# Patient Record
Sex: FEMALE | Race: WHITE | NOT HISPANIC OR LATINO | Employment: OTHER | URBAN - METROPOLITAN AREA
[De-identification: names, ages, dates, MRNs, and addresses within clinical notes are randomized per-mention and may not be internally consistent; named-entity substitution may affect disease eponyms.]

---

## 2017-01-18 ENCOUNTER — TELEPHONE (OUTPATIENT)
Dept: NEUROLOGY | Facility: CLINIC | Age: 67
End: 2017-01-18

## 2017-01-21 DIAGNOSIS — G25.0 ESSENTIAL TREMOR: Primary | ICD-10-CM

## 2017-01-21 DIAGNOSIS — Z82.0 FAMILY HISTORY OF MOVEMENT DISORDER: ICD-10-CM

## 2017-01-21 PROBLEM — G24.9 DYSTONIC MOVEMENTS: Status: ACTIVE | Noted: 2017-01-21

## 2017-01-21 PROBLEM — G25.2 DYSTONIC TREMOR: Status: ACTIVE | Noted: 2017-01-21

## 2017-01-23 ENCOUNTER — OFFICE VISIT (OUTPATIENT)
Dept: NEUROLOGY | Facility: CLINIC | Age: 67
End: 2017-01-23
Attending: INTERNAL MEDICINE
Payer: MEDICARE

## 2017-01-23 VITALS
BODY MASS INDEX: 24.75 KG/M2 | DIASTOLIC BLOOD PRESSURE: 77 MMHG | HEART RATE: 105 BPM | OXYGEN SATURATION: 98 % | HEIGHT: 63 IN | SYSTOLIC BLOOD PRESSURE: 149 MMHG | WEIGHT: 139.7 LBS

## 2017-01-23 DIAGNOSIS — G25.2 DYSTONIC TREMOR: Primary | ICD-10-CM

## 2017-01-23 DIAGNOSIS — R25.1 TREMOR: ICD-10-CM

## 2017-01-23 DIAGNOSIS — F40.240 CLAUSTROPHOBIA: ICD-10-CM

## 2017-01-23 DIAGNOSIS — G24.9 DYSTONIC MOVEMENTS: ICD-10-CM

## 2017-01-23 LAB
ANION GAP SERPL CALCULATED.3IONS-SCNC: 8 MMOL/L (ref 3–14)
BASOPHILS # BLD AUTO: 0 10E9/L (ref 0–0.2)
BASOPHILS NFR BLD AUTO: 0.3 %
BUN SERPL-MCNC: 16 MG/DL (ref 7–30)
CALCIUM SERPL-MCNC: 9.5 MG/DL (ref 8.5–10.1)
CHLORIDE SERPL-SCNC: 104 MMOL/L (ref 94–109)
CO2 SERPL-SCNC: 29 MMOL/L (ref 20–32)
CREAT SERPL-MCNC: 0.63 MG/DL (ref 0.52–1.04)
DIFFERENTIAL METHOD BLD: ABNORMAL
EOSINOPHIL # BLD AUTO: 0 10E9/L (ref 0–0.7)
EOSINOPHIL NFR BLD AUTO: 0.5 %
ERYTHROCYTE [DISTWIDTH] IN BLOOD BY AUTOMATED COUNT: 12 % (ref 10–15)
GFR SERPL CREATININE-BSD FRML MDRD: NORMAL ML/MIN/1.7M2
GLUCOSE SERPL-MCNC: 97 MG/DL (ref 70–99)
HCT VFR BLD AUTO: 41.6 % (ref 35–47)
HGB BLD-MCNC: 14.4 G/DL (ref 11.7–15.7)
LYMPHOCYTES # BLD AUTO: 2.4 10E9/L (ref 0.8–5.3)
LYMPHOCYTES NFR BLD AUTO: 40 %
MCH RBC QN AUTO: 33.3 PG (ref 26.5–33)
MCHC RBC AUTO-ENTMCNC: 34.6 G/DL (ref 31.5–36.5)
MCV RBC AUTO: 96 FL (ref 78–100)
MONOCYTES # BLD AUTO: 0.5 10E9/L (ref 0–1.3)
MONOCYTES NFR BLD AUTO: 8.2 %
NEUTROPHILS # BLD AUTO: 3.1 10E9/L (ref 1.6–8.3)
NEUTROPHILS NFR BLD AUTO: 51 %
PLATELET # BLD AUTO: 296 10E9/L (ref 150–450)
POTASSIUM SERPL-SCNC: 3.9 MMOL/L (ref 3.4–5.3)
RBC # BLD AUTO: 4.33 10E12/L (ref 3.8–5.2)
SODIUM SERPL-SCNC: 141 MMOL/L (ref 133–144)
WBC # BLD AUTO: 6.1 10E9/L (ref 4–11)

## 2017-01-23 PROCEDURE — 36415 COLL VENOUS BLD VENIPUNCTURE: CPT | Performed by: PSYCHIATRY & NEUROLOGY

## 2017-01-23 PROCEDURE — 85025 COMPLETE CBC W/AUTO DIFF WBC: CPT | Performed by: PSYCHIATRY & NEUROLOGY

## 2017-01-23 PROCEDURE — 80048 BASIC METABOLIC PNL TOTAL CA: CPT | Performed by: PSYCHIATRY & NEUROLOGY

## 2017-01-23 PROCEDURE — 99214 OFFICE O/P EST MOD 30 MIN: CPT | Performed by: PSYCHIATRY & NEUROLOGY

## 2017-01-23 RX ORDER — PRIMIDONE 50 MG/1
TABLET ORAL
Qty: 90 TABLET | Refills: 11 | Status: SHIPPED | OUTPATIENT
Start: 2017-01-23 | End: 2017-05-03

## 2017-01-23 RX ORDER — TRIHEXYPHENIDYL HYDROCHLORIDE 2 MG/1
TABLET ORAL
Qty: 30 TABLET | Refills: 11 | Status: SHIPPED | OUTPATIENT
Start: 2017-01-23 | End: 2017-05-03

## 2017-01-23 RX ORDER — LORAZEPAM 1 MG/1
TABLET ORAL
Qty: 3 TABLET | Refills: 0 | Status: SHIPPED | OUTPATIENT
Start: 2017-01-23 | End: 2017-05-03

## 2017-01-23 ASSESSMENT — PAIN SCALES - GENERAL: PAINLEVEL: NO PAIN (0)

## 2017-01-23 NOTE — NURSING NOTE
"Irish Rosales's goals for this visit include: consult  She requests these members of her care team be copied on today's visit information:     PCP: Braulio Victoria    Referring Provider:  Braulio Victoria MD  [facility]  [address]  [facility] [place] [address]    Chief Complaint   Patient presents with     Consult       Initial /77 mmHg  Pulse 105  Ht 1.6 m (5' 3\")  Wt 63.368 kg (139 lb 11.2 oz)  BMI 24.75 kg/m2  SpO2 98% Estimated body mass index is 24.75 kg/(m^2) as calculated from the following:    Height as of this encounter: 1.6 m (5' 3\").    Weight as of this encounter: 63.368 kg (139 lb 11.2 oz).  BP completed using cuff size: regular    Do you need any medication refills at today's visit? n  "

## 2017-01-23 NOTE — PROGRESS NOTES
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  She has had hand tremor her whole life.  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   She has neck head movements for 2-3 yrs.   She has  Received botox by [facility] - last injection was September 2016.     Family history of tremor in her mother  2 of 3 sisters have abnormal involuntary movements  Jazmyn voltz  [id number] orofacial dyskinesias -   Piedad Belz [id number]  laryngeal dystonia and laryngeal tremor and laryngeal spasm.     Plan  Consultation with Dr. Rod for botox  Consultation with Bryan Vasques for genetics - may have insurance coverage issues.   Consideration for primidone again  She has not been on artane  She has not had recent lft or cbc in her chart.   She is getting xanax from her pcp as needed.   She is interested in a dbs workup.   She may want to come in with Jazmyn and Piedad - both of whom have movement problems to have them videotaped if they agree at the same time.  Dystonia and tremor rating scale.   Return to be determined. May have her see me, Juhi or Dr. Haile.  Will need ativan for mri as has claustrophobia  Cognitive evaluation with Lilo Drew.     Carrillo Esposito MD  _____________________________________________________________________  Cc: 66 year old female   Consult requested by pcp    Outside records reviewed and revealed --      History obtained from patient and family      History of Present Illness  66 yrs old .     She has been seeing dr. Harris for botox of her face and neck. She had an injection in her neck in September that did not seem to work. She has been doing botox for about 4 yrs. She has had benefit in the past.     She has a family history of tremor in her mother  One sister has laryngeal spasm  Another sister has had orofacial dyskinesias     Her tremor improves with alcohol.   She cannot write well till after she has had a glass of wine.   She has been on primidone  in the past - was over 10 yrs ago and no clear side effects  She is taking zoloft for depression and depression  She wears glasses.   Vision is fine  She has no hearing problems  She sleeps okay  She denies snoring  Denies infection  Denies blood problems  Does not have heart problems  Denies lung problems  On cholesterol medication but does not have thyroid or diabetes  Does not have msk problems or bladder or bowel problems  She denies choking  She has a Rx for xanax for tremors and uses it once or twice per month.       14 Review of systems  are negative except for   Patient Active Problem List   Diagnosis     Spasm of vocal cords     Essential tremor     Osteoporosis     Hyperlipidemia LDL goal <160     Generalized anxiety disorder     History of major depression     Carpal tunnel syndrome     Closed nondisp fracture of distal phalanx of lesser toe of right foot     Cervical radiculopathy     Right shoulder pain     Impingement syndrome of right shoulder     Family history of tremor     Family history of movement disorder     Dystonic tremor     Dystonic movements        Allergies   Allergen Reactions     Sulfa Drugs Swelling and Rash     Atorvastatin      Leg cramps     Simvastatin      Leg cramp     Past Surgical History   Procedure Laterality Date     C bso, omentectomy w/demetris  1997     hysterectomy     C hand/finger surgery unlisted  1998     right carpal tunnel surgery     ------------other-------------  2004     vocal cord thyroplasty at [facility]     Release carpal tunnel Left 1/2/2015     Procedure: RELEASE CARPAL TUNNEL;  Surgeon: SHANIA Hernandez MD;  Location: [facility] OR     Release ulnar nerve (elbow) Left 1/2/2015     Procedure: RELEASE ULNAR NERVE (ELBOW);  Surgeon: SHANIA Hernandez MD;  Location: [facility] OR     Past Medical History   Diagnosis Date     Osteoporosis 8/2/2010     Family history of tremor 1/21/2017     Family history of movement disorder 1/21/2017     Dystonic tremor 1/21/2017      Dystonic movements 1/21/2017     Social History     Social History     Marital Status:      Spouse Name: N/A     Number of Children: N/A     Years of Education: N/A     Occupational History     Not on file.     Social History Main Topics     Smoking status: Never Smoker      Smokeless tobacco: Never Used     Alcohol Use: Yes      Comment: occasionally     Drug Use: No     Sexual Activity:     Partners: Male     Other Topics Concern      Service No     Blood Transfusions No     Caffeine Concern No     Occupational Exposure No     Hobby Hazards No     Sleep Concern No     Stress Concern No     Weight Concern No     Special Diet No     Back Care No     Exercise Yes     walk     Bike Helmet Yes     Seat Belt Yes     Self-Exams Yes     Social History Narrative        Lives in [place]    Daughter Karyn Sanchez        benign essential tremor with a strained quality to her voice consistent with mild laryngeal spasm        ALLERGIES:  She is allergic to sulfa drugs, atorvastatin and simvastatin.  The statin drugs caused leg cramps.            FAMILY HISTORY:  As noted above with 1 sister developing a voice tremor and another sister having what is described as a facial tremor.            SOCIAL HISTORY:  She does not smoke.  She does use alcohol on occasion.      Family History   Problem Relation Age of Onset     Hypertension Father      and mother     CEREBROVASCULAR DISEASE Father      Tremor Sister      voice tremor     Tremor Sister      facial tremor      Current Outpatient Prescriptions   Medication Sig Dispense Refill     rosuvastatin (CRESTOR) 5 MG tablet 1 tablet 3 times a week 45 tablet 1     sertraline (ZOLOFT) 100 MG tablet TAKE 1 TABLET (100 MG) BY MOUTH DAILY 90 tablet 0     botulinum toxin type A (BOTOX) 100 UNITS injection Inject 0.5units bilaterally for a total of 1.0 units 1 each      alprazolam (XANAX) 1 MG tablet Take 1 mg by mouth 3 times daily as needed.    "      Examination  B/P: 149/77, T: Data Unavailable, P: 105, R: Data Unavailable 139 lbs 11.2 oz  Blood pressure 149/77, pulse 105, height 1.6 m (5' 3\"), weight 63.368 kg (139 lb 11.2 oz), SpO2 98 %, not currently breastfeeding., Body mass index is 24.75 kg/(m^2).    General examination: well developed, nourished and normal affect  Carotid: No bruits. Chest CTA, Heart regular without gallops or murmurs. Abdomen soft nontender, no masses, bowel sounds intact. Periphery: normal pulses without edema. No skin lesions. MENTAL STATUS:  Alert, oriented x3.  Speech fluent with normal naming, repetition, comprehension.  Good right-left orientation, Can remember 3/3 objects.   5/5 on spelling world backwards. CRANIAL NERVES:  Disks flat. Pupils are equal, round, reactive to light.  Normal vascularity and fields. Extraocular movements full.  Facial sensation and movement normal.  Hearing intact. Palate moves symmetrically.  Tongue midline.  Sternocleidomastoid and trapezius strength intact.  Neck strength was normal.  NEUROLOGIC:  Tone: slight increased tone in legs. Motor in upper and lower extremities. 5/5.  Reflexes 2/4.  Toe signs downgoing.  Good finger-nose-finger, fine finger movement, heel-shin maneuver, sensation to light touch, position sense and vibration and temperature was normal. Gait normal. Romberg and postural stability intact Tremor  - vocal tremor with some dystonic features and has head/neck involvement. She has a postural tremor - more on the left than right. She has an irregular head tremor with left anterior movement and posterior movement along with some slight improvement with a sensory trick. She also involuntary movements of her forehead, and lower face.         Skyler Salas MD at 12/21/2016  3:14 PM       Status: Signed         Expand All Collapse All    HISTORY OF PRESENT ILLNESS:  Irish Rosales is a 66-year-old female referred for evaluation of tremor.        She tells me she has had a " "tremor of her hands \"most of her life.\"  For many years she has also had a voice tremor.  She did undergo vocal cord thyroplasty at the [facility] in the past for this.  I do note she has recently seen Dr. Christiano Mendenhall who felt that she had an essential tremor with mild laryngeal spasm and discussed Botox treatment for this.  Over the past year or so she has developed a tremor of her head and also facial movements.        She has been seen at the [facility] in the past and received Botox injections.  She recalls receiving them in her posterior cervical muscles and also her facial muscles.  Initially there was some improvement but the last round that apparently was just restricted to the neck really did not help.        In the past, she has tried propranolol and primidone, which did not help.  The [facility] notes indicates that Topamax was prescribed, but she cannot recall taking it.  She does take alprazolam as needed on occasion and it helps particularly at night.  She also has found alcohol helpful.        FAMILY HISTORY:  Notable for a sister who has a \"facial tremor\" and another sister who is developing a voice tremor.  There is no family history of limb tremor.        PAST MEDICAL HISTORY:  Otherwise rather unremarkable aside from anxiety and hyperlipidemia.        CURRENT MEDICATIONS:  Crestor, Zoloft and alprazolam.        ALLERGIES:  She is allergic to sulfa drugs, atorvastatin and simvastatin.  The statin drugs caused leg cramps.        FAMILY HISTORY:  As noted above with 1 sister developing a voice tremor and another sister having what is described as a facial tremor.        SOCIAL HISTORY:  She does not smoke.  She does use alcohol on occasion.        PHYSICAL EXAMINATION:    VITAL SIGNS:  The patient's heart rate is 71.  Blood pressure 142/76.    NEUROLOGIC:  She is alert and cooperative.  She has difficult to understand speech.  There is a tremulous quality to her voice but also a halting " quality.  She does have a tremor of the head which varies in its direction and sometimes is more pronounced towards the left and other times more pronounced to the right.  At times it is mainly horizontal and at other times it has a vertical component.  She has intermittent clonic movements of the facial muscles that involve the lower facial muscles more than the upper.  There is no blepharospasm.  She sometimes does raise her eyebrows.  Fundi are unremarkable.  Extraocular motility is full.  Facial strength and sensation are normal. Her palate moves in the midline.  There is no palatal myoclonus appreciated.  There are no abnormal tongue movements appreciated.  Limb strength and sensation are normal.  She has a very mild postural and action tremor of the upper extremities.  There are no dystonic limb movements.    TONE:  Her muscle tone is normal.    GAIT:  Normal.    REFLEXES:  2+.  Plantar responses are flexor.        IMPRESSION:  Complex tremor.        She appears to have a dystonic head tremor.  The facial movements are unusual in my experience.  They do not necessarily appear dystonic to me.  She has a mild postural and action tremor of the upper extremities and a complex voice tremor.      I suspect this is a familial disorder and probably some form of dystonia, but I cannot characterize it any further than that.        PLAN:  I do note she is scheduled to see Dr. Carrillo Esposito in February and I encouraged her to keep that appointment.  He is an expert in abnormal movements.  She understands today that I do not have any specific treatment to offer her.  We briefly discussed trying an anticholinergic agent but given the potential for side effects she declined.            SHWETA DENNEY MD                 D: 2016 13:42   T: 2016 14:19   MT: INGE#[id number]        Name:     HARSHAD PAYNE    MRN:      [id number]        Account:      [id number]    :      1950           Visit Date:   2016         Document: [id number]         cc: Christiano Victoria MD

## 2017-01-23 NOTE — PATIENT INSTRUCTIONS
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  She has had hand tremor her whole life.  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   She has neck head movements for 2-3 yrs.   She has  Received botox by [facility] - last injection was September 2016.     Family history of tremor in her mother  2 of 3 sisters have abnormal involuntary movements  Jazmyn voltz  [id number] orofacial dyskinesias -   Piedad Belz [id number]  laryngeal dystonia and laryngeal tremor and laryngeal spasm.     Plan  Consultation with Dr. Rod for botox  Consultation with Bryan Vasques for genetics - may have insurance coverage issues.   Consideration for primidone again  She has not been on artane  She has not had recent lft or cbc in her chart.   She is getting xanax from her pcp as needed.   She is interested in a dbs workup.   She may want to come in with Jazmyn and Piedad - both of whom have movement problems to have them videotaped if they agree at the same time.  Dystonia and tremor rating scale.   Return to be determined. May have her see me, Juhi or Dr. Haile.  Will need ativan for mri as has claustrophobia  Cognitive evaluation with Lilo Drew.     Carrillo Esposito MD

## 2017-01-23 NOTE — Clinical Note
1/23/2017       RE: Irish Rosales  [address] [facility] [address]     Dear Colleague,    Thank you for referring your patient, Irish Rosales, to the [facility] at [facility]. Please see a copy of my visit note below.      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  She has had hand tremor her whole life.  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   She has neck head movements for 2-3 yrs.   She has  Received botox by [facility] - last injection was September 2016.     Family history of tremor in her mother  2 of 3 sisters have abnormal involuntary movements  Jazmyn voltz  [id number] orofacial dyskinesias -   Piedad Belz [id number]  laryngeal dystonia and laryngeal tremor and laryngeal spasm.     Plan  Consultation with Dr. Rod for botox  Consultation with Bryan Vasques for genetics - may have insurance coverage issues.   Consideration for primidone again  She has not been on artane  She has not had recent lft or cbc in her chart.   She is getting xanax from her pcp as needed.   She is interested in a dbs workup.   She may want to come in with Jazmyn and Piedad - both of whom have movement problems to have them videotaped if they agree at the same time.   Return to be determined. May have her see me, Juhi or Dr. Haile.    Carrillo Esposito MD  _____________________________________________________________________  Cc: 66 year old female   Consult requested by pcp    Outside records reviewed and revealed --      History obtained from patient and family      History of Present Illness  66 yrs old .     She has been seeing dr. Harris for botox of her face and neck. She had an injection in her neck in September that did not seem to work. She has been doing botox for about 4 yrs. She has had benefit in the past.     She has a family history of tremor in her mother  One sister has laryngeal spasm  Another  sister has had orofacial dyskinesias     Her tremor improves with alcohol.   She cannot write well till after she has had a glass of wine.   She has been on primidone in the past - was over 10 yrs ago and no clear side effects  She is taking zoloft for depression and depression  She wears glasses.   Vision is fine  She has no hearing problems  She sleeps okay  She denies snoring  Denies infection  Denies blood problems  Does not have heart problems  Denies lung problems  On cholesterol medication but does not have thyroid or diabetes  Does not have msk problems or bladder or bowel problems  She denies choking  She has a Rx for xanax for tremors and uses it once or twice per month.       14 Review of systems  are negative except for   Patient Active Problem List   Diagnosis     Spasm of vocal cords     Essential tremor     Osteoporosis     Hyperlipidemia LDL goal <160     Generalized anxiety disorder     History of major depression     Carpal tunnel syndrome     Closed nondisp fracture of distal phalanx of lesser toe of right foot     Cervical radiculopathy     Right shoulder pain     Impingement syndrome of right shoulder     Family history of tremor     Family history of movement disorder     Dystonic tremor     Dystonic movements        Allergies   Allergen Reactions     Sulfa Drugs Swelling and Rash     Atorvastatin      Leg cramps     Simvastatin      Leg cramp     Past Surgical History   Procedure Laterality Date     C bso, omentectomy w/demetris  1997     hysterectomy     C hand/finger surgery unlisted  1998     right carpal tunnel surgery     ------------other-------------  2004     vocal cord thyroplasty at [facility]     Release carpal tunnel Left 1/2/2015     Procedure: RELEASE CARPAL TUNNEL;  Surgeon: SHANIA Hernandez MD;  Location: [facility] OR     Release ulnar nerve (elbow) Left 1/2/2015     Procedure: RELEASE ULNAR NERVE (ELBOW);  Surgeon: SHANIA Hernandez MD;  Location: [facility] OR     Past Medical  History   Diagnosis Date     Osteoporosis 8/2/2010     Family history of tremor 1/21/2017     Family history of movement disorder 1/21/2017     Dystonic tremor 1/21/2017     Dystonic movements 1/21/2017     Social History     Social History     Marital Status:      Spouse Name: N/A     Number of Children: N/A     Years of Education: N/A     Occupational History     Not on file.     Social History Main Topics     Smoking status: Never Smoker      Smokeless tobacco: Never Used     Alcohol Use: Yes      Comment: occasionally     Drug Use: No     Sexual Activity:     Partners: Male     Other Topics Concern      Service No     Blood Transfusions No     Caffeine Concern No     Occupational Exposure No     Hobby Hazards No     Sleep Concern No     Stress Concern No     Weight Concern No     Special Diet No     Back Care No     Exercise Yes     walk     Bike Helmet Yes     Seat Belt Yes     Self-Exams Yes     Social History Narrative        Lives in [place]    Daughter Karyn Sanchez        benign essential tremor with a strained quality to her voice consistent with mild laryngeal spasm        ALLERGIES:  She is allergic to sulfa drugs, atorvastatin and simvastatin.  The statin drugs caused leg cramps.            FAMILY HISTORY:  As noted above with 1 sister developing a voice tremor and another sister having what is described as a facial tremor.            SOCIAL HISTORY:  She does not smoke.  She does use alcohol on occasion.      Family History   Problem Relation Age of Onset     Hypertension Father      and mother     CEREBROVASCULAR DISEASE Father      Tremor Sister      voice tremor     Tremor Sister      facial tremor      Current Outpatient Prescriptions   Medication Sig Dispense Refill     rosuvastatin (CRESTOR) 5 MG tablet 1 tablet 3 times a week 45 tablet 1     sertraline (ZOLOFT) 100 MG tablet TAKE 1 TABLET (100 MG) BY MOUTH DAILY 90 tablet 0     botulinum toxin type A (BOTOX) 100  "UNITS injection Inject 0.5units bilaterally for a total of 1.0 units 1 each      alprazolam (XANAX) 1 MG tablet Take 1 mg by mouth 3 times daily as needed.         Examination  B/P: 149/77, T: Data Unavailable, P: 105, R: Data Unavailable 139 lbs 11.2 oz  Blood pressure 149/77, pulse 105, height 1.6 m (5' 3\"), weight 63.368 kg (139 lb 11.2 oz), SpO2 98 %, not currently breastfeeding., Body mass index is 24.75 kg/(m^2).    General examination: well developed, nourished and normal affect  Carotid: No bruits. Chest CTA, Heart regular without gallops or murmurs. Abdomen soft nontender, no masses, bowel sounds intact. Periphery: normal pulses without edema. No skin lesions. MENTAL STATUS:  Alert, oriented x3.  Speech fluent with normal naming, repetition, comprehension.  Good right-left orientation, Can remember 3/3 objects.   5/5 on spelling world backwards. CRANIAL NERVES:  Disks flat. Pupils are equal, round, reactive to light.  Normal vascularity and fields. Extraocular movements full.  Facial sensation and movement normal.  Hearing intact. Palate moves symmetrically.  Tongue midline.  Sternocleidomastoid and trapezius strength intact.  Neck strength was normal.  NEUROLOGIC:  Tone: slight increased tone in legs. Motor in upper and lower extremities. 5/5.  Reflexes 2/4.  Toe signs downgoing.  Good finger-nose-finger, fine finger movement, heel-shin maneuver, sensation to light touch, position sense and vibration and temperature was normal. Gait normal. Romberg and postural stability intact Tremor  - vocal tremor with some dystonic features and has head/neck involvement. She has a postural tremor - more on the left than right. She has an irregular head tremor with left anterior movement and posterior movement along with some slight improvement with a sensory trick. She also involuntary movements of her forehead, and lower face.         Skyler Salas MD at 12/21/2016  3:14 PM       Status: Signed         Expand All " "Collapse All    HISTORY OF PRESENT ILLNESS:  Irish Rosales is a 66-year-old female referred for evaluation of tremor.        She tells me she has had a tremor of her hands \"most of her life.\"  For many years she has also had a voice tremor.  She did undergo vocal cord thyroplasty at the [facility] in the past for this.  I do note she has recently seen Dr. Christiano Mendenhall who felt that she had an essential tremor with mild laryngeal spasm and discussed Botox treatment for this.  Over the past year or so she has developed a tremor of her head and also facial movements.        She has been seen at the [facility] in the past and received Botox injections.  She recalls receiving them in her posterior cervical muscles and also her facial muscles.  Initially there was some improvement but the last round that apparently was just restricted to the neck really did not help.        In the past, she has tried propranolol and primidone, which did not help.  The [facility] notes indicates that Topamax was prescribed, but she cannot recall taking it.  She does take alprazolam as needed on occasion and it helps particularly at night.  She also has found alcohol helpful.        FAMILY HISTORY:  Notable for a sister who has a \"facial tremor\" and another sister who is developing a voice tremor.  There is no family history of limb tremor.        PAST MEDICAL HISTORY:  Otherwise rather unremarkable aside from anxiety and hyperlipidemia.        CURRENT MEDICATIONS:  Crestor, Zoloft and alprazolam.        ALLERGIES:  She is allergic to sulfa drugs, atorvastatin and simvastatin.  The statin drugs caused leg cramps.        FAMILY HISTORY:  As noted above with 1 sister developing a voice tremor and another sister having what is described as a facial tremor.        SOCIAL HISTORY:  She does not smoke.  She does use alcohol on occasion.        PHYSICAL EXAMINATION:    VITAL SIGNS:  The patient's heart rate is 71.  Blood pressure 142/76. "    NEUROLOGIC:  She is alert and cooperative.  She has difficult to understand speech.  There is a tremulous quality to her voice but also a halting quality.  She does have a tremor of the head which varies in its direction and sometimes is more pronounced towards the left and other times more pronounced to the right.  At times it is mainly horizontal and at other times it has a vertical component.  She has intermittent clonic movements of the facial muscles that involve the lower facial muscles more than the upper.  There is no blepharospasm.  She sometimes does raise her eyebrows.  Fundi are unremarkable.  Extraocular motility is full.  Facial strength and sensation are normal. Her palate moves in the midline.  There is no palatal myoclonus appreciated.  There are no abnormal tongue movements appreciated.  Limb strength and sensation are normal.  She has a very mild postural and action tremor of the upper extremities.  There are no dystonic limb movements.    TONE:  Her muscle tone is normal.    GAIT:  Normal.    REFLEXES:  2+.  Plantar responses are flexor.        IMPRESSION:  Complex tremor.        She appears to have a dystonic head tremor.  The facial movements are unusual in my experience.  They do not necessarily appear dystonic to me.  She has a mild postural and action tremor of the upper extremities and a complex voice tremor.      I suspect this is a familial disorder and probably some form of dystonia, but I cannot characterize it any further than that.        PLAN:  I do note she is scheduled to see Dr. Carrillo Esposito in February and I encouraged her to keep that appointment.  He is an expert in abnormal movements.  She understands today that I do not have any specific treatment to offer her.  We briefly discussed trying an anticholinergic agent but given the potential for side effects she declined.            SHWETA DENNEY MD                 D: 12/21/2016 13:42   T: 12/21/2016 14:19   MT: [id number]         Name:     HARSHAD PAYNE    MRN:      [id number]        Account:      [id number]    :      1950           Visit Date:   2016        Document: [id number]         cc: Christiano Victoria MD               Again, thank you for allowing me to participate in the care of your patient.      Sincerely,    Carrillo Esposito MD

## 2017-01-23 NOTE — Clinical Note
Patient:  Irish Rosales  :   1950  MRN:     [id number]        MsJim Rosales  [address] [facility] [address]        2017    Dear ,    We are writing to inform you of your test results.    Resulted Orders   CBC with platelets differential   Result Value Ref Range    WBC 6.1 4.0 - 11.0 10e9/L    RBC Count 4.33 3.8 - 5.2 10e12/L    Hemoglobin 14.4 11.7 - 15.7 g/dL    Hematocrit 41.6 35.0 - 47.0 %    MCV 96 78 - 100 fl    MCH 33.3 (H) 26.5 - 33.0 pg    MCHC 34.6 31.5 - 36.5 g/dL    RDW 12.0 10.0 - 15.0 %    Platelet Count 296 150 - 450 10e9/L    Diff Method Automated Method     % Neutrophils 51.0 %    % Lymphocytes 40.0 %    % Monocytes 8.2 %    % Eosinophils 0.5 %    % Basophils 0.3 %    Absolute Neutrophil 3.1 1.6 - 8.3 10e9/L    Absolute Lymphocytes 2.4 0.8 - 5.3 10e9/L    Absolute Monocytes 0.5 0.0 - 1.3 10e9/L    Absolute Eosinophils 0.0 0.0 - 0.7 10e9/L    Absolute Basophils 0.0 0.0 - 0.2 10e9/L   Basic metabolic panel   Result Value Ref Range    Sodium 141 133 - 144 mmol/L    Potassium 3.9 3.4 - 5.3 mmol/L    Chloride 104 94 - 109 mmol/L    Carbon Dioxide 29 20 - 32 mmol/L    Anion Gap 8 3 - 14 mmol/L    Glucose 97 70 - 99 mg/dL      Comment:      Non Fasting    Urea Nitrogen 16 7 - 30 mg/dL    Creatinine 0.63 0.52 - 1.04 mg/dL    GFR Estimate >90  Non  GFR Calc   >60 mL/min/1.7m2    GFR Estimate If Black >90   GFR Calc   >60 mL/min/1.7m2    Calcium 9.5 8.5 - 10.1 mg/dL       Your test results fall within the expected range(s) or remain unchanged from previous results.  Please continue with current treatment plan.    Carrillo Esposito MD  dsb

## 2017-01-23 NOTE — MR AVS SNAPSHOT
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  She has had hand tremor her whole life.  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   She has neck head movements for 2-3 yrs.   She has  Received botox by [facility] - last injection was September 2016.     Family history of tremor in her mother  2 of 3 sisters have abnormal involuntary movements  Jazmyn voltz  [id number] orofacial dyskinesias -   Piedad Belz [id number]  laryngeal dystonia and laryngeal tremor and laryngeal spasm.     Plan  Consultation with Dr. Rod for botox  Consultation with Erickson Vasques for genetics - may have insurance coverage issues.   Consideration for primidone again  She has not been on artane  She has not had recent lft or cbc in her chart.   She is getting xanax from her pcp as needed.   She is interested in a dbs workup.   She may want to come in with Jazmyn and Piedad - both of whom have movement problems to have them videotaped if they agree at the same time.  Dystonia and tremor rating scale.   Return to be determined. May have her see me, Juhi or Dr. Haile.  Will need ativan for mri as has claustrophobia  Cognitive evaluation with Lilo Drew.     Carrillo Esposito MD        Follow-ups after your visit        Additional Services     GENETICS REFERRAL       Your provider has referred you to: erickson vasques      Please be aware that coverage of these services is subject to the terms and limitations of your health insurance plan.  Call member services at your health plan with any  benefit or coverage questions.      Please bring the following with you to your appointment:    (1) Any X-Rays, CTs or MRIs which have been performed.  Contact the facility where they were done to arrange for  prior to your scheduled appointment.   (2) List of current medications   (3) This referral request   (4) Any documents/labs given to you for this referral            NEUROPSYCHOLOGY REFERRAL       Your provider has referred you to:   Dr. Drew. dbs evaluation    All scheduling is subject to the client's specific insurance plan & benefits, provider/location availability, and provider clinical specialities.  Please arrive 15 minutes early for your first appointment and bring your completed paperwork.    Please be aware that coverage of these services is subject to the terms and limitations of your health insurance plan.  Call member services at your health plan with any benefit or coverage questions.    Please bring the following to your appointment:  >>   Any x-rays, CTs or MRIs which have been performed.  Contact the facility where they were done to arrange for  prior to your scheduled appointment.  Any new CT, MRI or other procedures ordered by your specialist must be performed at a [place] facility or coordinated by your clinic's referral office.    >>   List of current medications   >>   This referral request   >>   Any documents/labs given to you for this referral            PHYSIATRY REFERRAL       Your provider has referred you to:  Dr. Rod for botox.     Please be aware that coverage of these services is subject to the terms and limitations of your health insurance plan.  Call member services at your health plan with any benefit or coverage questions.      Please bring the following to your appointment:  >>   Any x-rays, CTs or MRIs which have been performed.  Contact the facility where they were done to arrange for  prior to your scheduled appointment.    >>   List of current  medications   >>   This referral request   >>   Any documents/labs given to you for this referral                  Your next 10 appointments already scheduled     May 03, 2017 11:20 AM   (Arrive by 11:05 AM)   New Movement Disorder with RACHANA Lock [facility] ([facility])    [address]  [address] [facility] [id number] [phone]              Future tests that were ordered for you today     Open Future Orders        Priority Expected Expires Ordered    MR Brain w/o Contrast Routine  1/23/2019 1/23/2017    NEUROPSYCHOLOGY REFERRAL Routine  1/23/2019 1/23/2017    CBC with platelets differential Routine  1/23/2019 1/23/2017    Basic metabolic panel Routine  1/23/2019 1/23/2017    GENETICS REFERRAL Routine  1/23/2019 1/23/2017    PHYSIATRY REFERRAL Routine  1/23/2019 1/23/2017            Who to contact     If you have questions or need follow up information about today's clinic visit or your schedule please contact [facility] directly at [phone].  Normal or non-critical lab and imaging results will be communicated to you by [employer], letter or phone within 4 business days after the clinic has received the results. If you do not hear from us within 7 days, please contact the clinic through [employer]t or phone. If you have a critical or abnormal lab result, we will notify you by phone as soon as possible.  Submit refill requests through [employer] or call your pharmacy and they will forward the refill request to us. Please allow 3 business days for your refill to be completed.          Additional Information About Your Visit        Lumat[employer]art Information     [employer] gives you secure access to your electronic health record. If you see a primary care provider, you can also send messages to your care team and make appointments. If you have questions, please call your primary care clinic.  If you do not have a primary care provider, please call  "[phone] and they will assist you.      [employer] is an electronic gateway that provides easy, online access to your medical records. With [employer], you can request a clinic appointment, read your test results, renew a prescription or communicate with your care team.     To access your existing account, please contact your [facility] or call [phone] for assistance.        Care EveryWhere ID     This is your Care EveryWhere ID. This could be used by other organizations to access your [place] medical records  [id number]        Your Vitals Were     Pulse Height BMI (Body Mass Index) Pulse Oximetry          105 1.6 m (5' 3\") 24.75 kg/m2 98%         Blood Pressure from Last 3 Encounters:   01/23/17 149/77   12/21/16 142/76   07/11/16 100/72    Weight from Last 3 Encounters:   01/23/17 63.368 kg (139 lb 11.2 oz)   12/21/16 63.549 kg (140 lb 1.6 oz)   04/29/15 58.968 kg (130 lb)                 Today's Medication Changes          These changes are accurate as of: 1/23/17  3:51 PM.  If you have any questions, ask your nurse or doctor.               Start taking these medicines.        Dose/Directions    LORazepam 1 MG tablet   Commonly known as:  ATIVAN   Used for:  Claustrophobia   Started by:  Carrillo Esposito MD        Take 1 mg 30 minutes prior to the mri scan. May repeat x 1   Quantity:  3 tablet   Refills:  0       primidone 50 MG tablet   Commonly known as:  MYSOLINE   Used for:  Tremor   Started by:  Carrillo Esposito MD        1/2 tab at night x 1 wk; then 1 tab at night x 1 wk then 1.5 tabs at night for 1 week, then 2 tabs at night x 1 week; then 2.5 tabs at night x 1 week then 3 tabs at night   Quantity:  90 tablet   Refills:  11       trihexyphenidyl 2 MG tablet   Commonly known as:  ARTANE   Used for:  Tremor   Started by:  Carrillo Esposito MD        1/2 tablet twice daily   Quantity:  30 tablet   Refills:  11            Where to get your medicines      Some of " these will need a paper prescription and others can be bought over the counter.  Ask your nurse if you have questions.     Bring a paper prescription for each of these medications    - LORazepam 1 MG tablet  - primidone 50 MG tablet  - trihexyphenidyl 2 MG tablet             Primary Care Provider Office Phone # Fax #    Brualio Victoria [phone]       [facility] [address]  [place] [address]        Thank you!     Thank you for choosing [facility]  for your care. Our goal is always to provide you with excellent care. Hearing back from our patients is one way we can continue to improve our services. Please take a few minutes to complete the written survey that you may receive in the mail after your visit with us. Thank you!             Your Updated Medication List - Protect others around you: Learn how to safely use, store and throw away your medicines at www.disposemymeds.org.          This list is accurate as of: 1/23/17  3:51 PM.  Always use your most recent med list.                   Brand Name Dispense Instructions for use    BOTOX 100 UNITS injection   Generic drug:  botulinum toxin type A     1 each    Inject 0.5units bilaterally for a total of 1.0 units       LORazepam 1 MG tablet    ATIVAN    3 tablet    Take 1 mg 30 minutes prior to the mri scan. May repeat x 1       primidone 50 MG tablet    MYSOLINE    90 tablet    1/2 tab at night x 1 wk; then 1 tab at night x 1 wk then 1.5 tabs at night for 1 week, then 2 tabs at night x 1 week; then 2.5 tabs at night x 1 week then 3 tabs at night       rosuvastatin 5 MG tablet    CRESTOR    45 tablet    1 tablet 3 times a week       sertraline 100 MG tablet    ZOLOFT    90 tablet    TAKE 1 TABLET (100 MG) BY MOUTH DAILY       trihexyphenidyl 2 MG tablet    ARTANE    30 tablet    1/2 tablet twice daily       XANAX 1 MG tablet   Generic drug:  ALPRAZolam      Take 1 mg by mouth 3 times daily as needed.

## 2017-01-24 ENCOUNTER — CARE COORDINATION (OUTPATIENT)
Dept: NEUROLOGY | Facility: CLINIC | Age: 67
End: 2017-01-24

## 2017-01-24 ENCOUNTER — TELEPHONE (OUTPATIENT)
Dept: NEUROLOGY | Facility: CLINIC | Age: 67
End: 2017-01-24

## 2017-01-24 NOTE — PROGRESS NOTES
botox and genetics consultation  Received: Yesterday       Vaibhav, MD Caprice Mcbride Matthew, JUAN ANTONIO; Storm Rod MD; Giovana Bales, PT; Briana Cuevas, EBENEZER Cc: Piedad Rose, RN                   Please call for consultations       1/24/17:  Message sent to the front staff to help arrange appointments with Dr. Rod, Bryan Vasques, and Lilo Drew per Dr. Esposito.

## 2017-01-24 NOTE — TELEPHONE ENCOUNTER
----- Message from Tacho Guthrie sent at 1/24/2017  8:43 AM CST -----  Message was sent to Giovana Adams to contact patient to schedule. I spoke to Lilo Drew and she'll contact patient to arrange.  ----- Message -----     From: Twila Lynch RN     Sent: 1/24/2017   6:26 AM       To: [id number]    Please help arrange an appointment with Dr. Rod for Botox, a genetics appointment with Bryan Vasques, and a neuropsych appointment with Viki.  Patient is being referred by Dr. Esposito.  Thank you!!

## 2017-01-25 ENCOUNTER — TELEPHONE (OUTPATIENT)
Dept: NEUROSURGERY | Facility: CLINIC | Age: 67
End: 2017-01-25

## 2017-01-25 NOTE — TELEPHONE ENCOUNTER
Spoke with pt about DBS workup appointments. She will be out of town 2/3 - 2/6, but any time after that is fine for scheduling. Will schedule appts and send letter with dates/times/locations/instructions to pt once everything is scheduled. She has my contact information and is welcome to call with any questions.

## 2017-02-07 ENCOUNTER — PRE VISIT (OUTPATIENT)
Dept: NEUROLOGY | Facility: CLINIC | Age: 67
End: 2017-02-07

## 2017-02-07 NOTE — TELEPHONE ENCOUNTER
1.  Date/reason for appt:2/17/17, Dystonia rating scale and tremor testing  2.  Referring provider:GIFTY MARINELLI  3.  Call to patient (Yes / No - short description):No, referred  4.  Previous care at / records requested from:   [facility]- office notes are in epic.

## 2017-02-13 ASSESSMENT — MOVEMENT DISORDERS SOCIETY - UNIFIED PARKINSONS DISEASE RATING SCALE (MDS-UPDRS)
TOTAL_SCORE: 12
HOBBIES_AND_OTHER_ACTIVITIES: SLIGHT: I AM A BIT SLOW BUT DO THESE ACTIVITIES EASILY.
GETTING_OUT_OF_BED_CAR_DEEP_CHAIR: NORMAL: NOT AT ALL (NO PROBLEMS).
HANDWRITING: MILD: SOME WORDS ARE UNCLEAR AND DIFFICULT TO READ.
WALKING_AND_BALANCE: NORMAL: NOT AT ALL (NO PROBLEMS).
EATING_TASKS: SLIGHT: I AM SLOW, BUT I DO NOT NEED ANY HELP HANDLING MY FOOD AND HAVE NOT HAD FOOD SPILLS WHILE EATING.
DRESSING: SLIGHT: I AM SLOW BUT I DO NOT NEED HELP.
SPEECH: MODERATE: MY SPEECH IS UNCLEAR ENOUGH THAT OTHERS ASK ME TO REPEAT MYSELF EVERY DAY EVEN THOUGH MOST OF MY SPEECH IS UNDERSTOOD.
TURNING_IN_BED: NORMAL: NOT AT ALL (NO PROBLEMS).
HYGIENE: SLIGHT:  I AM SLOW BUT I DO NOT NEED ANY HELP.
SALIVA_AND_DROOLING: NORMAL: NOT AT ALL (NO PROBLEMS).
CHEWING_AND_SWALLOWING: NORMAL: NO PROBLEMS.
TREMOR: MODERATE: SHAKING OR TREMOR CAUSES PROBLEMS WITH MANY OF MY DAILY ACTIVITES.
FREEZING: NORMAL: NOT AT ALL (NO PROBLEMS).

## 2017-02-13 ASSESSMENT — ENCOUNTER SYMPTOMS
JOINT SWELLING: 0
BACK PAIN: 0
NUMBNESS: 0
MUSCLE WEAKNESS: 0
NECK PAIN: 0
LOSS OF CONSCIOUSNESS: 0
DIZZINESS: 0
SPEECH CHANGE: 1
HEADACHES: 0
SEIZURES: 0
MUSCLE CRAMPS: 0
PARALYSIS: 0
MEMORY LOSS: 0
STIFFNESS: 0
ARTHRALGIAS: 0
TINGLING: 0
MYALGIAS: 1
WEAKNESS: 0
TREMORS: 1
DISTURBANCES IN COORDINATION: 0

## 2017-02-17 ENCOUNTER — OFFICE VISIT (OUTPATIENT)
Dept: NEUROLOGY | Facility: CLINIC | Age: 67
End: 2017-02-17

## 2017-02-17 VITALS
DIASTOLIC BLOOD PRESSURE: 62 MMHG | BODY MASS INDEX: 24.63 KG/M2 | HEIGHT: 63 IN | HEART RATE: 79 BPM | WEIGHT: 139 LBS | SYSTOLIC BLOOD PRESSURE: 147 MMHG

## 2017-02-17 DIAGNOSIS — G25.2 DYSTONIC TREMOR: ICD-10-CM

## 2017-02-17 DIAGNOSIS — G24.9 DYSTONIC MOVEMENTS: ICD-10-CM

## 2017-02-17 DIAGNOSIS — R25.1 TREMOR: ICD-10-CM

## 2017-02-17 DIAGNOSIS — G24.1 DYSTONIA, TORSION, FRAGMENTS OF: Primary | ICD-10-CM

## 2017-02-17 DIAGNOSIS — G24.3 SPASMODIC TORTICOLLIS: ICD-10-CM

## 2017-02-17 NOTE — LETTER
2017       RE: Irish Rosales  [address] [facility] [address]     Dear Colleague,    Thank you for referring your patient, Irish Rosales, to the [facility] NEUROLOGY at [facility]. Please see a copy of my visit note below.    GENETIC COUNSELING-Neurology  Irish Rosales comes to clinic with her sister due to a history of cervical/laryngeal dystonia. She has been seen by Ale Colvin and Vaibhav and they asked that I meet with her to review family history and genetic testing options.    MEDICAL HISTORY-  Please see Dr. Esposito and Vinicius's notes. Briefly, Irish reports hand tremor with onset at age 8-10.  This did not progress significantly over time.  In her 50's Irish noted a vocal dystonia and in the past 3-4 years has noted symptoms affected her neck and face.      FAMILY HISTORY-see scanned pedigree  1. Irish's sister has laryngeal dystonia with onset in the 50's.  She was present for today's visit and did not seem to display the other craniocervical findings, but she is a younger sister.  2. Irish's other sister (Jazmyn) has laryngeal dystonia with onset in the 50's and a blepharospasm more recently onset.  3. Irish's mother  at 72 with lung cancer- she had a mild hand tremor, but no other findings that would suggest neurologic disease.  4. Ethnic background is English and [place]ese.    GENETIC COUNSELING-  We discussed the genetic and environmental basis of dystonia. I explained that the extremely striking finding of laryngeal/vocal dystonia in 3 siblings is quite striking and strongly suggests a genetic component. I explained that many of the genes that have historically been identified for dystonia are associated generalized dystonia (e.g. DYT1) and we would not strongly suspect these in a family with very striking cervical/facial/laryngeal dystonia.      I explained that several newer genes (ANO3, GNAL, TUBB4A, CIZ) have been  identified that are more strongly associated with cervical dystonia. I explained that some of the newer genes are available for testing through [facility].  Otherwise, we will have a more up to date panel with all of these genes in the next few months.  I explained that if testing is an important factor in the DBS decision making process, we can test for the genes that we have available today. If it is not information that is important for the DBS decision, we can defer testing a few months until we have a more comprehensive group of genes available that focus on cervical dystonias.    We discussed the impact of a potential genetic diagnosis on other family members. I explained that most dystonias are dominant, but often have significantly reduced penetrance.  The family history is striking in that the three clearly affected individuals are all siblings, which would be more in keeping with recessive inheritance. Complicating this assessment is the fact that Irish's mother had a tremor, which may or may not be related to Irish's findings- as the tremor is not present in the other two sisters with dystonia.  Also complicating this is the above stated fact that for most dominant dystonias, penetrance is incomplete, which can lead to a pedigree that suggests recessive inheritance.    Irish indicated that she would like to defer testing a few months. We can check back on testing at a later date. I discussed the case with Dr. Colvin and Ida Juarez (movement disorders fellow)    Bryan Vasques MS, [facility]  Certified Genetic Counselor

## 2017-02-17 NOTE — PATIENT INSTRUCTIONS
We will have you start the Artane as follows:  Please take 1/2 tablet in the evening for one week then afterwards you can take 1/2 tablet twice daily.

## 2017-02-17 NOTE — MR AVS SNAPSHOT
After Visit Summary   2/17/2017    Irish Rosales    MRN: [id number]           Patient Information     Date Of Birth          1950        Visit Information        Provider Department      2/17/2017 11:00 AM Shayne Vasques GC [facility] Neurology        Today's Diagnoses     Dystonic tremor        Dystonic movements        Tremor           Follow-ups after your visit        Your next 10 appointments already scheduled     Feb 22, 2017  8:00 AM CST   (Arrive by 7:45 AM)   Neuropsych Eval with Lilo Drew, PhD [facility] Neuropsychology ([facility])    [address]  3rd Floor  [facility] [address] [phone]            Feb 27, 2017 11:00 AM CST   MR BRAIN W/O CONTRAST with [id number]   [place] [facility] ([facility])    [address] [address]   [phone]           Take your medicines as usual, unless your doctor tells you not to. Bring a list of your current medicines to your exam (including vitamins, minerals and over-the-counter drugs). Also bring the results of similar scans you may have had.  Please remove any body piercings and hair extensions before you arrive.  Follow your doctor s orders. If you do not, we may have to postpone your exam.  You will not have contrast for this exam. You do not need to do anything special to prepare.  The MRI machine uses a strong magnet. Please wear clothes without metal (snaps, zippers). A sweatsuit works well, or we may give you a hospital gown.   **IMPORTANT** THE INSTRUCTIONS BELOW ARE ONLY FOR THOSE PATIENTS WHO HAVE BEEN TOLD THEY WILL RECEIVE SEDATION OR GENERAL ANESTHESIA DURING THEIR MRI PROCEDURE:  IF YOU WILL RECEIVE SEDATION (take medicine to help you relax during your exam):   You must get the medicine from your doctor before you arrive. Bring the medicine to the exam. Do not take it at home.   Arrive one hour early. Bring someone who can take  you home after the test. Your medicine will make you sleepy. After the exam, you may not drive, take a bus or take a taxi by yourself.   No eating 8 hours before your exam. You may have clear liquids up until 4 hours before your exam. (Clear liquids include water, clear tea, black coffee and fruit juice without pulp.)  IF YOU WILL RECEIVE ANESTHESIA (be asleep for your exam):   Arrive 1 1/2 hours early. Bring someone who can take you home after the test. You may not drive, take a bus or take a taxi by yourself.   No eating 8 hours before your exam. You may have clear liquids up until 4 hours before your exam. (Clear liquids include water, clear tea, black coffee and fruit juice without pulp.)   You will spend four to five hours in the recovery room.  Please call the Imaging Department at your exam site with any questions.            Feb 27, 2017  1:30 PM CST   (Arrive by 1:15 PM)   New Patient Visit with Aleksander Morales MD   [facility] Neurosurgery ([facility])    [address] [place] [address] [phone]            May 03, 2017 11:20 AM CDT   (Arrive by 11:05 AM)   New Movement Disorder with RACHANA Lock CNP   [facility] Neurology ([facility])    [address] [place] [id number] [phone]              Who to contact     Please call your clinic at [phone] to:    Ask questions about your health    Make or cancel appointments    Discuss your medicines    Learn about your test results    Speak to your doctor   If you have compliments or concerns about an experience at your clinic, or if you wish to file a complaint, please contact [facility] Patient Relations at [phone] or email us at [email or web address]         Additional Information About Your Visit        MyChart Information     [facility] gives you secure access to your electronic health  record. If you see a primary care provider, you can also send messages to your care team and make appointments. If you have questions, please call your primary care clinic.  If you do not have a primary care provider, please call [phone] and they will assist you.      [facility] is an electronic gateway that provides easy, online access to your medical records. With [employer], you can request a clinic appointment, read your test results, renew a prescription or communicate with your care team.     To access your existing account, please contact your [facility] or call [phone] for assistance.        Care EveryWhere ID     This is your Care EveryWhere ID. This could be used by other organizations to access your [place] medical records  [id number]         Blood Pressure from Last 3 Encounters:   02/17/17 147/62   01/23/17 149/77   12/21/16 142/76    Weight from Last 3 Encounters:   02/17/17 63 kg (139 lb)   01/23/17 63.4 kg (139 lb 11.2 oz)   12/21/16 63.5 kg (140 lb 1.6 oz)              We Performed the Following     GENETICS REFERRAL        Primary Care Provider Office Phone # Fax #    Braulio Victoria [phone]       [facility] [address] [facility] [address]        Thank you!     Thank you for choosing [facility] NEUROLOGY  for your care. Our goal is always to provide you with excellent care. Hearing back from our patients is one way we can continue to improve our services. Please take a few minutes to complete the written survey that you may receive in the mail after your visit with us. Thank you!             Your Updated Medication List - Protect others around you: Learn how to safely use, store and throw away your medicines at www.disposemymeds.org.          This list is accurate as of: 2/17/17  3:53 PM.  Always use your most recent med list.                   Brand Name Dispense Instructions for use    BOTOX 100 UNITS injection   Generic drug:   botulinum toxin type A     1 each    Inject 0.5units bilaterally for a total of 1.0 units       LORazepam 1 MG tablet    ATIVAN    3 tablet    Take 1 mg 30 minutes prior to the mri scan. May repeat x 1       primidone 50 MG tablet    MYSOLINE    90 tablet    1/2 tab at night x 1 wk; then 1 tab at night x 1 wk then 1.5 tabs at night for 1 week, then 2 tabs at night x 1 week; then 2.5 tabs at night x 1 week then 3 tabs at night       rosuvastatin 5 MG tablet    CRESTOR    45 tablet    1 tablet 3 times a week       sertraline 100 MG tablet    ZOLOFT    90 tablet    TAKE 1 TABLET (100 MG) BY MOUTH DAILY       trihexyphenidyl 2 MG tablet    ARTANE    30 tablet    1/2 tablet twice daily       XANAX 1 MG tablet   Generic drug:  ALPRAZolam      Take 1 mg by mouth 3 times daily as needed.

## 2017-02-17 NOTE — PROGRESS NOTES
GENETIC COUNSELING-Neurology  Irish Rosales comes to clinic with her sister due to a history of cervical/laryngeal dystonia. She has been seen by Ale Colvin and Vaibhav and they asked that I meet with her to review family history and genetic testing options.    MEDICAL HISTORY-  Please see Dr. Esposito and Vinicius's notes. Briefly, Irish reports hand tremor with onset at age 8-10.  This did not progress significantly over time.  In her 50's Irish noted a vocal dystonia and in the past 3-4 years has noted symptoms affected her neck and face.      FAMILY HISTORY-see scanned pedigree  1. Irish's sister has laryngeal dystonia with onset in the 50's.  She was present for today's visit and did not seem to display the other craniocervical findings, but she is a younger sister.  2. Irish's other sister (Jazmyn) has laryngeal dystonia with onset in the 50's and a blepharospasm more recently onset.  3. Irish's mother  at 72 with lung cancer- she had a mild hand tremor, but no other findings that would suggest neurologic disease.  4. Ethnic background is English and [place].    GENETIC COUNSELING-  We discussed the genetic and environmental basis of dystonia. I explained that the extremely striking finding of laryngeal/vocal dystonia in 3 siblings is quite striking and strongly suggests a genetic component. I explained that many of the genes that have historically been identified for dystonia are associated generalized dystonia (e.g. DYT1) and we would not strongly suspect these in a family with very striking cervical/facial/laryngeal dystonia.      I explained that several newer genes (ANO3, GNAL, TUBB4A, CIZ) have been identified that are more strongly associated with cervical dystonia. I explained that some of the newer genes are available for testing through [facility].  Otherwise, we will have a more up to date panel with all of these genes in the next few months.  I explained that if testing is an  important factor in the DBS decision making process, we can test for the genes that we have available today. If it is not information that is important for the DBS decision, we can defer testing a few months until we have a more comprehensive group of genes available that focus on cervical dystonias.    We discussed the impact of a potential genetic diagnosis on other family members. I explained that most dystonias are dominant, but often have significantly reduced penetrance.  The family history is striking in that the three clearly affected individuals are all siblings, which would be more in keeping with recessive inheritance. Complicating this assessment is the fact that Irish's mother had a tremor, which may or may not be related to Irish's findings- as the tremor is not present in the other two sisters with dystonia.  Also complicating this is the above stated fact that for most dominant dystonias, penetrance is incomplete, which can lead to a pedigree that suggests recessive inheritance.    Irish indicated that she would like to defer testing a few months. We can check back on testing at a later date. I discussed the case with Dr. Colvin and Ida Juarez (movement disorders fellow)    Bryan Vasques MS, [facility]  Certified Genetic Counselor

## 2017-02-17 NOTE — MR AVS SNAPSHOT
After Visit Summary   2/17/2017    Irish Rosales    MRN: [id number]           Patient Information     Date Of Birth          1950        Visit Information        Provider Department      2/17/2017 10:20 AM Edwar Colvin MD [facility] Neurology        Care Instructions    We will have you start the Artane as follows:  Please take 1/2 tablet in the evening for one week then afterwards you can take 1/2 tablet twice daily.          Follow-ups after your visit        Your next 10 appointments already scheduled     Feb 22, 2017  8:00 AM CST   (Arrive by 7:45 AM)   Neuropsych Eval with Lilo Drew, PhD [facility] Neuropsychology ([facility])    [address]  3rd Floor  [facility] [id number] [phone]            Feb 27, 2017 11:00 AM CST   MR BRAIN W/O CONTRAST with [id number]   [facility])    [address] [facility] [address]   [phone]           Take your medicines as usual, unless your doctor tells you not to. Bring a list of your current medicines to your exam (including vitamins, minerals and over-the-counter drugs). Also bring the results of similar scans you may have had.  Please remove any body piercings and hair extensions before you arrive.  Follow your doctor s orders. If you do not, we may have to postpone your exam.  You will not have contrast for this exam. You do not need to do anything special to prepare.  The MRI machine uses a strong magnet. Please wear clothes without metal (snaps, zippers). A sweatsuit works well, or we may give you a hospital gown.   **IMPORTANT** THE INSTRUCTIONS BELOW ARE ONLY FOR THOSE PATIENTS WHO HAVE BEEN TOLD THEY WILL RECEIVE SEDATION OR GENERAL ANESTHESIA DURING THEIR MRI PROCEDURE:  IF YOU WILL RECEIVE SEDATION (take medicine to help you relax during your exam):   You must get the medicine from your doctor before you arrive. Bring the  medicine to the exam. Do not take it at home.   Arrive one hour early. Bring someone who can take you home after the test. Your medicine will make you sleepy. After the exam, you may not drive, take a bus or take a taxi by yourself.   No eating 8 hours before your exam. You may have clear liquids up until 4 hours before your exam. (Clear liquids include water, clear tea, black coffee and fruit juice without pulp.)  IF YOU WILL RECEIVE ANESTHESIA (be asleep for your exam):   Arrive 1 1/2 hours early. Bring someone who can take you home after the test. You may not drive, take a bus or take a taxi by yourself.   No eating 8 hours before your exam. You may have clear liquids up until 4 hours before your exam. (Clear liquids include water, clear tea, black coffee and fruit juice without pulp.)   You will spend four to five hours in the recovery room.  Please call the Imaging Department at your exam site with any questions.            Feb 27, 2017  1:30 PM CST   (Arrive by 1:15 PM)   New Patient Visit with Aleksander Morales MD   [facility] ([facility])    [address] [place] [id number] [phone]            May 03, 2017 11:20 AM CDT   (Arrive by 11:05 AM)   New Movement Disorder with RACHANA Lock [facility] Neurology ([facility])    [address] [place] [id number] [phone]              Who to contact     Please call your clinic at [phone] to:    Ask questions about your health    Make or cancel appointments    Discuss your medicines    Learn about your test results    Speak to your doctor   If you have compliments or concerns about an experience at your clinic, or if you wish to file a complaint, please contact [facility] Patient Relations at [phone] or email us at [email or web address]         Additional Information About Your  "Visit        MyChart Information     [facility] gives you secure access to your electronic health record. If you see a primary care provider, you can also send messages to your care team and make appointments. If you have questions, please call your primary care clinic.  If you do not have a primary care provider, please call [phone] and they will assist you.      [facility] is an electronic gateway that provides easy, online access to your medical records. With [facility], you can request a clinic appointment, read your test results, renew a prescription or communicate with your care team.     To access your existing account, please contact your [facility] or call [phone] for assistance.        Care EveryWhere ID     This is your Care EveryWhere ID. This could be used by other organizations to access your [place] medical records  [id number]        Your Vitals Were     Pulse Height BMI (Body Mass Index)             79 1.6 m (5' 3\") 24.62 kg/m2          Blood Pressure from Last 3 Encounters:   02/17/17 147/62   01/23/17 149/77   12/21/16 142/76    Weight from Last 3 Encounters:   02/17/17 63 kg (139 lb)   01/23/17 63.4 kg (139 lb 11.2 oz)   12/21/16 63.5 kg (140 lb 1.6 oz)              Today, you had the following     No orders found for display       Primary Care Provider Office Phone # Fax #    Braulio Victoria [phone]       [facility] [address]  [facility] [address]        Thank you!     Thank you for choosing [facility] NEUROLOGY  for your care. Our goal is always to provide you with excellent care. Hearing back from our patients is one way we can continue to improve our services. Please take a few minutes to complete the written survey that you may receive in the mail after your visit with us. Thank you!             Your Updated Medication List - Protect others around you: Learn how to safely use, store and throw away your medicines at " www.disposemymeds.org.          This list is accurate as of: 2/17/17 12:01 PM.  Always use your most recent med list.                   Brand Name Dispense Instructions for use    BOTOX 100 UNITS injection   Generic drug:  botulinum toxin type A     1 each    Inject 0.5units bilaterally for a total of 1.0 units       LORazepam 1 MG tablet    ATIVAN    3 tablet    Take 1 mg 30 minutes prior to the mri scan. May repeat x 1       primidone 50 MG tablet    MYSOLINE    90 tablet    1/2 tab at night x 1 wk; then 1 tab at night x 1 wk then 1.5 tabs at night for 1 week, then 2 tabs at night x 1 week; then 2.5 tabs at night x 1 week then 3 tabs at night       rosuvastatin 5 MG tablet    CRESTOR    45 tablet    1 tablet 3 times a week       sertraline 100 MG tablet    ZOLOFT    90 tablet    TAKE 1 TABLET (100 MG) BY MOUTH DAILY       trihexyphenidyl 2 MG tablet    ARTANE    30 tablet    1/2 tablet twice daily       XANAX 1 MG tablet   Generic drug:  ALPRAZolam      Take 1 mg by mouth 3 times daily as needed.

## 2017-02-17 NOTE — PROGRESS NOTES
"[facility]    HPI:  Ms. Rosales is 66 year old right handed woman with dystonia and tremor presenting for videotaped evaluation. She reports she has had tremor in the right>left hands since childhood. She notices this mainly with activities including writing, feeding herself, putting on makeup. She is unable to put on eye makeup but can brush her teeth and hair without issue. No issues with dressing herself or doing button. She is able to drink with a cup, rarely spills, sometimes will use two hands to stabilize the cup.     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    She has had vocal tremor with spasms for many years but this became more notical about 15 years ago and this has gotten much worse over the last 3-4 years. She had a vocal cord throplasty which helped with some breathy sounds but not the vocal spasms. She has had botox in the vocal cords with variable response.    She has developed facial movements in the last 3-4 years and the last 6 months she also developed head tremor and posterior neck tightness. She does receive botox injections at [facility] which she found helpful for the last 2-3 years but the most recent injections in Sept were less helpful.     She has been physically active and denies problems with balance. 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 She denies any tightness or cramping in her arms or legs.    She is does takes Xanax for both anxiety and can \"calm her movements\", she only takes this about twice a month.     She has been on propranolol previously without improve, she does not recall how high a dose she was on. She has precriptions for primidone and Artane but has not started these medications.     She follows with Dr. Esposito who she has most recently in Jan.     She has notice that with drinking ETOH, she has marked improvement in her tremors, voice, and facial movements.     No " "history of neuroleptic exposure.     In regards to DBS surgery, she would most want her head tremor and voice improved, she can \"live with the hand tremors\".     Past Surgical History   Procedure Laterality Date     C bso, omentectomy w/demetris  1997     hysterectomy     C hand/finger surgery unlisted  1998     right carpal tunnel surgery     ------------other-------------  2004     vocal cord [facility]roplasty at [facility]     Release carpal tunnel Left 1/2/2015     Procedure: RELEASE CARPAL TUNNEL;  Surgeon: SHANIA Hernandez MD;  Location: [facility] OR     Release ulnar nerve (elbow) Left 1/2/2015     Procedure: RELEASE ULNAR NERVE (ELBOW);  Surgeon: SHANIA Hernandez MD;  Location: [facility] OR     Past Medical History   Diagnosis Date     Depression      Dyslipidemia      Dystonic movements 1/21/2017     Dystonic tremor 1/21/2017     Family history of movement disorder 1/21/2017     Family history of tremor 1/21/2017     Osteoporosis 8/2/2010     Current Outpatient Prescriptions   Medication Sig Dispense Refill     LORazepam (ATIVAN) 1 MG tablet Take 1 mg 30 minutes prior to the mri scan. May repeat x 1 3 tablet 0     primidone (MYSOLINE) 50 MG tablet 1/2 tab at night x 1 wk; then 1 tab at night x 1 wk then 1.5 tabs at night for 1 week, then 2 tabs at night x 1 week; then 2.5 tabs at night x 1 week then 3 tabs at night 90 tablet 11     trihexyphenidyl (ARTANE) 2 MG tablet 1/2 tablet twice daily 30 tablet 11     rosuvastatin (CRESTOR) 5 MG tablet 1 tablet 3 times a week 45 tablet 1     sertraline (ZOLOFT) 100 MG tablet TAKE 1 TABLET (100 MG) BY MOUTH DAILY 90 tablet 0     botulinum toxin type A (BOTOX) 100 UNITS injection Inject 0.5units bilaterally for a total of 1.0 units 1 each      alprazolam (XANAX) 1 MG tablet Take 1 mg by mouth 3 times daily as needed.          Allergies   Allergen Reactions     Sulfa Drugs Swelling and Rash     Atorvastatin      Leg cramps     Simvastatin      Leg cramp     Social History     Social " History     Marital status:      Spouse name: N/A     Number of children: N/A     Years of education: N/A     Occupational History     Not on file.     Social History Main Topics     Smoking status: Never Smoker     Smokeless tobacco: Never Used     Alcohol use Yes      Comment: occasionally     Drug use: No     Sexual activity: Yes     Partners: Male     Other Topics Concern      Service No     Blood Transfusions No     Caffeine Concern No     Occupational Exposure No     Hobby Hazards No     Sleep Concern No     Stress Concern No     Weight Concern No     Special Diet No     Back Care No     Exercise Yes     walk     Bike Helmet Yes     Seat Belt Yes     Self-Exams Yes     Social History Narrative        Lives in [place]    Daughter Karyn Sanchez        benign essential tremor with a strained quality to her voice consistent with mild laryngeal spasm        ALLERGIES:  She is allergic to sulfa drugs, atorvastatin and simvastatin.  The statin drugs caused leg cramps.            FAMILY HISTORY:  As noted above with 1 sister developing a voice tremor and another sister having what is described as a facial tremor.  Mother with hand tremors          SOCIAL HISTORY:  She does not smoke.  She does use alcohol on occasion. She previously worked as a RN.         Jazmyn jimenez  [id number] orofacial dyskinesias    Piedad Harvey [phone]  laryngeal dystonia and laryngeal tremor and laryngeal spasm.              Family History   Problem Relation Age of Onset     Hypertension Father      and mother     CEREBROVASCULAR DISEASE Father      Tremor Mother      Lung Cancer Mother      Neurologic Disorder Sister      dystonic voice x 2 botox     Neurologic Disorder Sister      jose m mn. facial movement    One sister with orofacial dyskinesias and another sister with laryngeal dystonia and laryngeal tremor and laryngeal spasm.   Family heritage English and [place].   Mother with hand tremor     ROS:  "  Please see ROS questionnaire below    Patient Active Problem List   Diagnosis     Spasm of vocal cords     Essential tremor     Osteoporosis     Hyperlipidemia LDL goal <160     Generalized anxiety disorder     History of major depression     Carpal tunnel syndrome     Closed nondisp fracture of distal phalanx of lesser toe of right foot     Cervical radiculopathy     Right shoulder pain     Impingement syndrome of right shoulder     Family history of tremor     Family history of movement disorder     Dystonic tremor     Dystonic movements       Examination   139 lbs 0 oz  Blood pressure 147/62, pulse 79, height 1.6 m (5' 3\"), weight 63 kg (139 lb), not currently breastfeeding., Body mass index is 24.62 kg/(m^2).    General examination: pleasant woman, no apparent distress   Limited ROM with left lateral neck turn, neck pulls mildly to the left  No shoulder elevation    Neuro exam:   Mental status:  Alert, answers questions and follows commands appropriately    Cranial nerves:  EOMI, face symmetric with notable facial tremor, head tremor, vocal tremor with laryngeal tremor    Motor: normal tone, symmetric finger and toe tapping bi  Coordination: FTN intact bi without dysmetria   Gait: steady, symmetric arm swing, good turn    Please see rating scales below for more details    Dystonia movement scale:  Region Provoking factor Severity factor  Weight Product   eyes 0 0 0.5 0   Mouth 1 1 0.5 0.5   Speech/swallow 3 3 1.0 9   Neck 2 2 0.5 2   R arm 0 0 1.0 0   L arm 0 0 1.0 0   trunk 0 0 1.0 0   R leg 0 0 1.0 0   L leg 0 0 1.0 0   Total: 11.5    Fahn, Thien Apple tremor rating scale    Facial tremor 2  Tongue tremor protruded 0  Voice act/int: 3  Head- rest 2, post 2  RUE- rest0 , post 0, action 2  LUE- rest 0, post 1, action 0  Trunk -rest 0, post 0   RLE- rest 0, post 0, action 0  LLE- rest 0, post 0, action 0  Handwriting- 3  Drawing A R-2, L-3  Drawing B R-2, L-3  Drawing C R-2, L-2  Pouring- R-3, " L-3  Speaking-4  Feeding-2  Liquids- 1  Hygiene- 3  Dressing- 0  Writing- 2  Working -3    Total-50    TWSTRS Scale  A.   1. 2  2.0  3.a. 0b.0  4.0  5.0  B. 2  C.0  D.0  E.1  F.0  A.0  B.0  C.1  D.1  Total: 7      Assessment/plan:   Ms. Rosales is 66 year old woman with cervical dystonia, bilateral UE tremor and prominent facial and vocal tremor presenting for videotaped evaluation, positive family history strongly suggesting familial component. We did briefly discuss with her that it is difficult to say to what extent, if at all, that the head and vocal tremors will improve with DBS.     -appreciate genetics assistance   -videotaped assessment completed as above  -we will discuss her case at DBS case conference accordingly  -we did instruct her to start the Artane 1/2 of a 2mg tab at bedtime, increase to 1/2 tab BID after one week     The case and recommendations were discussed with Dr. Colvin, who has spoken with and examined the patient and helped to formulate the impression and recommendations.    Ida Juarez MD  Movement disorders fellow     Patient seen and examined with Dr. Juarez and I agree with the assessment and plan as outlined.    Edwar Colvin PhD, MD

## 2017-02-21 ENCOUNTER — CARE COORDINATION (OUTPATIENT)
Dept: PHYSICAL MEDICINE AND REHAB | Facility: CLINIC | Age: 67
End: 2017-02-21

## 2017-02-21 NOTE — PROGRESS NOTES
I called Irish Rosales to coordinate scheduling for a referral to Dr. Storm Rod.  Ms. Rosales was referred to Dr. Rod by Dr. Carrillo Esposito, Movement Disorder Specialist, Neurology, [facility], for evaluation for possible use of the botulinum toxins for management of dystonia and tremor.   I was unable to reach her and left voice-mail on her phone with the reason for my call and my contact information, as well as the contact information for my colleague, Briana Coronado RN.      Dr. Esposito is requesting we delay her appointment with Dr. Rod until she has completed all phases of her DBS work-up.    I requested a call back to coordinate scheduling this appointment and to discuss any concerns or questions she may have regarding this referral.  Currently awaiting call back.

## 2017-02-22 ENCOUNTER — OFFICE VISIT (OUTPATIENT)
Dept: NEUROPSYCHOLOGY | Facility: CLINIC | Age: 67
End: 2017-02-22

## 2017-02-22 DIAGNOSIS — G25.0 ESSENTIAL TREMOR: Primary | ICD-10-CM

## 2017-02-22 DIAGNOSIS — F09 MENTAL DISORDER DUE TO GENERAL MEDICAL CONDITION: ICD-10-CM

## 2017-02-22 NOTE — MR AVS SNAPSHOT
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           Follow-ups after your visit        Your next 10 appointments already scheduled     May 03, 2017 11:20 AM CDT   (Arrive by 11:05 AM)   New Movement Disorder with [facility] ([facility])    [address] [place] [id number] [phone]              Who to contact     Please call your clinic at [phone] to:    Ask questions about your health    Make or cancel appointments    Discuss your medicines    Learn about your test results    Speak to your doctor   If you have compliments or concerns about an experience at your clinic, or if you wish to file a complaint, please contact [facility] Patient Relations at [phone] or email us at [email or web address]         Additional Information About Your Visit        MyChart Information     [employer] gives you secure access to your electronic health record. If you see a primary care provider, you can also send messages to your care team and make appointments. If you have questions, please call your primary care clinic.  If you do not have a primary care provider, please call [phone] and they will assist you.      [employer] is an electronic gateway that provides easy, online access to your medical records. With [employer], you can request a clinic appointment, read your test results, renew a prescription or communicate with your care team.     To access your existing account, please contact your [facility] or call  [phone] for assistance.        Care EveryWhere ID     This is your Care EveryWhere ID. This could be used by other organizations to access your [facility] medical records  [id number]         Blood Pressure from Last 3 Encounters:   02/27/17 141/63   02/17/17 147/62   01/23/17 149/77    Weight from Last 3 Encounters:   02/27/17 64.4 kg (142 lb)   02/17/17 63 kg (139 lb)   01/23/17 63.4 kg (139 lb 11.2 oz)              We Performed the Following     [id number] TESTING, PER HR/PSYCHOLOGIST     NEUROPSYCH TESTING BY [facility]        Primary Care Provider Office Phone # Fax #    Braulio Victoria [phone]       [facility] [address] [facility] [address]        Thank you!     Thank you for choosing [facility] NEUROPSYCHOLOGY  for your care. Our goal is always to provide you with excellent care. Hearing back from our patients is one way we can continue to improve our services. Please take a few minutes to complete the written survey that you may receive in the mail after your visit with us. Thank you!             Your Updated Medication List - Protect others around you: Learn how to safely use, store and throw away your medicines at www.disposemymeds.org.          This list is accurate as of: 2/22/17 11:59 PM.  Always use your most recent med list.                   Brand Name Dispense Instructions for use    BOTOX 100 UNITS injection   Generic drug:  botulinum toxin type A     1 each    Inject 0.5units bilaterally for a total of 1.0 units       LORazepam 1 MG tablet    ATIVAN    3 tablet    Take 1 mg 30 minutes prior to the mri scan. May repeat x 1       primidone 50 MG tablet    MYSOLINE    90 tablet    1/2 tab at night x 1 wk; then 1 tab at night x 1 wk then 1.5 tabs at night for 1 week, then 2 tabs at night x 1 week; then 2.5 tabs at night x 1 week then 3 tabs at night       rosuvastatin 5 MG tablet    CRESTOR    45 tablet    1 tablet 3 times a week       sertraline 100 MG tablet     ZOLOFT    90 tablet    TAKE 1 TABLET (100 MG) BY MOUTH DAILY       trihexyphenidyl 2 MG tablet    ARTANE    30 tablet    1/2 tablet twice daily       XANAX 1 MG tablet   Generic drug:  ALPRAZolam      Take 1 mg by mouth 3 times daily as needed.

## 2017-02-22 NOTE — NURSING NOTE
The patient was seen for neuropsychological evaluation at the request of Dr. Carrillo Esposito for the purpose of diagnostic clarification and treatment planning.  2 hours of face to face testing were provided by this writer.  Please see Dr. Lilo Drew's report for a full interpretation of the findings.

## 2017-02-27 ENCOUNTER — OFFICE VISIT (OUTPATIENT)
Dept: NEUROSURGERY | Facility: CLINIC | Age: 67
End: 2017-02-27

## 2017-02-27 VITALS
TEMPERATURE: 97.9 F | DIASTOLIC BLOOD PRESSURE: 63 MMHG | HEART RATE: 102 BPM | HEIGHT: 63 IN | SYSTOLIC BLOOD PRESSURE: 141 MMHG | WEIGHT: 142 LBS | BODY MASS INDEX: 25.16 KG/M2

## 2017-02-27 DIAGNOSIS — R25.1 TREMOR: ICD-10-CM

## 2017-02-27 DIAGNOSIS — R09.89 BRUIT OF RIGHT CAROTID ARTERY: Primary | ICD-10-CM

## 2017-02-27 PROBLEM — Z92.89 H/O MAGNETIC RESONANCE IMAGING OF BRAIN AND BRAIN STEM: Status: ACTIVE | Noted: 2017-02-27

## 2017-02-27 ASSESSMENT — PAIN SCALES - GENERAL: PAINLEVEL: NO PAIN (0)

## 2017-02-27 NOTE — LETTER
2/27/2017       RE: Irish Rosales  [id number] [facility]     [place] [address]     Dear Colleague,    Thank you for referring your patient, Irish Rosales, to the [facility] NEUROSURGERY at [facility]. Please see a copy of my visit note below.    HISTORY AND PHYSICAL EXAM    Chief Complaint   Patient presents with     Consult     DBS evaluation; Dystonia and tremor       HISTORY OF PRESENT ILLNESS  We saw Ms. Irish Rosales in Neurosurgery Clinic today for DBS evaluation. She is a 66 year old right-handed female with history of Dystonic Tremor. 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 She also has cervical dystonia. Her right side is more affected than her left. She has had botox injections at the [facility] for the last 2-3 years, which have been becoming less affective recently. She also follows with Dr. Esposito. Today, she is doing well and comes to clinic with her friend. She is very nervous about staying in the hospital overnight. She does note that drinking will improve her vocal tremor. Her goals for DBS surgery are to decrease tremor, especially head and voice and improve dystonia. She stated that she can live with her hand tremors but wants other symptoms treated.      Past Medical History   Diagnosis Date     Depression      Dyslipidemia      Dystonic movements 1/21/2017     Dystonic tremor 1/21/2017     Family history of movement disorder 1/21/2017     Family history of tremor 1/21/2017     H/O magnetic resonance imaging of brain and brain stem 2/27/2017     MR BRAIN W/O CONTRAST 2/27/2017 11:46 AM  Provided History: dbs brain surgery for dystonia, Other specified forms of tremor, Dystonia, unspecified, Tremor, unspecified  Comparison:  No similar prior studies   Technique: Volumetric sagittal T1 and T2 weighted, axial  T2-weighted, turboFLAIR and diffusion-weighted with ADC map images of the brain were obtained without intravenous contrast.  Findings:     Osteoporosis 8/2/2010       Past Surgical History   Procedure Laterality Date     C bso, omentectomy w/demetris  1997     hysterectomy     C hand/finger surgery unlisted  1998     right carpal tunnel surgery     ------------other-------------  2004     vocal cord thyroplasty at [facility]     Release carpal tunnel Left 1/2/2015     Procedure: RELEASE CARPAL TUNNEL;  Surgeon: SHANIA Hernandez MD;  Location: [facility] OR     Release ulnar nerve (elbow) Left 1/2/2015     Procedure: RELEASE ULNAR NERVE (ELBOW);  Surgeon: SHANIA Hernandez MD;  Location: [facility] OR       Family History   Problem Relation Age of Onset     Hypertension Father      and mother     CEREBROVASCULAR DISEASE Father      Tremor Mother      Lung Cancer Mother      Neurologic Disorder Sister      dystonic voice x 2 botox     Neurologic Disorder Sister      jose m mn. facial movement        Social History     Social History     Marital status:      Spouse name: N/A     Number of children: N/A     Years of education: N/A     Occupational History     Not on file.     Social History Main Topics     Smoking status: Never Smoker     Smokeless tobacco: Never Used     Alcohol use Yes      Comment: occasionally     Drug use: No     Sexual activity: Yes     Partners: Male     Other Topics Concern      Service No     Blood Transfusions No     Caffeine Concern No     Occupational Exposure No     Hobby Hazards No     Sleep Concern No     Stress Concern No     Weight Concern No     Special Diet No     Back Care No     Exercise Yes     walk     Bike Helmet Yes     Seat Belt Yes     Self-Exams Yes     Social History Narrative        Lives in [place]    Daughter Karyn Sanchez        benign essential tremor with a strained quality to her voice consistent with mild laryngeal spasm        ALLERGIES:  She is  "allergic to sulfa drugs, atorvastatin and simvastatin.  The statin drugs caused leg cramps.            FAMILY HISTORY:  As noted above with 1 sister developing a voice tremor and another sister having what is described as a facial tremor.  Mother with hand tremors          SOCIAL HISTORY:  She does not smoke.  She does use alcohol on occasion. She previously worked as a RN.         Jazmyn barbara  [phone] orofacial dyskinesias    Piedad Harvey [phone]  laryngeal dystonia and laryngeal tremor and laryngeal spasm.                   Allergies   Allergen Reactions     Sulfa Drugs Swelling and Rash     Atorvastatin      Leg cramps     Simvastatin      Leg cramp       Current Outpatient Prescriptions   Medication     LORazepam (ATIVAN) 1 MG tablet     primidone (MYSOLINE) 50 MG tablet     trihexyphenidyl (ARTANE) 2 MG tablet     rosuvastatin (CRESTOR) 5 MG tablet     sertraline (ZOLOFT) 100 MG tablet     botulinum toxin type A (BOTOX) 100 UNITS injection     alprazolam (XANAX) 1 MG tablet     No current facility-administered medications for this visit.      PHYSICAL EXAM  /63 (BP Location: Right arm, Patient Position: Chair, Cuff Size: Adult Regular)  Pulse 102  Temp 97.9  F (36.6  C) (Oral)  Ht 1.6 m (5' 3\")  Wt 64.4 kg (142 lb)  Breastfeeding? No  BMI 25.15 kg/m2    General: Awake, alert, oriented. Well nourished, well developed, she is not in any acute distress.  HEENT: Head normocephalic, atraumatic. Soft carotid bruit on the right side. Neck supple. Good range of motion. No palpable thyroid mass.  Heart: Regular rhythm and rate. No murmurs.  Lungs: Clear to auscultation and percussion bilaterally. No ronchi, rales or wheeze.  Abdomen: Soft, non-tender, non-distended. No hepatosplenomegaly.  Extremity: Warm with no clubbing or cyanosis. No lower extremity edema.    Neurological  Awake, alert and oriented to date, time, place and person. Speech fluent.   Pupils equal, round, reactive to light.  Extraocular " movement intact.  Visual fields are full on confrontation.  Hearing is grossly normal to finger rub.   Facial sensation intact.  Face symmetric.  Tongue midline.  Uvula elevates equally.    Motor: full strength throughout.   Sensation: intact to light touch and pinpoint.  Deep tendon reflexes: 2+ throughout. Negative for clonus. Negative for Gilbert's sign. No dysmetria.      MRI brain 2/27/2017  Ventricles appear to be appropriate in size. No significant sulcal widening noted.    Findings:   Multiple images are degraded by motion artifact.     These images reveal no intracranial mass lesion, mass effect, midline shift or abnormal extraaxial fluid collection. The ventricles and sulci are normal for age. Diffusion-weighted images demonstrate no restricted diffusion.  Normal intravascular flow voids are identified. Several scattered nonspecific T2 hyperintense foci in bilateral cerebral white matter.     The visualized presence sinuses and mastoid air cells are clear. The orbits are unremarkable.         Impression: Several scattered nonspecific T2 hyperintense foci in supratentorial white matter of both cerebral hemispheres. Differential considerations are broad and include early chronic small ischemic disease, sequela of prior inflammation/infection or sequela of chronic migraines.      ASSESSMENT   66 year old female with a history of Dystonia and bilateral tremor, vocal dystonia and tremor, and head tremor.  Right side symptom is worse than left side.    She may ultimately be a bilateral candidate for implantation.  In terms of her target for DBS, I think it should be discussed.    Her MRI was reviewed and there are no obvious anatomical or structural concerns in terms of targeting for DBS.    On physical exam, I did detect a soft carotid bruit on the right side.  We will need to investigate this further.    During today's visit, we discussed both phase I and II of DBS surgery. We discussed that during Phase I, we  would place the DBS electrode on the left side under MAC and microelectrode recording. She would then return one week later for the phase II with placement of the DBS generator/battery over the left chest wall under general anesthesia. If she is a bilateral implantation candidate, in a staged manner, she would then return three weeks later for the phase I and II combined for the placement of the DBS electrode on the right side under MAC and microelectrode recording followed by connection to the previously implanted generator/battery.    Risks, benefits, alternative therapies were discussed with the patient, including but not limited to infection and bleeding. Surgical procedure was discussed in detail. I discussed the possible use of the VINCENT robotic surgical assistant, for the surgery. All questions were answered, and she expressed understanding.     A full history and physical exam was performed during today's visit. Her case will be discussed at the Movement Disorder Consensus Group Meeting to determine whether she is a good candidate for DBS surgery.      PLAN:  1. We will discuss her case at the Movement Disorder Consensus Group Meeting, and we will contact her regarding his DBS candidacy.   2. Order bilateral carotid ultrasound to evaluate for carotid stenosis - carotid bruit on physical exam.    IHannah, am serving as a scribe to document services personally performed by Aleksander Morales MD, PhD, based upon my observations and the provider's statements to me. All documentation has been reviewed and edited by the aforementioned doctor prior to being entered into the official medical record.    I, Aleksander Morales, attest that above named individual is acting in scribe capacity, has observed my performance of the services and has documented them in accordance with my direction. The documentation recorded by the scribe accurately reflects the service I personally performed and the decisions made by me.  The document was also partially recorded by me and the entire document was edited by me as well.       Again, thank you for allowing me to participate in the care of your patient.      Sincerely,    Aleksander Morales MD

## 2017-02-27 NOTE — PROGRESS NOTES
HISTORY AND PHYSICAL EXAM    Chief Complaint   Patient presents with     Consult     DBS evaluation; Dystonia and tremor       HISTORY OF PRESENT ILLNESS  We saw Ms. Irish Rosales in Neurosurgery Clinic today for DBS evaluation. She is a 66 year old right-handed female with history of Dystonic Tremor. 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 She also has cervical dystonia. Her right side is more affected than her left. She has had botox injections at the [facility] for the last 2-3 years, which have been becoming less affective recently. She also follows with Dr. Esposito. Today, she is doing well and comes to clinic with her friend. She is very nervous about staying in the hospital overnight. She does note that drinking will improve her vocal tremor. Her goals for DBS surgery are to decrease tremor, especially head and voice and improve dystonia. She stated that she can live with her hand tremors but wants other symptoms treated.      Past Medical History   Diagnosis Date     Depression      Dyslipidemia      Dystonic movements 1/21/2017     Dystonic tremor 1/21/2017     Family history of movement disorder 1/21/2017     Family history of tremor 1/21/2017     H/O magnetic resonance imaging of brain and brain stem 2/27/2017     MR BRAIN W/O CONTRAST 2/27/2017 11:46 AM  Provided History: dbs brain surgery for dystonia, Other specified forms of tremor, Dystonia, unspecified, Tremor, unspecified  Comparison:  No similar prior studies   Technique: Volumetric sagittal T1 and T2 weighted, axial T2-weighted, turboFLAIR and diffusion-weighted with ADC map images of the brain were obtained without intravenous contrast.  Findings:     Osteoporosis 8/2/2010       Past Surgical History   Procedure Laterality Date     C bso, omentectomy w/demetris  1997     hysterectomy     C hand/finger surgery unlisted  1998      right carpal tunnel surgery     ------------other-------------  2004     vocal cord thyroplasty at [facility]     Release carpal tunnel Left 1/2/2015     Procedure: RELEASE CARPAL TUNNEL;  Surgeon: SHANIA Hernandez MD;  Location: [facility] OR     Release ulnar nerve (elbow) Left 1/2/2015     Procedure: RELEASE ULNAR NERVE (ELBOW);  Surgeon: SHANIA Hernandez MD;  Location: [facility] OR       Family History   Problem Relation Age of Onset     Hypertension Father      and mother     CEREBROVASCULAR DISEASE Father      Tremor Mother      Lung Cancer Mother      Neurologic Disorder Sister      dystonic voice x 2 botox     Neurologic Disorder Sister      jose m mn. facial movement        Social History     Social History     Marital status:      Spouse name: N/A     Number of children: N/A     Years of education: N/A     Occupational History     Not on file.     Social History Main Topics     Smoking status: Never Smoker     Smokeless tobacco: Never Used     Alcohol use Yes      Comment: occasionally     Drug use: No     Sexual activity: Yes     Partners: Male     Other Topics Concern      Service No     Blood Transfusions No     Caffeine Concern No     Occupational Exposure No     Hobby Hazards No     Sleep Concern No     Stress Concern No     Weight Concern No     Special Diet No     Back Care No     Exercise Yes     walk     Bike Helmet Yes     Seat Belt Yes     Self-Exams Yes     Social History Narrative        Lives in [place]    Daughter Karyn Sanchez        benign essential tremor with a strained quality to her voice consistent with mild laryngeal spasm        ALLERGIES:  She is allergic to sulfa drugs, atorvastatin and simvastatin.  The statin drugs caused leg cramps.            FAMILY HISTORY:  As noted above with 1 sister developing a voice tremor and another sister having what is described as a facial tremor.  Mother with hand tremors          SOCIAL HISTORY:  She does not smoke.   "She does use alcohol on occasion. She previously worked as a RN.         Jazmyn barbara  [phone] orofacial dyskinesias    Piedad Harvey [phone]  laryngeal dystonia and laryngeal tremor and laryngeal spasm.                   Allergies   Allergen Reactions     Sulfa Drugs Swelling and Rash     Atorvastatin      Leg cramps     Simvastatin      Leg cramp       Current Outpatient Prescriptions   Medication     LORazepam (ATIVAN) 1 MG tablet     primidone (MYSOLINE) 50 MG tablet     trihexyphenidyl (ARTANE) 2 MG tablet     rosuvastatin (CRESTOR) 5 MG tablet     sertraline (ZOLOFT) 100 MG tablet     botulinum toxin type A (BOTOX) 100 UNITS injection     alprazolam (XANAX) 1 MG tablet     No current facility-administered medications for this visit.        Answers for HPI/ROS submitted by the patient on 2/13/2017   General Symptoms: No  Skin Symptoms: No  HENT Symptoms: No  EYE SYMPTOMS: No  HEART SYMPTOMS: No  LUNG SYMPTOMS: No  INTESTINAL SYMPTOMS: No  URINARY SYMPTOMS: No  GYNECOLOGIC SYMPTOMS: No  BREAST SYMPTOMS: No  SKELETAL SYMPTOMS: Yes  BLOOD SYMPTOMS: No  NERVOUS SYSTEM SYMPTOMS: Yes  MENTAL HEALTH SYMPTOMS: No  Back pain: No  Muscle aches: Yes  Neck pain: No  Swollen joints: No  Joint pain: No  Bone pain: No  Muscle cramps: No  Muscle weakness: No  Joint stiffness: No  Bone fracture: No  Trouble with coordination: No  Dizziness or trouble with balance: No  Fainting or black-out spells: No  Memory loss: No  Headache: No  Seizures: No  Speech problems: Yes  Tingling: No  Tremor: Yes  Weakness: No  Difficulty walking: No  Paralysis: No  Numbness: No      PHYSICAL EXAM  /63 (BP Location: Right arm, Patient Position: Chair, Cuff Size: Adult Regular)  Pulse 102  Temp 97.9  F (36.6  C) (Oral)  Ht 1.6 m (5' 3\")  Wt 64.4 kg (142 lb)  Breastfeeding? No  BMI 25.15 kg/m2    General: Awake, alert, oriented. Well nourished, well developed, she is not in any acute distress.  HEENT: Head normocephalic, atraumatic. Soft " carotid bruit on the right side. Neck supple. Good range of motion. No palpable thyroid mass.  Heart: Regular rhythm and rate. No murmurs.  Lungs: Clear to auscultation and percussion bilaterally. No ronchi, rales or wheeze.  Abdomen: Soft, non-tender, non-distended. No hepatosplenomegaly.  Extremity: Warm with no clubbing or cyanosis. No lower extremity edema.    Neurological  Awake, alert and oriented to date, time, place and person. Speech fluent.   Pupils equal, round, reactive to light.  Extraocular movement intact.  Visual fields are full on confrontation.  Hearing is grossly normal to finger rub.   Facial sensation intact.  Face symmetric.  Tongue midline.  Uvula elevates equally.    Motor: full strength throughout.   Sensation: intact to light touch and pinpoint.  Deep tendon reflexes: 2+ throughout. Negative for clonus. Negative for Gilbert's sign. No dysmetria.      MRI brain 2/27/2017  Ventricles appear to be appropriate in size. No significant sulcal widening noted.    Findings:   Multiple images are degraded by motion artifact.     These images reveal no intracranial mass lesion, mass effect, midline shift or abnormal extraaxial fluid collection. The ventricles and sulci are normal for age. Diffusion-weighted images demonstrate no restricted diffusion.  Normal intravascular flow voids are identified. Several scattered nonspecific T2 hyperintense foci in bilateral cerebral white matter.     The visualized presence sinuses and mastoid air cells are clear. The orbits are unremarkable.         Impression: Several scattered nonspecific T2 hyperintense foci in supratentorial white matter of both cerebral hemispheres. Differential considerations are broad and include early chronic small ischemic disease, sequela of prior inflammation/infection or sequela of chronic migraines.      ASSESSMENT   66 year old female with a history of Dystonia and bilateral tremor, vocal dystonia and tremor, and head tremor.  Right  side symptom is worse than left side.    She may ultimately be a bilateral candidate for implantation.  In terms of her target for DBS, I think it should be discussed.    Her MRI was reviewed and there are no obvious anatomical or structural concerns in terms of targeting for DBS.    On physical exam, I did detect a soft carotid bruit on the right side.  We will need to investigate this further.    During today's visit, we discussed both phase I and II of DBS surgery. We discussed that during Phase I, we would place the DBS electrode on the left side under MAC and microelectrode recording. She would then return one week later for the phase II with placement of the DBS generator/battery over the left chest wall under general anesthesia. If she is a bilateral implantation candidate, in a staged manner, she would then return three weeks later for the phase I and II combined for the placement of the DBS electrode on the right side under MAC and microelectrode recording followed by connection to the previously implanted generator/battery.    Risks, benefits, alternative therapies were discussed with the patient, including but not limited to infection and bleeding. Surgical procedure was discussed in detail. I discussed the possible use of the V[employer]ENT robotic surgical assistant, for the surgery. All questions were answered, and she expressed understanding.     A full history and physical exam was performed during today's visit. Her case will be discussed at the Movement Disorder Consensus Group Meeting to determine whether she is a good candidate for DBS surgery.      PLAN:  1. We will discuss her case at the Movement Disorder Consensus Group Meeting, and we will contact her regarding his DBS candidacy.   2. Order bilateral carotid ultrasound to evaluate for carotid stenosis - carotid bruit on physical exam.    I, Hannah Bateman, am serving as a scribe to document services personally performed by Aleksander Morales MD, PhD,  based upon my observations and the provider's statements to me. All documentation has been reviewed and edited by the aforementioned doctor prior to being entered into the official medical record.    I, Aleksander Morales, attest that above named individual is acting in scribe capacity, has observed my performance of the services and has documented them in accordance with my direction. The documentation recorded by the scribe accurately reflects the service I personally performed and the decisions made by me. The document was also partially recorded by me and the entire document was edited by me as well.

## 2017-02-27 NOTE — MR AVS SNAPSHOT
After Visit Summary   2/27/2017    Irish Rosales    MRN: [id number]           Patient Information     Date Of Birth          1950        Visit Information        Provider Department      2/27/2017 1:30 PM Aleksander Morales MD [facility] Neurosurgery        Today's Diagnoses     Bruit of right carotid artery    -  1    Tremor           Follow-ups after your visit        Your next 10 appointments already scheduled     Jun 12, 2017  9:00 AM CDT   (Arrive by 8:45 AM)   Return Visit with Aleksander Morales MD   [facility] Neurosurgery ([facility])    [address] [place] [address]  3rd [facility] [address]   [phone]            Jun 13, 2017 10:30 AM CDT   (Arrive by 10:15 AM)   PAC EVALUATION with  Pac Miguel 6   [facility] Preoperative Assessment [place] ([facility])    [address] [place] [address]   [phone]            Jun 13, 2017 11:30 AM CDT   (Arrive by 11:15 AM)   PAC RN ASSESSMENT with  Pac Rn   [facility] Preoperative Assessment [place] ([facility])    [address] [place] [address]   [phone]            Jun 13, 2017 12:10 PM CDT   (Arrive by 11:55 AM)   PAC Anesthesia Consult with  Pac Anesthesiologist   [facility] Preoperative Assessment [place] ([facility])    [address] [place] [address]   [phone]            Jun 20, 2017   Procedure with Aleksander Morales MD   [facility], [place], Same Day Surgery (--)    [address] [place] [address]   [phone]            Jun 20, 2017  8:40 AM CDT   CT HEAD W/O CONTRAST with UUCT1   [facility] [place] [place] ([facility])    [address] [facility] [address] [phone]           Please bring any scans or X-rays taken at other hospitals, if similar tests were done. Also bring a list of  your medicines, including vitamins, minerals and over-the-counter drugs. It is safest to leave personal items at home.  Be sure to tell your doctor:   If you have any allergies.   If there s any chance you are pregnant.   If you are breastfeeding.   If you have any special needs.  You do not need to do anything special to prepare.  Please wear loose clothing, such as a sweat suit or jogging clothes. Avoid snaps, zippers and other metal. We may ask you to undress and put on a hospital gown.            Jun 23, 2017   Procedure with Aleksander Morales MD   [facility], [place], Same Day Surgery (--)    [address]  [place] [address] [phone]              Future tests that were ordered for you today     Open Future Orders        Priority Expected Expires Ordered    CBC with platelets Routine  6/7/2018 6/7/2017    Basic metabolic panel Routine  6/7/2018 6/7/2017    UA with Microscopic reflex to Culture Routine  6/7/2018 6/7/2017    INR Routine  6/7/2018 6/7/2017    Partial thromboplastin time Routine  6/7/2018 6/7/2017            Who to contact     Please call your clinic at [phone] to:    Ask questions about your health    Make or cancel appointments    Discuss your medicines    Learn about your test results    Speak to your doctor   If you have compliments or concerns about an experience at your clinic, or if you wish to file a complaint, please contact [facility] Patient Relations at [phone] or email us at [email or web address]         Additional Information About Your Visit        [employer] Information     [employer]Simple gives you secure access to your electronic health record. If you see a primary care provider, you can also send messages to your care team and make appointments. If you have questions, please call your primary care clinic.  If you do not have a primary care provider, please call [phone] and they will assist you.      PaySimple is an electronic gateway  "that provides easy, online access to your medical records. With [employer], you can request a clinic appointment, read your test results, renew a prescription or communicate with your care team.     To access your existing account, please contact your [facility] or call [phone] for assistance.        Care EveryWhere ID     This is your Care EveryWhere ID. This could be used by other organizations to access your [place] medical records  [id number]        Your Vitals Were     Pulse Temperature Height Breastfeeding? BMI (Body Mass Index)       102 97.9  F (36.6  C) (Oral) 1.6 m (5' 3\") No 25.15 kg/m2        Blood Pressure from Last 3 Encounters:   05/03/17 144/64   02/27/17 141/63   02/17/17 147/62    Weight from Last 3 Encounters:   05/03/17 64 kg (141 lb)   02/27/17 64.4 kg (142 lb)   02/17/17 63 kg (139 lb)              We Performed the Following     Gela-Operative Worksheet, Single Procedure     Gela-Operative Worksheet, Single Procedure        Primary Care Provider Office Phone # Fax #    Braulio Victoria [phone]       [facility] [address]  [facility] [address]        Thank you!     Thank you for choosing [facility]  for your care. Our goal is always to provide you with excellent care. Hearing back from our patients is one way we can continue to improve our services. Please take a few minutes to complete the written survey that you may receive in the mail after your visit with us. Thank you!             Your Updated Medication List - Protect others around you: Learn how to safely use, store and throw away your medicines at www.disposemymeds.org.          This list is accurate as of: 2/27/17 11:59 PM.  Always use your most recent med list.                   Brand Name Dispense Instructions for use    BOTOX 100 UNITS injection   Generic drug:  botulinum toxin type A     1 each    Inject 0.5units bilaterally for a total of 1.0 units       " rosuvastatin 5 MG tablet    CRESTOR    45 tablet    1 tablet 3 times a week

## 2017-02-28 ENCOUNTER — TELEPHONE (OUTPATIENT)
Dept: NEUROLOGY | Facility: CLINIC | Age: 67
End: 2017-02-28

## 2017-02-28 NOTE — TELEPHONE ENCOUNTER
"                                                      Deep Brain Stimulation Surgery Surgical Candidacy Form    Referring Provider: Dr. Braulio Victoria (PCP in [place])   Patient Information: Lives in [place], [place]  Name: Irish Rosales  YOB: 1950  Age: 66 year old  Height: 5'3\"  Weight: 142 lbs.  BMI: 25.21  Blood Pressure: 141/63  Diagnosis: Dystonia and Tremor  Age of Onset of Symptoms: since childhood, vocal dystonia and head tremor began in her 40's.   Handedness: Right.  Side of Onset: ?   Disease Features: Dystonia, Tremor and bilateral upper extremity tremor; vocal dystonia and tremor, head tremor   7/25/18 - She is still disabled by R hand tremor as the arm approaches her mouth while eating.   Surgery Goals: Decrease tremor., Improve dystonia. and Other: Patient wants her head tremor and voice improved, she \"can live with her hand tremors.\"   Medications: As of 2/28/17  Current Outpatient Prescriptions   Medication Sig Dispense Refill     LORazepam (ATIVAN) 1 MG tablet Take 1 mg 30 minutes prior to the mri scan. May repeat x 1 3 tablet 0     primidone (MYSOLINE) 50 MG tablet 1/2 tab at night x 1 wk; then 1 tab at night x 1 wk then 1.5 tabs at night for 1 week, then 2 tabs at night x 1 week; then 2.5 tabs at night x 1 week then 3 tabs at night 90 tablet 11     trihexyphenidyl (ARTANE) 2 MG tablet 1/2 tablet twice daily 30 tablet 11     rosuvastatin (CRESTOR) 5 MG tablet 1 tablet 3 times a week 45 tablet 1     sertraline (ZOLOFT) 100 MG tablet TAKE 1 TABLET (100 MG) BY MOUTH DAILY 90 tablet 0     botulinum toxin type A (BOTOX) 100 UNITS injection Inject 0.5units bilaterally for a total of 1.0 units 1 each      alprazolam (XANAX) 1 MG tablet Take 1 mg by mouth 3 times daily as needed.        Tremor Assessment: 2/17/17  52 out of 144  TWSTRS: 7  Dystonia Movement Scale: 20.5    Tremor Assessment: 7/25/18  On Stim = 25  Off Stim = 34   Neuropsychological Evaluation: 2/22/17    Cognitive Issues: " Results indicate performance that falls within normal limits across cognitive domains, generally in the average to above average range. The findings do not reflect dementia at this time.    Psychiatric Issues: She has a history of depression which has been well managed with medications. Objective personality assessment falls within normal limits. She drinks 1-2 glasses of wine most days, which improves the tremor in her voice and hands and allows her to do tasks such as make phone calls and pay bills. She was never in any chemical dependency treatment programs. She denied illicit drug use, tobacco use, gambling, or other compulsive behaviors. She lives by herself, but her sister will stay with her after the surgery, and she has a next door neighbor who is a nurse and is very supportive.    Depression: No depression.(well managed)    Cognitive Function: No signs or symptoms of cognitive dysfunction.    Psychosis: No hallucinations.     MRI Date: 2/27/17    Impression:   Multiple images are degraded by motion artifact.     These images reveal no intracranial mass lesion, mass effect, midline  shift or abnormal extraaxial fluid collection. The ventricles and  sulci are normal for age. Diffusion-weighted images demonstrate no  restricted diffusion. Normal intravascular flow voids are identified.  Several scattered nonspecific T2 hyperintense foci in bilateral  cerebral white matter.     The visualized presence sinuses and mastoid air cells are clear. The  orbits are unremarkable.         Impression: Several scattered nonspecific T2 hyperintense foci in  supratentorial white matter of both cerebral hemispheres. Differential  considerations are broad and include early chronic small ischemic  disease, sequela of prior inflammation/infection or sequela of chronic  migraines.     PMH: -  Past Medical History:   Diagnosis Date     Depression      Dyslipidemia      Dystonic movements 1/21/2017     Dystonic tremor 1/21/2017      Encounter for neuropsychological testing 3/13/2017    2017 evaluation  IMPRESSIONS AND RECOMMENDATIONS   Current results indicate performance that falls within normal limits across cognitive domains, generally in the average to above average range. Objective personality assessment also falls within normal limits.   This pattern of performance does not reflect dementia at this time, nor is it suggestive of focal or lateralized cerebral involvement. Sh     Family history of movement disorder 1/21/2017     Family history of tremor 1/21/2017     H/O magnetic resonance imaging of brain and brain stem 2/27/2017    MR BRAIN W/O CONTRAST 2/27/2017 11:46 AM  Provided History: dbs brain surgery for dystonia, Other specified forms of tremor, Dystonia, unspecified, Tremor, unspecified  Comparison:  No similar prior studies   Technique: Volumetric sagittal T1 and T2 weighted, axial T2-weighted, turboFLAIR and diffusion-weighted with ADC map images of the brain were obtained without intravenous contrast.  Findings:     mild abnormality in carotid study 3/2/2017    BILATERAL DUPLEX CAROTID ULTRASOUND March 1, 2017 1:01 PM    HISTORY: Other specified symptoms and signs involving the circulatory and respiratory systems.   COMPARISON: None.   FINDINGS: There is mild atherosclerotic plaque in the carotid bifurcations. Flow velocities and waveforms show less than 50% diameter stenosis in both the right and left internal carotid arteries as assessed by each ICA PS     Osteoporosis 8/2/2010       Past Surgical History:   Procedure Laterality Date     ------------OTHER-------------  2004    vocal cord thyroplasty at [facility]     C BSO, OMENTECTOMY W/XAVIER  1997    hysterectomy     C HAND/FINGER SURGERY UNLISTED  1998    right carpal tunnel surgery     IMPLANT DEEP BRAIN STIMULATION GENERATOR / BATTERY Left 3/13/2018    Procedure: IMPLANT DEEP BRAIN STIMULATION GENERATOR / BATTERY;  Deep Brain Stimulator Placement, Phase II, Placement Of  Deep Brain Stimulator Generator/Battery Over The Left Chest Wall;  Surgeon: Aleksander Morales MD;  Location: [facility] OR     OPTICAL TRACKING SYSTEM INSERTION DEEP BRAIN STIMULATION Left 3/6/2018    Procedure: OPTICAL TRACKING SYSTEM INSERTION DEEP BRAIN STIMULATION;  Stealth Assisted Left Deep Brain Stimulator Placement, Phase I, Placement Of Left Side Deep Brain Stimulator Electrode, Target Left Ventral Intermediate Nucleus Of The Thalamus With Microelectrode Recording;  Surgeon: Aleksander Morales MD;  Location: [facility] OR     RELEASE CARPAL TUNNEL Left 1/2/2015    Procedure: RELEASE CARPAL TUNNEL;  Surgeon: SHANIA Hernandez MD;  Location: [facility] OR     RELEASE ULNAR NERVE (ELBOW) Left 1/2/2015    Procedure: RELEASE ULNAR NERVE (ELBOW);  Surgeon: SHANIA Hernandez MD;  Location: [facility] OR     Soc Hx:     Social History     Social History Narrative        Lives in [place]    Daughter Karyn Sanchez        benign essential tremor with a strained quality to her voice consistent with mild laryngeal spasm        ALLERGIES:  She is allergic to sulfa drugs, atorvastatin and simvastatin.  The statin drugs caused leg cramps.            FAMILY HISTORY:  As noted above with 1 sister developing a voice tremor and another sister having what is described as a facial tremor.  Mother with hand tremors          SOCIAL HISTORY:  She does not smoke.  She does use alcohol on occasion. She previously worked as a RN.         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            Former nurse    Family Hx of Movement Disorders: family history includes CEREBROVASCULAR DISEASE in her father; Hypertension in her father; Lung Cancer in her mother; Neurologic Disorder in her sister and sister; Tremor in her mother.    Consult Dr. Carrillo Esposito     Patient discussed at conference on: 3/2/17, 8/16/18     DBS Meeting Notes/Further Work-up  Needed: -  From the 3/2/17 Consensus meeting for DBS:    1. LVIM wait and see (possibility of improvement in voice tremor with one lead)   2. Abbott Infinity device -   3. Check on research interest   4. SLP to assess before and after implantation    1. Carrie Fitzgerald RN to contact patient to attend DBS class.  2. Surgery scheduling to contact patient to schedule the DBS surgery.    Per Dr. Edwar Colvin, patient should have post-surgical SLP evaluation 3 months after initial programming, but, if she comes under consideration for second side surgery sooner than that, then schedule the voice assessment as part of her workup for that.    When scheduling the voice assessment, make it clear that we are looking for a measure of the severity of her speech impairment (preferably a quantitative measure).  ---------------------  4/18/18 Francisco Gaytan, SLP    Today's evaluation shows subtle but significant improvement in voicing across perceptual, acoustic, and aerodynamic measures.  Perceptually, using two expert blinded raters the patient's overall severity on CAPE-V was noted to decrease by 8 points, with reduced tremor severity and strain.  Although most of the acoustic measures did not show significant differences pre to post activation of DBS, those which measure perturbation of the signal, Jitter (% frequency perturbation) and Shimmer (% intensity perturbation) did demonstrate a distinction, the latter being most notable with a decrease of .85%.  Aerodynamic measurement provided the most substantive changes with a marked improvement in efficiency of airflow when DBS was activated. Although still elevated compared to gender and age norms, comfortably sustained /a/ vowel was seen to improve in efficiency from over 2 standard deviations above the mean .227 L/s with DBS off to .167 L/s with the DBS on, which is just above the first standard deviation.  Additionally during running speech her total expiratory volume reduced  from 3.61 Liters of air with DBS off to .78  Liters of air with DBS on across a standard passage.  A visualization of this change in airflow, and the erratic nature of this airflow is provided above to underscore this difference.   This efficiency will make a marked difference in the vocal burden over the course of a day of conversation.  The patient herself noted significant improvement in voice quality, though effort ratings were unchanged.    8/16/18:    1. Dr. Hernandez to contact patient  2. 2nd side but neuropsych first  3. Abbott Infinity 5 RVIM/Vop pending neuropsych (unless patient wants bigger battery for longer life)  4. Zonisamide Dr. Hernandez?  5. We do not need to rediscuss if Dr. Drew finds her to be a reasonable candidate.  6. Dr. Hernandez to have clinic coordinators schedule neuropsych with Dr. Drew after she speaks with her.    I spoke with MsGuy Bobby to update her on the DBS consensus decision. We would like to offer her surgery for the second side - specifically two leads in the right thalamus: Vim and Vop/Voa. We hope to improve mixed essential tremor with dystonic features affecting the head and proximal arm.      We discussed that some of her tremor into the limbs may be referred by movement of the head. Bilateral stimulation may improve head tremor and thus limb function if this is the case.      The plan will be for a second surgery, during which two leads will be placed into the right thalamus for the left body: right Vim and right Vop/Voa. Device will be Abbott Infinity. She is ok with the larger Abbott generator.      In the meantime, we will start a low dose of zonisamide 25 mg HS, which may help reduce tremor. She does have a sulfa allergy to antibiotics. She reported a mild rash and mild swelling of her face. She denied respiratory problems. I informed her of the potential sulfa cross reactivity and she was counseled to immediately stop the medication if she were to develop a rash. Other  potential side effects are reduced sweating, hyperthermia (typically in pediatric patients), kidney stones, and reduced appetite. She was instructed to drink plenty of water and watch calorie content.      Sandy Hernandez MD  Movement Disorders Fellow

## 2017-03-01 ENCOUNTER — HOSPITAL ENCOUNTER (OUTPATIENT)
Dept: ULTRASOUND IMAGING | Facility: CLINIC | Age: 67
Discharge: HOME | End: 2017-03-01
Attending: NEUROLOGICAL SURGERY | Admitting: NEUROLOGICAL SURGERY
Payer: MEDICARE

## 2017-03-01 DIAGNOSIS — R09.89 BRUIT OF RIGHT CAROTID ARTERY: ICD-10-CM

## 2017-03-01 PROCEDURE — 93880 EXTRACRANIAL BILAT STUDY: CPT

## 2017-03-01 NOTE — PROGRESS NOTES
DEMENTIA RATING SCALE- 2          TRAIL MAKING TEST               Raw  MAS    Raw  MAS      Seconds  Errors  MAS  %ile       Attention  37  13  Conc  39  12    A  34  0  10  50       Init/Psv    36  10  Memory 24  10    B  52  0  13  84       Construct  6  10  Total  142/144  13                                   STROOP             WAIS-IV                Raw ? Tad ? Total  MAS  %ile           Raw    Age SS      Word  87 ? n/a ? 87  9  37     Similarities    30    13      Color  57 ? n/a ? 57  7  16     Comprehension    26    11      C/W  26 ? n/a ? 26  7  16     Letter Num. Seq    20    10                     Digit Span    27    10      WISCONSIN CARD SORTING TEST       Matrix Reas.    21    14      # Categories  6    >16 %ile                     % persev err.  6  T 62  88%ile       WIDE RANGE ACHIEVEMENT TEST- 4                           Standard   %ile    Grade    FRANKEL VERBAL LEARNIING TEST-Rev         Score    Rank    Equiv    Form  1             Reading    106    66    >12.9          Raw  T                       Trial 1      7         WECHSLER MEMORY SCALE- REVISED       Trial 2      11                 Raw  MAS  %ile    Trial 3      11         Information & Orientation    14        Total 1-3    29  55       Logical Memory Immed.    36  16  98    Learning      4         Logical Memory 30 Min.    32  15  95    Delay      11  58       Visual Reprod Immed.    28  7  16    Percent Retained    100  57       Visual Reprod 30 Min.    21  8  25    True Positives    12         30 Minute Recognition    4        Discrimination Index  12  59                       False Positives    0         BOSTON NAMING TEST                         Score  58  MAS  13  84  %ile      MARINELLI JUDGEMENT of LINE ORIENTATION      [ 58  w/o cues 2 w/phonemic cues]      Form  H                               Raw  26   MAS  13       CONTROLLED ORAL WORD ASSOC TEST                     Score  56  MAS  14  91 %ile                                      CLOCK DRAWING           SEMANTIC FLUENCY              Command 2 \3  Copy  3 \3   Score  57  MAS  13  84 %ile                                     MMPI-2-RF             MAS= [place] Older Adult Normative Study Age Adj. Scaled Scores

## 2017-03-02 PROBLEM — R93.89 ULTRASOUND SCAN ABNORMAL: Status: ACTIVE | Noted: 2017-03-02

## 2017-03-13 PROBLEM — Z01.89 ENCOUNTER FOR NEUROPSYCHOLOGICAL TESTING: Status: ACTIVE | Noted: 2017-03-13

## 2017-03-13 NOTE — PROGRESS NOTES
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 Information was obtained via interview with the patient and her sister, and review of her medical records. Records indicate that Ms. Rosales has a history of dystonia and tremor. She has tremor in her right hand worse than her left hand since childhood, which she notices mainly with activities including writing, feeding herself, and putting on makeup. She has not worked in the last four or five years because of difficulty with tasks like giving injections in her job as a nurse. She has had vocal tremor with spasms for many years which became more notable about 15 years ago and has become much worse over the last three or four years. She has developed facial movements over the last three or four years and in the last six months she also developed had tremor and posterior neck tightness for which she receives Botox injections. She is interested in undergoing deep brain stimulation (DBS) surgery for management of her symptoms. This neuropsychological evaluation was undertaken at the request of Carrillo Esposito M.D., as part of the presurgical protocol, to establish a neurocognitive baseline, and to evaluate mood, in order to assist with determining candidacy.    CLINICAL INTERVIEW FINDINGS    Upon interview, Ms. Rosales stated that she has been diagnosed with dystonia, and has had tremor in her hand since the age of 12. The tremor in her voice has become progressively worse. She was diagnosed with vocal dystonia 15 years ago, and recently started having a neck tremor. The neck and facial tremor are quite bothersome to her. The hand tremor is  making it harder for her to write, put on makeup, and eat. Her right side is worse, and she is right-handed. She stated that Dr. Colvin mentioned to her that her voice tremor was not likely to improve much with surgery. She has a good understanding of the surgical procedure and feels confident about being awake during the surgery. She listed bleeding and stroke, as risks of the surgery, and the possibility that the surgery may not work. She stated that she  100%  wants to proceed with surgery. Her sister stated that she admires her strength and courage, and that she is supportive of her pursuing the surgery. Ms. Rosales lives by herself, but her sister will stay with her after the surgery. Also, her next-door neighbor is a nurse and provides a lot of support to her.    Ms. Rosales denied difficulty with cognition, including memory, word finding, comprehension, attention or concentration, decision-making, or organization. She manages her own finances, medications, driving, and cooking, apparently without difficulty. She handles her personal cares independently.    Ms. Rosales reported a history of depression which has been well managed with medications. She met with a psychologist years ago. She has never been hospitalized for psychiatric treatment. She described her mood currently as good. She does not believe that she is depressed or anxious, and she is no more irritable than normal. She stated that she never cries. She sleeps well, eight hours a night, and she feels rested in the morning. She has not been napping during the day. Her appetite has been normal and her weight has been stable. Her energy level, interest level, and motivation are good. She denied suicidal ideation or any history of attempts to commit suicide. She denied visual or auditory hallucinations.    Most days, Ms. Rosales drinks 1 to 2 glasses of wine. The wine helps to diminish her tremor so she is able to make phone calls and pay her bills. Her  sister agreed that the tremor in her voice and hands improves considerably when she drinks a glass or two of wine. There was never a period of time when she drank much more than that, and she denied any history of chemical dependency treatment. She denied illicit drug use, tobacco use, gambling, or other compulsive behaviors.    Ms. Rosales completed an Associate s degree, and worked as an RN until four years ago. She left her work because of her health and problems with her voice and tremor. She has received disability. She was  to her first  for 12 years, and her second for 23 years before . She has three children and described herself as close with them.    Ms. Rosales denied any history of seizure, stroke, or head injury resulting loss of consciousness. Her balance has been good and she has not been falling. She has had carpal tunnel surgery on her left side and has improvements on that side since then. She denied headaches. She is bothered by tightness in the muscles of her neck.    PAST MEDICAL HISTORY:  Medical records indicate a history of generalized anxiety disorder, dystonic tremor, impingement syndrome of the right shoulder, cervical radiculopathy, carpal tunnel syndrome, depression, essential tremor, hyperlipidemia, and osteoporosis.    CURRENT MEDICATIONS: Include lorazepam, primidone, trihexyphenidyl, rosuvastatin, sertraline, botulinum toxin type A, and alprazolam.    FAMILY MEDICAL HISTORY:  Significant for a sister with laryngeal dystonia with onset in her 50s, another sister with laryngeal dystonia with onset in the 50s and more recent blepharospasm, a mother who  at 72 with lung cancer who had mild hand tremor, and a father with cerebrovascular disease.    BEHAVIORAL OBSERVATIONS    During the evaluation, Ms. Rosales was pleasant, cooperative, and seemed to understand the instructions. She was alert and oriented to person, place, and time. Tremors were observed clinically  in her hands, and gait was slow. Mood was euthymic. Speech was fluent with normal volume, but somewhat difficult to understand at times due to her vocal tremor. Spontaneous conversation was present and entirely appropriate. Ms. Rosales appeared to put forth consistent effort, and the results are believed to accurately reflect her current level of functioning.    MEASURES ADMINISTERED    The following measures were administered by a trained psychometrist, under the direct supervision of a licensed psychologist.    Subtests of the Wechsler Adult Intelligence Scale-4; Reading subtest of the Wide Range Achievement Test-4; subtests of the Wechsler Memory Scale-Revised; Woodard Verbal Learning Test-Revised; [place] Naming Test; Controlled Oral Word Association Test; Semantic Fluency; Trail Making Test; Stroop; [place] Card Sorting Test; Posey Judgement of Line Orientation; Clock Drawing; Dementia Rating Scale - 2 (DRS-2); [place] Multiphasic Personality Inventory -2-Restructured Form (MMPI-2-RF).     RESULTS AND INTERPRETATION    Overall intellectual functioning was estimated fall in the high average range, somewhat above premorbid estimates of average based on single word reading abilities. Performance on a screening measure of dementia was high average (DRS-2 Total Score = 142/144).    Confrontation naming was high average for her age. Verbal abstract reasoning was high average. Ability to comprehend and articulate responses to complex social situations was average. Letter fluency and generative naming to category were above average.    Attention span was average for her age. Divided attention was high average. Performance on a measure of distractibility was low average. Psychomotor processing speed was average.    Basic visual perception, including matching lines and angles, was average for her age. Construction of a clock to command was notable for difficulty with conceptualization. When asked to set the hands to  11:10, she instead set them to 2:00. Copy of a clock fell within normal limits. Nonverbal deductive reasoning was above average.    Novel problem-solving, including the ability to generate strategies and solutions, fell within normal limits for her age and level of education.    Immediate and 30 minute delayed recall of verbal narrative material were above average. On a multiple trial list learning task, immediate recall was high average, with high average retention (100%) following a 25 minute delay. Recognition memory on this task was above average. Immediate and 30 minute delayed recall of visual material was low average.    On the MMPI-2-RF, a self-report measure of mood and personality, Ms. Rosales responded to the items in a consistent and valid manner. Her level of emotional distress was low. She denied significant psychopathology, including depressed mood or ideation, anxiety, or psychosis.    IMPRESSIONS AND RECOMMENDATIONS    Current results indicate performance that falls within normal limits across cognitive domains, generally in the average to above average range. Objective personality assessment also falls within normal limits.    This pattern of performance does not reflect dementia at this time, nor is it suggestive of focal or lateralized cerebral involvement. She has a good understanding of the surgical procedure and the risks involved. She appears to be capable of comprehending medical information and making well reasoned decisions for herself. Ms. Rosales has a history of depression which has been well managed with medications. She does not currently appear to be experiencing emotional problems that might interfere with her judgment or ability to follow through with treatment recommendations. She drinks 1 to 2 glasses of wine most days, which improves the tremor in her voice and hands and allows her to do tasks such as make phone calls and pay bills. She was never in any chemical dependency  treatment programs. She denied illicit drug use, tobacco use, gambling, or other compulsive behaviors. She lives by herself, but her sister will stay with her after the surgery, and she has a next-door neighbor who is a nurse and who is very supportive. She appears to be a good candidate for surgery from a neurocognitive perspective.    Current results may serve as a baseline of her neurocognitive functioning. Repeated neuropsychological evaluation in one year may help to determine whether any cognitive difficulties develop. These results have not been discussed with Ms. Rosales or her family, although she was encouraged to contact my office to schedule an appointment for feedback should she so desire.    Lilo Drew, Ph.D., ABPP  Licensed Psychologist, LP [id number]  Board Certified in Clinical Neuropsychology    Time spent:  Four hours professional time, including interview, record review, data integration, and report writing (CPT 89658); an additional two hours, including testing administered by a psychometrist and interpreted by a neuropsychologist (CPT 92864). ICD-10 diagnosis: G25; F06.8.

## 2017-03-29 ENCOUNTER — TELEPHONE (OUTPATIENT)
Dept: NEUROLOGY | Facility: CLINIC | Age: 67
End: 2017-03-29

## 2017-03-29 DIAGNOSIS — R49.8 VOCAL TREMOR: ICD-10-CM

## 2017-03-29 DIAGNOSIS — R47.89 OTHER SPEECH DISTURBANCES: Primary | ICD-10-CM

## 2017-03-29 NOTE — TELEPHONE ENCOUNTER
From the 3/2/17 Consensus meeting for DBS:    1. LVIM wait and see (possibility of improvement in voice tremor with one lead)   2. [employer] Infinity device -   3. Check on research interest   4. SLP to assess before and after implantation  --------------------  Called  central scheduling to see how to order the before and after evaluation for SLP. Patient lives in [place] so [facility] would be closest. Once I know the surgery date I will call [facility] rehab at [phone] and let them know so they can call the patient to schedule the evaluation before and after the surgery. Will find out from providers, how long before the surgery and after the surgery the evaluations should take place. Order entered.  --------------------  Called patient to discuss. She is very excited and wants to move forward with the Infinity device even though it is not yet approved for MRI.  She would like to know more about research opportunities especially the 7T study. She has not been through the DBS class and would like to attend one of the sessions.     1. Carrie Fitzgerald RN to contact patient to attend DBS class.  2. Surgery scheduling to contact patient to schedule the DBS surgery.  --------------------------------  Per Dr. Edwar Colvin, patient should have post surgical SLP evaluation 3 months after initial programming, but, if she comes under consideration for second side surgery sooner than that, then schedule the voice assessment as part of her workup for that.    When scheduling the voice assessment, make it clear that we are looking for a measure of the severity of her speech impairment (preferably a quantitative measure).

## 2017-04-05 ENCOUNTER — TELEPHONE (OUTPATIENT)
Dept: NEUROSURGERY | Facility: CLINIC | Age: 67
End: 2017-04-05

## 2017-04-05 NOTE — TELEPHONE ENCOUNTER
Pt would like to attend our May DBS class. Will send a letter with all the relevant information. No further questions at this time.

## 2017-04-06 ENCOUNTER — HOSPITAL ENCOUNTER (OUTPATIENT)
Dept: SPEECH THERAPY | Facility: CLINIC | Age: 67
Setting detail: THERAPIES SERIES
End: 2017-04-06
Attending: PSYCHIATRY & NEUROLOGY
Payer: MEDICARE

## 2017-04-06 PROCEDURE — 40000251 ZZH STATISTIC VOICE CENTER VISIT: Performed by: STUDENT IN AN ORGANIZED HEALTH CARE EDUCATION/TRAINING PROGRAM

## 2017-04-06 PROCEDURE — G9171 VOICE CURRENT STATUS: HCPCS | Mod: GN,CL | Performed by: STUDENT IN AN ORGANIZED HEALTH CARE EDUCATION/TRAINING PROGRAM

## 2017-04-06 PROCEDURE — 92524 BEHAVRAL QUALIT ANALYS VOICE: CPT | Mod: GN | Performed by: STUDENT IN AN ORGANIZED HEALTH CARE EDUCATION/TRAINING PROGRAM

## 2017-04-06 PROCEDURE — G9173 VOICE D/C STATUS: HCPCS | Mod: GN,CL | Performed by: STUDENT IN AN ORGANIZED HEALTH CARE EDUCATION/TRAINING PROGRAM

## 2017-04-06 PROCEDURE — G9172 VOICE GOAL STATUS: HCPCS | Mod: GN,CL | Performed by: STUDENT IN AN ORGANIZED HEALTH CARE EDUCATION/TRAINING PROGRAM

## 2017-04-10 ENCOUNTER — CARE COORDINATION (OUTPATIENT)
Dept: PHYSICAL MEDICINE AND REHAB | Facility: CLINIC | Age: 67
End: 2017-04-10

## 2017-04-10 NOTE — PROGRESS NOTES
I spoke with Ms. Rosales today regarding the referral from Dr. Carrillo Esposito, Neurology, that is in place for her to see Dr. Storm Rod for consideration of Botox.  We discussed the referral and since she has decided to proceed with DBS placement we will hold off on Botox for now.  I explained that Botox would be an option in the future if she has continuing abnormal involuntary movements after DBS placement.  Ms. Rosales acknowledged understanding and is in agreement with this plan.    Dr. Esposito was notified of this call.

## 2017-04-13 NOTE — PROGRESS NOTES
"[facility] OP Voice Evaluation       04/06/17 1045   General Information   Type Of Visit Initial   Start Of Care Date 04/06/17   Referring Physician Carrillo Esposito MD  (neurologist)   Orders Evaluate And Treat   Orders Comment Per order: Evaluate pre and post DBS surgery for changes in vocal tremor   Medical Diagnosis Other speech disturbance, vocal tremor   Onset Of Illness/injury Or Date Of Surgery 03/29/17  (order date)   Precautions/Limitations no known precautions/limitations   Hearing WFL for evaluation   Avocational voice uses Pt's sister describes her as a socialable person   Surgical/Medical history reviewed Yes   Pertinent History Of Current Problem 67yo female with 15-20 year Hx of laryngeal dystonia and essential tremor.  She is hoping to have DBS surgery for treatment of this problem, and presents today for pre-op voice evaluation.  Pt reports worsening voice symptoms over the past few years.  Her voice will get \"stuck\" on sounds which greatly impacts her intelligibility, particularly on the phone and when talking to novel listeners.  Pt reports her voice is better with alcohol and with familiar listeners.  She feels that she reduces her voice use as a result of this problem.  Pt denies difficulty with swallowing, breathing, and/or cough.  Surgery is not yet scheduled, but she is hoping for this to be completed in the next few months.   Prior Level of Functioning (Previous medical/surgical treatment for this problem.)   Prior Level Of Function Comment Pt reports she has had tremors since she was a child that have progressively worsened over time.  Voice became involved 15-20 years ago and has worsened particularly in the past few years.  Pt had bilateral thyroplasty around 2005 at the [facility].  She has also had repeated Botox injections with Dr. Mendenhall at the [facility] initially in 2010 with the most recent injection in May 2015.  These were minimally effective.   General " "Observations Pt was accompanied to today's evaluation by her sister, Tea.  Pt was pleasant and cooperative.   Patient/family Goals To do what she can to prepare for the DBS surgery.   General Information Comments Pt reports today is a typical voice day.   Personal Rating / Voice Use Rating   Comments Voice Handicap Index-10: 38/40.   Evaluation Results   Voice Observations VISIBLE TENSION: neck, with visible head tremor.   Voice Profile during conversation, 1 min monologue and paragraph reading   Voice Quality Scratchy   Voice quality comments SPEECH: Severe tremor with consistent moderate strain and intermittent mild roughness.  Tremor is consistent with a horizontal and vertical component, intermittently characterized by regular phonation breaks and regular pitch variation.  Mixed adductor/abductor spasms are present in tasks differentiating between voiced and voiceless phonemes, and these will occasionally result in stuttering-like errors (e.g. \"s- s- s- sunlight\").  SINGING: Consistent regular tremor with intermittent aphonic breaks.  VOWEL PROLONGATION: comfortable pitch /a/: B3, initial regular voice breaks followed by regular pitch variation; high pitch /a/: C4, regular horizontal/vertical tremor; low pitch /a/: F3, regular horizontal tremor.   Voice quality severity rating continuum (1=Severe, 7=WNL) 3  (CAPE-V Overall Severity: 73/100)   Breath control Irregular   Breath Control comments Excessive thoracic muscle use pattern with clavicular elevation on inspiration.  Phonation is not coordinated with respiration.   Breath control severity rating continuum (1=Severe, 7=WNL) 4   Voice Use / Effort Contraction of neck muscles   Voice Use / Effort comments Pt reports her phonatory effort for speech as 7 out of 10 (10 is maximum effort).   Voice use / Effort severity rating continuum (1=Severe, 7=WNL) 4   Fundamental frequency (Hz) (Centered around A3)   Pitch / Frequency comments Tremor is sometimes " characterized by regular variations in pitch   Pitch / Frequency severity rating continuum (1=Severe, 7=WNL) 5   Volume comments Volume for conversational speech is WFL and appropriate for the setting.  A whisper has a regular waver from the head tremor.  Soft phonation is characterized by regular tremor.  Loud phonation is strained with regular tremor but fewer aphonic spasms.     Volume severity rating continuum (1=Severe, 7=WNL) 5   Neuromuscular Control Regular waver (tremor  4 -7 Hz);Voice arrests   Neuromuscular Control severity rating continuum (1=Severe, 7=WNL) 3   Resonance WNL   Resonance severity rating continuum (1=Severe, 7=WNL) 7   Comments Severe dysphonia characterized by horizontal/vertical tremor, intermittent mixed abductor/adductor spasms, strain, roughness, poor respiratory/phonatory coordination, increased phonatory effort, and reduced pitch range.   Adduction /Abduction Function   Laryngeal diadokinetic speed (Slow)   Laryngeal diadokinetic strength (Effortful)   Laryngeal diadokinetic consistency Regular   Function of Lengtheners / Shorteners (CT and TA Muscles)   Pitch glides Limited range  (Lowest pitch: F3; Highest pitch: Bb4)   General Therapy Interventions   Planned Therapy Interventions Voice   Intervention Comments Pt will return for post-operative voice evaluation, at which point appropriateness for therapy will be determined.   Impressions and Recommendations   Communication Diagnosis Dysphonia, Vocal tremor, Laryngeal dystonia   Summary Ms. Rosales presents with severe dysphonia characterized by horizontal/vertical tremor, intermittent mixed abductor/adductor spasms, strain, roughness, poor respiratory/phonatory coordination, increased phonatory effort, and reduced pitch range.  Dysphonia significantly impacts pt's intelligibility, which makes it difficult for her to communicate with others, particularly with novel communication partners.  Pt will be undergoing DBS surgery in the  next few months, and will return for post-operative voice evaluation at that time.   Recommendations Recommend that patient return for post-operative voice evaluation after recovery from DBS surgery to assess potential changes in voice quality/tremor.  Appropriateness for additional SLP intervention will be determined at that time.   Frequency and Duration One additional session of evaluation following recovery from DBS surgery.   Prognosis  Fair due to pending medical interventions   Risks and Benefits of Treatment have been explained. Yes   Patient & /or Caregiver  in agreement with plan of care Yes   Patient Education SLP provided education regarding results of today's evaluation to pt and sister.   Educational Assessment   Barriers to Learning No barriers   Preferred Learning Style Listening;Reading;Demonstration;Pictures / Video   Voice Goals   Voice Goals 1   Voice Goal 1   Goal Identifier Evaluation   Goal Description Patient will participate in post-operative voice evaluation to determine changes in vocal tremor per MD order.   Target Date 10/06/17   Total Session Time   Total Session Time 45   Total Evaluation Time 45   SLP Medicare Only G-code   G-code Voice   Voice   Voice: Current Status , Goal , Discharge -Ngeo Only-Modifier the same for all G-codes CL: 60-79% impairment   Voice Comments Based on evaluation     Thank you for the referral of this patient.    Allison Alpers, B.A. ([employer]), M.A., CCC-SLP  Speech-Language Pathologist  Certificate of Vocology  [facility]  [phone]

## 2017-04-19 ASSESSMENT — MOVEMENT DISORDERS SOCIETY - UNIFIED PARKINSONS DISEASE RATING SCALE (MDS-UPDRS)
DRESSING: SLIGHT: I AM SLOW BUT I DO NOT NEED HELP.
TOTAL_SCORE: 15
SALIVA_AND_DROOLING: NORMAL: NOT AT ALL (NO PROBLEMS).
TURNING_IN_BED: NORMAL: NOT AT ALL (NO PROBLEMS).
CHEWING_AND_SWALLOWING: NORMAL: NO PROBLEMS.
EATING_TASKS: SLIGHT: I AM SLOW, BUT I DO NOT NEED ANY HELP HANDLING MY FOOD AND HAVE NOT HAD FOOD SPILLS WHILE EATING.
HYGIENE: SLIGHT:  I AM SLOW BUT I DO NOT NEED ANY HELP.
TREMOR: MODERATE: SHAKING OR TREMOR CAUSES PROBLEMS WITH MANY OF MY DAILY ACTIVITES.
GETTING_OUT_OF_BED_CAR_DEEP_CHAIR: NORMAL: NOT AT ALL (NO PROBLEMS).
HOBBIES_AND_OTHER_ACTIVITIES: MILD: I HAVE SOME DIFFICULTY DOING THESE ACTIVITIES.
SPEECH: SEVERE: MOST OF ALL OF MY SPEECH CANNOT BE UNDERSTOOD.
HANDWRITING: MODERATE: MANY WORDS ARE UNCLEAR AND DIFFICULT TO READ.
FREEZING: NORMAL: NOT AT ALL (NO PROBLEMS).
WALKING_AND_BALANCE: NORMAL: NOT AT ALL (NO PROBLEMS).

## 2017-04-27 ENCOUNTER — TELEPHONE (OUTPATIENT)
Dept: NEUROSURGERY | Facility: CLINIC | Age: 67
End: 2017-04-27

## 2017-04-27 NOTE — TELEPHONE ENCOUNTER
Left pt a VM with DBS surgery dates and times and let her know I will be sending a packet in the mail with this information, arrival times, as well as information on preparing for surgery. I also let her know that we will schedule a PAC appt for her and the appt date/time will also be included in the packet. Left my direct contact information.

## 2017-04-28 DIAGNOSIS — R25.1 TREMOR: Primary | ICD-10-CM

## 2017-05-03 ENCOUNTER — OFFICE VISIT (OUTPATIENT)
Dept: NEUROLOGY | Facility: CLINIC | Age: 67
End: 2017-05-03

## 2017-05-03 VITALS
BODY MASS INDEX: 24.98 KG/M2 | SYSTOLIC BLOOD PRESSURE: 144 MMHG | HEART RATE: 67 BPM | HEIGHT: 63 IN | DIASTOLIC BLOOD PRESSURE: 64 MMHG | OXYGEN SATURATION: 98 % | WEIGHT: 141 LBS | RESPIRATION RATE: 20 BRPM

## 2017-05-03 DIAGNOSIS — G25.2 DYSTONIC TREMOR: Primary | ICD-10-CM

## 2017-05-03 RX ORDER — CALCIUM CARBONATE 500(1250)
500 TABLET ORAL 2 TIMES DAILY
COMMUNITY
End: 2018-03-09

## 2017-05-03 RX ORDER — ALPRAZOLAM 0.5 MG
1 TABLET ORAL DAILY PRN
COMMUNITY
Start: 2017-03-25 | End: 2017-10-27

## 2017-05-03 ASSESSMENT — PAIN SCALES - GENERAL: PAINLEVEL: NO PAIN (0)

## 2017-05-03 NOTE — Clinical Note
"5/3/2017       RE: Irish Rosales  [address] [facility] [id number]     Dear Colleague,    Thank you for referring your patient, Irish Rosales, to the [facility] NEUROLOGY at [facility]. Please see a copy of my visit note below.    PATIENT: Irish Rosales  : 1950    GIANNA: May 3, 2017    REASON FOR VISIT: Follow up for ***    HPI: Ms. Irish Rosales is a 66 year old *** handed *** female who came to the [facility] neurology clinic accompanied by her *** for a follow up visit.  She was last seen in the clinic on *** by *** for ***. Since then, ***. Overall, *** how do you think you are doing since last visit? States the main bothersome symptoms are ***.     Started LONG TIME A GO WITH HAND, THEN NOw involves voice, head.    Head   Voice face  Hard to make phone calls,  Hand to put make up & staff,    Can talk to family members, but it's difficult  Ask paying bill or ask questions      Has done botox -- voice  Face & necek, quit wrking  She had years ago thyroplasty to help voice but didn't 15 years ago, spasms       She has gone   A good candidiate  Surgery ,    Wanted to know if she needs to stay in the hospital [facility].       MEDICATIONS:   Outpatient Prescriptions Marked as Taking for the 5/3/17 encounter (Office Visit) with Xiomara Madsen APRN CNP   Medication Sig     ALPRAZolam (XANAX) 0.5 MG tablet Take 1 tablet by mouth daily as needed     calcium carbonate (OS- MG [place]. CA) 500 MG tablet Take 500 mg by mouth 2 times daily     sertraline (ZOLOFT) 100 MG tablet TAKE 1 TABLET BY MOUTH EVERY DAY     rosuvastatin (CRESTOR) 5 MG tablet 1 tablet 3 times a week     botulinum toxin type A (BOTOX) 100 UNITS injection Inject 0.5units bilaterally for a total of 1.0 units       ALLERGIES: Sulfa drugs; Atorvastatin; and Simvastatin    Vital Signs:  Blood pressure 144/64, pulse 67, resp. rate 20, height 1.6 m (5' 3\"), weight 64 kg (141 lb), " SpO2 98 %, not currently breastfeeding., Body mass index is 24.98 kg/(m^2).    ASSESSMENT/PLAN:    You may return to our clinic as needed.    The total time spent with the patient was 20 minutes, and greater than 50% of this time was spent in counseling and coordination of care.    RACHANA Roy, CNP  [facility]    Again, thank you for allowing me to participate in the care of your patient.      Sincerely,    RACHANA Booker CNP

## 2017-05-03 NOTE — MR AVS SNAPSHOT
After Visit Summary   5/3/2017    Irish Rosales    MRN: [id number]           Patient Information     Date Of Birth          1950        Visit Information        Provider Department      5/3/2017 11:20 AM Xiomara Madsen APRN CNP [facility] Neurology        Today's Diagnoses     Dystonic tremor    -  1       Follow-ups after your visit        Your next 10 appointments already scheduled     Jun 13, 2017 10:30 AM CDT   (Arrive by 10:15 AM)   PAC EVALUATION with  Pac Miguel 6   [facility] Preoperative [facility] [place] ([facility])    [address] [place] [id number]   [phone]            Jun 13, 2017 11:30 AM CDT   (Arrive by 11:15 AM)   PAC RN ASSESSMENT with Kylie Pac Rn   [facility] Assessment [place] ([facility])    [address] [place] [id number]   [phone]            Jun 13, 2017 12:10 PM CDT   (Arrive by 11:55 AM)   PAC Anesthesia Consult with  Pac Anesthesiologist   [facility] Assessment [place] ([facility])    [address] [place] [id number]   [phone]            Jun 20, 2017   Procedure with Aleksander Morales MD   [facility], [place], Same Day Surgery (--)    [address] [place] [id number]   [phone]            Jun 20, 2017  8:40 AM CDT   CT HEAD W/O CONTRAST with UUCT1   [facility], [place], [place] ([facility])    [address] [place] [id number] [phone]           Please bring any scans or X-rays taken at other hospitals, if similar tests were done. Also bring a list of your medicines, including vitamins, minerals and over-the-counter drugs. It is safest to leave personal items at home.  Be sure to tell your doctor:   If you have any allergies.   If there s any chance you are pregnant.   If you are breastfeeding.   If you have any  "special needs.  You do not need to do anything special to prepare.  Please wear loose clothing, such as a sweat suit or jogging clothes. Avoid snaps, zippers and other metal. We may ask you to undress and put on a hospital gown.            Jun 23, 2017   Procedure with Aleksander Morales MD   [facility], [place], Same Day Surgery (--)    [address]  [place] [id number] [phone]              Who to contact     Please call your clinic at [phone] to:    Ask questions about your health    Make or cancel appointments    Discuss your medicines    Learn about your test results    Speak to your doctor   If you have compliments or concerns about an experience at your clinic, or if you wish to file a complaint, please contact [facility] Patient Relations at [phone] or email us at [email or web address]         Additional Information About Your Visit        [employer] Information     [employer] gives you secure access to your electronic health record. If you see a primary care provider, you can also send messages to your care team and make appointments. If you have questions, please call your primary care clinic.  If you do not have a primary care provider, please call [phone] and they will assist you.      [employer] is an electronic gateway that provides easy, online access to your medical records. With [employer], you can request a clinic appointment, read your test results, renew a prescription or communicate with your care team.     To access your existing account, please contact your [facility] or call [phone] for assistance.        Care EveryWhere ID     This is your Care EveryWhere ID. This could be used by other organizations to access your [place] medical records  [id number]        Your Vitals Were     Pulse Respirations Height Pulse Oximetry Breastfeeding? BMI (Body Mass Index)    67 20 1.6 m (5' 3\") 98% No 24.98 kg/m2       Blood " Pressure from Last 3 Encounters:   05/03/17 144/64   02/27/17 141/63   02/17/17 147/62    Weight from Last 3 Encounters:   05/03/17 64 kg (141 lb)   02/27/17 64.4 kg (142 lb)   02/17/17 63 kg (139 lb)              Today, you had the following     No orders found for display       Primary Care Provider Office Phone # Fax #    Braulio Victoria [phone]       [facility] [address] [facility] [address]        Thank you!     Thank you for choosing [facility] NEUROLOGY  for your care. Our goal is always to provide you with excellent care. Hearing back from our patients is one way we can continue to improve our services. Please take a few minutes to complete the written survey that you may receive in the mail after your visit with us. Thank you!             Your Updated Medication List - Protect others around you: Learn how to safely use, store and throw away your medicines at www.disposemymeds.org.          This list is accurate as of: 5/3/17 11:59 PM.  Always use your most recent med list.                   Brand Name Dispense Instructions for use    ALPRAZolam 0.5 MG tablet    XANAX     Take 1 tablet by mouth daily as needed       BOTOX 100 UNITS injection   Generic drug:  botulinum toxin type A     1 each    Inject 0.5units bilaterally for a total of 1.0 units       calcium carbonate 500 MG tablet    OS- mg [place]. Ca     Take 500 mg by mouth 2 times daily       rosuvastatin 5 MG tablet    CRESTOR    45 tablet    1 tablet 3 times a week       sertraline 100 MG tablet    ZOLOFT    90 tablet    TAKE 1 TABLET BY MOUTH EVERY DAY

## 2017-05-03 NOTE — PROGRESS NOTES
"PATIENT: Irish Rosales    : 1950    GIANNA: May 3, 2017    REASON FOR VISIT: Follow up for dystonic tremor.    HPI: Ms. Irish Rosales is a 66 year old  female who came to the [facility] neurology clinic accompanied by her sister for a follow up visit.  She was initially seen in [facility] Disorder Neurology clinic on 2017 by Dr. Esposito for evaluation of dystonic tremor & consider deep brain stimulation.    She has a long standing Hx of tremor involving hands, voice, face, & head.  She has had Botox injection with minimal benefit.  \"It quit working.\"  About 15 years ago, she underwent bilateral thyroplasty to help voice tremor and spasms; but, it didn't help her.    Currently, she has a hard time making herself understood when she's on the phone. She is unable to pay bills or ask questions on the phone due to dysphonia, dysarthria, & voice tremor.    Since her last visit, she has completed DBS workup & has been informed to go forward with surgery.  He surgery is schedule on  & battery placement on .    Today's appointment was made when she was initially seen by Dr. Esposito to insure that her DBS workup is completed.  She is asking if she needs to stay in the hospital overnight.     She has reasonable expectation on DBS outcome.  She admits that no one has promised her positive outcome.  She has been told that the benefit that she might get is unclear.  She is willing to take the risk to have the surgery regardless of the outcome.       MEDICATIONS:   Medication Sig     ALPRAZolam (XANAX) 0.5 MG Take 1 tablet by mouth daily as needed     calcium carbonate 500 MG Take 500 mg by mouth 2 times daily     sertraline (ZOLOFT) 100 MG TAKE 1 TABLET BY MOUTH EVERY DAY     rosuvastatin (CRESTOR) 5 MG 1 tablet 3 times a week     botulinum toxin type A (BOTOX) 100 UNITS injection Inject 0.5units bilaterally for a total of 1.0 units       ALLERGIES: Sulfa drugs; Atorvastatin; and " "Simvastatin    Vital Signs:  Blood pressure 144/64, pulse 67, resp. rate 20, height 1.6 m (5' 3\"), weight 64 kg (141 lb), SpO2 98 %.  Body mass index is 24.98 kg/(m^2).    ASSESSMENT/PLAN:    Dystonic tremor:  Ms. Irish Rosales is a 66 year old  female who came to the [facility] neurology clinic accompanied by her sister for a follow up visit.  She has completed DBS workup & is scheduled for surgery.  As mentioned above, she has reasonable expectation of DBS outcome.  She was informed that she'll need to stay in the hospital after her initial surgery & discussed the reason, which she agreed.     The total time spent with the patient was 20 minutes, and greater than 50% of this time was spent in counseling and coordination of care.    RACHANA Roy, CNP  [facility] Neurology Clinic  "

## 2017-05-03 NOTE — NURSING NOTE
Chief Complaint   Patient presents with     Consult     [facility]- MEDS AND DBS DISCUSSION PER TUITE

## 2017-06-02 ENCOUNTER — CARE COORDINATION (OUTPATIENT)
Dept: GERIATRIC MEDICINE | Facility: CLINIC | Age: 67
End: 2017-06-02

## 2017-06-02 NOTE — PROGRESS NOTES
Client is new enrollee to [facility] effective 5/1/17 with [employer] health plan. Client transferred from [facility] system.  Writer was notified by [employer] on 6/1/17 of client's enrollment to [facility] that occurred on 5/1/17.    Writer placed call to client, introduced self as client s new CM. Confirmed with client that the welcome letter with writer's name and contact information has been received.  Initial home visit scheduled for Tuesday, June 13th at 1:00pm.  Confirmed w/ client that her apartment number is [id number] not [address].  Updated apartment number on CPS and in demographics in Epic.  Updated CMS of information.    INNA Heck  [employer]  [phone]  Fax: [phone]

## 2017-06-07 DIAGNOSIS — Z01.818 PREOPERATIVE EVALUATION TO RULE OUT SURGICAL CONTRAINDICATION: Primary | ICD-10-CM

## 2017-06-13 ENCOUNTER — ANESTHESIA EVENT (OUTPATIENT)
Dept: SURGERY | Facility: CLINIC | Age: 67
End: 2017-06-13

## 2017-06-14 ENCOUNTER — TELEPHONE (OUTPATIENT)
Dept: NEUROSURGERY | Facility: CLINIC | Age: 67
End: 2017-06-14

## 2017-06-14 ENCOUNTER — CARE COORDINATION (OUTPATIENT)
Dept: GERIATRIC MEDICINE | Facility: CLINIC | Age: 67
End: 2017-06-14

## 2017-06-14 NOTE — TELEPHONE ENCOUNTER
Called patient to check in as she No showed her appointment with Dr. Morales on Monday and her PA appointment on Tuesday. Patient states that she no longer wants to proceed with surgery as she was under the impression that the DBS would not fore sure help her with her voice tremors and only her hand tremors. Discussed with Dr. Morales and patient's surgery will be canceled.     Jenifer Townsend RN

## 2017-06-14 NOTE — PROGRESS NOTES
Home visit/[place] Risk Assessment/EW screening completed on: 6/13/17  Member resides: Apartment handicap accessible.  Resides at [facility] an independent senior living apartment building.  Member currently receiving the following services: None.  Independent in all ADL's and IADL's.  No services or DME order needed at this time.  Client is aware to call writer if she is in need of services or has a significant health status change.  See EMR for a list of client's diagnoses and medications. Reviewed [facility] Calls Service.  Medication management: Medications reviewed: Yes. Medication management: Independent-does not set up. Medication understanding: Patient has understanding of regimen and is adherent Yes and Do you have questions about your medications? No. MTM offered.  Falls: None.  ADL's/IADL's:  Independent with: Ambulation-no assistive device, Bathing, Dressing, Grooming, Eating, Positioning, Transfers, Toileting, Cleaning, Cooking, Laundry, Shopping, Meal prep, Medication Management, Money Management and Transportation (has a vehicle that she drives).                                  Dependent with: None  Member Mood/behavior-PHQ9 score:  1/27 indicating no major depression.  Reported feeling down d/t recent news she received at a doctors appointment that she would not be able to have surgery for her voice tremors.  [facility] sponsored benefits: Shared information re: Silver Sneakers/gym memberships (client is already utilizing the gym membership perk), ASA, Calcium +D.  Plan of Care Is: Contiue to remain independent in her own apartment.  Caregiver support: Daughter Halley lives in the area and helps support client.  Reviewed Community wide emergency planning: Client is able to call 911, exit the apartment building, take cover, and follow emergency response instructions as needed.  Release of Information: Okay to share scheduling, medical, and billing information w/ her  daughter Halley Sanchez.  Okay to leave scheduling, medical, and billing information on client's voicemail.  Follow-Up Plan: Member informed of future contact, plan to f/u with member with a 6 month telephone assessment.  Contact information shared with member and family, encouraged member to call with any questions or concerns prior to this.  See [facility] for further detailed information    INNA Heck  [employer]  [phone]  Fax: [phone]

## 2017-06-15 ASSESSMENT — PATIENT HEALTH QUESTIONNAIRE - PHQ9: SUM OF ALL RESPONSES TO PHQ QUESTIONS 1-9: 0

## 2017-06-16 ENCOUNTER — CARE COORDINATION (OUTPATIENT)
Dept: GERIATRIC MEDICINE | Facility: CLINIC | Age: 67
End: 2017-06-16

## 2017-06-16 PROBLEM — Z71.89 ADVANCE CARE PLANNING: Status: ACTIVE | Noted: 2017-06-16

## 2017-06-16 NOTE — PROGRESS NOTES
Forgot to ask client if she had a HCD during the home visit on 6/13/17.  Left message for client inquiring if she had a HCD.  If not, offered to send client information on advanced careplanning.    Michelle Sutton, [facility]  [phone]  Fax: [phone]

## 2017-06-20 ENCOUNTER — CARE COORDINATION (OUTPATIENT)
Dept: GERIATRIC MEDICINE | Facility: CLINIC | Age: 67
End: 2017-06-20

## 2017-06-20 NOTE — PROGRESS NOTES
Received message from client returning writers phone call re: advanced careplanning.  Client does have a HCD.  Writer will send client a pre-addressed and stamp envelope to client when her plan of care is mailed out to her for client to send back a copy of her HCD.  Otherwise if HCD is not received by next home visit, writer will obtain a copy at that time.    Michelle Sutton AMANDA  [facility]  [phone]  Fax: [phone]

## 2017-06-30 ENCOUNTER — CARE COORDINATION (OUTPATIENT)
Dept: GERIATRIC MEDICINE | Facility: CLINIC | Age: 67
End: 2017-06-30

## 2017-06-30 NOTE — PROGRESS NOTES
Received after visit chart from care coordinator.  Completed following tasks: Mailed copy of care plan to client  Entered MMIS  Mailed self addressed stamped envelope to return copy of HCD  Included dental provider list.  Chart was returned to MARCIN.   Chayo Hickman  Case Management Specialist  [facility]  [phone]

## 2017-08-04 DIAGNOSIS — E78.5 HYPERLIPIDEMIA LDL GOAL <160: ICD-10-CM

## 2017-08-04 RX ORDER — ROSUVASTATIN CALCIUM 5 MG/1
TABLET, COATED ORAL
Qty: 12 TABLET | Refills: 0 | Status: SHIPPED | OUTPATIENT
Start: 2017-08-04 | End: 2017-09-08

## 2017-08-04 NOTE — TELEPHONE ENCOUNTER
rosuvastatin (CRESTOR) 5 MG tablet  Last Written Prescription Date: 12/2/2016  Last Fill Quantity: 45, # refills: 1  Last Office Visit with G, P or [facility] prescribing provider: 7/11/2016       Lab Results   Component Value Date    CHOL 364 04/18/2016     Lab Results   Component Value Date     04/18/2016     Lab Results   Component Value Date     04/18/2016     Lab Results   Component Value Date    TRIG 140 04/18/2016     Lab Results   Component Value Date    CHOLHDLRATIO 3.0 09/17/2013     30 day aniceto refill and MyChart letter sent to patient indicating this as well as the need to schedule annual physical and labs. Rebecca Jhaveri RN

## 2017-09-08 DIAGNOSIS — E78.5 HYPERLIPIDEMIA LDL GOAL <160: ICD-10-CM

## 2017-09-11 NOTE — TELEPHONE ENCOUNTER
Patient was given a aniceto refill last month, she had an appointment set up with Dr. Victoria for 9/7/17. This appointment was cancelled and r/s for 10/16/17 at a different location ([place]) for a physical / establish care. Forwarding to provider for review and to advise on second aniceto refill.    Crestor 5 mg tablet     Last Written Prescription Date: 8/4/17  Last Fill Quantity: 12, # refills: 0    Last Office Visit with [facility] or [facility] prescribing provider:  Last visit with Dr. Victoria was on 4/18/16. Last office visit with our clinic was on 7/11/16, seen by Dr. Starkey.   Future Office Visit:    Next 5 appointments (look out 90 days)     Oct 16, 2017  9:20 AM CDT   PHYSICAL with Edita Knapp MD   [facility] ([facility]    [address] [place] [id number] [phone]                  Cholesterol   Date Value Ref Range Status   04/18/2016 364 (H) <200 mg/dL Final     Comment:     Desirable:       <200 mg/dl     HDL Cholesterol   Date Value Ref Range Status   04/18/2016 101 >49 mg/dL Final     LDL Cholesterol Calculated   Date Value Ref Range Status   04/18/2016 235 (H) <100 mg/dL Final     Comment:     Above desirable:  100-129 mg/dl   Borderline High:  130-159 mg/dL   High:             160-189 mg/dL   Very high:       >189 mg/dl       Triglycerides   Date Value Ref Range Status   04/18/2016 140 <150 mg/dL Final     Comment:     Fasting specimen     Cholesterol/HDL Ratio   Date Value Ref Range Status   09/17/2013 3.0 0.0 - 5.0 Final     ALT   Date Value Ref Range Status   09/17/2013 26 0 - 50 U/L Final      Janine Juarez RN   .[facility], Primary Care

## 2017-09-13 RX ORDER — ROSUVASTATIN CALCIUM 5 MG/1
TABLET, COATED ORAL
Qty: 12 TABLET | Refills: 0 | Status: SHIPPED | OUTPATIENT
Start: 2017-09-13 | End: 2017-10-08

## 2017-09-14 NOTE — TELEPHONE ENCOUNTER
Patient advised of information below per Dr. Victoria.  Patient stated understanding.    Maranda Yoon CMA

## 2017-10-08 DIAGNOSIS — E78.5 HYPERLIPIDEMIA LDL GOAL <160: ICD-10-CM

## 2017-10-10 NOTE — TELEPHONE ENCOUNTER
Patient has received 2 aniceto refills. Attempted to call and speak with patient. Phone connection was in and out, could hear every other word the patient was saying. Writer asked patient if it was okay to call tomorrow. Patient said okay that sounds good. Rebecca Jhvaeri RN        rosuvastatin (CRESTOR) 5 MG tablet  Last Written Prescription Date: 9/13/2017  Last Fill Quantity: 12, # refills: 0  [facility] Office Visit with [facility] or [facility] prescribing provider: 7/11/2016       Lab Results   Component Value Date    CHOL 364 04/18/2016     Lab Results   Component Value Date     04/18/2016     Lab Results   Component Value Date     04/18/2016     Lab Results   Component Value Date    TRIG 140 04/18/2016     Lab Results   Component Value Date    CHOLHDLRATIO 3.0 09/17/2013

## 2017-10-12 RX ORDER — ROSUVASTATIN CALCIUM 5 MG/1
TABLET, COATED ORAL
Qty: 12 TABLET | Refills: 0 | Status: SHIPPED | OUTPATIENT
Start: 2017-10-12 | End: 2017-10-23

## 2017-10-12 NOTE — TELEPHONE ENCOUNTER
Spoke with patient. She scheduled an appointment with Dr. Victoria on 10/23 also helped patient schedule fasting labs for the same day. Labs ordered. Patient aware that we will not be able to refill further if appointment is canceled. Patient verbalized understanding. Rebecca Jhaveri RN

## 2017-10-23 ENCOUNTER — OFFICE VISIT (OUTPATIENT)
Dept: PEDIATRICS | Facility: CLINIC | Age: 67
End: 2017-10-23
Payer: COMMERCIAL

## 2017-10-23 VITALS
WEIGHT: 138 LBS | TEMPERATURE: 96.7 F | HEART RATE: 76 BPM | SYSTOLIC BLOOD PRESSURE: 148 MMHG | OXYGEN SATURATION: 99 % | BODY MASS INDEX: 24.45 KG/M2 | DIASTOLIC BLOOD PRESSURE: 86 MMHG

## 2017-10-23 DIAGNOSIS — E78.5 HYPERLIPIDEMIA LDL GOAL <160: ICD-10-CM

## 2017-10-23 DIAGNOSIS — Z12.31 ENCOUNTER FOR SCREENING MAMMOGRAM FOR BREAST CANCER: ICD-10-CM

## 2017-10-23 DIAGNOSIS — G25.2 DYSTONIC TREMOR: Primary | ICD-10-CM

## 2017-10-23 DIAGNOSIS — Z11.59 ENCOUNTER FOR HEPATITIS C SCREENING TEST FOR LOW RISK PATIENT: ICD-10-CM

## 2017-10-23 DIAGNOSIS — Z23 NEED FOR PNEUMOCOCCAL VACCINATION: ICD-10-CM

## 2017-10-23 LAB
CHOLEST SERPL-MCNC: 289 MG/DL
HDLC SERPL-MCNC: 100 MG/DL
LDLC SERPL CALC-MCNC: 173 MG/DL
NONHDLC SERPL-MCNC: 189 MG/DL
TRIGL SERPL-MCNC: 82 MG/DL

## 2017-10-23 PROCEDURE — 99213 OFFICE O/P EST LOW 20 MIN: CPT | Mod: 25 | Performed by: INTERNAL MEDICINE

## 2017-10-23 PROCEDURE — 80061 LIPID PANEL: CPT | Performed by: INTERNAL MEDICINE

## 2017-10-23 PROCEDURE — 90670 PCV13 VACCINE IM: CPT | Performed by: INTERNAL MEDICINE

## 2017-10-23 PROCEDURE — 90471 IMMUNIZATION ADMIN: CPT | Performed by: INTERNAL MEDICINE

## 2017-10-23 PROCEDURE — 36415 COLL VENOUS BLD VENIPUNCTURE: CPT | Performed by: INTERNAL MEDICINE

## 2017-10-23 RX ORDER — ALPRAZOLAM 0.5 MG
0.5 TABLET ORAL DAILY PRN
Qty: 45 TABLET | Status: CANCELLED | OUTPATIENT
Start: 2017-10-23

## 2017-10-23 RX ORDER — GABAPENTIN 300 MG/1
CAPSULE ORAL
Qty: 90 CAPSULE | Refills: 0 | Status: SHIPPED | OUTPATIENT
Start: 2017-10-23 | End: 2018-01-11

## 2017-10-23 RX ORDER — ROSUVASTATIN CALCIUM 5 MG/1
5 TABLET, COATED ORAL DAILY
Qty: 30 TABLET | Refills: 11 | Status: SHIPPED | OUTPATIENT
Start: 2017-10-23 | End: 2018-08-30

## 2017-10-23 NOTE — MR AVS SNAPSHOT
After Visit Summary   10/23/2017    Irish Rosales    MRN: [id number]           Patient Information     Date Of Birth          1950        Visit Information        Provider Department      10/23/2017 8:30 AM Braulio [place] MD PhD [facility]        Today's Diagnoses     Dystonic tremor    -  1    Hyperlipidemia LDL goal <160        Encounter for hepatitis C screening test for low risk patient        Encounter for screening mammogram for breast cancer        Need for pneumococcal vaccination          Care Instructions    Make appointment(s) for:   -- wellness visit in 3 months with fasting labs.   -- mammogram.         Additional instruction:  1. Contact  or [facility] neurologist if gabapentin does not help.  2. Increase Crestor to 5 mg daily, call if you have side effect wit this.     Medication(s) prescribed today:    Orders Placed This Encounter   Medications     gabapentin (NEURONTIN) 300 MG capsule     Sig: Take 1 tablet (300 mg) every night for 1-3 days, then 1 tablet twice daily for 1-3 days, then 1 tablet three times daily     Dispense:  90 capsule     Refill:  0     rosuvastatin (CRESTOR) 5 MG tablet     Sig: Take 1 tablet (5 mg) by mouth daily     Dispense:  30 tablet     Refill:  11     Dose increased.                   Follow-ups after your visit        Future tests that were ordered for you today     Open Future Orders        Priority Expected Expires Ordered    MA Screening Digital Bilateral Routine  10/23/2018 10/23/2017    Hepatitis C antibody Routine  2/23/2018 10/23/2017    Lipid panel reflex to direct LDL Routine  2/23/2018 10/23/2017    ALT Routine  2/23/2018 10/23/2017            Who to contact     If you have questions or need follow up information about today's clinic visit or your schedule please contact [facility] directly at [phone].  Normal or non-critical lab and imaging results will be communicated to you by MyChart, letter or  phone within 4 business days after the clinic has received the results. If you do not hear from us within 7 days, please contact the clinic through [email or web address] or phone. If you have a critical or abnormal lab result, we will notify you by phone as soon as possible.  Submit refill requests through [email or web address] or call your pharmacy and they will forward the refill request to us. Please allow 3 business days for your refill to be completed.          Additional Information About Your Visit        [employer] Information     [email or web address] gives you secure access to your electronic health record. If you see a primary care provider, you can also send messages to your care team and make appointments. If you have questions, please call your primary care clinic.  If you do not have a primary care provider, please call [phone] and they will assist you.      [email or web address] is an electronic gateway that provides easy, online access to your medical records. With [email or web address], you can request a clinic appointment, read your test results, renew a prescription or communicate with your care team.     To access your existing account, please contact your [facility] or call [phone] for assistance.        Care EveryWhere ID     This is your Care EveryWhere ID. This could be used by other organizations to access your [facility] medical records  [id number]        Your Vitals Were     Pulse Temperature Pulse Oximetry BMI (Body Mass Index)          76 96.7  F (35.9  C) (Temporal) 99% 24.45 kg/m2         Blood Pressure from Last 3 Encounters:   10/23/17 148/86   05/03/17 144/64   02/27/17 141/63    Weight from Last 3 Encounters:   10/23/17 138 lb (62.6 kg)   05/03/17 141 lb (64 kg)   02/27/17 142 lb (64.4 kg)              We Performed the Following     PNEUMOCOCCAL CONJ VACCINE 13 VALENT IM          Today's Medication Changes          These changes are accurate as of: 10/23/17  8:58 AM.  If you have any questions, ask your nurse or  doctor.               Start taking these medicines.        Dose/Directions    gabapentin 300 MG capsule   Commonly known as:  NEURONTIN   Used for:  Dystonic tremor   Started by:  Braulio [place] MD PhD        Take 1 tablet (300 mg) every night for 1-3 days, then 1 tablet twice daily for 1-3 days, then 1 tablet three times daily   Quantity:  90 capsule   Refills:  0         These medicines have changed or have updated prescriptions.        Dose/Directions    rosuvastatin 5 MG tablet   Commonly known as:  CRESTOR   This may have changed:  See the new instructions.   Used for:  Hyperlipidemia LDL goal <160   Changed by:  Braulio [place] MD PhD        Dose:  5 mg   Take 1 tablet (5 mg) by mouth daily   Quantity:  30 tablet   Refills:  11            Where to get your medicines      These medications were sent to [id number] [place] [facility] [facility] [address] [facility]  [address] [facility] [place] [address]    Hours:  24-hours Phone:  [phone]     gabapentin 300 MG capsule    rosuvastatin 5 MG tablet                Primary Care Provider Office Phone # Fax #    Braulio [place] MD PhD [phone]       [address] [facility] [address]        Equal Access to Services     [facility]: Hadii tammy ku hadasho Soomaali, waaxda luqadaha, qaybta kaalmada adeegyada, waxay pk haysunn gerald melgoza . So [facility] [phone].    ATENCIÓN: Si habla español, tiene a flower disposición servicios gratuitos de asistencia lingüística. Llame al [phone].    We comply with applicable federal civil rights laws and [place] laws. We do not discriminate on the basis of race, color, national origin, age, disability, sex, sexual orientation, or gender identity.            Thank you!     Thank you for choosing [facility]  for your care. Our goal is always to provide you with excellent care. Hearing back from our patients is one way we can continue to improve our  services. Please take a few minutes to complete the written survey that you may receive in the mail after your visit with us. Thank you!             Your Updated Medication List - Protect others around you: Learn how to safely use, store and throw away your medicines at www.disposemymeds.org.          This list is accurate as of: 10/23/17  8:58 AM.  Always use your most recent med list.                   Brand Name Dispense Instructions for use Diagnosis    ALPRAZolam 0.5 MG tablet    XANAX     Take 1 tablet by mouth daily as needed        BOTOX 100 UNITS injection   Generic drug:  botulinum toxin type A     1 each    Inject 0.5units bilaterally for a total of 1.0 units    Other diseases of larynx       calcium carbonate 1250 MG tablet    OS- mg [place]. [place]     Take 500 mg by mouth 2 times daily        gabapentin 300 MG capsule    NEURONTIN    90 capsule    Take 1 tablet (300 mg) every night for 1-3 days, then 1 tablet twice daily for 1-3 days, then 1 tablet three times daily    Dystonic tremor       rosuvastatin 5 MG tablet    CRESTOR    30 tablet    Take 1 tablet (5 mg) by mouth daily    Hyperlipidemia LDL goal <160       sertraline 100 MG tablet    ZOLOFT    90 tablet    TAKE 1 TABLET BY MOUTH EVERY DAY    History of major depression

## 2017-10-23 NOTE — PROGRESS NOTES
SUBJECTIVE:   [place] Rosales is a 67 year old female who presents to clinic today for the following health issues:      Medication Followup of all meds, would like a new med for tremors.     Taking Medication as prescribed: yes    Side Effects:  None    Medication Helping Symptoms:  yes       Patient has a history of dystonic tremor involving the head in her voice.  She has been evaluated by neurology and ENT quite extensively.  She tried primidone without benefit, had Botox injection without benefit.  She was recently evaluated at [facility].  3 months ago, was given propranolol.  It helped her tremor some, but she was throwing up every day.  She was on it for about 2 months, and quit in the last month.  The throwing up stopped.  She has not followed up with [facility] neurologist.  She came here to ask me for a new medication for her tremor.    Reviewed prior evaluation at our care system.  Deep brain stimulation was suggested, patient went to neurosurgery evaluation for this.  According to the patient, [facility] doctors did not recommend DBS, and patient did not pursue this.  We do not have records from [facility].    History of hyperlipidemia, statin intolerance, able to tolerate Crestor 5 mg 3 times a week.  She had lipids done this morning.      Current Outpatient Prescriptions on File Prior to Visit:  sertraline (ZOLOFT) 100 MG tablet TAKE 1 TABLET BY MOUTH EVERY DAY   calcium carbonate (OS- MG [place]. [place]) 500 MG tablet Take 500 mg by mouth 2 times daily    botulinum toxin type A (BOTOX) 100 UNITS injection Inject 0.5units bilaterally for a total of 1.0 units   [DISCONTINUED] rosuvastatin (CRESTOR) 5 MG tablet TAKE 1 TABLET BY MOUTH THREE TIMES WEEKLY AS DIRECTED.   ALPRAZolam (XANAX) 0.5 MG tablet Take 1 tablet by mouth daily as needed     No current facility-administered medications on file prior to visit.       Problem list, Medication list, Allergies, and Medical/Social/Surgical histories  reviewed in [facility] and updated as appropriate.    OBJECTIVE:                                                    /86  Pulse 76  Temp 96.7  F (35.9  C) (Temporal)  Wt 138 lb (62.6 kg)  SpO2 99%  BMI 24.45 kg/m2    GEN: 67-year-old female, alert and constant head tremor and voice tremor, a little difficult to understand         Diagnostic test results:  Results for orders placed or performed in visit on 10/23/17 (from the past 24 hour(s))   **Lipid panel reflex to direct LDL FUTURE 1yr   Result Value Ref Range    Cholesterol 289 (H) <200 mg/dL    Triglycerides 82 <150 mg/dL    HDL Cholesterol 100 >49 mg/dL    LDL Cholesterol Calculated 173 (H) <100 mg/dL    Non HDL Cholesterol 189 (H) <130 mg/dL          ASSESSMENT/PLAN:                                                      67 year old female with the following diagnoses and treatment plan:      ICD-10-CM    1. Dystonic tremor G25.2 gabapentin (NEURONTIN) 300 MG capsule   2. Hyperlipidemia LDL goal <160 E78.5 rosuvastatin (CRESTOR) 5 MG tablet     Lipid panel reflex to direct LDL     ALT   3. Encounter for hepatitis C screening test for low risk patient Z11.59 Hepatitis C antibody   4. Encounter for screening mammogram for breast cancer Z12.31 MA Screening Digital Bilateral   5. Need for pneumococcal vaccination Z23 PNEUMOCOCCAL CONJ VACCINE 13 VALENT IM       -- The management of the dystonic tremor is beyond the scope of my expertise.  Patient is clearly miserable from this long-standing problem.  I do not see any harm for her to try gabapentin, which has benefit in essential tremor. Advised her to contact me or [facility] neurologist if this does not work or she has side effects.   -- lipid panel improved greatly with crestor 3 times a week but still high, increase dose to daily. She will let me know if she has side effects  -- updated on mammogram and Prevnar 13.  -- return in 3 months for wellness and repeat fasting lipids.     Will call or return to  clinic if worsening or symptoms not improving as discussed.  See Patient Instructions.        Braulio [place] MD-PhD  [facility]    (Note: Chart documentation was done in part with Dragon Voice Recognition software. Although reviewed after completion, some word and grammatical errors may remain.)

## 2017-10-23 NOTE — PROGRESS NOTES
Dear Irish,   Here are your recent results.   --    Please call or Mychart to our office if you have further questions.     Braulio [place] MD-PhD

## 2017-10-23 NOTE — PATIENT INSTRUCTIONS
Make appointment(s) for:   -- wellness visit in 3 months with fasting labs.   -- mammogram.         Additional instruction:  1. Contact us or [facility] neurologist if gabapentin does not help.  2. Increase Crestor to 5 mg daily, call if you have side effect wit this.     Medication(s) prescribed today:    Orders Placed This Encounter   Medications     gabapentin (NEURONTIN) 300 MG capsule     Sig: Take 1 tablet (300 mg) every night for 1-3 days, then 1 tablet twice daily for 1-3 days, then 1 tablet three times daily     Dispense:  90 capsule     Refill:  0     rosuvastatin (CRESTOR) 5 MG tablet     Sig: Take 1 tablet (5 mg) by mouth daily     Dispense:  30 tablet     Refill:  11     Dose increased.

## 2017-10-23 NOTE — NURSING NOTE
Screening Questionnaire for Adult Immunization    Are you sick today?   No   Do you have allergies to medications, food, a vaccine component or latex?   No   Have you ever had a serious reaction after receiving a vaccination?   No   Do you have a long-term health problem with heart disease, lung disease, asthma, kidney disease, metabolic disease (e.g. diabetes), anemia, or other blood disorder?   No   Do you have cancer, leukemia, HIV/AIDS, or any other immune system problem?   No   In the past 3 months, have you taken medications that affect  your immune system, such as prednisone, other steroids, or anticancer drugs; drugs for the treatment of rheumatoid arthritis, Crohn s disease, or psoriasis; or have you had radiation treatments?   No   Have you had a seizure, or a brain or other nervous system problem?   No   During the past year, have you received a transfusion of blood or blood     products, or been given immune (gamma) globulin or antiviral drug?   No   For women: Are you pregnant or is there a chance you could become        pregnant during the next month?   No   Have you received any vaccinations in the past 4 weeks?   No     Immunization questionnaire answers were all negative.      MNVFC doesn't apply on this patient           Screening performed by Margret Cabello

## 2017-10-23 NOTE — NURSING NOTE
"Chief Complaint   Patient presents with     Recheck Medication       Initial /86  Pulse 76  Temp 96.7  F (35.9  C) (Temporal)  Wt 138 lb (62.6 kg)  SpO2 99%  BMI 24.45 kg/m2 Estimated body mass index is 24.45 kg/(m^2) as calculated from the following:    Height as of 5/3/17: 5' 3\" (1.6 m).    Weight as of this encounter: 138 lb (62.6 kg).  Medication Reconciliation: complete    "

## 2017-10-25 ENCOUNTER — RADIANT APPOINTMENT (OUTPATIENT)
Dept: MAMMOGRAPHY | Facility: CLINIC | Age: 67
End: 2017-10-25
Attending: INTERNAL MEDICINE
Payer: COMMERCIAL

## 2017-10-25 DIAGNOSIS — Z12.31 ENCOUNTER FOR SCREENING MAMMOGRAM FOR BREAST CANCER: ICD-10-CM

## 2017-10-25 PROCEDURE — G0202 SCR MAMMO BI INCL CAD: HCPCS | Performed by: STUDENT IN AN ORGANIZED HEALTH CARE EDUCATION/TRAINING PROGRAM

## 2017-10-27 ENCOUNTER — MYC MEDICAL ADVICE (OUTPATIENT)
Dept: PEDIATRICS | Facility: CLINIC | Age: 67
End: 2017-10-27

## 2017-10-27 DIAGNOSIS — G25.2 DYSTONIC TREMOR: Primary | ICD-10-CM

## 2017-10-27 RX ORDER — ALPRAZOLAM 0.5 MG
0.5 TABLET ORAL DAILY PRN
Qty: 30 TABLET | Refills: 1 | Status: SHIPPED | OUTPATIENT
Start: 2017-10-27 | End: 2018-02-15

## 2017-10-27 NOTE — TELEPHONE ENCOUNTER
I have not prescribed this before. Check with patient who prescribed this for her and how often she is taking this. This is a controlled medication. So regular use/prescription through our clinic will need to have controlled medication agreement visit.

## 2017-10-27 NOTE — TELEPHONE ENCOUNTER
Routed Se[employer] message to PCP    I gave this info to Dr. [place]'s nurse during my visit. The Dr is Quinton from [facility], who I no longer see. The Xanax0.5 mg was PRN for severe tremor. I usually took 1-2 pills per wk.    Sol Rios RN,   [facility]

## 2017-10-27 NOTE — TELEPHONE ENCOUNTER
Routing refill request to provider for review/approval because:  Drug not on the G refill protocol.    Medication is listed as historical.    ALPRAZolam (XANAX) 0.5 MG tablet   3/25/2017  --      Sig: Take 1 tablet by mouth daily as needed     Class: Historical         Last office visit: 10/23/17    Sol Rios RN,   [facility], [facility]

## 2017-10-27 NOTE — TELEPHONE ENCOUNTER
Called patient, left message with phone number to call back.   Left message that a message was sent over [facility].        Ysabel Elizondo RN, [facility]

## 2017-11-06 DIAGNOSIS — Z86.59 HISTORY OF MAJOR DEPRESSION: ICD-10-CM

## 2017-11-06 RX ORDER — SERTRALINE HYDROCHLORIDE 100 MG/1
TABLET, FILM COATED ORAL
Qty: 90 TABLET | Refills: 0 | Status: SHIPPED | OUTPATIENT
Start: 2017-11-06 | End: 2018-02-02

## 2017-11-07 NOTE — TELEPHONE ENCOUNTER
sertraline (ZOLOFT) 100 MG tablet       Last Written Prescription Date: 8/10/17  Last Fill Quantity: 90; # refills: 0  Last Office Visit with [facility], [facility] or [facility] prescribing provider:  10/23/17   Next 5 appointments (look out 90 days)     Jan 22, 2018  8:10 AM CST   [facility]YS[facility] Braulio [place] [facility]   [facility]    [address] [place] [id number] [phone]                   Last PHQ-9 score on record=   PHQ-9 SCORE 6/14/2017   Total Score -   Total Score 0       Lab Results   Component Value Date    AST 27 08/19/2010     Lab Results   Component Value Date    ALT 26 09/17/2013     Prescription approved per [facility] Refill Protocol.  Janine Juarez RN   [facility], Primary Care

## 2017-11-08 ENCOUNTER — CARE COORDINATION (OUTPATIENT)
Dept: GERIATRIC MEDICINE | Facility: CLINIC | Age: 67
End: 2017-11-08

## 2017-11-08 NOTE — PROGRESS NOTES
First attempt to contact client for a six month telephone assessment.  Left message requesting a call back.    INNA Heck  [facility]  [phone]  Fax: [phone]

## 2017-11-16 NOTE — PROGRESS NOTES
Second attempt to contact client for a six month telephone assessment.  Left message requesting a call back.    INNA Heck  [facility]  [phone]  Fax: [phone]

## 2017-11-27 NOTE — PROGRESS NOTES
[facility] Six[facility]    6 month telephone assessment completed on 11-.    ER visits: No  Hospitalizations: No  TCU stays: No  Significant health status changes: Reported no significant health status changes.  Client reported that she has been feeling and doing well.  Falls/Injuries: No  ADL/IADL changes: No  Changes in services: No    Caregiver Assessment follow up:  N/A.    Goals: See POC in chart for goal progress documentation.      Will see client in 6 months for an annual health risk assessment.   Encouraged client to call CM with any questions or concerns in the meantime.     INNA Heck  [facility]  [phone]  Fax: [phone]

## 2017-12-26 DIAGNOSIS — Z86.59 HISTORY OF MAJOR DEPRESSION: ICD-10-CM

## 2017-12-27 RX ORDER — SERTRALINE HYDROCHLORIDE 100 MG/1
TABLET, FILM COATED ORAL
Qty: 90 TABLET | Refills: 0 | OUTPATIENT
Start: 2017-12-27

## 2017-12-27 NOTE — TELEPHONE ENCOUNTER
sertraline (ZOLOFT) 100 MG tablet  Last Written Prescription Date: 11/6/2017  Last Fill Quantity: 90, # refills: 0  Last Office Visit with [facility] primary care provider:  10/23/2017   Next 5 appointments (look out 90 days)     Jan 22, 2018  8:10 AM CST   PHYSICAL with Braulio [place] MD PhD   [facility]    [address] [place] [id number] [phone]                   Last PHQ-9 score on record=   PHQ-9 SCORE 6/14/2017   Total Score -   Total Score 0       Refill request too soon. Rebecca Jhaveri RN

## 2018-01-09 ENCOUNTER — TELEPHONE (OUTPATIENT)
Dept: NEUROLOGY | Facility: CLINIC | Age: 68
End: 2018-01-09

## 2018-01-09 NOTE — TELEPHONE ENCOUNTER
"Received [employer] message from patient stating she would like to move forward with DEEP BRAIN STIMULATION. Called her back and coordinated an appointment for her this Thursday 1/11 at 10:30 am, to see Dr. Morales to discuss the particular plan of her case.    Juhi pointed out:    Based on the [employer] discussion on 5/18/2017 pt was \"confused\" about what she could get out of DBS.  There were unanswered questions if DBS would improve voice tremor/dysarthria or not & how much benefit she might get.  These expectations should be addressed before pt goes forward with surgery.       From the 3/2/17 Consensus meeting for DBS:    1. LVIM wait and see (possibility of improvement in voice tremor with one lead)   2. [employer] Infinity device -   3. Check on research interest   4. SLP to assess before and after implantation  --------------------  Called  central scheduling to see how to order the before and after evaluation for SLP. [place] in [place]. Once I know the surgery date I will call [facility] rehab at [phone] and let them know so they can call the patient to schedule the evaluation before and after the surgery. Will find out from providers, how long before the surgery and after the surgery the evaluations should take place. Order entered.  --------------------  Called patient to discuss. She is very excited and wants to move forward with the Infinity device even though it is not yet approved for MRI.  She would like to know more about research opportunities especially the 7T study. She has not been through the DBS class and would like to attend one of the sessions.     1. Carrie Fitzgerald, RN to contact patient to attend DBS class. She did attend on 5/17/17.  2. Surgery scheduling to contact patient to schedule the DBS surgery.    Per Dr. Edwar Colvin, patient should have post-surgical SLP evaluation 3 months after initial programming, but, if she comes under consideration for " second side surgery sooner than that, then schedule the voice assessment as part of her workup for that.    When scheduling the voice assessment, make it clear that we are looking for a measure of the severity of her speech impairment (preferably a quantitative measure).

## 2018-01-11 ENCOUNTER — OFFICE VISIT (OUTPATIENT)
Dept: NEUROSURGERY | Facility: CLINIC | Age: 68
End: 2018-01-11
Payer: COMMERCIAL

## 2018-01-11 VITALS
HEART RATE: 75 BPM | DIASTOLIC BLOOD PRESSURE: 79 MMHG | HEIGHT: 63 IN | BODY MASS INDEX: 24.86 KG/M2 | SYSTOLIC BLOOD PRESSURE: 156 MMHG | WEIGHT: 140.3 LBS

## 2018-01-11 DIAGNOSIS — G25.2 DYSTONIC TREMOR: ICD-10-CM

## 2018-01-11 DIAGNOSIS — G24.9 DYSTONIC MOVEMENTS: ICD-10-CM

## 2018-01-11 DIAGNOSIS — R25.1 TREMOR: Primary | ICD-10-CM

## 2018-01-11 ASSESSMENT — PAIN SCALES - GENERAL: PAINLEVEL: SEVERE PAIN (7)

## 2018-01-11 NOTE — LETTER
1/11/2018       RE: Irish Rosales  [address]     [place] [address]     Dear Colleague,    Thank you for referring your patient, Irish Rosales, to the [facility] NEUROSURGERY at [facility]. Please see a copy of my visit note below.    HISTORY AND PHYSICAL EXAM    Chief Complaint   Patient presents with     RECHECK     [facility] RETURN - F/U DBS - patient would like further discussion     HISTORY OF PRESENT ILLNESS  Ms. Irish Rosales returns to the Neurosurgery Clinic to discuss her DBS surgery plans further.  Patient was seen by me back in February, 27, 2017, as part of her DBS workup.  Her case was discussed at the Movement Disorder Consensus Group Meeting and she was considered to be a good DBS candidate.  The following was recommended.    Left Vim DBS implantation with wait-and-see strategy.  Abbott Infinity device.  SLP evaluation to assess her speech before and after implantation.    Patient subsequently had SLP evaluation.  She also underwent botox injection at [place].  She states that her botox did not work and she also stated that it made her neck weak.  She continues to wear a soft collar for her weak neck.    She sent a [employer] message stating that she would like to move forward with DBS and the appointment has been made.    Briefly, patient is a 67 year old right-handed female with history of dystonic tremor.  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  She also has cervical dystonia.  Her right side is more affected than her left side.  Her goals for DBS surgery are to decrease tremor, especially head and voice and improve dystonia.  She stated that she can live with her hand tremors but wants other symptoms treated.    Per notes from RACHANA Rivera, patient apparently had questions about how DBS will improve her  voice tremors and dysarthria.  Her expectations have not been fully addressed.      Past Medical History:   Diagnosis Date     Depression      Dyslipidemia      Dystonic movements 1/21/2017     Dystonic tremor 1/21/2017     Encounter for neuropsychological testing 3/13/2017    2017 evaluation  IMPRESSIONS AND RECOMMENDATIONS   Current results indicate performance that falls within normal limits across cognitive domains, generally in the average to above average range. Objective personality assessment also falls within normal limits.   This pattern of performance does not reflect dementia at this time, nor is it suggestive of focal or lateralized cerebral involvement. Sh     Family history of movement disorder 1/21/2017     Family history of tremor 1/21/2017     H/O magnetic resonance imaging of brain and brain stem 2/27/2017    MR BRAIN W/O CONTRAST 2/27/2017 11:46 AM  Provided History: dbs brain surgery for dystonia, Other specified forms of tremor, Dystonia, unspecified, Tremor, unspecified  Comparison:  No similar prior studies   Technique: Volumetric sagittal T1 and T2 weighted, axial T2-weighted, turboFLAIR and diffusion-weighted with ADC map images of the brain were obtained without intravenous contrast.  Findings:     mild abnormality in carotid study 3/2/2017    BILATERAL DUPLEX CAROTID ULTRASOUND March 1, 2017 1:01 PM    HISTORY: Other specified symptoms and signs involving the circulatory and respiratory systems.   COMPARISON: None.   FINDINGS: There is mild atherosclerotic plaque in the carotid bifurcations. Flow velocities and waveforms show less than 50% diameter stenosis in both the right and left internal carotid arteries as assessed by each ICA PS     Osteoporosis 8/2/2010     Past Surgical History:   Procedure Laterality Date     ------------OTHER-------------  2004    vocal cord [facility]roplasty at [facility]     C BSO, OMENTECTOMY W/XAVIER  1997    hysterectomy     C HAND/FINGER SURGERY UNLISTED  1998     right carpal tunnel surgery     RELEASE CARPAL TUNNEL Left 1/2/2015    Procedure: RELEASE CARPAL TUNNEL;  Surgeon: SHANIA Hernandez MD;  Location: [facility] OR     RELEASE ULNAR NERVE (ELBOW) Left 1/2/2015    Procedure: RELEASE ULNAR NERVE (ELBOW);  Surgeon: SHANIA Hernandez MD;  Location: [facility] OR     Social History     Social History     Marital status:      Spouse name: N/A     Number of children: 3     Years of education: 14     Occupational History     RN Retired     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     Social History Main Topics     Smoking status: Never Smoker     Smokeless tobacco: Never Used     Alcohol use Yes      Comment: occasionally     Drug use: No     Sexual activity: Yes     Partners: Male     Other Topics Concern      Service No     Blood Transfusions No     Caffeine Concern No     Occupational Exposure No     Hobby Hazards No     Sleep Concern No     Stress Concern No     Weight Concern No     Special Diet No     Back Care No     Exercise Yes     walk     Bike Helmet Yes     Seat Belt Yes     Self-Exams Yes     Social History Narrative        Lives in [place]    Daughter Karyn Sanchez        benign essential tremor with a strained quality to her voice consistent with mild laryngeal spasm        ALLERGIES:  She is allergic to sulfa drugs, atorvastatin and simvastatin.  The statin drugs caused leg cramps.            FAMILY HISTORY:  As noted above with 1 sister developing a voice tremor and another sister having what is described as a facial tremor.  Mother with hand tremors          SOCIAL HISTORY:  She does not smoke.  She does use alcohol on occasion. She previously worked as a RN.         Jazmyn jimenez  [phone] orofacial dyskinesias    Piedad Harvey [phone]  laryngeal dystonia and laryngeal tremor and laryngeal spasm.             Updated 6/13/17: Client was born in [place], [place] to parents Jerome and Berenice.  Client grew up w/  "three sisters Tea, Carrie, and Jazmyn who are currently still living.  Client graduated from high school, attended college for two years, and graduated w/ a RN Degree.  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  She was  to her ex-spouse for 25 years and had three children; one son Bryan and two daughters Halley/Ysabel.  One daughter Halley Sanchez resides nearby and her other daughter Ysabel and her son Bryan reside in [place], [place].  Bryan has two daughters ages five and eight years old.  Client enjoys flying for free to [place] to visit her two granddaughters. Michelle Sutton, [facility] ([phone], Fax: [phone]).             Family History   Problem Relation Age of Onset     Hypertension Father      and mother     CEREBROVASCULAR DISEASE Father      Tremor Mother      Lung Cancer Mother      Neurologic Disorder Sister      dystonic voice x 2 botox     Neurologic Disorder Sister      jose m mn. facial movement      Current Outpatient Prescriptions   Medication     sertraline (ZOLOFT) 100 MG tablet     ALPRAZolam (XANAX) 0.5 MG tablet     rosuvastatin (CRESTOR) 5 MG tablet     calcium carbonate (OS- [facility] [place]. [place]) 500 MG tablet     botulinum toxin type A (BOTOX) 100 UNITS injection     No current facility-administered medications for this visit.         Allergies   Allergen Reactions     Sulfa Drugs Swelling and Rash     Atorvastatin      Leg cramps     Simvastatin      Leg cramp       PHYSICAL EXAM  /79 (BP Location: Right arm, Patient Position: Sitting, Cuff Size: Adult Regular)  Pulse 75  Ht 1.6 m (5' 3\")  Wt 63.6 kg (140 lb 4.8 oz)  BMI 24.85 kg/m2    General: Awake, alert, oriented. Well nourished, well developed, she is not in any acute distress.  HEENT: Head normocephalic, atraumatic. Neck supple. Good " range of motion. No palpable thyroid mass. Soft carotid bruit on the right side.  Wearing a soft cervical collar.  Heart: Regular rhythm and rate. No murmurs.  Lungs: Clear to auscultation and percussion bilaterally. No ronchi, rales or wheeze.  Abdomen: Soft, non-tender, non-distended. No hepatosplenomegaly.  Extremity: Warm with no clubbing, cyanosis or edema. No lower extremity edema.    Neurological  Awake, alert and oriented to date, time, place and person. Speech fluent but tremulous.   Pupils equal, round, reactive to light.  Extraocular movement intact.  Hearing is grossly normal to finger rub.   Facial sensation intact.  Face symmetric.  Tongue midline.  Uvula elevates equally.    Motor: full strength throughout.  Sensation: intact to light touch and pinpoint.  Deep tendon reflexes: 2+ throughout. Negative for clonus. Negative for Gilbert's sign. No dysmetria.      ASSESSMENT  66 year old female with a history of dystonia/dystonic tremors and bilateral tremors, vocal dystonia and tremor, head tremor.  Right side is worse than left side.    Her symptoms are debilitating and she is unable to perform some of the basic daily activities of living.  She did recently undergo botox injection and it was not helpful in terms of her neck dystonia.    She expressed interest in proceeding with the DBS surgery.  She did not ask any questions about what the DBS will address and I did discuss that DBS at [facility] and the [facility] would address her tremors.    Again, during today, we did discuss both phase I and II of DBS surgery.  During phase I, we would place the DBS electrode on the left side under MAC and microelectrode recording.  She would then return one week later for the phase II with placement of the DBS generator/battery over the left chest wall.  She is a wait-and-see candidate so her second side will be reconsidered at the later time.    Risks, benefits and alternative therapies were discussed with the patient, including but not  limited to infection and bleeding.  Surgical procedure was discussed in detail.  All questions were answered and she expressed understanding.    We also briefly discussed the device options, although Abbott Infinity system was recommended.    Following topic was discussed.    Medtronic  MRI compatible.  Non-rechargeable and rechargeable generator/battery available.  4 contact electrode.  Communication method: have the  or patient controller on the skin directly over the generator/battery.    Abbott  Not yet MRI compatible.  Will become MRI compatible with retro-approval when FDA approves the device for MRI use.  Non-rechargeable generator/battery.  9 contact segmented/directional electrode.  Communication method: Wireless/bluetooth.    Librelato Implementos RodoviÃ¡rios  Not MRI compatible.  Working on generator/battery that will be MRI compatible.  If patient gets an implant and needs an MRI in the future, when the device becomes available, patient can have the upgrade.  Rechargeable generator/battery.  8 contact electrode.  Independent current control at each contacts.  Communication method: Wireless.    I did discuss that the implant technique for these devices are relatively the same which was discussed again.  They all have similar risks associated with the surgery.      PLAN  1.  DBS surgery, Left Vim implantation, wait-and-see strategy.  Abbott Infinity system.      65 minutes were spent face to face with the patient of which more than 50% of the time was spent counseling and discussing the above issues regarding diagnosis and treatment/surgical options.    Again, thank you for allowing me to participate in the care of your patient.      Sincerely,    Aleksander Morales MD

## 2018-01-11 NOTE — MR AVS SNAPSHOT
After Visit Summary   1/11/2018    Irish Rosales    MRN: [id number]           Patient Information     Date Of Birth          1950        Visit Information        Provider Department      1/11/2018 10:30 AM Aleksander Morales MD [facility] Neurosurgery        Today's Diagnoses     Tremor    -  1    Dystonic tremor        Dystonic movements           Follow-ups after your visit        Your next 10 appointments already scheduled     Jan 22, 2018  7:40 AM CST   LAB with LAB FIRST FLOOR [facility] ([facility])    [address] [place] [address] [id number] [phone]           Please do not eat 10-12 hours before your appointment if you are coming in fasting for labs on lipids, cholesterol, or glucose (sugar). This does not apply to pregnant women. Water, hot tea and black coffee (with nothing added) are okay. Do not drink other fluids, diet soda or chew gum.            Jan 22, 2018  8:10 AM CST   PHYSICAL with Braulio Victoria MD PhD   [facility] ([facility]    [address] [place] [address] [id number] [phone]              Who to contact     Please call your clinic at [phone] to:    Ask questions about your health    Make or cancel appointments    Discuss your medicines    Learn about your test results    Speak to your doctor   If you have compliments or concerns about an experience at your clinic, or if you wish to file a complaint, please contact [facility] Patient Relations at [phone] or email us at [email or web address]         Additional Information About Your Visit        MyChart Information     [employer] gives you secure access to your electronic health record. If you see a primary care provider, you can also send messages to your care team and make appointments. If you have questions, please call your primary care clinic.  If you do not have a primary  "care provider, please call [phone] and they will assist you.      [employer] is an electronic gateway that provides easy, online access to your medical records. With [employer], you can request a clinic appointment, read your test results, renew a prescription or communicate with your care team.     To access your existing account, please contact your [facility] or call [phone] for assistance.        Care EveryWhere ID     This is your Care EveryWhere ID. This could be used by other organizations to access your [facility] medical records  [id number]        Your Vitals Were     Pulse Height BMI (Body Mass Index)             75 1.6 m (5' 3\") 24.85 kg/m2          Blood Pressure from Last 3 Encounters:   01/11/18 156/79   10/23/17 148/86   05/03/17 144/64    Weight from Last 3 Encounters:   01/11/18 63.6 kg (140 lb 4.8 oz)   10/23/17 62.6 kg (138 lb)   05/03/17 64 kg (141 lb)              We Performed the Following     Gela-Operative Worksheet, Single Procedure     Gela-Operative Worksheet, Single Procedure        Primary Care Provider Office Phone # Fax #    Braulio Victoria MD PhD [phone]       [address]  [place] [address]        Equal Access to Services     SAMINA PORTER : Hadii aad ku hadasho Soomaali, waaxda luqadaha, qaybta kaalmada adeegyada, waxay idiin haysunn gerald khnavid melgoza . So [facility] [phone].    ATENCIÓN: Si habla español, tiene a flower disposición servicios gratuitos de asistencia lingüística. Llame al [phone].    We comply with applicable federal civil rights laws and [place] laws. We do not discriminate on the basis of race, color, national origin, age, disability, sex, sexual orientation, or gender identity.            Thank you!     Thank you for choosing [facility]  for your care. Our goal is always to provide you with excellent care. Hearing back from our patients is one way we can continue to improve our services. Please " take a few minutes to complete the written survey that you may receive in the mail after your visit with us. Thank you!             Your Updated Medication List - Protect others around you: Learn how to safely use, store and throw away your medicines at www.disposemymeds.org.          This list is accurate as of: 1/11/18 11:59 PM.  Always use your most recent med list.                   Brand Name Dispense Instructions for use Diagnosis    ALPRAZolam 0.5 MG tablet    XANAX    30 tablet    Take 1 tablet (0.5 mg) by mouth daily as needed    Dystonic tremor       BOTOX 100 UNITS injection   Generic drug:  botulinum toxin type A     1 each    Inject 0.5units bilaterally for a total of 1.0 units    Other diseases of larynx       calcium carbonate 1250 MG tablet    OS- mg [place]. [place]     Take 500 mg by mouth 2 times daily        rosuvastatin 5 MG tablet    CRESTOR    30 tablet    Take 1 tablet (5 mg) by mouth daily    Hyperlipidemia LDL goal <160       sertraline 100 MG tablet    ZOLOFT    90 tablet    TAKE 1 TABLET BY MOUTH EVERY DAY    History of major depression

## 2018-01-11 NOTE — PROGRESS NOTES
HISTORY AND PHYSICAL EXAM    Chief Complaint   Patient presents with     RECHECK     [facility] RETURN - F/U DBS - patient would like further discussion     HISTORY OF PRESENT ILLNESS  Ms. Irish Rosales returns to the Neurosurgery Clinic to discuss her DBS surgery plans further.  Patient was seen by me back in February, 27, 2017, as part of her DBS workup.  Her case was discussed at the Movement Disorder Consensus Group Meeting and she was considered to be a good DBS candidate.  The following was recommended.    Left Vim DBS implantation with wait-and-see strategy.  Abbott Infinity device.  SLP evaluation to assess her speech before and after implantation.    Patient subsequently had SLP evaluation.  She also underwent botox injection at [facility].  She states that her botox did not work and she also stated that it made her neck weak.  She continues to wear a soft collar for her weak neck.    She sent a [employer] message stating that she would like to move forward with DBS and the appointment has been made.    Briefly, patient is a 67 year old right-handed female with history of dystonic tremor.  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  She also has cervical dystonia.  Her right side is more affected than her left side.  Her goals for DBS surgery are to decrease tremor, especially head and voice and improve dystonia.  She stated that she can live with her hand tremors but wants other symptoms treated.    Per notes from RACHANA Rivera, patient apparently had questions about how DBS will improve her voice tremors and dysarthria.  Her expectations have not been fully addressed.      Past Medical History:   Diagnosis Date     Depression      Dyslipidemia      Dystonic movements 1/21/2017     Dystonic tremor 1/21/2017     Encounter for neuropsychological testing 3/13/2017    2017 evaluation  IMPRESSIONS  AND RECOMMENDATIONS   Current results indicate performance that falls within normal limits across cognitive domains, generally in the average to above average range. Objective personality assessment also falls within normal limits.   This pattern of performance does not reflect dementia at this time, nor is it suggestive of focal or lateralized cerebral involvement. Sh     Family history of movement disorder 1/21/2017     Family history of tremor 1/21/2017     H/O magnetic resonance imaging of brain and brain stem 2/27/2017    MR BRAIN W/O CONTRAST 2/27/2017 11:46 AM  Provided History: dbs brain surgery for dystonia, Other specified forms of tremor, Dystonia, unspecified, Tremor, unspecified  Comparison:  No similar prior studies   Technique: Volumetric sagittal T1 and T2 weighted, axial T2-weighted, turboFLAIR and diffusion-weighted with ADC map images of the brain were obtained without intravenous contrast.  Findings:     mild abnormality in carotid study 3/2/2017    BILATERAL DUPLEX CAROTID ULTRASOUND March 1, 2017 1:01 PM    HISTORY: Other specified symptoms and signs involving the circulatory and respiratory systems.   COMPARISON: None.   FINDINGS: There is mild atherosclerotic plaque in the carotid bifurcations. Flow velocities and waveforms show less than 50% diameter stenosis in both the right and left internal carotid arteries as assessed by each ICA PS     Osteoporosis 8/2/2010     Past Surgical History:   Procedure Laterality Date     ------------OTHER-------------  2004    vocal cord [facility]roplasty at [facility]     C BSO, OMENTECTOMY W/XAVIER  1997    hysterectomy     C HAND/FINGER SURGERY UNLISTED  1998    right carpal tunnel surgery     RELEASE CARPAL TUNNEL Left 1/2/2015    Procedure: RELEASE CARPAL TUNNEL;  Surgeon: SHANIA Hernandez MD;  Location: [facility] OR     RELEASE ULNAR NERVE (ELBOW) Left 1/2/2015    Procedure: RELEASE ULNAR NERVE (ELBOW);  Surgeon: SHANIA Hernandez MD;  Location:  OR      Social History     Social History     Marital status:      Spouse name: N/A     Number of children: 3     Years of education: 14     Occupational History     RN Retired     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     Social History Main Topics     Smoking status: Never Smoker     Smokeless tobacco: Never Used     Alcohol use Yes      Comment: occasionally     Drug use: No     Sexual activity: Yes     Partners: Male     Other Topics Concern      Service No     Blood Transfusions No     Caffeine Concern No     Occupational Exposure No     Hobby Hazards No     Sleep Concern No     Stress Concern No     Weight Concern No     Special Diet No     Back Care No     Exercise Yes     walk     Bike Helmet Yes     Seat Belt Yes     Self-Exams Yes     Social History Narrative        Lives in [place]    Daughter Karyn Sanchez        benign essential tremor with a strained quality to her voice consistent with mild laryngeal spasm        ALLERGIES:  She is allergic to sulfa drugs, atorvastatin and simvastatin.  The statin drugs caused leg cramps.            FAMILY HISTORY:  As noted above with 1 sister developing a voice tremor and another sister having what is described as a facial tremor.  Mother with hand tremors          SOCIAL HISTORY:  She does not smoke.  She does use alcohol on occasion. She previously worked as a RN.         Jazmyn barbara  [phone] orofacial dyskinesias    Piedad Candice [phone]  laryngeal dystonia and laryngeal tremor and laryngeal spasm.             Updated 6/13/17: Client was born in [place], [place] to parents Jerome and Berenice.  Client grew up w/ three sisters Tea, Carrie, and Jazmyn who are currently still living.  Client graduated from high school, attended college for two years, and graduated w/ a RN Degree.  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  She was  to her ex-spouse for 25 years and had three children; one son Bryan and two daughters Halley/Ysabel.  One daughter Halley Sanchez resides nearby and her other daughter Ysabel and her son Bryan reside in [place], [place].  Bryan has two daughters ages five and eight years old.  Client enjoys flying for free to [place] to visit her two granddaughters. Michelle Sutton, [facility] ([phone], Fax: [phone]).             Family History   Problem Relation Age of Onset     Hypertension Father      and mother     CEREBROVASCULAR DISEASE Father      Tremor Mother      Lung Cancer Mother      Neurologic Disorder Sister      dystonic voice x 2 botox     Neurologic Disorder Sister      jose m mn. facial movement      Current Outpatient Prescriptions   Medication     sertraline (ZOLOFT) 100 MG tablet     ALPRAZolam (XANAX) 0.5 MG tablet     rosuvastatin (CRESTOR) 5 MG tablet     calcium carbonate (OS- [facility] [place]) 500 MG tablet     botulinum toxin type A (BOTOX) 100 UNITS injection     No current facility-administered medications for this visit.         Allergies   Allergen Reactions     Sulfa Drugs Swelling and Rash     Atorvastatin      Leg cramps     Simvastatin      Leg cramp       Answers for HPI/ROS submitted by the patient on 2/13/2017 - updated 1/11/2018.  Per HPI.  No new symptoms.  General Symptoms: No  Skin Symptoms: No  HENT Symptoms: No  EYE SYMPTOMS: No  HEART SYMPTOMS: No  LUNG SYMPTOMS: No  INTESTINAL SYMPTOMS: No  URINARY SYMPTOMS: No  GYNECOLOGIC SYMPTOMS: No  BREAST SYMPTOMS: No  SKELETAL SYMPTOMS: Yes  BLOOD SYMPTOMS: No  NERVOUS SYSTEM SYMPTOMS: Yes  MENTAL HEALTH SYMPTOMS: No  Back pain: No  Muscle aches: Yes  Neck pain: No  Swollen joints: No  Joint pain: No  Bone pain: No  Muscle cramps: No  Muscle weakness: No  Joint stiffness: No  Bone fracture: No  Trouble with coordination: No  Dizziness or  "trouble with balance: No  Fainting or black-out spells: No  Memory loss: No  Headache: No  Seizures: No  Speech problems: Yes  Tingling: No  Tremor: Yes  Weakness: No  Difficulty walking: No  Paralysis: No  Numbness: No      PHYSICAL EXAM  /79 (BP Location: Right arm, Patient Position: Sitting, Cuff Size: Adult Regular)  Pulse 75  Ht 1.6 m (5' 3\")  Wt 63.6 kg (140 lb 4.8 oz)  BMI 24.85 kg/m2    General: Awake, alert, oriented. Well nourished, well developed, she is not in any acute distress.  HEENT: Head normocephalic, atraumatic. Neck supple. Good range of motion. No palpable thyroid mass. Soft carotid bruit on the right side.  Wearing a soft cervical collar.  Heart: Regular rhythm and rate. No murmurs.  Lungs: Clear to auscultation and percussion bilaterally. No ronchi, rales or wheeze.  Abdomen: Soft, non-tender, non-distended. No hepatosplenomegaly.  Extremity: Warm with no clubbing, cyanosis or edema. No lower extremity edema.    Neurological  Awake, alert and oriented to date, time, place and person. Speech fluent but tremulous.   Pupils equal, round, reactive to light.  Extraocular movement intact.  Hearing is grossly normal to finger rub.   Facial sensation intact.  Face symmetric.  Tongue midline.  Uvula elevates equally.    Motor: full strength throughout.  Sensation: intact to light touch and pinpoint.  Deep tendon reflexes: 2+ throughout. Negative for clonus. Negative for Gilbert's sign. No dysmetria.      ASSESSMENT  66 year old female with a history of dystonia/dystonic tremors and bilateral tremors, vocal dystonia and tremor, head tremor.  Right side is worse than left side.    Her symptoms are debilitating and she is unable to perform some of the basic daily activities of living.  She did recently undergo botox injection and it was not helpful in terms of her neck dystonia.    She expressed interest in proceeding with the DBS surgery.  She did not ask any questions about what the DBS will " address and I did discuss that DBS at Vim would address her tremors.    Again, during today, we did discuss both phase I and II of DBS surgery.  During phase I, we would place the DBS electrode on the left side under MAC and microelectrode recording.  She would then return one week later for the phase II with placement of the DBS generator/battery over the left chest wall.  She is a wait-and-see candidate so her second side will be reconsidered at the later time.    Risks, benefits and alternative therapies were discussed with the patient, including but not limited to infection and bleeding.  Surgical procedure was discussed in detail.  All questions were answered and she expressed understanding.    We also briefly discussed the device options, although [employer] system was recommended.    Following topic was discussed.    [employer]  MRI compatible.  Non-rechargeable and rechargeable generator/battery available.  4 contact electrode.  Communication method: have the  or patient controller on the skin directly over the generator/battery.    [employer]ott  Not yet MRI compatible.  Will become MRI compatible with retro-approval when FDA approves the device for MRI use.  Non-rechargeable generator/battery.  9 contact segmented/directional electrode.  Communication method: Wireless/bluetooth.    [employer]  Not MRI compatible.  Working on generator/battery that will be MRI compatible.  If patient gets an implant and needs an MRI in the future, when the device becomes available, patient can have the upgrade.  Rechargeable generator/battery.  8 contact electrode.  Independent current control at each contacts.  Communication method: Wireless.    I did discuss that the implant technique for these devices are relatively the same which was discussed again.  They all have similar risks associated with the surgery.      PLAN  1.  DBS surgery, Left Vim implantation, wait-and-see strategy.  Abbott Infinity  system.      65 minutes were spent face to face with the patient of which more than 50% of the time was spent counseling and discussing the above issues regarding diagnosis and treatment/surgical options.

## 2018-02-02 ENCOUNTER — MYC MEDICAL ADVICE (OUTPATIENT)
Dept: PHARMACY | Facility: CLINIC | Age: 68
End: 2018-02-02

## 2018-02-02 DIAGNOSIS — Z86.59 HISTORY OF MAJOR DEPRESSION: ICD-10-CM

## 2018-02-02 DIAGNOSIS — F32.5 DEPRESSION, MAJOR, IN REMISSION (H): ICD-10-CM

## 2018-02-02 RX ORDER — SERTRALINE HYDROCHLORIDE 100 MG/1
TABLET, FILM COATED ORAL
Qty: 90 TABLET | Refills: 0 | Status: SHIPPED | OUTPATIENT
Start: 2018-02-02 | End: 2018-05-04

## 2018-02-02 ASSESSMENT — PATIENT HEALTH QUESTIONNAIRE - PHQ9
SUM OF ALL RESPONSES TO PHQ QUESTIONS 1-9: 0
SUM OF ALL RESPONSES TO PHQ QUESTIONS 1-9: 0

## 2018-02-02 NOTE — TELEPHONE ENCOUNTER
PHQ9 score updated via [employer].     Routing to provider as FYI.     PHQ-9 score:    PHQ-9 SCORE 2/2/2018   Total Score -   Total Score MyChart 0   Total Score 0       Rebecca Jhaveri RN

## 2018-02-02 NOTE — TELEPHONE ENCOUNTER
sertraline (ZOLOFT) 100 MG tablet  Last Written Prescription Date:  11/6/2017  Last Fill Quantity: 90,  # refills: 0   Last Office Visit with [facility] or [facility] primary care provider:  10/23/2017   Future Office Visit:       PHQ-9 score:    PHQ-9 SCORE 6/14/2017   Total Score -   Total Score 0       SSRIs Protocol Failed2/2 4:06 AM   PHQ-9 score less than 5 in past 6 months    Recent or future visit with authorizing provider    Patient is age 18 or older    No active pregnancy on record    No positive pregnancy test in last 12 months    Recent (6 mo) or future visit with authorizing provider's specialty     PHQ9 questionnaire sent via [employer]. Refilled per [facility] protocol.    Rebecca Jhaveri RN

## 2018-02-03 ASSESSMENT — PATIENT HEALTH QUESTIONNAIRE - PHQ9: SUM OF ALL RESPONSES TO PHQ QUESTIONS 1-9: 0

## 2018-02-05 ENCOUNTER — CARE COORDINATION (OUTPATIENT)
Dept: GERIATRIC MEDICINE | Facility: CLINIC | Age: 68
End: 2018-02-05

## 2018-02-05 NOTE — PROGRESS NOTES
Received message from Meseret an employee at client's apartment building.  Meseret was calling with client to find out who her CM was.  Due to client having difficulty speaking, client verbally okay in message that Meseret be called back with this writer's contact information at [phone].  Spoke w/ Meseret this morning and gave this writer's name and phone number to her.  Client thought she needed to notify this writer that she was having surgery in March.  Meseret was informed to tell client if she was in need of services after the surgery writer could assess the need to go back out to the home for a significant change assessment.    Michelle Sutton, [facility]  [phone]  Fax: [phone]

## 2018-02-15 DIAGNOSIS — G25.2 DYSTONIC TREMOR: ICD-10-CM

## 2018-02-16 RX ORDER — ALPRAZOLAM 0.5 MG
0.5 TABLET ORAL DAILY PRN
Qty: 30 TABLET | Refills: 1 | Status: SHIPPED | OUTPATIENT
Start: 2018-02-16 | End: 2018-07-31

## 2018-02-16 NOTE — TELEPHONE ENCOUNTER
Script for Xanax faxed to [facility] pharmacy.  Patient advised.    Maranda Yoon CMA

## 2018-02-20 ENCOUNTER — DOCUMENTATION ONLY (OUTPATIENT)
Dept: NEUROSURGERY | Facility: CLINIC | Age: 68
End: 2018-02-20

## 2018-02-28 ENCOUNTER — ALLIED HEALTH/NURSE VISIT (OUTPATIENT)
Dept: SURGERY | Facility: CLINIC | Age: 68
End: 2018-02-28
Payer: COMMERCIAL

## 2018-02-28 ENCOUNTER — APPOINTMENT (OUTPATIENT)
Dept: SURGERY | Facility: CLINIC | Age: 68
End: 2018-02-28
Payer: COMMERCIAL

## 2018-02-28 ENCOUNTER — ANESTHESIA EVENT (OUTPATIENT)
Dept: SURGERY | Facility: CLINIC | Age: 68
DRG: 027 | End: 2018-02-28
Payer: COMMERCIAL

## 2018-02-28 ENCOUNTER — OFFICE VISIT (OUTPATIENT)
Dept: SURGERY | Facility: CLINIC | Age: 68
End: 2018-02-28
Payer: COMMERCIAL

## 2018-02-28 VITALS
HEART RATE: 68 BPM | HEIGHT: 63 IN | DIASTOLIC BLOOD PRESSURE: 79 MMHG | OXYGEN SATURATION: 97 % | WEIGHT: 138.3 LBS | TEMPERATURE: 98.8 F | RESPIRATION RATE: 16 BRPM | SYSTOLIC BLOOD PRESSURE: 145 MMHG | BODY MASS INDEX: 24.5 KG/M2

## 2018-02-28 DIAGNOSIS — G24.9 DYSTONIC MOVEMENTS: ICD-10-CM

## 2018-02-28 DIAGNOSIS — Z01.818 PREOPERATIVE EVALUATION TO RULE OUT SURGICAL CONTRAINDICATION: ICD-10-CM

## 2018-02-28 DIAGNOSIS — Z01.818 PREOPERATIVE GENERAL PHYSICAL EXAMINATION: ICD-10-CM

## 2018-02-28 DIAGNOSIS — Z11.59 ENCOUNTER FOR HEPATITIS C SCREENING TEST FOR LOW RISK PATIENT: ICD-10-CM

## 2018-02-28 DIAGNOSIS — E78.5 HYPERLIPIDEMIA LDL GOAL <160: ICD-10-CM

## 2018-02-28 DIAGNOSIS — Z01.818 PREOPERATIVE GENERAL PHYSICAL EXAMINATION: Primary | ICD-10-CM

## 2018-02-28 DIAGNOSIS — G25.0 ESSENTIAL TREMOR: ICD-10-CM

## 2018-02-28 DIAGNOSIS — J38.5 SPASM OF VOCAL CORDS: ICD-10-CM

## 2018-02-28 LAB
ALBUMIN UR-MCNC: NEGATIVE MG/DL
ALT SERPL W P-5'-P-CCNC: 13 U/L (ref 0–50)
ANION GAP SERPL CALCULATED.3IONS-SCNC: 6 MMOL/L (ref 3–14)
APPEARANCE UR: ABNORMAL
APTT PPP: 26 SEC (ref 22–37)
BILIRUB UR QL STRIP: NEGATIVE
BUN SERPL-MCNC: 14 MG/DL (ref 7–30)
CALCIUM SERPL-MCNC: 9.2 MG/DL (ref 8.5–10.1)
CHLORIDE SERPL-SCNC: 104 MMOL/L (ref 94–109)
CHOLEST SERPL-MCNC: 257 MG/DL
CO2 SERPL-SCNC: 27 MMOL/L (ref 20–32)
COLOR UR AUTO: YELLOW
CREAT SERPL-MCNC: 0.62 MG/DL (ref 0.52–1.04)
ERYTHROCYTE [DISTWIDTH] IN BLOOD BY AUTOMATED COUNT: 11.3 % (ref 10–15)
GFR SERPL CREATININE-BSD FRML MDRD: >90 ML/MIN/1.7M2
GLUCOSE SERPL-MCNC: 93 MG/DL (ref 70–99)
GLUCOSE UR STRIP-MCNC: NEGATIVE MG/DL
HCT VFR BLD AUTO: 39.6 % (ref 35–47)
HDLC SERPL-MCNC: 97 MG/DL
HGB BLD-MCNC: 13.6 G/DL (ref 11.7–15.7)
HGB UR QL STRIP: ABNORMAL
INR PPP: 0.98 (ref 0.86–1.14)
KETONES UR STRIP-MCNC: 5 MG/DL
LDLC SERPL CALC-MCNC: 145 MG/DL
LEUKOCYTE ESTERASE UR QL STRIP: ABNORMAL
MCH RBC QN AUTO: 33.7 PG (ref 26.5–33)
MCHC RBC AUTO-ENTMCNC: 34.3 G/DL (ref 31.5–36.5)
MCV RBC AUTO: 98 FL (ref 78–100)
MUCOUS THREADS #/AREA URNS LPF: PRESENT /LPF
NITRATE UR QL: NEGATIVE
NONHDLC SERPL-MCNC: 160 MG/DL
PH UR STRIP: 5 PH (ref 5–7)
PLATELET # BLD AUTO: 240 10E9/L (ref 150–450)
POTASSIUM SERPL-SCNC: 3.6 MMOL/L (ref 3.4–5.3)
RBC # BLD AUTO: 4.03 10E12/L (ref 3.8–5.2)
RBC #/AREA URNS AUTO: 2 /HPF (ref 0–2)
SODIUM SERPL-SCNC: 138 MMOL/L (ref 133–144)
SOURCE: ABNORMAL
SP GR UR STRIP: 1.02 (ref 1–1.03)
SQUAMOUS #/AREA URNS AUTO: 1 /HPF (ref 0–1)
TRIGL SERPL-MCNC: 78 MG/DL
UROBILINOGEN UR STRIP-MCNC: 0 MG/DL (ref 0–2)
WBC # BLD AUTO: 4.4 10E9/L (ref 4–11)
WBC #/AREA URNS AUTO: 2 /HPF (ref 0–2)

## 2018-02-28 NOTE — PATIENT INSTRUCTIONS
Preparing for Your Surgery      Name:  Irish Rosales   MRN:  [id number]   :  1950   Today's Date:  2018     Arriving for surgery:  Surgery date:  3/6/18  Arrival time:  6:30 am  Please come to:       [facility] [address]  [address] [place]  [address]    -   parking is available in front of the hospital from 5:15 am to 8:00 pm    -  Stop at the Information Desk in the lobby    -   Inform the information person that you are here for surgery. An escort to 3c will be provided. If you would not like an escort, please proceed to 3C on the 3rd floor. [phone]     What can I eat or drink?  -  You may have solid food or milk products until 8 hours prior to your surgery.  Stop midnight 3/6/18  -  You may have water, black coffee, apple juice or 7up/Sprite until 2 hours prior to your surgery.  Stop 6:30 am  3/6/18    Which medicines can I take?         No aspirin before surgery, stop advil/ibuprofen 1 - 2 days before surgery.       Stop vitamins/supplements today.    -  Please take these medications the day of surgery: 3/6/18         zoloft       crestor       Xanax if needed    How do I prepare myself?  -  Take two showers: one the night before surgery; and one the morning of surgery.         Use Scrubcare or Hibiclens to wash from neck down.  You may use your own shampoo and conditioner. No other hair products.   -  Do NOT use lotion, powder, deodorant, or antiperspirant the day of your surgery.  -  Do NOT wear any makeup, fingernail polish or jewelry.  -Do not bring your own medications to the hospital, except for inhalers and eye drops.  -  Bring your ID and insurance card.    Questions or Concerns:  If you have questions or concerns, please call the  Preoperative Assessment Center, Monday-Friday 7AM-7PM:  [phone]  AFTER YOUR SURGERY  Breathing exercises   Breathing exercises help you recover faster. Take deep breaths and let the air out slowly.  This will:     Help you wake up after surgery.    Help prevent complications like pneumonia.  Preventing complications will help you go home sooner.   We may give you a breathing device (incentive spirometer) to encourage you to breathe deeply.   Nausea and vomiting   You may feel sick to your stomach after surgery; if so, let your nurse know.    Pain control:  After surgery, you may have pain. Our goal is to help you manage your pain. Pain medicine will help you feel comfortable enough to do activities that will help you heal.  These activities may include breathing exercises, walking and physical therapy.   To help your health care team treat your pain we will ask: 1) If you have pain  2) where it is located 3) describe your pain in your words  Methods of pain control include medications given by mouth, vein or by nerve block for some surgeries.  We may give you a pain control pump that will:  1) Deliver the medicine through a tube placed in your vein  2) Control the amount of medicine you receive  3) Allow you to push a button to deliver a dose of pain medicine  Sequential Compression Device (SCD) or Pneumo Boots:  You may need to wear SCD S on your legs or feet. These are wraps connected to a machine that pumps in air and releases it. The repeated pumping helps prevent blood clots from forming.

## 2018-02-28 NOTE — ANESTHESIA PREPROCEDURE EVALUATION
Anesthesia Evaluation     . Pt has had prior anesthetic. Type: General           ROS/MED HX    ENT/Pulmonary:     (+)other ENT- vocal cord spasms, , . .    Neurologic:     (+)other neuro familial tremor    Cardiovascular:     (+) Dyslipidemia, ----. : . . . :. . Previous cardiac testing date:results:date: results:ECG reviewed date: results: date: results:          METS/Exercise Tolerance:  >4 METS   Hematologic:  - neg hematologic  ROS       Musculoskeletal:  - neg musculoskeletal ROS       GI/Hepatic:  - neg GI/hepatic ROS       Renal/Genitourinary:  - ROS Renal section negative       Endo:  - neg endo ROS       Psychiatric:  - neg psychiatric ROS       Infectious Disease:  - neg infectious disease ROS       Malignancy:      - no malignancy   Other:    (+) No chance of pregnancy no H/O Chronic Pain,no other significant disability                    Physical Exam  Normal systems: dental    Airway   Mallampati: I  TM distance: >3 FB  Neck ROM: full    Dental     Cardiovascular   Rhythm and rate: regular and normal      Pulmonary    breath sounds clear to auscultation               PAC Discussion and Assessment    ASA Classification: 3  Case is suitable for:   Anesthetic techniques and relevant risks discussed: GA  Invasive monitoring and risk discussed:   Types:   Possibility and Risk of blood transfusion discussed:   NPO instructions given:   Additional anesthetic preparation and risks discussed:   Needs early admission to pre-op area:   Other:     PAC Resident/NP Anesthesia Assessment:  67 year old female for phase 1 left deep brain stimulator (DBS) placement on 3/6/18, with Dr. Morales and phase 2 DBS generator battery left chest, with Dr. Morales, on 3/13/18[facility] in treatment of systemic tremor, BUE, vocal dystonia and cervical dystonia.  PAC referral for risk assessment and optimization for anesthesia.  Pre-operative considerations include:  1.) cardiac: Functional status independent, Exercise tolerance is  > 4 METS. HLD. No known CAD. EKG 2014 NSR  RCRI = 0.4% risk of major adverse cardiac events  No further cardiac evaluation indicated  2.) Pulmonary/ENT: Vocal cord spasms and dystonia. No pulmonary dx, sx or meds.  SHERLY risks: age = 1/8 = low risk  3.) Heme: No bleeds or clots  4.) GI: PONV score = 2 (2 or > anti-emetioc prophylaxis recommended)  Anesthesia: Last GA 2015, LMA, no problems documented. Pt also seen by Dr. Bustamante. See his recommendations below.      Reviewed and Signed by PAC Mid-Level Provider/Resident  Mid-Level Provider/Resident: Lacey Arciniega PA-C  Date: 2/28/18  Time: 10:35 AM    Attending Anesthesiologist Anesthesia Assessment:  67 year old for DBS for management of dystonia. Chart reviewed, patient seen and evaluated; agree with above assessment. Patient has no significant cardiac or pulmonary disease, is active.     Patient is appropriate for the planned procedure without further workup or medical management change. The final anesthesia plan will be determined by the physician anesthesiologist caring for the patient on the day of surgery.      Reviewed and Signed by PAC Anesthesiologist  Anesthesiologist: mele  Date: 2/28/2018  Time:   Pass/Fail: Pass  Disposition:     PAC Pharmacist Assessment:        Pharmacist:   Date:   Time:      Anesthesia Plan      History & Physical Review  History and physical reviewed and following examination; no interval change.    ASA Status:  3 .    NPO Status:  > 8 hours    Plan for MAC with Intravenous and Propofol induction. Maintenance will be Balanced.  Reason for MAC:  Deep or markedly invasive procedure (G8)  PONV prophylaxis:  Ondansetron (or other 5HT-3) and Dexamethasone or Solumedrol  Additional equipment: Videolaryngoscope      Postoperative Care  Postoperative pain management:  Oral pain medications.      Consents  Anesthetic plan, risks, benefits and alternatives discussed with:  Patient..                          .

## 2018-02-28 NOTE — MR AVS SNAPSHOT
After Visit Summary   2018    Irish Rosales    MRN: [id number]           Patient Information     Date Of Birth          1950        Visit Information        Provider Department      2018 11:30 AM Rn, [facility]        Care Instructions    Preparing for Your Surgery      Name:  Irish Rosales   MRN:  [id number]   :  1950   Today's Date:  2018     Arriving for surgery:  Surgery date:  3/6/18  Arrival time:  6:30 am  Please come to:       [facility] [address]  [address] [place]  [address]    -   parking is available in front of the hospital from 5:15 am to 8:00 pm    -  Stop at the Information Desk in the lobby    -   Inform the information person that you are here for surgery. An escort to 3c will be provided. If you would not like an escort, please proceed to 3C on the 3rd floor. [phone]     What can I eat or drink?  -  You may have solid food or milk products until 8 hours prior to your surgery.  Stop midnight 3/6/18  -  You may have water, black coffee, apple juice or 7up/Sprite until 2 hours prior to your surgery.  Stop 6:30 am  3/6/18    Which medicines can I take?         No aspirin before surgery, stop advil/ibuprofen 1 - 2 days before surgery.       Stop vitamins/supplements today.    -  Please take these medications the day of surgery: 3/6/18         zoloft       crestor       Xanax if needed    How do I prepare myself?  -  Take two showers: one the night before surgery; and one the morning of surgery.         Use Scrubcare or Hibiclens to wash from neck down.  You may use your own shampoo and conditioner. No other hair products.   -  Do NOT use lotion, powder, deodorant, or antiperspirant the day of your surgery.  -  Do NOT wear any makeup, fingernail polish or jewelry.  -Do not bring your own medications to the hospital, except for inhalers and eye drops.  -  Bring  your ID and insurance card.    Questions or Concerns:  If you have questions or concerns, please call the  Preoperative Assessment Center, Monday-Friday 7AM-7PM:  [phone]  AFTER YOUR SURGERY  Breathing exercises   Breathing exercises help you recover faster. Take deep breaths and let the air out slowly. This will:     Help you wake up after surgery.    Help prevent complications like pneumonia.  Preventing complications will help you go home sooner.   We may give you a breathing device (incentive spirometer) to encourage you to breathe deeply.   Nausea and vomiting   You may feel sick to your stomach after surgery; if so, let your nurse know.    Pain control:  After surgery, you may have pain. Our goal is to help you manage your pain. Pain medicine will help you feel comfortable enough to do activities that will help you heal.  These activities may include breathing exercises, walking and physical therapy.   To help your health care team treat your pain we will ask: 1) If you have pain  2) where it is located 3) describe your pain in your words  Methods of pain control include medications given by mouth, vein or by nerve block for some surgeries.  We may give you a pain control pump that will:  1) Deliver the medicine through a tube placed in your vein  2) Control the amount of medicine you receive  3) Allow you to push a button to deliver a dose of pain medicine  Sequential Compression Device (SCD) or Pneumo Boots:  You may need to wear SCD S on your legs or feet. These are wraps connected to a machine that pumps in air and releases it. The repeated pumping helps prevent blood clots from forming.                     Follow-ups after your visit        Your next 10 appointments already scheduled     Feb 28, 2018 12:00 PM CST   LAB with [facility] ([facility])    [address] [place] [id number] [phone]           Please do not eat 10-12 hours  before your appointment if you are coming in fasting for labs on lipids, cholesterol, or glucose (sugar). This does not apply to pregnant women. Water, hot tea and black coffee (with nothing added) are okay. Do not drink other fluids, diet soda or chew gum.            Mar 06, 2018   Procedure with Aleksander Morales MD   [facility], Same Day Surgery (--)    [address] [facility] [address]   [phone]            Mar 06, 2018  9:00 AM CST   CT HEAD W/O CONTRAST with UUCT4   [place], [place] ([facility], [facility])    [address] [facility] [address] [phone]           Please bring any scans or X-rays taken at other hospitals, if similar tests were done. Also bring a list of your medicines, including vitamins, minerals and over-the-counter drugs. It is safest to leave personal items at home.  Be sure to tell your doctor:   If you have any allergies.   If there s any chance you are pregnant.   If you are breastfeeding.  You do not need to do anything special to prepare for this exam.  Please wear loose clothing, such as a sweat suit or jogging clothes. Avoid snaps, zippers and other metal. We may ask you to undress and put on a hospital gown.            Mar 13, 2018   Procedure with Aleksander Morales MD   [facility], Same Day Surgery (--)    [address] [facility] [address]   [phone]              Future tests that were ordered for you today     Open Future Orders        Priority Expected Expires Ordered    ABO/Rh type and screen Routine 2/28/2018 3/30/2018 2/28/2018            Who to contact     Please call your clinic at [phone] to:    Ask questions about your health    Make or cancel appointments    Discuss your medicines    Learn about your test results    Speak to your doctor            Additional Information About Your Visit        MyChart Information     [employer] gives you secure access to your electronic health record. If  you see a primary care provider, you can also send messages to your care team and make appointments. If you have questions, please call your primary care clinic.  If you do not have a primary care provider, please call [phone] and they will assist you.      [employer] is an electronic gateway that provides easy, online access to your medical records. With [employer], you can request a clinic appointment, read your test results, renew a prescription or communicate with your care team.     To access your existing account, please contact your [facility] or call [phone] for assistance.        Care EveryWhere ID     This is your Care EveryWhere ID. This could be used by other organizations to access your [facility] medical records  [id number]         Blood Pressure from Last 3 Encounters:   02/28/18 145/79   01/11/18 156/79   10/23/17 148/86    Weight from Last 3 Encounters:   02/28/18 62.7 kg (138 lb 4.8 oz)   01/11/18 63.6 kg (140 lb 4.8 oz)   10/23/17 62.6 kg (138 lb)              Today, you had the following     No orders found for display         Today's Medication Changes          These changes are accurate as of 2/28/18 11:50 AM.  If you have any questions, ask your nurse or doctor.               These medicines have changed or have updated prescriptions.        Dose/Directions    rosuvastatin 5 MG tablet   Commonly known as:  CRESTOR   This may have changed:  when to take this   Used for:  Hyperlipidemia LDL goal <160        Dose:  5 mg   Take 1 tablet (5 mg) by mouth daily   Quantity:  30 tablet   Refills:  11       sertraline 100 MG tablet   Commonly known as:  ZOLOFT   This may have changed:  See the new instructions.   Used for:  History of major depression        TAKE 1 TABLET BY MOUTH EVERY DAY   Quantity:  90 tablet   Refills:  0                Primary Care Provider Office Phone # Fax #    Braulio Victoria MD PhD [phone] [address] [facility]  [address]        Equal Access to Services     [facility]: Hadii tammy tristan sandra Almazan, watravisda luqkera, qaybta reganbrendaferny duncan. So [facility] [phone].    ATENCIÓN: Si habla español, tiene a flower disposición servicios gratuitos de asistencia lingüística. Jocelyname al [phone].    We comply with applicable federal civil rights laws and [place] laws. We do not discriminate on the basis of race, color, national origin, age, disability, sex, sexual orientation, or gender identity.            Thank you!     Thank you for choosing [facility]OPER[facility]  for your care. Our goal is always to provide you with excellent care. Hearing back from our patients is one way we can continue to improve our services. Please take a few minutes to complete the written survey that you may receive in the mail after your visit with us. Thank you!             Your Updated Medication List - Protect others around you: Learn how to safely use, store and throw away your medicines at www.disposemymeds.org.          This list is accurate as of 2/28/18 11:50 AM.  Always use your most recent med list.                   Brand Name Dispense Instructions for use Diagnosis    ALPRAZolam 0.5 MG tablet    XANAX    30 tablet    Take 1 tablet (0.5 mg) by mouth daily as needed    Dystonic tremor       BOTOX 100 UNITS injection   Generic drug:  botulinum toxin type A     1 each    Inject 0.5units bilaterally for a total of 1.0 units    Other diseases of larynx       calcium carbonate 1250 MG tablet    OS- mg Telida. Ca     Take 500 mg by mouth 2 times daily        rosuvastatin 5 MG tablet    CRESTOR    30 tablet    Take 1 tablet (5 mg) by mouth daily    Hyperlipidemia LDL goal <160       sertraline 100 MG tablet    ZOLOFT    90 tablet    TAKE 1 TABLET BY MOUTH EVERY DAY    History of major depression

## 2018-02-28 NOTE — H&P
Pre-Operative H & P     CC:  Preoperative exam to assess for increased cardiopulmonary risk while undergoing surgery and anesthesia.    Date of Encounter: 2/28/2018  Primary Care Physician:  Braulio Victoria  Reason for visit:     Preoperative general physical examination  Essential tremor  Spasm of vocal cords  Dystonic movements    HPI  Irish Rosales is a 67 year old female who presents for pre-operative H & P in preparation for phase 1 left deep brain stimulator (DBS) placement on 3/6/18, with Dr. Morales and phase 2 DBS generator battery left chest, with Dr. Morales, on 3/13/18, both at [facility], in treatment of systemic tremor, BUE, vocal dystonia and cervical dystonia.  Familial tremor, also present in mother and 2 sisters.  See neuro notes for full HPI  No recent fever, chills or night sweats. In her usual state of health. No prior surgeries for these tremors.       History is obtained from the patient.     Past Medical History  Past Medical History:   Diagnosis Date     Depression      Dyslipidemia      Dystonic movements 1/21/2017     Dystonic tremor 1/21/2017     Encounter for neuropsychological testing 3/13/2017     Family history of movement disorder 1/21/2017     mild abnormality in carotid study 3/2/2017    BILATERAL DUPLEX CAROTID ULTRASOUND March 1, 2017 1:01 PM    HISTORY: Other specified symptoms and signs involving the circulatory and respiratory systems.   COMPARISON: None.   FINDINGS: There is mild atherosclerotic plaque in the carotid bifurcations. Flow velocities and waveforms show less than 50% diameter stenosis in both the right and left internal carotid arteries as assessed by each ICA PS     Osteoporosis 8/2/2010       Past Surgical History  Past Surgical History:   Procedure Laterality Date     ------------OTHER-------------  2004    vocal cord thyroplasty at [facility]     C BSO, OMENTECTOMY W/XAVIER  1997    hysterectomy     C HAND/FINGER SURGERY UNLISTED  1998    right carpal tunnel surgery      RELEASE CARPAL TUNNEL Left 1/2/2015    Procedure: RELEASE CARPAL TUNNEL;  Surgeon: SHANIA Hernandez MD;  Location: [facility] OR     RELEASE ULNAR NERVE (ELBOW) Left 1/2/2015    Procedure: RELEASE ULNAR NERVE (ELBOW);  Surgeon: SHANIA Hernandez MD;  Location: [facility] OR       Hx of Blood transfusions/reactions: no     Hx of abnormal bleeding or anti-platelet use: no    Menstrual history: No LMP recorded. Patient has had a hysterectomy.:    Steroid use in the last year: no    Personal or FH with difficulty with Anesthesia:  no    Prior to Admission Medications  Current Outpatient Prescriptions   Medication Sig Dispense Refill     ALPRAZolam (XANAX) 0.5 MG tablet Take 1 tablet (0.5 mg) by mouth daily as needed 30 tablet 1     sertraline (ZOLOFT) 100 MG tablet TAKE 1 TABLET BY MOUTH EVERY DAY (Patient taking differently: TAKE 1 TABLET BY MOUTH EVERY MORNING) 90 tablet 0     rosuvastatin (CRESTOR) 5 MG tablet Take 1 tablet (5 mg) by mouth daily (Patient taking differently: Take 5 mg by mouth every morning ) 30 tablet 11     calcium carbonate (OS- [facility] [place]. 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     Social History Main Topics     Smoking status: Never Smoker     Smokeless tobacco: Never Used     Alcohol use Yes      Comment: occasionally     Drug use: No     Sexual activity: Yes     Partners: Male       Social History Narrative        Lives in [place]     Daughter Karyn Sanchez        benign essential tremor with a strained quality to her voice consistent with mild laryngeal spasm        ALLERGIES:  She is allergic to sulfa drugs, atorvastatin and simvastatin.  The statin drugs caused leg cramps.            FAMILY HISTORY:  As noted above with 1 sister developing a voice tremor and another sister having what is described as a facial tremor.  Mother with hand tremors          SOCIAL HISTORY:  She does not smoke.  She does use alcohol on occasion. She previously worked as a RN.         Jazmyn [facility]  [id number] orofacial dyskinesias    Piedad [facility] [phone]  laryngeal dystonia and laryngeal tremor and laryngeal spasm.             Updated 6/13/17: Client was born in [place], [place] to parents Jerome and Berenice.  Client grew up w/ three sisters Tea, Carrie, and Jazmyn who are currently still living.  Client graduated from high school, attended college for two years, and graduated w/ a RN Degree.  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  She was  to her ex-spouse for 25 years and had three children; one son Bryan and two daughters Halley/Ysabel.  One daughter Halley Sanchez resides nearby and her other daughter Ysabel and her son Bryan reside in [place], [place].  Bryan has two daughters ages five and eight years old.  Client enjoys flying for free to [place] to visit her two granddaughters. Michelle Sutton, [facility] ([phone], Fax: [phone]).               Family History  Family History   Problem Relation Age of Onset     Hypertension Father      and mother     CEREBROVASCULAR DISEASE Father      Tremor Mother      Lung Cancer Mother      Neurologic Disorder Sister      dystonic voice x 2 botox     Neurologic Disorder Sister      jose m olea. facial movement          ROS/MED  "HX    ENT/Pulmonary:     (+)other ENT- vocal cord spasms, , . .    Neurologic:     (+)other neuro familial tremor    Cardiovascular:     (+) Dyslipidemia  Previous cardiac testing :ECG reviewed date: 2014 NSR   METS/Exercise Tolerance:  >4 METS   Hematologic:  - neg hematologic  ROS       Musculoskeletal:  - neg musculoskeletal ROS       GI/Hepatic:  - neg GI/hepatic ROS       Renal/Genitourinary:  - ROS Renal section negative       Endo:  - neg endo ROS       Psychiatric:  - neg psychiatric ROS       Infectious Disease:  - neg infectious disease ROS       Malignancy:      - no malignancy   Other:    (+) No chance of pregnancy no H/O Chronic Pain,no other significant disability              The complete review of systems is negative other than noted in the HPI or here.   Temp: 98.8  F (37.1  C) Temp src: Oral BP: 145/79 Pulse: 68   Resp: 16 SpO2: 97 %         138 lbs 4.8 oz  5' 3\"   Body mass index is 24.5 kg/(m^2).       Physical Exam  Constitutional: Awake, alert, cooperative, no apparent distress, and appears stated age. Rotary head movement throughout exam.  Eyes: Pupils equal, round and reactive to light, extra ocular muscles not tested, sclera clear, conjunctiva normal.  HENT: Normocephalic, oral pharynx with moist mucus membranes, good dentition. No goiter appreciated.   Respiratory: Clear to auscultation bilaterally, no crackles or wheezing.  Cardiovascular: Regular rate and rhythm, normal S1 and S2, and no murmur noted.  Carotids +2, no bruits. No LE edema. Palpable pulses to radial  DP and PT arteries.   GI: Normal bowel sounds, soft, non-distended, non-tender, no masses palpated, no hepatosplenomegaly.  Lymph/Hematologic: No cervical lymphadenopathy and no supraclavicular lymphadenopathy.  Genitourinary:  deferred  Skin: Warm and dry.     Musculoskeletal: Full ROM of neck. There is no redness, warmth, or swelling of the joints. Gross motor strength is not tested   Neurologic: Awake, alert, oriented to " name, place and time. Cranial nerves II-XII are grossly intact. Gait is normal.   Neuropsychiatric: Calm, cooperative. Normal affect.     Labs: (personally reviewed)  CBC RESULTS:   Recent Labs   Lab Test  18   1229   WBC  4.4   RBC  4.03   HGB  13.6   HCT  39.6   MCV  98   MCH  33.7*   MCHC  34.3   RDW  11.3   PLT  240     Last Basic Metabolic Panel:  Lab Results   Component Value Date     2017      Lab Results   Component Value Date    POTASSIUM 3.9 2017     Lab Results   Component Value Date    CHLORIDE 104 2017     Lab Results   Component Value Date    TJ 9.5 2017     Lab Results   Component Value Date    CO2 29 2017     Lab Results   Component Value Date    BUN 16 2017     Lab Results   Component Value Date    CR 0.63 2017     Lab Results   Component Value Date    GLC 97 2017       EKG: Personally reviewed but formal cardiology read pendin NSR    ASSESSMENT and PLAN  Irish Rosales is a 67 year old female scheduled to undergo phase 1 left deep brain stimulator (DBS) placement on 3/6/18, with Dr. Morales and phase 2 DBS generator battery left chest, with Dr. Morales, on 3/13/18, both at [facility], in treatment of systemic tremor, BUE, vocal dystonia and cervical dystonia.    Pre-operative considerations include:  1.) Cardiac: Functional status independent, Exercise tolerance is > 4 METS. HLD. No known CAD. EKG  NSR  RCRI = 0.4% risk of major adverse cardiac events  No further cardiac evaluation indicated    2.) Pulmonary/ENT: Vocal cord spasms and dystonia. No pulmonary dx, sx or meds.    3.) Heme: No bleeds or clots  CBC, BMP, INR, PTT, ua and type and screenordered by Dr. Morales and can be done today.    She has the following specific operative considerations:   - RCRI : No serious cardiac risks.  0.4% risk of major adverse cardiac event.   - Anesthesia considerations:  Refer to PAC assessment in anesthesia records  - SHERLY # of risks  = low  risk  - Risk of PONV score = 2.  If > 2, anti-emetic intervention recommended.    Patient was discussed with Dr Bustamante. Patient is optimized and is acceptable candidate for the proposed procedure.  No further diagnostic evaluation is needed.       JELLY Singletary  Preoperative Assessment Center  [facility]  Clinic and Surgery Center  Phone: [phone]  Fax: [phone]

## 2018-03-01 LAB — HCV AB SERPL QL IA: NONREACTIVE

## 2018-03-02 NOTE — PROGRESS NOTES
Dear Irish,   Here are your recent results which are within the expected range.  Please continue with your current plan of care.     Please call or Mychart to our office if you have further questions.     Braulio Victoria MD-PhD

## 2018-03-06 ENCOUNTER — CARE COORDINATION (OUTPATIENT)
Dept: GERIATRIC MEDICINE | Facility: CLINIC | Age: 68
End: 2018-03-06

## 2018-03-06 ENCOUNTER — HOSPITAL ENCOUNTER (OUTPATIENT)
Dept: CT IMAGING | Facility: CLINIC | Age: 68
DRG: 027 | End: 2018-03-06
Attending: NEUROLOGICAL SURGERY | Admitting: NEUROLOGICAL SURGERY
Payer: COMMERCIAL

## 2018-03-06 ENCOUNTER — HOSPITAL ENCOUNTER (INPATIENT)
Facility: CLINIC | Age: 68
LOS: 1 days | Discharge: HOME | DRG: 027 | End: 2018-03-07
Attending: NEUROLOGICAL SURGERY | Admitting: NEUROLOGICAL SURGERY
Payer: COMMERCIAL

## 2018-03-06 ENCOUNTER — APPOINTMENT (OUTPATIENT)
Dept: GENERAL RADIOLOGY | Facility: CLINIC | Age: 68
DRG: 027 | End: 2018-03-06
Attending: NEUROLOGICAL SURGERY
Payer: COMMERCIAL

## 2018-03-06 ENCOUNTER — APPOINTMENT (OUTPATIENT)
Dept: GENERAL RADIOLOGY | Facility: CLINIC | Age: 68
DRG: 027 | End: 2018-03-06
Attending: STUDENT IN AN ORGANIZED HEALTH CARE EDUCATION/TRAINING PROGRAM
Payer: COMMERCIAL

## 2018-03-06 ENCOUNTER — ANESTHESIA (OUTPATIENT)
Dept: SURGERY | Facility: CLINIC | Age: 68
DRG: 027 | End: 2018-03-06
Payer: COMMERCIAL

## 2018-03-06 DIAGNOSIS — R25.1 TREMOR: ICD-10-CM

## 2018-03-06 DIAGNOSIS — G25.0 ESSENTIAL TREMOR: Primary | ICD-10-CM

## 2018-03-06 LAB
ABO + RH BLD: NORMAL
ABO + RH BLD: NORMAL
BLD GP AB SCN SERPL QL: NORMAL
BLOOD BANK CMNT PATIENT-IMP: NORMAL
BLOOD BANK CMNT PATIENT-IMP: NORMAL
GLUCOSE BLDC GLUCOMTR-MCNC: 107 MG/DL (ref 70–99)
INTERPRETATION ECG - MUSE: NORMAL
MRSA DNA SPEC QL NAA+PROBE: NEGATIVE
SPECIMEN EXP DATE BLD: NORMAL
SPECIMEN SOURCE: NORMAL

## 2018-03-06 PROCEDURE — 00000146 ZZHCL STATISTIC GLUCOSE BY METER IP

## 2018-03-06 PROCEDURE — 87641 MR-STAPH DNA AMP PROBE: CPT | Performed by: NEUROLOGICAL SURGERY

## 2018-03-06 PROCEDURE — 25000128 H RX IP 250 OP 636: Performed by: NEUROLOGICAL SURGERY

## 2018-03-06 PROCEDURE — 27810169 ZZH OR IMPLANT GENERAL: Performed by: NEUROLOGICAL SURGERY

## 2018-03-06 PROCEDURE — 25000128 H RX IP 250 OP 636: Performed by: NURSE ANESTHETIST, CERTIFIED REGISTERED

## 2018-03-06 PROCEDURE — 25000125 ZZHC RX 250: Performed by: NEUROLOGICAL SURGERY

## 2018-03-06 PROCEDURE — 27210794 ZZH OR GENERAL SUPPLY STERILE: Performed by: NEUROLOGICAL SURGERY

## 2018-03-06 PROCEDURE — 36000076 ZZH SURGERY LEVEL 6 EA 15 ADDTL MIN - UMMC: Performed by: NEUROLOGICAL SURGERY

## 2018-03-06 PROCEDURE — 70470 CT HEAD/BRAIN W/O & W/DYE: CPT

## 2018-03-06 PROCEDURE — 40000277 XR SURGERY CARM FLUORO LESS THAN 5 MIN W STILLS

## 2018-03-06 PROCEDURE — 87640 STAPH A DNA AMP PROBE: CPT | Performed by: NEUROLOGICAL SURGERY

## 2018-03-06 PROCEDURE — 27210995 ZZH RX 272: Performed by: STUDENT IN AN ORGANIZED HEALTH CARE EDUCATION/TRAINING PROGRAM

## 2018-03-06 PROCEDURE — 25000128 H RX IP 250 OP 636: Performed by: STUDENT IN AN ORGANIZED HEALTH CARE EDUCATION/TRAINING PROGRAM

## 2018-03-06 PROCEDURE — 37000009 ZZH ANESTHESIA TECHNICAL FEE, EACH ADDTL 15 MIN: Performed by: NEUROLOGICAL SURGERY

## 2018-03-06 PROCEDURE — 40000065 ZZH STATISTIC EKG NON-CHARGEABLE

## 2018-03-06 PROCEDURE — 71000014 ZZH RECOVERY PHASE 1 LEVEL 2 FIRST HR: Performed by: NEUROLOGICAL SURGERY

## 2018-03-06 PROCEDURE — 40000170 ZZH STATISTIC PRE-PROCEDURE ASSESSMENT II: Performed by: NEUROLOGICAL SURGERY

## 2018-03-06 PROCEDURE — 70250 X-RAY EXAM OF SKULL: CPT

## 2018-03-06 PROCEDURE — C1778 LEAD, NEUROSTIMULATOR: HCPCS | Performed by: NEUROLOGICAL SURGERY

## 2018-03-06 PROCEDURE — 36000074 ZZH SURGERY LEVEL 6 1ST 30 MIN - UMMC: Performed by: NEUROLOGICAL SURGERY

## 2018-03-06 PROCEDURE — 00H03MZ INSERTION OF NEUROSTIMULATOR LEAD INTO BRAIN, PERCUTANEOUS APPROACH: ICD-10-PCS | Performed by: NEUROLOGICAL SURGERY

## 2018-03-06 PROCEDURE — 25000125 ZZHC RX 250: Performed by: NURSE ANESTHETIST, CERTIFIED REGISTERED

## 2018-03-06 PROCEDURE — 93010 ELECTROCARDIOGRAM REPORT: CPT | Performed by: INTERNAL MEDICINE

## 2018-03-06 PROCEDURE — 20000004 ZZH R&B ICU UMMC

## 2018-03-06 PROCEDURE — 37000008 ZZH ANESTHESIA TECHNICAL FEE, 1ST 30 MIN: Performed by: NEUROLOGICAL SURGERY

## 2018-03-06 PROCEDURE — 8E09XBZ COMPUTER ASSISTED PROCEDURE OF HEAD AND NECK REGION: ICD-10-PCS | Performed by: NEUROLOGICAL SURGERY

## 2018-03-06 PROCEDURE — 27210995 ZZH RX 272: Performed by: NEUROLOGICAL SURGERY

## 2018-03-06 PROCEDURE — 25000132 ZZH RX MED GY IP 250 OP 250 PS 637: Performed by: STUDENT IN AN ORGANIZED HEALTH CARE EDUCATION/TRAINING PROGRAM

## 2018-03-06 DEVICE — IMP BUR HOLE COVER GUARDIAN 6010ANS: Type: IMPLANTABLE DEVICE | Site: CRANIAL | Status: FUNCTIONAL

## 2018-03-06 DEVICE — KIT DBS LEAD DBS 8CH 40CM 1-3,3-1 SPACE 0.5 BLACK 6172ANS: Type: IMPLANTABLE DEVICE | Site: BRAIN | Status: FUNCTIONAL

## 2018-03-06 RX ORDER — LABETALOL HYDROCHLORIDE 5 MG/ML
10-40 INJECTION, SOLUTION INTRAVENOUS EVERY 10 MIN PRN
Status: DISCONTINUED | OUTPATIENT
Start: 2018-03-06 | End: 2018-03-07 | Stop reason: HOSPADM

## 2018-03-06 RX ORDER — SODIUM CHLORIDE, SODIUM LACTATE, POTASSIUM CHLORIDE, CALCIUM CHLORIDE 600; 310; 30; 20 MG/100ML; MG/100ML; MG/100ML; MG/100ML
INJECTION, SOLUTION INTRAVENOUS CONTINUOUS
Status: DISCONTINUED | OUTPATIENT
Start: 2018-03-06 | End: 2018-03-06 | Stop reason: HOSPADM

## 2018-03-06 RX ORDER — OXYCODONE HYDROCHLORIDE 5 MG/1
5 TABLET ORAL EVERY 4 HOURS PRN
Status: DISCONTINUED | OUTPATIENT
Start: 2018-03-06 | End: 2018-03-06

## 2018-03-06 RX ORDER — HYDRALAZINE HYDROCHLORIDE 20 MG/ML
10-20 INJECTION INTRAMUSCULAR; INTRAVENOUS EVERY 30 MIN PRN
Status: DISCONTINUED | OUTPATIENT
Start: 2018-03-06 | End: 2018-03-07 | Stop reason: HOSPADM

## 2018-03-06 RX ORDER — CEFAZOLIN SODIUM 2 G/100ML
2 INJECTION, SOLUTION INTRAVENOUS
Status: COMPLETED | OUTPATIENT
Start: 2018-03-06 | End: 2018-03-06

## 2018-03-06 RX ORDER — LIDOCAINE HYDROCHLORIDE AND EPINEPHRINE 10; 10 MG/ML; UG/ML
INJECTION, SOLUTION INFILTRATION; PERINEURAL PRN
Status: DISCONTINUED | OUTPATIENT
Start: 2018-03-06 | End: 2018-03-06 | Stop reason: HOSPADM

## 2018-03-06 RX ORDER — LABETALOL HYDROCHLORIDE 5 MG/ML
INJECTION, SOLUTION INTRAVENOUS PRN
Status: DISCONTINUED | OUTPATIENT
Start: 2018-03-06 | End: 2018-03-06

## 2018-03-06 RX ORDER — HYDROMORPHONE HYDROCHLORIDE 1 MG/ML
0.2 INJECTION, SOLUTION INTRAMUSCULAR; INTRAVENOUS; SUBCUTANEOUS
Status: ACTIVE | OUTPATIENT
Start: 2018-03-06 | End: 2018-03-07

## 2018-03-06 RX ORDER — ONDANSETRON 2 MG/ML
4-8 INJECTION INTRAMUSCULAR; INTRAVENOUS EVERY 6 HOURS PRN
Status: DISCONTINUED | OUTPATIENT
Start: 2018-03-06 | End: 2018-03-07 | Stop reason: HOSPADM

## 2018-03-06 RX ORDER — FENTANYL CITRATE 50 UG/ML
25-50 INJECTION, SOLUTION INTRAMUSCULAR; INTRAVENOUS
Status: DISCONTINUED | OUTPATIENT
Start: 2018-03-06 | End: 2018-03-06 | Stop reason: HOSPADM

## 2018-03-06 RX ORDER — NALOXONE HYDROCHLORIDE 0.4 MG/ML
.1-.4 INJECTION, SOLUTION INTRAMUSCULAR; INTRAVENOUS; SUBCUTANEOUS
Status: DISCONTINUED | OUTPATIENT
Start: 2018-03-06 | End: 2018-03-07 | Stop reason: HOSPADM

## 2018-03-06 RX ORDER — PROCHLORPERAZINE MALEATE 5 MG
5 TABLET ORAL EVERY 6 HOURS PRN
Status: DISCONTINUED | OUTPATIENT
Start: 2018-03-06 | End: 2018-03-07 | Stop reason: HOSPADM

## 2018-03-06 RX ORDER — CEFAZOLIN SODIUM 1 G
1 VIAL (EA) INJECTION EVERY 8 HOURS
Status: DISCONTINUED | OUTPATIENT
Start: 2018-03-06 | End: 2018-03-07 | Stop reason: HOSPADM

## 2018-03-06 RX ORDER — PROPOFOL 10 MG/ML
INJECTION, EMULSION INTRAVENOUS PRN
Status: DISCONTINUED | OUTPATIENT
Start: 2018-03-06 | End: 2018-03-06

## 2018-03-06 RX ORDER — BACITRACIN 500 [USP'U]/G
OINTMENT OPHTHALMIC PRN
Status: DISCONTINUED | OUTPATIENT
Start: 2018-03-06 | End: 2018-03-06 | Stop reason: HOSPADM

## 2018-03-06 RX ORDER — SODIUM CHLORIDE, SODIUM LACTATE, POTASSIUM CHLORIDE, CALCIUM CHLORIDE 600; 310; 30; 20 MG/100ML; MG/100ML; MG/100ML; MG/100ML
INJECTION, SOLUTION INTRAVENOUS CONTINUOUS PRN
Status: DISCONTINUED | OUTPATIENT
Start: 2018-03-06 | End: 2018-03-06

## 2018-03-06 RX ORDER — METOPROLOL TARTRATE 1 MG/ML
INJECTION, SOLUTION INTRAVENOUS PRN
Status: DISCONTINUED | OUTPATIENT
Start: 2018-03-06 | End: 2018-03-06

## 2018-03-06 RX ORDER — ACETAMINOPHEN 325 MG/1
975 TABLET ORAL EVERY 8 HOURS
Status: DISCONTINUED | OUTPATIENT
Start: 2018-03-06 | End: 2018-03-07 | Stop reason: HOSPADM

## 2018-03-06 RX ORDER — ALPRAZOLAM 0.25 MG
0.5 TABLET ORAL DAILY PRN
Status: DISCONTINUED | OUTPATIENT
Start: 2018-03-06 | End: 2018-03-07 | Stop reason: HOSPADM

## 2018-03-06 RX ORDER — IOPAMIDOL 755 MG/ML
75 INJECTION, SOLUTION INTRAVASCULAR ONCE
Status: COMPLETED | OUTPATIENT
Start: 2018-03-06 | End: 2018-03-06

## 2018-03-06 RX ORDER — HYDRALAZINE HYDROCHLORIDE 20 MG/ML
INJECTION INTRAMUSCULAR; INTRAVENOUS PRN
Status: DISCONTINUED | OUTPATIENT
Start: 2018-03-06 | End: 2018-03-06

## 2018-03-06 RX ORDER — LIDOCAINE 40 MG/G
CREAM TOPICAL
Status: DISCONTINUED | OUTPATIENT
Start: 2018-03-06 | End: 2018-03-06 | Stop reason: HOSPADM

## 2018-03-06 RX ORDER — ONDANSETRON 4 MG/1
4-8 TABLET, ORALLY DISINTEGRATING ORAL EVERY 6 HOURS PRN
Status: DISCONTINUED | OUTPATIENT
Start: 2018-03-06 | End: 2018-03-07 | Stop reason: HOSPADM

## 2018-03-06 RX ORDER — SODIUM CHLORIDE 9 MG/ML
INJECTION, SOLUTION INTRAVENOUS CONTINUOUS
Status: DISPENSED | OUTPATIENT
Start: 2018-03-06 | End: 2018-03-07

## 2018-03-06 RX ORDER — ACETAMINOPHEN 325 MG/1
325 TABLET ORAL EVERY 4 HOURS PRN
Status: DISCONTINUED | OUTPATIENT
Start: 2018-03-06 | End: 2018-03-07 | Stop reason: HOSPADM

## 2018-03-06 RX ORDER — AMOXICILLIN 250 MG
2 CAPSULE ORAL 2 TIMES DAILY
Status: DISCONTINUED | OUTPATIENT
Start: 2018-03-06 | End: 2018-03-07 | Stop reason: HOSPADM

## 2018-03-06 RX ORDER — POLYETHYLENE GLYCOL 3350 17 G/17G
17 POWDER, FOR SOLUTION ORAL 3 TIMES DAILY
Status: DISCONTINUED | OUTPATIENT
Start: 2018-03-06 | End: 2018-03-07 | Stop reason: HOSPADM

## 2018-03-06 RX ORDER — CALCIUM CARBONATE 500(1250)
1250 TABLET ORAL 2 TIMES DAILY
Status: DISCONTINUED | OUTPATIENT
Start: 2018-03-06 | End: 2018-03-07 | Stop reason: HOSPADM

## 2018-03-06 RX ORDER — ROSUVASTATIN CALCIUM 5 MG/1
5 TABLET, COATED ORAL EVERY MORNING
Status: DISCONTINUED | OUTPATIENT
Start: 2018-03-07 | End: 2018-03-07 | Stop reason: HOSPADM

## 2018-03-06 RX ORDER — ONDANSETRON 4 MG/1
4 TABLET, ORALLY DISINTEGRATING ORAL EVERY 30 MIN PRN
Status: DISCONTINUED | OUTPATIENT
Start: 2018-03-06 | End: 2018-03-06 | Stop reason: HOSPADM

## 2018-03-06 RX ORDER — ONDANSETRON 2 MG/ML
4 INJECTION INTRAMUSCULAR; INTRAVENOUS EVERY 30 MIN PRN
Status: DISCONTINUED | OUTPATIENT
Start: 2018-03-06 | End: 2018-03-06 | Stop reason: HOSPADM

## 2018-03-06 RX ORDER — SERTRALINE HYDROCHLORIDE 100 MG/1
100 TABLET, FILM COATED ORAL EVERY MORNING
Status: DISCONTINUED | OUTPATIENT
Start: 2018-03-07 | End: 2018-03-07 | Stop reason: HOSPADM

## 2018-03-06 RX ORDER — LIDOCAINE 40 MG/G
CREAM TOPICAL
Status: DISCONTINUED | OUTPATIENT
Start: 2018-03-06 | End: 2018-03-07 | Stop reason: HOSPADM

## 2018-03-06 RX ORDER — OXYCODONE HYDROCHLORIDE 5 MG/1
5-10 TABLET ORAL EVERY 4 HOURS PRN
Status: DISCONTINUED | OUTPATIENT
Start: 2018-03-06 | End: 2018-03-07 | Stop reason: HOSPADM

## 2018-03-06 RX ORDER — CEFAZOLIN SODIUM 1 G/3ML
1 INJECTION, POWDER, FOR SOLUTION INTRAMUSCULAR; INTRAVENOUS SEE ADMIN INSTRUCTIONS
Status: DISCONTINUED | OUTPATIENT
Start: 2018-03-06 | End: 2018-03-06 | Stop reason: HOSPADM

## 2018-03-06 RX ORDER — FENTANYL CITRATE 50 UG/ML
INJECTION, SOLUTION INTRAMUSCULAR; INTRAVENOUS PRN
Status: DISCONTINUED | OUTPATIENT
Start: 2018-03-06 | End: 2018-03-06

## 2018-03-06 RX ADMIN — METOPROLOL TARTRATE 2 MG: 5 INJECTION INTRAVENOUS at 11:43

## 2018-03-06 RX ADMIN — SODIUM CHLORIDE, POTASSIUM CHLORIDE, SODIUM LACTATE AND CALCIUM CHLORIDE: 600; 310; 30; 20 INJECTION, SOLUTION INTRAVENOUS at 12:53

## 2018-03-06 RX ADMIN — PROPOFOL 40 MG: 10 INJECTION, EMULSION INTRAVENOUS at 09:09

## 2018-03-06 RX ADMIN — PROPOFOL 50 MG: 10 INJECTION, EMULSION INTRAVENOUS at 08:22

## 2018-03-06 RX ADMIN — PROPOFOL 40 MG: 10 INJECTION, EMULSION INTRAVENOUS at 09:13

## 2018-03-06 RX ADMIN — LABETALOL HYDROCHLORIDE 10 MG: 5 INJECTION INTRAVENOUS at 11:54

## 2018-03-06 RX ADMIN — SODIUM CHLORIDE, PRESERVATIVE FREE 90 ML: 5 INJECTION INTRAVENOUS at 08:44

## 2018-03-06 RX ADMIN — LABETALOL HYDROCHLORIDE 5 MG: 5 INJECTION INTRAVENOUS at 10:48

## 2018-03-06 RX ADMIN — PROPOFOL 20 MG: 10 INJECTION, EMULSION INTRAVENOUS at 09:15

## 2018-03-06 RX ADMIN — PROPOFOL 20 MG: 10 INJECTION, EMULSION INTRAVENOUS at 08:29

## 2018-03-06 RX ADMIN — DEXMEDETOMIDINE HYDROCHLORIDE 0.5 MCG/KG/HR: 100 INJECTION, SOLUTION INTRAVENOUS at 09:05

## 2018-03-06 RX ADMIN — HYDRALAZINE HYDROCHLORIDE 5 MG: 20 INJECTION INTRAMUSCULAR; INTRAVENOUS at 11:18

## 2018-03-06 RX ADMIN — IOPAMIDOL 75 ML: 755 INJECTION, SOLUTION INTRAVENOUS at 08:44

## 2018-03-06 RX ADMIN — METOPROLOL TARTRATE 2 MG: 5 INJECTION INTRAVENOUS at 11:50

## 2018-03-06 RX ADMIN — LABETALOL HYDROCHLORIDE 15 MG: 5 INJECTION INTRAVENOUS at 12:07

## 2018-03-06 RX ADMIN — PROPOFOL 30 MG: 10 INJECTION, EMULSION INTRAVENOUS at 09:19

## 2018-03-06 RX ADMIN — HYDRALAZINE HYDROCHLORIDE 5 MG: 20 INJECTION INTRAMUSCULAR; INTRAVENOUS at 11:04

## 2018-03-06 RX ADMIN — METOPROLOL TARTRATE 1 MG: 5 INJECTION INTRAVENOUS at 11:26

## 2018-03-06 RX ADMIN — PROPOFOL 40 MG: 10 INJECTION, EMULSION INTRAVENOUS at 09:05

## 2018-03-06 RX ADMIN — PROPOFOL 20 MG: 10 INJECTION, EMULSION INTRAVENOUS at 08:26

## 2018-03-06 RX ADMIN — CEFAZOLIN SODIUM 2 G: 2 INJECTION, SOLUTION INTRAVENOUS at 09:48

## 2018-03-06 RX ADMIN — CEFAZOLIN 1 G: 10 INJECTION, POWDER, FOR SOLUTION INTRAVENOUS at 22:28

## 2018-03-06 RX ADMIN — PROPOFOL 30 MG: 10 INJECTION, EMULSION INTRAVENOUS at 09:18

## 2018-03-06 RX ADMIN — ACETAMINOPHEN 975 MG: 325 TABLET, FILM COATED ORAL at 16:42

## 2018-03-06 RX ADMIN — ACETAMINOPHEN 975 MG: 325 TABLET, FILM COATED ORAL at 23:33

## 2018-03-06 RX ADMIN — FENTANYL CITRATE 50 MCG: 50 INJECTION, SOLUTION INTRAMUSCULAR; INTRAVENOUS at 12:21

## 2018-03-06 RX ADMIN — ALPRAZOLAM 0.5 MG: 0.25 TABLET ORAL at 21:41

## 2018-03-06 RX ADMIN — CEFAZOLIN SODIUM 1 G: 2 INJECTION, SOLUTION INTRAVENOUS at 11:41

## 2018-03-06 RX ADMIN — LABETALOL HYDROCHLORIDE 10 MG: 5 INJECTION INTRAVENOUS at 10:54

## 2018-03-06 RX ADMIN — SODIUM CHLORIDE, POTASSIUM CHLORIDE, SODIUM LACTATE AND CALCIUM CHLORIDE: 600; 310; 30; 20 INJECTION, SOLUTION INTRAVENOUS at 08:13

## 2018-03-06 RX ADMIN — PROPOFOL 30 MG: 10 INJECTION, EMULSION INTRAVENOUS at 09:10

## 2018-03-06 RX ADMIN — PROPOFOL 20 MG: 10 INJECTION, EMULSION INTRAVENOUS at 09:24

## 2018-03-06 RX ADMIN — SODIUM CHLORIDE: 9 INJECTION, SOLUTION INTRAVENOUS at 14:14

## 2018-03-06 RX ADMIN — CALCIUM 1250 MG: 500 TABLET ORAL at 20:41

## 2018-03-06 ASSESSMENT — VISUAL ACUITY
OU: GLASSES;BASELINE

## 2018-03-06 NOTE — OP NOTE
PATIENT NAME: HARSHAD ROSALES  YOB: 1950  MRN:   [id number]  ACCOUNT:  [id number]      DATE OF PROCEDURE:  03/06/2018    PREOPERATIVE DIAGNOSIS:    1.  Dystonic tremor.   2.  Essential tremor.    POSTOPERATIVE DIAGNOSIS:    1.  Dystonic tremor.   2.  Essential tremor.    PROCEDURES PERFORMED:   1.  Placement of CRW stereotactic headframe.   2.  Stereotactic neuronavigation planning and CT of head.   3.  Stereotactic neuronavigation using the Targe[employer] system for surgical planning, targeting and approach. The target is left ventral intermediate nucleus of the thalamus (Vim).   4.  Left side deep brain stimulator placement, phase I, placement of left side deep brain stimulator electrode, target is left ventral intermediate nucleus of the thalamus (Vim).   5.  Use of intraoperative microelectrode recording.   6.  Use of intraoperative fluoroscopy.    ATTENDING SURGEON:  Aleksander Morales MD, PhD     RESIDENT SURGEON:  Jennifer Colbert MD, MS     ANESTHESIA:  Monitored anesthesia care and local anesthetic.     ESTIMATED BLOOD LOSS:  5 mL.     COMPLICATIONS:  None.     FINDINGS:  Final electrode location, 0.75 mm X-Y stage shift medially, then center Navarro Gun position, 2.1 mm below target.    IMPLANTS:  Abbott DBS electrode, Infinity 0.5, model 6172ANS, serial number [id number].      INDICATIONS FOR PROCEDURE:  Ms. Harshad Rosales is a 67 year old right-handed female with history of dystonic tremor and essential tremor.  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  She also has cervical dystonia.  Her right side is more affected than her left.  She has had botox injections at the [facility] for the last 2-3 years, which have been becoming less affective recently.  She also follows with Dr. Esposito.  She does note that drinking will improve her vocal tremor.  Her goals for DBS surgery are to  decrease tremor, especially head and voice, and improve dystonia.  She stated that she can live with her hand tremors but wants other symptoms treated.  With multiple visits with various providers, her expectations from DBS has been addressed.  Her case was discussed at the Movement Disorder Consensus Group Meeting and she was considered to be a good DBS candidate.  The following was recommended: left Vim DBS implantation with wait-and-see strategy, Abbott Infinity device, SLP evaluation to assess her speech before and after implantation.  Patient subsequently had SLP evaluation.  She also underwent botox injection at [facility].  She states that her botox did not work and she also stated that it made her neck weak.  She continues to wear a soft collar for her weak neck.  Her MRI was reviewed and there are no obvious anatomical or structural concerns in terms of targeting for DBS.  We discussed both phase I and II of DBS surgery.  We discussed that during phase I, we would place the DBS electrode on the left side under MAC and microelectrode recording.  She would then return one week later for the phase II with placement of the DBS generator/battery over the left chest wall under general anesthesia.  Risks, benefits, alternative therapies were discussed with the patient, including but not limited to infection and bleeding. Surgical procedure was discussed in detail.  We also discussed at length the device options and reasons for recommending Abbott device.  All questions were answered, and she expressed understanding.    DESCRIPTION OF PROCEDURE:      CRW head frame placement and stereotactic neuronavigation.      Informed consent was obtained.  The patient was brought to the operating room and laid supine and kept on the gurney.  She was identified and a brief timeout was performed for the placement of the CRW head frame.  She was given sedation to make her comfortable.  The intended pin sites, two in the front and two  in the back of the head, were cleaned and were injected with local anesthetic, 1% lidocaine with epinephrine.  A total of 24 mL were used during this portion of the surgical case.  The CRW stereotactic head frame was placed onto her head with 4 pins for fixation.  Care was taken so that the fiducial box wound fit over the head and the frame.  Once the head frame was on, the fiducial box was easily placed without interference from her forehead. The patient tolerated the procedure well and her sedation was lightened and she was awakened.      After the CRW stereotactic head frame was placed, she was taken directly to the CT scanner, at which time a CT scan of the head stereotactic protocol was obtained.  Subsequently, the patient was taken back up to the operating room where she was placed supine on the operative bed with the CRW head frame affixed to the bed as well.  Patient was in a slight sitting up position with the neck in a neutral position and she was made comfortable.  The bed was positioned so that it would be a beach chair reclining position (head up, legs down, trendelenburg).  All pressure points were well padded.  Care was taken to make sure that the neck was in neutral position and that the Eads head pastor device had room for manipulation in case more flexion or extension is needed throughout the case.     At this time, attention was turned to the neuro navigation imaging that was obtained.  The Stealth neuronavigation device was loaded with the CT head that had just been obtained.  The device also had an MRI of the head, stereotactic protocol, that was obtained prior to surgery.  CT of the head was merged with the previously obtained MRI of the brain.  The merge demonstrated good coherence/registration.  Then, using this merged image, neuronavigation was used to stereotactically target the left ventral intermediate nucleus of the thalamus (Vim).  The technique used was the AC-PC line localization  technique to target the Vim using stereotactic coordinates and the X, Y and Z as well as the ring and arc angles for the CRW head frame were obtained for the left side.  The distance from the wall of the 3rd ventricle was approximately 10.8 mm.  The entry into the skull, as well as the trajectory of the electrode were checked with a probe's eye view to avoid any sulci, ventricle or vascular structures.     The stereotactic coordinates for the left side was then transferred to the [facility] stereotactic targeting apparatus as well as the phantom base.  The coordinates were confirmed and double checked for accuracy.  Accuracy was also confirmed using phantom base.  The coordinates were X = -13.1, Y = -6.0, Z =+0.3, ring angle = 70.0 degrees anterior, and arc angle = 15.0 degrees to the left.     At this time, attention was turned back to the patient.  Using a hair clipper, her hair over the left frontal area was clipped.  A semicircular C-shaped incision was planned on the left side and this was marked.  Then, the surgical area was prepped and draped in routine surgical fashion.  We also prepped the area posterior to this as well as area towards the left parietal area.  The patient was also made comfortable.     Timeout was then performed confirming the patient's identity, procedure to be performed, side and site of surgery identified, imaging corresponding with the patient and administration of preoperative prophylactic antibiotic.    Left-sided electrode placement with microelectrode recording: Target left Vim.     The CRW targeting apparatus was attached to the head frame on top of the sterile drapes.  The entry point was marked on the scalp on the left side.  A C-shaped incision was made with a skin knife, after the area was injected with local anesthetic, 1% Lidocaine with epinephrine and 1/4% Marcaine plain, 50:50 mix.  The area posterior to the incision was also injected as a pocket would be created.  Area posterior  lateral, towards the left parietal area was also injected as the electrode connector will be tunneled here later.  Total of 10 mL of the above mentioned local anesthetic was used.  The incision was then further carried down to the pericranium.  Hemostasis was obtained using monopolar and bipolar cautery.  Thin layer of pericranial tissue was left behind on the scalp and the skin flap was reflected posteriorly.  Then, using a blunt dissection technique, a pocket was created posteriorly as well as a track that was made towards the left parietal area for later use.    At this time, the targeting apparatus again positioned and the entry point to the skull was marked.  Area of the intended bur hole was cleaned and pericranial tissue removed.  Then using an acorn drill, bur hole was created over this entry point to expose the dura.  The area was vigorously irrigated and bone wax used to control the bone bleeding.  Dura was coagulated with bipolar cautery for hemostasis, and again no active bleeding was noted.     At this time, the electrode fixation cover was fixed to the skull using two screws.  Care was taken to overlap the pericranium over the edge of the cover to provide a smooth tissue transition.  Then, the electrode drive was attached to the targeting apparatus.  The entry into the dura was again checked.  It appeared that all positions of the Navarro Gun electrode pastor were accessible without any interference from the bone edge.  Then using a Bovie cautery and a #4 Penfield instrument, opening was made into the dura, as well as a small corticectomy.  No active bleeding was noted.    Dr. Edwar Colvin and Dr. Harpreet Middleton of the Neurology Department participated in the recording and neurological testing.  During the microelectrode recording and testing, Dr. Colvin and Dr. Middleton took detailed notes of the electrophysiologic data and neurologic exam as well as any stimulation effects.    At this time, the electrode guides  were inserted in the center and posterior Navarro Gun positions and they were advanced slowly until they were fully inserted at which point the tips would be well above the target.  No abnormal resistance was noted.  Duraseal was used to seal the entry site to provide a seal and to minimize cerebrospinal fluid leak and air entry.  Then, recording microelectrodes were brought in and placed within the guide tubes and they were connected for intraoperative recording.  The tips of the electrode were now 15 mm above target.  The patient was awakened.  Then, using a micro drive, the electrodes were slowly advanced towards the target, collecting microelectrode recording data for mapping the track.  The center track yielded dense thalamic activity with somatosensory response in wrist, elbow, shoulder, 1 unit biceps spindle, 1 unit tongue protrusion.  No tactile or deep pressure sensor response was noted.  The posterior track yielded dense thalamic activity with somatosensory response in finger, wrist, elbow, shoulder, 1 unit in knee.  Deep pressure in finger pads and tactile response in tongue were also noted.  Microstimulation at both tracks yielded finger tingling sensation with no internal capsular effects.    Based on the electrophysiology data collected, it was thought that the current tracks were too lateral to the desirable target area.  Therefore, a X-Y stage shift was made 0.75 mm medially which would make the new anterior Navarro Gun position the desired electrode location.  The electrodes were pulled out of the guide tubes and the guide tubes were removed.  The frame shift was made on the X-Y stage of the microdrive.  The electrode drive was then positioned to the desired depth position with the micro drive, 2.1 mm below the target depth.  Then, a guide tube was placed in the anterior Navarro Gun position for the final electrode insertion.  The permanent DBS electrode was brought into the field and placed in the anterior  guide tube with the tip being placed at 2.1 mm below the target depth.  The electrode demonstrated good impedance values.  The electrode was tested.  With 1- and 3+ configuration, 130 Hz and 60 microsecond pulse width, finger to nose tremor decreased at 0.5 microamps and mouth tremor decreased at 0.75 microamps.  Transient paresthesia was noted but no capsular effect was noted.  Based on the results, it was thought that the current location may be the best location of the permanent electrode.    With the satisfactory testing of the electrode position and the stimulation parameters, the electrode was secured at this location.  The patient was again made comfortable.  The guide tube was removed.  Duraseal was again used to seal any opening.  The area was generously irrigated.  Hemostasis was also obtained.  Fluoroscopy was brought into the field to test that the electrode did not move with each manipulation.  The electrode tip was seen to be below the target, as expected.  The electrode clamp was applied to hold the electrode.  Then, the electrode stylet was removed. The electrode was then removed from the electrode pasotr.  The cap cover was finally placed and secured in place.  Fluoroscopy confirmed that the tip of the electrode did not change in position with each manipulation.  The directional marker, on fluoroscopy, also demonstrated that the contact 2A is facing anteriorly.    The connecting portion of the electrode was covered with a protective covering/dead-end connector, and this apparatus was inserted into the subgaleal space/pocket towards the left parietal area that was created at the beginning of the case.  The excess wire was also buried posteriorly in the previously created pocket.  Fluoroscopy confirmed that the tip did not move.  The wound was then generously irrigated.    The galeal layer was reapproximated using 3-0 Vicryl sutures and the skin was reapproximated using 4-0 Monocryl suture in a  running fashion.  The wound was cleaned and dried and prepped with ChoraPrep.  Then, Dermabond was applied.    Removal of the head frame and end of procedure    First, the stereotactic targeting apparatus was removed.  Then, the sterile drapes were removed.  Subsequently, the patient was taken out of the CRW head frame.  The four pin sites were clean and dry and no active bleeding was noted.  Antibiotic ointment was applied to the pin sites.    Patient was further awakened and taken to the recovery room in a stable condition.  At the end of the case, all counts including needle, sponge and instrument counts were correct x 2.  There were no complications.    Dr. Morales was present and performed all the portions of the procedure.      ALEKSANDER MORALES MD    As prepared by BYRON LAUGHLIN MD, MS  Neurosurgery PGY-1      NEUROSURGERY ATTENDING ATTESTATION: Aleksander SHORT M.D., Ph.D., Neurosurgery Attending, was present and scrubbed for the entire case and performed the key and critical portions of the case.

## 2018-03-06 NOTE — IP AVS SNAPSHOT
MRN:[id number]                      After Visit Summary   3/6/2018    Irish Rosales    MRN: [id number]           Thank you!     Thank you for choosing [facility] for your care. Our goal is always to provide you with excellent care. Hearing back from our patients is one way we can continue to improve our services. Please take a few minutes to complete the written survey that you may receive in the mail after you visit with us. Thank you!        Patient Information     Date Of Birth          1950        Designated Caregiver       Most Recent Value    Caregiver    Will someone help with your care after discharge? yes    Name of designated caregiver rufina Grady [phone]    Phone number of caregiver daughter Ysabel [phone]    Caregiver address per face sheet      About your hospital stay     You were admitted on:  March 6, 2018 You last received care in the:  [address] [facility]    You were discharged on:  March 7, 2018        Reason for your hospital stay       You underwent surgery place a deep brain stimulator by Dr. Kline.    - Your sutures are absorbable.     - You will have follow up scheduled with the physician assistants and/or nurse practitioners in our clinic 2 weeks after your surgery.  If you live far away, you may see your primary care doctor for a wound check at 2 weeks.     - If you have not heard from our clinic about your follow up visit by 3-4 days following your discharge, please call our clinic at [phone] to schedule an appointment with the Neurosurgery teams.     After discharge, your activity restrictions are:   -We encourage short frequent walks, increasing as tolerated.  - No driving until you are seen in clinic and cleared by your neurosurgeon.  If you have had a seizure, you may not drive for at least 3 months according to [place] law.    - No strenuous activity.  - No lifting more than 10 pounds until you are seen in clinic (a gallon of milk  weighs approximately 8 pounds)    Wound care  - You are ok to shower, but do not soak your incisions. Pat them dry if they get damp.   - Avoid coloring your hair or permanent styling until cleared by your surgeon  - No baths, hot tubs or pools for 4-6 weeks after surgery.       Call if you have any of the followin. Temperature greater than 101.5 F.   2. Any redness, swelling or discharge from the wound.   3. Any new weakness, numbness or altered mental status.  4. Worsening pain that is not improving with the pain medications you were prescribed.     Call [phone] or after 5:00 pm or on weekends call [phone] and ask for the neurosurgery resident on call. Thank You.                  Who to Call     For medical emergencies, please call [phone].  For non-urgent questions about your medical care, please call your primary care provider or clinic, [phone]  For questions related to your surgery, please call your surgery clinic        Attending Provider     Provider Aleksander Granda MD Neurosurgery       Primary Care Provider Office Phone # Fax #    Braulio Victoria MD PhD [phone]      After Care Instructions     Activity       Do not do any bending, twisting, strenuous exercise, or heavy lifting (greater than 10 pounds) for 2 weeks. Be careful and ask for assistance when walking or going up and down stairs. Avoid any activities that could result in trauma to the surgical wound.            Diet       Follow this diet upon discharge: Orders Placed This Encounter      Advance Diet as Tolerated: Regular Diet Adult; Regular Diet Adult            Wound care and dressings       You should remove your dressings and bandages on post-operative day #2. You should then keep the wound undressed and open to air. You are allowed to take showers and get the wound wet starting on post-operative day #3 but you may not scrub or soak the wound or keep it submerged under water. If you do happen to  "get the wound wet, be sure to pat dry it rather than scrubbing it with a towel.                  Follow-up Appointments     Adult [facility] Follow-up and recommended labs and tests       You should follow up on 3/13/18 for phase II surgery. You should call [phone] or [phone] if you have not heard from the hospital within 7 days of discharge regarding your follow-up appointment.          Appointments on [place] and/or [facility] (with [facility] or [facility] provider or service). Call [phone] if you haven't heard regarding these appointments within 7 days of discharge.                  Your next 10 appointments already scheduled     Mar 13, 2018   Procedure with Aleksander Morales MD   [facility], [place], Same Day Surgery (--)    [address]  [place] [address] [phone]              Pending Results     No orders found for last 3 day(s).            Statement of Approval     Ordered          03/07/18 0843  I have reviewed and agree with all the recommendations and orders detailed in this document.  EFFECTIVE NOW     Approved and electronically signed by:  Debbie Vazquez APRN CNP             Admission Information     Date & Time Provider Department Dept. Phone    3/6/2018 Aleksander Morales MD [address] [facility] [place] [phone]      Your Vitals Were     Blood Pressure Temperature Respirations Height Weight Pulse Oximetry    86/75 98.7  F (37.1  C) (Oral) 19 1.6 m (5' 2.99\") 61.9 kg (136 lb 7.4 oz) 95%    BMI (Body Mass Index)                   24.18 kg/m2           MyChart Information     [employer] gives you secure access to your electronic health record. If you see a primary care provider, you can also send messages to your care team and make appointments. If you have questions, please call your primary care clinic.  If you do not have a primary care provider, please call [phone] and they will assist you.        Care EveryWhere ID     This is your Care EveryWhere ID. This " could be used by other organizations to access your [facility] medical records  [id number]        Equal Access to Services     SAMINA PORTER : Hadii aad ku hadmaganjosselin Almazan, watravisda peacekatheha, qaybta kaselene geraldcatnirmal, waxay idiin haysunmichael interianosachikaleb ballard. So [facility] [phone].    ATENCIÓN: Si habla español, tiene a flower disposición servicios gratuitos de asistencia lingüística. Llame al [phone].    We comply with applicable federal civil rights laws and [place] laws. We do not discriminate on the basis of race, color, national origin, age, disability, sex, sexual orientation, or gender identity.               Review of your medicines      START taking        Dose / Directions    oxyCODONE IR 5 MG tablet   Commonly known as:  ROXICODONE   Used for:  Essential tremor        Dose:  5-10 mg   Take 1-2 tablets (5-10 mg) by mouth every 4 hours as needed for other (pain control or improvement in physical function. Hold dose for analgesic side effects.)   Quantity:  20 tablet   Refills:  0         CONTINUE these medicines which may have CHANGED, or have new prescriptions. If we are uncertain of the size of tablets/capsules you have at home, strength may be listed as something that might have changed.        Dose / Directions    rosuvastatin 5 MG tablet   Commonly known as:  CRESTOR   This may have changed:  when to take this   Used for:  Hyperlipidemia LDL goal <160        Dose:  5 mg   Take 1 tablet (5 mg) by mouth daily   Quantity:  30 tablet   Refills:  11       sertraline 100 MG tablet   Commonly known as:  ZOLOFT   This may have changed:  See the new instructions.   Used for:  History of major depression        TAKE 1 TABLET BY MOUTH EVERY DAY   Quantity:  90 tablet   Refills:  0         CONTINUE these medicines which have NOT CHANGED        Dose / Directions    ALPRAZolam 0.5 MG tablet   Commonly known as:  XANAX   Used for:  Dystonic tremor        Dose:  0.5 mg   Take 1 tablet (0.5 mg) by mouth daily as  needed   Quantity:  30 tablet   Refills:  1       BOTOX 100 UNITS injection   Indication:  Client indeicated she is not taking this medication 6/13/17   Used for:  Other diseases of larynx   Generic drug:  botulinum toxin type A        Inject 0.5units bilaterally for a total of 1.0 units   Quantity:  1 each   Refills:  0       calcium carbonate 1250 MG tablet   Commonly known as:  OS- mg [place]. [place]   Indication:  Client reported she is not taking this medicatoin 6/13/17        Dose:  500 mg   Take 500 mg by mouth 2 times daily   Refills:  0            Where to get your medicines      Some of these will need a paper prescription and others can be bought over the counter. Ask your nurse if you have questions.     Bring a paper prescription for each of these medications     oxyCODONE IR 5 MG tablet                Protect others around you: Learn how to safely use, store and throw away your medicines at www.disposemymeds.org.        Information about OPIOIDS     PRESCRIPTION OPIOIDS: WHAT YOU NEED TO KNOW    Prescription opioids can be used to help relieve moderate to severe pain and are often prescribed following a surgery or injury, or for certain health conditions. These medications can be an important part of treatment but also come with serious risks. It is important to work with your health care provider to make sure you are getting the safest, most effective care.    WHAT ARE THE RISKS AND SIDE EFFECTS OF OPIOID USE?  Prescription opioids carry serious risks of addiction and overdose, especially with prolonged use. An opioid overdose, often marked by slowed breathing can cause sudden death. The use of prescription opioids can have a number of side effects as well, even when taken as directed:      Tolerance - meaning you might need to take more of a medication for the same pain relief    Physical dependence - meaning you have symptoms of withdrawal when a medication is stopped    Increased sensitivity to  pain    Constipation    Nausea, vomiting, and dry mouth    Sleepiness and dizziness    Confusion    Depression    Low levels of testosterone that can result in lower sex drive, energy, and strength    Itching and sweating    RISKS ARE GREATER WITH:    History of drug misuse, substance use disorder, or overdose    Mental health conditions (such as depression or anxiety)    Sleep apnea    Older age (65 years or older)    Pregnancy    Avoid alcohol while taking prescription opioids.   Also, unless specifically advised by your health care provider, medications to avoid include:    Benzodiazepines (such as Xanax or Valium)    Muscle relaxants (such as Soma or Flexeril)    Hypnotics (such as Ambien or Lunesta)    Other prescription opioids    KNOW YOUR OPTIONS:  Talk to your health care provider about ways to manage your pain that do not involve prescription opioids. Some of these options may actually work better and have fewer risks and side effects:    Pain relievers such as acetaminophen, ibuprofen, and naproxen    Some medications that are also used for depression or seizures    Physical therapy and exercise    Cognitive behavioral therapy, a psychological, goal-directed approach, in which patients learn how to modify physical, behavioral, and emotional triggers of pain and stress    IF YOU ARE PRESCRIBED OPIOIDS FOR PAIN:    Never take opioids in greater amounts or more often than prescribed    Follow up with your primary health care provider and work together to create a plan on how to manage your pain.    Talk about ways to help manage your pain that do not involve prescription opioids    Talk about all concerns and side effects    Help prevent misuse and abuse    Never sell or share prescription opioids    Never use another person's prescription opioids    Store prescription opioids in a secure place and out of reach of others (this may include visitors, children, friends, and family)    Visit  www.cdc.gov/drugoverdose to learn about risks of opioid abuse and overdose    If you believe you may be struggling with addiction, tell your health care provider and ask for guidance or call [facility]'s National Helpline at 9-493-514-HELP    LEARN MORE / www.cdc.gov/drugoverdose/prescribing/guideline.html    Safely dispose of unused prescription opioids: Find your local drug take-back programs and more information about the importance of safe disposal at www.doseofreality.[place].gov             Medication List: This is a list of all your medications and when to take them. Check marks below indicate your daily home schedule. Keep this list as a reference.      Medications           Morning Afternoon Evening Bedtime As Needed    ALPRAZolam 0.5 MG tablet   Commonly known as:  XANAX   Take 1 tablet (0.5 mg) by mouth daily as needed   Last time this was given:  0.5 mg on 3/7/2018  4:04 AM                                BOTOX 100 UNITS injection   Inject 0.5units bilaterally for a total of 1.0 units   Generic drug:  botulinum toxin type A                                calcium carbonate 1250 MG tablet   Commonly known as:  OS- mg [place]. Ca   Take 500 mg by mouth 2 times daily   Last time this was given:  1,250 mg on 3/7/2018  8:10 AM                                oxyCODONE IR 5 MG tablet   Commonly known as:  ROXICODONE   Take 1-2 tablets (5-10 mg) by mouth every 4 hours as needed for other (pain control or improvement in physical function. Hold dose for analgesic side effects.)                                rosuvastatin 5 MG tablet   Commonly known as:  CRESTOR   Take 1 tablet (5 mg) by mouth daily   Last time this was given:  5 mg on 3/7/2018  8:11 AM                                sertraline 100 MG tablet   Commonly known as:  ZOLOFT   TAKE 1 TABLET BY MOUTH EVERY DAY   Last time this was given:  100 mg on 3/7/2018  8:10 AM                                          More Information      Managing Tremors  Make the Most  of Your Hand Function in Daily Tasks  Feel more stable    Sit close to your work, with your arms close to your body. Use the table for support.    Use both hands when you pour drinks, lift things and so on.    Use your other hand to help steady the hand that shakes.    Wear a wrist splint.    Use non-slip fabric and rubber bands:    Under your cutting board or plate when cutting food.    Around objects for a better . For example: laundry and other soap bottles, shampoo, shaving cream, hairspray and other spray cans.  Add weight    Wear a light weight on your wrist or forearm to keep it steady.    Use weighted objects: silverware, covered cups, pens and so on.  Bigger is easier  It takes less effort and is easier to hold:    Built-up handled silverware and kitchen tools. Example: Good  brand    Fatter pens. Example: Dr.  by     Built-up foam on tool handles. For example: your toothbrush.  Manage Your Energy    Do hard tasks when your energy is at its best.    Take your medicines as prescribed.    Plan plenty of time for tasks, so you don't feel rushed. Feeling nervous can make tremors worse.    Get good sleep at night, and rest before you feel tired.    Use methods to relax, such as mindful breathing or guided imagery.  Change and adapt  Try these tips and replacements.  Eating:  Use drinking cups and mugs that have larger bases. Keep your elbows on the table when eating.  Dressing:     Stretchy shoelaces    Button hook    Elastic thread for shirt cuff buttons    Velcro fasteners    Larger buttonholes and buttons (  inch across)    Magnetic jewelry  Staying clean:   Use an electric toothbrush, soap-on-rope, dental flossers.  In the Kitchen:     Rocker knife    Pizza cutter    Cart to move heavy things instead of carrying them    Long oven mitts or Ov Gloves with grippers to protect your hands and arms when cooking  Exchanging things and information:     Digital recorder    Book pastor    Signature  stamp    Fingertip moistener    Rubber fingertip    Money clip  Medicines:   Keep pills on a tray with a lip to keep them from falling and rolling off the table.  Playing games:   Use a pastor for playing cards.  Home access:     Install lever door handles.    Use remote control appliances.  Using the computer:    Slow down mouse motion and keystroke rate.    Use chair armrests to support your elbows when typing.  For informational purposes only. Not to replace the advice of your health care provider. Copyright   2016 [facility]. All rights reserved. [employer] 422333 - 08/16.

## 2018-03-06 NOTE — ANESTHESIA POSTPROCEDURE EVALUATION
Patient: Irish Rosales    Procedure(s):  Stealth Assisted Left Deep Brain Stimulator Placement, Phase I, Placement Of Left Side Deep Brain Stimulator Electrode, Target Left Ventral Intermediate Nucleus Of The Thalamus With Microelectrode Recording - Wound Class: I-Clean    Diagnosis:Tremor   Diagnosis Additional Information: No value filed.    Anesthesia Type:  MAC    Note:  Anesthesia Post Evaluation    Patient location during evaluation: PACU  Patient participation: Able to fully participate in evaluation  Level of consciousness: awake and alert  Pain management: adequate  Airway patency: patent  Cardiovascular status: hemodynamically stable  Respiratory status: acceptable  Hydration status: stable  PONV: none     Anesthetic complications: None          Last vitals:  Vitals:    03/06/18 1323 03/06/18 1330 03/06/18 1345   BP: 107/58 (!) 80/43 102/57   Resp: 18 16    Temp: 36.9  C (98.4  F)  36.7  C (98.1  F)   SpO2: 95% 94% 95%         Electronically Signed By: Meng Chavis MD  March 6, 2018  2:00 PM

## 2018-03-06 NOTE — PROGRESS NOTES
Client admitted to [facility] for planned OPTICAL TRACKING SYSTEM INSERTION DEEP BRAIN STIMULATION surgery.  Left message for Sasha KLEIN that will following client while at the hospital.  Updated LATOYA with client's services, writer's contact information, and covering CM starting tomorrow through 23/19/18 information.  Requested for Covering CM to be kept updated on d/c plans if before 3/19/18 and writer after 3/19/18.    INNA Heck  [employer]  [phone]  Fax: [phone]

## 2018-03-06 NOTE — BRIEF OP NOTE
[facility], [place]    Brief Operative Note    Pre-operative diagnosis: Tremor   Post-operative diagnosis * No post-op diagnosis entered *  Procedure: Procedure(s):  Stealth Assisted Left Deep Brain Stimulator Placement, Phase I, Placement Of Left Side Deep Brain Stimulator Electrode, Target Left Ventral Intermediate Nucleus Of The Thalamus With Microelectrode Recording - Wound Class: I-Clean  Surgeon: Surgeon(s) and Role:     * Aleksander Morales MD - Primary  Anesthesia: Combined MAC with Local   Estimated blood loss: 5cc  Drains: None  Specimens: * No specimens in log *  Findings:   None.  Complications: None.  Implant: left VIM DBS      NEUROSURGERY ATTENDING ATTESTATION: Aleksander SHORT M.D., Ph.D., Neurosurgery Attending, was present and scrubbed for the entire case and performed the key and critical portions of the case.

## 2018-03-06 NOTE — ANESTHESIA CARE TRANSFER NOTE
Patient: Irish Rosales    Procedure(s):  Stealth Assisted Left Deep Brain Stimulator Placement, Phase I, Placement Of Left Side Deep Brain Stimulator Electrode, Target Left Ventral Intermediate Nucleus Of The Thalamus With Microelectrode Recording - Wound Class: I-Clean    Diagnosis: Tremor   Diagnosis Additional Information: No value filed.    Anesthesia Type:   MAC     Note:  Airway :Room Air  Patient transferred to:PACU  Comments: Anesthesia Care Transfer Note    Patient: Irish Rosales    Transferred to: PACU    Patient vital signs: stable    Airway: none    Monitors on, VSS, pt. Stable, Report given to PACU EBENEZER.     Davie Soto CRNA  3/6/2018 1:26 PM      Handoff Report: Identifed the Patient, Identified the Reponsible Provider, Reviewed the pertinent medical history, Discussed the surgical course, Reviewed Intra-OP anesthesia mangement and issues during anesthesia, Set expectations for post-procedure period and Allowed opportunity for questions and acknowledgement of understanding      Vitals: (Last set prior to Anesthesia Care Transfer)    CRNA VITALS  3/6/2018 1251 - 3/6/2018 1326      3/6/2018             Ht Rate: 73                Electronically Signed By: RACHANA Damian CRNA  March 6, 2018  1:26 PM

## 2018-03-06 NOTE — IP AVS SNAPSHOT
[address] [address]    Phone:  [phone]                                       After Visit Summary   3/6/2018    Irish Rosales    MRN: [id number]           After Visit Summary Signature Page     I have received my discharge instructions, and my questions have been answered. I have discussed any challenges I see with this plan with the nurse or doctor.    ..........................................................................................................................................  Patient/Patient Representative Signature      ..........................................................................................................................................  Patient Representative Print Name and Relationship to Patient    ..................................................               ................................................  Date                                            Time    ..........................................................................................................................................  Reviewed by Signature/Title    ...................................................              ..............................................  Date                                                            Time

## 2018-03-06 NOTE — PROGRESS NOTES
SPIRITUAL HEALTH SERVICES  [facility] ([place])  3C  PRE-SURGERY VISIT    Had pre-surgery visit with Julia and her 2 daughters.  Provided spiritual support, prayer.     Jeannie Watt MDiv    Pager [phone]

## 2018-03-07 ENCOUNTER — APPOINTMENT (OUTPATIENT)
Dept: CT IMAGING | Facility: CLINIC | Age: 68
DRG: 027 | End: 2018-03-07
Attending: STUDENT IN AN ORGANIZED HEALTH CARE EDUCATION/TRAINING PROGRAM
Payer: COMMERCIAL

## 2018-03-07 ENCOUNTER — CARE COORDINATION (OUTPATIENT)
Dept: GERIATRIC MEDICINE | Facility: CLINIC | Age: 68
End: 2018-03-07

## 2018-03-07 VITALS
TEMPERATURE: 98.7 F | SYSTOLIC BLOOD PRESSURE: 86 MMHG | RESPIRATION RATE: 19 BRPM | BODY MASS INDEX: 24.18 KG/M2 | HEIGHT: 63 IN | WEIGHT: 136.47 LBS | DIASTOLIC BLOOD PRESSURE: 75 MMHG | OXYGEN SATURATION: 95 %

## 2018-03-07 DIAGNOSIS — Z71.89 ADVANCE CARE PLANNING: ICD-10-CM

## 2018-03-07 LAB
ANION GAP SERPL CALCULATED.3IONS-SCNC: 10 MMOL/L (ref 3–14)
BUN SERPL-MCNC: 6 MG/DL (ref 7–30)
CALCIUM SERPL-MCNC: 8.5 MG/DL (ref 8.5–10.1)
CHLORIDE SERPL-SCNC: 106 MMOL/L (ref 94–109)
CO2 SERPL-SCNC: 23 MMOL/L (ref 20–32)
CREAT SERPL-MCNC: 0.54 MG/DL (ref 0.52–1.04)
ERYTHROCYTE [DISTWIDTH] IN BLOOD BY AUTOMATED COUNT: 12 % (ref 10–15)
GFR SERPL CREATININE-BSD FRML MDRD: >90 ML/MIN/1.7M2
GLUCOSE SERPL-MCNC: 108 MG/DL (ref 70–99)
HCT VFR BLD AUTO: 38.5 % (ref 35–47)
HGB BLD-MCNC: 12.7 G/DL (ref 11.7–15.7)
MCH RBC QN AUTO: 32.9 PG (ref 26.5–33)
MCHC RBC AUTO-ENTMCNC: 33 G/DL (ref 31.5–36.5)
MCV RBC AUTO: 100 FL (ref 78–100)
PLATELET # BLD AUTO: 222 10E9/L (ref 150–450)
POTASSIUM SERPL-SCNC: 3.2 MMOL/L (ref 3.4–5.3)
RBC # BLD AUTO: 3.86 10E12/L (ref 3.8–5.2)
SODIUM SERPL-SCNC: 138 MMOL/L (ref 133–144)
WBC # BLD AUTO: 6.4 10E9/L (ref 4–11)

## 2018-03-07 PROCEDURE — 25000128 H RX IP 250 OP 636: Performed by: STUDENT IN AN ORGANIZED HEALTH CARE EDUCATION/TRAINING PROGRAM

## 2018-03-07 PROCEDURE — 70450 CT HEAD/BRAIN W/O DYE: CPT

## 2018-03-07 PROCEDURE — 25000132 ZZH RX MED GY IP 250 OP 250 PS 637: Performed by: STUDENT IN AN ORGANIZED HEALTH CARE EDUCATION/TRAINING PROGRAM

## 2018-03-07 PROCEDURE — 85027 COMPLETE CBC AUTOMATED: CPT | Performed by: STUDENT IN AN ORGANIZED HEALTH CARE EDUCATION/TRAINING PROGRAM

## 2018-03-07 PROCEDURE — 27210995 ZZH RX 272: Performed by: STUDENT IN AN ORGANIZED HEALTH CARE EDUCATION/TRAINING PROGRAM

## 2018-03-07 PROCEDURE — 80048 BASIC METABOLIC PNL TOTAL CA: CPT | Performed by: STUDENT IN AN ORGANIZED HEALTH CARE EDUCATION/TRAINING PROGRAM

## 2018-03-07 PROCEDURE — 84132 ASSAY OF SERUM POTASSIUM: CPT | Performed by: NEUROLOGICAL SURGERY

## 2018-03-07 PROCEDURE — 36415 COLL VENOUS BLD VENIPUNCTURE: CPT | Performed by: STUDENT IN AN ORGANIZED HEALTH CARE EDUCATION/TRAINING PROGRAM

## 2018-03-07 RX ORDER — POTASSIUM CHLORIDE 1.5 G/1.58G
20-40 POWDER, FOR SOLUTION ORAL
Status: DISCONTINUED | OUTPATIENT
Start: 2018-03-07 | End: 2018-03-07 | Stop reason: HOSPADM

## 2018-03-07 RX ORDER — POTASSIUM CHLORIDE 750 MG/1
20-40 TABLET, EXTENDED RELEASE ORAL
Status: DISCONTINUED | OUTPATIENT
Start: 2018-03-07 | End: 2018-03-07 | Stop reason: HOSPADM

## 2018-03-07 RX ORDER — ALPRAZOLAM 0.25 MG
0.5 TABLET ORAL ONCE
Status: COMPLETED | OUTPATIENT
Start: 2018-03-07 | End: 2018-03-07

## 2018-03-07 RX ORDER — POTASSIUM CHLORIDE 7.45 MG/ML
10 INJECTION INTRAVENOUS
Status: DISCONTINUED | OUTPATIENT
Start: 2018-03-07 | End: 2018-03-07 | Stop reason: RX

## 2018-03-07 RX ORDER — POTASSIUM CHLORIDE 29.8 MG/ML
20 INJECTION INTRAVENOUS
Status: DISCONTINUED | OUTPATIENT
Start: 2018-03-07 | End: 2018-03-07 | Stop reason: HOSPADM

## 2018-03-07 RX ORDER — OXYCODONE HYDROCHLORIDE 5 MG/1
5-10 TABLET ORAL EVERY 4 HOURS PRN
Qty: 20 TABLET | Refills: 0 | Status: ON HOLD | OUTPATIENT
Start: 2018-03-07 | End: 2018-03-13

## 2018-03-07 RX ORDER — MAGNESIUM SULFATE HEPTAHYDRATE 40 MG/ML
4 INJECTION, SOLUTION INTRAVENOUS EVERY 4 HOURS PRN
Status: DISCONTINUED | OUTPATIENT
Start: 2018-03-07 | End: 2018-03-07 | Stop reason: HOSPADM

## 2018-03-07 RX ORDER — POTASSIUM CL/LIDO/0.9 % NACL 10MEQ/0.1L
10 INTRAVENOUS SOLUTION, PIGGYBACK (ML) INTRAVENOUS
Status: DISCONTINUED | OUTPATIENT
Start: 2018-03-07 | End: 2018-03-07 | Stop reason: HOSPADM

## 2018-03-07 RX ADMIN — ALPRAZOLAM 0.5 MG: 0.25 TABLET ORAL at 04:04

## 2018-03-07 RX ADMIN — CEFAZOLIN 1 G: 10 INJECTION, POWDER, FOR SOLUTION INTRAVENOUS at 04:05

## 2018-03-07 RX ADMIN — HYDRALAZINE HYDROCHLORIDE 10 MG: 20 INJECTION INTRAMUSCULAR; INTRAVENOUS at 00:10

## 2018-03-07 RX ADMIN — CALCIUM 1250 MG: 500 TABLET ORAL at 08:10

## 2018-03-07 RX ADMIN — SERTRALINE HYDROCHLORIDE 100 MG: 100 TABLET ORAL at 08:10

## 2018-03-07 RX ADMIN — ROSUVASTATIN CALCIUM 5 MG: 5 TABLET, FILM COATED ORAL at 08:11

## 2018-03-07 RX ADMIN — ACETAMINOPHEN 975 MG: 325 TABLET, FILM COATED ORAL at 08:10

## 2018-03-07 RX ADMIN — POTASSIUM CHLORIDE 40 MEQ: 750 TABLET, EXTENDED RELEASE ORAL at 06:32

## 2018-03-07 ASSESSMENT — VISUAL ACUITY
OU: GLASSES;BASELINE

## 2018-03-07 NOTE — DISCHARGE SUMMARY
[facility] Discharge Summary and Instructions    Irish Rosales MRN# [id number]   Age: 67 year old YOB: 1950     Date of Admission:  3/6/2018  Date of Discharge::  3/7/2018  9:56 AM  Admitting Physician:  Aleksander Morales MD  Discharge Physician:  Aleksander Morales MD          Admission Diagnoses:   Tremor   Essential tremor          Discharge Diagnosis:   Tremor   Essential tremor          Procedures:   3/6/18: left VIM DBS placement           Brief History of Illness:   Patient is a 67 year old woman with dystonic tremor affecting right > left side. She was evaluated by Dr. Morales and was offered DBS as above. Risks, benefits, alternative therapies were discussed with the patient including but not limited to infection, bleeding.  The surgical procedure was discussed in detail, all questions were answered. She expressed understanding.             Hospital Course:   Patient underwent above-mentioned procedure on 3/6/18. The operation was uncomplicated and he was admitted to the surgical ICU for routine post operative cares. On post operative day 1, she was doing well, ambulating, voiding without a rodríguez, eating a regular diet, and pain was well controlled. Post-op head CT and x-rays show no acute intracranial findings and good positioning of the DBS lead, respectively. She was deemed ready for discharge home.          Discharge Medications:     Current Discharge Medication List      START taking these medications    Details   oxyCODONE IR (ROXICODONE) 5 MG tablet Take 1-2 tablets (5-10 mg) by mouth every 4 hours as needed for other (pain control or improvement in physical function. Hold dose for analgesic side effects.)  Qty: 20 tablet, Refills: 0    Associated Diagnoses: Essential tremor         CONTINUE these medications which have NOT CHANGED    Details   ALPRAZolam (XANAX) 0.5 MG tablet Take 1 tablet (0.5 mg) by mouth daily as needed  Qty: 30 tablet, Refills: 1    Associated  Diagnoses: Dystonic tremor      sertraline (ZOLOFT) 100 MG tablet TAKE 1 TABLET BY MOUTH EVERY DAY  Qty: 90 tablet, Refills: 0    Associated Diagnoses: History of major depression      rosuvastatin (CRESTOR) 5 MG tablet Take 1 tablet (5 mg) by mouth daily  Qty: 30 tablet, Refills: 11    Comments: Dose increased.  Associated Diagnoses: Hyperlipidemia LDL goal <160      calcium carbonate (OS- MG [place]. CA) 500 MG tablet Take 500 mg by mouth 2 times daily       botulinum toxin type A (BOTOX) 100 UNITS injection Inject 0.5units bilaterally for a total of 1.0 units  Qty: 1 each    Associated Diagnoses: Other diseases of larynx                     Discharge Instructions and Follow-Up:   Discharge diet: Regular   Discharge activity: You may advance activity as tolerated. No strenuous exercise or heay lifting greater than 10 lbs for 4 weeks or until seen and cleared in clinic.   Discharge follow-up: Follow-up on 3/13/18 for stage II surgery   Wound care: Ok to shower,however no scrubbing of the wound and no soaking of the wound, meaning no bathtubs or swimming pools. Pat dry only. Leave wound open to air.  Sutures are not absorbable and need to be removed in 2 weeks. If patient still at rehab by this time, the sutures may be removed by the rehab physician if he or she considers that the wound has healed completely.     Please call if you have:  1. increased pain, redness, drainage, swelling at your incision  2. fevers > 101.5 F degrees  3. with any questions or concerns.  You may reach the Neurosurgery clinic at [phone] during regular work hours. ER at [phone].    and ask for the Neurosurgery Resident on call at [phone], during off hours or weekends.         Discharge Disposition:   Discharged to home          -----------------------------------  Jennifer Colbert MD, MS  Neurosurgery PGY-1  Pager #[phone]

## 2018-03-07 NOTE — PROGRESS NOTES
Social Work Services Progress Note    Hospital Day: 2  Collaborated with:  Radha from [employer]    Data:  Writer received a message from Michelle Wylie ([phone]), Pt's [facility] , requesting an update regarding Pt's discharge plan. Per Michelle, Pt had been independent in the community prior to hospitalization. Michelle stated she will be out of the office, but left contact info for the covering :  Radha Carrillo, [phone].    Intervention:  Writer contacted Radha and informed her that Pt was already discharged to home this morning with no SW needs. Radha stated she will document the discharge.      Renetta Muhammad, [facility]  ICU   Pager: [phone]

## 2018-03-07 NOTE — PLAN OF CARE
Problem: Patient Care Overview  Goal: Plan of Care/Patient Progress Review  Outcome: Improving  Patient post-Op day 1 for left deep brain stimulation.   Neuro: Alert and oriented x 4, PERRL equal/reactive. Has denied pain, had shceduled tylenol on board. Scheduled xanax given and an additional x1 dose given as patient was feeling anxious.  Resp: Patient remained on room air maintaining sats 94-96%.   CV: Afebrile tmax 99.4 oral. Hydralazine x1 given for sBP>140 provider also made aware. SR no ectopy.  GI/: no BM overnight but passing gas. Patient refused her scheduled stool bowel meds. Adequate UOP.   Skin: Head incision remains clean and sutures approximated. Denies having HA, no swelling and denies tenderness at site.  Labs: Potassium 3.2 - 40mEq given. Additional 20mEq @ 0830 and then recheck in 4hrs.    P: Continue hourly neuro checks. Monitor for changes and intervene as needed.

## 2018-03-07 NOTE — PROGRESS NOTES
Patient neurologically baseline from admission, no new deficits noted. Patient is A&Ox4, able to make needs known. CRANDALL +5 strength. PERRLA. Symmetrical. No drainage noted from nose or ears. Denies any pain. Afebrile.  HR 70-90s Sinus. BP stable 120-130s/60-70s(70-90s). Oxygenating 94-98% on Room Air. LS clear diminished. Using IS appropriately without help.  Tolerating a Regular diet. + Flatus. Voids spontaneously without difficulty. Good UOP. Serum Potassium level low this AM, replaced per protocol, Talked to Dr. Seymour and he is ok with discharging patient without need for recheck level at this time.Bilateral incisions C/D/I, no drainage, no swelling, no tenderness. Ambulating in hallway with standby. L hand swelling from infiltrated IV is improving, no numbness or tingling in hand, patient denies pain in hand, cap refill <3 seconds, fingers warm to touch, no discoloration noted.  Explained discharge teaching and education to patient, patient and family did not have any questions at this time. Will discharge to home via family transport.

## 2018-03-07 NOTE — PROGRESS NOTES
3/7/18-  Received call from Shell Jackson at [facility] that Irish was discharged this morning back home before she could even see her!     Called Irish to see how she's doing. She said she is doing okay and didn't need anything. I gave her my number in case something changes. She had no questions about her discharge instructions.     Radha KEITH, [facility] Care Coordinator   Telephone: [phone]  Fax: [phone]

## 2018-03-08 ENCOUNTER — CARE COORDINATION (OUTPATIENT)
Dept: NEUROSURGERY | Facility: CLINIC | Age: 68
End: 2018-03-08

## 2018-03-08 NOTE — PROGRESS NOTES
Neurosurgery Discharge Coordination Note     Attending physician: Dr. Morales  Surgery performed: DBS lead placement  Date of Discharge: 3/7/2018  Discharge to: Home     Current status: Patient states she  feels very good. She was very tired yesterday, but is catching up on her rest. Denies headache or incisional pain.   Denies redness, swelling, increased tenderness, drainage, incision opening or elevated temp. Reports Incision CDI without signs of infection.  Denies current bowel or bladder issues.    Discharge instructions and medications reviewed with patient.  Follow up appointments/imaging/tests needed: Battery placement on 3/13/2018 at 2:40 p.m.     RN triage/on call number given: [phone]/ [phone]

## 2018-03-13 ENCOUNTER — HOSPITAL ENCOUNTER (OUTPATIENT)
Facility: CLINIC | Age: 68
Discharge: HOME | End: 2018-03-13
Attending: NEUROLOGICAL SURGERY | Admitting: NEUROLOGICAL SURGERY
Payer: COMMERCIAL

## 2018-03-13 ENCOUNTER — ANESTHESIA EVENT (OUTPATIENT)
Dept: SURGERY | Facility: CLINIC | Age: 68
End: 2018-03-13
Payer: COMMERCIAL

## 2018-03-13 ENCOUNTER — ANESTHESIA (OUTPATIENT)
Dept: SURGERY | Facility: CLINIC | Age: 68
End: 2018-03-13
Payer: COMMERCIAL

## 2018-03-13 VITALS
RESPIRATION RATE: 15 BRPM | DIASTOLIC BLOOD PRESSURE: 83 MMHG | WEIGHT: 135.36 LBS | HEART RATE: 81 BPM | SYSTOLIC BLOOD PRESSURE: 119 MMHG | OXYGEN SATURATION: 100 % | HEIGHT: 63 IN | TEMPERATURE: 98.4 F | BODY MASS INDEX: 23.98 KG/M2

## 2018-03-13 DIAGNOSIS — G25.0 ESSENTIAL TREMOR: ICD-10-CM

## 2018-03-13 LAB — GLUCOSE BLDC GLUCOMTR-MCNC: 101 MG/DL (ref 70–99)

## 2018-03-13 PROCEDURE — 82962 GLUCOSE BLOOD TEST: CPT

## 2018-03-13 PROCEDURE — 71000014 ZZH RECOVERY PHASE 1 LEVEL 2 FIRST HR: Performed by: NEUROLOGICAL SURGERY

## 2018-03-13 PROCEDURE — 37000009 ZZH ANESTHESIA TECHNICAL FEE, EACH ADDTL 15 MIN: Performed by: NEUROLOGICAL SURGERY

## 2018-03-13 PROCEDURE — 25000125 ZZHC RX 250: Performed by: NURSE ANESTHETIST, CERTIFIED REGISTERED

## 2018-03-13 PROCEDURE — 36000057 ZZH SURGERY LEVEL 3 1ST 30 MIN - UMMC: Performed by: NEUROLOGICAL SURGERY

## 2018-03-13 PROCEDURE — 27211024 ZZHC OR SUPPLY OTHER OPNP: Performed by: NEUROLOGICAL SURGERY

## 2018-03-13 PROCEDURE — 40000170 ZZH STATISTIC PRE-PROCEDURE ASSESSMENT II: Performed by: NEUROLOGICAL SURGERY

## 2018-03-13 PROCEDURE — 37000008 ZZH ANESTHESIA TECHNICAL FEE, 1ST 30 MIN: Performed by: NEUROLOGICAL SURGERY

## 2018-03-13 PROCEDURE — 25000128 H RX IP 250 OP 636: Performed by: NEUROLOGICAL SURGERY

## 2018-03-13 PROCEDURE — 25000131 ZZH RX MED GY IP 250 OP 636 PS 637: Performed by: ANESTHESIOLOGY

## 2018-03-13 PROCEDURE — 25000125 ZZHC RX 250: Performed by: NEUROLOGICAL SURGERY

## 2018-03-13 PROCEDURE — 27210794 ZZH OR GENERAL SUPPLY STERILE: Performed by: NEUROLOGICAL SURGERY

## 2018-03-13 PROCEDURE — 25000128 H RX IP 250 OP 636: Performed by: NURSE ANESTHETIST, CERTIFIED REGISTERED

## 2018-03-13 PROCEDURE — 71000027 ZZH RECOVERY PHASE 2 EACH 15 MINS: Performed by: NEUROLOGICAL SURGERY

## 2018-03-13 PROCEDURE — C1767 GENERATOR, NEURO NON-RECHARG: HCPCS | Performed by: NEUROLOGICAL SURGERY

## 2018-03-13 PROCEDURE — 25000565 ZZH ISOFLURANE, EA 15 MIN: Performed by: NEUROLOGICAL SURGERY

## 2018-03-13 PROCEDURE — 25000128 H RX IP 250 OP 636: Performed by: ANESTHESIOLOGY

## 2018-03-13 PROCEDURE — C1883 ADAPT/EXT, PACING/NEURO LEAD: HCPCS | Performed by: NEUROLOGICAL SURGERY

## 2018-03-13 PROCEDURE — 36000059 ZZH SURGERY LEVEL 3 EA 15 ADDTL MIN UMMC: Performed by: NEUROLOGICAL SURGERY

## 2018-03-13 PROCEDURE — C9399 UNCLASSIFIED DRUGS OR BIOLOG: HCPCS | Performed by: NURSE ANESTHETIST, CERTIFIED REGISTERED

## 2018-03-13 DEVICE — LEAD EXTENSION W/EXTEND TECHNOLOGY 50CM 6371ANS: Type: IMPLANTABLE DEVICE | Site: CHEST  WALL | Status: FUNCTIONAL

## 2018-03-13 DEVICE — GENERATOR DBS SYSTEM INFINITY 5 IPG 6660ANS
Type: IMPLANTABLE DEVICE | Site: CHEST  WALL | Status: NON-FUNCTIONAL
Removed: 2022-02-18

## 2018-03-13 RX ORDER — FENTANYL CITRATE 50 UG/ML
INJECTION, SOLUTION INTRAMUSCULAR; INTRAVENOUS PRN
Status: DISCONTINUED | OUTPATIENT
Start: 2018-03-13 | End: 2018-03-13

## 2018-03-13 RX ORDER — NALOXONE HYDROCHLORIDE 0.4 MG/ML
.1-.4 INJECTION, SOLUTION INTRAMUSCULAR; INTRAVENOUS; SUBCUTANEOUS
Status: DISCONTINUED | OUTPATIENT
Start: 2018-03-13 | End: 2018-03-13 | Stop reason: HOSPADM

## 2018-03-13 RX ORDER — MEPERIDINE HYDROCHLORIDE 50 MG/ML
12.5 INJECTION INTRAMUSCULAR; INTRAVENOUS; SUBCUTANEOUS
Status: DISCONTINUED | OUTPATIENT
Start: 2018-03-13 | End: 2018-03-13 | Stop reason: HOSPADM

## 2018-03-13 RX ORDER — ONDANSETRON 2 MG/ML
INJECTION INTRAMUSCULAR; INTRAVENOUS PRN
Status: DISCONTINUED | OUTPATIENT
Start: 2018-03-13 | End: 2018-03-13

## 2018-03-13 RX ORDER — LIDOCAINE 40 MG/G
CREAM TOPICAL
Status: DISCONTINUED | OUTPATIENT
Start: 2018-03-13 | End: 2018-03-13 | Stop reason: HOSPADM

## 2018-03-13 RX ORDER — PROPOFOL 10 MG/ML
INJECTION, EMULSION INTRAVENOUS PRN
Status: DISCONTINUED | OUTPATIENT
Start: 2018-03-13 | End: 2018-03-13

## 2018-03-13 RX ORDER — CEFAZOLIN SODIUM 1 G/3ML
1 INJECTION, POWDER, FOR SOLUTION INTRAMUSCULAR; INTRAVENOUS SEE ADMIN INSTRUCTIONS
Status: DISCONTINUED | OUTPATIENT
Start: 2018-03-13 | End: 2018-03-13 | Stop reason: HOSPADM

## 2018-03-13 RX ORDER — HYDRALAZINE HYDROCHLORIDE 20 MG/ML
2.5-5 INJECTION INTRAMUSCULAR; INTRAVENOUS EVERY 10 MIN PRN
Status: DISCONTINUED | OUTPATIENT
Start: 2018-03-13 | End: 2018-03-13 | Stop reason: HOSPADM

## 2018-03-13 RX ORDER — LABETALOL HYDROCHLORIDE 5 MG/ML
10 INJECTION, SOLUTION INTRAVENOUS
Status: COMPLETED | OUTPATIENT
Start: 2018-03-13 | End: 2018-03-13

## 2018-03-13 RX ORDER — ONDANSETRON 2 MG/ML
4 INJECTION INTRAMUSCULAR; INTRAVENOUS EVERY 30 MIN PRN
Status: DISCONTINUED | OUTPATIENT
Start: 2018-03-13 | End: 2018-03-13 | Stop reason: HOSPADM

## 2018-03-13 RX ORDER — FENTANYL CITRATE 50 UG/ML
25-50 INJECTION, SOLUTION INTRAMUSCULAR; INTRAVENOUS
Status: DISCONTINUED | OUTPATIENT
Start: 2018-03-13 | End: 2018-03-13 | Stop reason: HOSPADM

## 2018-03-13 RX ORDER — SODIUM CHLORIDE, SODIUM LACTATE, POTASSIUM CHLORIDE, CALCIUM CHLORIDE 600; 310; 30; 20 MG/100ML; MG/100ML; MG/100ML; MG/100ML
INJECTION, SOLUTION INTRAVENOUS CONTINUOUS PRN
Status: DISCONTINUED | OUTPATIENT
Start: 2018-03-13 | End: 2018-03-13

## 2018-03-13 RX ORDER — SODIUM CHLORIDE, SODIUM LACTATE, POTASSIUM CHLORIDE, CALCIUM CHLORIDE 600; 310; 30; 20 MG/100ML; MG/100ML; MG/100ML; MG/100ML
INJECTION, SOLUTION INTRAVENOUS CONTINUOUS
Status: DISCONTINUED | OUTPATIENT
Start: 2018-03-13 | End: 2018-03-13 | Stop reason: HOSPADM

## 2018-03-13 RX ORDER — OXYCODONE HYDROCHLORIDE 5 MG/1
5-10 TABLET ORAL EVERY 4 HOURS PRN
Qty: 30 TABLET | Refills: 0 | Status: SHIPPED | OUTPATIENT
Start: 2018-03-13 | End: 2018-05-25

## 2018-03-13 RX ORDER — CEPHALEXIN 500 MG/1
500 CAPSULE ORAL 4 TIMES DAILY
Qty: 28 CAPSULE | Refills: 0 | Status: SHIPPED | OUTPATIENT
Start: 2018-03-13 | End: 2018-03-20

## 2018-03-13 RX ORDER — CEFAZOLIN SODIUM 2 G/100ML
2 INJECTION, SOLUTION INTRAVENOUS
Status: COMPLETED | OUTPATIENT
Start: 2018-03-13 | End: 2018-03-13

## 2018-03-13 RX ORDER — LIDOCAINE HYDROCHLORIDE 20 MG/ML
INJECTION, SOLUTION INFILTRATION; PERINEURAL PRN
Status: DISCONTINUED | OUTPATIENT
Start: 2018-03-13 | End: 2018-03-13

## 2018-03-13 RX ORDER — ONDANSETRON 4 MG/1
4 TABLET, ORALLY DISINTEGRATING ORAL EVERY 30 MIN PRN
Status: DISCONTINUED | OUTPATIENT
Start: 2018-03-13 | End: 2018-03-13 | Stop reason: HOSPADM

## 2018-03-13 RX ADMIN — Medication 10 MG: at 16:04

## 2018-03-13 RX ADMIN — SODIUM CHLORIDE, POTASSIUM CHLORIDE, SODIUM LACTATE AND CALCIUM CHLORIDE: 600; 310; 30; 20 INJECTION, SOLUTION INTRAVENOUS at 13:56

## 2018-03-13 RX ADMIN — PROPOFOL 30 MG: 10 INJECTION, EMULSION INTRAVENOUS at 15:03

## 2018-03-13 RX ADMIN — Medication 100 MG: at 14:18

## 2018-03-13 RX ADMIN — FENTANYL CITRATE 50 MCG: 50 INJECTION, SOLUTION INTRAMUSCULAR; INTRAVENOUS at 15:03

## 2018-03-13 RX ADMIN — FENTANYL CITRATE 50 MCG: 50 INJECTION, SOLUTION INTRAMUSCULAR; INTRAVENOUS at 14:18

## 2018-03-13 RX ADMIN — MIDAZOLAM 2 MG: 1 INJECTION INTRAMUSCULAR; INTRAVENOUS at 14:12

## 2018-03-13 RX ADMIN — HYDRALAZINE HYDROCHLORIDE 2.5 MG: 20 INJECTION INTRAMUSCULAR; INTRAVENOUS at 16:20

## 2018-03-13 RX ADMIN — ONDANSETRON 4 MG: 2 INJECTION INTRAMUSCULAR; INTRAVENOUS at 15:22

## 2018-03-13 RX ADMIN — FENTANYL CITRATE 50 MCG: 50 INJECTION, SOLUTION INTRAMUSCULAR; INTRAVENOUS at 14:49

## 2018-03-13 RX ADMIN — PROPOFOL 100 MG: 10 INJECTION, EMULSION INTRAVENOUS at 14:18

## 2018-03-13 RX ADMIN — ROCURONIUM BROMIDE 30 MG: 10 INJECTION INTRAVENOUS at 14:27

## 2018-03-13 RX ADMIN — LIDOCAINE HYDROCHLORIDE 60 MG: 20 INJECTION, SOLUTION INFILTRATION; PERINEURAL at 14:18

## 2018-03-13 RX ADMIN — SUGAMMADEX 120 MG: 100 INJECTION, SOLUTION INTRAVENOUS at 15:34

## 2018-03-13 RX ADMIN — PROPOFOL 30 MG: 10 INJECTION, EMULSION INTRAVENOUS at 15:32

## 2018-03-13 RX ADMIN — CEFAZOLIN SODIUM 2 G: 2 INJECTION, SOLUTION INTRAVENOUS at 14:30

## 2018-03-13 RX ADMIN — ROCURONIUM BROMIDE 10 MG: 10 INJECTION INTRAVENOUS at 15:04

## 2018-03-13 ASSESSMENT — VISUAL ACUITY
OU: NORMAL ACUITY
OU: GLASSES
OU: NORMAL ACUITY
OU: NORMAL ACUITY

## 2018-03-13 NOTE — ANESTHESIA PREPROCEDURE EVALUATION
Anesthesia Plan      History & Physical Review  History and physical reviewed and following examination; no interval change.    ASA Status:  3 .    NPO Status:  > 8 hours    Plan for General and ETT with Intravenous induction. Maintenance will be Other.    PONV prophylaxis:  Ondansetron (or other 5HT-3)  See full preop evaluation associated with last op 3/6/2018.  No interval changes.      Postoperative Care  Postoperative pain management:  IV analgesics.      Consents  Anesthetic plan, risks, benefits and alternatives discussed with:  Patient..                          .

## 2018-03-13 NOTE — ANESTHESIA CARE TRANSFER NOTE
Patient: Irish Rosales    Procedure(s):  Deep Brain Stimulator Placement, Phase II, Placement Of Deep Brain Stimulator Generator/Battery Over The Left Chest Wall - Wound Class: I-Clean    Diagnosis: Tremor  Diagnosis Additional Information: No value filed.    Anesthesia Type:   No value filed.     Note:  Airway :Face Mask  Patient transferred to:PACU  Comments: Pt alert, no c/o pain, breathing spontaneously on 6L O2 via FM. VSS. Report shared with RN.Handoff Report: Identifed the Patient, Identified the Reponsible Provider, Reviewed the pertinent medical history, Discussed the surgical course, Reviewed Intra-OP anesthesia mangement and issues during anesthesia, Set expectations for post-procedure period and Allowed opportunity for questions and acknowledgement of understanding      Vitals: (Last set prior to Anesthesia Care Transfer)    CRNA VITALS  3/13/2018 1518 - 3/13/2018 1551      3/13/2018             Pulse: 93    SpO2: 99 %                Electronically Signed By: RACHANA Aguilera CRNA  March 13, 2018  3:51 PM

## 2018-03-13 NOTE — IP AVS SNAPSHOT
Post Anesthesia Care Unit [address] [address]    Phone:  [phone]                                       After Visit Summary   3/13/2018    Irish Rosales    MRN: [id number]           After Visit Summary Signature Page     I have received my discharge instructions, and my questions have been answered. I have discussed any challenges I see with this plan with the nurse or doctor.    ..........................................................................................................................................  Patient/Patient Representative Signature      ..........................................................................................................................................  Patient Representative Print Name and Relationship to Patient    ..................................................               ................................................  Date                                            Time    ..........................................................................................................................................  Reviewed by Signature/Title    ...................................................              ..............................................  Date                                                            Time

## 2018-03-13 NOTE — IP AVS SNAPSHOT
MRN:[id number]                      After Visit Summary   3/13/2018    Irish Rosales    MRN: [id number]           Thank you!     Thank you for choosing [facility] for your care. Our goal is always to provide you with excellent care. Hearing back from our patients is one way we can continue to improve our services. Please take a few minutes to complete the written survey that you may receive in the mail after you visit with us. Thank you!        Patient Information     Date Of Birth          1950        About your hospital stay     You were admitted on:  March 13, 2018 You last received care in the:  Post Anesthesia Care Unit [facility]    You were discharged on:  March 13, 2018        Reason for your hospital stay       Left Phase II DBS generator placement                  Who to Call     For medical emergencies, please call 911.  For non-urgent questions about your medical care, please call your primary care provider or clinic, [phone]  For questions related to your surgery, please call your surgery clinic        Attending Provider     Provider Aleksander Granda MD Neurosurgery       Primary Care Provider Office Phone # Fax #    Braulio Victoria MD PhD [phone]       When to contact your care team       Please call or go to the closest Emergency Room if you have increased pain, redness, drainage, or swelling at your incision, temperature > 101.5 degrees Farenheit, changes in neurologic status (such as headache, lightheadedness, dizziness, confusion, or numbness, tingling, or weakness in your face, arms, or legs) or if you have any other questions or concerns.    You may reach the Neurosurgery clinic at [phone] during regular business hours. You can call the hospital  at [phone] and ask for the on-call Neurosurgery Resident at all other times.                  After Care Instructions     Activity       Do not do any bending,  twisting, strenuous exercise, or heavy lifting (greater than 10 pounds) for 3-4 weeks. Avoid any activities that could result in trauma to the surgical wound. Do not drive while using narcotics or other sedating medications, such as sleep aids, muscle relaxants, etc.            Diet       Follow this diet upon discharge: Advance to a regular diet as tolerated            Discharge Instructions       Continue to take the antibiotics as prescribed until it is finished.            Wound care and dressings       You should then keep the wounds undressed and open to air. You are allowed to take showers and get the wound wet starting on post-operative day #3 (3/16/18) but you may not scrub or soak the wound or keep it submerged under water. If you do happen to get the wound wet, be sure to pat dry it rather than scrubbing it with a towel.                  Follow-up Appointments     Adult [facility] Follow-up and recommended labs and tests       You should follow up with Dr. Morales in Neurosurgery clinic in 2 weeks for wound check and general post-operative care. You should call [phone] or [phone] if you have not heard from the hospital within 2 days of discharge regarding your follow-up appointment.                  Further instructions from your care team       [facility], [place]  Same-Day Surgery   Adult Discharge Orders & Instructions     For 24 hours after surgery    1. Get plenty of rest.  A responsible adult must stay with you for at least 24 hours after you leave the hospital.   2. Do not drive or use heavy equipment.  If you have weakness or tingling, don't drive or use heavy equipment until this feeling goes away.  3. Do not drink alcohol.  4. Avoid strenuous or risky activities.  Ask for help when climbing stairs.   5. You may feel lightheaded.  IF so, sit for a few minutes before standing.  Have someone help you get up.   6. If you have nausea (feel sick to your stomach):  Drink only clear liquids such as apple juice, ginger ale, broth or 7-Up.  Rest may also help.  Be sure to drink enough fluids.  Move to a regular diet as you feel able.  7. You may have a slight fever. Call the doctor if your fever is over 100 F (37.7 C) (taken under the tongue) or lasts longer than 24 hours.  8. You may have a dry mouth, a sore throat, muscle aches or trouble sleeping.  These should go away after 24 hours.  9. Do not make important or legal decisions.   Call your doctor for any of the followin.  Signs of infection (fever, growing tenderness at the surgery site, a large amount of drainage or bleeding, severe pain, foul-smelling drainage, redness, swelling).    2. It has been over 8 to 10 hours since surgery and you are still not able to urinate (pass water).    3.  Headache for over 24 hours.    4.  Numbness, tingling or weakness the day after surgery (if you had spinal anesthesia).  To contact a doctor, call Dr Morales's office at [phone]-- Neurosurgery or:        [phone] and ask for the resident on call for Neurosurgery (answered 24 hours a day)      Emergency Department:    [facility]: [phone]       (TTY for hearing impaired: [phone])           Tips for taking pain medications  To get the best pain relief possible , remember these points:      Take pain medications as directed, before pain becomes severe      Pain medication can upset your stomach: taking it with food may help      Constipation is a common side effect of pain medication. Drink plenty of  Fluids      Eat foods high in fiber. Take a stool softener  if recommended by your doctor or  Pharmacist.      Do not drink alcohol, drive or operate machinery while taking pain medications.      Ask about other ways to control pain, such as with heat, ice or relaxation.          Additional Information     If you use hormonal birth control (such as the pill, patch, ring or implants): You'll need a second form of  "birth control for 7 days (condoms, a diaphragm or contraceptive foam). While in the hospital, you received a medicine called Bridion. Your normal birth control will not work as well for a week after taking this medicine.          Pending Results     No orders found from 3/11/2018 to 3/14/2018.            Admission Information     Date & Time Provider Department Dept. Phone    3/13/2018 Aleksander Morales MD Post Anesthesia Care Unit [facility] [phone]      Your Vitals Were     Blood Pressure Pulse Temperature Respirations Height Weight    156/83 81 98.1  F (36.7  C) (Oral) 16 1.6 m (5' 3\") 61.4 kg (135 lb 5.8 oz)    Pulse Oximetry BMI (Body Mass Index)                99% 23.98 kg/m2          [facility] Information     [employer] gives you secure access to your electronic health record. If you see a primary care provider, you can also send messages to your care team and make appointments. If you have questions, please call your primary care clinic.  If you do not have a primary care provider, please call [phone] and they will assist you.        Care EveryWhere ID     This is your Care EveryWhere ID. This could be used by other organizations to access your [facility] medical records  [id number]        Equal Access to Services     SAMINA PORTER AH: Vincent Almazan, watravisda stevenson, qaybta kaalmada adelorin, ferny ballard. So [facility] [phone].    ATENCIÓN: Si habla español, tiene a flower disposición servicios gratuitos de asistencia lingüística. Llame al [phone].    We comply with applicable federal civil rights laws and [place] laws. We do not discriminate on the basis of race, color, national origin, age, disability, sex, sexual orientation, or gender identity.               Review of your medicines      START taking        Dose / Directions    cephALEXin 500 MG capsule   Commonly known as:  KEFLEX   Used for:  Essential tremor        Dose:  500 mg   Take 1 " capsule (500 mg) by mouth 4 times daily for 7 days   Quantity:  28 capsule   Refills:  0         CONTINUE these medicines which may have CHANGED, or have new prescriptions. If we are uncertain of the size of tablets/capsules you have at home, strength may be listed as something that might have changed.        Dose / Directions    rosuvastatin 5 MG tablet   Commonly known as:  CRESTOR   This may have changed:  when to take this   Used for:  Hyperlipidemia LDL goal <160        Dose:  5 mg   Take 1 tablet (5 mg) by mouth daily   Quantity:  30 tablet   Refills:  11       sertraline 100 MG tablet   Commonly known as:  ZOLOFT   This may have changed:  See the new instructions.   Used for:  History of major depression        TAKE 1 TABLET BY MOUTH EVERY DAY   Quantity:  90 tablet   Refills:  0         CONTINUE these medicines which have NOT CHANGED        Dose / Directions    ALPRAZolam 0.5 MG tablet   Commonly known as:  XANAX   Used for:  Dystonic tremor        Dose:  0.5 mg   Take 1 tablet (0.5 mg) by mouth daily as needed   Quantity:  30 tablet   Refills:  1       oxyCODONE IR 5 MG tablet   Commonly known as:  ROXICODONE   Used for:  Essential tremor        Dose:  5-10 mg   Take 1-2 tablets (5-10 mg) by mouth every 4 hours as needed for other (pain control or improvement in physical function. Hold dose for analgesic side effects.)   Quantity:  30 tablet   Refills:  0            Where to get your medicines      Some of these will need a paper prescription and others can be bought over the counter. Ask your nurse if you have questions.     Bring a paper prescription for each of these medications     cephALEXin 500 MG capsule    oxyCODONE IR 5 MG tablet                Protect others around you: Learn how to safely use, store and throw away your medicines at www.disposemymeds.org.        ANTIBIOTIC INSTRUCTION     You've Been Prescribed an Antibiotic - Now What?  Your healthcare team thinks that you or your loved one might  have an infection. Some infections can be treated with antibiotics, which are powerful, life-saving drugs. Like all medications, antibiotics have side effects and should only be used when necessary. There are some important things you should know about your antibiotic treatment.      Your healthcare team may run tests before you start taking an antibiotic.    Your team may take samples (e.g., from your blood, urine or other areas) to run tests to look for bacteria. These test can be important to determine if you need an antibiotic at all and, if you do, which antibiotic will work best.      Within a few days, your healthcare team might change or even stop your antibiotic.    Your team may start you on an antibiotic while they are working to find out what is making you sick.    Your team might change your antibiotic because test results show that a different antibiotic would be better to treat your infection.    In some cases, once your team has more information, they learn that you do not need an antibiotic at all. They may find out that you don't have an infection, or that the antibiotic you're taking won't work against your infection. For example, an infection caused by a virus can't be treated with antibiotics. Staying on an antibiotic when you don't need it is more likely to be harmful than helpful.      You may experience side effects from your antibiotic.    Like all medications, antibiotics have side effects. Some of these can be serious.    Let you healthcare team know if you have any known allergies when you are admitted to the hospital.    One significant side effect of nearly all antibiotics is the risk of severe and sometimes deadly diarrhea caused by Clostridium difficile (C. Difficile). This occurs when a person takes antibiotics because some good germs are destroyed. Antibiotic use allows C. diificile to take over, putting patients at high risk for this serious infection.    As a patient or caregiver,  it is important to understand your or your loved one's antibiotic treatment. It is especially important for caregivers to speak up when patients can't speak for themselves. Here are some important questions to ask your healthcare team.    What infection is this antibiotic treating and how do you know I have that infection?    What side effects might occur from this antibiotic?    How long will I need to take this antibiotic?    Is it safe to take this antibiotic with other medications or supplements (e.g., vitamins) that I am taking?     Are there any special directions I need to know about taking this antibiotic? For example, should I take it with food?    How will I be monitored to know whether my infection is responding to the antibiotic?    What tests may help to make sure the right antibiotic is prescribed for me?      Information provided by:  www.cdc.gov/getsmart  U.S. Department of Health and Human Services  Centers for disease Control and Prevention  National Center for Emerging and Zoonotic Infectious Diseases  Division of Healthcare Quality Promotion        Information about OPIOIDS     PRESCRIPTION OPIOIDS: WHAT YOU NEED TO KNOW    Prescription opioids can be used to help relieve moderate to severe pain and are often prescribed following a surgery or injury, or for certain health conditions. These medications can be an important part of treatment but also come with serious risks. It is important to work with your health care provider to make sure you are getting the safest, most effective care.    WHAT ARE THE RISKS AND SIDE EFFECTS OF OPIOID USE?  Prescription opioids carry serious risks of addiction and overdose, especially with prolonged use. An opioid overdose, often marked by slowed breathing can cause sudden death. The use of prescription opioids can have a number of side effects as well, even when taken as directed:      Tolerance - meaning you might need to take more of a medication for the same  pain relief    Physical dependence - meaning you have symptoms of withdrawal when a medication is stopped    Increased sensitivity to pain    Constipation    Nausea, vomiting, and dry mouth    Sleepiness and dizziness    Confusion    Depression    Low levels of testosterone that can result in lower sex drive, energy, and strength    Itching and sweating    RISKS ARE GREATER WITH:    History of drug misuse, substance use disorder, or overdose    Mental health conditions (such as depression or anxiety)    Sleep apnea    Older age (65 years or older)    Pregnancy    Avoid alcohol while taking prescription opioids.   Also, unless specifically advised by your health care provider, medications to avoid include:    Benzodiazepines (such as Xanax or Valium)    Muscle relaxants (such as Soma or Flexeril)    Hypnotics (such as Ambien or Lunesta)    Other prescription opioids    KNOW YOUR OPTIONS:  Talk to your health care provider about ways to manage your pain that do not involve prescription opioids. Some of these options may actually work better and have fewer risks and side effects:    Pain relievers such as acetaminophen, ibuprofen, and naproxen    Some medications that are also used for depression or seizures    Physical therapy and exercise    Cognitive behavioral therapy, a psychological, goal-directed approach, in which patients learn how to modify physical, behavioral, and emotional triggers of pain and stress    IF YOU ARE PRESCRIBED OPIOIDS FOR PAIN:    Never take opioids in greater amounts or more often than prescribed    Follow up with your primary health care provider and work together to create a plan on how to manage your pain.    Talk about ways to help manage your pain that do not involve prescription opioids    Talk about all concerns and side effects    Help prevent misuse and abuse    Never sell or share prescription opioids    Never use another person's prescription opioids    Store prescription opioids  in a secure place and out of reach of others (this may include visitors, children, friends, and family)    Visit www.cdc.gov/drugoverdose to learn about risks of opioid abuse and overdose    If you believe you may be struggling with addiction, tell your health care provider and ask for guidance or call [facility]'s National Helpline at 2-587-332-HELP    LEARN MORE / www.cdc.gov/drugoverdose/prescribing/guideline.html    Safely dispose of unused prescription opioids: Find your local drug take-back programs and more information about the importance of safe disposal at www.doseofreality.mn.gov             Medication List: This is a list of all your medications and when to take them. Check marks below indicate your daily home schedule. Keep this list as a reference.      Medications           Morning Afternoon Evening Bedtime As Needed    ALPRAZolam 0.5 MG tablet   Commonly known as:  XANAX   Take 1 tablet (0.5 mg) by mouth daily as needed                                cephALEXin 500 MG capsule   Commonly known as:  KEFLEX   Take 1 capsule (500 mg) by mouth 4 times daily for 7 days                                oxyCODONE IR 5 MG tablet   Commonly known as:  ROXICODONE   Take 1-2 tablets (5-10 mg) by mouth every 4 hours as needed for other (pain control or improvement in physical function. Hold dose for analgesic side effects.)                                rosuvastatin 5 MG tablet   Commonly known as:  CRESTOR   Take 1 tablet (5 mg) by mouth daily                                sertraline 100 MG tablet   Commonly known as:  ZOLOFT   TAKE 1 TABLET BY MOUTH EVERY DAY

## 2018-03-13 NOTE — OP NOTE
PATIENT NAME: HARSHAD PAYNE  YOB: 1950  MRN:   [id number]  ACCOUNT:  [id number]      DATE OF PROCEDURE:  03/13/2018     PREOPERATIVE DIAGNOSIS:   1.  Dystonic tremor.  2.  Essential tremor.  3.  S/p left side deep brain stimulator placement, phase I, placement of left side deep brain stimulator electrode, target left ventral intermediate nucleus of the thalamus, microelectrode recording on 3/6/2018.    POSTOPERATIVE DIAGNOSIS:   1.  Dystonic tremor  2.  Essential tremor.  3.  S/p left side deep brain stimulator placement, phase I, placement of left side deep brain stimulator electrode, target left ventral intermediate nucleus of the thalamus, microelectrode recording on 3/6/2018.    PROCEDURES PERFORMED:  1.  Deep brain stimulator placement, phase II, placement of new deep brain stimulator generator/battery, model Infinity 5, over left chest wall.    2.  Placement of one extension wire and connection of the left side deep brain stimulator electrode, one electrode array, to the new generator/battery.  3.  Electrical interrogation and analysis of the deep brain stimulator system.    ATTENDING SURGEON:  Aleksander Morales MD, PhD     RESIDENT SURGEON:  Chet Seymour MD    ANESTHESIA:  General Anesthesia.     ESTIMATED BLOOD LOSS:  2 mL     COMPLICATIONS:  None.    FINDINGS:  All impedance values were within normal.  No problems found.    IMPLANTS:  Abbott Infinity 5 DBS generator/battery model # 6660ANS, S/N [id number]  Abbott extension wire, model # 6371ANS, S/N S/N [id number]    INDICATIONS FOR PROCEDURE:  Ms. Harshad Payne returns for her ongoing DBS surgery.  She is s/p left side deep brain stimulator placement, phase I, placement of left side deep brain stimulator electrode, target left ventral intermediate nucleus of the thalamus, with microelectrode recording on 3/6/2018.  The surgery was without any complications and patient tolerated it well.  Her postoperative CT head demonstrated expected  findings with no hemorrhage.  She was discharged on POD#1.  She states that she has been doing well.  She denies any fevers, chills, nausea, vomiting, dizziness, weakness, numbness or seizure like activity.  She also denies any significant headaches.  She also denies any problems or issues with the wound.  Briefly, patient is a 67 year old right-handed female with history of dystonic tremor.  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  She also has cervical dystonia.  Her right side is more affected than her left side.  Her goals for DBS surgery are to decrease tremor, especially head and voice and improve dystonia.  She stated that she can live with her hand tremors but wants other symptoms treated.  Her case was discussed at the Movement Disorder Consensus Group meeting and the recommendation was left side Vim DBS with Abbott device and wait and see strategy.  We discussed the details of the surgery.  Under general anesthesia, we will place the DBS generator/battery over the left chest wall.  Extension wire will be placed and will be connected to the previously implanted electrode.  Risks, benefits and alternative therapies were discussed with the patient, including but not limited to infection and bleeding.  Surgical procedure was discussed in detail.  All questions were answered and she expressed understanding.    DESCRIPTION OF PROCEDURE:  The patient was taken to the operating theater and positioned supine on the operating room table.  All pressure points were appropriately padded.  With placement of the endotracheal tube, general endotracheal anesthesia was induced.  Her head was turned to the right side, thus exposing the left parietal area of the head.  The previously implanted connector portion of the stimulating electrode from the left side could be palpated on the left side of the  head.  A small area of hair was removed over this palpable connector using a surgical hair clipper.  Then the left side of the head, neck and chest area was prepped and draped in normal sterile fashion.  Local anesthetic was injected in the areas of intended incision at the left scalp and left chest wall as well as along the path of the intended tunneling.  A total of 18 mL of 1% lidocaine with epinephrine mixed with 0.25% Marcaine plain in a 50:50 combination was used.  A timeout was performed confirming the patient's identity, procedure to be performed, site and side of surgery and administration of preoperative prophylactic antibiotics.      Attention was turned to the head.  A #10 blade scalpel was used to make a 2 cm skin incision at the distal end of the connector that was palpated in her scalp.  Hemostasis was achieved with bipolar cautery.  The incision was carried down to the pericranium and galea opened.  Previously buried protected connector was visualized.  The mosquito was used to hold the protective cover, and we gently pulled out the connector.  This was found to be fully intact.  At that point, a pocket was made using a Cecilia clamp down toward behind the ear into the nuchal line.  This was in preparation for tunneling of the extension wire.    At that point, attention was turned to the left chest wall area.  A #10 blade scalpel was used to make a 6-7 cm incision on the left chest wall.  A #10 blade scalpel was used to finish dissection down to the proper plane less than 1 cm below the skin.  We found a nice dissecting plane superficial to the pectoralis muscle.  A combination of blunt dissection as well as the monopolar cautery with a mosquito was used to establish a subcutaneous pocket about 3 fingerbreadths wide and deep enough to encompass the full length of the fingers.  Hemostasis was achieved with monopolar cautery.  The pocket was copiously irrigated with antibiotic-impregnated saline and  sponge was placed in the pocket.      At this point, a tunneler was brought into the field.  A blunt tip was attached and a subcutaneous passer was tunneled from the head incision to the chest wall incision, careful not to be too superficial to the skin, but was in the correct plane and taking a course over the clavicle.  The tunneler was then removed, thus leaving behind a plastic sheath.  One extension wire was passed from the head incision to the chest incision.  Subsequently, the plastic sheath was pulled out from the chest incision, leaving behind the tunneled extension wire.    We then proceeded to make the connection between the left intracranial lead and the extension wire.  The protective covering was removed from the connector portion of the stimulating electrode.  The contacts were inspected to be clean and without damage.  Then, the stimulating electrode was inserted into the extension wire connection.  The connection was secured with screwdriver.  Then, the extension wire was gently pulled from the chest incision and the excess wire length towards the head was also buried superiorly until the connector was within the incision.  Then, the connector was buried further superiorly in the incision until it was no longer directly under the incision.     At this time, a new Infinity 5 generator/battery was brought onto the field.  The extension wire was then cleaned at its contacts and inserted into the generator/battery portal.  The extension wire for the left sided implant was placed into the front connector portal.  A plug was placed in the back connector portal.  These were secured with a screwdriver.  The previously placed sponge was taken out of the pocket.  Then, the new generator/battery with the excess wires being placed posterior to the generator/battery was placed within the left chest wall pocket that was created previously.  The entire apparatus fit into the pocket comfortably.  The  generator/battery was anchored to the pocket using two 2-0 Ethibond sutures.    The system was tested electrically.  All the impedance values of the left stimulating electrode was within normal.  No problems were found with the system.     With the satisfactory placement of the new system, we began closing the wound.  The left chest incision was closed in the following manner.  The deep pocket was reapproximated using 3-0 Vicryl sutures in an inverted interrupted fashion.  The subcutaneous fat layer was reapproximated using 3-0 Vicryl sutures in an inverted interrupted fashion.  The dermal layer was reapproximated using 3-0 Vicryl sutures in an inverted interrupted fashion.  The skin was reapproximated using 4-0 Monocryl suture in a subcuticular fashion.  The left parietal head incision was irrigated with antibiotic solution and dried.  Meticulous hemostasis was obtained.  It was then closed in the following manner.  The galea was reapproximated over the implanted hardware using 3-0 Vicryl sutures in an inverted interrupted fashion.  This provided a protective layer.  The skin was then reapproximated using 4-0 Monocryl suture in a running fashion.  Both wounds were cleaned and dried.  ChoraPrep was used to clean the incisions again.  Then, Dermabond was applied.    At the end of the case, all counts including needle, sponge and instrument counts were correct x 2.  There were no complications.  Patient was extubated and taken to the recovery room in a stable condition.    Dr. Morales was present and scrubbed throughout the entirety of the case.      SOLANGE MORALES MD      As prepared by ENRIQUE RIGGINS MD      NEUROSURGERY ATTENDING ATTESTATION: Solange SHORT M.D., Ph.D., Neurosurgery Attending, was present and scrubbed for the entire case and performed the key and critical portions of the case.

## 2018-03-13 NOTE — H&P
NEUROSURGERY    HISTORY AND PHYSICAL EXAM UPDATE          Chief Complaint   Patient presents with     RECHECK       Dystonic tremor.  Ongoing DBS surgery.  S/p left side deep brain stimulator placement, phase I, placement of left side deep brain stimulator electrode, target left ventral intermediate nucleus of the thalamus, with microelectrode recording on 3/6/2018.      HISTORY OF PRESENT ILLNESS  Ms. Irish Rosales returns today for her ongoing DBS surgery.  She is s/p left side deep brain stimulator placement, phase I, placement of left side deep brain stimulator electrode, target left ventral intermediate nucleus of the thalamus, with microelectrode recording on 3/6/2018.  The surgery was without any complications and patient tolerated it well.  Her postoperative CT head demonstrated expected findings with no hemorrhage.  She was discharged on POD#1.  She states that she has been doing well.  She denies any fevers, chills, nausea, vomiting, dizziness, weakness, numbness or seizure like activity.  She also denies any significant headaches.  She also denies any problems or issues with the wound.  She is now here for her phase II of the surgery.      Briefly, patient is a 67 year old right-handed female with history of dystonic tremor.  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  She also has cervical dystonia.  Her right side is more affected than her left side.  Her goals for DBS surgery are to decrease tremor, especially head and voice and improve dystonia.  She stated that she can live with her hand tremors but wants other symptoms treated.  Her case was discussed at the Movement Disorder Consensus Group meeting and the recommendation was left side Vim DBS with Abbott device and wait and see strategy.    Past Medical History:   Diagnosis Date     Depression      Dyslipidemia      Dystonic  movements 1/21/2017     Dystonic tremor 1/21/2017     Encounter for neuropsychological testing 3/13/2017    2017 evaluation  IMPRESSIONS AND RECOMMENDATIONS   Current results indicate performance that falls within normal limits across cognitive domains, generally in the average to above average range. Objective personality assessment also falls within normal limits.   This pattern of performance does not reflect dementia at this time, nor is it suggestive of focal or lateralized cerebral involvement. Sh     Family history of movement disorder 1/21/2017     Family history of tremor 1/21/2017     H/O magnetic resonance imaging of brain and brain stem 2/27/2017    MR BRAIN W/O CONTRAST 2/27/2017 11:46 AM  Provided History: dbs brain surgery for dystonia, Other specified forms of tremor, Dystonia, unspecified, Tremor, unspecified  Comparison:  No similar prior studies   Technique: Volumetric sagittal T1 and T2 weighted, axial T2-weighted, turboFLAIR and diffusion-weighted with ADC map images of the brain were obtained without intravenous contrast.  Findings:     mild abnormality in carotid study 3/2/2017    BILATERAL DUPLEX CAROTID ULTRASOUND March 1, 2017 1:01 PM    HISTORY: Other specified symptoms and signs involving the circulatory and respiratory systems.   COMPARISON: None.   FINDINGS: There is mild atherosclerotic plaque in the carotid bifurcations. Flow velocities and waveforms show less than 50% diameter stenosis in both the right and left internal carotid arteries as assessed by each ICA PS     Osteoporosis 8/2/2010     Past Surgical History:   Procedure Laterality Date     ------------OTHER-------------  2004    [facility]ocal [facility] at [facility]     C BSO, OMENTECTOMY W/XAVIER  1997    hysterectomy     C HAND/FINGER SURGERY UNLISTED  1998    right carpal tunnel surgery     OPTICAL TRACKING SYSTEM INSERTION DEEP BRAIN STIMULATION Left 3/6/2018    Procedure: OPTICAL TRACKING SYSTEM INSERTION DEEP BRAIN  STIMULATION;  Stealth Assisted Left Deep Brain Stimulator Placement, Phase I, Placement Of Left Side Deep Brain Stimulator Electrode, Target Left Ventral Intermediate Nucleus Of The Thalamus With Microelectrode Recording;  Surgeon: Aleksander Morales MD;  Location: [facility] OR     RELEASE CARPAL TUNNEL Left 1/2/2015    Procedure: RELEASE CARPAL TUNNEL;  Surgeon: SHANIA Hernandez MD;  Location: [facility] OR     RELEASE ULNAR NERVE (ELBOW) Left 1/2/2015    Procedure: RELEASE ULNAR NERVE (ELBOW);  Surgeon: SHANIA Hernandez MD;  Location: [facility] OR     Social History     Social History     Marital status:      Spouse name: N/A     Number of children: 3     Years of education: 14     Occupational History     RN Retired     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     Social History Main Topics     Smoking status: Never Smoker     Smokeless tobacco: Never Used     Alcohol use Yes      Comment: occasionally     Drug use: No     Sexual activity: Yes     Partners: Male     Other Topics Concern      Service No     Blood Transfusions No     Caffeine Concern No     Occupational Exposure No     Hobby Hazards No     Sleep Concern No     Stress Concern No     Weight Concern No     Special Diet No     Back Care No     Exercise Yes     walk     Bike Helmet Yes     Seat Belt Yes     Self-Exams Yes     Social History Narrative        Lives in [place]    Daughter Karyn Sanchez        benign essential tremor with a strained quality to her voice consistent with mild laryngeal spasm        ALLERGIES:  She is allergic to sulfa drugs, atorvastatin and simvastatin.  The statin drugs caused leg cramps.            FAMILY HISTORY:  As noted above with 1 sister developing a voice tremor and another sister having what is described as a facial tremor.  Mother with hand tremors          SOCIAL HISTORY:  She does not smoke.  She does use alcohol on occasion. She previously worked as a  RN.         Jazmyn jimenez  [phone] orofacial dyskinesias    Piedad Candice [phone]  laryngeal dystonia and laryngeal tremor and laryngeal spasm.             Updated 6/13/17: Client was born in [place], [place] to parents Jerome and Berenice.  Client grew up w/ three sisters Tea, Carrie, and Jazmyn who are currently still living.  Client graduated from high school, attended college for two years, and graduated w/ a RN Degree.  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  She was  to her ex-spouse for 25 years and had three children; one son Bryan and two daughters Halley/Ysabel.  One daughter Halley Sanchez resides nearby and her other daughter Ysbael and her son Bryan reside in [place], [place].  Bryan has two daughters ages five and eight years old.  Client enjoys flying for free to [place] to visit her two granddaughters. Michelle Sutton, [facility] ([phone], Fax: [phone]).             Family History   Problem Relation Age of Onset     Hypertension Father      and mother     CEREBROVASCULAR DISEASE Father      Tremor Mother      Lung Cancer Mother      Neurologic Disorder Sister      dystonic voice x 2 botox     Neurologic Disorder Sister      jose m mn. facial movement         Allergies   Allergen Reactions     Sulfa Drugs Swelling and Rash     Atorvastatin      Leg cramps     Simvastatin      Leg cramp     Current Outpatient Prescriptions   Medication     sertraline (ZOLOFT) 100 MG tablet     ALPRAZolam (XANAX) 0.5 MG tablet     rosuvastatin (CRESTOR) 5 MG tablet     calcium carbonate (OS- [facility] [place]. [place]) 500 MG tablet     botulinum toxin type A (BOTOX) 100 UNITS injection       Answers for HPI/ROS submitted by the patient on 2/13/2017 - updated 3/13/2018.  Per HPI.  No new symptoms.  General Symptoms: No  Skin Symptoms: No  HENT  Symptoms: No  EYE SYMPTOMS: No  HEART SYMPTOMS: No  LUNG SYMPTOMS: No  INTESTINAL SYMPTOMS: No  URINARY SYMPTOMS: No  GYNECOLOGIC SYMPTOMS: No  BREAST SYMPTOMS: No  SKELETAL SYMPTOMS: Yes  BLOOD SYMPTOMS: No  NERVOUS SYSTEM SYMPTOMS: Yes  MENTAL HEALTH SYMPTOMS: No  Back pain: No  Muscle aches: Yes  Neck pain: No  Swollen joints: No  Joint pain: No  Bone pain: No  Muscle cramps: No  Muscle weakness: No  Joint stiffness: No  Bone fracture: No  Trouble with coordination: No  Dizziness or trouble with balance: No  Fainting or black-out spells: No  Memory loss: No  Headache: No  Seizures: No  Speech problems: Yes  Tingling: No  Tremor: Yes  Weakness: No  Difficulty walking: No  Paralysis: No  Numbness: No      PHYSICAL EXAM  Temp: 98.5  F (36.9  C)   BP: 146/81 Pulse: 81   Resp: 12 SpO2: 97 % O2 Device: None (Room air)      General: Awake, alert, oriented. Well nourished, well developed, she is not in any acute distress.  HEENT: Head normocephalic, atraumatic. Neck supple. Good range of motion. No palpable thyroid mass. Soft carotid bruit on the right side.  Wearing a soft cervical collar.  Heart: Regular rhythm and rate. No murmurs.  Lungs: Clear to auscultation and percussion bilaterally. No ronchi, rales or wheeze.  Abdomen: Soft, non-tender, non-distended. No hepatosplenomegaly.  Extremity: Warm with no clubbing, cyanosis or edema. No lower extremity edema.  Left frontal incision clean/dry and intact.  No redness.  No drainage.  No fluid collection.    Neurological  Awake, alert and oriented to date, time, place and person. Speech fluent but tremulous.   Pupils equal, round, reactive to light.  Extraocular movement intact.  Hearing is grossly normal to finger rub.   Facial sensation intact.  Face symmetric.  Tongue midline.  Uvula elevates equally.    Motor: full strength throughout.  Sensation: intact to light touch and pinpoint.  Deep tendon reflexes: 2+ throughout. Negative for clonus. Negative for Gilbert's  sign. No dysmetria.      ASSESSMENT  66 year old female with a history of dystonia/dystonic tremors and bilateral tremors, vocal dystonia and tremor, head tremor.  Right side is worse than left side.  S/p left side deep brain stimulator placement, phase I, placement of left side deep brain stimulator electrode, target left ventral intermediate nucleus of the thalamus, with microelectrode recording on 3/6/2018.    She did well with the surgery and she has no complaints at this time.  She is ready for her phase II surgery.  We discussed the details of the surgery.  Under general anesthesia, we will place the DBS generator/battery over the left chest wall.  Extension wire will be placed and will be connected to the previously implanted electrode.  Risks, benefits and alternative therapies were discussed with the patient, including but not limited to infection and bleeding.  Surgical procedure was discussed in detail.  All questions were answered and she expressed understanding.      PLAN  1.  Deep brain stimulator placement, phase II, placement of deep brain stimulator generator/battery over the left chest wall.  Abbott Infinity system.

## 2018-03-13 NOTE — ANESTHESIA POSTPROCEDURE EVALUATION
Patient: Irish Rosales    Procedure(s):  Deep Brain Stimulator Placement, Phase II, Placement Of Deep Brain Stimulator Generator/Battery Over The Left Chest Wall - Wound Class: I-Clean    Diagnosis:Tremor  Diagnosis Additional Information: No value filed.    Anesthesia Type:  General, ETT    Note:  Anesthesia Post Evaluation    Patient location during evaluation: PACU  Patient participation: Able to fully participate in evaluation  Level of consciousness: awake and alert  Pain management: adequate  Airway patency: patent  Cardiovascular status: acceptable  Respiratory status: acceptable  Hydration status: acceptable  PONV: none     Anesthetic complications: None          Last vitals:  Vitals:    03/13/18 1600 03/13/18 1615 03/13/18 1630   BP: (!) 172/93 (!) 166/92 156/83   Pulse:      Resp: 17 15 16   Temp:  36.7  C (98  F) 36.7  C (98.1  F)   SpO2: 100% 98% 98%         Electronically Signed By: Aleksander Garibay MD  March 13, 2018  4:27 PM

## 2018-03-13 NOTE — OR NURSING
Dr. Garibay to PACU bedside. Aware of SBP greater than 160 but states he is ok with pt going home with BP that high. Informed MDA that labetalol already administered. Ok per MDA but no further interventions needed at this time. Will inform Phase 2 nurse and continue to monitor.

## 2018-03-13 NOTE — PROGRESS NOTES
SPIRITUAL HEALTH SERVICES  SPIRITUAL ASSESSMENT Progress Note  [facility] ([place] Pre-Op   ON-CALL VISIT    REFERRAL SOURCE: [facility]      Attempted to visit pt prior to her surgery, she had just been brought down to the OR.    PLAN: Refer to unit  or tomorrow's on-call  for follow-up.    Virginia Amaro  Chaplain Resident  Pager [phone]

## 2018-03-13 NOTE — DISCHARGE INSTRUCTIONS
[facility]  Same-Day Surgery   Adult Discharge Orders & Instructions     For 24 hours after surgery    1. Get plenty of rest.  A responsible adult must stay with you for at least 24 hours after you leave the hospital.   2. Do not drive or use heavy equipment.  If you have weakness or tingling, don't drive or use heavy equipment until this feeling goes away.  3. Do not drink alcohol.  4. Avoid strenuous or risky activities.  Ask for help when climbing stairs.   5. You may feel lightheaded.  IF so, sit for a few minutes before standing.  Have someone help you get up.   6. If you have nausea (feel sick to your stomach): Drink only clear liquids such as apple juice, ginger ale, broth or 7-Up.  Rest may also help.  Be sure to drink enough fluids.  Move to a regular diet as you feel able.  7. You may have a slight fever. Call the doctor if your fever is over 100 F (37.7 C) (taken under the tongue) or lasts longer than 24 hours.  8. You may have a dry mouth, a sore throat, muscle aches or trouble sleeping.  These should go away after 24 hours.  9. Do not make important or legal decisions.   Call your doctor for any of the followin.  Signs of infection (fever, growing tenderness at the surgery site, a large amount of drainage or bleeding, severe pain, foul-smelling drainage, redness, swelling).    2. It has been over 8 to 10 hours since surgery and you are still not able to urinate (pass water).    3.  Headache for over 24 hours.    4.  Numbness, tingling or weakness the day after surgery (if you had spinal anesthesia).  To contact a doctor, call Dr Morales's office at [phone]-- Neurosurgery or:        [phone] and ask for the resident on call for Neurosurgery (answered 24 hours a day)      Emergency Department:    [facility]: [phone]       (TTY for hearing impaired: [phone])           Tips for taking pain medications  To get the best pain relief possible ,  remember these points:      Take pain medications as directed, before pain becomes severe      Pain medication can upset your stomach: taking it with food may help      Constipation is a common side effect of pain medication. Drink plenty of  Fluids      Eat foods high in fiber. Take a stool softener  if recommended by your doctor or  Pharmacist.      Do not drink alcohol, drive or operate machinery while taking pain medications.      Ask about other ways to control pain, such as with heat, ice or relaxation.

## 2018-03-13 NOTE — BRIEF OP NOTE
[facility], [place]    Brief Operative Note    Pre-operative diagnosis: Tremor  Post-operative diagnosis Tremor  Procedure: Procedure(s):  Deep Brain Stimulator Placement, Phase II, Placement Of Deep Brain Stimulator Generator/Battery Over The Left Chest Wall - Wound Class: I-Clean  Surgeon: Surgeon(s) and Role:     * Aleksander Morales MD - Primary     * Chet Seymour MD - Resident - Assisting  Anesthesia: General   Estimated blood loss: 2 cc  Drains: None  Specimens: None  Findings:  All impedances normal  Complications: None.  Implants:  Abbott Infinity 5 DBS generator/battery model # 6660ANS, S/N [id number]  Abbott extension wire, model # 6371ANS, S/N S/N [id number]

## 2018-03-14 ENCOUNTER — CARE COORDINATION (OUTPATIENT)
Dept: GERIATRIC MEDICINE | Facility: CLINIC | Age: 68
End: 2018-03-14

## 2018-03-14 NOTE — PROGRESS NOTES
3/14/18-  Called Irish to see how she's doing after this procedure yesterday:  IMPLANT DEEP BRAIN STIMULATION GENERATOR / BATTERY (Left Chest)    She said she is doing pretty good except she's not able to do much for 3-4 weeks. She can't bend, twist, lift much or drive so will be at home for awhile. She needs to f/u with surgeon in 2 weeks. No other concerns or needs at this time. She thanked me for calling.    Radha KEITH, [facility] Care Coordinator   Telephone: [phone]  Fax: [phone]

## 2018-03-15 ENCOUNTER — CARE COORDINATION (OUTPATIENT)
Dept: NEUROSURGERY | Facility: CLINIC | Age: 68
End: 2018-03-15

## 2018-03-15 NOTE — PROGRESS NOTES
Neurosurgery Discharge Coordination Note     Attending physician: Dr. Morales  Surgery performed: DBS battery placement and connection to lead  Date of Discharge: 3/13/2018  Discharge to: Home     Current status: Patient states she is doing well, though her chest incision  feels sore. She understands that this is normal following surgery, but if soreness starts to get worse over time instead of better, she should call.   Denies redness, swelling, increased tenderness, drainage, incision opening or elevated temp. Reports Incision CDI without signs of infection.  Denies current bowel or bladder issues.    Discharge instructions and medications reviewed with patient.  Follow up appointments/imaging/tests needed: 2 week post op with PA-C or NP. Messaged  to f/u with pt.     RN triage/on call number given: [phone]/ [phone]

## 2018-03-20 DIAGNOSIS — R25.1 TREMOR: Primary | ICD-10-CM

## 2018-03-21 DIAGNOSIS — R47.1 DYSARTHRIA: ICD-10-CM

## 2018-03-21 DIAGNOSIS — G25.0 ESSENTIAL TREMOR: Primary | ICD-10-CM

## 2018-03-23 ENCOUNTER — TELEPHONE (OUTPATIENT)
Dept: NEUROLOGY | Facility: CLINIC | Age: 68
End: 2018-03-23

## 2018-03-23 NOTE — TELEPHONE ENCOUNTER
"Mira ([phone]) from  [facility] called and said they received orders for patient that state she is \"coming down to 3rd floor @ 3pm on 4/11\" and it is unclear what is needed, she has talked with her staff and they aren't sure what they are supposed to do or how to treat, is it supposed to be with a speech therapist? She also needs to know parameters and measureables for therapy? She would like a call back to clarify  "

## 2018-03-23 NOTE — TELEPHONE ENCOUNTER
"Called [facility] back and spoke to \"Catherine\". Clarified our needs to evaluate the patient with a speech therapist after 4/11/18 to see if the Deep Brain Stimulation system has improved the patient's dysarthria. We need speech therapist to evaluate patient's speech when she comes in with the stimulation on, I would then turn the stimulation off. They would wait 15 minutes or so and reevaluate to see if there is a decline in her speech. I would then turn it back on before the patient leaves.    Catherine verbalized understanding of this information and asked that I include \"dysarthria\" on the order.  "

## 2018-03-28 ENCOUNTER — OFFICE VISIT (OUTPATIENT)
Dept: NEUROSURGERY | Facility: CLINIC | Age: 68
End: 2018-03-28
Payer: COMMERCIAL

## 2018-03-28 VITALS
SYSTOLIC BLOOD PRESSURE: 145 MMHG | BODY MASS INDEX: 23.92 KG/M2 | HEART RATE: 71 BPM | DIASTOLIC BLOOD PRESSURE: 90 MMHG | WEIGHT: 135 LBS | HEIGHT: 63 IN

## 2018-03-28 DIAGNOSIS — Z96.89 STATUS POST DEEP BRAIN STIMULATOR PLACEMENT: ICD-10-CM

## 2018-03-28 DIAGNOSIS — G25.0 ESSENTIAL TREMOR: Primary | ICD-10-CM

## 2018-03-28 ASSESSMENT — PAIN SCALES - GENERAL: PAINLEVEL: NO PAIN (0)

## 2018-03-28 NOTE — LETTER
3/28/2018       RE: Irish Rosales  [address]   [place] [place] [address]     Dear Colleague,    Thank you for referring your patient, Irish Rosales, to the [facility] NEUROSURGERY at [facility]. Please see a copy of my visit note below.    NEUROSURGERY PROGRESS NOTE    REASON FOR VISIT: Postop wound check.       DATE OF PROCEDURE:  03/13/2018   PREOPERATIVE DIAGNOSIS:   1.  Dystonic tremor.  2.  Essential tremor.  3.  S/p left side deep brain stimulator placement, placement of left side deep brain stimulator electrode, target left ventral intermediate nucleus of the thalamus, microelectrode recording on 3/6/2018.  PROCEDURES PERFORMED:  1.  Deep brain stimulator placement, phase II, placement of new deep brain stimulator generator/battery, model Infinity 5, over left chest wall.    2.  Placement of one extension wire and connection of the left side deep brain stimulator electrode, one electrode array, to the new generator/battery.  3.  Electrical interrogation and analysis of the deep brain stimulator system.  ATTENDING SURGEON:  Aleksander Morales MD, PhD     HPI:   This is a 67 year old woman with progressive dystonic tremor affecting right > left side. She was evaluated by Dr. Morales and was offered DBS as above in 2 stages. Risks, benefits, alternative therapies were discussed with the patient including but not limited to infection, bleeding.  The surgical procedure was discussed in detail, all questions were answered and she consented to undergo the procedures. She presents to our office for routine wound check today.    The patient denies pain, fever, chills, headaches, nuchal rigidity, redness, swelling, or drainage from incisions. She already has appointments scheduled with Neurology for initial DBS programming on 4/11/2018. She has no concerns or questions today.       CURRENT MEDICATIONS:  Current Outpatient Prescriptions:      oxycodone IR (ROXICODONE)  "5 MG tablet, Take 1-2 tablets (5-10 mg) by mouth every 4 hours as needed for other (pain control or improvement in physical function. Hold dose for analgesic side effects.), Disp: 30 tablet, Rfl: 0     Alprazolam (XANAX) 0.5 MG tablet, Take 1 tablet (0.5 mg) by mouth daily as needed, Disp: 30 tablet, Rfl: 1     sertraline (ZOLOFT) 100 MG tablet, TAKE 1 TABLET BY MOUTH EVERY DAY (Patient taking differently: TAKE 1 TABLET BY MOUTH EVERY MORNING), Disp: 90 tablet, Rfl: 0     rosuvastatin (CRESTOR) 5 MG tablet, Take 1 tablet (5 mg) by mouth daily (Patient taking differently: Take 5 mg by mouth every morning ), Disp: 30 tablet, Rfl: 11      ALLERGIES:  Allergies   Allergen Reactions     Sulfa Drugs Swelling and Rash     Atorvastatin      Leg cramps     Simvastatin      Leg cramp       PMH, SOC HIST, FAM HIST, PROBLEM LIST:  All reviewed in EPIC.      FOCUS EXAMINATION:  /90  Pulse 71  Ht 1.6 m (5' 3\")  Wt 61.2 kg (135 lb)  BMI 23.91 kg/m2  Well developed well nourished female found seated comfortably in exam chair.  No apparent distress. She is accompanied by her sister. She is A&O X3.  Mood and affect WNL. Language and fund of knowledge intact.  She has prominent head, neck and upper body tremors. She is able to sit and rise independently.   She has a nicely healed incision.  The incisions in her scalp and upper chest are well healed.  There is no localized tenderness, swelling, redness, and/or drainage..      ASSESSMENT:  1. Essential tremor    2. Status post deep brain stimulator placement    Irish Rosales's wound is healthy without evidence of infection.       PLAN:  We discussed wound care.  *  Patient was informed that it may take up to 90 days for the sutures to completely dissolved.  *  Keep it open to air.  *  May shower and shampoo with mild soap/shampoo including the incision. No hair conditioners, hair treatments or skin cream for two more weeks.  *  May swim or bathe when all scabbing is gone " from the incision.  *  Call our services if you develop fever, chills, redness, swelling, and/or drainage from incision.  We discussed activities.  *  We recommend she return to normal activities as able.  *  She can return to driving after she sees movement disorder neurologist for DBS programming and recommendations.     We discussed follow up  *  All the patient's questions have been answered and they demonstrate good understanding of the above.    *  We are comfortable releasing her to PRN follow up.  We have not made a specific return appointment but will be happy to see * again should the need arise.  *  The patient has our contact information and is aware that she should call if she has questions comments or concerns.     We appreciate the opportunity to be of service in the care of this pleasant patient.  Please do call if there is anything more we can do    Renuka Mack DNP, RACHANA, FNP-BC  [facility]   Department of Neurosurgery  Phone: [phone]  Fax: [phone]

## 2018-03-28 NOTE — MR AVS SNAPSHOT
After Visit Summary   3/28/2018    Irish Rosales    MRN: [id number]           Patient Information     Date Of Birth          1950        Visit Information        Provider Department      3/28/2018 3:30 PM Renuka Mack APRN [facility] Neurosurgery        Today's Diagnoses     Essential tremor    -  1    Status post deep brain stimulator placement          Care Instructions    1. Followup with Neurology as scheduled.    2. Call [phone] if you have questions regarding postoperative care or develop signs and symptoms of infection.          Follow-ups after your visit        Your next 10 appointments already scheduled     Apr 11, 2018  2:00 PM CDT   CT HEAD W/O CONTRAST with CT2   [facility] [place] [facility] ([facility])    [address]  [address] [place] [address] [phone]           Please bring any scans or X-rays taken at other hospitals, if similar tests were done. Also bring a list of your medicines, including vitamins, minerals and over-the-counter drugs. It is safest to leave personal items at home.  Be sure to tell your doctor:   If you have any allergies.   If there s any chance you are pregnant.   If you are breastfeeding.  You do not need to do anything special to prepare for this exam.  Please wear loose clothing, such as a sweat suit or jogging clothes. Avoid snaps, zippers and other metal. We may ask you to undress and put on a hospital gown.            Apr 11, 2018  3:00 PM CDT   (Arrive by 2:45 PM)   Return Movement Disorder with  MOVEMENT DISORDER FELLOW   [facility] ([facility])    [address]  [address] [place] [address] [phone]              Who to contact     Please call your clinic at [phone] to:    Ask questions about your health    Make or cancel appointments    Discuss your medicines    Learn about your test results    Speak to your doctor       "      Additional Information About Your Visit        [employer] Information     [employer] gives you secure access to your electronic health record. If you see a primary care provider, you can also send messages to your care team and make appointments. If you have questions, please call your primary care clinic.  If you do not have a primary care provider, please call [phone] and they will assist you.      [employer] is an electronic gateway that provides easy, online access to your medical records. With [employer], you can request a clinic appointment, read your test results, renew a prescription or communicate with your care team.     To access your existing account, please contact your [facility] or call [phone] for assistance.        Care EveryWhere ID     This is your Care EveryWhere ID. This could be used by other organizations to access your [place] medical records  [id number]        Your Vitals Were     Pulse Height BMI (Body Mass Index)             71 1.6 m (5' 3\") 23.91 kg/m2          Blood Pressure from Last 3 Encounters:   03/28/18 145/90   03/13/18 119/83   03/07/18 (!) 86/75    Weight from Last 3 Encounters:   03/28/18 61.2 kg (135 lb)   03/13/18 61.4 kg (135 lb 5.8 oz)   03/06/18 61.9 kg (136 lb 7.4 oz)              Today, you had the following     No orders found for display         Today's Medication Changes          These changes are accurate as of 3/28/18 11:59 PM.  If you have any questions, ask your nurse or doctor.               These medicines have changed or have updated prescriptions.        Dose/Directions    rosuvastatin 5 MG tablet   Commonly known as:  CRESTOR   This may have changed:  when to take this   Used for:  Hyperlipidemia LDL goal <160        Dose:  5 mg   Take 1 tablet (5 mg) by mouth daily   Quantity:  30 tablet   Refills:  11       sertraline 100 MG tablet   Commonly known as:  ZOLOFT   This may have changed:  See the new instructions.   Used " for:  History of major depression        TAKE 1 TABLET BY MOUTH EVERY DAY   Quantity:  90 tablet   Refills:  0                Primary Care Provider Office Phone # Fax #    Braulio Victoria MD PhD [phone]       [address]  [place] [address]        Equal Access to Services     [facility]: Hadii aad ku hadasho Soomaali, waaxda luqadaha, qaybta kaalmada adeegyada, waxay pk henriquen gerald jimkaleb melgoza . So [facility] [phone].    ATENCIÓN: Si habla español, tiene a flower disposición servicios gratuitos de asistencia lingüística. Marixa al [phone].    We comply with applicable federal civil rights laws and [place] laws. We do not discriminate on the basis of race, color, national origin, age, disability, sex, sexual orientation, or gender identity.            Thank you!     Thank you for choosing [facility]  for your care. Our goal is always to provide you with excellent care. Hearing back from our patients is one way we can continue to improve our services. Please take a few minutes to complete the written survey that you may receive in the mail after your visit with us. Thank you!             Your Updated Medication List - Protect others around you: Learn how to safely use, store and throw away your medicines at www.disposemymeds.org.          This list is accurate as of 3/28/18 11:59 PM.  Always use your most recent med list.                   Brand Name Dispense Instructions for use Diagnosis    ALPRAZolam 0.5 MG tablet    XANAX    30 tablet    Take 1 tablet (0.5 mg) by mouth daily as needed    Dystonic tremor       oxyCODONE IR 5 MG tablet    ROXICODONE    30 tablet    Take 1-2 tablets (5-10 mg) by mouth every 4 hours as needed for other (pain control or improvement in physical function. Hold dose for analgesic side effects.)    Essential tremor       rosuvastatin 5 MG tablet    CRESTOR    30 tablet    Take 1 tablet (5 mg) by mouth daily    Hyperlipidemia LDL goal <160        sertraline 100 MG tablet    ZOLOFT    90 tablet    TAKE 1 TABLET BY MOUTH EVERY DAY    History of major depression

## 2018-03-29 NOTE — PROGRESS NOTES
NEUROSURGERY PROGRESS NOTE    REASON FOR VISIT: Postop wound check.       DATE OF PROCEDURE:  03/13/2018   PREOPERATIVE DIAGNOSIS:   1.  Dystonic tremor.  2.  Essential tremor.  3.  S/p left side deep brain stimulator placement, placement of left side deep brain stimulator electrode, target left ventral intermediate nucleus of the thalamus, microelectrode recording on 3/6/2018.  PROCEDURES PERFORMED:  1.  Deep brain stimulator placement, phase II, placement of new deep brain stimulator generator/battery, model Infinity 5, over left chest wall.    2.  Placement of one extension wire and connection of the left side deep brain stimulator electrode, one electrode array, to the new generator/battery.  3.  Electrical interrogation and analysis of the deep brain stimulator system.  ATTENDING SURGEON:  Aleksander Morales MD, PhD     HPI:   This is a 67 year old woman with progressive dystonic tremor affecting right > left side. She was evaluated by Dr. Morales and was offered DBS as above in 2 stages. Risks, benefits, alternative therapies were discussed with the patient including but not limited to infection, bleeding.  The surgical procedure was discussed in detail, all questions were answered and she consented to undergo the procedures. She presents to our office for routine wound check today.    The patient denies pain, fever, chills, headaches, nuchal rigidity, redness, swelling, or drainage from incisions. She already has appointments scheduled with Neurology for initial DBS programming on 4/11/2018. She has no concerns or questions today.       CURRENT MEDICATIONS:  Current Outpatient Prescriptions:      oxycodone IR (ROXICODONE) 5 MG tablet, Take 1-2 tablets (5-10 mg) by mouth every 4 hours as needed for other (pain control or improvement in physical function. Hold dose for analgesic side effects.), Disp: 30 tablet, Rfl: 0     Alprazolam (XANAX) 0.5 MG tablet, Take 1 tablet (0.5 mg) by mouth daily as needed, Disp: 30  "tablet, Rfl: 1     sertraline (ZOLOFT) 100 MG tablet, TAKE 1 TABLET BY MOUTH EVERY DAY (Patient taking differently: TAKE 1 TABLET BY MOUTH EVERY MORNING), Disp: 90 tablet, Rfl: 0     rosuvastatin (CRESTOR) 5 MG tablet, Take 1 tablet (5 mg) by mouth daily (Patient taking differently: Take 5 mg by mouth every morning ), Disp: 30 tablet, Rfl: 11      ALLERGIES:  Allergies   Allergen Reactions     Sulfa Drugs Swelling and Rash     Atorvastatin      Leg cramps     Simvastatin      Leg cramp       PMH, SOC HIST, FAM HIST, PROBLEM LIST:  All reviewed in EPIC.      FOCUS EXAMINATION:  /90  Pulse 71  Ht 1.6 m (5' 3\")  Wt 61.2 kg (135 lb)  BMI 23.91 kg/m2  Well developed well nourished female found seated comfortably in exam chair.  No apparent distress. She is accompanied by her sister. She is A&O X3.  Mood and affect WNL. Language and fund of knowledge intact.  She has prominent head, neck and upper body tremors. She is able to sit and rise independently.   She has a nicely healed incision.  The incisions in her scalp and upper chest are well healed.  There is no localized tenderness, swelling, redness, and/or drainage..      ASSESSMENT:  1. Essential tremor    2. Status post deep brain stimulator placement    Irish Rosales's wound is healthy without evidence of infection.       PLAN:  We discussed wound care.  *  Patient was informed that it may take up to 90 days for the sutures to completely dissolved.  *  Keep it open to air.  *  May shower and shampoo with mild soap/shampoo including the incision. No hair conditioners, hair treatments or skin cream for two more weeks.  *  May swim or bathe when all scabbing is gone from the incision.  *  Call our services if you develop fever, chills, redness, swelling, and/or drainage from incision.  We discussed activities.  *  We recommend she return to normal activities as able.  *  She can return to driving after she sees movement disorder neurologist for DBS " programming and recommendations.     We discussed follow up  *  All the patient's questions have been answered and they demonstrate good understanding of the above.    *  We are comfortable releasing her to PRN follow up.  We have not made a specific return appointment but will be happy to see * again should the need arise.  *  The patient has our contact information and is aware that she should call if she has questions comments or concerns.     We appreciate the opportunity to be of service in the care of this pleasant patient.  Please do call if there is anything more we can do    Renuka Mack, SABRINA, APRN, FNP-[facility]   Department of Neurosurgery  Phone: [phone]  Fax: [phone]

## 2018-03-29 NOTE — PATIENT INSTRUCTIONS
1. Followup with Neurology as scheduled.    2. Call [phone] if you have questions regarding postoperative care or develop signs and symptoms of infection.

## 2018-04-09 ENCOUNTER — TELEPHONE (OUTPATIENT)
Dept: NEUROLOGY | Facility: CLINIC | Age: 68
End: 2018-04-09

## 2018-04-09 NOTE — TELEPHONE ENCOUNTER
Called Bessy in [facility] speech center to coordinate a quantitative evaluation of the effect that Deep Brain Stimulation has on her voice tremor. Left voice mail with my number to call.

## 2018-04-11 ENCOUNTER — TELEPHONE (OUTPATIENT)
Dept: NEUROLOGY | Facility: CLINIC | Age: 68
End: 2018-04-11

## 2018-04-11 ENCOUNTER — OFFICE VISIT (OUTPATIENT)
Dept: NEUROLOGY | Facility: CLINIC | Age: 68
End: 2018-04-11
Payer: COMMERCIAL

## 2018-04-11 ENCOUNTER — RADIANT APPOINTMENT (OUTPATIENT)
Dept: CT IMAGING | Facility: CLINIC | Age: 68
End: 2018-04-11
Attending: PSYCHIATRY & NEUROLOGY
Payer: COMMERCIAL

## 2018-04-11 VITALS
HEIGHT: 63 IN | WEIGHT: 135 LBS | SYSTOLIC BLOOD PRESSURE: 140 MMHG | DIASTOLIC BLOOD PRESSURE: 53 MMHG | BODY MASS INDEX: 23.92 KG/M2 | HEART RATE: 79 BPM

## 2018-04-11 DIAGNOSIS — R25.1 TREMOR: ICD-10-CM

## 2018-04-11 DIAGNOSIS — R25.1 TREMOR: Primary | ICD-10-CM

## 2018-04-11 ASSESSMENT — PAIN SCALES - GENERAL: PAINLEVEL: NO PAIN (0)

## 2018-04-11 NOTE — NURSING NOTE
Chief Complaint   Patient presents with     RECHECK     Movement Disorder- Initial DBS Programming      Sheron Dyer

## 2018-04-11 NOTE — TELEPHONE ENCOUNTER
Left voice mail for Bessy Herrera, the  in the [facility] voice center asking if this writer could reserve the appointment on 4/17 at 8:00 am for the patient. The purpose is to try to evaluate quantitatively, any improvement of her voice tremor with her stimulation on.    Patient states this day works for her. I will call the patient to confirm.

## 2018-04-11 NOTE — PROGRESS NOTES
[facility] Neurology  Movement Disorders Clinic    Irish Rosales  YOB: 1950  MRN: [id number]    Reason for visit: Initial DBS programming  DBS implantation 3/6/2018  IPG 3/13/2018    HISTORY OF PRESENT ILLNESS:    Irish Rosales is a 67 year old right handed woman with dystonic tremor, vocal dystonia & cervical dystonia s/p left Vim DBS who presents for initial DBS programming. The plan is a wait and see approach. The right Vim DBS surgery may be considered, depending on response to left Vim stimulation.     For about two weeks post op her symptoms were better. Tremor improved on both sides. Her voice tremor improved as well. At this time she is back her baseline.     She denied any side effects - headache, tingling, no changes in walking, changes in her voice. She has noticed some pain in her neck. It feels tight because of the tremor.      MEDICATIONS:    Reviewed and updated in epic    No tremor medications    Allergies: Sulfa drugs, atorvastatin, simvastatin    PAST MEDICAL HISTORY:  Reviewed and updated in epic    Review of Systems:  12 point review of systems completed. Pertinent positives and negatives above and in HPI    Physical Exam:    Vitals: There were no vitals taken for this visit.  General: Cooperative, no acute distress  HEENT: Normocephalic and nontraumatic, sclera white, moist mucous membranes  Cardiac: Regular rate and rhythm   Respiratory: Nonlabored breathing  Extremities: Distal pulses intact. No UE or LE edema       NEUROLOGIC:  FahnThien Marin Tremor Rating Scale:  TREMOR:  0: None-- 1: Slight -- 2: Moderate -- 3: Marked -- 4: Severe Amplitude   1. FACE: 2    2. TONGUE: 1 at rest, 0 with posture   3. VOICE: 3 (action/intention)   4. HEAD: 2 at rest, 2 posture   5. RUE: 1 at rest, 1 posture, 1 with action   6. LUE: 1 at rest, 1 posture, 1 with action   7.TRUNK: 0   8. RLE: 0   9. LLE: 0  10. HANDWRITIN  11. LARGE SPIRALS:                     RUE: 1    LUE: 1   12.  SMALL SPIRALS:   RUE: 2   LUE: 2    13: STRAIGHT LINES:   RUE: 1   LUE: 1  14. WATER POUR TASK:    RUE: 1    LUE: 1     Performance scale:                28   QoL Scale:                             13   Total:          41           DEEP BRAIN STIMULATION PROGRAMMING    Here for initial programming (monopolar review).     Battery status: > 3.00 v  ON/OFF: OFF  Implanted generator: [employer] Infinity model 6172ANS, serial number [id number]. Lead type 0.5  Lead site(s): Left Vim  Impedance Check: All electrodes checked and were ok     Contacts Amplitude Pulse width Freq Ramp? Effect Side effect    C+1-  0.5  60  130  yes        C+1-  1.0  60  130  yes        C+1-  1.5  60   130  yes   RUE tingling    C+1-  2.0  60  130  yes    R hand tingling, transient    C+1-  2.5  60  130  yes    no tingling    C+1-  3.0  60  130  yes  slight reduction head and RUE tremor   none    C+1-  3.5  60  130  yes    none    C+1-  4.0  60  130  yes    R hand tingling, transient but lasting 5s    C+1-  4.5  60  130  yes   R hand tingling, transient    C+1-  5.0  60  130  yes   Slight ataxia FNF, transient tingling              C+1-  5.0  30 130  no   R hand tingling               C+1-  5.0  30  140  yes   none    C+1-  5.0  30  150  yes   none    C+1-  5.0  30  160  yes   none    C+1-  5.0  30  170  yes    R hand tingling 5s, transient    C+1-  5.0  30  180  yes  Head tremor improved. Improved head range of motion.       C+1-  5.0  30  200  yes  Head tremor improved. Head range of motion improved.   Gait ataxia    C+1-  5.0  30  160  yes    Gait ataxia remained    C+1-  4.0  30  160  yes   Gait ataxia better - but patient felt that she was dragging her R leg.              C+2-abc  0.5   60  130  yes       C+2-abc  1.0         C+2-abc  1.5  60  130  yes       C+2-abc  2.0  60  130  yes     transient R hand tingling    C+2-abc  2.5  60  130  yes  Slightly improved intention tremor  none    C+2-abc  3.0  60  130  yes   transient R hand tingling      C+2-abc  3.5  60  130  yes  Slightly improved action tremor Slight ataxia FNF - cerebellothalamic fibers    C+2-abc  4.5  60  130  yes    R hand tingling, transient    C+2-abc  5.0  60  130  yes   R hand tingling    C+2-abc  5.0  30  130  no   Head tremor improve, action tremor improved. Not better than contact 1.   Slight RUE ataxia.               C+2-abc  5.0  30  140  yes   R hand tingling, transient    C+2-abc  5.0  30  150  yes    R hand tingling, transient    C+2-abc  5.0  30  160  yes   none    C+2-abc  5.0  30  170  yes  Head tremor improved. RUE tremor improved.   R hand tingling, 30sec    C+2-abc  5.0  30  180  yes        C+2-abc  5.0  30  190  no    R hand tingling, transient    C+2-abc  5.0  30  200  yes  Head tremor improved. No more improvement than contact 1. RUE tremor improved.   R hand tingling, transient. Slight RUE ataxia.              C+3-abc  0.5  60  130  yes        1.0  60   130  yes       1.5  60  130  yes       2.0  60  130  yes  Improved intention tremor on FNF      2.5  60  130  yes       3.0  60    yes  Improved intention tremor on FNF  none     3.5  60  130  yes       4.0  60  130  yes   R hand tingling, transient      4.5  60  130  yes    R hand tingling, transient     5.0  60  130  yes  Intention tremor improved but RUE ataxia present - cerebellothalamic fibers. Head tremor improved slightly.   R hand tingling, transient. Slight RUE ataxia.              C+3-abc  5.0  30  130  No   none             C+3-abc  5.0  30  140  yes   none    C+3-abc  5.0  30  150  yes   R hand tingling, transient    C+3-abc  5.0  30  160  yes   none      5.0  30  170  yes   R hand tingling, transient     5.0  30  180  yes       5.0  30   190  yes      C+3-abc  5.0  30  200  yes  Spiral improved from baseline, but no improvement over contact 1. Head tremor improved from baseline.   Slight RUE ataxia. Slight gait ataxia.             C+1-  5.0  30  200  yes  Improved head tremor, improved head range of motion,  "improved spiral Mild gait ataxia, R arm dystonic posturing    C+1-  5.0  30  160  yes    Slight gait ataxia. Patient reported walking felt \"weird.\" R arm dystonic posturing.     C+1-  4.0  30  160  yes     Gait ataxia resolved. R arm dystonic posturing resolved.              C+1-2b-  4.0  30  160 yes  Improved head tremor. Spiral improved over contact 1 alone. Consistent spiral results.   Slight RUE ataxia. No gait ataxia.              C+1-  4.0  30  160  yes    R leg dragging.         Program 1 (Active) Right Brain       Initial Final   Amplitude (mA)  OFF  4.0   Pulse width (usec)    30   Freq (Hz)    160   Contacts: C    +   Contacts: 4       Contacts: 3       Contacts: 2    b-   Contacts: 1    -     Therapy impedances:  Program 1: 800 W       IMPRESSION:  1. Essential tremor  2. Vocal dystonia and tremor    Irish Rosales is a 67 year old woman with dystonic tremor and vocal dystonia/tremor who presented for initial programming after left Vim DBS implantation 3/6/2018. Her final configuration is C+1-2b- at 4.0 mA, 30 ms, and 160 Hz. We intended for the majority of the stimulation to reach the cerebellothalomocortical fibers through the most distal contact. However, there was some benefit also demonstrated on our monopolar survey at contact 2abc. By taking of advantage of the segmented contacts (with higher impedance) and single current system - we felt we would be able to spread some current to the posteromedial Vim by adding 2b but still apply the majority of the current to contact 1. On review of serial spirals and exam - this appeared to be the most effective configuration.     PLAN:  - Ms. Rosales was discharged on configuration C+1-2b- at 4.0 mA, 30 ms, and 160 Hz. Patient  range set from 0 to 5.0 mA in the event she experiences RUE or gait ataxia at home. She was instructed on its use. She was also instructed to carry it with her, especially on long trips away from home.   - There is no reason " she should not fly on an airplane - but TSA should be aware of her DBS and she should be wanded instead of going through xray or other imaging which may inadvertently interfere with her DBS.   - Today, improved head tremor and RUE tremor. Will see how her voice tremor responds. This may shelbi well for the second side.       Sandy Hernandez MD  Patient seen and discussed with Dr. Colvin    Patient seen and examined with Dr. Hernandez and I agree with the assessment and plan as outlined.  Edwar Colvin PhD, MD

## 2018-04-11 NOTE — MR AVS SNAPSHOT
"              After Visit Summary   4/11/2018    Irish Rosales    MRN: [id number]           Patient Information     Date Of Birth          1950        Visit Information        Provider Department      4/11/2018 3:00 PM  MOVEMENT DISORDER FELLOW [facility] Neurology        Today's Diagnoses     Tremor    -  1       Follow-ups after your visit        Who to contact     Please call your clinic at [phone] to:    Ask questions about your health    Make or cancel appointments    Discuss your medicines    Learn about your test results    Speak to your doctor            Additional Information About Your Visit        [email or web address] Information     [employer] gives you secure access to your electronic health record. If you see a primary care provider, you can also send messages to your care team and make appointments. If you have questions, please call your primary care clinic.  If you do not have a primary care provider, please call [phone] and they will assist you.      [employer] is an electronic gateway that provides easy, online access to your medical records. With [employer], you can request a clinic appointment, read your test results, renew a prescription or communicate with your care team.     To access your existing account, please contact your [facility] or call [phone] for assistance.        Care EveryWhere ID     This is your Care EveryWhere ID. This could be used by other organizations to access your [facility] medical records  [id number]        Your Vitals Were     Pulse Height BMI (Body Mass Index)             79 1.6 m (5' 3\") 23.91 kg/m2          Blood Pressure from Last 3 Encounters:   06/29/18 115/74   05/25/18 138/82   05/23/18 138/82    Weight from Last 3 Encounters:   07/25/18 58.5 kg (129 lb)   06/29/18 59.1 kg (130 lb 3.2 oz)   05/25/18 60.2 kg (132 lb 11.2 oz)              Today, you had the following     No orders found for display         Today's Medication " Changes          These changes are accurate as of 4/11/18 11:59 PM.  If you have any questions, ask your nurse or doctor.               These medicines have changed or have updated prescriptions.        Dose/Directions    rosuvastatin 5 MG tablet   Commonly known as:  CRESTOR   This may have changed:  when to take this   Used for:  Hyperlipidemia LDL goal <160        Dose:  5 mg   Take 1 tablet (5 mg) by mouth daily   Quantity:  30 tablet   Refills:  11                Primary Care Provider Office Phone # Fax #    Braulio Victoria MD PhD [phone] [address]  [facility] [place] [address]        Equal Access to Services     [facility]: Hadii tammy tristan hadasho Soomaali, waaxda luqadaha, qaybta kaalmada delisa, ferny melgoza . So [facility] [phone].    ATENCIÓN: Si habla español, tiene a flower disposición servicios gratuitos de asistencia lingüística. [facility] [phone].    We comply with applicable federal civil rights laws and [place] laws. We do not discriminate on the basis of race, color, national origin, age, disability, sex, sexual orientation, or gender identity.            Thank you!     Thank you for choosing [facility] NEUROLOGY  for your care. Our goal is always to provide you with excellent care. Hearing back from our patients is one way we can continue to improve our services. Please take a few minutes to complete the written survey that you may receive in the mail after your visit with us. Thank you!             Your Updated Medication List - Protect others around you: Learn how to safely use, store and throw away your medicines at www.disposemymeds.org.          This list is accurate as of 4/11/18 11:59 PM.  Always use your most recent med list.                   Brand Name Dispense Instructions for use Diagnosis    oxyCODONE IR 5 MG tablet    ROXICODONE    30 tablet    Take 1-2 tablets (5-10 mg) by mouth every 4 hours as needed for other (pain control or  improvement in physical function. Hold dose for analgesic side effects.)    Essential tremor       rosuvastatin 5 MG tablet    CRESTOR    30 tablet    Take 1 tablet (5 mg) by mouth daily    Hyperlipidemia LDL goal <160

## 2018-04-12 NOTE — TELEPHONE ENCOUNTER
Left voice mail on Debbie's phone [phone] asking her to call back to confirm that the patient has this appointment. Patient wanted 4/17, not 4/18.

## 2018-04-13 ENCOUNTER — TELEPHONE (OUTPATIENT)
Dept: NEUROLOGY | Facility: CLINIC | Age: 68
End: 2018-04-13

## 2018-04-13 DIAGNOSIS — G25.2 DYSTONIC TREMOR: Primary | ICD-10-CM

## 2018-04-13 NOTE — TELEPHONE ENCOUNTER
Irish Rosales is a 67 year old woman with dystonic tremor s/p left Vim DBS (implanted 3/6/2018 with [employer]) and recently programmed two days ago 4/11/2018 at configuration C+1-2b-, 4.0 mA, 30 ms, 160 Hz. She initially reported good benefit for right arm tremor control and improved head tremor. However, in the last day she has noted reduction in benefit, approximately 25%. She tried turning the DBS system on and off - with no change in effect.     It is not clear why she has lost benefit. We could be exacerbating her tremor by over-stimulating the cerebellothalamic fiber tract. Microlesion effect could be ongoing. Another possibility is that she had a partial placebo effect to programming.     I instructed her to turn the DBS off through Saturday to see if the DBS to establish a clear baseline and allow a washout of current. On Sunday she will turn the system back on, but at a lower amplitude 3.5 mA. I explained that this lower amplitude may actually be more effective because we could be causing side effects of incoordination if we are overstimulating - similar to what we saw in clinic (see monopolar survey - RUE and gait ataxia at 5.0 mA and at higher pulse widths & frequencies).     She will try the lower current over Sunday and contact the clinic on Monday. Final configuration then should be C+1-2b-, 3.5 mA, 30 ms, 160 Hz.     Sandy Hernandez MD  Movement Disorders Fellow

## 2018-04-16 ENCOUNTER — TELEPHONE (OUTPATIENT)
Dept: NEUROLOGY | Facility: CLINIC | Age: 68
End: 2018-04-16

## 2018-04-16 NOTE — TELEPHONE ENCOUNTER
[facility] Call Center    Phone Message    May a detailed message be left on voicemail: yes    Reason for Call: Other: Pt was supposed to call Piedad with an update. Turning her DBS off and then back on did not help. Please give her a call back to discuss.      Action Taken: Message routed to:  [facility] ([facility] Neurology

## 2018-04-16 NOTE — TELEPHONE ENCOUNTER
Covering for Dr Hernandez:    Spoke to patient. DBS is definitely helping, not causing side effects. Was much worse when she was OFF over weekend. But still not having as much of a benefit as when she was first programmed. Told her she can go back up to 4.0mA. She says she doesn't have range to go up any further after that. Not quite sure if Dr. Hernandez wanted to have a higher range, but told her I'd prefer to wait until she got back next week before making changes to her program and increasing the current further.    Tushar Deluca MD  Movement Disorders Fellow

## 2018-04-17 ENCOUNTER — TELEPHONE (OUTPATIENT)
Dept: NEUROLOGY | Facility: CLINIC | Age: 68
End: 2018-04-17

## 2018-04-17 ENCOUNTER — PRE VISIT (OUTPATIENT)
Dept: OTOLARYNGOLOGY | Facility: CLINIC | Age: 68
End: 2018-04-17

## 2018-04-17 NOTE — TELEPHONE ENCOUNTER
Called patient to see if she is able to come to her appointment tomorrow with Speech Therapy. Left voice mail to call and confirm.    Called patient's daughter Ysabel to confirm if patient is coming to the appointment tomorrow. Left voice mail asking someone to call and confirm.

## 2018-04-17 NOTE — TELEPHONE ENCOUNTER
FUTURE VISIT INFORMATION      FUTURE VISIT INFORMATION:    Date: 4/18/18    Time: 8:00am     Location: [facility]  REFERRAL INFORMATION:    Referring provider:  Ne[facility]    Referring providers clinic:  Neurology [facility]    Reason for visit/diagnosis  brain stimulator    RECORDS REQUESTED FROM:       Clinic name Comments Records Status Imaging Status   [facility] Neuology Records are internal internal N/A                                   RECORDS STATUS

## 2018-04-18 ENCOUNTER — OFFICE VISIT (OUTPATIENT)
Dept: OTOLARYNGOLOGY | Facility: CLINIC | Age: 68
End: 2018-04-18
Payer: COMMERCIAL

## 2018-04-18 DIAGNOSIS — R49.0 DYSPHONIA: ICD-10-CM

## 2018-04-18 DIAGNOSIS — G25.0 ESSENTIAL TREMOR: ICD-10-CM

## 2018-04-18 DIAGNOSIS — G25.2 DYSTONIC TREMOR: ICD-10-CM

## 2018-04-18 DIAGNOSIS — R49.8 VOCAL TREMOR: Primary | ICD-10-CM

## 2018-04-18 NOTE — PROGRESS NOTES
[facility]  Evaluation report    Clinician: Cristobal Gaytan M.M., M.A., CCC/SLP  Referring physician:  Dr. Colvin  Patient: Irish Rosales  Date of Visit: 4/18/2018    HISTORY  Chief complaint: Irish Rosales is a 67 year old woman presenting today for evaluation of voice.    Salient history: She has a history significant for dystonic tremor.  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 She underwent left DBS implantation on 3/13/18 with Dr. Morales, and programming of the DBS was performed on 4/11/18.  She presents today at the request of her care team in neurology for objective evaluation of her improvement with and without activation of DBS.     CURRENT SYMPTOMS PER PATIENT REPORT INCLUDE  VOICE    Poor voice quality    Voice Breaks    Shaky / unsteady voice    Worsens with stress    Improving over time    Past Medical History:   Diagnosis Date     Depression      Dyslipidemia      Dystonic movements 1/21/2017     Dystonic tremor 1/21/2017     Encounter for neuropsychological testing 3/13/2017    2017 evaluation  IMPRESSIONS AND RECOMMENDATIONS   Current results indicate performance that falls within normal limits across cognitive domains, generally in the average to above average range. Objective personality assessment also falls within normal limits.   This pattern of performance does not reflect dementia at this time, nor is it suggestive of focal or lateralized cerebral involvement. Sh     Family history of movement disorder 1/21/2017     Family history of tremor 1/21/2017     H/O magnetic resonance imaging of brain and brain stem 2/27/2017    MR BRAIN W/O CONTRAST 2/27/2017 11:46 AM  Provided History: dbs brain surgery for dystonia, Other specified forms of tremor, Dystonia, unspecified, Tremor, unspecified  Comparison:  No similar prior studies   Technique: Volumetric sagittal T1 and  T2 weighted, axial T2-weighted, turboFLAIR and diffusion-weighted with ADC map images of the brain were obtained without intravenous contrast.  Findings:     mild abnormality in carotid study 3/2/2017    BILATERAL DUPLEX CAROTID ULTRASOUND March 1, 2017 1:01 PM    HISTORY: Other specified symptoms and signs involving the circulatory and respiratory systems.   COMPARISON: None.   FINDINGS: There is mild atherosclerotic plaque in the carotid bifurcations. Flow velocities and waveforms show less than 50% diameter stenosis in both the right and left internal carotid arteries as assessed by each ICA PS     Osteoporosis 8/2/2010     Past Surgical History:   Procedure Laterality Date     ------------OTHER-------------  2004    vocal cord thyroplasty at [facility]     C BSO, OMENTECTOMY W/XAVIER  1997    hysterectomy     C HAND/FINGER SURGERY UNLISTED  1998    right carpal tunnel surgery     IMPLANT DEEP BRAIN STIMULATION GENERATOR / BATTERY Left 3/13/2018    Procedure: IMPLANT DEEP BRAIN STIMULATION GENERATOR / BATTERY;  Deep Brain Stimulator Placement, Phase II, Placement Of Deep Brain Stimulator Generator/Battery Over The Left Chest Wall;  Surgeon: Aleksander Morales MD;  Location: [facility] OR     OPTICAL TRACKING SYSTEM INSERTION DEEP BRAIN STIMULATION Left 3/6/2018    Procedure: OPTICAL TRACKING SYSTEM INSERTION DEEP BRAIN STIMULATION;  Stealth Assisted Left Deep Brain Stimulator Placement, Phase I, Placement Of Left Side Deep Brain Stimulator Electrode, Target Left Ventral Intermediate Nucleus Of The Thalamus With Microelectrode Recording;  Surgeon: Aleksander Morales MD;  Location: [facility] OR     RELEASE CARPAL TUNNEL Left 1/2/2015    Procedure: RELEASE CARPAL TUNNEL;  Surgeon: SHANIA Hernandez MD;  Location: [facility] OR     RELEASE ULNAR NERVE (ELBOW) Left 1/2/2015    Procedure: RELEASE ULNAR NERVE (ELBOW);  Surgeon: SHANIA Hernandez MD;  Location: [facility] OR     OBJECTIVE  Patient Supplied Answers To I  "Questionnaire  Voice Handicap Index (VHI-10) 2018   My voice makes it difficult for people to hear me 3   People have difficulty understanding me in a noisy room 3   My voice difficulties restrict my personal and social life.  3   I feel left out of conversations because of my voice 3   My voice problem causes me to lose income 3   I feel as though I have to strain to produce voice 2   The clarity of my voice is unpredictable 3   My voice problem upsets me 3   My voice makes me feel handicapped 3   People ask, \"What's wrong with your voice?\" 2   VHI-10 28     ** ALL PERCEPTUAL MEASURES WERE RECORDED BOTH WITH AND WITHOUT DBS. 15 MINUTES WAS ALLOWED TO ELAPSE BETWEEN TURNING OFF THE DBS AND RECORDING THE SECOND SET OF MEASURES.**    PATIENT REPORTED MEASURES  WITH DBS:    Effort to talk: 8 / 10 (0-10 in which 10 represents maximal effort)    Effort to sin / 10 (0-10 in which 10 represents maximal effort)    Voice quality: 5 / 10 (0-10 in which 10 represents best possible voice)    WITHOUT DBS:    Effort to talk: 8 / 10 (0-10 in which 10 represents maximal effort)    Effort to sin / 10 (0-10 in which 10 represents maximal effort)    Voice quality: 3 / 10 (0-10 in which 10 represents best possible voice)    PERCEPTUAL EVALUATION (CPT 35414)  *ratings of perceptual quality were averaged from two clinicians reviewing blinded samples with >20 years of combined experience rating voices *  WITH DBS:    Roughness: Minimal    Breathiness: Mild    Strain: WNL    Vocal Tremor: Moderate to severe    Loudness    Conversational speech:  WNL    Pitch:    Conversational speech:  Mildly lowered    Pitch glide: mildly limited but within normal limits. No notable breaks    Resonance:    Conversational speech: narrow, with mild hyponasality    Singing vs. Speech:  Consistent across contexts    CAPE-V Overall Severity:  57/100    WITHOUT DBS:    Roughness: WNL    Breathiness: Mild    Strain: Mild    Vocal Tremor: " "Severe    Loudness    Conversational speech:  WNL    Pitch:    Conversational speech:  WNL    Pitch glide: significantly disrupted by tremor, reduced range    Resonance:    Conversational speech:  Narrowed, affected by tongue base tremor    Singing vs. Speech:  Consistent across contexts    CAPE-V Overall Severity:  65/100    ______________________________________________________________________________  Laryngeal Function Studies (CPT 85035)  Acoustic measures WITH DBS:  Fundamental frequency Metrics     /a/ mean F0 = 209 Hz (SD = 12.82 Hz)     /i/ mean F0 = 214 Hz (SD = 9.24 Hz)     [place] Mean f0 = 190 Hz (SD 25.31 Hz)     [place]:         Min F0 = 150 Hz        Max F0 = 407 Hz        Range = 257 Hz    Cepstral Measures     CPPS /a/ = 26.00 dB     CPPS /i/ = 24.11 dB     CPPS \"all voiced\" = 20.71 dB     AVQI (v.3.01) = 1.92    Additional Measures     Harmonic to Noise Ratio /a/ = 19.81 dB     Harmonic to Noise Ratio: [place] = 16.17 dB     Jitter (local) /a/ = 0.482 %     Shimmer (local) /a/ = 2.966 %        Acoustic measurures WITHOUT DBS:  Fundamental frequency Metrics     /a/ mean F0 = 209 Hz (SD = 17.28 Hz)     /i/ mean F0 = 223 Hz (SD = 12.64 Hz)     [place] Mean f0 = 196 Hz (SD 27.62 Hz)     [place]:         Min F0 = 136 Hz        Max F0 = 384 Hz        Range = 248 Hz    Cepstral Measures     CPPS /a/ = 25.58 dB     CPPS /i/ = 23.92 dB     CPPS \"all voiced\" = 19.91 dB     AVQI (v.3.01) = 1.78    Additional Measures     Harmonic to Noise Ratio /a/ = 18.35 dB     Harmonic to Noise Ratio: [place] = 16.52 dB     Jitter (local) /a/ = 0.642 %     Shimmer (local) /a/ = 3.817 %        AERODYNAMIC MEASURES:   DBS ON     DBS OFF      PROTOCOL Result Units Norm. Mean Std Dev  Result Units Norm. Mean Std Dev   Vital Capacity            Expiratory Volume 1.84 Liters 2.24 0.6  1.98 Liters 2.24 0.6   Comfortable Sustained Phonation            Mean SPL 86.91 dB 79.89 5.47  77.85 dB 79.89 5.47 "   Mean Pitch 220.34 Hz 185.27 25.93  203.39 Hz 185.27 25.93   Peak Expiratory Airflow 0.428 Lit/Sec 0.17 0.09  0.583 Lit/Sec 0.17 0.09   Mean Expiratory Airflow 0.168 Lit/Sec 0.1 0.06  0.227 Lit/Sec 0.1 0.06   Expiratory Volume 1.27 Liters 0.6 0.34  1.38 Liters 0.6 0.34   Voicing Efficiency            Mean SPL 86.43 dB 79.65 6.25  89.94 dB 79.65 6.25   Mean Pitch 216.52 Hz 179.42 18.78  244.29 Hz 179.42 18.78   Peak Air Pressure 13.69 cm H2O 9.7 5.12  13.17 cm H2O 9.7 5.12   Mean Peak Air Pressure (inferred sub-glottal pressure) 12.5 cm H2O 7.95 4.28  11.33 cm H2O 7.95 4.28   Mean Expiratory Airflow 0.38 Lit/Sec    0.467 Lit/Sec     Mean Airflow During Voicing 0.381 Lit/Sec 0.13 0.07  0.465 Lit/Sec 0.13 0.07   Aerodynamic Power 0.466 duckworth 0.12 0.14  0.519 duckworth 0.12 0.14   Aerodynamic Resistance 32.21 cm H2O/(l/s) 80 51.98  23.76 cm H2O/(l/s) 80 51.98   Acoustic Ohms 32.85 ds/cm5 81.58 53.01  24.23 ds/cm5 81.58 53.01   Aerodynamic Efficiency 133.24 ppm 97.94 55.25  268.84 ppm 97.94 55.25   Running Speech (all voiced stimuli)            Mean SPL 71.04 dB    67.27 dB     SPL Range 48.46 dB    52.33 dB     Mean Pitch 180.27 Hz    195.64 Hz     Pitch Range 235.55 Hz    236.15 Hz     Peak Expiratory Airflow 0.555 Lit/Sec    0.796 Lit/Sec     Mean Expiratory Airflow 0.128 Lit/Sec    0.262 Lit/Sec     Expiratory Volume 0.48 Liters    1.25 Liters     Mean Airflow During Voicing 0.095 Lit/Sec    0.237 Lit/Sec     Peak Inspiratory Airflow -0.535 Lit/Sec    -0.946 Lit/Sec     Inspiratory Volume -0.1 Liters    -0.23 Liters     Running Speech ([place])            Mean SPL 68.08 dB    69.22 dB     SPL Range 55.92 dB    56.92 dB     Mean Pitch 190.37 Hz    192.57 Hz     Pitch Range 262.06 Hz    261.94 Hz     Peak Expiratory Airflow 0.29 Lit/Sec    1.152 Lit/Sec     Mean Expiratory Airflow 0.052 Lit/Sec    0.183 Lit/Sec     Expiratory Volume 0.78 Liters    3.61 Liters     Mean Airflow During Voicing 0.049 Lit/Sec     0.193 Lit/Sec     Peak Inspiratory Airflow -1.125 Lit/Sec    -2.16 Lit/Sec     Inspiratory Volume -1.22 Liters    -2.91 Liters       A visualization of airflow during comfortable sustained phonation is below as there is a striking contrast between trials with and without DBS    With DBS      Without DBS    ____________________________________________________________________    ASSESSMENT / PLAN  IMPRESSIONS: Irish Rosales is presenting today with a Dysphonia (R49.0) and vocal tremor (R49.8) in the context of Essential Tremor (G25.0) and Dystonic Tremor (G25.2) status post implantation of a Deep Brain Stimulator in March of this year. Today's evaluation shows subtle but significant improvement in voicing across perceptual, acoustic, and aerodynamic measures.  Perceptually, using two expert blinded raters the patient's overall severity on CAPE-V was noted to decrease by 8 points, with reduced tremor severity and strain.  Although most of the acoustic measures did not show significant differences pre to post activation of DBS, those which measure perturbation of the signal, Jitter (% frequency perturbation) and Shimmer (% intensity perturbation) did demonstrate a distinction, the latter being most notable with a decrease of .85%.  Aerodynamic measurement provided the most substantive changes with a marked improvement in efficiency of airflow when DBS was activated. Although still elevated compared to gender and age norms, comfortably sustained /a/ vowel was seen to improve in efficiency from over 2 standard deviations above the mean .227 L/s with DBS off to .167 L/s with the DBS on, which is just above the first standard deviation.  Additionally during running speech her total expiratory volume reduced from 3.61 Liters of air with DBS off to .78  Liters of air with DBS on across a standard passage.  A visualization of this change in airflow, and the erratic nature of this airflow is provided above to underscore this  difference.   This efficiency will make a marked difference in the vocal burden over the course of a day of conversation.  The patient herself noted significant improvement in voice quality, though effort ratings were unchanged.    Given her continued improvement with her voice post implantation no further speech intervention is needed at this point; however, should the patient feel that her progress plateaus, a brief course of speech therapy (2-3 one hour sessions) would be warranted to aid in optimization of voice.  She was encouraged to maintain contact with the clinic and re-engage if her needs or status change in the future.    G-Codes:   - Functional limitation, current status, at evalution CK - At least 40 percent but less than 60 percent impaired, limited, or restricted   - Functional limitation, projected goal status, at discharge from therapy CK - At least 40 percent but less than 60 percent impaired, limited, or restricted   - Functional limitation, discharge status, at discharge from therapy CK - At least 40 percent but less than 60 percent impaired, limited, or restricted  Certification period: EVALUATION ONLY    This treatment plan was developed with the patient who agreed with the recommendations.    TOTAL SERVICE TIME: 120 minutes  EVALUATION OF VOICE AND RESONANCE: (54368): 60 minutes    LARYNGEAL FUNCTION STUDIES (95189): 60 minutes  NO CHARGE FACILITY FEE (53636)    Cristobal Gaytan M.M., M.A., CCC-SLP  Speech-Language Pathologist  Certificate of Vocology  [phone]

## 2018-04-18 NOTE — LETTER
4/18/2018      RE: Irish Rosales  [address] [address] [facility] [facility]   [facility] [address]       [facility] VOICE CLINIC  Evaluation report    Clinician: Cristobal Gaytan M.M., M.A., CCC/SLP  Referring physician:  Dr. Colvin  Patient: Irish Rosales  Date of Visit: 4/18/2018    HISTORY  Chief complaint: Irish Rosales is a 67 year old woman presenting today for evaluation of voice.    Salient history: She has a history significant for dystonic tremor.  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 She underwent left DBS implantation on 3/13/18 with Dr. Morales, and programming of the DBS was performed on 4/11/18.  She presents today at the request of her care team in neurology for objective evaluation of her improvement with and without activation of DBS.     CURRENT SYMPTOMS PER PATIENT REPORT INCLUDE  VOICE    Poor voice quality    Voice Breaks    Shaky / unsteady voice    Worsens with stress    Improving over time    Past Medical History:   Diagnosis Date     Depression      Dyslipidemia      Dystonic movements 1/21/2017     Dystonic tremor 1/21/2017     Encounter for neuropsychological testing 3/13/2017    2017 evaluation  IMPRESSIONS AND RECOMMENDATIONS   Current results indicate performance that falls within normal limits across cognitive domains, generally in the average to above average range. Objective personality assessment also falls within normal limits.   This pattern of performance does not reflect dementia at this time, nor is it suggestive of focal or lateralized cerebral involvement. Sh     Family history of movement disorder 1/21/2017     Family history of tremor 1/21/2017     H/O magnetic resonance imaging of brain and brain stem 2/27/2017    MR BRAIN W/O CONTRAST 2/27/2017 11:46 AM  Provided History: dbs brain surgery for dystonia, Other specified forms of tremor, Dystonia, unspecified,  Tremor, unspecified  Comparison:  No similar prior studies   Technique: Volumetric sagittal T1 and T2 weighted, axial T2-weighted, turboFLAIR and diffusion-weighted with ADC map images of the brain were obtained without intravenous contrast.  Findings:     mild abnormality in carotid study 3/2/2017    BILATERAL DUPLEX CAROTID ULTRASOUND March 1, 2017 1:01 PM    HISTORY: Other specified symptoms and signs involving the circulatory and respiratory systems.   COMPARISON: None.   FINDINGS: There is mild atherosclerotic plaque in the carotid bifurcations. Flow velocities and waveforms show less than 50% diameter stenosis in both the right and left internal carotid arteries as assessed by each ICA PS     Osteoporosis 8/2/2010     Past Surgical History:   Procedure Laterality Date     ------------OTHER-------------  2004    vocal cord thyroplasty at [facility]     C BSO, OMENTECTOMY W/XAVIER  1997    hysterectomy     C HAND/FINGER SURGERY UNLISTED  1998    right carpal tunnel surgery     IMPLANT DEEP BRAIN STIMULATION GENERATOR / BATTERY Left 3/13/2018    Procedure: IMPLANT DEEP BRAIN STIMULATION GENERATOR / BATTERY;  Deep Brain Stimulator Placement, Phase II, Placement Of Deep Brain Stimulator Generator/Battery Over The Left Chest Wall;  Surgeon: Aleksander Morales MD;  Location: [facility] OR     OPTICAL TRACKING SYSTEM INSERTION DEEP BRAIN STIMULATION Left 3/6/2018    Procedure: OPTICAL TRACKING SYSTEM INSERTION DEEP BRAIN STIMULATION;  Stealth Assisted Left Deep Brain Stimulator Placement, Phase I, Placement Of Left Side Deep Brain Stimulator Electrode, Target Left Ventral Intermediate Nucleus Of The Thalamus With Microelectrode Recording;  Surgeon: Aleksander Morales MD;  Location: [facility] OR     RELEASE CARPAL TUNNEL Left 1/2/2015    Procedure: RELEASE CARPAL TUNNEL;  Surgeon: SHANIA Hernandez MD;  Location: [facility] OR     RELEASE ULNAR NERVE (ELBOW) Left 1/2/2015    Procedure: RELEASE ULNAR NERVE (ELBOW);  Surgeon:  "SHANIA Hernandez MD;  Location: [facility] OR     OBJECTIVE  Patient Supplied Answers To VHI Questionnaire  Voice Handicap Index (VHI-10) 2018   My voice makes it difficult for people to hear me 3   People have difficulty understanding me in a noisy room 3   My voice difficulties restrict my personal and social life.  3   I feel left out of conversations because of my voice 3   My voice problem causes me to lose income 3   I feel as though I have to strain to produce voice 2   The clarity of my voice is unpredictable 3   My voice problem upsets me 3   My voice makes me feel handicapped 3   People ask, \"What's wrong with your voice?\" 2   VHI-10 28     ** ALL PERCEPTUAL MEASURES WERE RECORDED BOTH WITH AND WITHOUT DBS. 15 MINUTES WAS ALLOWED TO ELAPSE BETWEEN TURNING OFF THE DBS AND RECORDING THE SECOND SET OF MEASURES.**    PATIENT REPORTED MEASURES  WITH DBS:    Effort to talk: 8 / 10 (0-10 in which 10 represents maximal effort)    Effort to sin / 10 (0-10 in which 10 represents maximal effort)    Voice quality: 5 / 10 (0-10 in which 10 represents best possible voice)    WITHOUT DBS:    Effort to talk: 8 / 10 (0-10 in which 10 represents maximal effort)    Effort to sin / 10 (0-10 in which 10 represents maximal effort)    Voice quality: 3 / 10 (0-10 in which 10 represents best possible voice)    PERCEPTUAL EVALUATION (CPT 04169)  *ratings of perceptual quality were averaged from two clinicians reviewing blinded samples with >20 years of combined experience rating voices *  WITH DBS:    Roughness: Minimal    Breathiness: Mild    Strain: WNL    Vocal Tremor: Moderate to severe    Loudness    Conversational speech:  WNL    Pitch:    Conversational speech:  Mildly lowered    Pitch glide: mildly limited but within normal limits. No notable breaks    Resonance:    Conversational speech: narrow, with mild hyponasality    Singing vs. Speech:  Consistent across contexts    CAPE-V Overall Severity:  " "57/100    WITHOUT DBS:    Roughness: WNL    Breathiness: Mild    Strain: Mild    Vocal Tremor: Severe    Loudness    Conversational speech:  WNL    Pitch:    Conversational speech:  WNL    Pitch glide: significantly disrupted by tremor, reduced range    Resonance:    Conversational speech:  Narrowed, affected by tongue base tremor    Singing vs. Speech:  Consistent across contexts    CAPE- Overall Severity:  65/100    ______________________________________________________________________________  Laryngeal Function Studies (CPT 56776)  Acoustic measures WITH DBS:  Fundamental frequency Metrics     /a/ mean F0 = 209 Hz (SD = 12.82 Hz)     /i/ mean F0 = 214 Hz (SD = 9.24 Hz)     [place] Mean f0 = 190 Hz (SD 25.31 Hz)     [place]:         Min F0 = 150 Hz        Max F0 = 407 Hz        Range = 257 Hz    Cepstral Measures     CPPS /a/ = 26.00 dB     CPPS /i/ = 24.11 dB     CPPS \"all voiced\" = 20.71 dB     AVQI (v.3.01) = 1.92    Additional Measures     Harmonic to Noise Ratio /a/ = 19.81 dB     Harmonic to Noise Ratio: [place] = 16.17 dB     Jitter (local) /a/ = 0.482 %     Shimmer (local) /a/ = 2.966 %        Acoustic measurures WITHOUT DBS:  Fundamental frequency Metrics     /a/ mean F0 = 209 Hz (SD = 17.28 Hz)     /i/ mean F0 = 223 Hz (SD = 12.64 Hz)     [place] Mean f0 = 196 Hz (SD 27.62 Hz)     [place]:         Min F0 = 136 Hz        Max F0 = 384 Hz        Range = 248 Hz    Cepstral Measures     CPPS /a/ = 25.58 dB     CPPS /i/ = 23.92 dB     CPPS \"all voiced\" = 19.91 dB     AVQI (v.3.01) = 1.78    Additional Measures     Harmonic to Noise Ratio /a/ = 18.35 dB     Harmonic to Noise Ratio: [place] = 16.52 dB     Jitter (local) /a/ = 0.642 %     Shimmer (local) /a/ = 3.817 %        AERODYNAMIC MEASURES:   DBS ON     DBS OFF      PROTOCOL Result Units Norm. Mean Std Dev  Result Units Norm. Mean Std Dev   Vital Capacity            Expiratory Volume 1.84 Liters 2.24 0.6  1.98 Liters 2.24 0.6 "   Comfortable Sustained Phonation            Mean SPL 86.91 dB 79.89 5.47  77.85 dB 79.89 5.47   Mean Pitch 220.34 Hz 185.27 25.93  203.39 Hz 185.27 25.93   Peak Expiratory Airflow 0.428 Lit/Sec 0.17 0.09  0.583 Lit/Sec 0.17 0.09   Mean Expiratory Airflow 0.168 Lit/Sec 0.1 0.06  0.227 Lit/Sec 0.1 0.06   Expiratory Volume 1.27 Liters 0.6 0.34  1.38 Liters 0.6 0.34   Voicing Efficiency            Mean SPL 86.43 dB 79.65 6.25  89.94 dB 79.65 6.25   Mean Pitch 216.52 Hz 179.42 18.78  244.29 Hz 179.42 18.78   Peak Air Pressure 13.69 cm H2O 9.7 5.12  13.17 cm H2O 9.7 5.12   Mean Peak Air Pressure (inferred sub-glottal pressure) 12.5 cm H2O 7.95 4.28  11.33 cm H2O 7.95 4.28   Mean Expiratory Airflow 0.38 Lit/Sec    0.467 Lit/Sec     Mean Airflow During Voicing 0.381 Lit/Sec 0.13 0.07  0.465 Lit/Sec 0.13 0.07   Aerodynamic Power 0.466 duckworth 0.12 0.14  0.519 [place] 0.12 0.14   Aerodynamic Resistance 32.21 cm H2O/(l/s) 80 51.98  23.76 cm H2O/(l/s) 80 51.98   Acoustic Ohms 32.85 ds/cm5 81.58 53.01  24.23 ds/cm5 81.58 53.01   Aerodynamic Efficiency 133.24 ppm 97.94 55.25  268.84 ppm 97.94 55.25   Running Speech (all voiced stimuli)            Mean SPL 71.04 dB    67.27 dB     SPL Range 48.46 dB    52.33 dB     Mean Pitch 180.27 Hz    195.64 Hz     Pitch Range 235.55 Hz    236.15 Hz     Peak Expiratory Airflow 0.555 Lit/Sec    0.796 Lit/Sec     Mean Expiratory Airflow 0.128 Lit/Sec    0.262 Lit/Sec     Expiratory Volume 0.48 Liters    1.25 Liters     Mean Airflow During Voicing 0.095 Lit/Sec    0.237 Lit/Sec     Peak Inspiratory Airflow -0.535 Lit/Sec    -0.946 Lit/Sec     Inspiratory Volume -0.1 Liters    -0.23 Liters     Running Speech ([place])            Mean SPL 68.08 dB    69.22 dB     SPL Range 55.92 dB    56.92 dB     Mean Pitch 190.37 Hz    192.57 Hz     Pitch Range 262.06 Hz    261.94 Hz     Peak Expiratory Airflow 0.29 Lit/Sec    1.152 Lit/Sec     Mean Expiratory Airflow 0.052 Lit/Sec    0.183 Lit/Sec      Expiratory Volume 0.78 Liters    3.61 Liters     Mean Airflow During Voicing 0.049 Lit/Sec    0.193 Lit/Sec     Peak Inspiratory Airflow -1.125 Lit/Sec    -2.16 Lit/Sec     Inspiratory Volume -1.22 Liters    -2.91 Liters       A visualization of airflow during comfortable sustained phonation is below as there is a striking contrast between trials with and without DBS    With DBS      Without DBS    ____________________________________________________________________    ASSESSMENT / PLAN  IMPRESSIONS: Irish Rosales is presenting today with a Dysphonia (R49.0) and vocal tremor (R49.8) in the context of Essential Tremor (G25.0) and Dystonic Tremor (G25.2) status post implantation of a Deep Brain Stimulator in March of this year. Today's evaluation shows subtle but significant improvement in voicing across perceptual, acoustic, and aerodynamic measures.  Perceptually, using two expert blinded raters the patient's overall severity on CAPE-V was noted to decrease by 8 points, with reduced tremor severity and strain.  Although most of the acoustic measures did not show significant differences pre to post activation of DBS, those which measure perturbation of the signal, Jitter (% frequency perturbation) and Shimmer (% intensity perturbation) did demonstrate a distinction, the latter being most notable with a decrease of .85%.  Aerodynamic measurement provided the most substantive changes with a marked improvement in efficiency of airflow when DBS was activated. Although still elevated compared to gender and age norms, comfortably sustained /a/ vowel was seen to improve in efficiency from over 2 standard deviations above the mean .227 L/s with DBS off to .167 L/s with the DBS on, which is just above the first standard deviation.  Additionally during running speech her total expiratory volume reduced from 3.61 Liters of air with DBS off to .78  Liters of air with DBS on across a standard passage.  A visualization of  this change in airflow, and the erratic nature of this airflow is provided above to underscore this difference.   This efficiency will make a marked difference in the vocal burden over the course of a day of conversation.  The patient herself noted significant improvement in voice quality, though effort ratings were unchanged.    Given her continued improvement with her voice post implantation no further speech intervention is needed at this point; however, should the patient feel that her progress plateaus, a brief course of speech therapy (2-3 one hour sessions) would be warranted to aid in optimization of voice.  She was encouraged to maintain contact with the clinic and re-engage if her needs or status change in the future.    G-Codes:   - Functional limitation, current status, at evalution CK - At least 40 percent but less than 60 percent impaired, limited, or restricted   - Functional limitation, projected goal status, at discharge from therapy CK - At least 40 percent but less than 60 percent impaired, limited, or restricted   - Functional limitation, discharge status, at discharge from therapy CK - At least 40 percent but less than 60 percent impaired, limited, or restricted  Certification period: EVALUATION ONLY    This treatment plan was developed with the patient who agreed with the recommendations.    TOTAL SERVICE TIME: 120 minutes  EVALUATION OF VOICE AND RESONANCE: (45867): 60 minutes    LARYNGEAL FUNCTION STUDIES (54634): 60 minutes  NO CHARGE FACILITY FEE (00096)    Cristobal Gaytan M.M., M.A., CCC-SLP  Speech-Language Pathologist  Certificate of Vocology  [phone]

## 2018-04-18 NOTE — LETTER
4/18/2018       RE: Irish Rosales  [address] [address]   [place] [address]     Dear Colleague,    Thank you for referring your patient, Irish Rosalse, to the [facility] VOICE at [facility]. Please see a copy of my visit note below.    [facility] VOICE CLINIC  Evaluation report    Clinician: Cristobal Gaytan M.M., M.A., CCC/SLP  Referring physician:  Dr. Colvin  Patient: Irish Rosales  Date of Visit: 4/18/2018    HISTORY  Chief complaint: Irish Rosales is a 67 year old woman presenting today for evaluation of voice.    Salient history: She has a history significant for dystonic tremor.  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 She underwent left DBS implantation on 3/13/18 with Dr. Morales, and programming of the DBS was performed on 4/11/18.  She presents today at the request of her care team in neurology for objective evaluation of her improvement with and without activation of DBS.     CURRENT SYMPTOMS PER PATIENT REPORT INCLUDE  VOICE    Poor voice quality    Voice Breaks    Shaky / unsteady voice    Worsens with stress    Improving over time    Past Medical History:   Diagnosis Date     Depression      Dyslipidemia      Dystonic movements 1/21/2017     Dystonic tremor 1/21/2017     Encounter for neuropsychological testing 3/13/2017    2017 evaluation  IMPRESSIONS AND RECOMMENDATIONS   Current results indicate performance that falls within normal limits across cognitive domains, generally in the average to above average range. Objective personality assessment also falls within normal limits.   This pattern of performance does not reflect dementia at this time, nor is it suggestive of focal or lateralized cerebral involvement. Sh     Family history of movement disorder 1/21/2017     Family history of tremor 1/21/2017     H/O magnetic resonance  imaging of brain and brain stem 2/27/2017    MR BRAIN W/O CONTRAST 2/27/2017 11:46 AM  Provided History: dbs brain surgery for dystonia, Other specified forms of tremor, Dystonia, unspecified, Tremor, unspecified  Comparison:  No similar prior studies   Technique: Volumetric sagittal T1 and T2 weighted, axial T2-weighted, turboFLAIR and diffusion-weighted with ADC map images of the brain were obtained without intravenous contrast.  Findings:     mild abnormality in carotid study 3/2/2017    BILATERAL DUPLEX CAROTID ULTRASOUND March 1, 2017 1:01 PM    HISTORY: Other specified symptoms and signs involving the circulatory and respiratory systems.   COMPARISON: None.   FINDINGS: There is mild atherosclerotic plaque in the carotid bifurcations. Flow velocities and waveforms show less than 50% diameter stenosis in both the right and left internal carotid arteries as assessed by each ICA PS     Osteoporosis 8/2/2010     Past Surgical History:   Procedure Laterality Date     ------------OTHER-------------  2004    vocal cord thyroplasty at [facility]     C BSO, OMENTECTOMY W/XAVIER  1997    hysterectomy     C HAND/FINGER SURGERY UNLISTED  1998    right carpal tunnel surgery     IMPLANT DEEP BRAIN STIMULATION GENERATOR / BATTERY Left 3/13/2018    Procedure: IMPLANT DEEP BRAIN STIMULATION GENERATOR / BATTERY;  Deep Brain Stimulator Placement, Phase II, Placement Of Deep Brain Stimulator Generator/Battery Over The Left Chest Wall;  Surgeon: Aleksander Morales MD;  Location: [facility] OR     OPTICAL TRACKING SYSTEM INSERTION DEEP BRAIN STIMULATION Left 3/6/2018    Procedure: OPTICAL TRACKING SYSTEM INSERTION DEEP BRAIN STIMULATION;  Stealth Assisted Left Deep Brain Stimulator Placement, Phase I, Placement Of Left Side Deep Brain Stimulator Electrode, Target Left Ventral Intermediate Nucleus Of The Thalamus With Microelectrode Recording;  Surgeon: Aleksander Morales MD;  Location: [facility] OR     RELEASE CARPAL TUNNEL Left 1/2/2015  "   Procedure: RELEASE CARPAL TUNNEL;  Surgeon: SHANIA Hernandez MD;  Location: [facility] OR     RELEASE ULNAR NERVE (ELBOW) Left 2015    Procedure: RELEASE ULNAR NERVE (ELBOW);  Surgeon: SHANIA Hernandez MD;  Location: [facility] OR     OBJECTIVE  Patient Supplied Answers To VHI Questionnaire  Voice Handicap Index (VHI-10) 2018   My voice makes it difficult for people to hear me 3   People have difficulty understanding me in a noisy room 3   My voice difficulties restrict my personal and social life.  3   I feel left out of conversations because of my voice 3   My voice problem causes me to lose income 3   I feel as though I have to strain to produce voice 2   The clarity of my voice is unpredictable 3   My voice problem upsets me 3   My voice makes me feel handicapped 3   People ask, \"What's wrong with your voice?\" 2   VHI-10 28     ** ALL PERCEPTUAL MEASURES WERE RECORDED BOTH WITH AND WITHOUT DBS. 15 MINUTES WAS ALLOWED TO ELAPSE BETWEEN TURNING OFF THE DBS AND RECORDING THE SECOND SET OF MEASURES.**    PATIENT REPORTED MEASURES  WITH DBS:    Effort to talk: 8 / 10 (0-10 in which 10 represents maximal effort)    Effort to sin / 10 (0-10 in which 10 represents maximal effort)    Voice quality: 5 / 10 (0-10 in which 10 represents best possible voice)    WITHOUT DBS:    Effort to talk: 8 / 10 (0-10 in which 10 represents maximal effort)    Effort to sin / 10 (0-10 in which 10 represents maximal effort)    Voice quality: 3 / 10 (0-10 in which 10 represents best possible voice)    PERCEPTUAL EVALUATION (CPT 59689)  *ratings of perceptual quality were averaged from two clinicians reviewing blinded samples with >20 years of combined experience rating voices *  WITH DBS:    Roughness: Minimal    Breathiness: Mild    Strain: WNL    Vocal Tremor: Moderate to severe    Loudness    Conversational speech:  WNL    Pitch:    Conversational speech:  Mildly lowered    Pitch glide: mildly limited but within normal " "limits. No notable breaks    Resonance:    Conversational speech: narrow, with mild hyponasality    Singing vs. Speech:  Consistent across contexts    [facility]- Overall Severity:  57/100    WITHOUT DBS:    Roughness: WNL    Breathiness: Mild    Strain: Mild    Vocal Tremor: Severe    Loudness    Conversational speech:  WNL    Pitch:    Conversational speech:  WNL    Pitch glide: significantly disrupted by tremor, reduced range    Resonance:    Conversational speech:  Narrowed, affected by tongue base tremor    Singing vs. Speech:  Consistent across contexts    [facility] Overall Severity:  65/100    ______________________________________________________________________________  Laryngeal Function Studies (CPT 90378)  Acoustic measures WITH DBS:  Fundamental frequency Metrics     /a/ mean F0 = 209 Hz (SD = 12.82 Hz)     /i/ mean F0 = 214 Hz (SD = 9.24 Hz)     [place] Mean f0 = 190 Hz (SD 25.31 Hz)     [place]:         Min F0 = 150 Hz        Max F0 = 407 Hz        Range = 257 Hz    Cepstral Measures     CPPS /a/ = 26.00 dB     CPPS /i/ = 24.11 dB     CPPS \"all voiced\" = 20.71 dB     AVQI (v.3.01) = 1.92    Additional Measures     Harmonic to Noise Ratio /a/ = 19.81 dB     Harmonic to Noise Ratio: [place] = 16.17 dB     Jitter (local) /a/ = 0.482 %     Shimmer (local) /a/ = 2.966 %        Acoustic measurures WITHOUT DBS:  Fundamental frequency Metrics     /a/ mean F0 = 209 Hz (SD = 17.28 Hz)     /i/ mean F0 = 223 Hz (SD = 12.64 Hz)     [place] Mean f0 = 196 Hz (SD 27.62 Hz)     [place]:         Min F0 = 136 Hz        Max F0 = 384 Hz        Range = 248 Hz    Cepstral Measures     CPPS /a/ = 25.58 dB     CPPS /i/ = 23.92 dB     CPPS \"all voiced\" = 19.91 dB     AVQI (v.3.01) = 1.78    Additional Measures     Harmonic to Noise Ratio /a/ = 18.35 dB     Harmonic to Noise Ratio: [place] = 16.52 dB     Jitter (local) /a/ = 0.642 %     Shimmer (local) /a/ = 3.817 %        AERODYNAMIC MEASURES:   DBS ON     " DBS OFF      PROTOCOL Result Units Norm. Mean Std Dev  Result Units Norm. Mean Std Dev   Vital Capacity            Expiratory Volume 1.84 Liters 2.24 0.6  1.98 Liters 2.24 0.6   Comfortable Sustained Phonation            Mean SPL 86.91 dB 79.89 5.47  77.85 dB 79.89 5.47   Mean Pitch 220.34 Hz 185.27 25.93  203.39 Hz 185.27 25.93   Peak Expiratory Airflow 0.428 Lit/Sec 0.17 0.09  0.583 Lit/Sec 0.17 0.09   Mean Expiratory Airflow 0.168 Lit/Sec 0.1 0.06  0.227 Lit/Sec 0.1 0.06   Expiratory Volume 1.27 Liters 0.6 0.34  1.38 Liters 0.6 0.34   Voicing Efficiency            Mean SPL 86.43 dB 79.65 6.25  89.94 dB 79.65 6.25   Mean Pitch 216.52 Hz 179.42 18.78  244.29 Hz 179.42 18.78   Peak Air Pressure 13.69 cm H2O 9.7 5.12  13.17 cm H2O 9.7 5.12   Mean Peak Air Pressure (inferred sub-glottal pressure) 12.5 cm H2O 7.95 4.28  11.33 cm H2O 7.95 4.28   Mean Expiratory Airflow 0.38 Lit/Sec    0.467 Lit/Sec     Mean Airflow During Voicing 0.381 Lit/Sec 0.13 0.07  0.465 Lit/Sec 0.13 0.07   Aerodynamic Power 0.466 duckworth 0.12 0.14  0.519 duckworth 0.12 0.14   Aerodynamic Resistance 32.21 cm H2O/(l/s) 80 51.98  23.76 cm H2O/(l/s) 80 51.98   Acoustic Ohms 32.85 ds/cm5 81.58 53.01  24.23 ds/cm5 81.58 53.01   Aerodynamic Efficiency 133.24 ppm 97.94 55.25  268.84 ppm 97.94 55.25   Running Speech (all voiced stimuli)            Mean SPL 71.04 dB    67.27 dB     SPL Range 48.46 dB    52.33 dB     Mean Pitch 180.27 Hz    195.64 Hz     Pitch Range 235.55 Hz    236.15 Hz     Peak Expiratory Airflow 0.555 Lit/Sec    0.796 Lit/Sec     Mean Expiratory Airflow 0.128 Lit/Sec    0.262 Lit/Sec     Expiratory Volume 0.48 Liters    1.25 Liters     Mean Airflow During Voicing 0.095 Lit/Sec    0.237 Lit/Sec     Peak Inspiratory Airflow -0.535 Lit/Sec    -0.946 Lit/Sec     Inspiratory Volume -0.1 Liters    -0.23 Liters     Running Speech ([place])            Mean SPL 68.08 dB    69.22 dB     SPL Range 55.92 dB    56.92 dB     Mean Pitch 190.37  Hz    192.57 Hz     Pitch Range 262.06 Hz    261.94 Hz     Peak Expiratory Airflow 0.29 Lit/Sec    1.152 Lit/Sec     Mean Expiratory Airflow 0.052 Lit/Sec    0.183 Lit/Sec     Expiratory Volume 0.78 Liters    3.61 Liters     Mean Airflow During Voicing 0.049 Lit/Sec    0.193 Lit/Sec     Peak Inspiratory Airflow -1.125 Lit/Sec    -2.16 Lit/Sec     Inspiratory Volume -1.22 Liters    -2.91 Liters       A visualization of airflow during comfortable sustained phonation is below as there is a striking contrast between trials with and without DBS    With DBS      Without DBS    ____________________________________________________________________    ASSESSMENT / PLAN  IMPRESSIONS: Irish Rosales is presenting today with a Dysphonia (R49.0) and vocal tremor (R49.8) in the context of Essential Tremor (G25.0) and Dystonic Tremor (G25.2) status post implantation of a Deep Brain Stimulator in March of this year. Today's evaluation shows subtle but significant improvement in voicing across perceptual, acoustic, and aerodynamic measures.  Perceptually, using two expert blinded raters the patient's overall severity on CAPE-V was noted to decrease by 8 points, with reduced tremor severity and strain.  Although most of the acoustic measures did not show significant differences pre to post activation of DBS, those which measure perturbation of the signal, Jitter (% frequency perturbation) and Shimmer (% intensity perturbation) did demonstrate a distinction, the latter being most notable with a decrease of .85%.  Aerodynamic measurement provided the most substantive changes with a marked improvement in efficiency of airflow when DBS was activated. Although still elevated compared to gender and age norms, comfortably sustained /a/ vowel was seen to improve in efficiency from over 2 standard deviations above the mean .227 L/s with DBS off to .167 L/s with the DBS on, which is just above the first standard deviation.  Additionally  during running speech her total expiratory volume reduced from 3.61 Liters of air with DBS off to .78  Liters of air with DBS on across a standard passage.  A visualization of this change in airflow, and the erratic nature of this airflow is provided above to underscore this difference.   This efficiency will make a marked difference in the vocal burden over the course of a day of conversation.  The patient herself noted significant improvement in voice quality, though effort ratings were unchanged.    Given her continued improvement with her voice post implantation no further speech intervention is needed at this point; however, should the patient feel that her progress plateaus, a brief course of speech therapy (2-3 one hour sessions) would be warranted to aid in optimization of voice.  She was encouraged to maintain contact with the clinic and re-engage if her needs or status change in the future.    G-Codes:   - Functional limitation, current status, at evalution CK - At least 40 percent but less than 60 percent impaired, limited, or restricted   - Functional limitation, projected goal status, at discharge from therapy CK - At least 40 percent but less than 60 percent impaired, limited, or restricted   - Functional limitation, discharge status, at discharge from therapy CK - At least 40 percent but less than 60 percent impaired, limited, or restricted  Certification period: EVALUATION ONLY    This treatment plan was developed with the patient who agreed with the recommendations.    TOTAL SERVICE TIME: 120 minutes  EVALUATION OF VOICE AND RESONANCE: (96241): 60 minutes    LARYNGEAL FUNCTION STUDIES (25924): 60 minutes  NO CHARGE FACILITY FEE (42654)    Cristobal Gaytan M.M., M.A., CCC-SLP  Speech-Language Pathologist  Certificate of Vocology  [phone]

## 2018-04-18 NOTE — MR AVS SNAPSHOT
After Visit Summary   4/18/2018    Irish Rosales    MRN: [id number]           Patient Information     Date Of Birth          1950        Visit Information        Provider Department      4/18/2018 8:00 AM Francisco Gaytan SLP  [facility]        Today's Diagnoses     Vocal tremor    -  1    Dystonic tremor        Essential tremor        Dysphonia           Follow-ups after your visit        Who to contact     Please call your clinic at [phone] to:    Ask questions about your health    Make or cancel appointments    Discuss your medicines    Learn about your test results    Speak to your doctor            Additional Information About Your Visit        MyChart Information     [email or web address] gives you secure access to your electronic health record. If you see a primary care provider, you can also send messages to your care team and make appointments. If you have questions, please call your primary care clinic.  If you do not have a primary care provider, please call [phone] and they will assist you.      [email or web address] is an electronic gateway that provides easy, online access to your medical records. With [email or web address], you can request a clinic appointment, read your test results, renew a prescription or communicate with your care team.     To access your existing account, please contact your [facility] or call [phone] for assistance.        Care EveryWhere ID     This is your Care EveryWhere ID. This could be used by other organizations to access your [place] medical records  [id number]         Blood Pressure from Last 3 Encounters:   04/11/18 140/53   03/28/18 145/90   03/13/18 119/83    Weight from Last 3 Encounters:   04/11/18 61.2 kg (135 lb)   03/28/18 61.2 kg (135 lb)   03/13/18 61.4 kg (135 lb 5.8 oz)              We Performed the Following     C BEHAVIORAL & QUALITATIVE ANALYSIS VOICE AND RESONANCE       SLP VOICE CURRENT STATUS      SLP VOICE D/C  STATUS     [facility] GOAL STATUS     LARYNGEAL FUNCTION STUDIES          Today's Medication Changes          These changes are accurate as of 4/18/18 11:59 PM.  If you have any questions, ask your nurse or doctor.               These medicines have changed or have updated prescriptions.        Dose/Directions    rosuvastatin 5 MG tablet   Commonly known as:  CRESTOR   This may have changed:  when to take this   Used for:  Hyperlipidemia LDL goal <160        Dose:  5 mg   Take 1 tablet (5 mg) by mouth daily   Quantity:  30 tablet   Refills:  11       sertraline 100 MG tablet   Commonly known as:  ZOLOFT   This may have changed:  See the new instructions.   Used for:  History of major depression        TAKE 1 TABLET BY MOUTH EVERY DAY   Quantity:  90 tablet   Refills:  0                Primary Care Provider Office Phone # Fax #    Braulio Victoria MD PhD [phone] [address]  [facility] [address]        Equal Access to Services     SALINAS [facility] : Hadii tammy tristan hadasho Soomaali, waaxda luqadaha, qaybta kaalmada adeegyada, ferny melgoza . So [facility] [phone].    ATENCIÓN: Si habla español, tiene a flower disposición servicios gratuitos de asistencia lingüística. Marixa al [phone].    We comply with applicable federal civil rights laws and [place] laws. We do not discriminate on the basis of race, color, national origin, age, disability, sex, sexual orientation, or gender identity.            Thank you!     Thank you for choosing [facility]  for your care. Our goal is always to provide you with excellent care. Hearing back from our patients is one way we can continue to improve our services. Please take a few minutes to complete the written survey that you may receive in the mail after your visit with us. Thank you!             Your Updated Medication List - Protect others around you: Learn how to safely use, store and throw away your medicines at  www.disposemymeds.org.          This list is accurate as of 4/18/18 11:59 PM.  Always use your most recent med list.                   Brand Name Dispense Instructions for use Diagnosis    ALPRAZolam 0.5 MG tablet    XANAX    30 tablet    Take 1 tablet (0.5 mg) by mouth daily as needed    Dystonic tremor       oxyCODONE IR 5 MG tablet    ROXICODONE    30 tablet    Take 1-2 tablets (5-10 mg) by mouth every 4 hours as needed for other (pain control or improvement in physical function. Hold dose for analgesic side effects.)    Essential tremor       rosuvastatin 5 MG tablet    CRESTOR    30 tablet    Take 1 tablet (5 mg) by mouth daily    Hyperlipidemia LDL goal <160       sertraline 100 MG tablet    ZOLOFT    90 tablet    TAKE 1 TABLET BY MOUTH EVERY DAY    History of major depression

## 2018-04-23 NOTE — PROGRESS NOTES
Left Vim    Pass 1 X:-13.16 (10.8 from 3rd vent) Y:-5.99 Z:-0.01 AP:64.0deg MSP:16.5deg    Pen 1 center  Unremarkable dorsal thalamus.  Ventral tier from, possibly, +5.6.  Proprioceptive fields from 5.6 through +1.203, then one mor at -0.06, all of them upper extremity, except the most dorsal, which was tongue.  OUt of thalamus by -1.983, without ever a tactile field.    Microstimulation gave paresthesias, always upper extremity, at 100 uA at -1.983mm, 30 uA at +0.041, at 60 uA at +0.978, at 60 uA at +2.012, an no effect up to 90 uA at  +3.023, 4.009, and +4.972    Pen2 posterior  Unremarkable dorsal thalamsu, ventral tier from, pssibly, 6.0.  Upper extermity proprioceptive fiedls from +6.0 through +2.115, a spindle unit (also upper extremity) at +1.575, deep tactile upper extermity +1.203 through 1.01, tactile finger at +0.578 and tactile bckground tongue at -1.28 and -1.37.  Bottom at, probably, -1.983  Microstimulation gave paresthesis, always upper extremity, at 20 uA at -1.983, at 30 uA at +0.041, at 20 uA at +0.978, at 30 uA at +2.012, at 70 yA at +3.023, at 70 uA at +4.008 and at 90 uA at +4.972    For DBS lead placement, we made a 0.75 mm medial move in the X-Y stage frame of reference, and used the anterior hole,  depth -0.06.  [employer]ott Infinity 0.5  Stimulation [id number] 130 Hz, 60 usec transient paresthesias up to 3.0 mA, with tremor reduction on Finger-chin-finger from 0.5 mA, and reduction of mouth tremor from possibly 0.75, clearer by 1.5. We then advanced the electrode by one contact spacing (to -2.1 mm) final position.    1h mapping  0.5h programming

## 2018-04-27 ENCOUNTER — OFFICE VISIT (OUTPATIENT)
Dept: NEUROLOGY | Facility: CLINIC | Age: 68
End: 2018-04-27
Payer: COMMERCIAL

## 2018-04-27 VITALS
DIASTOLIC BLOOD PRESSURE: 79 MMHG | SYSTOLIC BLOOD PRESSURE: 148 MMHG | WEIGHT: 135 LBS | HEART RATE: 72 BPM | BODY MASS INDEX: 23.92 KG/M2 | HEIGHT: 63 IN

## 2018-04-27 DIAGNOSIS — R25.1 TREMOR: Primary | ICD-10-CM

## 2018-04-27 ASSESSMENT — PAIN SCALES - GENERAL: PAINLEVEL: NO PAIN (0)

## 2018-04-27 NOTE — MR AVS SNAPSHOT
After Visit Summary   4/27/2018    Irish Rosales    MRN: [id number]           Patient Information     Date Of Birth          1950        Visit Information        Provider Department      4/27/2018 8:20 AM Edwar Colvin MD [facility]        Care Instructions    Thank for coming to the [facility] for your DBS programming appointment.     Today we adjusted your settings to improve your tremor. Our primary target for programming is your tremor in the right arm. We were able to improve your ability to hold and drink from a cup. Your head tremor was slightly improved as well. We find that mid-line symptoms such as voice tremor and head tremor respond best to bilateral (both sides) stimulation. We are optimistic by your response so far.     We increased your current to 5.0 mA and the frequency to 180 Hz.     You can decrease the current if you have side effects. The bottom range is 0. The side effects we watch out for are tingling, problems with coordination, and problems with balance. Please let us know if you have side effects.     We would like to follow up with you in 3 months to check your device and see how you are doing.            Follow-ups after your visit        Your next 10 appointments already scheduled     Jul 25, 2018  4:00 PM CDT   (Arrive by 3:45 PM)   Return Movement Disorder with  MOVEMENT DISORDER FELLOW   [facility] ([facility])    [address] [place] [id number] [phone]              Who to contact     Please call your clinic at [phone] to:    Ask questions about your health    Make or cancel appointments    Discuss your medicines    Learn about your test results    Speak to your doctor            Additional Information About Your Visit        MyChart Information     [employer] gives you secure access to your electronic health record. If you see a primary care provider, you can also  "send messages to your care team and make appointments. If you have questions, please call your primary care clinic.  If you do not have a primary care provider, please call [phone] and they will assist you.      [employer] is an electronic gateway that provides easy, online access to your medical records. With [employer], you can request a clinic appointment, read your test results, renew a prescription or communicate with your care team.     To access your existing account, please contact your [facility] or call [phone] for assistance.        Care EveryWhere ID     This is your Care EveryWhere ID. This could be used by other organizations to access your [facility] medical records  [id number]        Your Vitals Were     Pulse Height BMI (Body Mass Index)             72 1.6 m (5' 3\") 23.91 kg/m2          Blood Pressure from Last 3 Encounters:   04/27/18 148/79   04/11/18 140/53   03/28/18 145/90    Weight from Last 3 Encounters:   04/27/18 61.2 kg (135 lb)   04/11/18 61.2 kg (135 lb)   03/28/18 61.2 kg (135 lb)              Today, you had the following     No orders found for display         Today's Medication Changes          These changes are accurate as of 4/27/18 10:14 AM.  If you have any questions, ask your nurse or doctor.               These medicines have changed or have updated prescriptions.        Dose/Directions    rosuvastatin 5 MG tablet   Commonly known as:  CRESTOR   This may have changed:  when to take this   Used for:  Hyperlipidemia LDL goal <160        Dose:  5 mg   Take 1 tablet (5 mg) by mouth daily   Quantity:  30 tablet   Refills:  11       sertraline 100 MG tablet   Commonly known as:  ZOLOFT   This may have changed:  See the new instructions.   Used for:  History of major depression        TAKE 1 TABLET BY MOUTH EVERY DAY   Quantity:  90 tablet   Refills:  0                Primary Care Provider Office Phone # Fax #    Braulio Victoria MD PhD [phone] " [phone]       [address]  [facility] [address]        Equal Access to Services     SAMINA PORTER : Hadii aad ku hadmagano Solevi, watravisda luqadaha, qaybta kaalmada delisa, ferny stephens laFlowernikki ballard. So [facility] [phone].    ATENCIÓN: Si habla español, tiene a flower disposición servicios gratuitos de asistencia lingüística. Llame al [phone].    We comply with applicable federal civil rights laws and [place] laws. We do not discriminate on the basis of race, color, national origin, age, disability, sex, sexual orientation, or gender identity.            Thank you!     Thank you for choosing [facility] NEUROLOGY  for your care. Our goal is always to provide you with excellent care. Hearing back from our patients is one way we can continue to improve our services. Please take a few minutes to complete the written survey that you may receive in the mail after your visit with us. Thank you!             Your Updated Medication List - Protect others around you: Learn how to safely use, store and throw away your medicines at www.disposemymeds.org.          This list is accurate as of 4/27/18 10:14 AM.  Always use your most recent med list.                   Brand Name Dispense Instructions for use Diagnosis    ALPRAZolam 0.5 MG tablet    XANAX    30 tablet    Take 1 tablet (0.5 mg) by mouth daily as needed    Dystonic tremor       oxyCODONE IR 5 MG tablet    ROXICODONE    30 tablet    Take 1-2 tablets (5-10 mg) by mouth every 4 hours as needed for other (pain control or improvement in physical function. Hold dose for analgesic side effects.)    Essential tremor       rosuvastatin 5 MG tablet    CRESTOR    30 tablet    Take 1 tablet (5 mg) by mouth daily    Hyperlipidemia LDL goal <160       sertraline 100 MG tablet    ZOLOFT    90 tablet    TAKE 1 TABLET BY MOUTH EVERY DAY    History of major depression

## 2018-04-27 NOTE — PATIENT INSTRUCTIONS
Thank for coming to the [facility] for your DBS programming appointment.     Today we adjusted your settings to improve your tremor. Our primary target for programming is your tremor in the right arm. We were able to improve your ability to hold and drink from a cup. Your head tremor was slightly improved as well. We find that mid-line symptoms such as voice tremor and head tremor respond best to bilateral (both sides) stimulation. We are optimistic by your response so far.     We increased your current to 5.0 mA and the frequency to 180 Hz.     You can decrease the current if you have side effects. The bottom range is 0. The side effects we watch out for are tingling, problems with coordination, and problems with balance. Please let us know if you have side effects.     We would like to follow up with you in 3 months to check your device and see how you are doing.

## 2018-04-27 NOTE — PROGRESS NOTES
"[facility]  Movement Disorders Clinic    Irish Rosales  YOB: 1950  MRN: [id number]    Reason for visit: DBS programming  DBS implantation 3/6/2018  IPG implantation left chest 3/13/2018 [employer]    History of Present Illness:    rIish Rosales is a 67 year old right handed woman with a history of dystonic tremor, vocal dystonia, and cervical dystonia s/p left Vim DBS who presented for DBS programming. She was last seen 4/11/2018 for initial programming at which time she reported improvement of her RUE tremor and head tremor. However, when she woke up the next morning, she felt that she had lost a large majority of her benefit. Her benefit decreased over a few days 25-50%. At the end of the last session she felt that her head tremor was greatly improved after initial programming but now it is nearly back to baseline. While her RUE tremor is still better than baseline - she notes intermittent problems with spilling when holding a coffee cup. Her goal would be improved drinking from a cup with her right hand. She feels the benefit for her voice tremor has remained the same.     She has made no changes to her settings with the patient . She denied injuries or falls. She is not experiencing any side effects except for occasional \"fogginess\" with her walking. However - at this time she feels that her walking is fine and does not have the \"fogginess.\" She recently travelled to [place] to visit her children. The trip was uneventful with regards to Airport security and DBS interference.       MEDICATIONS:  Alprazolam 0.5 mg prn  Oxycodone prn  Rosuvastatin 5 mg daily  Sertraline 100 mg daily    No tremor medications    Review of Systems:  12 point review of systems completed. Pertinent positives and negatives above and in HPI    Physical Exam:    Vitals: /79 (BP Location: Right arm, Patient Position: Chair, Cuff Size: Adult Regular)  Pulse 72  Ht 1.6 m (5' 3\")  Wt 61.2 " "kg (135 lb)  BMI 23.91 kg/m2  General: Cooperative, no acute distress  HEENT: Normocephalic and nontraumatic, sclera white, moist mucous membranes  Cardiac: Regular rate and rhythm  Respiratory: Nonlabored breathing  Extremities: Distal pulses intact. No UE or LE edema    NEUROLOGIC:  MENTAL STATUS: Fully alert, attentive and oriented.   CRANIAL NERVES: EOM full. No nystagmus. Face is symmetric.   SPEECH: Marked voice tremor with both \"eee\" and \"ahhh.\" Speech is nearly 100% intelligible.     MOTOR: See tremor rating scale for scores below. Intermittent slight face tremors, improved from previous exam. No tongue tremor today. Head tremor seen both at rest and with posture. RUE tremor is better compared to the left side. The RUE tremor is proximal in nature and is slight. At times - it seems higher amplitude but there is a component of spread from the head tremor. Action tremor is slight. No tremor at rest today in either limb. Lower limbs are not affected. Action induced posturing of the left hand, especially with walking. Neck dystonia noted, with left turn and right tilt, slight retrocollis. On top of head tremor, there are phasic head movements.     COORDINATION: Slight RUE ataxia and rebound at end of programming, not observed before changing stimulation parameters. See notes. No LUE ataxia.   GAIT: Rises easily from chair. Posture is upright. Normal base. Arm swing slightly diminished on the left with intermittent dystonic posturing of the left hand and prominent left hand tremor while ambulating. She easily turns in a single step. Able to tandem 4-5 steps before stepping to the side.     Thien Vega Marin Tremor Rating Scale:  TREMOR:  0: None-- 1: Slight -- 2: Moderate -- 3: Marked -- 4: Severe Amplitude   1. FACE: 1   2. TONGUE: 0   3. VOICE: 3   4. HEAD: 1 at rest, 2 posture   5. RUE: 0 at rest, 1 posture, 1 with action   6. LUE: 0 at rest, 1 posture, 1 with action   7.TRUNK: 0   8. RLE: 0   9. LLE: " 0  10. HANDWRITIN  11. LARGE SPIRALS:                     RUE: 1    LUE: 1   12. SMALL SPIRALS:   RUE: 1   LUE: 1    13: STRAIGHT LINES:   RUE: 1   LUE: 1  14. WATER POUR TASK:    RUE: 2    LUE: 1     Performance scale:  21   QoL Scale:   11   Total:    32         PROCEDURE:  DBS PROGRAMMING     Battery status: 2.97v  ON/OFF: On, 100%  Implanted generator: [employer] Infinity   Lead type: Infinity 1-3-3-1 (1.27 x 0.5 mm)  Lead site(s): Left Vim    Impedance Check: No problems found  Therapy impedance: ok, 887 W     Program 1  Active Left Brain             Initial Final   Amplitude (mA)  4.0  5.0   Pulse width (usec)  30  30   Freq (Hz)  160  180   Contacts: C  +  +   Contacts: 4       Contacts: 3       Contacts: 2  b-  b-   Contacts: 1  -  -          Contacts Amplitude PW Freq Ramp? Effect Side effect    C+1-2b-  4.0 30 160  no  RUE posture 1, action 1   Lg spiral 1, sm spiral 1   Head tremor 2  No gait ataxia currently    C+1-2b-  4.25  30  160  yes  RUE posture 1, action 1   Lg spiral 1, sm spiral 1   Head tremor 2      C+1-2b-  4.5  30  160  yes  RUE posture trace, action 1 but better  Right hand transient tingling.     C+1-2b-  4.75  30  160  yes    Right hand transient tingling    C+1-2b-  5.0  30  160  yes  RUE posture trace, action 1 but better.    Head tremor 2 but better   Voice 3  Right hand transient tingling.   No gait ataxia    C+1-2b-  5.25  30  160  yes  RUE posture trace, action 1 (same), Head tremor 2  No tingling this time    C+1-2b-  5.5  30  160  yes  RUE posture trace, action trace. Head tremor 1. Water pour 0. Able to drink from cup, single hand without spilling.  Right hand transient tingling, slight RUE ataxia. Able to tandem 4 steps. Ataxia on spirals.     C+1-2b-  6.0  30 160  yes  RUE posture trace, action trace, spirals show ataxia,    Head tremor 1  Right hand transient tingling. RUE ataxia worse. No gait ataxia. Able to tandem 4-5 steps.     C+1-2b-  5.0  30  170  yes  RUE posture trace  trace, action trace, head tremor 1-2 (worse than previous setting), voice 3  No tingling    C+1-2b-  5.0  30  176  yes    No tingling    C+1-2b-  5.0  30  180  yes  RUE posture trace, trace action tremor, head tremor 1. Lg spiral 1, sm spiral 2 (but from ataxia). Water pour 1. Able to drink from cup single hand without spilling. Voice 3.   No tingling. Slight RUE ataxia. Slight RUE rebound. No gait ataxia. Able to tandem 5 steps.       Final therapy impedances:  Program 1: 887 W    Patient : 0 to 5.0 mA     IMPRESSION:  Irish Rosales is a 67 year old right handed woman with dystonic tremor, vocal dystonia & tremor, and cervical dystonia s/p left Vim DBS who presented for reprogramming. We were able to adjust her stimulation parameters to improve tremor in the RUE - especially for the targeted symptom of holding a cup of water and drinking from a cup. We established a high threshold on monopolar review - up to 6.0 mA - at which point RUE ataxia outweighed benefit. Gait ataxia was not observed today.     We discussed that improvement of her head tremor is a promising sign and may motivate us to pursue second side DBS surgery (right Vim) to improve head tremor further. However - at this time head tremor is not our primary programming target. Midline symptoms such as head tremor, cervical dystonia, and vocal dystonia often require bilateral DBS to optimize symptom control. Today we focused on improving the right arm tremor - which was demonstrably better with the water pour task. Her spirals were slightly worse than baseline due to RUE ataxia - however the benefit in her function outweighs this slight side effect.     Overall, she felt her function was much better on the final settings: C+1-2b-, 5/0 mA, 30 ms, 180 Hz. She will observe for continued benefit at home. She was counseled on potential side effects of RUE paresthesias, RUE ataxia, and gait ataxia. Her patient  range is set from 0 to 5 mA  and she was instructed to call if she needs to decrease her current due to side effects.     PLAN:  - DBS stimulation parameters adjusted as above  - Follow up in 3 months    Patient seen and discussed with Dr. Colvin    Patient seen and examined with Dr. Hernandez and I agree with the assessment and plan as outlined.    Edwar Colvin PhD, MD

## 2018-04-27 NOTE — PROGRESS NOTES
Movement Disorders Clinic    HPI:  Ms. Rosales is a 67-year-old right-handed woman with essential tremor/dystonic tremor status post left VIM DBS in March 2018.  She underwent monopolar review on April 11 with improvement both in right upper extremity and vocal tremor.  However she only had significant improvement for 2 days, and then began to notice worsening of her tremors.  Given concern that some of her worsening may be stimulation related, she was recommended to turn her DBS off for a couple days and then turn it back on at a lower voltage.  She was much worse with her DBS off.  She presents today for adjustment of her DBS settings.    Current Outpatient Prescriptions   Medication Sig Dispense Refill     ALPRAZolam (XANAX) 0.5 MG tablet Take 1 tablet (0.5 mg) by mouth daily as needed 30 tablet 1     oxyCODONE IR (ROXICODONE) 5 MG tablet Take 1-2 tablets (5-10 mg) by mouth every 4 hours as needed for other (pain control or improvement in physical function. Hold dose for analgesic side effects.) 30 tablet 0     rosuvastatin (CRESTOR) 5 MG tablet Take 1 tablet (5 mg) by mouth daily (Patient taking differently: Take 5 mg by mouth every morning ) 30 tablet 11     sertraline (ZOLOFT) 100 MG tablet TAKE 1 TABLET BY MOUTH EVERY DAY (Patient taking differently: TAKE 1 TABLET BY MOUTH EVERY MORNING) 90 tablet 0        Allergies   Allergen Reactions     Sulfa Drugs Swelling and Rash     Atorvastatin      Leg cramps     Simvastatin      Leg cramp     Examination  B/P: Data Unavailable, T: Data Unavailable, P: Data Unavailable, R: Data Unavailable 0 lbs 0 oz  not currently breastfeeding., There is no height or weight on file to calculate BMI.    General examination: no apparent distress   Carotid: No bruits.   CV: Heart regular rate and rhythm  Pulm: clear to ascultation bilaterally     Neuro exam:   Mental status:  Alert, oriented x3.  Speech fluent with normal naming, repetition, comprehension.  Good right-left  orientation, Can remember ***/3 objects, good fund of knowledge    Cranial nerves:  Pupils are equal, round, reactive to light. Extraocular movements intact. Visual fields intact,  Facial sensation intact, facial strength intact.  Hearing intact to finger rub bi. Palate moves symmetrically.  Tongue midline.  Sternocleidomastoid and trapezius strength intact.    Motor: Tone: ***. Motor in upper and lower extremities. 5/5.    Sensation: intact to light touch and vibration in all extremities   Coordination: Good finger-nose-finger, fine finger movement, heel-shin maneuver,  Gait: normal except for ***. Romberg and postural stability *** Tremor present/absent  Reflexes: normoreflexic and symmetric throughout.    Plantars: downgoing bi     Battery status  ON/OFF:  Implanted generator:  Lead site(s):  Impedance Check:      Group (A,B,C,D) Left Brain         Right Brain       Initial Final Initial Final   Amplitude (V/mA)           Pulse width (usec)           Freq (Hz)           Contacts: C           Contacts: 3           Contacts: 2           Contacts: 1           Contacts: 0                 Group (A,B,C,D) Left Brain         Right Brain       Initial Final Initial Final   Amplitude (V/mA)  4.0    N/A     Pulse width (usec)  60         Freq (Hz)  130         Contacts: C  +         Contacts: 3           Contacts: 2  b-         Contacts: 1  -         Contacts: 0              Contacts Amplitude Pulse width Freq Ramp? Effect Side effect                                                                                                                                                      Therapy impedances:  Group A  Group B  Group C  Group D     Assessment/plan:

## 2018-05-03 ENCOUNTER — TELEPHONE (OUTPATIENT)
Dept: NEUROLOGY | Facility: CLINIC | Age: 68
End: 2018-05-03

## 2018-05-03 DIAGNOSIS — R25.1 TREMOR: Primary | ICD-10-CM

## 2018-05-03 NOTE — TELEPHONE ENCOUNTER
Returned Ms Rosales's call regarding her tremor. Over the last two days, the tremor in her right hand and arm has worsened - about to where it was before DBS stimulation adjustment last week. She confirmed with her patient  that she is at 5 mA. She has not made any changes herself. Denied injuries etc.     My recommendation to her was:  - Reduce current to 4.0 mA over the weekend  - On Monday - increase to 4.25 mA   - Stay at 4.25 mA for three days  - Then continue to increase stimulation by 0.25 mA every three days until she gets back up to 5.0 mA    She was able to repeat the instructions back to me.     She will call to let us know how she is doing when she gets back to 5.0 mA again.    Sandy Hernandez MD  Movement Disorders Fellow

## 2018-05-04 DIAGNOSIS — Z86.59 HISTORY OF MAJOR DEPRESSION: ICD-10-CM

## 2018-05-04 NOTE — LETTER
May 7, 2018      Irish Rosales  [address] [address]  [place] [facility] [address]              Dear Irish,      We recently received a call from your pharmacy requesting a refill of your medication. We have provided a 30 day refill of your medication, sertraline (ZOLOFT) 100 MG tablet, as requested.      A review of your chart indicates that your last office visit was on 10/23/2017.  At this appointment Dr. Victoria advised for a return visit in 3 months time for: wellness exam, repeat fasting labs.    We have authorized a one time refill of your medication to allow time for you to schedule.     If you have a history of diabetes or high cholesterol, please come in fasting for the appointment. Fasting entails nothing to eat or drink 8 hours prior to your appointment; with the exception on water. You may take your medication the day of the appointment.    Please call the clinic to schedule your appointment.    Thank you for taking an active role in your healthcare.      Sincerely,    [facility]

## 2018-05-07 ENCOUNTER — PATIENT OUTREACH (OUTPATIENT)
Dept: GERIATRIC MEDICINE | Facility: CLINIC | Age: 68
End: 2018-05-07

## 2018-05-07 RX ORDER — SERTRALINE HYDROCHLORIDE 100 MG/1
TABLET, FILM COATED ORAL
Qty: 90 TABLET | Refills: 0 | OUTPATIENT
Start: 2018-05-07

## 2018-05-07 RX ORDER — SERTRALINE HYDROCHLORIDE 100 MG/1
TABLET, FILM COATED ORAL
Qty: 30 TABLET | Refills: 0 | Status: SHIPPED | OUTPATIENT
Start: 2018-05-07 | End: 2018-06-19

## 2018-05-07 NOTE — TELEPHONE ENCOUNTER
sertraline (ZOLOFT) 100 MG tablet 90 tablet 0 2/2/2018  No   Sig: TAKE 1 TABLET BY MOUTH EVERY DAY       Last OV with Dr. Victoria: 10/23/2017 return in 3 months for wellness and repeat fasting lipids.     No future apts.     PHQ-9 SCORE 6/15/2016 6/14/2017 2/2/2018   Total Score - - -   Total Score MyChart - - 0   Total Score 2 0 0     SSRIs Protocol Passed5/4 6:47 AM   Recent (12 mo) or future (30 days) visit within the authorizing provider's specialty    Patient is age 18 or older    No active pregnancy on record    No positive pregnancy test in last 12 months     Patient due for 6 month depression follow up, wellness exam, and repeat fasting labs. Radha refill. Patient notified by letter.

## 2018-05-07 NOTE — PROGRESS NOTES
[facility] Care Coordination Contact  Communication History     User: Michelle Sutton LSW Date/time: 5/7/2018 10:48 AM    Comment: Scheduled Annual Home Visit for  Tuesday, May 8th at 2:30pm.    Context:  Outcome:     Phone number: [phone] Phone Type: Home Phone    Comm. type: Telephone Call type: Outgoing    Contact: Irish Rosales Relation to patient: Self        INNA Heck  [facility]  [phone]  Fax: [phone]

## 2018-05-08 ENCOUNTER — PATIENT OUTREACH (OUTPATIENT)
Dept: GERIATRIC MEDICINE | Facility: CLINIC | Age: 68
End: 2018-05-08

## 2018-05-08 ASSESSMENT — ACTIVITIES OF DAILY LIVING (ADL): DEPENDENT_IADLS:: MEAL PREPARATION;SHOPPING;LAUNDRY;COOKING;CLEANING

## 2018-05-09 ASSESSMENT — PATIENT HEALTH QUESTIONNAIRE - PHQ9: SUM OF ALL RESPONSES TO PHQ QUESTIONS 1-9: 3

## 2018-05-09 NOTE — PROGRESS NOTES
[facility] Care Coordination Contact    [facility] Home Visit Assessment     Home visit for Health Risk Assessment with Irish Roasles completed on May 8, 2018    Type of residence:: Apartment - handicap accessible  Current living arrangement:: I live alone     Assessment completed with:: Patient    Current Care Plan  Member currently receiving the following home care services: No  Member currently receiving the following community resources: No    Medication Review  Medication reconciliation completed in Epic: Yes  Medication set-up & administration: Independent-does not set up.  Self-administers medications.  Medication understanding concerns (by member, family or CC): No  Medication adherence concerns (by member, family or CC): No    Mental/Behavioral Health   Depression Screening: See PHQ assessment flowsheet.   Mental health DX:: Yes (Depression, major, in remission (H))   Mental health DX how managed:: Medication  No current MH services-will place referral for Medication    Falls Assessment:   Fallen 2 or more times in the past year?: No   Any fall with injury in the past year?: No    ADL/IADL Dependencies:   Dependent ADLs:: Eating, Grooming  Dependent IADLs:: Meal Preparation, Shopping, Laundry, Cooking, Cleaning    [facility] sponsored benefits: Shared information re: Silver Sneakers/gym memberships, ASA, Calcium +D.    PCA Assessment completed at visit: Not applicable     Elderly Waiver Eligibility: Yes-will open to     Care Plan & Recommendations: Irish is having more difficulty with cooking, cleaning, meal prep, and shopping d/t worsening tremors. Irish is requesting homemaking services for assist w/ these needs. Irish is going to ask her neighbors to see if one of their homemakers would be willing to be her homemaker. In the meantime, this CM will look for a homemaking company that can provide five hours a week of homemaking. Irish is also having difficulty  holding a cup to drink and using eating utensils while having soup. Irish is agreeable to being evaluated by OT for adaptive eating utensils and other DME recommendations that could be beneficial to her due to the tremors.    See [facility] for detailed assessment information.    Follow-Up Plan: Member informed of future contact, plan to f/u with member with a 6 month telephone assessment.  Contact information shared with member and family, encouraged member to call with any questions or concerns at any time.    [place] care continuum providers: Please refer to Health Care Home on the Epic Problem List to view this patient's [facility] Care Plan Summary.    INNA Heck  [facility]  [phone]  Fax: [phone]

## 2018-05-10 NOTE — PROGRESS NOTES
I have not seen the patient since October 2017 and have evaluate or discuss her need for OT.   Please schedule an appointment for clinic for Face to Face encounter.

## 2018-05-11 NOTE — PROGRESS NOTES
Called patient to discuss provider request for Face to Face encounter. Patient is agreeable to provider plan and scheduled office visit with PCP on 05/23/18.  Jennifer Herrera CMA

## 2018-05-17 ENCOUNTER — TELEPHONE (OUTPATIENT)
Dept: NEUROLOGY | Facility: CLINIC | Age: 68
End: 2018-05-17

## 2018-05-17 NOTE — TELEPHONE ENCOUNTER
"Called patient back to discuss her concerns and symptoms. She has fallen several times. She did get up to 5 mA but went back down to 4.5 because she was \"shaking uncontrollably\" her whole body. She states, \"the last couple of months have been tiresome.\"    Patient is very unsteady and concerned.     [facility]: Nurse (RN) Note  SITUATION/BACKGROUND                                                      Irish Rosales is a 67 year old female, who complains of increased tremor and gait instability. She has fallen several times in the past week, is very unsteady and concerned. When she gets up to walk, she feels off balance and stumbles.  A couple times she fell but did not hit her head. She is s/p Deep Brain Stimulation surgery LVIM using [employer] on 3/6/18.     Current Status:  Onset: Since programming her DEEP BRAIN STIMULATION   Progression of Symptoms:  worsening  Accompanying Signs & Symptoms: Her voice is very choppy over the phone and somewhat difficult to understand. Her voice tremor was improved initially after Deep Brain Stimulation surgery.    Was instructed by Dr. Hernandez on 5/3:     - Reduce current to 4.0 mA over the weekend  - On Monday - increase to 4.25 mA   - Stay at 4.25 mA for three days  - Then continue to increase stimulation by 0.25 mA every three days until she gets back up to 5.0 mA    RECOMMENDATION/PLAN                                                      Medical Plan:  FUTURE APPOINTMENTS:       - Follow up visit on 5/25 with Dr. Colvin (and Dr. Juarez - 4th Friday of the month)  PATIENT INSTRUCTIONS: This writer instructed patient to turn off dbs to see if her stability improves. Advised that she will have more tremor but we want her to be safe, this will also determine if the stimulation is causing any of this instability.            "

## 2018-05-17 NOTE — TELEPHONE ENCOUNTER
[facility] Call Center    Phone Message    May a detailed message be left on voicemail: yes    Reason for Call: Other: PT would like Piedad to give her a call.     Action Taken: Message routed to:  [facility] Neurology

## 2018-05-17 NOTE — TELEPHONE ENCOUNTER
Spoke to Dr. Juarez. She suggests patient keep her Deep Brain Stimulation system turned off until next Friday 5/26 when she can be seen for an in depth appointment to look into the problem.     Called patient back asking her to send a Probity message on Monday, letting me know if the feeling of unsteadiness has improved. She will bring her patient  next Friday and will keep her stimulation off until then or instructed otherwise. Patient did not hit her head when she fell.

## 2018-05-23 ENCOUNTER — OFFICE VISIT (OUTPATIENT)
Dept: PEDIATRICS | Facility: CLINIC | Age: 68
End: 2018-05-23
Payer: COMMERCIAL

## 2018-05-23 VITALS
OXYGEN SATURATION: 98 % | DIASTOLIC BLOOD PRESSURE: 82 MMHG | BODY MASS INDEX: 23.21 KG/M2 | WEIGHT: 131 LBS | SYSTOLIC BLOOD PRESSURE: 138 MMHG | HEART RATE: 83 BPM | TEMPERATURE: 96.8 F

## 2018-05-23 DIAGNOSIS — G24.3 CERVICAL DYSTONIA: Primary | ICD-10-CM

## 2018-05-23 DIAGNOSIS — Z12.11 SPECIAL SCREENING FOR MALIGNANT NEOPLASMS, COLON: ICD-10-CM

## 2018-05-23 DIAGNOSIS — M85.89 OSTEOPENIA OF MULTIPLE SITES: ICD-10-CM

## 2018-05-23 PROCEDURE — 99213 OFFICE O/P EST LOW 20 MIN: CPT | Performed by: INTERNAL MEDICINE

## 2018-05-23 NOTE — MR AVS SNAPSHOT
After Visit Summary   5/23/2018    Irish Rosales    MRN: [id number]           Patient Information     Date Of Birth          1950        Visit Information        Provider Department      5/23/2018 2:50 PM Braulio Victoria MD PhD [facility] [place]        Today's Diagnoses     Tremor    -  1    Special screening for malignant neoplasms, colon        Osteopenia of multiple sites          Care Instructions    Make appointment(s) for:   -- wellness visit toward the end of October (Oct 28 or after)   -- bone density, colonoscopy, mammogram (Oct 25 or after)      Check with your insurance regarding coverage for Shingrix - the new shingles vaccine.      See referral for physical therapy.             Follow-ups after your visit        Additional Services     GASTROENTEROLOGY ADULT REF PROCEDURE ONLY       Last Lab Result: Creatinine (mg/dL)       Date                     Value                 03/07/2018               0.54             ----------  Body mass index is 23.21 kg/(m^2).     Needed:  No  Language:  English    Patient will be contacted to schedule procedure.     Please be aware that coverage of these services is subject to the terms and limitations of your health insurance plan.  Call member services at your health plan with any benefit or coverage questions.  Any procedures must be performed at a [facility] facility OR coordinated by your clinic's referral office.    Please bring the following with you to your appointment:    (1) Any X-Rays, CTs or MRIs which have been performed.  Contact the facility where they were done to arrange for  prior to your scheduled appointment.    (2) List of current medications   (3) This referral request   (4) Any documents/labs given to you for this referral            OCCUPATIONAL THERAPY REFERRAL       *This therapy referral will be filtered to a centralized scheduling office at [facility] and the patient will  receive a call to schedule an appointment at a [facility] location most convenient for them. *     [facility] provides Occupational Therapy evaluation and treatment and many specialty services across the [facility] system.  If requesting a specialty program, please choose from the list below.    If you have not heard from the scheduling office within 2 business days, please call [phone] for all locations, with the exception of [place], please call [phone] and [place], please call [phone]    Treatment: Evaluation & Treatment for tremor                  Your next 10 appointments already scheduled     May 25, 2018 10:20 AM CDT   (Arrive by 10:05 AM)   Return Movement Disorder with Edwar Colvin MD   [facility] Neurology ([facility] [place])    [address] [address] [phone]            Jul 25, 2018  4:00 PM CDT   (Arrive by 3:45 PM)   Return Movement Disorder with  MOVEMENT DISORDER FELLOW   [facility] Neurology ([facility] [place])    [address] [address] [phone]              Future tests that were ordered for you today     Open Future Orders        Priority Expected Expires Ordered    DX Hip/Pelvis/Spine Routine  5/23/2019 5/23/2018            Who to contact     If you have questions or need follow up information about today's clinic visit or your schedule please contact [facility] directly at [phone].  Normal or non-critical lab and imaging results will be communicated to you by MyChart, letter or phone within 4 business days after the clinic has received the results. If you do not hear from us within 7 days, please contact the clinic through MyChart or phone. If you have a critical or abnormal lab result, we will notify you by phone as soon as possible.  Submit refill requests through [employer] or call your pharmacy and they will forward  the refill request to us. Please allow 3 business days for your refill to be completed.          Additional Information About Your Visit        [facility] Information     [employer] gives you secure access to your electronic health record. If you see a primary care provider, you can also send messages to your care team and make appointments. If you have questions, please call your primary care clinic.  If you do not have a primary care provider, please call [phone] and they will assist you.      [employer] is an electronic gateway that provides easy, online access to your medical records. With [employer], you can request a clinic appointment, read your test results, renew a prescription or communicate with your care team.     To access your existing account, please contact your [facility] or call [phone] for assistance.        Care EveryWhere ID     This is your Care EveryWhere ID. This could be used by other organizations to access your [place] medical records  [id number]        Your Vitals Were     Pulse Temperature Pulse Oximetry BMI (Body Mass Index)          83 96.8  F (36  C) (Temporal) 98% 23.21 kg/m2         Blood Pressure from Last 3 Encounters:   05/23/18 138/82   04/27/18 148/79   04/11/18 140/53    Weight from Last 3 Encounters:   05/23/18 131 lb (59.4 kg)   04/27/18 135 lb (61.2 kg)   04/11/18 135 lb (61.2 kg)              We Performed the Following     GASTROENTEROLOGY ADULT REF PROCEDURE ONLY     OCCUPATIONAL THERAPY REFERRAL          Today's Medication Changes          These changes are accurate as of 5/23/18  3:33 PM.  If you have any questions, ask your nurse or doctor.               These medicines have changed or have updated prescriptions.        Dose/Directions    rosuvastatin 5 MG tablet   Commonly known as:  CRESTOR   This may have changed:  when to take this   Used for:  Hyperlipidemia LDL goal <160        Dose:  5 mg   Take 1 tablet (5 mg) by mouth daily    Quantity:  30 tablet   Refills:  11                Primary Care Provider Office Phone # Fax #    Braulio Victoria MD PhD [phone] [address]  [facility] [address]        Equal Access to Services     SAMINA PORTER : Hadii tammy ku ildao Sotoniali, waaxda luqadaha, qaybta kaalmada adebrittnida, ferny stephens laFlowernikki ballard. So [facility] [place] [phone].    ATENCIÓN: Si habla español, tiene a flower disposición servicios gratuitos de asistencia lingüística. Llame al [phone].    We comply with applicable federal civil rights laws and [place] laws. We do not discriminate on the basis of race, color, national origin, age, disability, sex, sexual orientation, or gender identity.            Thank you!     Thank you for choosing [facility]  for your care. Our goal is always to provide you with excellent care. Hearing back from our patients is one way we can continue to improve our services. Please take a few minutes to complete the written survey that you may receive in the mail after your visit with us. Thank you!             Your Updated Medication List - Protect others around you: Learn how to safely use, store and throw away your medicines at www.disposemymeds.org.          This list is accurate as of 5/23/18  3:33 PM.  Always use your most recent med list.                   Brand Name Dispense Instructions for use Diagnosis    ALPRAZolam 0.5 MG tablet    XANAX    30 tablet    Take 1 tablet (0.5 mg) by mouth daily as needed    Dystonic tremor       oxyCODONE IR 5 MG tablet    ROXICODONE    30 tablet    Take 1-2 tablets (5-10 mg) by mouth every 4 hours as needed for other (pain control or improvement in physical function. Hold dose for analgesic side effects.)    Essential tremor       rosuvastatin 5 MG tablet    CRESTOR    30 tablet    Take 1 tablet (5 mg) by mouth daily    Hyperlipidemia LDL goal <160       sertraline 100 MG tablet    ZOLOFT    30 tablet    TAKE 1 TABLET BY  MOUTH EVERY DAY    History of major depression

## 2018-05-23 NOTE — PATIENT INSTRUCTIONS
Make appointment(s) for:   -- wellness visit toward the end of October (Oct 28 or after)   -- bone density, colonoscopy, mammogram (Oct 25 or after)      Check with your insurance regarding coverage for Shingrix - the new shingles vaccine.      See referral for physical therapy.

## 2018-05-23 NOTE — PROGRESS NOTES
SUBJECTIVE:   Irish Rosales is a 67 year old female who presents to clinic today for the following health issues:      Having stimulater adjusted again this Friday, would like referral to OT if procedure is not successful.     Has cervical dystonia.  Had deep brain stimulator implanted in March.  She has had 2 adjustments.  She is going to have a third adjustment on Friday.  If this is not successful, patient will like to get occupational therapy.      She is also here to update on preventive issues.  She is due for bone density and colon cancer screening.  Mammogram will be due later this year after October 25.    She has no other concerns at this time.          Current Outpatient Prescriptions on File Prior to Visit:  rosuvastatin (CRESTOR) 5 MG tablet Take 1 tablet (5 mg) by mouth daily (Patient taking differently: Take 5 mg by mouth every morning )   sertraline (ZOLOFT) 100 MG tablet TAKE 1 TABLET BY MOUTH EVERY DAY   ALPRAZolam (XANAX) 0.5 MG tablet Take 1 tablet (0.5 mg) by mouth daily as needed     No current facility-administered medications on file prior to visit.        Patient Active Problem List   Diagnosis     Spasm of vocal cords     Essential tremor     Osteoporosis     Hyperlipidemia LDL goal <160     Generalized anxiety disorder     History of major depression     Carpal tunnel syndrome     Closed nondisp fracture of distal phalanx of lesser toe of right foot     Cervical radiculopathy     Right shoulder pain     Impingement syndrome of right shoulder     Family history of tremor     Family history of movement disorder     Dystonic tremor     Dystonic movements     H/O magnetic resonance imaging of brain and brain stem     mild abnormality in carotid study     Encounter for neuropsychological testing     Health Care Home     Advance care planning     Depression, major, in remission (H)     Past Surgical History:   Procedure Laterality Date     ------------OTHER-------------  2004    vocal  cord thyroplasty at [facility]     C BSO, OMENTECTOMY W/XAVIER  1997    hysterectomy     C HAND/FINGER SURGERY UNLISTED  1998    right carpal tunnel surgery     IMPLANT DEEP BRAIN STIMULATION GENERATOR / BATTERY Left 3/13/2018    Procedure: IMPLANT DEEP BRAIN STIMULATION GENERATOR / BATTERY;  Deep Brain Stimulator Placement, Phase II, Placement Of Deep Brain Stimulator Generator/Battery Over The Left Chest Wall;  Surgeon: Aleksander Morales MD;  Location: [facility] OR     OPTICAL TRACKING SYSTEM INSERTION DEEP BRAIN STIMULATION Left 3/6/2018    Procedure: OPTICAL TRACKING SYSTEM INSERTION DEEP BRAIN STIMULATION;  Stealth Assisted Left Deep Brain Stimulator Placement, Phase I, Placement Of Left Side Deep Brain Stimulator Electrode, Target Left Ventral Intermediate Nucleus Of The Thalamus With Microelectrode Recording;  Surgeon: Aleksander Morales MD;  Location: [facility] OR     RELEASE CARPAL TUNNEL Left 1/2/2015    Procedure: RELEASE CARPAL TUNNEL;  Surgeon: SHANIA Hernandez MD;  Location: [facility] OR     RELEASE ULNAR NERVE (ELBOW) Left 1/2/2015    Procedure: RELEASE ULNAR NERVE (ELBOW);  Surgeon: SHANIA Hernandez MD;  Location: [facility] OR       Social History   Substance Use Topics     Smoking status: Never Smoker     Smokeless tobacco: Never Used     Alcohol use Yes      Comment: occasionally     Family History   Problem Relation Age of Onset     Hypertension Father      and mother     CEREBROVASCULAR DISEASE Father      Tremor Mother      Lung Cancer Mother      Neurologic Disorder Sister      dystonic voice x 2 botox     Neurologic Disorder Sister      jose m mn. facial movement              Problem list, Medication list, Allergies, and Medical/Social/Surgical histories reviewed in [facility] and updated as appropriate.    OBJECTIVE:                                                    /82  Pulse 83  Temp 96.8  F (36  C) (Temporal)  Wt 131 lb (59.4 kg)  SpO2 98%  BMI 23.21 kg/m2    GENERAL: 68-year-old female  with constant head tremor and voice tremor, alert and no distress        Diagnostic test results:  Results for orders placed or performed in visit on 04/11/18   CT Head w/o Contrast    Narrative    CT HEAD W/O CONTRAST 4/11/2018 1:37 PM    Provided History: Tremor  ICD-10: Tremor    Comparison: 3/7/2018.    Technique: Using multidetector thin collimation helical acquisition  technique, axial, coronal and sagittal CT images from the skull base  to the vertex were obtained without intravenous contrast.     Findings:    Several images are mildly degraded by artifact related to patient  motion. Left frontal parietal dorota hole for a left frontal approach  DBS lead with tip in the inferior left thalamic region, unchanged.  Streak artifact associated with the lead. No intracranial hemorrhage,  mass effect, or midline shift. The ventricles are proportionate to the  cerebral sulci. The gray to white matter differentiation of the  cerebral hemispheres is preserved. The basal cisterns are patent.     The visualized paranasal sinuses are clear. The mastoid air cells are  clear.       Impression    Impression: Unchanged left frontal DBS lead with tip in the left  subthalamic region. No acute intracranial pathology.    MATILDE PICKENS MD         ASSESSMENT/PLAN:                                                      68 year old female with the following diagnoses and treatment plan:      ICD-10-CM    1. Tremor R25.1 OCCUPATIONAL THERAPY REFERRAL   2. Special screening for malignant neoplasms, colon Z12.11 GASTROENTEROLOGY ADULT REF PROCEDURE ONLY   3. Osteopenia of multiple sites M85.89 DX Hip/Pelvis/Spine       --Occupational therapy referral placed per patient request.  She is due for bone density and colon cancer screening.  These were ordered.  --Return around late October for annual wellness visit.    Will call or return to clinic if worsening or symptoms not improving as discussed.  See Patient Instructions.      Braulio Victoria,  MD-PhD  [facility]    (Note: Chart documentation was done in part with Dragon Voice Recognition software. Although reviewed after completion, some word and grammatical errors may remain.)

## 2018-05-25 ENCOUNTER — PATIENT OUTREACH (OUTPATIENT)
Dept: GERIATRIC MEDICINE | Facility: CLINIC | Age: 68
End: 2018-05-25

## 2018-05-25 ENCOUNTER — OFFICE VISIT (OUTPATIENT)
Dept: NEUROLOGY | Facility: CLINIC | Age: 68
End: 2018-05-25
Payer: COMMERCIAL

## 2018-05-25 VITALS
HEIGHT: 61 IN | BODY MASS INDEX: 25.05 KG/M2 | HEART RATE: 47 BPM | RESPIRATION RATE: 24 BRPM | SYSTOLIC BLOOD PRESSURE: 138 MMHG | WEIGHT: 132.7 LBS | DIASTOLIC BLOOD PRESSURE: 82 MMHG | OXYGEN SATURATION: 97 % | TEMPERATURE: 98.1 F

## 2018-05-25 DIAGNOSIS — G24.3 CERVICAL DYSTONIA: Primary | ICD-10-CM

## 2018-05-25 RX ORDER — BACLOFEN 10 MG/1
TABLET ORAL
Qty: 60 TABLET | Refills: 3 | Status: SHIPPED | OUTPATIENT
Start: 2018-05-25 | End: 2018-05-29

## 2018-05-25 ASSESSMENT — PAIN SCALES - GENERAL: PAINLEVEL: NO PAIN (0)

## 2018-05-25 NOTE — NURSING NOTE
Chief Complaint   Patient presents with     RECHECK     [facility]- MOVEMENT DISORDER F/U     Juvenal Cabello, NING

## 2018-05-25 NOTE — PROGRESS NOTES
[facility]    HPI:  Ms. Rosales is a 67-year-old woman with history of essential tremor status post left VIM DBS on 03/06/18 presenting for follow-up. She underwent monopolar survey on 04/11/18 with transient improvement in both head and right upper extremity tremor.  However this improvement was very short-lived, and she was seen in the clinic again 2 weeks later at which time her voltage was increased as well as the frequency, again with tremor improvement although with some slight ataxia.  Unfortunately, shortly thereafter she kind of this letter know her gait had significantly worsened and she was having falls.  Given concern about stimulation induced ataxia leading to falls, we had recommended she turn off her DBS until she can be reevaluated in clinic today.    She reports she is only able to tolerate being off her DBS for about 1 day prior to the right extremity tremor became very bothersome for her.  She subsequently turned back on her DBS has been on the lower current of 4.5 mA.  At this setting, she has not noticed any significant changes in her balance and feels her balance is back to baseline.  Right upper extremity tremor remains very bothersome for her and she needs to use 2 hands to hold a cup, spills frequently, and has difficulty doing personal hygiene.  She also has prominent head tremor that is very bothersome for her and is leading to significant neck pain.  Her sister reports that was severe enough several days ago where she had to hold the back of her head entire time during [place].    Current Outpatient Prescriptions   Medication Sig Dispense Refill     ALPRAZolam (XANAX) 0.5 MG tablet Take 1 tablet (0.5 mg) by mouth daily as needed 30 tablet 1     oxyCODONE IR (ROXICODONE) 5 MG tablet Take 1-2 tablets (5-10 mg) by mouth every 4 hours as needed for other (pain control or improvement in physical function. Hold dose for analgesic side effects.) (Patient not taking: Reported on  5/8/2018) 30 tablet 0     rosuvastatin (CRESTOR) 5 MG tablet Take 1 tablet (5 mg) by mouth daily (Patient taking differently: Take 5 mg by mouth every morning ) 30 tablet 11     sertraline (ZOLOFT) 100 MG tablet TAKE 1 TABLET BY MOUTH EVERY DAY 30 tablet 0        Allergies   Allergen Reactions     Sulfa Drugs Swelling and Rash     Atorvastatin      Leg cramps     Simvastatin      Leg cramp     Patient Active Problem List   Diagnosis     Spasm of vocal cords     Essential tremor     Osteoporosis     Hyperlipidemia LDL goal <160     Generalized anxiety disorder     History of major depression     Carpal tunnel syndrome     Closed nondisp fracture of distal phalanx of lesser toe of right foot     Cervical radiculopathy     Right shoulder pain     Impingement syndrome of right shoulder     Family history of tremor     Family history of movement disorder     Dystonic tremor     Dystonic movements     H/O magnetic resonance imaging of brain and brain stem     mild abnormality in carotid study     Encounter for neuropsychological testing     Health Care Home     Advance care planning     Depression, major, in remission (H)       Examination  B/P: Data Unavailable, T: Data Unavailable, P: Data Unavailable, R: Data Unavailable 0 lbs 0 oz  not currently breastfeeding., There is no height or weight on file to calculate BMI.    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    Motor: Normal tone in all extremities  Coordination: Finger to nose with mild postural tremors/mild ataxia bilaterally  Intermittent questionable rest tremor that is likely moreso  "reverberation from her head tremor  Very mild postural tremor on the right with batwing position  Gait: Normal stance, she had mild difficulty with tandem gait otherwise appears steady  Handwriting is legible and improved from prior pre-DBS assessments  With spirals, there is somewhat jerky quality with question of ataxia versus reverberation from her head tremor    Tremor rating scale to be scanned into epic:  Exam: 37  Disability: 14    Battery status  ON/OFF:  Implanted generator: Infinity   Lead site(s): left VIM  Impedance Check:   Impedence 900 ohms   Group (A,B,C,D) Left Brain         Right Brain       Initial Final Initial Final   Amplitude (V/mA)  4.5  see table below       Pulse width (usec)  30         Freq (Hz)  180         Contacts: C  +         Contacts: 4           Contacts: 3           Contacts: 2B  -         Contacts: 1  -            Contacts Amplitude Pulse width Freq Ramp? Effect Side effect    DBS off    30  180  yes  FTN- 3      C+, 2B-  0.5  30  180    speech very difficult to understand        1.0  30  180    FTN- 3        1.5  30  180      no SE      2.0  30  180    FTN- 2.5        2.5  30  180     \"felt little jolts\"       3.0  30  180    FTN-2.0      3.5 30 180   FTN-2.0 Trace UE ataxia, as tremor subsides, no clear threshold    4.0 30 180  FTN-2.0  \"felt little jolts\"     4.5 30 180  FTN-2.0 \"felt little jolts\" (transient)    5.0 30 180  FTN-2.0     5.5 30 180  FTN-2.0     6.0 30 180  FTN-2.5    C+, 2A- 0.5 30 180  Speak worse again  (it had improved)     1.0 30 180  FTN-3      1.5 30 180  FTN-3      2.0 30 180  FTN-3       2.5  30  180    FTN-2.5       3.0 30 180   FTN-2.5      3.5 30 180  FTN- 2.0     4.0 30 180  FTN- 1.5      4.5 30   FTN- 1.5 (less ataxia than on 2B     5.0 30   FTN-1.5  Felt \"a little off\" with gait    6.0 30   FTN-2.0    C+, 2C- 0.5 30   FTN-3.0     1.0 30   FTN-3.0     1.5 30        2.0 30   FTN-2.5     2.5 30   FTN-2.5     3.0 30   FTN-2.5     3.5 30   FTN-2.5     " 4.0 30   FTN-1.5     4.5 30   FTN-1.5     5.0    FTN-1.0, gait steady     6.0    FTN-2.0,  More UE ataxia                                   30                Final DBS settings:  Program Contacts Current PW Freq   2A (active) C+,2A- 4.0 mA (range 0-4.0) 30 msec 180 Hz   2B C+,2B- 4.0 mA (range 0-4.0) 30 msec 180 Hz   2C C+,2C- 4.0 mA (range 0-4.0) 30 msec 180 Hz   Old settings  C+, 1-, 2B- 4.5 mA (range 0-4.5) 30 msec 180 Hz            Assessment/plan:   Ms. Rosales is a 67-year-old woman with essential tremor and cervical dystonia/dystonic tremor presenting for follow-up.  She has had issues with stimulation induced ataxia affecting her gait previously with programming.  In reviewing previous surgeries spirals and handwriting, there has been improvement in her tremor of the right upper extremity.  As a result, however there is now more of a component of ataxia.  With adjustments of her DBS today, we were able to strike a better balance between treating tremor and not causing too much stimulation induced ataxia.  At this time her gait does not appear ataxic.  Unfortunately, we have not noticed much improvement in her head tremor which may be a mixed tremor with components of essential tremor and dystonic tremor.    -DBS adjustments with plan to do a trial at each of the subunits on contact 2 for 2 weeks and then to let us know which one is best  -she also has the backup of the her old settings as needed  -If this is not sufficient, we might add a segment from contact 3 in the future   -in the future, we may consider second side surgery to see if this improves her midline tremors/head tremor  -given her neck pain, I also prescribed PRN baclofen 5-10mg BID PRN for symptomatic relief     .The case and recommendations were discussed with Dr. Colvin, who has spoken with and examined the patient and helped to formulate the impression and recommendations.    Ida Juarez MD  Movement disorders fellow       Patient seen and  examined with Dr. Juarez and I agree with the assessment and plan as outlined.  Briefly:  Stimulation is certainly suppressing RUE tremor -- both her history and exam show that, pretty clearly -- but not sufficiently.  Probably, with any settings we are compromising between tremor suppression and ataxia, but with the contacts we were using, an amplitude in excess of 4.5mA is clearly past the optimum, so we can't get more tremor suppression out of the currrent contacts by increasing amplitude.   Today, we systematically investigated 2a, 2b, and 2c.  All three suppressed RUE tremor (and improve vocal tremor too, convincingly) generally with appearance of mild UE ataxia, though not gait ataxia, at the upper end of the therapeutic amplitude range.  2c seemed slightly better, but, rather than rely solely on observations in the office, we'll investigate these more chronically, at home, before proceeding (options include adding the corresponding segment from contact 3, combining two segments from contact 2, or bipolar stimulation with one or more segments of 2 negative, and 1 positive).  Also, for pain associated with the head movements, interpreting those as partly dystonic, we'll try baclofen.  In addition to RUE tremor per se, and to RUE ataxia at higher stimulation intensity, some of the arm shaking may be a secondary consequence of head shaking, which is not responding to unilateral stimulation (as expected, and unlike vocal tremor, which, unexpectedly, is, a little). This raises the question of whether second-side surgery might help the RUE tremor by improving the axial tremor.      Complex programming, two hours.    Edwar Colvin PhD, MD

## 2018-05-25 NOTE — PATIENT INSTRUCTIONS
We will prescribe a medication called baclofen 10mg tablets of which you can take 1/2-1 tab up to twice a day as needed for neck pain.     We will have you do group 2A for two weeks then go to group 2B for two weeks. Then, chose the better of 2A and 2B and after you have been on this program for at least one week, please go to group 2C.     Please let us know which group ends up being best.     You will also have the option to go back to your old settings.

## 2018-05-25 NOTE — MR AVS SNAPSHOT
After Visit Summary   5/25/2018    Irish Rosales    MRN: [id number]           Patient Information     Date Of Birth          1950        Visit Information        Provider Department      5/25/2018 10:20 AM Edwar Colvin MD [facility]        Today's Diagnoses     Cervical dystonia    -  1      Care Instructions    We will prescribe a medication called baclofen 10mg tablets of which you can take 1/2-1 tab up to twice a day as needed for neck pain.     We will have you do group 2A for two weeks then go to group 2B for two weeks. Then, chose the better of 2A and 2B and after you have been on this program for at least one week, please go to group 2C.     Please let us know which group ends up being best.     You will also have the option to go back to your old settings.          Follow-ups after your visit        Your next 10 appointments already scheduled     Jun 04, 2018  8:30 AM CDT   Neuro Eval with Trudy Walters, OT   [facility] ([facility])    [address] [facility] [id number] [phone]            Jul 25, 2018  4:00 PM CDT   (Arrive by 3:45 PM)   Return Movement Disorder with  MOVEMENT DISORDER FELLOW   [facility] ([facility])    [address] [address] [phone]              Who to contact     Please call your clinic at [phone] to:    Ask questions about your health    Make or cancel appointments    Discuss your medicines    Learn about your test results    Speak to your doctor            Additional Information About Your Visit        MyChart Information     [employer] gives you secure access to your electronic health record. If you see a primary care provider, you can also send messages to your care team and make appointments. If you have questions, please call your primary care clinic.  If you do not have a primary care provider, please  "call [phone] and they will assist you.      [employer] is an electronic gateway that provides easy, online access to your medical records. With [employer], you can request a clinic appointment, read your test results, renew a prescription or communicate with your care team.     To access your existing account, please contact your [facility] or call [phone] for assistance.        Care EveryWhere ID     This is your Care EveryWhere ID. This could be used by other organizations to access your [place] medical records  [id number]        Your Vitals Were     Pulse Temperature Respirations Height Pulse Oximetry Breastfeeding?    47 98.1  F (36.7  C) (Oral) 24 1.537 m (5' 0.5\") 97% No    BMI (Body Mass Index)                   25.49 kg/m2            Blood Pressure from Last 3 Encounters:   05/25/18 138/82   05/23/18 138/82   04/27/18 148/79    Weight from Last 3 Encounters:   05/25/18 60.2 kg (132 lb 11.2 oz)   05/23/18 59.4 kg (131 lb)   04/27/18 61.2 kg (135 lb)              Today, you had the following     No orders found for display         Today's Medication Changes          These changes are accurate as of 5/25/18 12:27 PM.  If you have any questions, ask your nurse or doctor.               Start taking these medicines.        Dose/Directions    baclofen 10 MG tablet   Commonly known as:  LIORESAL   Used for:  Cervical dystonia   Started by:  Edwar Colvin MD        Please take 1/2-1 tab up to twice a day as needed for neck pain.   Quantity:  60 tablet   Refills:  3         These medicines have changed or have updated prescriptions.        Dose/Directions    rosuvastatin 5 MG tablet   Commonly known as:  CRESTOR   This may have changed:  when to take this   Used for:  Hyperlipidemia LDL goal <160        Dose:  5 mg   Take 1 tablet (5 mg) by mouth daily   Quantity:  30 tablet   Refills:  11         Stop taking these medicines if you haven't already. Please contact your care " team if you have questions.     oxyCODONE IR 5 MG tablet   Commonly known as:  ROXICODONE   Stopped by:  Edwar Colvin MD                Where to get your medicines      These medications were sent to [facility] - [place], [place] - [address] AT [facility] [address] [place] [address]  [address], [place] [address]     Phone:  [phone]     baclofen 10 MG tablet                Primary Care Provider Office Phone # Fax #    Braulio Victoria MD PhD [phone]       [address]  [facility] [address]        Equal Access to Services     [facility]: Hadii aad ku hadasho Soomaali, waaxda luqadaha, qaybta kaalmada adeegyada, waxay idiin haysunn gerald melgoza . So [facility] [phone].    ATENCIÓN: Si habla español, tiene a flower disposición servicios gratuitos de asistencia lingüística. [facility] [phone].    We comply with applicable federal civil rights laws and [place] laws. We do not discriminate on the basis of race, color, national origin, age, disability, sex, sexual orientation, or gender identity.            Thank you!     Thank you for choosing [facility]UROLOG[facility]  for your care. Our goal is always to provide you with excellent care. Hearing back from our patients is one way we can continue to improve our services. Please take a few minutes to complete the written survey that you may receive in the mail after your visit with us. Thank you!             Your Updated Medication List - Protect others around you: Learn how to safely use, store and throw away your medicines at www.disposemymeds.org.          This list is accurate as of 5/25/18 12:27 PM.  Always use your most recent med list.                   Brand Name Dispense Instructions for use Diagnosis    ALPRAZolam 0.5 MG tablet    XANAX    30 tablet    Take 1 tablet (0.5 mg) by mouth daily as needed    Dystonic tremor       baclofen 10 MG tablet    LIORESAL    60 tablet    Please take 1/2-1 tab up to twice  a day as needed for neck pain.    Cervical dystonia       rosuvastatin 5 MG tablet    CRESTOR    30 tablet    Take 1 tablet (5 mg) by mouth daily    Hyperlipidemia LDL goal <160       sertraline 100 MG tablet    ZOLOFT    30 tablet    TAKE 1 TABLET BY MOUTH EVERY DAY    History of major depression

## 2018-05-25 NOTE — PROGRESS NOTES
[facility] Care Coordination Contact  Received after visit chart from care coordinator.  Completed following tasks: Mailed copy of care plan to client and Entered MMIS  Mailed a list of [facility] agencies.  Provider Signature - No POC Shared:  Member indicates that they do not want their POC shared with any [facility] providers.  Chart was returned to CR Hickman  Case Management Specialist  [facility]  [phone]

## 2018-05-28 ENCOUNTER — PATIENT OUTREACH (OUTPATIENT)
Dept: GERIATRIC MEDICINE | Facility: CLINIC | Age: 68
End: 2018-05-28

## 2018-05-28 NOTE — PROGRESS NOTES
Communication History     User: Michelle Sutton LSW Date/time: 5/25/2018  5:37 PM    Context:  Outcome:     Phone number:  Phone Type:     Comm. type: Telephone Call type: Outgoing    Contact: Irish Rosales Relation to patient: Self    User: Michelle Sutton LS Date/time: 5/25/2018  5:34 PM    Context:  Outcome:     Phone number:  Phone Type:     Comm. type: Telephone Call type: Outgoing    Contact: [facility] Relation to patient:     User: Michelle Sutton LSW Date/time: 5/25/2018  5:32 PM    Context:  Outcome:     Phone number:  Phone Type:     Comm. type: Telephone Call type: Outgoing    Contact: [employer] Relation to patient:           [employer] is looking for a homemaking for 5 hours per week.    [facility] doesn't have any staff available to provide homemaking services, but can place Irish on a waiting list. Requested that her demographics be faxed to them.  Will fax demographics to [facility].    [facility] stated that it's difficulty to find staff to provide services in [facility].    LM for Irish informing her that a homemaking company hasn't been found yet after she left writer a message asking if one had been found.     Speaking w/ another company on 5/29/18 about potential staff.    INNA Heck  [facility]  [phone]  Fax: [phone]

## 2018-05-29 ENCOUNTER — PATIENT OUTREACH (OUTPATIENT)
Dept: GERIATRIC MEDICINE | Facility: CLINIC | Age: 68
End: 2018-05-29

## 2018-05-29 ENCOUNTER — TELEPHONE (OUTPATIENT)
Dept: NEUROLOGY | Facility: CLINIC | Age: 68
End: 2018-05-29

## 2018-05-29 DIAGNOSIS — G24.3 CERVICAL DYSTONIA: ICD-10-CM

## 2018-05-29 RX ORDER — BACLOFEN 10 MG/1
TABLET ORAL
Qty: 180 TABLET | Refills: 1 | Status: SHIPPED | OUTPATIENT
Start: 2018-05-29 | End: 2018-10-25

## 2018-05-29 NOTE — PROGRESS NOTES
[facility] Care Coordination Contact  Communication History     User: Michelle Sutton, INNA Date/time: 5/29/2018 10:16 AM    Context:  Outcome:     Phone number: [phone] Phone Type: Home Phone    Comm. type: Telephone Call type: Outgoing    Contact: Irish Rosales Relation to patient: Self    User: Michelle Sutton, LSAMANDA Date/time: 5/29/2018 10:16 AM    Context:  Outcome:     Phone number: [phone] Phone Type:     Comm. type: Telephone Call type: Outgoing    Contact: [employer]: Emerald Relation to patient:     User: Michelle Sutton, LSAMANDA Date/time: 5/29/2018 10:16 AM    Context:  Outcome:     Phone number: [phone] Phone Type:     Comm. type: Telephone Call type: Incoming    Contact: [employer]: Emerald Relation to patient:         Received message from Emerald at [employer] that she was able to find a homemaking for Irish. Faxed a copy of Irish's demographics to [employer] per Emerald's request. LM for Emerald informing her that writer would write and submit a homemaking auth to [facility].    Spoke w/ Irish and updated her that this CM found a homemaking company. Requested for Irish to call this CM if she doesn't hear from the [employer] with in a week or two.    Requested for CMS to write a homemaking auth.    INNA Heck  [facility]  [phone]  Fax: [phone]

## 2018-05-30 NOTE — PROGRESS NOTES
[facility] Care Coordination Contact  Communication History     User: Michelle Sutton LSW Date/time: 5/29/2018 10:16 AM    Context:  Outcome:     Phone number: [phone] Phone Type: Home Phone    Comm. type: Telephone Call type: Outgoing    Contact: Irish Rosales Relation to patient: Self    User: Michelle Sutton LSW Date/time: 5/29/2018 10:16 AM    Context:  Outcome:     Phone number: [phone] Phone Type:     Comm. type: Telephone Call type: Outgoing    Contact: [employer]: Emerald Relation to patient:      Received message for Irish requesting a call back. Spoke w/ Irish who stated that she received a call from [employer] and they would be out to visit on 5/30/18 to complete paperwork.    INNA Heck  [facility]  [phone]  Fax: [phone]

## 2018-06-04 ENCOUNTER — HOSPITAL ENCOUNTER (OUTPATIENT)
Dept: OCCUPATIONAL THERAPY | Facility: CLINIC | Age: 68
Setting detail: THERAPIES SERIES
End: 2018-06-04
Attending: INTERNAL MEDICINE
Payer: COMMERCIAL

## 2018-06-04 PROCEDURE — 40000125 ZZHC STATISTIC OT OUTPT VISIT: Performed by: OCCUPATIONAL THERAPIST

## 2018-06-04 PROCEDURE — 97535 SELF CARE MNGMENT TRAINING: CPT | Mod: GO | Performed by: OCCUPATIONAL THERAPIST

## 2018-06-04 PROCEDURE — 97165 OT EVAL LOW COMPLEX 30 MIN: CPT | Mod: GO | Performed by: OCCUPATIONAL THERAPIST

## 2018-06-04 ASSESSMENT — ACTIVITIES OF DAILY LIVING (ADL): IADL_QUICK_ADDS: COMMUNICATION/COMPUTER USE

## 2018-06-04 NOTE — PROGRESS NOTES
06/04/18 0800   Quick Adds   Type of Visit Initial Outpatient Occupational Therapy Evaluation   General Information   Start Of Care Date 06/04/18   Referring Physician Braulio Victoria MD PhD   Orders Evaluate and treat as indicated   Other Orders for tremor   Orders Date 05/23/18   Medical Diagnosis Pertinent medical hx includes cervical dystonia, essential tremor in BUEs, cervical radiculopathy,impingement syndrome in  Right shoulder, depression/anxiety. Pt presents with constant head/neck and vocal cord dystonic tremor, and essential tremor of BUE.    Onset of Illness/Injury or Date of Surgery 03/01/18  (Date of DBS, which pt reports exacerabated her symptoms )   Surgical/Medical History Reviewed Yes   Additional Occupational Profile Info/Pertinent History of Current Problem Pt has experienced a 50% increase in tremor of voice, head/neck, and BUE since receiving DBS tx in March 2018. Pt is most concerned about changes in ability to eat/drink without spills. Has tremor in BUE, notices her RUE tremor to be more prominent, thinks this might be because she is right-handed. Has been experiencing frustration/sadness about her tremors, has tried meditation but did not like it, wants recommendations for coping.    Comments/Observations Pt held her neck with 1 or both hands throughout duration of session. Pt had difficulty tolerating sitting for duration of session, taking breaks to stand, stating that her neck dystonia decreases slightly when standing.   Role/Living Environment   Current Community Support Family/friend caregiver  (Wants homemaker for cooking/cleaning services)   Patient role/Employment history Retired  (nurse)   Community/Avocational Activities Reading, playing cards. Used to enjoy going out to eat and movies, but avoids these activities now d/t symptoms   Current Living Environment Apartment/condo   Number of Stairs to Enter Home 0   Number of Stairs Within Home 0   Primary Bathroom Location/Comments  tub/shower, grab bars in shower   Prior Level - Transfers Independent   Prior Level - Ambulation Independent   Prior Level - ADLS Independent   Prior Responsibilities - IADL Meal Preparation;Housekeeping;Laundry;Shopping;Medication management;Finances;Driving   Prior Level Comments Pt is able to perform all IADLs, wants to have services for cooking/cleaning because more challenging with tremors. Pt currently cooks very simple meals that she just has to heat up. Is still driving, reported no accidents but questions her safety in driving, currently uses a neck pillow for support when driving.    Role/Living Environment Comments Pt lives alone, in a senior apartment building    Patient/family Goals Statement To address difficulty with eating/drinking, and to learn of coping techniques for stress related to her symptoms.    Pain   Patient currently in pain Yes   Pain location On bilateral sides of neck    Pain comments 9/10   (baclofen)   Fall Risk Screen   Fall screen comments Couple months ago was having gait instability and experienced frequent loss of balance but was not falling to ground. Gait has since improved    Cognitive Status Examination   Orientation Orientation to person, place and time   Level of Consciousness Alert   Follows Commands and Answers Questions 100% of the time;Able to follow multistep instructions   Visual Perception   Visual Perception Wears glasses   Visual Perception Comments Wants to get eyes checked, has concerns about ability to get eyes checked d/t to dystonic head movements   Sensation   Upper Extremity Sensory Examination No deficits were identified   Range of Motion (ROM)   ROM Comments OCHOA has intact AROM, d/t dystonic movements in neck, has difficulty right and left cervical rotation; 45 degrees bilaterally     Strength   Strength No deficits were identified   Hand Strength   Hand Dominance Right   Coordination   Coordination Comments essential tremor impairs gross motor  coordination in BUE such as feeding self, intact ability to perform fine motor tasks (writing, buttoning) adequately with BUE   Balance   Balance Quick Add No deficits were identified   Transfer Skill   Level of [place]: Transfers independent   Bathing   Level of [place] - Bathing independent   Upper Body Dressing   Level of [place]: Dress Upper Body independent   Upper Body Dressing Comments Reports no difficulty with zippers, buttons.    Lower Body Dressing   Level of [place]: Dress Lower Body independent   Lower Body Dressing Comments Reports no difficulty with tying shoes    Toileting   Level of [place]: Toilet independent   Grooming   Level of [place]: Grooming independent   Grooming Comments Uses electric toothbrush which works well    Eating/Self-Feeding   Level of [place]: Eating independent   Eating/Self Feeding Comments Pt reports difficulty eating/drinking without spills. Uses cup with lid/straw    Instrumental Activities of Daily Living Assessment   IADL Quick Adds Communication/Computer Use   Communication/Computer Use Pt reports that communication on the phone can be difficult due to holding phone to ear and shaking voice, but she gets by, using friends to make phone calls for her. Uses a stylus for typing on phone which works well.    Planned Therapy Interventions   Planned Therapy Interventions ADL training;IADL training;Self care/Home management;Therapeutic activities   Adult OT Eval Goals   OT Eval Goals (Adult) 1;2;3   OT Goal 1   Goal Identifier Coping skills    Goal Description Pt will verbalize 2-3 coping strategies for managing emotional stressors to support pt's mental health and QOL.    Target Date 07/13/18   OT Goal 2   Goal Identifier Tremor    Goal Description Pt will verbalize understanding of at least three strategies to compensate for tremor to support improved performance in ADL/IADL/leisure.    Target Date 07/13/18   Clinical Impression   Criteria  for Skilled Therapeutic Interventions Met Yes, treatment indicated   OT Diagnosis Essential tremors of BLE impairing ADLs/IADLs   Assessment of Occupational Performance 1-3 Performance Deficits   Identified Performance Deficits difficulty feeding self, impaired communication, not involved in leisure activities   Clinical Decision Making (Complexity) Low complexity   Therapy Frequency 1xwk x 3wks   Predicted Duration of Therapy Intervention (days/wks) For duration of 6 weeks to fit scheduling needs, thru 7/13/18   Risks and Benefits of Treatment have been explained. Yes   Patient, Family & other staff in agreement with plan of care Yes   Clinical Impression Comments Pt presents with essential tremor of BUE and dystonic movements of head/neck imparing engagement in ADLs/IADLs/leisure. Pt would benefit from continued skilled OT services to address tremors to support improved performance of daily ADLs,IADLs, leisure.    Total Evaluation Time   Total Evaluation Time 30

## 2018-06-19 DIAGNOSIS — Z86.59 HISTORY OF MAJOR DEPRESSION: ICD-10-CM

## 2018-06-19 RX ORDER — SERTRALINE HYDROCHLORIDE 100 MG/1
100 TABLET, FILM COATED ORAL DAILY
Qty: 90 TABLET | Refills: 1 | Status: SHIPPED | OUTPATIENT
Start: 2018-06-19 | End: 2019-02-06

## 2018-06-19 NOTE — TELEPHONE ENCOUNTER
sertraline (ZOLOFT) 100 MG tablet    Last Written Prescription Date:  5/7/18  Last Fill Quantity: 30 tab,  # refills: 0   Last office visit: 5/23/2018 with prescribing provider:  Dr Victoria   Future Office Visit:

## 2018-06-19 NOTE — TELEPHONE ENCOUNTER
sertraline (ZOLOFT) 100 MG tablet 30 tablet 0 5/7/2018  No   Sig: TAKE 1 TABLET BY MOUTH EVERY DAY   Class: E-Prescribe   Notes to Pharmacy: Patient due for 6 month depression follow up, wellness exam, and repeat fasting labs. Radha refill. Patient notified by letter.     Last OV with Dr. PATEL: 5/23/2018    No future apts.     PHQ-9 SCORE 6/14/2017 2/2/2018 5/8/2018   Total Score - - -   Total Score MyChart - 0 -   Total Score 0 0 3         SSRIs Protocol Passed6/19 9:59 AM   Recent (12 mo) or future (30 days) visit within the authorizing provider's specialty    Patient is age 18 or older    No active pregnancy on record    No positive pregnancy test in last 12 months     Refilled per [facility] protocol.    Rebecca Jhaveri RN

## 2018-06-27 ENCOUNTER — PATIENT OUTREACH (OUTPATIENT)
Dept: GERIATRIC MEDICINE | Facility: CLINIC | Age: 68
End: 2018-06-27

## 2018-06-28 NOTE — PROGRESS NOTES
[facility] Care Coordination Contact  Communication History     User: Michelle Sutton, LSW Date/time: 6/28/2018  1:08 PM    Context:  Outcome: Talked with Patient    Phone number: [phone] Phone Type: Home Phone    Comm. type: Telephone Call type: Outgoing    Contact: Irish Rosales Relation to patient: Self    User: Michelle Sutton, LSW Date/time: 6/28/2018  1:07 PM    Comment: [email or web address]    Context:  Outcome:     Phone number:  Phone Type:     Comm. type: Email Call type: Outgoing    Contact: [employer]: Emanuel  Relation to patient:     User: Michelle Sutton, LSW Date/time: 6/28/2018  1:07 PM    Context:  Outcome:     Phone number: [phone] Phone Type:     Comm. type: Telephone Call type: Outgoing    Contact: [employer]: Emanuel  Relation to patient:     User: Michelle Sutton, LSW Date/time: 6/28/2018  1:07 PM    Context:  Outcome:     Phone number: [phone] Phone Type:     Comm. type: Telephone Call type: Outgoing    Contact: [employer]: Emanuel  Relation to patient:     User: Michelle Sutton, LSW Date/time: 6/28/2018  1:06 PM    Context:  Outcome:     Phone number: [phone] Phone Type:     Comm. type: Telephone Call type: Outgoing    Contact: [employer]: Sheryl Relation to patient:     User: Michelle Sutton, LSW Date/time: 6/27/2018  1:06 PM    Context:  Outcome:     Phone number: [phone] Phone Type: Home Phone    Comm. type: Telephone Call type: Incoming    Contact: Irish Rosales Relation to patient: Self    User: Michelle Sutton, LSW Date/time: 6/27/2018  1:06 PM    Context:  Outcome:     Phone number: [phone] Phone Type: Home Phone    Comm. type: Telephone Call type: Incoming    Contact: Irish Rosales Relation to patient: Self          Received call from Irish requesting to change [employer] companies to [employer] ([phone], contact Emanuel) d/t [employer] sending a homemaker who  Irish couldn't understand due to the language barrier. Irish stated that the homemaker had to cancel a few times.    Spoke w/ [employer] and cancelled services.  [employer] stated that the homemaker has been working for their company for over 5 years and is able to communicate, so maybe it was Irish that couldn't understand her. [employer] said Irish cancelled several homemaking appointments not the homemaker.    Spoke w/ Emanuel at [employer] who said they had staff to provide homemaking for Irihs. Services would most likely start the week of July 9th, but would try and send someone out the week of July 2nd. Emanuel requested that writer email him a copy of Irish's demographics.  Emailed Emanuel demographics.    Spoke w/ Irish and updated her re: a start date for homemaking.    Michelle Sutton AMANDA  [employer]  [phone]  Fax: [phone]

## 2018-06-29 ENCOUNTER — OFFICE VISIT (OUTPATIENT)
Dept: NEUROLOGY | Facility: CLINIC | Age: 68
End: 2018-06-29
Payer: COMMERCIAL

## 2018-06-29 ENCOUNTER — PATIENT OUTREACH (OUTPATIENT)
Dept: GERIATRIC MEDICINE | Facility: CLINIC | Age: 68
End: 2018-06-29

## 2018-06-29 VITALS
OXYGEN SATURATION: 97 % | BODY MASS INDEX: 24.58 KG/M2 | HEIGHT: 61 IN | WEIGHT: 130.2 LBS | HEART RATE: 73 BPM | TEMPERATURE: 98.2 F | SYSTOLIC BLOOD PRESSURE: 115 MMHG | RESPIRATION RATE: 24 BRPM | DIASTOLIC BLOOD PRESSURE: 74 MMHG

## 2018-06-29 DIAGNOSIS — G24.3 CERVICAL DYSTONIA: Primary | ICD-10-CM

## 2018-06-29 DIAGNOSIS — R25.1 TREMOR: ICD-10-CM

## 2018-06-29 ASSESSMENT — PAIN SCALES - GENERAL: PAINLEVEL: EXTREME PAIN (8)

## 2018-06-29 NOTE — PROGRESS NOTES
[facility] Care Coordination Contact   Member Signature - POC Change:  Per CC, member has made a change to their POC.  Care Plan Change Letter mailed to member for signature with a self-addressed return envelope.  Chayo Hickman  Case Management Specialist  [facility]  [phone]

## 2018-06-29 NOTE — PROGRESS NOTES
[facility]    HPI: Irish Rosales is a 68 year-old female with a history of cervical and vocal dystonia as well as dystonic tremor s/p L VIM DBS who presents in follow-up. Previous attempts at DBS settings were either short-lived in benefit (C+1-2b- 4.0mA 30us 160Hz), or limited by imbalance and falls (increased to 5.0mA). Previous trials of turning DBS OFF have led to worsening. She was last seen 5/25/18 and given three programs at each of contact 2's segments to test.    She reports she tried 2a, 2b, and 2c, each for two weeks. She reports 2b was the best. Overall she reports she feels like she is worse than she was before surgery. She reports she is having a lot of neck pain. Her head tremor is worse. She is having more difficulty curling her hair and brushing her teeth which were not problems prior to surgery. Her voice, however has improved since surgery.    She has been turning her DBS off at night, about 5 days per week.    No issues with balance or dysphagia.      Current Outpatient Prescriptions   Medication Sig Dispense Refill     ALPRAZolam (XANAX) 0.5 MG tablet Take 1 tablet (0.5 mg) by mouth daily as needed 30 tablet 1     baclofen (LIORESAL) 10 MG tablet Please take 1/2-1 tab up to twice a day as needed for neck pain. 180 tablet 1     rosuvastatin (CRESTOR) 5 MG tablet Take 1 tablet (5 mg) by mouth daily (Patient taking differently: Take 5 mg by mouth every morning ) 30 tablet 11     sertraline (ZOLOFT) 100 MG tablet Take 1 tablet (100 mg) by mouth daily 90 tablet 1        Allergies   Allergen Reactions     Sulfa Drugs Swelling and Rash     Atorvastatin      Leg cramps     Simvastatin      Leg cramp     Patient Active Problem List   Diagnosis     Spasm of vocal cords     Essential tremor     Osteoporosis     Hyperlipidemia LDL goal <160     Generalized anxiety disorder     History of major depression     Carpal tunnel syndrome     Closed nondisp fracture of distal phalanx of lesser toe  "of right foot     Cervical radiculopathy     Right shoulder pain     Impingement syndrome of right shoulder     Family history of tremor     Family history of movement disorder     Dystonic tremor     Dystonic movements     H/O magnetic resonance imaging of brain and brain stem     mild abnormality in carotid study     Encounter for neuropsychological testing     Health Care Home     Advance care planning     Depression, major, in remission (H)       Examination  B/P: Data Unavailable, T: Data Unavailable, P: Data Unavailable, R: Data Unavailable 130 lbs 3.2 oz  Blood pressure 115/74, pulse 73, temperature 98.2  F (36.8  C), temperature source Oral, resp. rate 24, height 1.537 m (5' 0.5\"), weight 59.1 kg (130 lb 3.2 oz), SpO2 97 %, not currently breastfeeding., Body mass index is 25.01 kg/(m^2).    General examination: no apparent distress     Neuro exam:   Mental status:  Alert, answers questions and follows commands appropriately  Cranial nerves: She has spasmodic dysphonia and at times very difficult to understand, vocal tremor with both eee and aah. Frequent head bobbing, mostly \"yes-yes\" and also milder perioral movements and eyebrow elevation.  Motor: Tremor as in FTM scale, scanned. No bradykinesia. No distal postural tremor on right but irregular jerking more proximal movements. Intention tremor present on right.   Gait: Narrow-based. Can tandem without difficulty  Writing sample compared to pre-operative -- improved.    FTM tremor rating scale scanned:  Disability: 14  Total: 60    Battery status  Group 2B ON  2.95V  Implanted generator: Infinity   Lead site(s): left VIM  Impedance Check: OK    Group 2B ON Left Brain         Right Brain       Initial Final Initial Final   Amplitude (V/mA)  4 same       Pulse width (usec)  30         Freq (Hz)  180         Contacts: C  +         Contacts: 4           Contacts: 3           Contacts: 2B  -         Contacts: 1  -         Therapeutic impedance: 1562 " ohms   Range: 0 to 5mA        Group orig settings Left Brain         Right Brain       Initial Final Initial Final   Amplitude (V/mA)  4.5  same       Pulse width (usec)  30         Freq (Hz)  180         Contacts: C  +         Contacts: 4           Contacts: 3           Contacts: 2B  -         Contacts: 1  -         Therapeutic impedance: 862 ohms  Range 0-4.5mA     Group 2b3b Left Brain         Right Brain       Initial Final Initial Final   Amplitude (V/mA)  5  same       Pulse width (usec)  30         Freq (Hz)  180         Contacts: C  +         Contacts: 4           Contacts: 3B  -         Contacts: 2B  -         Contacts: 1           range 0-6mA  Therapeutic impedance: 1112 ohms      Programming (See scanned spiral samples)  Contacts Amplitude  mA Pulse width Freq Ramp? Effect Side effect   c+2b-3b- 0.5 30 180   jolt    2    No significant change in spiral none    3    Mildly better spiral None, gait ok    4    Much better spiral None, gait ok    5    Spiral similar None, gait ok    6    Spiral similar None, gait ok    7    Spiral similar Gait a little off with right leg                   Assessment/plan:   68 year-old female with a history of cervical and vocal dystonia as well as dystonic tremor s/p L VIM DBS. Notes subjective worsening since DBS surgery, however improved writing sample and worsening with previous attempts to turn off DBS would suggest results may be more complicated than this. DBS seems to have improved her distal right arm tremor as well as speech. However she is reporting worsened truncal/neck movements which appear to be a large component of her current disability. Previous attempts to increase current at C+1-2b- led to gait ataxia.     We assessed C+2b-3b- today with a good effect noted. Will attempt stimulation at this setting. There is room to increase or decrease current. If this is not effective, will attempt trials of 2b at higher and lower currents. If this is ineffective she  "will change back her original settings (c+1-2b-, 4.5mA), and plan to return at her next visit OFF. We will then complete a videotaped ON/OFF assessment and consider whether second side surgery will better help her difficult to treat axial symptoms, or whether another target (GPI) should be considered.    In the meantime have placed referral for Botox for cervical dystonia.    Instructions provided to patient:  1. Stay at 2b3b for about 1-2 weeks.   If you are doing fairly well on this setting, stay on it. Let us know if you have any questions about whether you should stay on it or change things.    2. If you are not doing well on 2b3b, change to 2b and set the current at 5mA.   Leave it on this setting for 1 week.    3. Change the current on 2b to 3mA for one week.    4. Change back to \"orig settings\"    5. Do not turn your DBS on the day of your follow-up appointment. We will plan on doing videotaped exams OFF and ON DBS if none of the above settings work well for you.    Follow-up 1 month    Tushar Deluca MD  Movement Disorders Fellow    Patient seen and examined with Dr. Deluca and I agree with the assessment and plan as outlined. The neurostimulator interrogation and/or programming and was done under my close supervision  Analysis of neurostimulator complex with programming, two hours, [id number], [id number], [id number]    Edwar Colvin PhD, MD  "

## 2018-06-29 NOTE — MR AVS SNAPSHOT
"              After Visit Summary   6/29/2018    Irish Rosales    MRN: [id number]           Patient Information     Date Of Birth          1950        Visit Information        Provider Department      6/29/2018 3:20 PM Edwar Colvin MD [facility] Neurology        Today's Diagnoses     Cervical dystonia    -  1    Tremor          Care Instructions    We have changed you to new DBS settings. This is called 2b3b    1. Stay at 2b3b for about 1-2 weeks.   If you are doing fairly well on this setting, stay on it. Let us know if you have any questions about whether you should stay on it or change things.    2. If you are not doing well on 2b3b, change to 2b and set the current at 5mA.   Leave it on this setting for 1 week.    3. Change the current on 2b to 3mA for one week.    4. Change back to \"orig settings\"    5. Do not turn your DBS on the day of your follow-up appointment. We will plan on doing videotaped exams OFF and ON DBS if none of the above settings work well for you.      We have also placed a referral to have Botox done with Dr. Haile.          Follow-ups after your visit        Additional Services     NEUROLOGY ADULT REFERRAL       Your provider has referred you for the following:   Consult at [facility]: [facility] [phone]   [email or web address]  [facility]: [facility] [phone]   [email or web address]  Movement Disorder  Dr. Haile for Botox    Please be aware that coverage of these services is subject to the terms and limitations of your health insurance plan.  Call member services at your health plan with any benefit or coverage questions.      Please bring the following with you to your appointment:    (1) Any X-Rays, CTs or MRIs which have been performed.  Contact the facility where they were done to arrange for  prior to your scheduled appointment.    (2) List of current " "medications  (3) This referral request   (4) Any documents/labs given to you for this referral                  Your next 10 appointments already scheduled     Jul 25, 2018  4:00 PM CDT   (Arrive by 3:45 PM)   Return Movement Disorder with  MOVEMENT DISORDER FELLOW   [facility] Neurology ([facility])    [address] [address] [phone]              Who to contact     Please call your clinic at [phone] to:    Ask questions about your health    Make or cancel appointments    Discuss your medicines    Learn about your test results    Speak to your doctor            Additional Information About Your Visit        [employer] Information     [employer] gives you secure access to your electronic health record. If you see a primary care provider, you can also send messages to your care team and make appointments. If you have questions, please call your primary care clinic.  If you do not have a primary care provider, please call [phone] and they will assist you.      [employer] is an electronic gateway that provides easy, online access to your medical records. With [employer], you can request a clinic appointment, read your test results, renew a prescription or communicate with your care team.     To access your existing account, please contact your [facility] or call [phone] for assistance.        Care EveryWhere ID     This is your Care EveryWhere ID. This could be used by other organizations to access your [place] medical records  [id number]        Your Vitals Were     Pulse Temperature Respirations Height Pulse Oximetry Breastfeeding?    73 98.2  F (36.8  C) (Oral) 24 1.537 m (5' 0.5\") 97% No    BMI (Body Mass Index)                   25.01 kg/m2            Blood Pressure from Last 3 Encounters:   06/29/18 115/74   05/25/18 138/82   05/23/18 138/82    Weight from Last 3 Encounters:   06/29/18 59.1 kg (130 lb 3.2 oz) "   05/25/18 60.2 kg (132 lb 11.2 oz)   05/23/18 59.4 kg (131 lb)              We Performed the Following     NEUROLOGY ADULT REFERRAL          Today's Medication Changes          These changes are accurate as of 6/29/18  5:15 PM.  If you have any questions, ask your nurse or doctor.               These medicines have changed or have updated prescriptions.        Dose/Directions    rosuvastatin 5 MG tablet   Commonly known as:  CRESTOR   This may have changed:  when to take this   Used for:  Hyperlipidemia LDL goal <160        Dose:  5 mg   Take 1 tablet (5 mg) by mouth daily   Quantity:  30 tablet   Refills:  11                Primary Care Provider Office Phone # Fax #    Braulio Victoria MD PhD [phone] [address]  [facility] [address]        Equal Access to Services     SAMINA PORTER : Vincent flores Solevi, waaxda luqadaha, qaybta kaalmada adeshirayanirmal, ferny melgoza . So [facility] [phone].    ATENCIÓN: Si habla español, tiene a flower disposición servicios gratuitos de asistencia lingüística. Llame al [phone].    We comply with applicable federal civil rights laws and [place] laws. We do not discriminate on the basis of race, color, national origin, age, disability, sex, sexual orientation, or gender identity.            Thank you!     Thank you for choosing [facility] NEUROLOGY  for your care. Our goal is always to provide you with excellent care. Hearing back from our patients is one way we can continue to improve our services. Please take a few minutes to complete the written survey that you may receive in the mail after your visit with us. Thank you!             Your Updated Medication List - Protect others around you: Learn how to safely use, store and throw away your medicines at www.disposemymeds.org.          This list is accurate as of 6/29/18  5:15 PM.  Always use your most recent med list.                   Brand Name Dispense Instructions for use  Diagnosis    ALPRAZolam 0.5 MG tablet    XANAX    30 tablet    Take 1 tablet (0.5 mg) by mouth daily as needed    Dystonic tremor       baclofen 10 MG tablet    LIORESAL    180 tablet    Please take 1/2-1 tab up to twice a day as needed for neck pain.    Cervical dystonia       rosuvastatin 5 MG tablet    CRESTOR    30 tablet    Take 1 tablet (5 mg) by mouth daily    Hyperlipidemia LDL goal <160       sertraline 100 MG tablet    ZOLOFT    90 tablet    Take 1 tablet (100 mg) by mouth daily    History of major depression

## 2018-06-29 NOTE — PATIENT INSTRUCTIONS
"We have changed you to new DBS settings. This is called 2b3b    1. Stay at 2b3b for about 1-2 weeks.   If you are doing fairly well on this setting, stay on it. Let us know if you have any questions about whether you should stay on it or change things.    2. If you are not doing well on 2b3b, change to 2b and set the current at 5mA.   Leave it on this setting for 1 week.    3. Change the current on 2b to 3mA for one week.    4. Change back to \"orig settings\"    5. Do not turn your DBS on the day of your follow-up appointment. We will plan on doing videotaped exams OFF and ON DBS if none of the above settings work well for you.      We have also placed a referral to have Botox done with Dr. Haile.  "

## 2018-07-04 ENCOUNTER — MYC MEDICAL ADVICE (OUTPATIENT)
Dept: PEDIATRICS | Facility: CLINIC | Age: 68
End: 2018-07-04

## 2018-07-06 NOTE — TELEPHONE ENCOUNTER
[facility] CLINICAL DOCUMENTATION    Form Documentation Form or Letter Request    Type or form/letter needing completion: Application for Disability Parking Certificate   Provider: Dr. Victoria  Has provider seen patient for office visit related to reason for form request? Yes  Date form needed: Not indicated  Once completed: Not indicated    Form placed on PCP's desk for completion.  Jennifer Herrera CMA

## 2018-07-22 ASSESSMENT — ENCOUNTER SYMPTOMS
MYALGIAS: 1
SEIZURES: 0
STIFFNESS: 0
WEAKNESS: 0
CHILLS: 0
FATIGUE: 1
MUSCLE CRAMPS: 0
LOSS OF CONSCIOUSNESS: 0
ARTHRALGIAS: 0
FEVER: 0
ALTERED TEMPERATURE REGULATION: 1
NUMBNESS: 0
TINGLING: 0
WEIGHT GAIN: 0
DECREASED APPETITE: 1
POLYPHAGIA: 0
NECK PAIN: 1
POLYDIPSIA: 0
DIZZINESS: 0
HALLUCINATIONS: 0
SPEECH CHANGE: 0
PARALYSIS: 0
MUSCLE WEAKNESS: 0
HEADACHES: 0
INCREASED ENERGY: 1
MEMORY LOSS: 0
NIGHT SWEATS: 0
TREMORS: 1
DISTURBANCES IN COORDINATION: 0
BACK PAIN: 0
JOINT SWELLING: 0
WEIGHT LOSS: 1

## 2018-07-23 ENCOUNTER — PATIENT OUTREACH (OUTPATIENT)
Dept: GERIATRIC MEDICINE | Facility: CLINIC | Age: 68
End: 2018-07-23

## 2018-07-23 NOTE — PROGRESS NOTES
[facility] Care Coordination Contact  Communication History     User: Lesley Michelle INNA CALLAWAY Date/time: 7/23/2018  9:41 AM    Context:  Outcome: Left Message    Phone number: [phone] Phone Type:     Comm. type: Telephone Call type: Outgoing    Contact: [facility]: Emanuel Relation to patient: Other    User: Rebeca Suttonily INNA CALLAWAY Date/time: 7/23/2018  9:41 AM    Context:  Outcome: Talked with Patient    Phone number: [phone] Phone Type: Home Phone    Comm. type: Telephone Call type: Outgoing    Contact: Irish Rosales Relation to patient: Self    User: Lesley Michelle NILTON, INNA Date/time: 7/23/2018  9:40 AM    Comment: Received message    Context:  Outcome:     Phone number: [phone] Phone Type: Home Phone    Comm. type: Telephone Call type: Incoming    Contact: Irish Rosales Relation to patient: Self        Received message from Cammie stating that her homemaker left after 30 minutes when she was suppose to be there for 2.5 hours. Cammie isn't happy with the cleaning that the homemaker provided and isn't sure what to do.    Returned call. Cammie reported that the homemaker didn't clean the tub, move anything, stayed in the bathroom for 5 mins,and left after 30 mins when she was suppose to be there for 2.5 hrs. Cammie doesn't want that homemaker to return and is requesting a new one.    LM for Emanuel at [facility] informing him of the concerns that Cammie expressed, stated that Cammie doesn't want the previous homemaker to return, and inquired about having a different homemaker help her.    Michelle Sutton AMANDA  [facility]  [phone]  Fax: [phone]

## 2018-07-25 ENCOUNTER — OFFICE VISIT (OUTPATIENT)
Dept: NEUROLOGY | Facility: CLINIC | Age: 68
End: 2018-07-25
Payer: COMMERCIAL

## 2018-07-25 VITALS — BODY MASS INDEX: 24.35 KG/M2 | WEIGHT: 129 LBS | HEIGHT: 61 IN

## 2018-07-25 DIAGNOSIS — G25.2 DYSTONIC TREMOR: ICD-10-CM

## 2018-07-25 DIAGNOSIS — R49.0 DYSPHONIA: ICD-10-CM

## 2018-07-25 DIAGNOSIS — G25.0 ESSENTIAL TREMOR: Primary | ICD-10-CM

## 2018-07-25 ASSESSMENT — PAIN SCALES - GENERAL: PAINLEVEL: NO PAIN (0)

## 2018-07-25 NOTE — MR AVS SNAPSHOT
"              After Visit Summary   7/25/2018    Irish Rosales    MRN: [id number]           Patient Information     Date Of Birth          1950        Visit Information        Provider Department      7/25/2018 4:00 PM  MOVEMENT DISORDER FELLOW [facility] Neurology        Today's Diagnoses     Essential tremor    -  1    Dystonic tremor        Dysphonia           Follow-ups after your visit        Who to contact     Please call your clinic at [phone] to:    Ask questions about your health    Make or cancel appointments    Discuss your medicines    Learn about your test results    Speak to your doctor            Additional Information About Your Visit        [employer] Information     [employer] gives you secure access to your electronic health record. If you see a primary care provider, you can also send messages to your care team and make appointments. If you have questions, please call your primary care clinic.  If you do not have a primary care provider, please call [phone] and they will assist you.      [employer] is an electronic gateway that provides easy, online access to your medical records. With [employer], you can request a clinic appointment, read your test results, renew a prescription or communicate with your care team.     To access your existing account, please contact your [facility] or call [phone] for assistance.        Care EveryWhere ID     This is your Care EveryWhere ID. This could be used by other organizations to access your [place] medical records  [id number]        Your Vitals Were     Height BMI (Body Mass Index)                1.537 m (5' 0.5\") 24.78 kg/m2           Blood Pressure from Last 3 Encounters:   06/29/18 115/74   05/25/18 138/82   05/23/18 138/82    Weight from Last 3 Encounters:   07/25/18 58.5 kg (129 lb)   06/29/18 59.1 kg (130 lb 3.2 oz)   05/25/18 60.2 kg (132 lb 11.2 oz)              Today, you had the following     No " orders found for display         Today's Medication Changes          These changes are accurate as of 7/25/18  6:53 PM.  If you have any questions, ask your nurse or doctor.               These medicines have changed or have updated prescriptions.        Dose/Directions    rosuvastatin 5 MG tablet   Commonly known as:  CRESTOR   This may have changed:  when to take this   Used for:  Hyperlipidemia LDL goal <160        Dose:  5 mg   Take 1 tablet (5 mg) by mouth daily   Quantity:  30 tablet   Refills:  11                Primary Care Provider Office Phone # Fax #    Braulio Victoria MD PhD [phone]       [address]  [facility] [address]        Equal Access to Services     [facility]: Hadii tammy tristan hadalvin Solevi, waaxda luson, qaybta kaalmada delisa, fenry melgoza . So [facility] [phone].    ATENCIÓN: Si habla español, tiene a flower disposición servicios gratuitos de asistencia lingüística. [facility] [phone].    We comply with applicable federal civil rights laws and [place] laws. We do not discriminate on the basis of race, color, national origin, age, disability, sex, sexual orientation, or gender identity.            Thank you!     Thank you for choosing [facility] NEUROLOGY  for your care. Our goal is always to provide you with excellent care. Hearing back from our patients is one way we can continue to improve our services. Please take a few minutes to complete the written survey that you may receive in the mail after your visit with us. Thank you!             Your Updated Medication List - Protect others around you: Learn how to safely use, store and throw away your medicines at www.disposemymeds.org.          This list is accurate as of 7/25/18  6:53 PM.  Always use your most recent med list.                   Brand Name Dispense Instructions for use Diagnosis    ALPRAZolam 0.5 MG tablet    XANAX    30 tablet    Take 1 tablet (0.5 mg) by mouth daily as needed     Dystonic tremor       baclofen 10 MG tablet    LIORESAL    180 tablet    Please take 1/2-1 tab up to twice a day as needed for neck pain.    Cervical dystonia       rosuvastatin 5 MG tablet    CRESTOR    30 tablet    Take 1 tablet (5 mg) by mouth daily    Hyperlipidemia LDL goal <160       sertraline 100 MG tablet    ZOLOFT    90 tablet    Take 1 tablet (100 mg) by mouth daily    History of major depression

## 2018-07-25 NOTE — PROGRESS NOTES
"[facility] Neurology  Movement Disorder Clinic    Irish Rosales  YOB: 1950  MRN: [id number]    REASON FOR VISIT: Essential tremor with dystonic features, s/p left Vim DBS  DBS implantation 3/6/2018  IPG 3/13/2018    HISTORY OF PRESENT ILLNESS:  Irish Rosales is a 68 year old woman with essential tremor with dystonic features who returned to clinic for videotaped on/off stimulation tremor rating scales and follow up. Tremor has been present in the hands her entire life, but voice and head tremor started about 15 years ago. She pursued DBS as her symptoms became more disabling. Some symptoms have improved with DBS - including voice tremor, handwriting, and right hand tremor. However - she is still disabled by R hand tremor occurring as the arm approaches her mouth while eating. Additionally, higher amplitude stimulation at the most ventral contact caused side effects of limb and gait ataxia. Multiple attempts at reprogramming the Abbott Infinity device, including utilization of directional contacts, have yet to yield optimal benefits.     Considerations have been raised regarding pursuing the second side (right Vim) vs GPi if dystonia is thought to be a prominent underlying  of the tremor.     MEDICATIONS:  Medications reviewed and updated in Epic.     She has started taking Baclofen due to neck pain    ALLERGIES:  Sulfa drugs  Atorvastatin  Simvastatin    PAST MEDICAL HISTORY:  Medical history reviewed and updated in Epic, no new problems    REVIEW OF SYSTEMS:  12 point review of systems completed. Pertinent positives and negatives above and in HPI    PHYSICAL EXAM:  VITALS: Ht 1.537 m (5' 0.5\")  Wt 58.5 kg (129 lb)  BMI 24.78 kg/m2  GENERAL: Cooperative, no acute distress  HEENT: Normocephalic and nontraumatic, sclera white, moist mucous membranes  RESPIRATORY: Nonlabored breathing  EXTREMITIES: Distal pulses intact. No UE or LE edema    NEUROLOGIC:  Please see dystonia and ET tremor " scales below. Patient has limited neck range of motion for the last 15-20 degrees. Slight left torticollis noted. She has full anterior range of motion. Facial tremor involving the frontalis muscle noted. Prominent irregular head tremor. See scales outlining severity of tremor in the limbs. Gait is steady. She is able to tandem.          Thien Vega Marin Tremor Rating Scale Score     Speaking 2    Feeding other than liquids 2    Bringing liquids to mouth 4    Hygiene 3    Dressing 1    Writing 1    Working 4    Disability subtotal 17       OFF STIMULATION:    Dystonia movement scale:  Region Provoking factor Severity factor  Weight Product   eyes 0 0 0.5 0   Mouth 1 1 0.5 0.5   Speech/swallow 3 3 1.0 9   Neck 2 2 0.5 2   R arm 0 0 1.0 0   L arm 0 0 1.0 0   trunk 0 0 1.0 0   R leg 0 0 1.0 0   L leg 0 0 1.0 0   Total: 11.5    Thien Vega Marin Tremor Rating Scale:  TREMOR:  0: None-- 1: Slight -- 2: Moderate -- 3: Marked -- 4: Severe Amplitude   1. FACE: 2 (frontalis)   2. TONGUE: 0   3. VOICE: 4   4. HEAD: 1 at rest, 2 posture   5. RUE: 0 at rest, 1 posture, 2 with action   6. LUE: 0 at rest, 2 posture, 1 with action   7.TRUNK: 0, 1   8. RLE: 0, 0, 0   9. LLE: 0, 0, 0  10. HANDWRITIN  11. LARGE SPIRALS:                     RUE: 2    LUE: 1   12. SMALL SPIRALS:   RUE: 2   LUE: 2    13: STRAIGHT LINES:   RUE: 2   LUE: 2  14. WATER POUR TASK:    RUE: 4    LUE: 1     Performance scale:                33     ON STIMULATION:    Dystonia movement scale:  Region Provoking factor Severity factor  Weight Product   eyes 0 0 0.5 0   Mouth 1 1 0.5 0.5   Speech/swallow 2 2 1.0 4   Neck 2 2 0.5 2   R arm 0 0 1.0 0   L arm 0 0 1.0 0   trunk 0 0 1.0 0   R leg 0 0 1.0 0   L leg 0 0 1.0 0     Total: 6.5    Improvement: 43%       Thien Vega Marin Tremor Rating Scale:  TREMOR:  0: None-- 1: Slight -- 2: Moderate -- 3: Marked -- 4: Severe Amplitude   1. FACE: 2 (frontalis)   2. TONGUE: 0   3. VOICE: 3   4. HEAD: 1 at rest, 2  posture   5. RUE: 0 at rest, 1 posture, 2 with action   6. LUE: 0 at rest, 2 posture, 1 with action   7.TRUNK: 0, 1   8. RLE: 0, 0, 0   9. LLE: 0, 0, 0  10. HANDWRITIN  11. LARGE SPIRALS:                     RUE: 1    LUE: 1   12. SMALL SPIRALS:   RUE: 2   LUE: 2    13: STRAIGHT LINES:   RUE: 1   LUE: 2  14. WATER POUR TASK:    RUE: 0   LUE: 1     Performance scale:                25     Improvement: 24%      Procedure  DBS Interrogation & Analysis:    Battery status: On 25%  Implanted generator: Abbott Infinity 2.94v  Lead sites: Left Vim    Impedance Check:   No problems found  See scanned report for impedance details.      Stimulation Parameters    LEFT BRAIN    Initial Final   Contacts C+, 2b-, 3b- No change   Amplitude (mA) 5.0    Pulse Width (ms) 30    Frequency (Hz) 180      Patient  Left Brain   Upper Limit 6.0   Lower Limit 0.05       IMPRESSION:   Essential tremor with dystonic tremor vs dystonic tremor. Given appearance of facial tremor, essential tremor is a stronger consideration. Today we completed on/off stimulation tremor rating scales. We will discuss her case at the DBS consensus meeting.     PLAN:  No change in DBS parameters    Follow up in 3 months    Sandy Hernandez MD  Movement Disorders Fellow    I spent greater than 60 minutes with the patient for videotaped rating scales, coordinating with the DBS consensus team, and discussing the various surgical options going forward.     Answers for HPI/ROS submitted by the patient on 2018   General Symptoms: Yes  Skin Symptoms: No  HENT Symptoms: No  EYE SYMPTOMS: No  HEART SYMPTOMS: No  LUNG SYMPTOMS: No  INTESTINAL SYMPTOMS: No  URINARY SYMPTOMS: No  GYNECOLOGIC SYMPTOMS: No  BREAST SYMPTOMS: No  SKELETAL SYMPTOMS: Yes  BLOOD SYMPTOMS: No  NERVOUS SYSTEM SYMPTOMS: Yes  MENTAL HEALTH SYMPTOMS: No  Fever: No  Loss of appetite: Yes  Weight loss: Yes  Weight gain: No  Fatigue: Yes  Night sweats: No  Chills: No  Increased stress:  No  Excessive hunger: No  Excessive thirst: No  Feeling hot or cold when others believe the temperature is normal: Yes  Loss of height: No  Post-operative complications: No  Surgical site pain: No  Hallucinations: No  Change in or Loss of Energy: Yes  Hyperactivity: No  Confusion: No  Back pain: No  Muscle aches: Yes  Neck pain: Yes  Swollen joints: No  Joint pain: No  Bone pain: No  Muscle cramps: No  Muscle weakness: No  Joint stiffness: No  Bone fracture: No  Trouble with coordination: No  Dizziness or trouble with balance: No  Fainting or black-out spells: No  Memory loss: No  Headache: No  Seizures: No  Speech problems: No  Tingling: No  Tremor: Yes  Weakness: No  Difficulty walking: No  Paralysis: No  Numbness: No

## 2018-07-26 ENCOUNTER — PATIENT OUTREACH (OUTPATIENT)
Dept: GERIATRIC MEDICINE | Facility: CLINIC | Age: 68
End: 2018-07-26

## 2018-07-31 ENCOUNTER — MYC MEDICAL ADVICE (OUTPATIENT)
Dept: PEDIATRICS | Facility: CLINIC | Age: 68
End: 2018-07-31

## 2018-07-31 DIAGNOSIS — G25.2 DYSTONIC TREMOR: ICD-10-CM

## 2018-07-31 NOTE — TELEPHONE ENCOUNTER
ALPRAZolam (XANAX) 0.5 MG tablet      Last Written Prescription Date:  02/16/18  Last Fill Quantity: 30,   # refills: 1  Last Office Visit: 05/23/18  Future Office visit:       Routing refill request to provider for review/approval because:  Drug not on the [facility], [facility] or [facility] refill protocol or controlled substance

## 2018-08-01 RX ORDER — ALPRAZOLAM 0.5 MG
0.5 TABLET ORAL DAILY PRN
Qty: 30 TABLET | Refills: 3 | Status: SHIPPED | OUTPATIENT
Start: 2018-08-01 | End: 2019-02-15

## 2018-08-06 ENCOUNTER — TELEPHONE (OUTPATIENT)
Dept: NEUROLOGY | Facility: CLINIC | Age: 68
End: 2018-08-06

## 2018-08-06 NOTE — TELEPHONE ENCOUNTER
I informed Ms. Rosales that we were not able to discuss her case at last week's DBS consensus meeting. However, we will have an extra meeting this Thursday and we will discuss her case then. She graciously thanked me for the call and coordinating her case discussion.    Sandy Hernandez MD  Movement Disorders Fellow

## 2018-08-10 ENCOUNTER — HOSPITAL ENCOUNTER (OUTPATIENT)
Dept: OCCUPATIONAL THERAPY | Facility: CLINIC | Age: 68
Setting detail: THERAPIES SERIES
End: 2018-08-10
Attending: INTERNAL MEDICINE
Payer: COMMERCIAL

## 2018-08-10 PROCEDURE — 97535 SELF CARE MNGMENT TRAINING: CPT | Mod: GO | Performed by: OCCUPATIONAL THERAPIST

## 2018-08-10 PROCEDURE — 40000125 ZZHC STATISTIC OT OUTPT VISIT: Performed by: OCCUPATIONAL THERAPIST

## 2018-08-10 NOTE — PROGRESS NOTES
Outpatient Occupational Therapy Discharge Note     Patient: Irish Rosales  : 1950    Beginning/End Dates of Reporting Period:  18 to 8/10/2018    Referring Provider: Braulio Victoria MD    Therapy Diagnosis: essential tremors of BUes and head/neck impacting I/ADL performance.     Client Self Report: Pt reported her tremors are worse than initial evaluation.  Tremors include BUEs and head/neck.  Pt reported her doctor is meeting with other doctors to discuss her case and see if other treatment options are available.            Goals:     Goal Identifier Coping skills    Goal Description Pt will verbalize 2-3 coping strategies for managing emotional stressors to support pt's mental health and QOL.    Target Date 18   Date Met  08/10/18   Progress:     Goal Identifier Tremor    Goal Description Pt will verbalize understanding of at least three strategies to compensate for tremor to support improved performance in ADL/IADL/leisure.    Target Date 18   Date Met  08/10/18   Progress:           Progress Toward Goals:   Goals met    Plan:  Discharge from therapy.    Discharge:    Reason for Discharge: Patient has met all goals.    Equipment Issued: 0    Discharge Plan: purchase weighted utensils and wrist weights to help with self feeding

## 2018-08-16 ENCOUNTER — TELEPHONE (OUTPATIENT)
Dept: NEUROLOGY | Facility: CLINIC | Age: 68
End: 2018-08-16

## 2018-08-16 DIAGNOSIS — G25.0 ESSENTIAL TREMOR: Primary | ICD-10-CM

## 2018-08-16 RX ORDER — ZONISAMIDE 25 MG/1
25 CAPSULE ORAL DAILY
Qty: 30 CAPSULE | Refills: 3 | Status: SHIPPED | OUTPATIENT
Start: 2018-08-16 | End: 2018-08-30

## 2018-08-16 NOTE — TELEPHONE ENCOUNTER
I spoke with Ms. Rosales to update her on the DBS consensus decision. We would like to offer her surgery for the second side - specifically two leads in the right thalamus: Vim and Vop/Voa. We hope to improve mixed essential tremor with dystonic features affecting the head and proximal arm.     We discussed that some of her tremor into the limbs may be referred by movement of the head. Bilateral stimulation may improve head tremor and thus limb function if this is the case.     The plan will be for a second surgery, during which two leads will be placed into the right thalamus for the left body: right Vim and right Vop/Voa. Device will be Abbott Infinity. She is ok with the larger Abbott generator.     In the meantime, we will start a low dose of zonisamide 25 mg HS, which may help reduce tremor. She does have a sulfa allergy to antibiotics. She reported a mild rash and mild swelling of her face. She denied respiratory problems. I informed her of the potential sulfa cross reactivity and she was counseled to immediately stop the medication if she were to develop a rash. Other potential side effects are reduced sweating, hyperthermia (typically in pediatric patients), kidney stones, and reduced appetite. She was instructed to drink plenty of water and watch calorie content.     Sandy Hernandez MD  Movement Disorders Fellow

## 2018-08-20 ENCOUNTER — MYC MEDICAL ADVICE (OUTPATIENT)
Dept: PEDIATRICS | Facility: CLINIC | Age: 68
End: 2018-08-20

## 2018-08-20 DIAGNOSIS — M62.838 NECK MUSCLE SPASM: Primary | ICD-10-CM

## 2018-08-20 NOTE — TELEPHONE ENCOUNTER
Will await Dr. PATEL 's return tomorrow to order PT per patients request.    Sol Rios RN,   [facility]

## 2018-08-24 ENCOUNTER — THERAPY VISIT (OUTPATIENT)
Dept: PHYSICAL THERAPY | Facility: CLINIC | Age: 68
End: 2018-08-24
Payer: COMMERCIAL

## 2018-08-24 DIAGNOSIS — M54.2 CERVICALGIA: Primary | ICD-10-CM

## 2018-08-24 PROCEDURE — 97110 THERAPEUTIC EXERCISES: CPT | Mod: GP | Performed by: PHYSICAL THERAPIST

## 2018-08-24 PROCEDURE — 97161 PT EVAL LOW COMPLEX 20 MIN: CPT | Mod: GP | Performed by: PHYSICAL THERAPIST

## 2018-08-24 PROCEDURE — 97140 MANUAL THERAPY 1/> REGIONS: CPT | Mod: GP | Performed by: PHYSICAL THERAPIST

## 2018-08-24 NOTE — MR AVS SNAPSHOT
After Visit Summary   8/24/2018    Irish Rosales    MRN: [id number]           Patient Information     Date Of Birth          1950        Visit Information        Provider Department      8/24/2018 10:40 AM Finesse Velasquez, PT [facility]        Today's Diagnoses     Cervicalgia    -  1       Follow-ups after your visit        Your next 10 appointments already scheduled     Aug 28, 2018  2:20 PM CDT   ALEX Spine with Eliseo Biggs PT   [facility] ([facility])    [address]  [address]  [place] [address]   [phone]            Aug 31, 2018  2:20 PM CDT   ALEX Spine with Eliseo Biggs PT   [facility] ([facility])    [address]  [address]  [place] [address]   [phone]            Sep 04, 2018 11:00 AM CDT   ALEX Spine with Finesse Velasquez, PT   [facility] ([facility])    [address]  [address]  [place] [address]   [phone]            Sep 07, 2018 11:20 AM CDT   ALEX Spine with Finesse Velasquez, PT   [facility] ([facility])    [address]  [address]  [facility] [place] [facility] [place] [address]   [phone]            Oct 23, 2018  8:00 AM CDT   (Arrive by 7:45 AM)   Neuropsych Eval with Lilo Drew, PhD LP   [facility] ([facility])    [address]  [address] [facility] [address] [phone]              Who to contact     If you have questions or need follow up information about today's clinic visit or your schedule please contact [facility] directly at [phone].  Normal or non-critical lab and imaging results will be communicated to you by  MyChart, letter or phone within 4 business days after the clinic has received the results. If you do not hear from us within 7 days, please contact the clinic through [employer] or phone. If you have a critical or abnormal lab result, we will notify you by phone as soon as possible.  Submit refill requests through [employer] or call your pharmacy and they will forward the refill request to us. Please allow 3 business days for your refill to be completed.          Additional Information About Your Visit        m[employer]ryhart Information     [employer] gives you secure access to your electronic health record. If you see a primary care provider, you can also send messages to your care team and make appointments. If you have questions, please call your primary care clinic.  If you do not have a primary care provider, please call [phone] and they will assist you.        Care EveryWhere ID     This is your Care EveryWhere ID. This could be used by other organizations to access your [place] medical records  [id number]         Blood Pressure from Last 3 Encounters:   06/29/18 115/74   05/25/18 138/82   05/23/18 138/82    Weight from Last 3 Encounters:   07/25/18 58.5 kg (129 lb)   06/29/18 59.1 kg (130 lb 3.2 oz)   05/25/18 60.2 kg (132 lb 11.2 oz)              We Performed the Following     HC PT EVAL, LOW COMPLEXITY     ALEX INITIAL EVAL REPORT     MANUAL THER TECH,1+REGIONS,EA 15 MIN     THERAPEUTIC EXERCISES          Today's Medication Changes          These changes are accurate as of 8/24/18 12:11 PM.  If you have any questions, ask your nurse or doctor.               These medicines have changed or have updated prescriptions.        Dose/Directions    rosuvastatin 5 MG tablet   Commonly known as:  CRESTOR   This may have changed:  when to take this   Used for:  Hyperlipidemia LDL goal <160        Dose:  5 mg   Take 1 tablet (5 mg) by mouth daily   Quantity:  30 tablet   Refills:  11                Primary Care Provider Office  Phone # Fax #    Braulio Victoria MD PhD [phone]       [address]  [facility] [address]        Equal Access to Services     SAMINA PORTER : Hadii aad ku hadmaganjosselin Galali, watravisda luqadaha, qaybta kabrendada delisa, ferny stephens laamitamichael ballard. So [facility] [phone].    ATENCIÓN: Si brookela español, tiene a flower disposición servicios gratuitos de asistencia lingüística. Llame al [phone].    We comply with applicable federal civil rights laws and [place] laws. We do not discriminate on the basis of race, color, national origin, age, disability, sex, sexual orientation, or gender identity.            Thank you!     Thank you for choosing [facility]  for your care. Our goal is always to provide you with excellent care. Hearing back from our patients is one way we can continue to improve our services. Please take a few minutes to complete the written survey that you may receive in the mail after your visit with us. Thank you!             Your Updated Medication List - Protect others around you: Learn how to safely use, store and throw away your medicines at www.disposemymeds.org.          This list is accurate as of 8/24/18 12:11 PM.  Always use your most recent med list.                   Brand Name Dispense Instructions for use Diagnosis    ALPRAZolam 0.5 MG tablet    XANAX    30 tablet    Take 1 tablet (0.5 mg) by mouth daily as needed    Dystonic tremor       baclofen 10 MG tablet    LIORESAL    180 tablet    Please take 1/2-1 tab up to twice a day as needed for neck pain.    Cervical dystonia       rosuvastatin 5 MG tablet    CRESTOR    30 tablet    Take 1 tablet (5 mg) by mouth daily    Hyperlipidemia LDL goal <160       sertraline 100 MG tablet    ZOLOFT    90 tablet    Take 1 tablet (100 mg) by mouth daily    History of major depression       zonisamide 25 MG capsule    Zonegran    30 capsule    Take 1 capsule (25 mg) by mouth  daily    Essential tremor

## 2018-08-24 NOTE — PROGRESS NOTES
[facility] Initial Evaluation  Subjective:  Patient is a 68 year old female presenting with rehab general hpi.   General   Medical allergies:  Yes (Sulfa)  Medications you are currently taking:  Anti-depressants and muscle relaxants  Occupation comment:  Retired                        Objective:  System    Physical Exam    General     ROS    Assessment/Plan:

## 2018-08-24 NOTE — PROGRESS NOTES
[facility] Initial Evaluation  Subjective:  Patient is a 68 year old female presenting with rehab cervical spine hpi.   Irish Rosales is a 68 year old female with a cervical spine condition.  Condition occurred with:  Other reason (Worse since DBS procedure in March 2018).  Condition occurred: other.  This is a chronic condition  Patient is referred with c/o B neck pain related to essential tremor and dystonia. She underwent deep brain stimulation (DBS) in March of 2018 and thinks that her sxs, including neck pain have increased. There is constant significant upper trunk, head and U/E tremor..    Patient reports pain:  Central cervical spine, cervical left side, cervical right side, lower cervical spine, mid cervical spine and upper cervical spine.    Pain is described as aching and is constant and reported as 8/10.  Associated symptoms:  Loss of motion/stiffness (severe essential tremor and dystonia affecting upper trunk and B U/Es). Pain is the same all the time.   and relieved by nothing.  Since onset symptoms are gradually worsening.    Previous treatment includes surgery.    General health as reported by patient is fair.                  Barriers include:  None as reported by the patient.    Red flags:  None as reported by the patient.                        Objective:  System              Cervical/Thoracic Evaluation    AROM:  AROM Cervical:    Flexion:            Mior loss  Extension:       Major loss  Rotation:         Left: moderate loss     Right: moderate loss  Side Bend:      Left: major loss     Right:  Major loss      Headaches: none  Cervical Myotomes:  not assessed                  DTR's:  not assessed          Cervical Dermatomes:  normal                    Cervical Palpation:    Tenderness present at Left:    Sternocleidomstoid; Scalenes; Upper Trap; Levator and Erector Spinae  Tenderness present at Right:    Sternocleidomstoid; Scalenes; Upper Trap; Levator and Erector  Spinae  Functional Tests:  not assessed    Cervical Stability/Joint Clearing:  not assessed      Spinal Segmental Conclusions:  Mm spasm and guarding in cervical area related to severe tremor and dystonia.        Cord Sign:  not assessed                                            General     ROS    Assessment/Plan:    Patient is a 68 year old female with cervical complaints.    Patient has the following significant findings with corresponding treatment plan.                Diagnosis 1:  Neck pain  Pain -  hot/cold therapy, manual therapy, self management, education and home program  Decreased ROM/flexibility - manual therapy, therapeutic exercise and home program  Impaired muscle performance - neuro re-education and home program  Decreased function - therapeutic activities and home program    Therapy Evaluation Codes:   1) History comprised of:   Personal factors that impact the plan of care:      Time since onset of symptoms and Essential tremor and dystonia.    Comorbidity factors that impact the plan of care are:      Essential tremor and dystonia.     Medications impacting care: Anti-depressant and Pain.  2) Examination of Body Systems comprised of:   Body structures and functions that impact the plan of care:      Cervical spine.   Activity limitations that impact the plan of care are:      Driving, Reading/Computer work, Sitting and Laying down.  3) Clinical presentation characteristics are:   Evolving/Changing.  4) Decision-Making    High complexity using standardized patient assessment instrument and/or measureable assessment of functional outcome.  Cumulative Therapy Evaluation is: High complexity.    Previous and current functional limitations:  (See Goal Flow Sheet for this information)    Short term and Long term goals: (See Goal Flow Sheet for this information)     Communication ability:  Patient appears to be able to clearly communicate and understand verbal and written communication and follow  directions correctly.  Treatment Explanation - The following has been discussed with the patient:   RX ordered/plan of care  Anticipated outcomes  Possible risks and side effects  This patient would benefit from PT intervention to resume normal activities.   Rehab potential is poor.    Frequency:  1-2 X week, once daily  Duration:  for 3 weeks  Discharge Plan:  Achieve all LTG.  Independent in home treatment program.  Reach maximal therapeutic benefit.    Please refer to the daily flowsheet for treatment today, total treatment time and time spent performing 1:1 timed codes.

## 2018-08-27 ENCOUNTER — MYC MEDICAL ADVICE (OUTPATIENT)
Dept: NEUROSURGERY | Facility: CLINIC | Age: 68
End: 2018-08-27

## 2018-08-29 ENCOUNTER — PATIENT OUTREACH (OUTPATIENT)
Dept: GERIATRIC MEDICINE | Facility: CLINIC | Age: 68
End: 2018-08-29

## 2018-08-29 NOTE — PROGRESS NOTES
[facility] Care Coordination Contact  Received call from Irish requesting an early assessment d/t having more difficulty being able to care for herself at home. Home visit scheduled for Thursday, September 6th at 2:00pm.    INNA Diallo  [facility]  [phone]  Fax: [phone]

## 2018-08-30 ENCOUNTER — OFFICE VISIT (OUTPATIENT)
Dept: PEDIATRICS | Facility: CLINIC | Age: 68
End: 2018-08-30
Payer: COMMERCIAL

## 2018-08-30 VITALS
BODY MASS INDEX: 21.02 KG/M2 | OXYGEN SATURATION: 97 % | TEMPERATURE: 97.7 F | HEIGHT: 65 IN | WEIGHT: 126.2 LBS | SYSTOLIC BLOOD PRESSURE: 146 MMHG | HEART RATE: 77 BPM | DIASTOLIC BLOOD PRESSURE: 82 MMHG

## 2018-08-30 DIAGNOSIS — L29.9 ITCHING: Primary | ICD-10-CM

## 2018-08-30 DIAGNOSIS — G25.2 DYSTONIC TREMOR: ICD-10-CM

## 2018-08-30 PROCEDURE — 99213 OFFICE O/P EST LOW 20 MIN: CPT | Performed by: INTERNAL MEDICINE

## 2018-08-30 NOTE — PROGRESS NOTES
SUBJECTIVE:   Irish Rosales is a 68 year old female who presents to clinic today for the following health issues:      Concern - Scalp is very itchy  Onset: March 2018    Description:    Patient had brain surgery in March 2018 for tremors and her scalp has been itchy ever since/on the left side where surgery was.  Patient states it feels gritty.    Intensity: severe    Progression of Symptoms:  worsening    Accompanying Signs & Symptoms:  None    Previous history of similar problem:   None    Precipitating factors:   Worsened by: None    Alleviating factors:  Improved by: None    Therapies Tried and outcome: Tried T gel shampoo    HPI.    Patient comes in complaining of itching on the left side of her scalp.  Is the area of previous surgery.  She had deep vein brain stimulator placed for the dystonic tremor.  Deep brain stimulator did not help.  Plan is to consider placing one on the right side in the future and see if that would make a difference.  She has intense itching on that side of the scalp.    Problem list and histories reviewed & adjusted, as indicated.  Additional history: as documented    Patient Active Problem List   Diagnosis     Spasm of vocal cords     Essential tremor     Osteoporosis     Hyperlipidemia LDL goal <160     Generalized anxiety disorder     History of major depression     Carpal tunnel syndrome     Closed nondisp fracture of distal phalanx of lesser toe of right foot     Cervical radiculopathy     Right shoulder pain     Impingement syndrome of right shoulder     Family history of tremor     Family history of movement disorder     Dystonic tremor     Dystonic movements     H/O magnetic resonance imaging of brain and brain stem     mild abnormality in carotid study     Encounter for neuropsychological testing     Health Care Home     Advance care planning     Depression, major, in remission (H)     Cervicalgia     Past Surgical History:   Procedure Laterality Date      ------------OTHER-------------  2004    vocal cord thyroplasty at [facility]     C BSO, OMENTECTOMY W/XAVIER  1997    hysterectomy     C HAND/FINGER SURGERY UNLISTED  1998    right carpal tunnel surgery     IMPLANT DEEP BRAIN STIMULATION GENERATOR / BATTERY Left 3/13/2018    Procedure: IMPLANT DEEP BRAIN STIMULATION GENERATOR / BATTERY;  Deep Brain Stimulator Placement, Phase II, Placement Of Deep Brain Stimulator Generator/Battery Over The Left Chest Wall;  Surgeon: Aleksander Morales MD;  Location: [facility] OR     OPTICAL TRACKING SYSTEM INSERTION DEEP BRAIN STIMULATION Left 3/6/2018    Procedure: OPTICAL TRACKING SYSTEM INSERTION DEEP BRAIN STIMULATION;  Stealth Assisted Left Deep Brain Stimulator Placement, Phase I, Placement Of Left Side Deep Brain Stimulator Electrode, Target Left Ventral Intermediate Nucleus Of The Thalamus With Microelectrode Recording;  Surgeon: Aleksander Morales MD;  Location: [facility] OR     RELEASE CARPAL TUNNEL Left 1/2/2015    Procedure: RELEASE CARPAL TUNNEL;  Surgeon: SHANIA Hernandez MD;  Location: [facility] OR     RELEASE ULNAR NERVE (ELBOW) Left 1/2/2015    Procedure: RELEASE ULNAR NERVE (ELBOW);  Surgeon: SHANIA Hernandez MD;  Location: [facility] OR       Social History   Substance Use Topics     Smoking status: Never Smoker     Smokeless tobacco: Never Used     Alcohol use Yes      Comment: occasionally     Family History   Problem Relation Age of Onset     Hypertension Father      and mother     Cerebrovascular Disease Father      Tremor Mother      Lung Cancer Mother      Neurologic Disorder Sister      dystonic voice x 2 botox     Neurologic Disorder Sister      jose m m[place]. facial movement          Current Outpatient Prescriptions   Medication Sig Dispense Refill     ALPRAZolam (XANAX) 0.5 MG tablet Take 1 tablet (0.5 mg) by mouth daily as needed 30 tablet 3     baclofen (LIORESAL) 10 MG tablet Please take 1/2-1 tab up to twice a day as needed for neck pain. 180 tablet 1      "rosuvastatin (CRESTOR) 5 MG tablet Take 1 tablet (5 mg) by mouth daily (Patient taking differently: Take 5 mg by mouth every morning ) 30 tablet 11     sertraline (ZOLOFT) 100 MG tablet Take 1 tablet (100 mg) by mouth daily 90 tablet 1     zonisamide (ZONEGRAN) 25 MG capsule Take 1 capsule (25 mg) by mouth daily (Patient not taking: Reported on 8/30/2018) 30 capsule 3     Allergies   Allergen Reactions     Sulfa Drugs Swelling and Rash     Atorvastatin      Leg cramps     Simvastatin      Leg cramp       Reviewed and updated as needed this visit by clinical staff       Reviewed and updated as needed this visit by Provider         ROS:  Constitutional, HEENT, cardiovascular, pulmonary, gi and gu systems are negative, except as otherwise noted.    OBJECTIVE:     /82 (BP Location: Right arm, Patient Position: Sitting, Cuff Size: Adult Regular)  Pulse 77  Temp 97.7  F (36.5  C) (Temporal)  Ht 5' 5\" (1.651 m)  Wt 126 lb 3.2 oz (57.2 kg)  SpO2 97%  BMI 21 kg/m2  Body mass index is 21 kg/(m^2).  GENERAL: healthy, alert and no distress  NECK: no adenopathy, no asymmetry, masses, or scars and thyroid normal to palpation  MS: no gross musculoskeletal defects noted, no edema  SKIN: Examination of the scalp reveals no skin lesion.  There is no evidence of dermatitis.  No erythema or any lesion of the skin        ASSESSMENT/PLAN:     1.  Localized imaging of scalp following surgery.  I informed her that I did not find any lesions on the skin to suggest that she has some sort of dermatitis.  Could be nerve irritation following her surgery.  No specific treatment offered.  Patient was reassured.  She will follow-up with her primary provider.    Truman Julian MD  [facility]  "

## 2018-08-30 NOTE — MR AVS SNAPSHOT
After Visit Summary   8/30/2018    Irish Rosales    MRN: [id number]           Patient Information     Date Of Birth          1950        Visit Information        Provider Department      8/30/2018 1:50 PM Truman Julian MD [facility]        Today's Diagnoses     Itching    -  1    Dystonic tremor           Follow-ups after your visit        Follow-up notes from your care team     Return if symptoms worsen or fail to improve.      Your next 10 appointments already scheduled     Sep 04, 2018 11:00 AM CDT   ALEX Spine with Finesse Velasquez, PT   [facility] ([facility] [place])    [address]  [address]  [place] [address]   [phone]            Sep 07, 2018 11:20 AM CDT   ALEX Spine with Finesse Velasquez, PT   [facility] ([facility])    [address] [place] [address] Drive  [address]  [place] [address]   [phone]            Sep 10, 2018  1:00 PM CDT   (Arrive by 12:45 PM)   New Patient Visit with Aleksander Morales MD   [facility] ([facility])    [address] [place] [id number] [phone]            Oct 23, 2018  8:00 AM CDT   (Arrive by 7:45 AM)   Neuropsych Eval with Lilo Drew, PhD [facility] Neuropsychology ([facility])    [address] [place] [id number] [phone]              Who to contact     If you have questions or need follow up information about today's clinic visit or your schedule please contact [facility] directly at [phone].  Normal or non-critical lab and imaging results will be communicated to you by MyChart, letter or phone within 4 business days after the clinic has received the results. If you do not hear from us within 7 days, please contact the clinic through MyChart or  "phone. If you have a critical or abnormal lab result, we will notify you by phone as soon as possible.  Submit refill requests through [employer] or call your pharmacy and they will forward the refill request to us. Please allow 3 business days for your refill to be completed.          Additional Information About Your Visit        [employer] Information     [employer] gives you secure access to your electronic health record. If you see a primary care provider, you can also send messages to your care team and make appointments. If you have questions, please call your primary care clinic.  If you do not have a primary care provider, please call [phone] and they will assist you.      [employer] is an electronic gateway that provides easy, online access to your medical records. With Red[employer], you can request a clinic appointment, read your test results, renew a prescription or communicate with your care team.     To access your existing account, please contact your [facility] or call [phone] for assistance.        Care EveryWhere ID     This is your Care EveryWhere ID. This could be used by other organizations to access your [facility] medical records  [id number]        Your Vitals Were     Pulse Temperature Height Pulse Oximetry BMI (Body Mass Index)       77 97.7  F (36.5  C) (Temporal) 5' 5\" (1.651 m) 97% 21 kg/m2        Blood Pressure from Last 3 Encounters:   08/30/18 146/82   06/29/18 115/74   05/25/18 138/82    Weight from Last 3 Encounters:   08/30/18 126 lb 3.2 oz (57.2 kg)   07/25/18 129 lb (58.5 kg)   06/29/18 130 lb 3.2 oz (59.1 kg)              Today, you had the following     No orders found for display         Today's Medication Changes          These changes are accurate as of 8/30/18  2:16 PM.  If you have any questions, ask your nurse or doctor.               These medicines have changed or have updated prescriptions.        Dose/Directions    rosuvastatin 5 MG tablet "   Commonly known as:  CRESTOR   This may have changed:  when to take this   Used for:  Hyperlipidemia LDL goal <160        Dose:  5 mg   Take 1 tablet (5 mg) by mouth daily   Quantity:  30 tablet   Refills:  11                Primary Care Provider Office Phone # Fax #    Braulio Victoria MD PhD [phone] [address]  [facility] [address]        Equal Access to Services     [facility] : Hadii aad ku hadasho Soomaali, waaxda luqadaha, qaybta kaalmada adeegyada, waxay idiin hayaan adeeg kharash la'aan . So [facility] [phone].    ATENCIÓN: Si habla español, tiene a flower disposición servicios gratuitos de asistencia lingüística. Llame al [phone].    We comply with applicable federal civil rights laws and [place] laws. We do not discriminate on the basis of race, color, national origin, age, disability, sex, sexual orientation, or gender identity.            Thank you!     Thank you for choosing [facility]  for your care. Our goal is always to provide you with excellent care. Hearing back from our patients is one way we can continue to improve our services. Please take a few minutes to complete the written survey that you may receive in the mail after your visit with us. Thank you!             Your Updated Medication List - Protect others around you: Learn how to safely use, store and throw away your medicines at www.disposemymeds.org.          This list is accurate as of 8/30/18  2:16 PM.  Always use your most recent med list.                   Brand Name Dispense Instructions for use Diagnosis    ALPRAZolam 0.5 MG tablet    XANAX    30 tablet    Take 1 tablet (0.5 mg) by mouth daily as needed    Dystonic tremor       baclofen 10 MG tablet    LIORESAL    180 tablet    Please take 1/2-1 tab up to twice a day as needed for neck pain.    Cervical dystonia       rosuvastatin 5 MG tablet    CRESTOR    30 tablet    Take 1 tablet (5 mg) by mouth daily    Hyperlipidemia LDL goal <160        sertraline 100 MG tablet    ZOLOFT    90 tablet    Take 1 tablet (100 mg) by mouth daily    History of major depression       zonisamide 25 MG capsule    Zonegran    30 capsule    Take 1 capsule (25 mg) by mouth daily    Essential tremor

## 2018-08-31 NOTE — TELEPHONE ENCOUNTER
From 8/16/18 Consensus:    1. Dr. Hernandez to contact patient  2. 2nd side but neuropsych first  3. Abbott Infinity 5 RVIM/Vop pending neuropsych (unless patient wants bigger battery for longer life)  4. Zonisamide Dr. Hernandez?  5. We do not need to rediscuss if Dr. Drew finds her to be a reasonable candidate.  6. Dr. Hernandez to have clinic coordinators schedule neuropsych with Dr. Drew after she speaks with her. (10/23 appointment)

## 2018-09-03 ASSESSMENT — ENCOUNTER SYMPTOMS
ARTHRALGIAS: 0
HEADACHES: 0
NECK PAIN: 1
STIFFNESS: 0
INCREASED ENERGY: 1
POLYPHAGIA: 0
MUSCLE CRAMPS: 0
SPEECH CHANGE: 0
FEVER: 0
MYALGIAS: 1
WEAKNESS: 0
JOINT SWELLING: 0
TINGLING: 0
WEIGHT GAIN: 0
NIGHT SWEATS: 0
DISTURBANCES IN COORDINATION: 0
DECREASED APPETITE: 1
NUMBNESS: 0
MEMORY LOSS: 0
PARALYSIS: 0
LOSS OF CONSCIOUSNESS: 0
TREMORS: 1
DIZZINESS: 0
CHILLS: 0
HALLUCINATIONS: 0
WEIGHT LOSS: 1
POLYDIPSIA: 0
MUSCLE WEAKNESS: 0
BACK PAIN: 0
ALTERED TEMPERATURE REGULATION: 1
FATIGUE: 1

## 2018-09-06 NOTE — PROGRESS NOTES
[facility] Care Coordination Contact  Received message from Irish wanting to cancel the home visit tomorrow.     Returned phone call. Irish said that she has an appointment with her neurologist next week and wants to see what his/her recommendation is before being evaluated for more home services. Irish said that it is possible she might have surgery again.    Irish will call writer if she would like to have a home visit.    INNA Diallo  [facility]  [phone]  Fax: [phone]

## 2018-09-10 ENCOUNTER — OFFICE VISIT (OUTPATIENT)
Dept: NEUROSURGERY | Facility: CLINIC | Age: 68
End: 2018-09-10
Payer: COMMERCIAL

## 2018-09-10 VITALS — BODY MASS INDEX: 21.03 KG/M2 | HEART RATE: 95 BPM | WEIGHT: 126.4 LBS | OXYGEN SATURATION: 97 %

## 2018-09-10 DIAGNOSIS — Z96.89 STATUS POST DEEP BRAIN STIMULATOR PLACEMENT: ICD-10-CM

## 2018-09-10 DIAGNOSIS — G25.0 ESSENTIAL TREMOR: Primary | ICD-10-CM

## 2018-09-10 DIAGNOSIS — G25.2 DYSTONIC TREMOR: ICD-10-CM

## 2018-09-10 NOTE — MR AVS SNAPSHOT
After Visit Summary   9/10/2018    Irish Rosales    MRN: [id number]           Patient Information     Date Of Birth          1950        Visit Information        Provider Department      9/10/2018 1:00 PM Aleksander Morales MD [facility] Neurosurgery        Today's Diagnoses     Essential tremor    -  1    Status post deep brain stimulator placement        Dystonic tremor           Follow-ups after your visit        Who to contact     Please call your clinic at [phone] to:    Ask questions about your health    Make or cancel appointments    Discuss your medicines    Learn about your test results    Speak to your doctor            Additional Information About Your Visit        MyChart Information     [employer] gives you secure access to your electronic health record. If you see a primary care provider, you can also send messages to your care team and make appointments. If you have questions, please call your primary care clinic.  If you do not have a primary care provider, please call [phone] and they will assist you.      [employer] is an electronic gateway that provides easy, online access to your medical records. With [employer], you can request a clinic appointment, read your test results, renew a prescription or communicate with your care team.     To access your existing account, please contact your [facility] or call [phone] for assistance.        Care EveryWhere ID     This is your Care EveryWhere ID. This could be used by other organizations to access your [facility] medical records  [id number]        Your Vitals Were     Pulse Pulse Oximetry Breastfeeding? BMI (Body Mass Index)          95 97% No 21.03 kg/m2         Blood Pressure from Last 3 Encounters:   08/30/18 146/82   06/29/18 115/74   05/25/18 138/82    Weight from Last 3 Encounters:   09/10/18 57.3 kg (126 lb 6.4 oz)   08/30/18 57.2 kg (126 lb 3.2 oz)   07/25/18 58.5 kg (129 lb)               Today, you had the following     No orders found for display       Primary Care Provider Office Phone # Fax #    Braulio Victoria MD PhD [phone] [address]  [facility] [address]        Equal Access to Services     SAMINA PORTER : Hadii tammy tristan ildao Soomaali, waaxda luqadaha, qaybta kaalmada adeegyada, ferny rahin hayaan jadenshira stephens laFlowernikki . So [facility] [phone].    ATENCIÓN: Si habla español, tiene a flower disposición servicios gratuitos de asistencia lingüística. Llame al [phone].    We comply with applicable federal civil rights laws and [place] laws. We do not discriminate on the basis of race, color, national origin, age, disability, sex, sexual orientation, or gender identity.            Thank you!     Thank you for choosing [facility] NEUROSURGERY  for your care. Our goal is always to provide you with excellent care. Hearing back from our patients is one way we can continue to improve our services. Please take a few minutes to complete the written survey that you may receive in the mail after your visit with us. Thank you!             Your Updated Medication List - Protect others around you: Learn how to safely use, store and throw away your medicines at www.disposemymeds.org.          This list is accurate as of 9/10/18 11:59 PM.  Always use your most recent med list.                   Brand Name Dispense Instructions for use Diagnosis    ALPRAZolam 0.5 MG tablet    XANAX    30 tablet    Take 1 tablet (0.5 mg) by mouth daily as needed    Dystonic tremor       baclofen 10 MG tablet    LIORESAL    180 tablet    Please take 1/2-1 tab up to twice a day as needed for neck pain.    Cervical dystonia       sertraline 100 MG tablet    ZOLOFT    90 tablet    Take 1 tablet (100 mg) by mouth daily    History of major depression

## 2018-09-10 NOTE — PROGRESS NOTES
"HISTORY AND PHYSICAL EXAM    Chief Complaint   Patient presents with     RECHECK     P RETURN - F/U DBS - patient would like further discussion regarding DBS for her second side     HISTORY OF PRESENT ILLNESS  Ms. Irish Rosales returns to the Neurosurgery Clinic to discuss her DBS surgery plans further.  Patient was seen by me back in January, 11, 2018, as part of her DBS workup regarding her left side DBS surgery.  She underwent placement of left side DBS electrode on 3/6/2018 followed by implant of left side DBS generator/battery on 3/13/2018.  Briefly, patient is a 68 year old right-handed female with history of dystonic tremor.  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  She also has cervical dystonia.  Her right side was more affected than her left side.  Her goals for DBS surgery are to decrease tremor, especially head and voice and improve dystonia.    Currently, her symptoms are not resolved.  For a few days following surgery she seemed to have improved symptoms but since then her symptoms have continued to worsen.  She believes her right side has gotten worse because she is unable to eat or curl her hair, etc.  She continues to wear a soft collar for her weak neck.  She reports itchiness of the top of her scalp that feels \"grainy\" and began a few weeks after surgery.      Past Medical History:   Diagnosis Date     Depression      Dyslipidemia      Dystonic movements 1/21/2017     Dystonic tremor 1/21/2017     Encounter for neuropsychological testing 3/13/2017    2017 evaluation  IMPRESSIONS AND RECOMMENDATIONS   Current results indicate performance that falls within normal limits across cognitive domains, generally in the average to above average range. Objective personality assessment also falls within normal limits.   This pattern of performance does not reflect dementia at this " time, nor is it suggestive of focal or lateralized cerebral involvement. Sh     Family history of movement disorder 1/21/2017     Family history of tremor 1/21/2017     H/O magnetic resonance imaging of brain and brain stem 2/27/2017    MR BRAIN W/O CONTRAST 2/27/2017 11:46 AM  Provided History: dbs brain surgery for dystonia, Other specified forms of tremor, Dystonia, unspecified, Tremor, unspecified  Comparison:  No similar prior studies   Technique: Volumetric sagittal T1 and T2 weighted, axial T2-weighted, turboFLAIR and diffusion-weighted with ADC map images of the brain were obtained without intravenous contrast.  Findings:     mild abnormality in carotid study 3/2/2017    BILATERAL DUPLEX CAROTID ULTRASOUND March 1, 2017 1:01 PM    HISTORY: Other specified symptoms and signs involving the circulatory and respiratory systems.   COMPARISON: None.   FINDINGS: There is mild atherosclerotic plaque in the carotid bifurcations. Flow velocities and waveforms show less than 50% diameter stenosis in both the right and left internal carotid arteries as assessed by each ICA PS     Osteoporosis 8/2/2010     Past Surgical History:   Procedure Laterality Date     ------------OTHER-------------  2004    vocal cord thyroplasty at [facility]     C BSO, OMENTECTOMY W/XAVIER  1997    hysterectomy     C HAND/FINGER SURGERY UNLISTED  1998    right carpal tunnel surgery     IMPLANT DEEP BRAIN STIMULATION GENERATOR / BATTERY Left 3/13/2018    Procedure: IMPLANT DEEP BRAIN STIMULATION GENERATOR / BATTERY;  Deep Brain Stimulator Placement, Phase II, Placement Of Deep Brain Stimulator Generator/Battery Over The Left Chest Wall;  Surgeon: Aleksander Morales MD;  Location: [facility] OR     OPTICAL TRACKING SYSTEM INSERTION DEEP BRAIN STIMULATION Left 3/6/2018    Procedure: OPTICAL TRACKING SYSTEM INSERTION DEEP BRAIN STIMULATION;  Stealth Assisted Left Deep Brain Stimulator Placement, Phase I, Placement Of Left Side Deep Brain Stimulator  Electrode, Target Left Ventral Intermediate Nucleus Of The Thalamus With Microelectrode Recording;  Surgeon: Aleksander Morales MD;  Location: [facility] OR     RELEASE CARPAL TUNNEL Left 1/2/2015    Procedure: RELEASE CARPAL TUNNEL;  Surgeon: SHANIA Hernandez MD;  Location: [facility] OR     RELEASE ULNAR NERVE (ELBOW) Left 1/2/2015    Procedure: RELEASE ULNAR NERVE (ELBOW);  Surgeon: SHANIA Hernandez MD;  Location: [facility] OR     Social History     Social History     Marital status:      Spouse name: N/A     Number of children: 3     Years of education: 14     Occupational History     RN Retired     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     Social History Main Topics     Smoking status: Never Smoker     Smokeless tobacco: Never Used     Alcohol use Yes      Comment: occasionally     Drug use: No     Sexual activity: Yes     Partners: Male     Other Topics Concern     Parent/Sibling W/ Cabg, Mi Or Angioplasty Before 65f 55m? No      Service No     Blood Transfusions No     Caffeine Concern No     Occupational Exposure No     Hobby Hazards No     Sleep Concern No     Stress Concern No     Weight Concern No     Special Diet No     Back Care No     Exercise Yes     walk     Bike Helmet Yes     Seat Belt Yes     Self-Exams Yes     Social History Narrative        Lives in [place]    Daughter Karyn Sanchez        benign essential tremor with a strained quality to her voice consistent with mild laryngeal spasm        ALLERGIES:  She is allergic to sulfa drugs, atorvastatin and simvastatin.  The statin drugs caused leg cramps.            FAMILY HISTORY:  As noted above with 1 sister developing a voice tremor and another sister having what is described as a facial tremor.  Mother with hand tremors          SOCIAL HISTORY:  She does not smoke.  She does use alcohol on occasion. She previously worked as a RN.         Jazmyn jimenez  [phone] orofacial dyskinesias     Piedad Leivarebeca [phone]  laryngeal dystonia and laryngeal tremor and laryngeal spasm.             Updated 6/13/17: Client was born in [place], [place] to parents Jerome and Berenice.  Client grew up w/ three sisters Tea, Carrie, and Jazmyn who are currently still living.  Client graduated from high school, attended college for two years, and graduated w/ a RN Degree.  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  She was  to her ex-spouse for 25 years and had three children; one son Bryan and two daughters Halley/Ysabel.  One daughter Halley Sanchez resides nearby and her other daughter Ysabel and her son Bryan reside in [place], [place].  Bryan has two daughters ages five and eight years old.  Client enjoys flying for free to [place] to visit her two granddaughters. Michelle Sutton, [facility] ([phone], Fax: [phone]).             Family History   Problem Relation Age of Onset     Hypertension Father      and mother     Cerebrovascular Disease Father      Tremor Mother      Lung Cancer Mother      Neurologic Disorder Sister      dystonic voice x 2 botox     Neurologic Disorder Sister      jose m mn. facial movement      Current Outpatient Prescriptions   Medication     ALPRAZolam (XANAX) 0.5 MG tablet     baclofen (LIORESAL) 10 MG tablet     sertraline (ZOLOFT) 100 MG tablet     No current facility-administered medications for this visit.         Allergies   Allergen Reactions     Sulfa Drugs Swelling and Rash     Atorvastatin      Leg cramps     Simvastatin      Leg cramp       Answers for HPI/ROS submitted by the patient on 9/3/2018   General Symptoms: Yes  Skin Symptoms: No  HENT Symptoms: No  EYE SYMPTOMS: No  HEART SYMPTOMS: No  LUNG SYMPTOMS: No  INTESTINAL SYMPTOMS: No  URINARY SYMPTOMS: No  GYNECOLOGIC SYMPTOMS: No  BREAST SYMPTOMS:  No  SKELETAL SYMPTOMS: Yes  BLOOD SYMPTOMS: No  NERVOUS SYSTEM SYMPTOMS: Yes  MENTAL HEALTH SYMPTOMS: No  Fever: No  Loss of appetite: Yes  Weight loss: Yes  Weight gain: No  Fatigue: Yes  Night sweats: No  Chills: No  Increased stress: No  Excessive hunger: No  Excessive thirst: No  Feeling hot or cold when others believe the temperature is normal: Yes  Loss of height: No  Post-operative complications: No  Surgical site pain: No  Hallucinations: No  Change in or Loss of Energy: Yes  Hyperactivity: No  Confusion: No  Back pain: No  Muscle aches: Yes  Neck pain: Yes  Swollen joints: No  Joint pain: No  Bone pain: No  Muscle cramps: No  Muscle weakness: No  Joint stiffness: No  Bone fracture: No  Trouble with coordination: No  Dizziness or trouble with balance: No  Fainting or black-out spells: No  Memory loss: No  Headache: No  Speech problems: No  Tingling: No  Tremor: Yes  Weakness: No  Difficulty walking: No  Paralysis: No  Numbness: No      PHYSICAL EXAM  Pulse 95  Wt 57.3 kg (126 lb 6.4 oz)  SpO2 97%  Breastfeeding? No  BMI 21.03 kg/m2    General: Awake, alert, oriented. Well nourished, well developed, she is not in any acute distress.  HEENT: Head normocephalic, atraumatic. Neck supple. Good range of motion. No palpable thyroid mass. Soft carotid bruit on the right side.  Wearing a soft cervical collar.  Heart: Regular rhythm and rate. No murmurs.  Lungs: Clear to auscultation and percussion bilaterally. No ronchi, rales or wheeze.  Abdomen: Soft, non-tender, non-distended. No hepatosplenomegaly.  Extremity: Warm with no clubbing, cyanosis or edema. No lower extremity edema.  Left frontal and parietal area incisions well healed.  Left chest wall incision well healed.    Neurological  Awake, alert and oriented to date, time, place and person. Speech fluent but tremulous.   Pupils equal, round, reactive to light.  Extraocular movement intact.  Hearing is grossly normal to finger rub.   Facial sensation  intact.  Face symmetric.  Tongue midline.  Uvula elevates equally.    Motor: full strength throughout.  Sensation: intact to light touch and pinpoint.  Deep tendon reflexes: 2+ throughout. Negative for clonus. Negative for Gilbert's sign. No dysmetria.      ASSESSMENT  68 year old female with a history of dystonia/dystonic tremors and bilateral tremors, vocal dystonia and tremor, head tremor.   S/p left side deep brain stimulator placement, phase I, placement of left side deep brain stimulator electrode, target left ventral intermediate nucleus of the thalamus, with microelectrode recording on 3/6/2018.  S/p deep brain stimulator placement, phase II, placement of deep brain stimulator generator/battery over the left chest wall on 3/13/2018    Patient is undergoing evaluation for DBS implantation for her second side, right side.  Due to her left side symptoms, which are debilitating, she is unable to perform some of the basic daily activities of living.     We did discuss both phase I and II of DBS surgery.  During phase I, we would place the DBS electrode on the right side under MAC and microelectrode recording.  She would then return one week later for the phase II with placement of the DBS generator/battery over the right chest wall.      Risks, benefits and alternative therapies were discussed with the patient, including but not limited to infection and bleeding.  Surgical procedure was discussed in detail.  All questions were answered and she expressed understanding.    We also briefly discussed the device options, although Abbott Infinity system was previously recommended and she already has the Abbott Infinity system on the left side.    Risks, benefits and alternative therapies were discussed with the patient, including but not limited to infection and bleeding.  Surgical procedure was discussed in detail.  All questions were answered and she expressed understanding.      PLAN  1.  We will discuss her case at  the Movement Disorder Consensus Group meeting and we will inform her of the recommendations.      I, Joshua Flores, am serving as a scribe to document services personally performed by Aleksander Morales MD, PhD, based upon my observations and the provider's statements to me. All documentation has been reviewed and edited by the aforementioned doctor prior to being entered into the official medical record.    I, Aleksander Morales, attest that above named individual is acting in scribe capacity, has observed my performance of the services and has documented them in accordance with my direction. The documentation recorded by the scribe accurately reflects the service I personally performed and the decisions made by me. The document was also partially recorded by me and the entire document was edited by me as well.       65 minutes were spent face to face with the patient of which more than 50% of the time was spent counseling and discussing the above issues regarding diagnosis and treatment/surgical options.

## 2018-09-10 NOTE — NURSING NOTE
Chief Complaint   Patient presents with     RECHECK     [facility] RETURN DBS 2ND SIDE         Jasmin Raman, EMT

## 2018-09-10 NOTE — LETTER
"9/10/2018       RE: Irish Rosales  [address]  [place] [address]     Dear Colleague,    Thank you for referring your patient, Irish Rosales, to the [facility] NEUROSURGERY at [facility]. Please see a copy of my visit note below.    HISTORY AND PHYSICAL EXAM    Chief Complaint   Patient presents with     RECHECK     [facility] RETURN - F/U DBS - patient would like further discussion regarding DBS for her second side     HISTORY OF PRESENT ILLNESS  Ms. Irish Rosales returns to the Neurosurgery Clinic to discuss her DBS surgery plans further.  Patient was seen by me back in January, 11, 2018, as part of her DBS workup regarding right side DBS surgery.  She underwent placement of left side DBS electrode on 3/6/2018 followed by implant of left side DBS generator/battery on 3/13/2018.  Briefly, patient is a 68 year old right-handed female with history of dystonic tremor.  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  She also has cervical dystonia.  Her right side was more affected than her left side.  Her goals for DBS surgery are to decrease tremor, especially head and voice and improve dystonia.    Currently, her symptoms are not resolved.  For a few days following surgery she seemed to have improved symptoms but since then her symptoms have continued to worsen.  She believes her right side has gotten worse because she is unable to eat or curl her hair, etc.  She continues to wear a soft collar for her weak neck.  She reports itchiness of the top of her scalp that feels \"grainy\" and began a few weeks after surgery.      Past Medical History:   Diagnosis Date     Depression      Dyslipidemia      Dystonic movements 1/21/2017     Dystonic tremor 1/21/2017     Encounter for neuropsychological testing 3/13/2017    2017 evaluation  IMPRESSIONS AND " RECOMMENDATIONS   Current results indicate performance that falls within normal limits across cognitive domains, generally in the average to above average range. Objective personality assessment also falls within normal limits.   This pattern of performance does not reflect dementia at this time, nor is it suggestive of focal or lateralized cerebral involvement. Sh     Family history of movement disorder 1/21/2017     Family history of tremor 1/21/2017     H/O magnetic resonance imaging of brain and brain stem 2/27/2017    MR BRAIN W/O CONTRAST 2/27/2017 11:46 AM  Provided History: dbs brain surgery for dystonia, Other specified forms of tremor, Dystonia, unspecified, Tremor, unspecified  Comparison:  No similar prior studies   Technique: Volumetric sagittal T1 and T2 weighted, axial T2-weighted, turboFLAIR and diffusion-weighted with ADC map images of the brain were obtained without intravenous contrast.  Findings:     mild abnormality in carotid study 3/2/2017    BILATERAL DUPLEX CAROTID ULTRASOUND March 1, 2017 1:01 PM    HISTORY: Other specified symptoms and signs involving the circulatory and respiratory systems.   COMPARISON: None.   FINDINGS: There is mild atherosclerotic plaque in the carotid bifurcations. Flow velocities and waveforms show less than 50% diameter stenosis in both the right and left internal carotid arteries as assessed by each ICA PS     Osteoporosis 8/2/2010     Past Surgical History:   Procedure Laterality Date     ------------OTHER-------------  2004    vocal cord thyroplasty at [facility]     C BSO, OMENTECTOMY W/XAVIER  1997    hysterectomy     C HAND/FINGER SURGERY UNLISTED  1998    right carpal tunnel surgery     IMPLANT DEEP BRAIN STIMULATION GENERATOR / BATTERY Left 3/13/2018    Procedure: IMPLANT DEEP BRAIN STIMULATION GENERATOR / BATTERY;  Deep Brain Stimulator Placement, Phase II, Placement Of Deep Brain Stimulator Generator/Battery Over The Left Chest Wall;  Surgeon: Aleksander Morales  MD Joanne;  Location: [facility] OR     OPTICAL TRACKING SYSTEM INSERTION DEEP BRAIN STIMULATION Left 3/6/2018    Procedure: OPTICAL TRACKING SYSTEM INSERTION DEEP BRAIN STIMULATION;  Stealth Assisted Left Deep Brain Stimulator Placement, Phase I, Placement Of Left Side Deep Brain Stimulator Electrode, Target Left Ventral Intermediate Nucleus Of The Thalamus With Microelectrode Recording;  Surgeon: Aleksander Morales MD;  Location: [facility] OR     RELEASE CARPAL TUNNEL Left 1/2/2015    Procedure: RELEASE CARPAL TUNNEL;  Surgeon: SHANIA Hernandez MD;  Location: [facility] OR     RELEASE ULNAR NERVE (ELBOW) Left 1/2/2015    Procedure: RELEASE ULNAR NERVE (ELBOW);  Surgeon: SHANIA Hernandez MD;  Location: [facility] OR     Social History     Social History     Marital status:      Spouse name: N/A     Number of children: 3     Years of education: 14     Occupational History     RN Retired     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     Social History Main Topics     Smoking status: Never Smoker     Smokeless tobacco: Never Used     Alcohol use Yes      Comment: occasionally     Drug use: No     Sexual activity: Yes     Partners: Male     Other Topics Concern     Parent/Sibling W/ Cabg, Mi Or Angioplasty Before 65f 55m? No      Service No     Blood Transfusions No     Caffeine Concern No     Occupational Exposure No     Hobby Hazards No     Sleep Concern No     Stress Concern No     Weight Concern No     Special Diet No     Back Care No     Exercise Yes     walk     Bike Helmet Yes     Seat Belt Yes     Self-Exams Yes     Social History Narrative        Lives in [place]    Daughter Karyn Sanchez        benign essential tremor with a strained quality to her voice consistent with mild laryngeal spasm        ALLERGIES:  She is allergic to sulfa drugs, atorvastatin and simvastatin.  The statin drugs caused leg cramps.            FAMILY HISTORY:  As noted above with 1  sister developing a voice tremor and another sister having what is described as a facial tremor.  Mother with hand tremors          SOCIAL HISTORY:  She does not smoke.  She does use alcohol on occasion. She previously worked as a RN.         Jazmyn barbara  [id number] orofacial dyskinesias    Piedad Candice [phone]  laryngeal dystonia and laryngeal tremor and laryngeal spasm.             Updated 6/13/17: Client was born in [place], [place] to parents Jerome and Berenice.  Client grew up w/ three sisters Tea, Carrie, and Jazmyn who are currently still living.  Client graduated from high school, attended college for two years, and graduated w/ a RN Degree.  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  She was  to her ex-spouse for 25 years and had three children; one son Bryan and two daughters Halley/Ysabel.  One daughter Halley Sanchez resides nearby and her other daughter Ysabel and her son Bryan reside in [place], [place].  Bryan has two daughters ages five and eight years old.  Client enjoys flying for free to [place] to visit her two granddaughters. Michelle Sutton, [facility] ([phone], Fax: [phone]).             Family History   Problem Relation Age of Onset     Hypertension Father      and mother     Cerebrovascular Disease Father      Tremor Mother      Lung Cancer Mother      Neurologic Disorder Sister      dystonic voice x 2 botox     Neurologic Disorder Sister      jose m [place]. facial movement      Current Outpatient Prescriptions   Medication     ALPRAZolam (XANAX) 0.5 MG tablet     baclofen (LIORESAL) 10 MG tablet     sertraline (ZOLOFT) 100 MG tablet     No current facility-administered medications for this visit.         Allergies   Allergen Reactions     Sulfa Drugs Swelling and Rash     Atorvastatin      Leg cramps     Simvastatin       Leg cramp       Answers for HPI/ROS submitted by the patient on 9/3/2018   General Symptoms: Yes  Skin Symptoms: No  HENT Symptoms: No  EYE SYMPTOMS: No  HEART SYMPTOMS: No  LUNG SYMPTOMS: No  INTESTINAL SYMPTOMS: No  URINARY SYMPTOMS: No  GYNECOLOGIC SYMPTOMS: No  BREAST SYMPTOMS: No  SKELETAL SYMPTOMS: Yes  BLOOD SYMPTOMS: No  NERVOUS SYSTEM SYMPTOMS: Yes  MENTAL HEALTH SYMPTOMS: No  Fever: No  Loss of appetite: Yes  Weight loss: Yes  Weight gain: No  Fatigue: Yes  Night sweats: No  Chills: No  Increased stress: No  Excessive hunger: No  Excessive thirst: No  Feeling hot or cold when others believe the temperature is normal: Yes  Loss of height: No  Post-operative complications: No  Surgical site pain: No  Hallucinations: No  Change in or Loss of Energy: Yes  Hyperactivity: No  Confusion: No  Back pain: No  Muscle aches: Yes  Neck pain: Yes  Swollen joints: No  Joint pain: No  Bone pain: No  Muscle cramps: No  Muscle weakness: No  Joint stiffness: No  Bone fracture: No  Trouble with coordination: No  Dizziness or trouble with balance: No  Fainting or black-out spells: No  Memory loss: No  Headache: No  Speech problems: No  Tingling: No  Tremor: Yes  Weakness: No  Difficulty walking: No  Paralysis: No  Numbness: No      PHYSICAL EXAM  Pulse 95  Wt 57.3 kg (126 lb 6.4 oz)  SpO2 97%  Breastfeeding? No  BMI 21.03 kg/m2    General: Awake, alert, oriented. Well nourished, well developed, she is not in any acute distress.  HEENT: Head normocephalic, atraumatic. Neck supple. Good range of motion. No palpable thyroid mass. Soft carotid bruit on the right side.  Wearing a soft cervical collar.  Heart: Regular rhythm and rate. No murmurs.  Lungs: Clear to auscultation and percussion bilaterally. No ronchi, rales or wheeze.  Abdomen: Soft, non-tender, non-distended. No hepatosplenomegaly.  Extremity: Warm with no clubbing, cyanosis or edema. No lower extremity edema.  Left frontal and parietal area incisions well  healed.  Left chest wall incision well healed.    Neurological  Awake, alert and oriented to date, time, place and person. Speech fluent but tremulous.   Pupils equal, round, reactive to light.  Extraocular movement intact.  Hearing is grossly normal to finger rub.   Facial sensation intact.  Face symmetric.  Tongue midline.  Uvula elevates equally.    Motor: full strength throughout.  Sensation: intact to light touch and pinpoint.  Deep tendon reflexes: 2+ throughout. Negative for clonus. Negative for Gilbert's sign. No dysmetria.      ASSESSMENT  68 year old female with a history of dystonia/dystonic tremors and bilateral tremors, vocal dystonia and tremor, head tremor.   S/p left side deep brain stimulator placement, phase I, placement of left side deep brain stimulator electrode, target left ventral intermediate nucleus of the thalamus, with microelectrode recording on 3/6/2018.  S/p deep brain stimulator placement, phase II, placement of deep brain stimulator generator/battery over the left chest wall on 3/13/2018    Patient is undergoing evaluation for DBS implantation for her second side, right side.  Due to her left side symptoms, which are debilitating, she is unable to perform some of the basic daily activities of living.     We did discuss both phase I and II of DBS surgery.  During phase I, we would place the DBS electrode on the right side under MAC and microelectrode recording.  She would then return one week later for the phase II with placement of the DBS generator/battery over the right chest wall.      Risks, benefits and alternative therapies were discussed with the patient, including but not limited to infection and bleeding.  Surgical procedure was discussed in detail.  All questions were answered and she expressed understanding.    We also briefly discussed the device options, although Abbott Infinity system was previously recommended and she already has the Abbott Infinity system on the left  side.    Risks, benefits and alternative therapies were discussed with the patient, including but not limited to infection and bleeding.  Surgical procedure was discussed in detail.  All questions were answered and she expressed understanding.      PLAN  1.  We will discuss her case at the Movement Disorder Consensus Group meeting and we will inform her of the recommendations.      I, Joshua Flores, am serving as a scribe to document services personally performed by Aleksander Morales MD, PhD, based upon my observations and the provider's statements to me. All documentation has been reviewed and edited by the aforementioned doctor prior to being entered into the official medical record.    I, Aleksander Morales, attest that above named individual is acting in scribe capacity, has observed my performance of the services and has documented them in accordance with my direction. The documentation recorded by the scribe accurately reflects the service I personally performed and the decisions made by me. The document was also partially recorded by me and the entire document was edited by me as well.       65 minutes were spent face to face with the patient of which more than 50% of the time was spent counseling and discussing the above issues regarding diagnosis and treatment/surgical options.      Again, thank you for allowing me to participate in the care of your patient.      Sincerely,    Aleksander Morales MD

## 2018-09-12 NOTE — PROGRESS NOTES
[facility] Care Coordination Contact  Received message from Irish requesting an early assessment.    LM for Irish letting her know when writer was available the last week of September and first week of October to make a home visit.    INNA Diallo  [facility]  [phone]  Fax: [phone]

## 2018-09-12 NOTE — PROGRESS NOTES
[facility] Care Coordination Contact  Received call back to schedule a home visit appointment to complete the annual HRA.  A home visit has been scheduled for Tuesday, September 25th at 2:00pm.      INNA Diallo  [facility]  [phone]  Fax: [phone]

## 2018-09-20 ENCOUNTER — OFFICE VISIT (OUTPATIENT)
Dept: NEUROPSYCHOLOGY | Facility: CLINIC | Age: 68
End: 2018-09-20
Payer: COMMERCIAL

## 2018-09-20 DIAGNOSIS — G25.0 ESSENTIAL TREMOR: Primary | ICD-10-CM

## 2018-09-20 DIAGNOSIS — F09 MENTAL DISORDER DUE TO GENERAL MEDICAL CONDITION: ICD-10-CM

## 2018-09-20 NOTE — PROGRESS NOTES
Pt was seen for neuropsychological evaluation at the request of Dr. Sandy Hernandez for the purposes of diagnostic clarification and treatment planning. 2 hours of face-to-face testing were provided by this writer. Please see Dr. Lilo Drew's report for a full interpretation of the findings.    Micheal Ward  Psychometrist

## 2018-09-20 NOTE — MR AVS SNAPSHOT
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 If you see a primary care provider, you can also send messages to your care team and make appointments. If you have questions, please call your primary care clinic.  If you do not have a primary care provider, please call [phone] and they will assist you.      TDX is an electronic gateway that provides easy, online access to your medical records. With TDX, you can request a clinic appointment, read your test results, renew a prescription or communicate with your care team.     To access your existing account, please contact your [facility] or call [phone] for assistance.        Care EveryWhere ID     This is your Care EveryWhere ID. This could be used by other organizations to access your [facility] medical records  [id number]         Blood Pressure from Last 3 Encounters:   08/30/18 146/82   06/29/18 115/74   05/25/18 138/82    Weight from Last 3 Encounters:   09/10/18 57.3 kg (126 lb 6.4 oz)   08/30/18 57.2 kg (126 lb 3.2 oz)   07/25/18 58.5 kg (129 lb)              We Performed the Following     [id number] TESTING, PER HR/PSYCHOLOGIST     NEUROPSYCH TESTING BY TECH         Primary Care Provider Office Phone # Fax #    Braulio Victoria MD PhD [phone] [address]  [facility] [address]        Equal Access to Services     SAMINA PORTER : Hadslim tammy tristan ildao Sotoniali, waaxda luqadaha, qaybta kaalmada adelorin, ferny alcalamichael nellie. So [facility] [phone].    ATENCIÓN: Si habla español, tiene a flower disposición servicios gratuitos de asistencia lingüística. Llame al [phone].    We comply with applicable federal civil rights laws and [place] laws. We do not discriminate on the basis of race, color, national origin, age, disability, sex, sexual orientation, or gender identity.            Thank you!     Thank you for choosing [facility] NEUROPSYCHOLOGY  for your care. Our goal is always to provide you with excellent care. Hearing back from our patients is one way we can continue to improve our services. Please take a few minutes to complete the written survey that you may receive in the mail after your visit with us. Thank you!             Your Updated Medication List - Protect others around you: Learn how to safely use, store and throw away your medicines at www.disposemymeds.org.          This list is accurate as of 9/20/18 11:59 PM.  Always use your most recent med list.                   Brand Name Dispense Instructions for use Diagnosis    ALPRAZolam 0.5 MG tablet    XANAX    30 tablet    Take 1 tablet (0.5 mg) by mouth daily as needed    Dystonic tremor       baclofen 10 MG tablet    LIORESAL    180 tablet    Please take 1/2-1 tab up to twice a day as needed for neck pain.    Cervical dystonia       sertraline 100 MG tablet    ZOLOFT    90 tablet    Take 1 tablet (100 mg) by mouth daily    History of major depression

## 2018-09-24 NOTE — PROGRESS NOTES
[facility] Care Coordination Contact  Received message from Irish that she was going to be out of town for a week and needed to reschedule her home visit. Irish said she would contact writer once she returned from out of town to set up a home visit.    Michelle Corcoran, AMANDA  [facility]  [phone]  Fax: [phone]

## 2018-10-01 NOTE — PROGRESS NOTES
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 Information was obtained via interview with the patient and review of her medical records, including a prior neuropsychological evaluation. 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 Tremor has been present in her hands her entire life, and the voice and head tremor started about 15 years ago. She is s/p left VIM DBS lead implantation. Attempts at DBS settings have been either short-lived in benefit, or limited by imbalance and falls. Trials of turning DBS off have led to worsening. Recent adjustments have led to some improvements in voice tremor, handwriting, and right hand tremor, although she is still disabled by right hand tremor occurring as the arm approaches her mouth while eating. Consideration is being made regarding pursuing the second side (right VIM) vs. GPi if dystonia is thought to be a prominent underlying  of the tremor. She was referred for neuropsychological evaluation by Edwar Colvin M.D., as part of the presurgical protocol, for further characterization of any cognitive difficulties, to evaluate mood, and to establish a more current neurocognitive baseline.    Ms. Rosales first underwent a neuropsychological evaluation under my direction on 2/22/2017, prior to her first surgery. Please refer to the report from that date for full details regarding her history and the findings of the evaluation. Briefly, results indicated performance that fell within normal limits across cognitive domains, generally in the average to above average  range. Personality assessment also fell within normal limits.    CLINICAL INTERVIEW FINDINGS    Upon interview, Ms. Rosales stated that he first surgery targeted her left brain, and that it was not successful, although she feels that her voice is somewhat better. She understands that for the second surgery, they are considering placing two leads in the right side of her brain. Her goals for surgery include improving her quality of life, which is affected by her tremor. She misses being able to eat in social situations. She has a good understanding of the surgical procedure. She stated that she did fine with being awake during her first surgery. She understands that there is a risk of bleeding and infection with the surgery, and that it is uncommon to place two leads on one side, so that it may not work. She stated that she trusts the surgical team, and that she wants to go forward with surgery because she is not happy with her life as it is. Her family is supportive of whatever she decides. She will stay with one of her children after the surgery so they can assist with her cares as necessary.    Ms. Rosales has not noticed changes in cognition, including memory, word finding, comprehension, attention or concentration, decision making, or organization. She lives by herself, and manages her own finances, medications, and driving, apparently without difficulty. She has trouble cooking because of her tremor, so she primarily eats deli food. She handles her personal cares independently.     Ms. Rosales has a longstanding history of depression and anxiety. She  described her mood as somewhat down, but she stated that she does not feel clinically depressed. She has felt frustration, but denied sadness, hopelessness, helplessness, worthlessness or feelings of guilt. She has not felt anxious, she is no more irritable than normal, and she has not been crying any more than normal. She stated that she has been sleeping well, 8-9  hours a night. She feels rested in the morning and has not been napping during the day. Her appetite has not been good. She has lost 12-15 pounds without trying. She stated that she is working on that, but she has trouble eating because of the tremor. Her interest level is good. She is not as socially involved as she used to be; she is interested in social engagements, but her tremor keeps her from participating. She denied visual or auditory hallucinations. She denied suicidal ideation or any history of suicide attempts. She drinks one glass of wine a night, and never drinks more than two. Alcohol has an ameliorating effect on her tremor. She denied any history of chemical dependency treatment. She denied illicit drug use or tobacco use. She plays Bingo, spending a nickel a game, and she denied gambling or other compulsive behaviors.    Since her last evaluation, Ms. Rosales has not had any major illnesses or injuries. Her balance has been good, and she has not been falling. She denied unilateral weakness or numbness. She has not been experiencing headaches. She stated that her neck is always sore, because she compensates for her tremor.     PAST MEDICAL HISTORY: Medical records indicate a history of depression, anxiety, cervicalgia, dystonic tremor, essential tremor, impingement syndrome of the right shoulder, cervical radiculopathy, carpal tunnel syndrome, and osteoporosis.     CURRENT MEDICATIONS:  Include alprazolam, baclofen, and sertraline.    FAMILY MEDICAL HISTORY:  Significant for a sister with laryngeal dystonia with onset in her 50s, another sister with laryngeal dystonia with onset in the 50s and more recent blepharospasm, a mother who  at 72 with lung cancer who had mild hand tremor, and a father with cerebrovascular disease.    BEHAVIORAL OBSERVATIONS    During the evaluation, Ms. Rosales was pleasant, cooperative, and seemed to understand the instructions. She was alert and oriented to person,  place, and time, although she stated that she was 69 rather than 68. Tremors were observed clinically in both hands, as well as in her voice. Mood was euthymic. Speech was fluent, with normal volume and rate. Spontaneous conversation was present and appropriate. Internal performance validity measures fell within normal limits. The results are believed to accurately reflect her current level of functioning.    MEASURES ADMINISTERED    The following measures were administered by a trained psychometrist, under the direct supervision of a licensed psychologist.    Subtests of the Wechsler Adult Intelligence Scale-4; subtests of the Wechsler Memory Scale-Revised; Woodard Verbal Learning Test-Revised, Form 4; [place] Naming Test; D-KEFS Verbal Fluency, Standard; Trail Making Test; Stroop; Wisconsin Card Sorting Test - 64; Posey Judgement of Line Orientation Form H; Clock Drawing; Dementia Rating Scale - 2 (DRS-2) Alternate Form; Upton Depression Inventory-2 (BDI-2), HAM-D, HAM-A, RBDSQ, ESS, Apathy Scale.     RESULTS AND INTERPRETATION    Overall intellectual functioning was estimated to fall in the high average range, above premorbid estimates based on single word reading abilities, administered at her previous evaluation. Performance on a screening measure of dementia was average (DRS-2 Total Score = 138/144).    Confrontation naming was high average for her age. Verbal abstract reasoning was high average. Ability to comprehend and articulate responses to complex social situations was above average. Letter fluency, generate naming to category, and switching fluency were above average.    Attention span was average for her age. Divided attention was average. She was mildly distractible. Psychomotor processing speed was average.     Basic visual perception, including matching lines and angles, was high average for her age. Construction of a clock was notable for tremor, but otherwise fell within normal limits. Nonverbal  deductive reasoning was above average.    Novel problem solving, including the ability to generate strategies and solutions, fell within normal limits for her age and level of education.    Immediate and 30 minute delayed recall of verbal narrative material was high average. On a multiple trial list learning task, immediate recall was average, with above average recall following a 25 minute delay. Immediate recall of visual material was average, with high average recall following a 30 minute delay.    On the BDI-2, a self-report questionnaire, Ms. Rosales endorsed minimal depressive symptomatology. Specifically, she acknowledged feeling sad much of the time, and feeling more discouraged about her future than she used to be. She does not enjoy things as much as she used to, and she is less interested in other people or things than before. She feels more restless or wound up than usual, she has less energy than she used to have, and she gets tired or fatigued more easily. Her appetite is somewhat less than usual. She does not consider herself as worthwhile and useful as she used to. She endorsed minimal apathy on a scale.    IMPRESSIONS AND RECOMMENDATIONS    Current results indicate mild distractibility, and performance that otherwise falls within normal limits across cognitive domains. She denied experiencing significant depressive symptomatology, apathy, or anxiety. Compared to her previous evaluation  on 2/22/2017, she has had improvements in some aspects of expressive language, as well as in memory for visual information. Performance otherwise remains stable across cognitive domains.    This pattern of performance does not reflect dementia at this time. Ms. Rosales appears to be capable of comprehending medical information and making well reasoned decisions for herself. She has a good understanding of the surgical procedure and the risks involved. She drinks alcohol in moderation, and denied illicit drug use,  tobacco use, or compulsive behaviors. She has a good support system in her family. She lives alone, but will be able to stay with one of her children after surgery so they can assist with her cares as necessary. She appears to be a good candidate for surgery from a neurocognitive perspective.    In terms of daily functioning, Ms. Rosales is not experiencing cognitive difficulties that might interfere with her ability to actively participate in treatment or to manage her instrumental activities of daily living independently. She is mildly distractible, and when working on a task, may find that she works best in environments that are relatively free from distractions, such as noises or other interruptions. She may also find it helpful to take frequent, brief breaks when working on a project. Results may serve as a more current baseline of her neurocognitive functioning, and the evaluation may be repeated in one year to assist in determining whether there are any changes in cognitive functioning over time.      Lilo Drew, Ph.D., ABPP  Licensed Psychologist, [place] [id number]  Board Certified in Clinical Neuropsychology    Time spent:  Four hours professional time, including interview, record review, data integration, and report writing (CPT 76983); an additional three hours, including testing administered by a psychometrist and interpreted by a neuropsychologist (CPT 07715). ICD-10 diagnosis: G25; F06.8.

## 2018-10-08 NOTE — PROGRESS NOTES
Name: Irish Rosales MRN: [id number]  : 1950  MCKEON: 2018  Staff: CAR Tech: SH Age: 68  Sex: Female Hand: Right Educ: 13-15 Occupation: retired nurse    WAIS-IV     Raw SSa    Similarities  32 14     Comprehension  31 14     Letter Num. Seq. 19 10     Digit Span  24 9     Matrix Reasoning 20 14    WMS-Revised  Raw    MAS     Info & Orientation 13       LM I   29 12     LM II   27 13     VR I   34 11     VR II   32 13     VR Recognition  5    FRANKEL VERBAL LEARNING TEST - REVISED  Form   4    Raw T     Trial 1   7     Trial 2   11     Trial 3   11     Total 1-3  29 55     Learning  4     Delayed Recall  12 63     Percent Retention 109 64     True Positives  11     Discrimination Index 11 52     False Positives  0    BOSTON NAMING TEST   Score: 58  MAS: 13  84%ile                    [ 57   w/o cues        1   w/phonemic cues]     D-KEFS VERBAL FLUENCY Standard        Raw  Age Scaled     Letter Fluency  50                   14     Category Fluency 57                   19       Switching Fluency              Total Correct 19                   18              Switching [id number]            18    CLOCK DRAWING     Command   2/3             Copy     3/3    TRAIL MAKING TEST    Raw         Err  MAS  %ile   A 29            0               11             63   B 69        0               11             63    STROOP   Raw + Tad  =   Total          MAS %ile    Word 103 +     --  =      103 11 63   Color  75 +     --=        75  12 75       Color/Word  22 +    --=         22  6 9   WCST (64 cards)    Raw   T/%ile   Categories: 5 >16   #Persev. Err.: 4 69   Concept. Resp.: 58 75   FTMS:  0    MARINELLI JUDGMENT OF LINE ORIENTATION  Form H   Raw: 26  MAS: 13     DEMENTIA RATING SCALE - 2 Alternate       Raw       MAS            Raw      MAS  Attention  37  13        Concept   35           8  Init/Psv  37  11  Memory   23       9  Construct 6  10   Total     138/144     10     BDI-II:  9  APATHY SCALE:         10       MAS = [place] Older Adult Normative Study Age Adj. Scaled Score

## 2018-10-16 DIAGNOSIS — G25.0 ESSENTIAL TREMOR: Primary | ICD-10-CM

## 2018-10-16 DIAGNOSIS — G25.2 DYSTONIC TREMOR: ICD-10-CM

## 2018-10-22 ENCOUNTER — PATIENT OUTREACH (OUTPATIENT)
Dept: GERIATRIC MEDICINE | Facility: CLINIC | Age: 68
End: 2018-10-22

## 2018-10-22 ENCOUNTER — TELEPHONE (OUTPATIENT)
Dept: NEUROLOGY | Facility: CLINIC | Age: 68
End: 2018-10-22

## 2018-10-22 NOTE — TELEPHONE ENCOUNTER
Called patient to discuss her weight loss. She was 135 lbs with a BMI of 23.96 back on 4/27/18. On 8/30/18 she was 126 lb. 3.2 oz with BMI 21.04. Today she reports she is 118 lbs. She claims she does not have much of an appetite, but thinks it is because of her tremor.     I told her it is important to discuss her weight loss with her primary care provider. She agreed to call him today and make the appointment. I will send a [employer] message with my contact information for her to give the primary care provider to send their report.

## 2018-10-22 NOTE — PROGRESS NOTES
Had scheduled a home visit w/ Irish, because she reported needing more assistance at home and writer was going to complete a sig change [facility].    Irish hasn't had any ADL changes and didn't feel she needed a PCA at this time, because she's only having difficulty with curling her hair and eat, but doesn't need to be fed. Irish has specialized eating utensils and was seen by therapy two months ago. Therapy didn't recommend anything new to assist with eating.    Adding [employer] meals and [employer] services to assist with grocery shopping and meal prep.    Will mail Irish a [employer] application to assist with transportation for non-medical appointments. Gave Irish the phone number to [employer] for transportation in case her sister was unable to drive her to medical appointments.    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 Irish said that the homemaker does a good job and is nice, but her hours keep getting shorter. Will contact [employer] to discuss this.    Assisted Irish in calling Sallie ([phone]) at [facility]. Irish hasn't heard when her next surgery with Dr. Morales is going to be stating she's left several messages with out receiving a return phone call. Sallie return writer's call and called Irish while writer was still at her home. According to the notes, Dr. Jasmine nurse needs to coordinator a surgery date that Dr. Morales and the neurologist can be at. It;s currently in the works.    Michelle Corcoran, [facility]  [phone]  Fax: [phone]

## 2018-10-22 NOTE — PROGRESS NOTES
[facility] Care Coordination Contact      [facility] Six-Month Telephone Assessment    6 month telephone assessment completed on 10-.    ER visits: No  Hospitalizations: No  TCU stays: No  Significant health status changes: See first progress not in this encounter/note.  Falls/Injuries: No  ADL/IADL changes: No  Changes in services: Yes: Adding [employer] Meals and [employer].    Caregiver Assessment follow up: NA    Goals: See POC in chart for goal progress documentation.      Will see member in 6 months for an annual health risk assessment.   Encouraged member to call CC with any questions or concerns in the meantime.     INNA Diallo  [facility]  [phone]  Fax: [phone]

## 2018-10-23 DIAGNOSIS — G25.2 DYSTONIC TREMOR: ICD-10-CM

## 2018-10-23 DIAGNOSIS — G25.0 ESSENTIAL TREMOR: Primary | ICD-10-CM

## 2018-10-24 ENCOUNTER — TELEPHONE (OUTPATIENT)
Dept: NEUROLOGY | Facility: CLINIC | Age: 68
End: 2018-10-24

## 2018-10-24 DIAGNOSIS — R25.1 TREMOR: Primary | ICD-10-CM

## 2018-10-24 NOTE — TELEPHONE ENCOUNTER
----- Message from Jessy Vaughn sent at 10/23/2018  2:59 PM CDT -----  Piedad    I called patient with surgery dates and patient kept telling me (which was hard to understand) that she is in a lot of pain.      Could you please call patient regarding the pain issue.    Patient's surgeries are scheduled and Carrie is mailing out the information to the patient as patient did not want to write anything down she kkept saying the word pain.    Please call [phone]    Berenice

## 2018-10-24 NOTE — PROGRESS NOTES
[facility] Care Coordination Contact    Spoke w/ Raheel and made a referral to [employer] for grocery store delivery services for Irish. Gave Raheel all of Irish's contact information. He is going to set up with her whether she would like every week or e/o week grocery delivery and will let writer know once that is decided.    There is a minimum of $40 that Irish has to spend each time she places an order. Her order date is on Monday's and the grocery's will be delivered on Thursdays. Her grocery's are purchased from [employer]. [employer] will mail Irish a booklet of what foods she can order from [employer] each week.    This service would fall under [employer]. The NPI Number is [id number].    INNA Diallo  [facility]  [phone]  Fax: [phone]

## 2018-10-24 NOTE — TELEPHONE ENCOUNTER
TELEPHONE CALL    I returned Irish's call.     She has been experiencing a number of issues since DBS was implanted. She has uncomfortable neck pain, weight loss of about 20 lb, and tremor interfering with eating. DBS was effective for improving her handwriting and voice tremor to some degree.     I discussed with her that she could turn the DBS off when she is not engaged in an activity for which it would be helpful since there is some question she is experiencing stimulation related side effects. For example - she could turn DBS on when she is writing or speaking for an extended period of time and otherwise turn it off. The neck pain could be related to asymmetric stimulation and perhaps would be better after she has bilateral DBS stimulation. There is also a possibility that neck pain is not related to DBS at all. By turning off DBS - it will be helpful to know if pain resolves during these intervals.     Additionally, we are concerned to hear of her 20lb weight loss. The location of her DBS would not be expected to cause weight loss. She thinks she has been eating less. However, I encouraged her to seek evaluation with her PCP to make sure she has a proper work up for weight loss in case there is an alternative explanation.     She also wanted to know why her surgery for the right brain is scheduled in January. Her impression was that it would be done in October or November. I will follow up with Carrie Fitzgerald. I'll call Ms. Rosales back later this week to discuss how her neck pain does off stimulation.     Sandy Hernandez MD  Movement Disorders Fellow

## 2018-10-25 ENCOUNTER — OFFICE VISIT (OUTPATIENT)
Dept: PEDIATRICS | Facility: CLINIC | Age: 68
End: 2018-10-25
Payer: COMMERCIAL

## 2018-10-25 VITALS
WEIGHT: 121 LBS | TEMPERATURE: 98.6 F | BODY MASS INDEX: 20.14 KG/M2 | SYSTOLIC BLOOD PRESSURE: 146 MMHG | HEART RATE: 80 BPM | OXYGEN SATURATION: 97 % | DIASTOLIC BLOOD PRESSURE: 82 MMHG

## 2018-10-25 DIAGNOSIS — R63.4 LOSS OF WEIGHT: Primary | ICD-10-CM

## 2018-10-25 DIAGNOSIS — G24.3 CERVICAL DYSTONIA: ICD-10-CM

## 2018-10-25 DIAGNOSIS — Z23 NEEDS FLU SHOT: ICD-10-CM

## 2018-10-25 LAB
ALBUMIN SERPL-MCNC: 4.4 G/DL (ref 3.4–5)
ALP SERPL-CCNC: 62 U/L (ref 40–150)
ALT SERPL W P-5'-P-CCNC: 22 U/L (ref 0–50)
ANION GAP SERPL CALCULATED.3IONS-SCNC: 7 MMOL/L (ref 3–14)
AST SERPL W P-5'-P-CCNC: 17 U/L (ref 0–45)
BILIRUB SERPL-MCNC: 0.5 MG/DL (ref 0.2–1.3)
BUN SERPL-MCNC: 15 MG/DL (ref 7–30)
CALCIUM SERPL-MCNC: 9.4 MG/DL (ref 8.5–10.1)
CHLORIDE SERPL-SCNC: 106 MMOL/L (ref 94–109)
CO2 SERPL-SCNC: 29 MMOL/L (ref 20–32)
CREAT SERPL-MCNC: 0.6 MG/DL (ref 0.52–1.04)
ERYTHROCYTE [DISTWIDTH] IN BLOOD BY AUTOMATED COUNT: 11.4 % (ref 10–15)
GFR SERPL CREATININE-BSD FRML MDRD: >90 ML/MIN/1.7M2
GLUCOSE SERPL-MCNC: 90 MG/DL (ref 70–99)
HCT VFR BLD AUTO: 41.5 % (ref 35–47)
HGB BLD-MCNC: 14.2 G/DL (ref 11.7–15.7)
MCH RBC QN AUTO: 32.9 PG (ref 26.5–33)
MCHC RBC AUTO-ENTMCNC: 34.2 G/DL (ref 31.5–36.5)
MCV RBC AUTO: 96 FL (ref 78–100)
PLATELET # BLD AUTO: 275 10E9/L (ref 150–450)
POTASSIUM SERPL-SCNC: 4.3 MMOL/L (ref 3.4–5.3)
PROT SERPL-MCNC: 7.9 G/DL (ref 6.8–8.8)
RBC # BLD AUTO: 4.32 10E12/L (ref 3.8–5.2)
SODIUM SERPL-SCNC: 142 MMOL/L (ref 133–144)
TSH SERPL DL<=0.005 MIU/L-ACNC: 1.44 MU/L (ref 0.4–4)
WBC # BLD AUTO: 5.4 10E9/L (ref 4–11)

## 2018-10-25 PROCEDURE — 85027 COMPLETE CBC AUTOMATED: CPT | Performed by: INTERNAL MEDICINE

## 2018-10-25 PROCEDURE — 84443 ASSAY THYROID STIM HORMONE: CPT | Performed by: INTERNAL MEDICINE

## 2018-10-25 PROCEDURE — 99214 OFFICE O/P EST MOD 30 MIN: CPT | Mod: 25 | Performed by: INTERNAL MEDICINE

## 2018-10-25 PROCEDURE — 90471 IMMUNIZATION ADMIN: CPT | Performed by: INTERNAL MEDICINE

## 2018-10-25 PROCEDURE — 80053 COMPREHEN METABOLIC PANEL: CPT | Performed by: INTERNAL MEDICINE

## 2018-10-25 PROCEDURE — 36415 COLL VENOUS BLD VENIPUNCTURE: CPT | Performed by: INTERNAL MEDICINE

## 2018-10-25 PROCEDURE — 90662 IIV NO PRSV INCREASED AG IM: CPT | Performed by: INTERNAL MEDICINE

## 2018-10-25 RX ORDER — BACLOFEN 20 MG/1
20 TABLET ORAL 2 TIMES DAILY
Qty: 60 TABLET | Refills: 3 | Status: SHIPPED | OUTPATIENT
Start: 2018-10-25 | End: 2018-10-25

## 2018-10-25 RX ORDER — BACLOFEN 20 MG/1
20 TABLET ORAL 3 TIMES DAILY
Qty: 90 TABLET | Refills: 3 | Status: SHIPPED | OUTPATIENT
Start: 2018-10-25 | End: 2019-04-16

## 2018-10-25 ASSESSMENT — ANXIETY QUESTIONNAIRES
3. WORRYING TOO MUCH ABOUT DIFFERENT THINGS: NOT AT ALL
7. FEELING AFRAID AS IF SOMETHING AWFUL MIGHT HAPPEN: NOT AT ALL
6. BECOMING EASILY ANNOYED OR IRRITABLE: NOT AT ALL
1. FEELING NERVOUS, ANXIOUS, OR ON EDGE: NOT AT ALL
5. BEING SO RESTLESS THAT IT IS HARD TO SIT STILL: NOT AT ALL
2. NOT BEING ABLE TO STOP OR CONTROL WORRYING: NOT AT ALL
GAD7 TOTAL SCORE: 0

## 2018-10-25 ASSESSMENT — PATIENT HEALTH QUESTIONNAIRE - PHQ9
SUM OF ALL RESPONSES TO PHQ QUESTIONS 1-9: 5
5. POOR APPETITE OR OVEREATING: NOT AT ALL

## 2018-10-25 NOTE — MR AVS SNAPSHOT
After Visit Summary   10/25/2018    Irish Rosales    MRN: [id number]           Patient Information     Date Of Birth          1950        Visit Information        Provider Department      10/25/2018 9:10 AM Braulio Victoria MD PhD [facility]        Today's Diagnoses     Loss of weight    -  1    Needs flu shot        Cervical dystonia          Care Instructions    Make appointment(s) for:   -- get labs done today.         Medication(s) prescribed today:    Orders Placed This Encounter   Medications     Nutritional Supplements (ENSURE COMPLETE SHAKE) LIQD     Sig: Take 237 mLs by mouth 3 times daily     Dispense:  90 Bottle     Refill:  3     baclofen (LIORESAL) 20 MG tablet     Sig: Take 1 tablet (20 mg) by mouth 3 times daily     Dispense:  90 tablet     Refill:  3     Cancel the previous Rx for BID                   Follow-ups after your visit        Your next 10 appointments already scheduled     Dec 21, 2018  9:00 AM CST   (Arrive by 8:45 AM)   PAC EVALUATION with RACHANA Hartman [facility] ([facility])    [address] [place] [address]  4th [facility] [id number] [phone]            Jan 08, 2019   Procedure with Aleksander Morales MD   [facility], Same Day Surgery (--)    [address] [facility] [id number] [phone]            Jan 08, 2019  8:40 AM CST   CT HEAD W/O & W CONTRAST with UUCT1   [place], CT ([facility])    [address] [facility] [id number] [phone]           How do I prepare for my exam? (Food and drink instructions) **You will have contrast for this exam.** Do not eat or drink for 2 hours before your exam. If you need to take medicine, you may take it with small sips of water. (We may ask you to take liquid medicine as well.)  The day before your exam, drink extra fluids at least six 8-ounce glasses  (unless your doctor tells you to restrict your fluids).  How do I prepare for my exam? (Other instructions) Patients over 70 or patients with diabetes or kidney problems: If you haven t had a blood test (creatinine test) within the last 30 days, the Cardiologist/Radiologist may require you to get this test prior to your exam.  What should I wear: Please wear loose clothing, such as a sweat suit or jogging clothes.  Avoid snaps, zippers and other metal. We may ask you to undress and put on a hospital gown.  How long does the exam take: Most scans take less than 20 minutes.  What should I bring: Please bring any scans or X-rays taken at other hospitals, if similar tests were done. Also bring a list of your medicines, including vitamins, minerals and over-the-counter drugs. It is safest to leave personal items at home.  Do I need a :  No  is needed.  What do I need to tell my doctor? Be sure to tell your doctor: * If you have any allergies. * If there s any chance you are pregnant. * If you are breastfeeding. * If you have diabetes as your medication may need to be adjusted for this exam.  What should I do after the exam: No restrictions, You may resume normal activities.  What is this test: A CT (computed tomography) scan is a series of pictures that allows us to look inside your body. The scanner creates images of the body in cross sections, much like slices of bread. This helps us see any problems more clearly. You may receive contrast (X-ray dye) before or during your scan. Contrast is given through an IV (small needle in your arm).  Who should I call with questions: If you have any questions, please call the Imaging Department where you will have your exam. Directions, parking instructions, and other information is available on our website, [email or web address].            Lino 15, 2019   Procedure with Aleksander Morales MD   [facility] [place], Same Day Surgery (--)    [address] [facility]  [address]   [phone]            Jan 30, 2019  3:00 PM CST   (Arrive by 2:45 PM)   Return Visit with RACHANA Galindo [facility] ([facility])    [address] [facility]  3rd [facility] [address] [phone]              Who to contact     If you have questions or need follow up information about today's clinic visit or your schedule please contact [facility] directly at [phone].  Normal or non-critical lab and imaging results will be communicated to you by [employer], letter or phone within 4 business days after the clinic has received the results. If you do not hear from us within 7 days, please contact the clinic through [employer] or phone. If you have a critical or abnormal lab result, we will notify you by phone as soon as possible.  Submit refill requests through [employer] or call your pharmacy and they will forward the refill request to us. Please allow 3 business days for your refill to be completed.          Additional Information About Your Visit        [employer] Information     [employer] gives you secure access to your electronic health record. If you see a primary care provider, you can also send messages to your care team and make appointments. If you have questions, please call your primary care clinic.  If you do not have a primary care provider, please call [phone] and they will assist you.      [employer] is an electronic gateway that provides easy, online access to your medical records. With [employer], you can request a clinic appointment, read your test results, renew a prescription or communicate with your care team.     To access your existing account, please contact your [facility] or call [phone] for assistance.        Care EveryWhere ID     This is your Care EveryWhere ID. This could be used by other organizations to access your [facility] medical records  [id number]        Your  Vitals Were     Pulse Temperature Pulse Oximetry BMI (Body Mass Index)          80 98.6  F (37  C) (Temporal) 97% 20.14 kg/m2         Blood Pressure from Last 3 Encounters:   10/25/18 146/82   08/30/18 146/82   06/29/18 115/74    Weight from Last 3 Encounters:   10/25/18 121 lb (54.9 kg)   09/10/18 126 lb 6.4 oz (57.3 kg)   08/30/18 126 lb 3.2 oz (57.2 kg)              We Performed the Following     ADMIN 1st VACCINE     CBC with platelets     Comprehensive metabolic panel (BMP + Alb, Alk Phos, ALT, AST, Total. Bili, TP)     FLU VACCINE, INCREASED ANTIGEN, PRESV FREE     TSH with free T4 reflex          Today's Medication Changes          These changes are accurate as of 10/25/18  9:49 AM.  If you have any questions, ask your nurse or doctor.               Start taking these medicines.        Dose/Directions    baclofen 20 MG tablet   Commonly known as:  LIORESAL   Used for:  Cervical dystonia   Started by:  Braulio Victoria MD PhD        Dose:  20 mg   Take 1 tablet (20 mg) by mouth 3 times daily   Quantity:  90 tablet   Refills:  3       ENSURE COMPLETE SHAKE Liqd   Used for:  Loss of weight   Started by:  Braulio Victoria MD PhD        Dose:  237 mL   Take 237 mLs by mouth 3 times daily   Quantity:  90 Bottle   Refills:  3            Where to get your medicines      These medications were sent to [facility] - [place], [place] - [address] AT [facility] [place] [facility] [address]  [address], [place] [address]     Phone:  [phone]     baclofen 20 MG tablet    ENSURE COMPLETE SHAKE Liqd                Primary Care Provider Office Phone # Fax #    Braulio Victoria MD PhD [phone]       [address]  [facility] [address]        Equal Access to Services     SALINAS PORTER AH: Vincent flores Solevi, waaxda luqadaha, qaybta kaalmada adeegyada, ferny ballard. [facility] [phone].    ATENCIÓN: Si habla español, tiene a flower disposición servicios gratuitos de  asistencia lingüística. Marixa al [phone].    We comply with applicable federal civil rights laws and [place] laws. We do not discriminate on the basis of race, color, national origin, age, disability, sex, sexual orientation, or gender identity.            Thank you!     Thank you for choosing [facility]  for your care. Our goal is always to provide you with excellent care. Hearing back from our patients is one way we can continue to improve our services. Please take a few minutes to complete the written survey that you may receive in the mail after your visit with us. Thank you!             Your Updated Medication List - Protect others around you: Learn how to safely use, store and throw away your medicines at www.disposemymeds.org.          This list is accurate as of 10/25/18  9:49 AM.  Always use your most recent med list.                   Brand Name Dispense Instructions for use Diagnosis    ALPRAZolam 0.5 MG tablet    XANAX    30 tablet    Take 1 tablet (0.5 mg) by mouth daily as needed    Dystonic tremor       baclofen 20 MG tablet    LIORESAL    90 tablet    Take 1 tablet (20 mg) by mouth 3 times daily    Cervical dystonia       ENSURE COMPLETE SHAKE Liqd     90 Bottle    Take 237 mLs by mouth 3 times daily    Loss of weight       sertraline 100 MG tablet    ZOLOFT    90 tablet    Take 1 tablet (100 mg) by mouth daily    History of major depression

## 2018-10-25 NOTE — PATIENT INSTRUCTIONS
Make appointment(s) for:   -- get labs done today.         Medication(s) prescribed today:    Orders Placed This Encounter   Medications     Nutritional Supplements (ENSURE COMPLETE SHAKE) LIQD     Sig: Take 237 mLs by mouth 3 times daily     Dispense:  90 Bottle     Refill:  3     baclofen (LIORESAL) 20 MG tablet     Sig: Take 1 tablet (20 mg) by mouth 3 times daily     Dispense:  90 tablet     Refill:  3     Cancel the previous Rx for BID

## 2018-10-25 NOTE — NURSING NOTE
Injectable Influenza Immunization Documentation    1.  Is the person to be vaccinated sick today?  No    2. Does the person to be vaccinated have an allergy to eggs or to a component of the vaccine?  No    3. Has the person to be vaccinated today ever had a serious reaction to influenza vaccine in the past?  No    4. Has the person to be vaccinated ever had Guillain-Verona syndrome?  No     Form completed by Margret MELENDREZ

## 2018-10-25 NOTE — PROGRESS NOTES
SUBJECTIVE:   Irish Rosales is a 68 year old female who presents to clinic today for the following health issues:      Taalk about surgery for Deep vein stimulator, Surgeon wants to wait until Jan 2019 to do surg, pt wants surgery next month.     She has lost 15 lbs in the last few months because she can't eat due to tremor, poor hand/mouth coordination. She has to eat everything through liquid. She is miserable.  He wants to be able to get the surgery done early, however she could not get hold of anyone at the surgery center to arrange this earlier.  She was also advised to see primary care to rule out other causes of her weight loss.    Patient also has significant neck pain associated with tremor of her head.  Patient has been diagnosed with cervical dystonia.  She has baclofen 10 mg tablets, half to 1 tablet twice a day as needed.  She is taking 2 tablets once a day.  She thinks it helps a little bit.    Patient reports she otherwise is doing well.  She has not has any fevers or chills or night sweats.  She does not have any nausea vomiting, constipation or diarrhea.  The only place that hurt is her neck.  She believes is from her head shaking all the time.  She does not think there is any other cause of her weight loss other than not able to eat.  She came with her sister Piedad and her daughter.  They were asking if patient could be given Ensure as a prescription.    ROS:  Constitutional, HEENT, cardiovascular, pulmonary, gi and gu systems are negative, except as otherwise noted.         Current Outpatient Prescriptions on File Prior to Visit:  sertraline (ZOLOFT) 100 MG tablet Take 1 tablet (100 mg) by mouth daily   ALPRAZolam (XANAX) 0.5 MG tablet Take 1 tablet (0.5 mg) by mouth daily as needed     No current facility-administered medications on file prior to visit.        Patient Active Problem List   Diagnosis     Spasm of vocal cords     Essential tremor     Osteoporosis     Hyperlipidemia LDL goal  <160     Generalized anxiety disorder     History of major depression     Carpal tunnel syndrome     Closed nondisp fracture of distal phalanx of lesser toe of right foot     Cervical radiculopathy     Right shoulder pain     Impingement syndrome of right shoulder     Family history of tremor     Family history of movement disorder     Dystonic tremor     Dystonic movements     H/O magnetic resonance imaging of brain and brain stem     mild abnormality in carotid study     Encounter for neuropsychological testing     Health Care Home     Advance care planning     Depression, major, in remission (H)     Cervicalgia     Past Surgical History:   Procedure Laterality Date     ------------OTHER-------------  2004    vocal cord thyroplasty at [facility]     C BSO, OMENTECTOMY W/XAVIER  1997    hysterectomy     C HAND/FINGER SURGERY UNLISTED  1998    right carpal tunnel surgery     IMPLANT DEEP BRAIN STIMULATION GENERATOR / BATTERY Left 3/13/2018    Procedure: IMPLANT DEEP BRAIN STIMULATION GENERATOR / BATTERY;  Deep Brain Stimulator Placement, Phase II, Placement Of Deep Brain Stimulator Generator/Battery Over The Left Chest Wall;  Surgeon: Aleksander Morales MD;  Location: [facility] OR     OPTICAL TRACKING SYSTEM INSERTION DEEP BRAIN STIMULATION Left 3/6/2018    Procedure: OPTICAL TRACKING SYSTEM INSERTION DEEP BRAIN STIMULATION;  Stealth Assisted Left Deep Brain Stimulator Placement, Phase I, Placement Of Left Side Deep Brain Stimulator Electrode, Target Left Ventral Intermediate Nucleus Of The Thalamus With Microelectrode Recording;  Surgeon: Aleksander Morales MD;  Location: [facility] OR     RELEASE CARPAL TUNNEL Left 1/2/2015    Procedure: RELEASE CARPAL TUNNEL;  Surgeon: SHANIA Hernandez MD;  Location: [facility] OR     RELEASE ULNAR NERVE (ELBOW) Left 1/2/2015    Procedure: RELEASE ULNAR NERVE (ELBOW);  Surgeon: SHANIA Hernandez MD;  Location: [facility] OR       Social History   Substance Use Topics     Smoking status:  Never Smoker     Smokeless tobacco: Never Used     Alcohol use Yes      Comment: occasionally     Family History   Problem Relation Age of Onset     Hypertension Father      and mother     Cerebrovascular Disease Father      Tremor Mother      Lung Cancer Mother      Neurologic Disorder Sister      dystonic voice x 2 botox     Neurologic Disorder Sister      jose m m[place]. facial movement              Problem list, Medication list, Allergies, and Medical/Social/Surgical histories reviewed in [facility] and updated as appropriate.    OBJECTIVE:                                                    /82  Pulse 80  Temp 98.6  F (37  C) (Temporal)  Wt 121 lb (54.9 kg)  SpO2 97%  BMI 20.14 kg/m2    GENERAL: 68-year-old female, alert and no distress; she has constant tremor of her head in the upper body.  As well as voice tremor.  HEENT: unremarkable  Neck: no adenopathy/mass/stiffness. Thyroid normal.  Lung: clear, no wheezing/rhonchi/crackles  Heart: RRR, normal S1/2, no murmur/gallop/rup  Abd: soft, normal BS, non tender, no organomegaly   Ext: no cyanosis/clubbing/edema      Diagnostic test results:  Results for orders placed or performed in visit on 10/25/18   CBC with platelets   Result Value Ref Range    WBC 5.4 4.0 - 11.0 10e9/L    RBC Count 4.32 3.8 - 5.2 10e12/L    Hemoglobin 14.2 11.7 - 15.7 g/dL    Hematocrit 41.5 35.0 - 47.0 %    MCV 96 78 - 100 fl    MCH 32.9 26.5 - 33.0 pg    MCHC 34.2 31.5 - 36.5 g/dL    RDW 11.4 10.0 - 15.0 %    Platelet Count 275 150 - 450 10e9/L   Comprehensive metabolic panel (BMP + Alb, Alk Phos, ALT, AST, Total. Bili, TP)   Result Value Ref Range    Sodium 142 133 - 144 mmol/L    Potassium 4.3 3.4 - 5.3 mmol/L    Chloride 106 94 - 109 mmol/L    Carbon Dioxide 29 20 - 32 mmol/L    Anion Gap 7 3 - 14 mmol/L    Glucose 90 70 - 99 mg/dL    Urea Nitrogen 15 7 - 30 mg/dL    Creatinine 0.60 0.52 - 1.04 mg/dL    GFR Estimate >90 >60 mL/min/1.7m2    GFR Estimate If Black >90 >60 mL/min/1.7m2     Calcium 9.4 8.5 - 10.1 mg/dL    Bilirubin Total 0.5 0.2 - 1.3 mg/dL    Albumin 4.4 3.4 - 5.0 g/dL    Protein Total 7.9 6.8 - 8.8 g/dL    Alkaline Phosphatase 62 40 - 150 U/L    ALT 22 0 - 50 U/L    AST 17 0 - 45 U/L   TSH with free T4 reflex   Result Value Ref Range    TSH 1.44 0.40 - 4.00 mU/L         ASSESSMENT/PLAN:                                                      68 year old female with the following diagnoses and treatment plan:      ICD-10-CM    1. Loss of weight R63.4 Nutritional Supplements (ENSURE COMPLETE SHAKE) LIQD     CBC with platelets     Comprehensive metabolic panel (BMP + Alb, Alk Phos, ALT, AST, Total. Bili, TP)     TSH with free T4 reflex   2. Needs flu shot Z23 FLU VACCINE, INCREASED ANTIGEN, PRESV FREE     ADMIN 1st VACCINE   3. Cervical dystonia G24.3 baclofen (LIORESAL) 20 MG tablet     DISCONTINUED: baclofen (LIORESAL) 20 MG tablet       --Patient has recent weight loss, most likely due to inadequate nutrition related to dystonic tremors.  With her constant body movements, in addition to not able to take in adequate calories, she is also burning more calories from dystonic movements.  I do think that she will benefit from taking Ensure supplements to help keep her weight.  We will obtain baseline metabolic panel blood count and thyroid test to be sure there is no other process going on.  --Cervical dystonia: This is causing significant muscle strain and tightness around her neck.  We will increase her baclofen dose to 20 mg 3 times a day.  She can start with 20 mg twice a day for 3 days, and then go up to 3 times a day.  --I will send a note to her surgical team, there is no contraindication for having her surgery done early.    Will call or return to clinic if worsening or symptoms not improving as discussed.  See Patient Instructions.      Braulio Victoria MD-PhD  [facility]    (Note: Chart documentation was done in part with Dragon Voice Recognition software. Although  reviewed after completion, some word and grammatical errors may remain.)

## 2018-10-25 NOTE — Clinical Note
I saw Irish. Her weight loss is from insufficiency calorie intake from the dystonia and extra energy expenditure from the constant dystonia. I have another pt with similar dystonia and weight loss. There is no contraindication for her to get her surgery early from primary care perspective. Dr. Braulio Victoria

## 2018-10-25 NOTE — TELEPHONE ENCOUNTER
[facility] Call Center    Phone Message    May a detailed message be left on voicemail: yes    Reason for Call: Other: Patient's daughter Halley called in wanting to speak to Piedad Rose. She says Irish went to PCP today and Karyn wanted to discuss that appt with Piedad and talk about pt's upcoming surgery as well. Please give her a call back at [phone].    Action Taken: Message routed to:  [facility] ([facility] Neurology

## 2018-10-25 NOTE — PROGRESS NOTES
Dear Irish,   Your recent result are within acceptable range or at baseline. Please continue with your current plan of care.     -- I sent a note to the surgical office that there is no contraindication for surgery from weight standpoint.     Please call or Mychart to our office if you have further questions.     Braulio Victoria MD-PhD

## 2018-10-26 ENCOUNTER — TELEPHONE (OUTPATIENT)
Dept: NEUROLOGY | Facility: CLINIC | Age: 68
End: 2018-10-26

## 2018-10-26 DIAGNOSIS — G25.2 DYSTONIC TREMOR: Primary | ICD-10-CM

## 2018-10-26 ASSESSMENT — ANXIETY QUESTIONNAIRES: GAD7 TOTAL SCORE: 0

## 2018-10-26 NOTE — TELEPHONE ENCOUNTER
[facility] Call Center    Phone Message    May a detailed message be left on voicemail: yes    Reason for Call: Other: Halley calling in for Piedad again. Please give her a call back.      Action Taken: Message routed to:  [facility] ([facility] Neurology

## 2018-10-26 NOTE — TELEPHONE ENCOUNTER
"  I called to follow up on her pain.     She reported that her pain was actually worse when she turned DBS off.     We discussed that her original surgery date was only a ball park estimate. A group of patients were scheduled for surgery at the same time and this pushed the actual surgery date out from the original estimate. Her surgery date wasn't \"moved.\" Unfortunately, we don't have any earlier times to offer her. She can discuss this further with Carrie Fitzgerald.     Regarding her pain, she was concerned that she may want to explore other options besides DBS to treat it. She did not find turning DBS off helpful, but rather pain was worse with it off. I am confused as to the origin of her neck pain - because it seems much worse after surgery - but is not clearly exacerbated by DBS.     I think it would be best to see her in person to discuss this. I can see her in one week. It may be possible that neck pain is unrelated to DBS or tremor.     Sandy Hernandez MD  Movement Disorders Fellow  "

## 2018-10-26 NOTE — TELEPHONE ENCOUNTER
"Called daughter Halley. Patient saw primary care provider yesterday and lab work came back fine/normal. They are concerned that they were \"told\" she would have her surgery in November but they received a call scheduling it into January. They would like Carrie to call on Monday to discuss.      "

## 2018-10-29 NOTE — PROGRESS NOTES
[facility] Care Coordination Contact    FABI Pickering at [employer] inquiring how often Irish will receive grocery services so writer can submit the auth.    Spoke w/ Emanuel ([phone]) at [employer] about Irish's homemaker not providing five hours per week of homemaking services. Emanuel states that he will speak w/ the homemaker about this. Did tell Emanuel that Irish likes the homemaker and that she does a good job.    Mailed [employer] application to Irish.    Michelle Corcoran, [facility]  [phone]  Fax: [phone]

## 2018-10-31 NOTE — PROGRESS NOTES
[facility] Care Coordination Contact    Raheel hasn't been able to reach Irish to set up grocery services through [employer]. Spoke w/ Irish who said she would like every other week grocery store delivery. Called [employer] a third time and informed them of Irish's preference. [employer] will call writer once grocery services have been officially set up to submit auth.    Requested for CMS to process M[employer] auth.    INNA Diallo  [facility]  [phone]  Fax: [phone]

## 2018-11-02 ENCOUNTER — OFFICE VISIT (OUTPATIENT)
Dept: NEUROLOGY | Facility: CLINIC | Age: 68
End: 2018-11-02
Payer: COMMERCIAL

## 2018-11-02 VITALS
SYSTOLIC BLOOD PRESSURE: 118 MMHG | HEART RATE: 88 BPM | WEIGHT: 121 LBS | DIASTOLIC BLOOD PRESSURE: 78 MMHG | HEIGHT: 65 IN | OXYGEN SATURATION: 96 % | BODY MASS INDEX: 20.16 KG/M2

## 2018-11-02 DIAGNOSIS — G25.2 DYSTONIC TREMOR: ICD-10-CM

## 2018-11-02 DIAGNOSIS — G25.0 ESSENTIAL TREMOR: Primary | ICD-10-CM

## 2018-11-02 DIAGNOSIS — R63.4 LOSS OF WEIGHT: ICD-10-CM

## 2018-11-02 DIAGNOSIS — M54.2 CERVICALGIA: ICD-10-CM

## 2018-11-02 DIAGNOSIS — J38.5 SPASM OF VOCAL CORDS: ICD-10-CM

## 2018-11-02 ASSESSMENT — PAIN SCALES - GENERAL: PAINLEVEL: EXTREME PAIN (8)

## 2018-11-02 NOTE — LETTER
11/2/2018      RE: Irish Rosales  [address]  [place] [address]       [facility] Neurology  Movement Disorders Clinic    Irish Rosales  YOB: 1950  MRN: [id number]    REASON FOR VISIT: Follow up to discuss neck pain, possibly related to DBS stimulation    HISTORY OF PRESENT ILLNESS:  Irish Rosales is a 68 year old woman with tremor s/p left Vim DBS who returns to discuss neck pain which has worsened after DBS. The clinical impression and consensus is that her tremor is most likely due to essential tremor with dystonic features - probably related to advanced essential tremor and long duration of symptoms. Given prominent axial involvement with head tremor and dysphonia - she is scheduled for the right Vim/Vop DBS surgery Jan 8, 2019 with the hope that bilateral stimulation will improve these axial signs.    Thus far, left Vim DBS has provided benefit for handwriting and components of her postural tremor. Functionally, however, she still has difficulty feeding herself with utensils and drinking from a cup due to tremor. She remains embarrassed with the tremor and avoids eating in the presence of others. Her appetite has been low and she has lost a significant amount of weight over the last year, about 20lb. Her PCP has also evaluated her for alternative causes of weight loss.     The neck pain may further impact her appetite. She reported that neck pain was minimal prior to DBS and now she is very uncomfortable. I had advised her to turn off the DBS to see if her pain improved. While she did turn it off - she did so for only a short time of less than one hour. She did not feel her pain improved during this time but her tremor and handwriting were worse. She endorsed transient numbness in the finger tips of the right hand, but that has resolved. She denied pain traveling down the arms, weakness in the arms or legs, changes in balance, or bowel/bladder incontinence. In the  "past, she tried Botox for head tremor and dystonic movements. She experienced neck weakness and did not return for further Botox. Although she would be willing to consider another trial at this point if it would not interfere with her upcoming surgery.     I had suggested zonisamide as possible treatment for tremor over the phone - but due to sulfa allergy she did not start this medication. We had discussed a slow titration since her allergy was skin only - but I agree given the minimal evidence for zonisamide for tremor and the patient's failure of a similar medication (topiramate) it may not be worth the risk.     Currently she is taking baclofen 20 mg twice daily with some benefit for the spasms in the neck - but no relief for the pain.     MEDICATIONS:  Outpatient Prescriptions Marked as Taking for the 11/2/18 encounter (Office Visit) with  MOVEMENT DISORDER FELLOW   Medication Sig     ALPRAZolam (XANAX) 0.5 MG tablet Take 1 tablet (0.5 mg) by mouth daily as needed     baclofen (LIORESAL) 20 MG tablet Take 1 tablet (20 mg) by mouth 3 times daily     Nutritional Supplements (ENSURE COMPLETE SHAKE) LIQD Take 237 mLs by mouth 3 times daily     sertraline (ZOLOFT) 100 MG tablet Take 1 tablet (100 mg) by mouth daily       ALLERGIES:  Sulfa drugs; Atorvastatin; and Simvastatin    PAST MEDICAL HISTORY:  Medical history reviewed and updated in Epic, no new problems    REVIEW OF SYSTEMS:  12 point review of systems completed. Pertinent positives and negatives above and in HPI    PHYSICAL EXAM:  VITALS: /78 (BP Location: Right arm)  Pulse 88  Ht 1.651 m (5' 5\")  Wt 54.9 kg (121 lb)  SpO2 96%  BMI 20.14 kg/m2  GENERAL: Cooperative, no acute distress  HEENT: Normocephalic and nontraumatic, sclera white, moist mucous membranes  CARDIAC: Regular rate and rhythm  RESPIRATORY: Nonlabored breathing  EXTREMITIES: Distal pulses intact. No UE or LE edema    NEUROLOGIC:  MENTAL STATUS: Fully alert, attentive and " oriented.  SPEECH: Dysphonic  FACIAL EXPRESSION:     CRANIAL NERVES: Visual fields intact. EOM full with smooth pursuit. No nystagmus. Normal saccades. Facial sensation intact/symmetric. Facial movements symmetric. Palate elevation symmetric, uvula midline. No dysarthria. Tongue protrusion midline.    MOTOR:   INVOLUNTARY MOVEMENTS -    Dystonia: Limited range of motion of the neck for the last 15-20 degrees lateral rotation. Slight left torticollis. Full anterior and posterior range of motion.    Tremor: Facial tremor involving frontalis, irregular head tremor. No rest tremor in the UE or LE. Postural and kinetic tremor in the upper extremities has a jerky quality, involves proximal joints at shoulder, elbow as well as wrist.   STRENGTH - 5/5 upper and lower extremities.    SENSORY: Intact/symmetric to light touch and temperature     COORDINATION: Slight ataxia FNF on the RUE, normal on the left  GAIT: Slightly stooped posture, normal base. Normal stride. Steady gait and turn. Able to tandem.     Procedure  DBS Interrogation & Analysis:    Battery status: ON  Implanted generator: [employer] Infinity   Lead sites: Left Vim    Impedance Check:   No problems found    See scanned report for impedance details.      Stimulation Parameters    LEFT BRAIN    Initial Final   Contacts C+, 2b-, 3b- C+, 2b-, 3b-   Amplitude (mA) 5.0 [range 0-6] 4.0 [range 0-5]   Pulse Width (ms) 30 30   Frequency (Hz) 180 180         IMPRESSION and PLAN:   1. Essential tremor with dystonic features s/p left Vim DBS  2. Cervical dystonia  3. Neck pain  4. Weight loss, poor appetite    Irish Rosales is a 68 year old woman with essential tremor with dystonic features including prominent axial signs s/p left Vim DBS who returned to discuss neck pain. I'm not entirely sure what is causing her neck pain. The possibilities are: asymmetric DBS stimulation, over stimulation, cervical dystonia, and arthritic changes (which we have not evaluated for). She  does not have any red flag symptoms for cervical radiculopathy or stenosis, but we could keep this in mind if she continues to get worse or she develops other neurologic findings. We discussed getting imaging of the spine today, but ultimately decided to hold off to see if reducing stimulation helped first.     Regarding treatment, baclofen may well help spasms of the neck and reduce pain. If not sedating, it is ok to increase up to 20 mg tid. She was cautioned not to stop baclofen abruptly due to the risk of seizure. I am reluctant to start any narcotic medication for chronic pain. Hopefully we can offer more relief with the reduction in stimulation and upcoming surgery. If pain continues after surgery, she may need to follow with pain management. I will check to see if Botox for cervical dystonia will interfere with MARIANA during DBS surgery.     I offered her a nutrition consult for weight loss, but she declined.     She will contact me on mychart in a week or so to let me know how the stimulation changes are working. Follow up in one month.     Sandy Hernandez MD  Movement Disorders Fellow    I spent 30 minutes on DBS analysis and programming. Additional 30 minutes spent on the issues as outlined above, at least half in counseling and coordination of care. Total time 60 minutes.      MOVEMENT DISORDER FELLOW

## 2018-11-02 NOTE — MR AVS SNAPSHOT
After Visit Summary   11/2/2018    Irish Rosales    MRN: [id number]           Patient Information     Date Of Birth          1950        Visit Information        Provider Department      11/2/2018 1:00 PM  MOVEMENT DISORDER FELLOW [facility] Neurology         Follow-ups after your visit        Your next 10 appointments already scheduled     Dec 07, 2018  3:00 PM CST   (Arrive by 2:45 PM)   Return Movement Disorder with  MOVEMENT DISORDER FELLOW   [facility] Neurology ([facility])    [address] [facility] [address] [phone]            Dec 21, 2018  9:00 AM CST   (Arrive by 8:45 AM)   PAC EVALUATION with RACHANA Hartman   [facility] Preoperative Assessment Center ([facility])    [address] [facility] [address] [phone]            Jan 08, 2019   Procedure with Aleksander Morales MD   [facility], [place], Same Day Surgery (--)    [address] [address] [phone]            Jan 08, 2019  8:40 AM CST   CT HEAD W/O & W CONTRAST with UUCT1   [facility], [place], CT ([facility] [place])    [address] [address] [phone]           How do I prepare for my exam? (Food and drink instructions) **You will have contrast for this exam.** Do not eat or drink for 2 hours before your exam. If you need to take medicine, you may take it with small sips of water. (We may ask you to take liquid medicine as well.)  The day before your exam, drink extra fluids at least six 8-ounce glasses (unless your doctor tells you to restrict your fluids).  How do I prepare for my exam? (Other instructions) Patients over 70 or patients with diabetes or kidney problems: If you haven t had a blood test (creatinine test) within the last 30 days, the Cardiologist/Radiologist may require you to get this test prior to your exam.   What should I wear: Please wear loose clothing, such as a sweat suit or jogging clothes.  Avoid snaps, zippers and other metal. We may ask you to undress and put on a hospital gown.  How long does the exam take: Most scans take less than 20 minutes.  What should I bring: Please bring any scans or X-rays taken at other hospitals, if similar tests were done. Also bring a list of your medicines, including vitamins, minerals and over-the-counter drugs. It is safest to leave personal items at home.  Do I need a :  No  is needed.  What do I need to tell my doctor? Be sure to tell your doctor: * If you have any allergies. * If there s any chance you are pregnant. * If you are breastfeeding. * If you have diabetes as your medication may need to be adjusted for this exam.  What should I do after the exam: No restrictions, You may resume normal activities.  What is this test: A CT (computed tomography) scan is a series of pictures that allows us to look inside your body. The scanner creates images of the body in cross sections, much like slices of bread. This helps us see any problems more clearly. You may receive contrast (X-ray dye) before or during your scan. Contrast is given through an IV (small needle in your arm).  Who should I call with questions: If you have any questions, please call the Imaging Department where you will have your exam. Directions, parking instructions, and other information is available on our website, [email or web address].            Lino 15, 2019   Procedure with Aleksander Morales MD   [facility], [place], Same Day Surgery (--)    [address] [id number] [phone]            Jan 30, 2019  3:00 PM CST   (Arrive by 2:45 PM)   Return Visit with RACHANA Galindo [facility] Neurosurgery ([facility])    [address]  [facility] [address] [phone]              Who to contact     Please call your clinic at  "[phone] to:    Ask questions about your health    Make or cancel appointments    Discuss your medicines    Learn about your test results    Speak to your doctor            Additional Information About Your Visit        [employer] Information     [employer] gives you secure access to your electronic health record. If you see a primary care provider, you can also send messages to your care team and make appointments. If you have questions, please call your primary care clinic.  If you do not have a primary care provider, please call [phone] and they will assist you.      [employer] is an electronic gateway that provides easy, online access to your medical records. With [employer], you can request a clinic appointment, read your test results, renew a prescription or communicate with your care team.     To access your existing account, please contact your [facility] or call [phone] for assistance.        Care EveryWhere ID     This is your Care EveryWhere ID. This could be used by other organizations to access your May medical records  [id number]        Your Vitals Were     Pulse Height Pulse Oximetry BMI (Body Mass Index)          88 1.651 m (5' 5\") 96% 20.14 kg/m2         Blood Pressure from Last 3 Encounters:   11/02/18 118/78   10/25/18 146/82   08/30/18 146/82    Weight from Last 3 Encounters:   11/02/18 54.9 kg (121 lb)   10/25/18 54.9 kg (121 lb)   09/10/18 57.3 kg (126 lb 6.4 oz)              Today, you had the following     No orders found for display       Primary Care Provider Office Phone # Fax #    Braulio Victoria MD PhD [phone]       [address]  [facility] [address]        Equal Access to Services     [facility] : Hadii adali Johnson, ferny padron. So [facility] [phone].    ATENCIÓN: Si habla español, tiene a flower disposición servicios gratuitos de asistencia " lingüística. Marixa al [phone].    We comply with applicable federal civil rights laws and [place] laws. We do not discriminate on the basis of race, color, national origin, age, disability, sex, sexual orientation, or gender identity.            Thank you!     Thank you for choosing [facility] NEUROLOGY  for your care. Our goal is always to provide you with excellent care. Hearing back from our patients is one way we can continue to improve our services. Please take a few minutes to complete the written survey that you may receive in the mail after your visit with us. Thank you!             Your Updated Medication List - Protect others around you: Learn how to safely use, store and throw away your medicines at www.disposemymeds.org.          This list is accurate as of 11/2/18  1:59 PM.  Always use your most recent med list.                   Brand Name Dispense Instructions for use Diagnosis    ALPRAZolam 0.5 MG tablet    XANAX    30 tablet    Take 1 tablet (0.5 mg) by mouth daily as needed    Dystonic tremor       baclofen 20 MG tablet    LIORESAL    90 tablet    Take 1 tablet (20 mg) by mouth 3 times daily    Cervical dystonia       ENSURE COMPLETE SHAKE Liqd     90 Bottle    Take 237 mLs by mouth 3 times daily    Loss of weight       sertraline 100 MG tablet    ZOLOFT    90 tablet    Take 1 tablet (100 mg) by mouth daily    History of major depression

## 2018-11-02 NOTE — PROGRESS NOTES
[facility] Neurology  Movement Disorders Clinic    Irish Rosales  YOB: 1950  MRN: [id number]    REASON FOR VISIT: Follow up to discuss neck pain, possibly related to DBS stimulation    HISTORY OF PRESENT ILLNESS:  Irish Rosales is a 68 year old woman with tremor s/p left Vim DBS who returns to discuss neck pain which has worsened after DBS. The clinical impression and consensus is that her tremor is most likely due to essential tremor with dystonic features - probably related to advanced essential tremor and long duration of symptoms. Given prominent axial involvement with head tremor and dysphonia - she is scheduled for the right Vim/Vop DBS surgery Jan 8, 2019 with the hope that bilateral stimulation will improve these axial signs.    Thus far, left Vim DBS has provided benefit for handwriting and components of her postural tremor. Functionally, however, she still has difficulty feeding herself with utensils and drinking from a cup due to tremor. She remains embarrassed with the tremor and avoids eating in the presence of others. Her appetite has been low and she has lost a significant amount of weight over the last year, about 20lb. Her PCP has also evaluated her for alternative causes of weight loss.     The neck pain may further impact her appetite. She reported that neck pain was minimal prior to DBS and now she is very uncomfortable. I had advised her to turn off the DBS to see if her pain improved. While she did turn it off - she did so for only a short time of less than one hour. She did not feel her pain improved during this time but her tremor and handwriting were worse. She endorsed transient numbness in the finger tips of the right hand, but that has resolved. She denied pain traveling down the arms, weakness in the arms or legs, changes in balance, or bowel/bladder incontinence. In the past, she tried Botox for head tremor and dystonic movements. She experienced neck weakness and  "did not return for further Botox. Although she would be willing to consider another trial at this point if it would not interfere with her upcoming surgery.     I had suggested zonisamide as possible treatment for tremor over the phone - but due to sulfa allergy she did not start this medication. We had discussed a slow titration since her allergy was skin only - but I agree given the minimal evidence for zonisamide for tremor and the patient's failure of a similar medication (topiramate) it may not be worth the risk.     Currently she is taking baclofen 20 mg twice daily with some benefit for the spasms in the neck - but no relief for the pain.     MEDICATIONS:  Outpatient Prescriptions Marked as Taking for the 11/2/18 encounter (Office Visit) with  MOVEMENT DISORDER FELLOW   Medication Sig     ALPRAZolam (XANAX) 0.5 MG tablet Take 1 tablet (0.5 mg) by mouth daily as needed     baclofen (LIORESAL) 20 MG tablet Take 1 tablet (20 mg) by mouth 3 times daily     Nutritional Supplements (ENSURE COMPLETE SHAKE) LIQD Take 237 mLs by mouth 3 times daily     sertraline (ZOLOFT) 100 MG tablet Take 1 tablet (100 mg) by mouth daily       ALLERGIES:  Sulfa drugs; Atorvastatin; and Simvastatin    PAST MEDICAL HISTORY:  Medical history reviewed and updated in Epic, no new problems    REVIEW OF SYSTEMS:  12 point review of systems completed. Pertinent positives and negatives above and in HPI    PHYSICAL EXAM:  VITALS: /78 (BP Location: Right arm)  Pulse 88  Ht 1.651 m (5' 5\")  Wt 54.9 kg (121 lb)  SpO2 96%  BMI 20.14 kg/m2  GENERAL: Cooperative, no acute distress  HEENT: Normocephalic and nontraumatic, sclera white, moist mucous membranes  CARDIAC: Regular rate and rhythm  RESPIRATORY: Nonlabored breathing  EXTREMITIES: Distal pulses intact. No UE or LE edema    NEUROLOGIC:  MENTAL STATUS: Fully alert, attentive and oriented.  SPEECH: Dysphonic  FACIAL EXPRESSION:     CRANIAL NERVES: Visual fields intact. EOM full " with smooth pursuit. No nystagmus. Normal saccades. Facial sensation intact/symmetric. Facial movements symmetric. Palate elevation symmetric, uvula midline. No dysarthria. Tongue protrusion midline.    MOTOR:   INVOLUNTARY MOVEMENTS -    Dystonia: Limited range of motion of the neck for the last 15-20 degrees lateral rotation. Slight left torticollis. Full anterior and posterior range of motion.    Tremor: Facial tremor involving frontalis, irregular head tremor. No rest tremor in the UE or LE. Postural and kinetic tremor in the upper extremities has a jerky quality, involves proximal joints at shoulder, elbow as well as wrist.   STRENGTH - 5/5 upper and lower extremities.    SENSORY: Intact/symmetric to light touch and temperature     COORDINATION: Slight ataxia FNF on the RUE, normal on the left  GAIT: Slightly stooped posture, normal base. Normal stride. Steady gait and turn. Able to tandem.     Procedure  DBS Interrogation & Analysis:    Battery status: ON  Implanted generator: [employer] Infinity   Lead sites: Left Vim    Impedance Check:   No problems found    See scanned report for impedance details.      Stimulation Parameters    LEFT BRAIN    Initial Final   Contacts C+, 2b-, 3b- C+, 2b-, 3b-   Amplitude (mA) 5.0 [range 0-6] 4.0 [range 0-5]   Pulse Width (ms) 30 30   Frequency (Hz) 180 180         IMPRESSION and PLAN:   1. Essential tremor with dystonic features s/p left Vim DBS  2. Cervical dystonia  3. Neck pain  4. Weight loss, poor appetite    Irish Rosales is a 68 year old woman with essential tremor with dystonic features including prominent axial signs s/p left Vim DBS who returned to discuss neck pain. I'm not entirely sure what is causing her neck pain. The possibilities are: asymmetric DBS stimulation, over stimulation, cervical dystonia, and arthritic changes (which we have not evaluated for). She does not have any red flag symptoms for cervical radiculopathy or stenosis, but we could keep this  in mind if she continues to get worse or she develops other neurologic findings. We discussed getting imaging of the spine today, but ultimately decided to hold off to see if reducing stimulation helped first.     Regarding treatment, baclofen may well help spasms of the neck and reduce pain. If not sedating, it is ok to increase up to 20 mg tid. She was cautioned not to stop baclofen abruptly due to the risk of seizure. I am reluctant to start any narcotic medication for chronic pain. Hopefully we can offer more relief with the reduction in stimulation and upcoming surgery. If pain continues after surgery, she may need to follow with pain management. I will check to see if Botox for cervical dystonia will interfere with MARIANA during DBS surgery.     I offered her a nutrition consult for weight loss, but she declined.     She will contact me on mychart in a week or so to let me know how the stimulation changes are working. Follow up in one month.     Sandy Hernandez MD  Movement Disorders Fellow    I spent 30 minutes on DBS analysis and programming. Additional 30 minutes spent on the issues as outlined above, at least half in counseling and coordination of care. Total time 60 minutes.

## 2018-11-03 PROBLEM — R63.4 LOSS OF WEIGHT: Status: ACTIVE | Noted: 2018-11-03

## 2018-11-05 ENCOUNTER — MEDICAL CORRESPONDENCE (OUTPATIENT)
Dept: HEALTH INFORMATION MANAGEMENT | Facility: CLINIC | Age: 68
End: 2018-11-05

## 2018-11-05 ENCOUNTER — PATIENT OUTREACH (OUTPATIENT)
Dept: GERIATRIC MEDICINE | Facility: CLINIC | Age: 68
End: 2018-11-05

## 2018-11-05 NOTE — PROGRESS NOTES
Received message from Irish requesting to call her re: her homemaker not calling or showing up last week. Irish spoke w/ Emanuel at the homemaking company and he was going to call the homemaker and get back to Irish, but she never heard back.    Irish said she is wanting to possible switch homemaking companies. States that a few other people in her building that use the same company are having issues also.    Irish is going to a home care presentation this week and is going to with that company about homemaking services. She will call writer later this week to let writer know if she is interested in using that company or changing company's at all.    Michelle Corcoran, [facility]  [phone]  Fax: [phone]

## 2018-11-08 NOTE — PROGRESS NOTES
[facility] Care Coordination Contact    Received call from Irish salinas: homemaking presentation she attended. The company was [employer] ([phone]). Irish would like to switch to this company if they accept her insurance.    [employer] does accept [employer] and [employer], but is not accepting new client's at this time.     for Irish informing her of this.    Michelle Corcoran, AMANDA  [facility]  [phone]  Fax: [phone]

## 2018-11-08 NOTE — PROGRESS NOTES
[facility] Care Coordination Contact    Received call from Irish wondering when she would hear from [employer]. CMS processed auth 11/105/18. [facility] has 10-14 days to process auth. LM for Irish letting her know that Ian is probably still processing the auth and if she hasn't heard from [employer] by the end of next week to let writer know.    INNA Diallo  [facility]  [phone]  Fax: [phone]

## 2018-11-13 ENCOUNTER — PATIENT OUTREACH (OUTPATIENT)
Dept: GERIATRIC MEDICINE | Facility: CLINIC | Age: 68
End: 2018-11-13

## 2018-11-13 NOTE — PROGRESS NOTES
Received message from a Religious friend of Irish's Renae LEAVITT ([phone]) concerned that Irish hasn't been receiving homemaking, [employer], or [employer]OM[employer] services. She recently was given food from the [facility] food pantry.    Irish told writer that her homemaker hasn't shown up for three weeks and she no longer wants to use that company. Spoke to Emanuel at [employer] and informed him that Irish will no longer be using there homemaking services. Emanuel did report that that they tried to set up Irish with a homemaking last week on several different days and she declined. Mailed Irish a list of homemaking agencies to call and choose from to provide services.     Placed call to Yudy Rodriguez at [employer]. They don't have any documentation that they received a new referral on Irish. Referral was faxed or emailed on 11/5/18. Yudy had writer email her the intact form directly to her to start the process of setting up [employer]. Emailed intake form to [email or web address].    Irish also reported she hasn't heard from [employer] about grocery shopping. [employer] only answers phone's until 3:30pm. Will call on 11/14/18 to f/u.    Irish gave the okay to call her friend Renae LEAVITT to give her an update. Will call Renae back once writer speaks w/ [employer]. Will also call an update Irish re: the above info after speaking w/ [employer].    Michelle Corcoran, [facility]  [phone]  Fax: [phone]

## 2018-11-14 NOTE — PROGRESS NOTES
"[facility] Care Coordination Contact    Email from Yudy at [employer]:    \"I have received your email and referred the information for your client to the work team for processing.\"    INNA Diallo  [facility]  [phone]  Fax: [phone]    "

## 2018-11-14 NOTE — PROGRESS NOTES
"[facility] Care Coordination Contact    Email from [employer]:  'Hello,  I am emailing you regarding client: HARSHAD PAYNE   This client s referral has been completed and services will be initiated as requested.  The first delivery of meals will be on: 11.20.18   If you have any questions, please email me.  Thank you,  Kayley Alfredo    Going forward, please send all emails to [email or web address] or by fax to [phone]. We are starting a new process to entering paperwork and are asking that emails not be sent directly to intake representatives. Thank you for your assistance.  [facility] [employer] [facility]   [address] [place], [place] [address]  Phone: [phone]  [email or web address]  [email or web address]  \"    INNA Diallo  [facility]  [phone]  Fax: [phone]    "

## 2018-11-14 NOTE — PROGRESS NOTES
[facility] Care Coordination Contact    Received message from Raheel at [employer]. They called Irish on 11/5/18 for a grocery order and she decided to not place one. Irish will receive her next call on 11/19/18 to place a grocery store request.    Updated Irish re: information found out yesterday and today. Irish states she didn't place a grocery order with [employer], because she hasn't received there catalogue of items that can be purchased.    Irish said she never received a call from Emanuel at [facility] even though he said they called. Informed Irish that the service was canceled through that company.    Returned Renae LEAVITT's phone call. Updated on all the information included in this encounter. Renae is a counselor at the [facility] [employer] [facility] that Irish attends. Irish meets with Renae once a week.    Michelle Corcoran, [facility]  [phone]  Fax: [phone]

## 2018-11-14 NOTE — PROGRESS NOTES
[facility] Care Coordination Contact    Spoke w/ [employer]. Irish has been signed up to receive a phone call every other Monday between 9:00am-12:00pm since 11/5/18 to place a grocery order. FABI for Raheel who manages the grocery shopping/delivery inquiring if Irish has placed an order yet and if she hasn't why not.    Michelle Corcoran, AMANDA  [facility]  [phone]  Fax: [phone]

## 2018-11-19 ENCOUNTER — PATIENT OUTREACH (OUTPATIENT)
Dept: GERIATRIC MEDICINE | Facility: CLINIC | Age: 68
End: 2018-11-19

## 2018-11-19 NOTE — PROGRESS NOTES
"Received the following notification from [employer]:    \"Hello,   We are contacting you to let you know a client under your caseload has requested to Skip an upcoming delivery.  First Initial: C  Last Name: Bobby  Client ID: [id number]   Skipped Delivery Date (1): 11/20   Please respond and submit any changes through your internal database or portal, by fax or email, or call with any questions. Our Intake department can be contacted at [phone] Opt 1, between 7am-6pm Central Time, or by email at [email or web address].     Thank you,  Thank you,   Willa Keen    [facility] s [employer] [address] [place], [place] [address]  Phone: [phone]  [email or web address]   [email or web address]    INNA Diallo  [facility]  [phone]  Fax: [phone]    "

## 2018-11-20 ENCOUNTER — PATIENT OUTREACH (OUTPATIENT)
Dept: GERIATRIC MEDICINE | Facility: CLINIC | Age: 68
End: 2018-11-20

## 2018-11-20 NOTE — PROGRESS NOTES
Received message from Irish. She found a homemaking company that she would like to use. [employer] ([phone]), contact Christopher.    Spoke w/ Christopher. [facility] [employer] has a [employer] contract and can start providing services as soon as they receive the auth from [employer]. Christopher said the start date could be as soon as today. Gave Christopher writers name and contact info if needed for future reference. Writer will request for the CMS to submit an auth for homemaking with [employer].    Spoke w/ Irish and informed her of the information above. Did tell her that [employer] has 14 days to process authorizations, but she should hear from [employer] with in the next week or two to start services.    Michelle Corcoran, [facility]  [phone]  Fax: [phone]

## 2018-11-21 NOTE — PROGRESS NOTES
[facility] Care Coordination Contact    Provider Signature - No POC Shared:  Member indicates that they do not want their POC shared with any EW providers.     Member Signature - POC Change:  Per CC, member has made a change to their POC.  Care Plan Change Letter mailed to member for signature with a self-addressed return envelope.    Nichole Garsia  Care Management Specialist Supervisor  [facility]  [phone]

## 2018-11-27 ENCOUNTER — DOCUMENTATION ONLY (OUTPATIENT)
Dept: NEUROSURGERY | Facility: CLINIC | Age: 68
End: 2018-11-27

## 2018-11-27 DIAGNOSIS — Z01.818 PREOPERATIVE EVALUATION TO RULE OUT SURGICAL CONTRAINDICATION: Primary | ICD-10-CM

## 2018-12-07 ENCOUNTER — OFFICE VISIT (OUTPATIENT)
Dept: NEUROLOGY | Facility: CLINIC | Age: 68
End: 2018-12-07
Payer: COMMERCIAL

## 2018-12-07 VITALS
HEART RATE: 76 BPM | WEIGHT: 120.6 LBS | DIASTOLIC BLOOD PRESSURE: 78 MMHG | SYSTOLIC BLOOD PRESSURE: 138 MMHG | OXYGEN SATURATION: 97 % | BODY MASS INDEX: 20.07 KG/M2

## 2018-12-07 DIAGNOSIS — G24.3 CERVICAL DYSTONIA: Primary | ICD-10-CM

## 2018-12-07 DIAGNOSIS — G25.0 ESSENTIAL TREMOR: ICD-10-CM

## 2018-12-07 ASSESSMENT — ENCOUNTER SYMPTOMS
POLYPHAGIA: 0
PARALYSIS: 0
DISTURBANCES IN COORDINATION: 0
ALTERED TEMPERATURE REGULATION: 0
SPEECH CHANGE: 0
WEIGHT GAIN: 0
BACK PAIN: 0
STIFFNESS: 0
TINGLING: 0
TREMORS: 1
NIGHT SWEATS: 0
MUSCLE WEAKNESS: 0
MEMORY LOSS: 0
SEIZURES: 0
NECK PAIN: 1
CHILLS: 0
POLYDIPSIA: 0
MUSCLE CRAMPS: 1
NUMBNESS: 0
WEIGHT LOSS: 1
DECREASED APPETITE: 1
DIZZINESS: 0
FEVER: 0
LOSS OF CONSCIOUSNESS: 0
ARTHRALGIAS: 0
HEADACHES: 0
WEAKNESS: 0
HALLUCINATIONS: 0
JOINT SWELLING: 0
MYALGIAS: 1
FATIGUE: 1
INCREASED ENERGY: 1

## 2018-12-07 ASSESSMENT — PAIN SCALES - GENERAL: PAINLEVEL: SEVERE PAIN (7)

## 2018-12-07 NOTE — PATIENT INSTRUCTIONS
I changed your DBS settings to 3.5 mA. Try this out for 1 week. You can reduce to 3.0 mA after that to see if ataxia is better. Your tremor may get worse as the stimulation is reduced. There is a trade off between ataxia (incoordination, side effect of stimulation) and tremor control. Your range on the patient controller is 0 to 4.5 mA.    I placed a referral for Botox to Dr. Myers

## 2018-12-07 NOTE — MR AVS SNAPSHOT
After Visit Summary   12/7/2018    Irish Rosales    MRN: [id number]           Patient Information     Date Of Birth          1950        Visit Information        Provider Department      12/7/2018 3:00 PM  MOVEMENT DISORDER FELLOW [facility] Neurology        Today's Diagnoses     Cervical dystonia    -  1      Care Instructions     I changed your DBS settings to 3.5 mA. Try this out for 1 week. You can reduce to 3.0 mA after that to see if ataxia is better. Your tremor may get worse as the stimulation is reduced. There is a trade off between ataxia (incoordination, side effect of stimulation) and tremor control. Your range on the patient controller is 0 to 5 mA.    I placed a referral for Botox to Dr. Myers          Follow-ups after your visit        Additional Services     NEUROLOGY ADULT REFERRAL       Your provider has referred you for the following:   Botox - please schedule with Dr. Myers for Botox    Please be aware that coverage of these services is subject to the terms and limitations of your health insurance plan.  Call member services at your health plan with any benefit or coverage questions.      Please bring the following with you to your appointment:    (1) Any X-Rays, CTs or MRIs which have been performed.  Contact the facility where they were done to arrange for  prior to your scheduled appointment.    (2) List of current medications  (3) This referral request   (4) Any documents/labs given to you for this referral                  Your next 10 appointments already scheduled     Dec 21, 2018  9:00 AM CST   (Arrive by 8:45 AM)   PAC EVALUATION with RACHANA Hartman [facility] ([facility])    [address]  4th Floor  [facility] [address] [phone]            Jan 08, 2019   Procedure with Aleksander Morales MD   [facility], [place], Same Day Surgery (--)    [address] [facility] [address] [facility]  [place] [address]   [phone]            Jan 08, 2019  8:40 AM CST   CT HEAD W/O & W CONTRAST with UUCT1   [facility], [place], [place] ([facility])    [address] [facility] [address]   [phone]           How do I prepare for my exam? (Food and drink instructions) **You will have contrast for this exam.** Do not eat or drink for 2 hours before your exam. If you need to take medicine, you may take it with small sips of water. (We may ask you to take liquid medicine as well.)  The day before your exam, drink extra fluids at least six 8-ounce glasses (unless your doctor tells you to restrict your fluids).  How do I prepare for my exam? (Other instructions) Patients over 70 or patients with diabetes or kidney problems: If you haven t had a blood test (creatinine test) within the last 30 days, the Cardiologist/Radiologist may require you to get this test prior to your exam.  What should I wear: Please wear loose clothing, such as a sweat suit or jogging clothes.  Avoid snaps, zippers and other metal. We may ask you to undress and put on a hospital gown.  How long does the exam take: Most scans take less than 20 minutes.  What should I bring: Please bring any scans or X-rays taken at other hospitals, if similar tests were done. Also bring a list of your medicines, including vitamins, minerals and over-the-counter drugs. It is safest to leave personal items at home.  Do I need a :  No  is needed.  What do I need to tell my doctor? Be sure to tell your doctor: * If you have any allergies. * If there s any chance you are pregnant. * If you are breastfeeding. * If you have diabetes as your medication may need to be adjusted for this exam.  What should I do after the exam: No restrictions, You may resume normal activities.  What is this test: A CT (computed tomography) scan is a series of pictures that allows us to look inside your body. The scanner creates  images of the body in cross sections, much like slices of bread. This helps us see any problems more clearly. You may receive contrast (X-ray dye) before or during your scan. Contrast is given through an IV (small needle in your arm).  Who should I call with questions: If you have any questions, please call the Imaging Department where you will have your exam. Directions, parking instructions, and other information is available on our website, [email or web address].            Lino 15, 2019   Procedure with Aleksander Morales MD   [facility], [place], Same Day Surgery (--)    [address] [facility] [address]   [phone]            Jan 31, 2019 10:30 AM CST   (Arrive by 10:15 AM)   Return Visit with RACHANA Galindo [facility] ([facility])    [address] [address] [phone]              Who to contact     Please call your clinic at [phone] to:    Ask questions about your health    Make or cancel appointments    Discuss your medicines    Learn about your test results    Speak to your doctor            Additional Information About Your Visit        [employer] Information     [employer] gives you secure access to your electronic health record. If you see a primary care provider, you can also send messages to your care team and make appointments. If you have questions, please call your primary care clinic.  If you do not have a primary care provider, please call [phone] and they will assist you.      [employer] is an electronic gateway that provides easy, online access to your medical records. With [employer], you can request a clinic appointment, read your test results, renew a prescription or communicate with your care team.     To access your existing account, please contact your [facility] or call [phone] for assistance.        Care EveryWhere ID     This is your Care EveryWhere ID. This  could be used by other organizations to access your [facility] medical records  [id number]        Your Vitals Were     Pulse Pulse Oximetry Breastfeeding? BMI (Body Mass Index)          76 97% No 20.07 kg/m2         Blood Pressure from Last 3 Encounters:   12/07/18 138/78   11/02/18 118/78   10/25/18 146/82    Weight from Last 3 Encounters:   12/07/18 54.7 kg (120 lb 9.6 oz)   11/02/18 54.9 kg (121 lb)   10/25/18 54.9 kg (121 lb)              We Performed the Following     NEUROLOGY ADULT REFERRAL        Primary Care Provider Office Phone # Fax #    Braulio Victoria MD PhD [phone]       [address]  [facility] [address]        Equal Access to Services     SAMINA PORTER : Hadii aad ku hadasho Solevi, waaxda luqadaha, qaybta kaalmada adeegyada, ferny melgoza . So [facility] [phone].    ATENCIÓN: Si habla español, tiene a flower disposición servicios gratuitos de asistencia lingüística. Llame al [phone].    We comply with applicable federal civil rights laws and [place] laws. We do not discriminate on the basis of race, color, national origin, age, disability, sex, sexual orientation, or gender identity.            Thank you!     Thank you for choosing [facility] NEUROLOGY  for your care. Our goal is always to provide you with excellent care. Hearing back from our patients is one way we can continue to improve our services. Please take a few minutes to complete the written survey that you may receive in the mail after your visit with us. Thank you!             Your Updated Medication List - Protect others around you: Learn how to safely use, store and throw away your medicines at www.disposemymeds.org.          This list is accurate as of 12/7/18  3:33 PM.  Always use your most recent med list.                   Brand Name Dispense Instructions for use Diagnosis    ALPRAZolam 0.5 MG tablet    XANAX    30 tablet    Take 1 tablet (0.5 mg) by mouth daily as needed    Dystonic  tremor       baclofen 20 MG tablet    LIORESAL    90 tablet    Take 1 tablet (20 mg) by mouth 3 times daily    Cervical dystonia       ENSURE COMPLETE SHAKE Liqd     90 Bottle    Take 237 mLs by mouth 3 times daily    Loss of weight       gabapentin 100 MG capsule    NEURONTIN    90 capsule    Take 1 capsule every night for 1-3 days, then 1 capsule twice daily for 1-3 days, then 1 capsule three times daily    Cervicalgia       sertraline 100 MG tablet    ZOLOFT    90 tablet    Take 1 tablet (100 mg) by mouth daily    History of major depression

## 2018-12-07 NOTE — PROGRESS NOTES
[facility] Neurology  Movement Disorder Clinic    Irish Rosales  YOB: 1950  MRN: [id number]    REASON FOR VISIT: Follow up for tremor and neck pain    HISTORY OF PRESENT ILLNESS:  Irish Rosales is a 68 year old woman with tremor s/p left Vim DBS who returns to follow up on tremor and neck pain. At our last visit one month ago DBS amplitude was reduced to 4.0 mA. Her neck pain is unchanged. She also complained that her ability to feed herself is actually worse after DBS implantation and programming. She feels her arm is incoordinated as she approaches her mouth. While tremor and handwriting are improved, she is having difficulty with coordination. Her description is consistent with ataxia, which was seen on monopolar review at an amplitude of 3.5 mA or greater using contacts 2 and 3. Although only slight ataxia at 3.5 mA noted on previous review. Her current configuration is double monopolar directional using contacts 2b-,3b- at 4.0 mA, 30 ms and 180 Hz.     She is looking forward to the second side surgery (right Vim/Vop), but she did raise concerns about the problems she is having on the right body (left Vim) and whether there are adjustments to be made.         MEDICATIONS:  Outpatient Prescriptions Marked as Taking for the 12/7/18 encounter (Office Visit) with  MOVEMENT DISORDER FELLOW   Medication Sig     baclofen (LIORESAL) 20 MG tablet Take 1 tablet (20 mg) by mouth 3 times daily (Patient taking differently: Take 20 mg by mouth 3 times daily )     Nutritional Supplements (ENSURE COMPLETE SHAKE) LIQD Take 237 mLs by mouth 3 times daily     sertraline (ZOLOFT) 100 MG tablet Take 1 tablet (100 mg) by mouth daily       ALLERGIES:  Sulfa drugs; Atorvastatin; and Simvastatin    PAST MEDICAL HISTORY:  Medical history reviewed and updated in Epic, no new problems    REVIEW OF SYSTEMS:  12 point review of systems completed. Pertinent positives and negatives above and in HPI      PHYSICAL  EXAM:  VITALS: /78 (BP Location: Left arm, Patient Position: Chair, Cuff Size: Adult Small)  Pulse 76  Wt 54.7 kg (120 lb 9.6 oz)  SpO2 97%  Breastfeeding? No  BMI 20.07 kg/m2  GENERAL: Cooperative, no acute distress  HEENT: Normocephalic and nontraumatic, sclera white, moist mucous membranes  CARDIAC: Regular rate and rhythm  RESPIRATORY: Nonlabored breathing  EXTREMITIES: Distal pulses intact. No UE or LE edema    NEUROLOGIC:  MENTAL STATUS: Fully alert, attentive and oriented.  SPEECH: Dysphonic, but nearly 100% intelligible. Improved speech since DBS implanted and programmed.   FACIAL EXPRESSION: No dystonic movements or myoclonus.    CRANIAL NERVES: Visual fields intact. EOM full with smooth pursuit. No nystagmus. Facial movements symmetric. Palate elevation symmetric, uvula midline. Tongue protrusion midline.    MOTOR:   INVOLUNTARY MOVEMENTS - Slight left torticollis. Limited range of motion of the neck for the last 15 to 20 degrees lateral rotation bilaterally. Full anterior and posterior range of motion. Irregular head tremor. Facial tremor involving frontalis. No rest tremor in the upper extremities. Only trace jerky postural and kinetic tremor. Question intention tremor as she approaches her chin with the RUE vs ataxia.   STRENGTH - 5/5 SA, EE, EF, WE, FA. Able to rise from chair with arms crossed over her chest.     SENSORY: Intact/symmetric to light touch UE and LE    COORDINATION: Ataxia with finger-nose-finger on the right, stimulation induced. Ataxia improved with reduction in current. Normal on the left.   GAIT: Able to rise from chair with arms crossed over chest. Posture is upright. Normal base. Steady gait.     Procedure  DBS Interrogation & Analysis:    Battery status: 2.95v  Implanted generator: [employer] Infinity  Lead sites: Left Vim  Impedances not checked today    Stimulation Parameters    LEFT BRAIN    Initial Final   Contacts C+2b-3b- C+2b-3b-   Amplitude (mA) 4.0 3.5 [range  0-4.5]   Pulse Width (ms) 30 30   Frequency (Hz) 180 180       Reduced current from 4.0 to 3.5 mA. Rechecked her exam and RUE ataxia slightly improved. Tremor unchanged - trace on posture and action, absent rest tremor.       IMPRESSION:   1. Essential tremor with dystonic features s/p left Vim DBS  2. Cervical dystonia  3. Neck pain  4. RUE ataxia related to deep brain stimulation    Irish Rosales is a 68 year old woman who returned to discuss neck pain and DBS programming. It is possible that asymmetric DBS may be contributing to her neck pain, although we cannot be certain. She did find Botox helpful in the past for pain and she was agreeable to trying this again. I ensured that Botox will not interfere with microelectrode recording in the OR with Dr. Colvin.     We discussed that she has some ataxia in the RUE. During programming we may balance some very slight ataxia for excellent tremor control. During the monopolar review, she did not have significant ataxia at her final settings, although it looks more disabling now.     PLAN:  I reduced the amplitude to 3.5 mA. I instructed her to leave the stimulation here for one week. If ataxia remains severe, she could reduced again to 3.0 mA. She was warned that tremor could get worse as the stimulation was reduced. There may be a trade off between ataxia and tremor control.       Sandy Hernandez MD    I spent 30 minutes on DBS programming    Answers for HPI/ROS submitted by the patient on 12/7/2018   General Symptoms: Yes  Skin Symptoms: No  HENT Symptoms: No  EYE SYMPTOMS: No  HEART SYMPTOMS: No  LUNG SYMPTOMS: No  INTESTINAL SYMPTOMS: No  URINARY SYMPTOMS: No  GYNECOLOGIC SYMPTOMS: No  BREAST SYMPTOMS: No  SKELETAL SYMPTOMS: Yes  BLOOD SYMPTOMS: No  NERVOUS SYSTEM SYMPTOMS: Yes  MENTAL HEALTH SYMPTOMS: No  Fever: No  Loss of appetite: Yes  Weight loss: Yes  Weight gain: No  Fatigue: Yes  Night sweats: No  Chills: No  Increased stress: Yes  Excessive hunger:  No  Excessive thirst: No  Feeling hot or cold when others believe the temperature is normal: No  Loss of height: No  Post-operative complications: No  Surgical site pain: No  Hallucinations: No  Change in or Loss of Energy: Yes  Hyperactivity: No  Confusion: No  Back pain: No  Muscle aches: Yes  Neck pain: Yes  Swollen joints: No  Joint pain: No  Bone pain: No  Muscle cramps: Yes  Muscle weakness: No  Joint stiffness: No  Bone fracture: No  Trouble with coordination: No  Dizziness or trouble with balance: No  Fainting or black-out spells: No  Memory loss: No  Headache: No  Seizures: No  Speech problems: No  Tingling: No  Tremor: Yes  Weakness: No  Difficulty walking: No  Paralysis: No  Numbness: No

## 2018-12-07 NOTE — NURSING NOTE
Chief Complaint   Patient presents with     RECHECK     [facility] RETURN MOVEMENT DISORDER       Jasmin Raman, EMT

## 2018-12-07 NOTE — LETTER
12/7/2018      RE: Irish Rosales  [id number]  [facility] [address]       [facility] Neurology  Movement Disorder Clinic    Irish Rosales  YOB: 1950  MRN: [id number]    REASON FOR VISIT: Follow up for tremor and neck pain    HISTORY OF PRESENT ILLNESS:  Irish Rosales is a 68 year old woman with tremor s/p left Vim DBS who returns to follow up on tremor and neck pain. At our last visit one month ago DBS amplitude was reduced to 4.0 mA. Her neck pain is unchanged. She also complained that her ability to feed herself is actually worse after DBS implantation and programming. She feels her arm is incoordinated as she approaches her mouth. While tremor and handwriting are improved, she is having difficulty with coordination. Her description is consistent with ataxia, which was seen on monopolar review at an amplitude of 3.5 mA or greater using contacts 2 and 3. Although only slight ataxia at 3.5 mA noted on previous review. Her current configuration is double monopolar directional using contacts 2b-,3b- at 4.0 mA, 30 ms and 180 Hz.     She is looking forward to the second side surgery (right Vim/Vop), but she did raise concerns about the problems she is having on the right body (left Vim) and whether there are adjustments to be made.         MEDICATIONS:  Outpatient Prescriptions Marked as Taking for the 12/7/18 encounter (Office Visit) with  MOVEMENT DISORDER FELLOW   Medication Sig     baclofen (LIORESAL) 20 MG tablet Take 1 tablet (20 mg) by mouth 3 times daily (Patient taking differently: Take 20 mg by mouth 3 times daily )     Nutritional Supplements (ENSURE COMPLETE SHAKE) LIQD Take 237 mLs by mouth 3 times daily     sertraline (ZOLOFT) 100 MG tablet Take 1 tablet (100 mg) by mouth daily       ALLERGIES:  Sulfa drugs; Atorvastatin; and Simvastatin    PAST MEDICAL HISTORY:  Medical history reviewed and updated in Epic, no new problems    REVIEW OF SYSTEMS:  12 point review  of systems completed. Pertinent positives and negatives above and in HPI      PHYSICAL EXAM:  VITALS: /78 (BP Location: Left arm, Patient Position: Chair, Cuff Size: Adult Small)  Pulse 76  Wt 54.7 kg (120 lb 9.6 oz)  SpO2 97%  Breastfeeding? No  BMI 20.07 kg/m2  GENERAL: Cooperative, no acute distress  HEENT: Normocephalic and nontraumatic, sclera white, moist mucous membranes  CARDIAC: Regular rate and rhythm  RESPIRATORY: Nonlabored breathing  EXTREMITIES: Distal pulses intact. No UE or LE edema    NEUROLOGIC:  MENTAL STATUS: Fully alert, attentive and oriented.  SPEECH: Dysphonic, but nearly 100% intelligible. Improved speech since DBS implanted and programmed.   FACIAL EXPRESSION: No dystonic movements or myoclonus.    CRANIAL NERVES: Visual fields intact. EOM full with smooth pursuit. No nystagmus. Facial movements symmetric. Palate elevation symmetric, uvula midline. Tongue protrusion midline.    MOTOR:   INVOLUNTARY MOVEMENTS - Slight left torticollis. Limited range of motion of the neck for the last 15 to 20 degrees lateral rotation bilaterally. Full anterior and posterior range of motion. Irregular head tremor. Facial tremor involving frontalis. No rest tremor in the upper extremities. Only trace jerky postural and kinetic tremor. Question intention tremor as she approaches her chin with the RUE vs ataxia.   STRENGTH - 5/5 SA, EE, EF, WE, FA. Able to rise from chair with arms crossed over her chest.     SENSORY: Intact/symmetric to light touch UE and LE    COORDINATION: Ataxia with finger-nose-finger on the right, stimulation induced. Ataxia improved with reduction in current. Normal on the left.   GAIT: Able to rise from chair with arms crossed over chest. Posture is upright. Normal base. Steady gait.     Procedure  DBS Interrogation & Analysis:    Battery status: 2.95v  Implanted generator: Abbott Infinity  Lead sites: Left Vim  Impedances not checked today    Stimulation Parameters    LEFT  BRAIN    Initial Final   Contacts C+2b-3b- C+2b-3b-   Amplitude (mA) 4.0 3.5 [range 0-4.5]   Pulse Width (ms) 30 30   Frequency (Hz) 180 180       Reduced current from 4.0 to 3.5 mA. Rechecked her exam and RUE ataxia slightly improved. Tremor unchanged - trace on posture and action, absent rest tremor.       IMPRESSION:   1. Essential tremor with dystonic features s/p left Vim DBS  2. Cervical dystonia  3. Neck pain  4. RUE ataxia related to deep brain stimulation    Irish Rosales is a 68 year old woman who returned to discuss neck pain and DBS programming. It is possible that asymmetric DBS may be contributing to her neck pain, although we cannot be certain. She did find Botox helpful in the past for pain and she was agreeable to trying this again. I ensured that Botox will not interfere with microelectrode recording in the OR with Dr. Colvin.     We discussed that she has some ataxia in the RUE. During programming we may balance some very slight ataxia for excellent tremor control. During the monopolar review, she did not have significant ataxia at her final settings, although it looks more disabling now.     PLAN:  I reduced the amplitude to 3.5 mA. I instructed her to leave the stimulation here for one week. If ataxia remains severe, she could reduced again to 3.0 mA. She was warned that tremor could get worse as the stimulation was reduced. There may be a trade off between ataxia and tremor control.       Sandy Hernandez MD    I spent 30 minutes on DBS programming    Answers for HPI/ROS submitted by the patient on 12/7/2018   General Symptoms: Yes  Skin Symptoms: No  HENT Symptoms: No  EYE SYMPTOMS: No  HEART SYMPTOMS: No  LUNG SYMPTOMS: No  INTESTINAL SYMPTOMS: No  URINARY SYMPTOMS: No  GYNECOLOGIC SYMPTOMS: No  BREAST SYMPTOMS: No  SKELETAL SYMPTOMS: Yes  BLOOD SYMPTOMS: No  NERVOUS SYSTEM SYMPTOMS: Yes  MENTAL HEALTH SYMPTOMS: No  Fever: No  Loss of appetite: Yes  Weight loss: Yes  Weight gain:  No  Fatigue: Yes  Night sweats: No  Chills: No  Increased stress: Yes  Excessive hunger: No  Excessive thirst: No  Feeling hot or cold when others believe the temperature is normal: No  Loss of height: No  Post-operative complications: No  Surgical site pain: No  Hallucinations: No  Change in or Loss of Energy: Yes  Hyperactivity: No  Confusion: No  Back pain: No  Muscle aches: Yes  Neck pain: Yes  Swollen joints: No  Joint pain: No  Bone pain: No  Muscle cramps: Yes  Muscle weakness: No  Joint stiffness: No  Bone fracture: No  Trouble with coordination: No  Dizziness or trouble with balance: No  Fainting or black-out spells: No  Memory loss: No  Headache: No  Seizures: No  Speech problems: No  Tingling: No  Tremor: Yes  Weakness: No  Difficulty walking: No  Paralysis: No  Numbness: No       MOVEMENT DISORDER FELLOW

## 2018-12-10 ENCOUNTER — TELEPHONE (OUTPATIENT)
Dept: NEUROLOGY | Facility: CLINIC | Age: 68
End: 2018-12-10

## 2018-12-10 DIAGNOSIS — G24.3 CERVICAL DYSTONIA: Primary | ICD-10-CM

## 2018-12-10 NOTE — TELEPHONE ENCOUNTER
botulinum toxin type A (BOTOX) 100 units injection 800 Units 0 12/10/2018  --   Sig: Inject 200 units 4x per year     Prior Authorization Infusion/Clinic Administered Request    Location: neck area  Diagnosis and ICD:cervical dystonia G24.3  Drug/Therapy: ? See above    Previously Tried and Failed Therapies: NA    Date of provider note with supporting information: 12/7/18    Urgency (When is the patient scheduled?): Patient coming in on 12/20 for injection  Would you like to include any research articles? no        If yes please call [phone] for further instructions about sending that information

## 2018-12-20 ENCOUNTER — OFFICE VISIT (OUTPATIENT)
Dept: NEUROLOGY | Facility: CLINIC | Age: 68
End: 2018-12-20
Attending: PSYCHIATRY & NEUROLOGY
Payer: COMMERCIAL

## 2018-12-20 VITALS
SYSTOLIC BLOOD PRESSURE: 134 MMHG | HEIGHT: 65 IN | HEART RATE: 78 BPM | WEIGHT: 120 LBS | DIASTOLIC BLOOD PRESSURE: 76 MMHG | BODY MASS INDEX: 19.99 KG/M2

## 2018-12-20 DIAGNOSIS — G25.2 DYSTONIC TREMOR: ICD-10-CM

## 2018-12-20 DIAGNOSIS — M54.2 NECK PAIN: ICD-10-CM

## 2018-12-20 DIAGNOSIS — G25.0 ESSENTIAL TREMOR: ICD-10-CM

## 2018-12-20 DIAGNOSIS — G24.3 CERVICAL DYSTONIA: Primary | ICD-10-CM

## 2018-12-20 ASSESSMENT — PAIN SCALES - GENERAL: PAINLEVEL: EXTREME PAIN (8)

## 2018-12-20 ASSESSMENT — MIFFLIN-ST. JEOR: SCORE: 1085.2

## 2018-12-20 NOTE — PROGRESS NOTES
Movement Disorders Botulinum Toxin Clinic Note    Chief Complaint: Cervical dystonia, head tremor, and neck pain    History of Present Illness:  Irish Rosales is a 68 year old female with essential tremor nearing almost 60 years in duration with dystonic features, head tremor, and cervical dystonia s/p left Vim who presents to clinic for botulinum toxin injections for the treatment of cervical dystonia. She complained of neck pain, mostly posterior and on the left. She has some pulling toward the back and to the left. She has an irregular head tremor as well. Previous Botox injections in the past were somewhat helpful at [facility]. However, at that time she was complaining of more facial spasms and headache in addition to more moderate neck pain compared to the severe pain she has now. Today she would like to focus primarily on neck pain.     Response to Last Injection:   Previous injections focused on neck and facial muscles symmetrically for the treatment of head and facial tremor.     2017: [facility] - records unavailable. Ms. Rosales reported that they had increased the dose (up to 300 units?) compared to the [facility] and she subsequently experienced head drop.     6/1/2011: Dr. Tommie Harris MD at [facility]:  1. Glabellar, left 5 units, right 5 units  2. Masseter, left 10 units, right 10 units  3. Orbicularis oculi, left 10 units, right 10 units  4. Temporalis, left 20 units, right 20 units  5. Trapezius, left 40 units, right 40 units  6. Upper posterior cervical paraspinals, left 10 units, right 10 units   Total 200 units    10/27/2010: Dr. Tommie Harris MD at [facility]  1. Masseter, left 10 units, right 10 units  2. Orbicularis oculi, left 15 units, right 15 units  3.Temporalis 20 units, right 20 units  4. Trapezius, left 40 units, right 40 units  5. Upper posterior cervical paraspinals, left 10 units, right 10 units   Total 200 units    4/20/2010: Dr. Luda Peterson MD at [facility]  1.  Sternocleidomastoid   Mastoid head, left 20 units, right 20 units   Midline, left 10 units, right 10 units   Clavicular head, left 10 units, right 10 units  2. Masseter, left 15 units, right 15 units  3. Levator scapulae   Origin, left 20 units, right 20 units   Base of neck, left 10 units, right 10 units   Base of skull, left 10 units, right 10 units  4. Scalenes, left 10 units, right 10 units   Total 200 units    11/18/2018: Dr. Luda Peterson MD at [facility]  1. Sternocleidomastoid   Mastoid head, left 15 units, right 15 units   Midline, left 5 units, right 5 units   Clavicular head, left 10 units, right 10 units  2. Masseter, left 10 units, right 10 units  3. Upper lip, left 5 units, right 5 units  4. Mentalis, left 5 units, right 5 units   Total 100 units    She reported some benefit with previous injections    Other interim history:  She reduced the DBS stimulation to 3.5 mA and found that her tremor came back. She did not think the level of tremor was work the trade off in improvement of ataxia. She has been supplementing her nutrition with Ensure and icecream. Her weight has been coming up. She is hopeful that DBS surgery for the right Vim/Vop will improve head tremor and possibly some axial components which may translate into improved quality of life.     MEDICATIONS:   Current Outpatient Medications   Medication Sig Dispense Refill     ALPRAZolam (XANAX) 0.5 MG tablet Take 1 tablet (0.5 mg) by mouth daily as needed 30 tablet 3     baclofen (LIORESAL) 20 MG tablet Take 1 tablet (20 mg) by mouth 3 times daily (Patient taking differently: Take 20 mg by mouth 3 times daily ) 90 tablet 3     botulinum toxin type A (BOTOX) 100 units injection Inject 200 units 4x per year 800 Units 0     gabapentin (NEURONTIN) 100 MG capsule Take 1 capsule every night for 1-3 days, then 1 capsule twice daily for 1-3 days, then 1 capsule three times daily 90 capsule 1     Nutritional Supplements (ENSURE COMPLETE SHAKE) LIQD  "Take 237 mLs by mouth 3 times daily 90 Bottle 3     sertraline (ZOLOFT) 100 MG tablet Take 1 tablet (100 mg) by mouth daily 90 tablet 1       Allergies: She is allergic to sulfa drugs; atorvastatin; and simvastatin.    PAST MEDICAL HISTORY:  Reviewed and updated in Epic  No new medical problems    Physical Examination:  Vital Signs:   height is 1.651 m (5' 5\") and weight is 54.4 kg (120 lb). Her blood pressure is 134/76 and her pulse is 78.   Patient seated in the exam room. She has a neck pillow wrapped around her neck. Tender over the posterior and left lateral neck & left upper back. Prominent irregular head tremor. She has a limited range of motion for neck rotation and lateral bending. There is a slight left torticollis and retrocollis. Facial tremor involving frontalis. No rest tremor in the UE or LE. No postural tremor in the distal RUE when the elbow is supported. I would question whether some of the axial tremor or head tremor is translating into the shoulder and the rest of the arm. Slight ataxia on chin-nose-chin on the left. Tremor on the LUE has a jerky quality. Her posture is slightly stooped. Base is normal with normal stride. Her gait appears steady.       Procedure  DBS Interrogation & Analysis:    A[employer]ott Infinity  Battery status: 2.95v  Implanted generator: Abbott Infinity 5, REF [id number], implanted 3/13/2018  Lead sites: Left Vim  Lead type: 0.5 mm spacing, REF [id number], implanted 3/6/2018    Impedance Check:   Impedances checked. No problems found, no open or closed circuits.   See scanned report for impedance details.       Stimulation Parameters    LEFT BRAIN    Initial Final   Contacts C+, 2b-, 3b-  No changes   Amplitude (mA) 4.0 [range 0-5]    Pulse Width (ms) 30    Frequency (Hz) 180          BOTULINUM NEUROTOXIN INJECTION PROCEDURES:    VERIFICATION OF PATIENT IDENTIFICATION AND PROCEDURE     Initials   Patient Name AS   Patient  AS   Procedure Verified by: AS     Prior to the start of the " procedure and with procedural staff participation, I verbally confirmed the patient s identity using two indicators, relevant allergies, that the procedure was appropriate and matched the consent or emergent situation, and that the correct equipment/implants were available. Immediately prior to starting the procedure I conducted the Time Out with the procedural staff and re-confirmed the patient s name, procedure, and site/side. (The Joint Commission universal protocol was followed.)  Yes    Sedation (Moderate or Deep): None      Above assessments performed by:  Sandy Hernandez MD      INDICATION/S FOR PROCEDURE/S:  Irish Rosales is a 68 year old year old patient with dystonia affecting the  head, neck and shoulder girdle musculature secondary to a diagnosis of Cervical dystonia and head tremor with associated  pain, tremor, loss of joint motion, loss of volitional motor control and difficulty with activities of daily living.     Irish Rosales baseline symptoms have been recalcitrant to oral medications and conservative therapy.  Irish Rosales is here today for an injection of Botox.      GOAL OF PROCEDURE:  The goal of this procedure is to increase active range of motion, improve volitional motor control, decrease pain  and enhance functional independence associated with dystonic movements.    TOTAL DOSE ADMINISTERED:  Dose Administered:  160 units Botox  1:1 Dilution  Diluent Used:  Preservative Free Normal Saline  Total Volume of Diluent Used:  1.6 ml  Lot # /C3 with Expiration Date:  06/2021  NDC #: Botox 100u ([address]-86)    Medication guide was offered to patient and was declined.    CONSENT:  The risks, benefits, and treatment options were discussed with Irish Rosales and she agreed to proceed.      Written consent was obtained by AS.     EQUIPMENT USED:  Needle-37mm stimulating/recording    SKIN PREPARATION:  Skin preparation was performed using an alcohol wipe.    GUIDANCE  DESCRIPTION:  Electro-myographic guidance was necessary throughout the procedure to accurately identify all areas of dystonic muscles while avoiding injection of non-dystonic muscles, neighboring nerves and nearby vascular structures.     AREA/MUSCLE INJECTED:      DBS extension wire was palpated posterior to the left ear and toward the anterior left SCM. I avoided injecting around the palpated extension wire.      Muscles Injected Units Injected Number of Injections   Left lateral trapezius  40 2   Left upper trapezius 15 1   Left splenius cervicus 35 2   Left paraspinal  10 1   Left levator scapulae at origin 20 1   Left levator middle of muscle 15 1   Left rhomboid 15 1   Right levator at origin 10 1   Total Units Injected: 160    Unavoidable Waste: 40    Total Units Billed 200      The patient tolerated the injections without difficulty.      IMPRESSION and PLAN:  1. Essential tremor with dystonic features s/p left Vim DBS  2. Cervical dystonia    Irish Rosales is a 68 year old woman with essential tremor with dystonic features and cervical dystonia s/p left Vim DBS who presented for botulinum toxin injections for cervical dystonia. EMG activity was greatest, clearly demonstrating tremor, for the left trapezius and splenius - also muscles Ms. Rosales described as tender. The left levator was also quite active - more so at the origin at the scapula than at the muscle belly.     She will monitor for improvement of pain and head tremor. Injections may be repeated in 12 weeks. She will also undergo implantation of the right Vim/Vop 1/15/2019 and subsequent programming. She may wait to schedule repeat Botox injections until after DBS programming in case her pain improves after surgery.     Sandy Hernandez MD  Movement Disorders Fellow    Discussed with Dr. Willett      Answers for HPI/ROS submitted by the patient on 12/20/2018   General Symptoms: Yes  Skin Symptoms: No  HENT Symptoms: No  EYE SYMPTOMS: No  HEART  SYMPTOMS: No  LUNG SYMPTOMS: No  INTESTINAL SYMPTOMS: No  URINARY SYMPTOMS: No  GYNECOLOGIC SYMPTOMS: No  BREAST SYMPTOMS: No  SKELETAL SYMPTOMS: Yes  BLOOD SYMPTOMS: No  NERVOUS SYSTEM SYMPTOMS: Yes  MENTAL HEALTH SYMPTOMS: No

## 2018-12-20 NOTE — LETTER
12/20/2018       RE: Irish Rosales  [address]  [place] [facility] [place] [id number]     Dear Colleague,    Thank you for referring your patient, Irish Rosales, to the [facility] NEUROLOGY at [facility]. Please see a copy of my visit note below.    Movement Disorders Botulinum Toxin Clinic Note    Chief Complaint: Cervical dystonia, head tremor, and neck pain    History of Present Illness:  Irish Rosales is a 68 year old female with essential tremor nearing almost 60 years in duration with dystonic features, head tremor, and cervical dystonia s/p left Vim who presents to clinic for botulinum toxin injections for the treatment of cervical dystonia. She complained of neck pain, mostly posterior and on the left. She has some pulling toward the back and to the left. She has an irregular head tremor as well. Previous Botox injections in the past were somewhat helpful at [facility]. However, at that time she was complaining of more facial spasms and headache in addition to more moderate neck pain compared to the severe pain she has now. Today she would like to focus primarily on neck pain.     Response to Last Injection:   Previous injections focused on neck and facial muscles symmetrically for the treatment of head and facial tremor.     2017: [facility] - records unavailable. Ms. Rosales reported that they had increased the dose (up to 300 units?) compared to the [facility] and she subsequently experienced head drop.     6/1/2011: Dr. Tommie Harris MD at [facility]:  1. Glabellar, left 5 units, right 5 units  2. Masseter, left 10 units, right 10 units  3. Orbicularis oculi, left 10 units, right 10 units  4. Temporalis, left 20 units, right 20 units  5. Trapezius, left 40 units, right 40 units  6. Upper posterior cervical paraspinals, left 10 units, right 10 units   Total 200 units    10/27/2010: Dr. Tommie Harris MD at [facility]  1. Masseter, left 10 units,  right 10 units  2. Orbicularis oculi, left 15 units, right 15 units  3.Temporalis 20 units, right 20 units  4. Trapezius, left 40 units, right 40 units  5. Upper posterior cervical paraspinals, left 10 units, right 10 units   Total 200 units    4/20/2010: Dr. Luda Peterson MD at [facility]  1. Sternocleidomastoid   Mastoid head, left 20 units, right 20 units   Midline, left 10 units, right 10 units   Clavicular head, left 10 units, right 10 units  2. Masseter, left 15 units, right 15 units  3. Levator scapulae   Origin, left 20 units, right 20 units   Base of neck, left 10 units, right 10 units   Base of skull, left 10 units, right 10 units  4. Scalenes, left 10 units, right 10 units   Total 200 units    11/18/2018: Dr. Luda Peterson MD at [facility]  1. Sternocleidomastoid   Mastoid head, left 15 units, right 15 units   Midline, left 5 units, right 5 units   Clavicular head, left 10 units, right 10 units  2. Masseter, left 10 units, right 10 units  3. Upper lip, left 5 units, right 5 units  4. Mentalis, left 5 units, right 5 units   Total 100 units    She reported some benefit with previous injections    Other interim history:  She reduced the DBS stimulation to 3.5 mA and found that her tremor came back. She did not think the level of tremor was work the trade off in improvement of ataxia. She has been supplementing her nutrition with Ensure and icecream. Her weight has been coming up. She is hopeful that DBS surgery for the right Vim/Vop will improve head tremor and possibly some axial components which may translate into improved quality of life.     MEDICATIONS:   Current Outpatient Medications   Medication Sig Dispense Refill     ALPRAZolam (XANAX) 0.5 MG tablet Take 1 tablet (0.5 mg) by mouth daily as needed 30 tablet 3     baclofen (LIORESAL) 20 MG tablet Take 1 tablet (20 mg) by mouth 3 times daily (Patient taking differently: Take 20 mg by mouth 3 times daily ) 90 tablet 3     botulinum toxin type A  "(BOTOX) 100 units injection Inject 200 units 4x per year 800 Units 0     gabapentin (NEURONTIN) 100 MG capsule Take 1 capsule every night for 1-3 days, then 1 capsule twice daily for 1-3 days, then 1 capsule three times daily 90 capsule 1     Nutritional Supplements (ENSURE COMPLETE SHAKE) LIQD Take 237 mLs by mouth 3 times daily 90 Bottle 3     sertraline (ZOLOFT) 100 MG tablet Take 1 tablet (100 mg) by mouth daily 90 tablet 1       Allergies: She is allergic to sulfa drugs; atorvastatin; and simvastatin.    PAST MEDICAL HISTORY:  Reviewed and updated in Epic  No new medical problems    Physical Examination:  Vital Signs:   height is 1.651 m (5' 5\") and weight is 54.4 kg (120 lb). Her blood pressure is 134/76 and her pulse is 78.   Patient seated in the exam room. She has a neck pillow wrapped around her neck. Tender over the posterior and left lateral neck & left upper back. Prominent irregular head tremor. She has a limited range of motion for neck rotation and lateral bending. There is a slight left torticollis and retrocollis. Facial tremor involving frontalis. No rest tremor in the UE or LE. No postural tremor in the distal RUE when the elbow is supported. I would question whether some of the axial tremor or head tremor is translating into the shoulder and the rest of the arm. Slight ataxia on chin-nose-chin on the left. Tremor on the LUE has a jerky quality. Her posture is slightly stooped. Base is normal with normal stride. Her gait appears steady.       Procedure  DBS Interrogation & Analysis:    Abbott Infinity  Battery status: 2.95v  Implanted generator: Abbott Infinity 5, REF [id number], implanted 3/13/2018  Lead sites: Left Vim  Lead type: 0.5 mm spacing, REF [id number], implanted 3/6/2018    Impedance Check:   Impedances checked. No problems found, no open or closed circuits.   See scanned report for impedance details.       Stimulation Parameters    LEFT BRAIN    Initial Final   Contacts C+, 2b-, 3b-  No changes "   Amplitude (mA) 4.0 [range 0-5]    Pulse Width (ms) 30    Frequency (Hz) 180          BOTULINUM NEUROTOXIN INJECTION PROCEDURES:    VERIFICATION OF PATIENT IDENTIFICATION AND PROCEDURE     Initials   Patient Name AS   Patient  AS   Procedure Verified by: AS     Prior to the start of the procedure and with procedural staff participation, I verbally confirmed the patient s identity using two indicators, relevant allergies, that the procedure was appropriate and matched the consent or emergent situation, and that the correct equipment/implants were available. Immediately prior to starting the procedure I conducted the Time Out with the procedural staff and re-confirmed the patient s name, procedure, and site/side. (The Joint Commission universal protocol was followed.)  Yes    Sedation (Moderate or Deep): None      Above assessments performed by:  Sandy Hernandez MD      INDICATION/S FOR PROCEDURE/S:  Irish Rosales is a 68 year old year old patient with dystonia affecting the  head, neck and shoulder girdle musculature secondary to a diagnosis of Cervical dystonia and head tremor with associated  pain, tremor, loss of joint motion, loss of volitional motor control and difficulty with activities of daily living.     Irish Rosales baseline symptoms have been recalcitrant to oral medications and conservative therapy.  Irish Rosales is here today for an injection of Botox.      GOAL OF PROCEDURE:  The goal of this procedure is to increase active range of motion, improve volitional motor control, decrease pain  and enhance functional independence associated with dystonic movements.    TOTAL DOSE ADMINISTERED:  Dose Administered:  160 units Botox  1:1 Dilution  Diluent Used:  Preservative Free Normal Saline  Total Volume of Diluent Used:  1.6 ml  Lot # /C3 with Expiration Date:  2021  NDC #: Botox 100u ([address]-63)    Medication guide was offered to patient and was declined.    CONSENT:  The risks,  benefits, and treatment options were discussed with Irish Rosales and she agreed to proceed.      Written consent was obtained by AS.     EQUIPMENT USED:  Needle-37mm stimulating/recording    SKIN PREPARATION:  Skin preparation was performed using an alcohol wipe.    GUIDANCE DESCRIPTION:  Electro-myographic guidance was necessary throughout the procedure to accurately identify all areas of dystonic muscles while avoiding injection of non-dystonic muscles, neighboring nerves and nearby vascular structures.     AREA/MUSCLE INJECTED:      DBS extension wire was palpated posterior to the left ear and toward the anterior left SCM. I avoided injecting around the palpated extension wire.      Muscles Injected Units Injected Number of Injections   Left lateral trapezius  40 2   Left upper trapezius 15 1   Left splenius cervicus 35 2   Left paraspinal  10 1   Left levator scapulae at origin 20 1   Left levator middle of muscle 15 1   Left rhomboid 15 1   Right levator at origin 10 1   Total Units Injected: 160    Unavoidable Waste: 40    Total Units Billed 200      The patient tolerated the injections without difficulty.      IMPRESSION and PLAN:  1. Essential tremor with dystonic features s/p left Vim DBS  2. Cervical dystonia    Irish Rosales is a 68 year old woman with essential tremor with dystonic features and cervical dystonia s/p left Vim DBS who presented for botulinum toxin injections for cervical dystonia. EMG activity was greatest, clearly demonstrating tremor, for the left trapezius and splenius - also muscles Ms. Rosales described as tender. The left levator was also quite active - more so at the origin at the scapula than at the muscle belly.     She will monitor for improvement of pain and head tremor. Injections may be repeated in 12 weeks. She will also undergo implantation of the right Vim/Vop 1/15/2019 and subsequent programming. She may wait to schedule repeat Botox injections until after DBS  programming in case her pain improves after surgery.     Sandy Hernandez MD  Movement Disorders Fellow    Discussed with Dr. Willett      Again, thank you for allowing me to participate in the care of your patient.      Sincerely,     MOVEMENT DISORDER FELLOW

## 2018-12-20 NOTE — PATIENT INSTRUCTIONS
We completed Botox today - focusing on muscles of the left neck. You may be sore for the next day or so. It will take up to one week for Botox to take effect. We injected a total of 160 units.     The primary side effects of Botox are neck weakness, dry mouth and occasionally trouble swallowing. Please let me know if you have any problems.     We can repeat injections in 12 weeks.

## 2018-12-20 NOTE — LETTER
12/20/2018      RE: Irish Rosales  [address]  [facility] [id number]       Movement Disorders Botulinum Toxin Clinic Note    Chief Complaint: Cervical dystonia, head tremor, and neck pain    History of Present Illness:  Irish Rosales is a 68 year old female with essential tremor nearing almost 60 years in duration with dystonic features, head tremor, and cervical dystonia s/p left Vim who presents to clinic for botulinum toxin injections for the treatment of cervical dystonia. She complained of neck pain, mostly posterior and on the left. She has some pulling toward the back and to the left. She has an irregular head tremor as well. Previous Botox injections in the past were somewhat helpful at [facility]. However, at that time she was complaining of more facial spasms and headache in addition to more moderate neck pain compared to the severe pain she has now. Today she would like to focus primarily on neck pain.     Response to Last Injection:   Previous injections focused on neck and facial muscles symmetrically for the treatment of head and facial tremor.     2017: [facility] - records unavailable. Ms. Rosales reported that they had increased the dose (up to 300 units?) compared to the [facility] and she subsequently experienced head drop.     6/1/2011: Dr. Tommie Harris MD at [facility]:  1. Glabellar, left 5 units, right 5 units  2. Masseter, left 10 units, right 10 units  3. Orbicularis oculi, left 10 units, right 10 units  4. Temporalis, left 20 units, right 20 units  5. Trapezius, left 40 units, right 40 units  6. Upper posterior cervical paraspinals, left 10 units, right 10 units   Total 200 units    10/27/2010: Dr. Tommie Harris MD at [facility]  1. Masseter, left 10 units, right 10 units  2. Orbicularis oculi, left 15 units, right 15 units  3.Temporalis 20 units, right 20 units  4. Trapezius, left 40 units, right 40 units  5. Upper posterior cervical paraspinals, left 10  units, right 10 units   Total 200 units    4/20/2010: Dr. Luda Peterson MD at [facility]  1. Sternocleidomastoid   Mastoid head, left 20 units, right 20 units   Midline, left 10 units, right 10 units   Clavicular head, left 10 units, right 10 units  2. Masseter, left 15 units, right 15 units  3. Levator scapulae   Origin, left 20 units, right 20 units   Base of neck, left 10 units, right 10 units   Base of skull, left 10 units, right 10 units  4. Scalenes, left 10 units, right 10 units   Total 200 units    11/18/2018: Dr. Luda Peterson MD at [facility]  1. Sternocleidomastoid   Mastoid head, left 15 units, right 15 units   Midline, left 5 units, right 5 units   Clavicular head, left 10 units, right 10 units  2. Masseter, left 10 units, right 10 units  3. Upper lip, left 5 units, right 5 units  4. Mentalis, left 5 units, right 5 units   Total 100 units    She reported some benefit with previous injections    Other interim history:  She reduced the DBS stimulation to 3.5 mA and found that her tremor came back. She did not think the level of tremor was work the trade off in improvement of ataxia. She has been supplementing her nutrition with Ensure and icecream. Her weight has been coming up. She is hopeful that DBS surgery for the right Vim/Vop will improve head tremor and possibly some axial components which may translate into improved quality of life.     MEDICATIONS:   Current Outpatient Medications   Medication Sig Dispense Refill     ALPRAZolam (XANAX) 0.5 MG tablet Take 1 tablet (0.5 mg) by mouth daily as needed 30 tablet 3     baclofen (LIORESAL) 20 MG tablet Take 1 tablet (20 mg) by mouth 3 times daily (Patient taking differently: Take 20 mg by mouth 3 times daily ) 90 tablet 3     botulinum toxin type A (BOTOX) 100 units injection Inject 200 units 4x per year 800 Units 0     gabapentin (NEURONTIN) 100 MG capsule Take 1 capsule every night for 1-3 days, then 1 capsule twice daily for 1-3 days, then 1  "capsule three times daily 90 capsule 1     Nutritional Supplements (ENSURE COMPLETE SHAKE) LIQD Take 237 mLs by mouth 3 times daily 90 Bottle 3     sertraline (ZOLOFT) 100 MG tablet Take 1 tablet (100 mg) by mouth daily 90 tablet 1       Allergies: She is allergic to sulfa drugs; atorvastatin; and simvastatin.    PAST MEDICAL HISTORY:  Reviewed and updated in Epic  No new medical problems    Physical Examination:  Vital Signs:   height is 1.651 m (5' 5\") and weight is 54.4 kg (120 lb). Her blood pressure is 134/76 and her pulse is 78.   Patient seated in the exam room. She has a neck pillow wrapped around her neck. Tender over the posterior and left lateral neck & left upper back. Prominent irregular head tremor. She has a limited range of motion for neck rotation and lateral bending. There is a slight left torticollis and retrocollis. Facial tremor involving frontalis. No rest tremor in the UE or LE. No postural tremor in the distal RUE when the elbow is supported. I would question whether some of the axial tremor or head tremor is translating into the shoulder and the rest of the arm. Slight ataxia on chin-nose-chin on the left. Tremor on the LUE has a jerky quality. Her posture is slightly stooped. Base is normal with normal stride. Her gait appears steady.       Procedure  DBS Interrogation & Analysis:    Abbott Infinity  Battery status: 2.95v  Implanted generator: Abbott Infinity 5, REF [id number], implanted 3/13/2018  Lead sites: Left Vim  Lead type: 0.5 mm spacing, REF [id number], implanted 3/6/2018    Impedance Check:   Impedances checked. No problems found, no open or closed circuits.   See scanned report for impedance details.       Stimulation Parameters    LEFT BRAIN    Initial Final   Contacts C+, 2b-, 3b-  No changes   Amplitude (mA) 4.0 [range 0-5]    Pulse Width (ms) 30    Frequency (Hz) 180          BOTULINUM NEUROTOXIN INJECTION PROCEDURES:    VERIFICATION OF PATIENT IDENTIFICATION AND PROCEDURE     Initials "   Patient Name AS   Patient  AS   Procedure Verified by: AS     Prior to the start of the procedure and with procedural staff participation, I verbally confirmed the patient s identity using two indicators, relevant allergies, that the procedure was appropriate and matched the consent or emergent situation, and that the correct equipment/implants were available. Immediately prior to starting the procedure I conducted the Time Out with the procedural staff and re-confirmed the patient s name, procedure, and site/side. (The Joint Commission universal protocol was followed.)  Yes    Sedation (Moderate or Deep): None      Above assessments performed by:  Sandy Hernandez MD      INDICATION/S FOR PROCEDURE/S:  Irish Rosales is a 68 year old year old patient with dystonia affecting the  head, neck and shoulder girdle musculature secondary to a diagnosis of Cervical dystonia and head tremor with associated  pain, tremor, loss of joint motion, loss of volitional motor control and difficulty with activities of daily living.     Irish Rosales baseline symptoms have been recalcitrant to oral medications and conservative therapy.  Irish Rosales is here today for an injection of Botox.      GOAL OF PROCEDURE:  The goal of this procedure is to increase active range of motion, improve volitional motor control, decrease pain  and enhance functional independence associated with dystonic movements.    TOTAL DOSE ADMINISTERED:  Dose Administered:  160 units Botox  1:1 Dilution  Diluent Used:  Preservative Free Normal Saline  Total Volume of Diluent Used:  1.6 ml  Lot # /C3 with Expiration Date:  2021  NDC #: Botox 100u ([address]-18)    Medication guide was offered to patient and was declined.    CONSENT:  The risks, benefits, and treatment options were discussed with Irish Rosales and she agreed to proceed.      Written consent was obtained by AS.     EQUIPMENT USED:  Needle-37mm  stimulating/recording    SKIN PREPARATION:  Skin preparation was performed using an alcohol wipe.    GUIDANCE DESCRIPTION:  Electro-myographic guidance was necessary throughout the procedure to accurately identify all areas of dystonic muscles while avoiding injection of non-dystonic muscles, neighboring nerves and nearby vascular structures.     AREA/MUSCLE INJECTED:      DBS extension wire was palpated posterior to the left ear and toward the anterior left SCM. I avoided injecting around the palpated extension wire.      Muscles Injected Units Injected Number of Injections   Left lateral trapezius  40 2   Left upper trapezius 15 1   Left splenius cervicus 35 2   Left paraspinal  10 1   Left levator scapulae at origin 20 1   Left levator middle of muscle 15 1   Left rhomboid 15 1   Right levator at origin 10 1   Total Units Injected: 160    Unavoidable Waste: 40    Total Units Billed 200      The patient tolerated the injections without difficulty.      IMPRESSION and PLAN:  1. Essential tremor with dystonic features s/p left Vim DBS  2. Cervical dystonia    Irish Rosales is a 68 year old woman with essential tremor with dystonic features and cervical dystonia s/p left Vim DBS who presented for botulinum toxin injections for cervical dystonia. EMG activity was greatest, clearly demonstrating tremor, for the left trapezius and splenius - also muscles Ms. Rosales described as tender. The left levator was also quite active - more so at the origin at the scapula than at the muscle belly.     She will monitor for improvement of pain and head tremor. Injections may be repeated in 12 weeks. She will also undergo implantation of the right Vim/Vop 1/15/2019 and subsequent programming. She may wait to schedule repeat Botox injections until after DBS programming in case her pain improves after surgery.     Sandy Hernandez MD  Movement Disorders Fellow    Discussed with Dr. Willett      Answers for HPI/ROS submitted by the  patient on 12/20/2018   General Symptoms: Yes  Skin Symptoms: No  HENT Symptoms: No  EYE SYMPTOMS: No  HEART SYMPTOMS: No  LUNG SYMPTOMS: No  INTESTINAL SYMPTOMS: No  URINARY SYMPTOMS: No  GYNECOLOGIC SYMPTOMS: No  BREAST SYMPTOMS: No  SKELETAL SYMPTOMS: Yes  BLOOD SYMPTOMS: No  NERVOUS SYSTEM SYMPTOMS: Yes  MENTAL HEALTH SYMPTOMS: No      [facility] MOVEMENT DISORDER FELLOW

## 2018-12-21 ENCOUNTER — OFFICE VISIT (OUTPATIENT)
Dept: SURGERY | Facility: CLINIC | Age: 68
End: 2018-12-21
Payer: COMMERCIAL

## 2018-12-21 ENCOUNTER — ANESTHESIA EVENT (OUTPATIENT)
Dept: SURGERY | Facility: CLINIC | Age: 68
DRG: 027 | End: 2018-12-21
Payer: COMMERCIAL

## 2018-12-21 VITALS
SYSTOLIC BLOOD PRESSURE: 122 MMHG | HEART RATE: 78 BPM | TEMPERATURE: 98.3 F | DIASTOLIC BLOOD PRESSURE: 68 MMHG | RESPIRATION RATE: 18 BRPM | WEIGHT: 120.5 LBS | HEIGHT: 61 IN | BODY MASS INDEX: 22.75 KG/M2 | OXYGEN SATURATION: 95 %

## 2018-12-21 DIAGNOSIS — Z01.818 PREOPERATIVE EVALUATION TO RULE OUT SURGICAL CONTRAINDICATION: ICD-10-CM

## 2018-12-21 DIAGNOSIS — Z01.818 PREOP GENERAL PHYSICAL EXAM: ICD-10-CM

## 2018-12-21 DIAGNOSIS — Z01.818 PREOP GENERAL PHYSICAL EXAM: Primary | ICD-10-CM

## 2018-12-21 LAB
ALBUMIN UR-MCNC: NEGATIVE MG/DL
ANION GAP SERPL CALCULATED.3IONS-SCNC: 8 MMOL/L (ref 3–14)
APPEARANCE UR: CLEAR
APTT PPP: 26 SEC (ref 22–37)
BILIRUB UR QL STRIP: NEGATIVE
BUN SERPL-MCNC: 19 MG/DL (ref 7–30)
CALCIUM SERPL-MCNC: 8.8 MG/DL (ref 8.5–10.1)
CHLORIDE SERPL-SCNC: 102 MMOL/L (ref 94–109)
CO2 SERPL-SCNC: 28 MMOL/L (ref 20–32)
COLOR UR AUTO: YELLOW
CREAT SERPL-MCNC: 0.6 MG/DL (ref 0.52–1.04)
ERYTHROCYTE [DISTWIDTH] IN BLOOD BY AUTOMATED COUNT: 11.4 % (ref 10–15)
GFR SERPL CREATININE-BSD FRML MDRD: >90 ML/MIN/{1.73_M2}
GLUCOSE SERPL-MCNC: 84 MG/DL (ref 70–99)
GLUCOSE UR STRIP-MCNC: NEGATIVE MG/DL
HCT VFR BLD AUTO: 39.6 % (ref 35–47)
HGB BLD-MCNC: 13.2 G/DL (ref 11.7–15.7)
HGB UR QL STRIP: NEGATIVE
INR PPP: 0.9 (ref 0.86–1.14)
KETONES UR STRIP-MCNC: NEGATIVE MG/DL
LEUKOCYTE ESTERASE UR QL STRIP: ABNORMAL
MCH RBC QN AUTO: 32.5 PG (ref 26.5–33)
MCHC RBC AUTO-ENTMCNC: 33.3 G/DL (ref 31.5–36.5)
MCV RBC AUTO: 98 FL (ref 78–100)
MUCOUS THREADS #/AREA URNS LPF: PRESENT /LPF
NITRATE UR QL: NEGATIVE
PH UR STRIP: 5 PH (ref 5–7)
PLATELET # BLD AUTO: 270 10E9/L (ref 150–450)
POTASSIUM SERPL-SCNC: 4 MMOL/L (ref 3.4–5.3)
RBC # BLD AUTO: 4.06 10E12/L (ref 3.8–5.2)
RBC #/AREA URNS AUTO: 1 /HPF (ref 0–2)
SODIUM SERPL-SCNC: 137 MMOL/L (ref 133–144)
SOURCE: ABNORMAL
SP GR UR STRIP: 1.02 (ref 1–1.03)
SQUAMOUS #/AREA URNS AUTO: <1 /HPF (ref 0–1)
UROBILINOGEN UR STRIP-MCNC: 2 MG/DL (ref 0–2)
WBC # BLD AUTO: 4.9 10E9/L (ref 4–11)
WBC #/AREA URNS AUTO: 2 /HPF (ref 0–5)

## 2018-12-21 RX ORDER — IBUPROFEN 200 MG
600 TABLET ORAL PRN
Status: ON HOLD | COMMUNITY
End: 2019-01-09

## 2018-12-21 ASSESSMENT — MIFFLIN-ST. JEOR: SCORE: 1023.96

## 2018-12-21 NOTE — ANESTHESIA PREPROCEDURE EVALUATION
Anesthesia Pre-Procedure Evaluation    Patient: Irish Rosales   MRN:     [id number] Gender:   female   Age:    68 year old :      1950        Preoperative Diagnosis: Essential Tremor    Procedure(s):  Stealth Assisted Right Deep Brain Stimulator Placement, Phase One, Placement Of Right Side Deep Brain Stimulator Electrode Target Right Ventral Intermediate Nucleus Of The Thalamus Possible Second Electrode With Microelectrode Recording     Past Medical History:   Diagnosis Date     Depression      Dyslipidemia      Dystonic movements 2017     Dystonic tremor 2017     Encounter for neuropsychological testing 3/13/2017    2017 evaluation  IMPRESSIONS AND RECOMMENDATIONS   Current results indicate performance that falls within normal limits across cognitive domains, generally in the average to above average range. Objective personality assessment also falls within normal limits.   This pattern of performance does not reflect dementia at this time, nor is it suggestive of focal or lateralized cerebral involvement. Sh     Family history of movement disorder 2017     Family history of tremor 2017     H/O magnetic resonance imaging of brain and brain stem 2017    MR BRAIN W/O CONTRAST 2017 11:46 AM  Provided History: dbs brain surgery for dystonia, Other specified forms of tremor, Dystonia, unspecified, Tremor, unspecified  Comparison:  No similar prior studies   Technique: Volumetric sagittal T1 and T2 weighted, axial T2-weighted, turboFLAIR and diffusion-weighted with ADC map images of the brain were obtained without intravenous contrast.  Findings:     mild abnormality in carotid study 3/2/2017    BILATERAL DUPLEX CAROTID ULTRASOUND 2017 1:01 PM    HISTORY: Other specified symptoms and signs involving the circulatory and respiratory systems.   COMPARISON: None.   FINDINGS: There is mild atherosclerotic plaque in the carotid bifurcations. Flow velocities and waveforms  show less than 50% diameter stenosis in both the right and left internal carotid arteries as assessed by each ICA PS     Osteoporosis 8/2/2010      Past Surgical History:   Procedure Laterality Date     ------------OTHER-------------  2004    vocal cord thyroplasty at [facility]     C BSO, OMENTECTOMY W/XAVIER  1997    hysterectomy     C HAND/FINGER SURGERY UNLISTED  1998    right carpal tunnel surgery     IMPLANT DEEP BRAIN STIMULATION GENERATOR / BATTERY Left 3/13/2018    Procedure: IMPLANT DEEP BRAIN STIMULATION GENERATOR / BATTERY;  Deep Brain Stimulator Placement, Phase II, Placement Of Deep Brain Stimulator Generator/Battery Over The Left Chest Wall;  Surgeon: Aleksander Morales MD;  Location: [facility] OR     OPTICAL TRACKING SYSTEM INSERTION DEEP BRAIN STIMULATION Left 3/6/2018    Procedure: OPTICAL TRACKING SYSTEM INSERTION DEEP BRAIN STIMULATION;  Stealth Assisted Left Deep Brain Stimulator Placement, Phase I, Placement Of Left Side Deep Brain Stimulator Electrode, Target Left Ventral Intermediate Nucleus Of The Thalamus With Microelectrode Recording;  Surgeon: Aleksander Morales MD;  Location: [facility] OR     RELEASE CARPAL TUNNEL Left 1/2/2015    Procedure: RELEASE CARPAL TUNNEL;  Surgeon: SHANIA Hernandez MD;  Location: [facility] OR     RELEASE ULNAR NERVE (ELBOW) Left 1/2/2015    Procedure: RELEASE ULNAR NERVE (ELBOW);  Surgeon: SHANIA Hernandez MD;  Location: [facility] OR          Anesthesia Evaluation     . Pt has had prior anesthetic. Type: General and MAC           ROS/MED HX    ENT/Pulmonary:     (+)other ENT- vocal cord spasms, , . .    Neurologic:     (+)other neuro familial tremor    Cardiovascular:     (+) Dyslipidemia, ----. : . . . :. . Previous cardiac testing date:results:date: results:ECG reviewed date: results: date: results:          METS/Exercise Tolerance:  >4 METS   Hematologic:  - neg hematologic  ROS       Musculoskeletal:  - neg musculoskeletal ROS       GI/Hepatic:  - neg GI/hepatic ROS        Renal/Genitourinary:  - ROS Renal section negative       Endo:  - neg endo ROS       Psychiatric:  - neg psychiatric ROS       Infectious Disease:  - neg infectious disease ROS       Malignancy:      - no malignancy   Other:    (+) No chance of pregnancy no H/O Chronic Pain,no other significant disability                        PHYSICAL EXAM:   Mental Status/Neuro: A/A/O   Airway: Facies: Feasible  Mallampati: I  Mouth/Opening: Full  TM distance: > 6 cm  Neck ROM: Full   Respiratory: Auscultation: CTAB     Resp. Rate: Normal     Resp. Effort: Normal      CV: Rhythm: Regular  Rate: Age appropriate  Heart: Normal Sounds   Comments:      Dental: Normal              Results for HARSHAD PAYNE (MRN [id number]) as of 12/21/2018 12:32   Ref. Range 12/21/2018 09:58 12/21/2018 10:00   Sodium Latest Ref Range: 133 - 144 mmol/L 137    Potassium Latest Ref Range: 3.4 - 5.3 mmol/L 4.0    Chloride Latest Ref Range: 94 - 109 mmol/L 102    Carbon Dioxide Latest Ref Range: 20 - 32 mmol/L 28    Urea Nitrogen Latest Ref Range: 7 - 30 mg/dL 19    Creatinine Latest Ref Range: 0.52 - 1.04 mg/dL 0.60    GFR Estimate Latest Ref Range: >60 mL/min/1.73_m2 >90    GFR Estimate If Black Latest Ref Range: >60 mL/min/1.73_m2 >90    Calcium Latest Ref Range: 8.5 - 10.1 mg/dL 8.8    Anion Gap Latest Ref Range: 3 - 14 mmol/L 8    Glucose Latest Ref Range: 70 - 99 mg/dL 84    WBC Latest Ref Range: 4.0 - 11.0 10e9/L 4.9    Hemoglobin Latest Ref Range: 11.7 - 15.7 g/dL 13.2    Hematocrit Latest Ref Range: 35.0 - 47.0 % 39.6    Platelet Count Latest Ref Range: 150 - 450 10e9/L 270    RBC Count Latest Ref Range: 3.8 - 5.2 10e12/L 4.06    MCV Latest Ref Range: 78 - 100 fl 98    MCH Latest Ref Range: 26.5 - 33.0 pg 32.5    MCHC Latest Ref Range: 31.5 - 36.5 g/dL 33.3    RDW Latest Ref Range: 10.0 - 15.0 % 11.4    INR Latest Ref Range: 0.86 - 1.14  0.90    PTT Latest Ref Range: 22 - 37 sec 26    ABO Unknown A    RH(D) Unknown Pos    Antibody  "Screen Unknown Neg    Test Valid Only At Latest Units:     [facility]...    Specimen Expires Unknown 12/24/2018    Blood Bank Comment Unknown [facility] FOR...    Color Urine Unknown  Yellow   Appearance Urine Unknown  Clear   Glucose Urine Latest Ref Range: NEG^Negative mg/dL  Negative   Bilirubin Urine Latest Ref Range: NEG^Negative   Negative   Ketones Urine Latest Ref Range: NEG^Negative mg/dL  Negative   Specific Gravity Urine Latest Ref Range: 1.003 - 1.035   1.019   pH Urine Latest Ref Range: 5.0 - 7.0 pH  5.0   Protein Albumin Urine Latest Ref Range: NEG^Negative mg/dL  Negative   Urobilinogen mg/dL Latest Ref Range: 0.0 - 2.0 mg/dL  2.0   Nitrite Urine Latest Ref Range: NEG^Negative   Negative   Blood Urine Latest Ref Range: NEG^Negative   Negative   Leukocyte Esterase Urine Latest Ref Range: NEG^Negative   Trace (A)   Source Unknown  Midstream Urine   WBC Urine Latest Ref Range: 0 - 5 /HPF  2   RBC Urine Latest Ref Range: 0 - 2 /HPF  1   Squamous Epithelial /HPF Urine Latest Ref Range: 0 - 1 /HPF  <1   Mucous Urine Latest Ref Range: NEG^Negative /LPF  Present (A)           Preop Vitals  BP Readings from Last 3 Encounters:   12/21/18 122/68   12/20/18 134/76   12/07/18 138/78    Pulse Readings from Last 3 Encounters:   12/21/18 78   12/20/18 78   12/07/18 76      Resp Readings from Last 3 Encounters:   12/21/18 18   06/29/18 24   05/25/18 24    SpO2 Readings from Last 3 Encounters:   12/21/18 95%   12/07/18 97%   11/02/18 96%      Temp Readings from Last 1 Encounters:   12/21/18 98.3  F (36.8  C) (Oral)    Ht Readings from Last 1 Encounters:   12/21/18 1.549 m (5' 1\")      Wt Readings from Last 1 Encounters:   12/21/18 54.7 kg (120 lb 8 oz)    Estimated body mass index is 22.77 kg/m  as calculated from the following:    Height as of this encounter: 1.549 m (5' 1\").    Weight as of this encounter: 54.7 kg (120 lb 8 oz).     LDA:            Assessment:   ASA SCORE: 3    NPO Status: > 6 hours since " completed Solid [employer]   Documentation: H&P complete; Preop Testing complete; Consents complete   Proceeding: Proceed without further delay     Plan:   Anes. Type:  MAC      Induction:  Not applicable   Airway: Native Airway   Access/Monitoring: PIV   Maintenance: N/a   Emergence: N/a   Logistics: ICU Admission     Postop Pain/Sedation Strategy:  Standard-Options: Opioids PRN     PONV Management:  Adult Risk Factors: Female, Postop Opioids     CONSENT: Direct conversation   Plan and risks discussed with: Patient   Blood Products: N/a                  PAC Discussion and Assessment    ASA Classification: 3  Case is suitable for:   Anesthetic techniques and relevant risks discussed: GA and MAC with GA as backup  Invasive monitoring and risk discussed: Yes  Types:   Possibility and Risk of blood transfusion discussed: Yes  NPO instructions given:   Additional anesthetic preparation and risks discussed:   Needs early admission to pre-op area:   Other:     PAC Resident/NP Anesthesia Assessment:  Irish Rosales is a 69 yo female scheduled for right DBS part 1 on 1/8/2018 and part 2 1/15/2018 by Dr. Morales in treatment of tremors     Previous anesthesia, last 3/2018 at [facility], without complications    1) Cardiac: No known cardiac diagnosis or symptoms. EKG3/6/2018 SR.   2) Pulmonary: Never smoked. Vocal cord spasm  3) Neuro: Familial tremors. S/p left DBS with little relief on right side. Most of her symptoms are central: head and neck dystonia and tremor.    Feasible airway                   Reviewed and Signed by PAC Mid-Level Provider/Resident  Mid-Level Provider/Resident: RACHANA Monterroso  Date: 12/21/2018  Time: 0945    Attending Anesthesiologist Anesthesia Assessment:  68 year old for DBS in management of tremor. Patient has no significant cardiac or pulmonary disease.    Patient/case discussed with EFREN/resident; agree with above assessment. No need to see patient. Patient is appropriate for the planned procedure  without further work-up or medical management.      Reviewed and Signed by PAC Anesthesiologist  Anesthesiologist: mele  Date: 12/21/2018  Time:   Pass/Fail: Pass  Disposition:     PAC Pharmacist Assessment:        Pharmacist:   Date:   Time:        RACHANA Hartman CNS

## 2018-12-21 NOTE — PATIENT INSTRUCTIONS
Preparing for Your Surgery      Name:  Irish Rosales   MRN:  [id number]   :  1950   Today's Date:  2018     Arriving for surgery:  Surgery date:  2019  Arrival time:  6:00 am  Please come to:       [facility] [address]  [address] [place]  [address]    -   parking is available in front of the hospital from 5:15 am to 8:00 pm    -  Stop at the Information Desk in the lobby    -   Inform the information person that you are here for surgery. An escort to 3c will be provided. If you would not like an escort, please proceed to 3C on the 3rd floor. [phone]     What can I eat or drink?  -  You may have solid food or milk products until 8 hours prior to your surgery. (midnight)   -  You may have water, apple juice or 7up/Sprite until 2 hours prior to your surgery.(until 6 am arrival time)    Which medicines can I take?        Stop Aspirin, vitamins and supplements one week prior to surgery.      Hold Ibuprofen and Naproxen for 24 hours prior to surgery.       -  Please take these medications the day of surgery:     Baclofen      Alprazolam if needed      Gabapentin      Sertraline      How do I prepare myself?  -  Take two showers: one the night before surgery; and one the morning of surgery.         Use Scrubcare or Hibiclens to wash from neck down.  You may use your own shampoo and conditioner. No other hair products.   -  Do NOT use lotion, powder, deodorant, or antiperspirant the day of your surgery.  -  Do NOT wear any makeup, fingernail polish or jewelry.  - Do not bring your own medications to the hospital, except for inhalers and eye drops.  -  Bring your ID and insurance card.    Questions or Concerns:  -If you have questions or concerns regarding the day of surgery, please call [phone].       -For questions after surgery please call your surgeons office.

## 2018-12-21 NOTE — H&P
Pre-Operative H & P     CC:  Preoperative exam to assess for increased cardiopulmonary risk while undergoing surgery and anesthesia.    Date of Encounter: 12/21/2018  Primary Care Physician:  Braulio Victoria    Reason for visit:  Preop general physical exam  tremors      HPI  Irish Rosales is a 68 year old female who presents for pre-operative H & P in preparation for right DBS part 1 on 1/8/2018 and part 2 1/15/2018 by Dr. Morales in treatment of tremors at [facility] [place], [place].     History is obtained from the patient.         Previous anesthesia, last 3/2018 at [facility], without complications    1) Cardiac: No known cardiac diagnosis or symptoms. EKG3/6/2018 SR.   2) Pulmonary: Never smoked. Vocal cord spasm  3) Neuro: Familial tremors. S/p left DBS with little relief on right side. Most of her symptoms are central: head and neck dystonia and tremor.    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   COMPARISON: None.   FINDINGS: There is mild atherosclerotic plaque in the carotid bifurcations. Flow velocities and waveforms show less than 50% diameter stenosis in both the right and left internal carotid arteries as assessed by each ICA PS     Osteoporosis 8/2/2010       Past Surgical History  Past Surgical History:   Procedure Laterality Date     ------------OTHER-------------  2004    vocal cord thyroplasty at [facility]     C BSO, OMENTECTOMY W/XAVIER  1997    hysterectomy     C HAND/FINGER SURGERY UNLISTED  1998    right carpal tunnel surgery     IMPLANT DEEP BRAIN STIMULATION GENERATOR / BATTERY  Left 3/13/2018    Procedure: IMPLANT DEEP BRAIN STIMULATION GENERATOR / BATTERY;  Deep Brain Stimulator Placement, Phase II, Placement Of Deep Brain Stimulator Generator/Battery Over The Left Chest Wall;  Surgeon: Aleksander Morales MD;  Location: [facility] OR     OPTICAL TRACKING SYSTEM INSERTION DEEP BRAIN STIMULATION Left 3/6/2018    Procedure: OPTICAL TRACKING SYSTEM INSERTION DEEP BRAIN STIMULATION;  Stealth Assisted Left Deep Brain Stimulator Placement, Phase I, Placement Of Left Side Deep Brain Stimulator Electrode, Target Left Ventral Intermediate Nucleus Of The Thalamus With Microelectrode Recording;  Surgeon: Aleksander Morales MD;  [facility]: [facility] OR     RELEASE CARPAL TUNNEL Left 1/2/2015    Procedure: RELEASE CARPAL TUNNEL;  Surgeon: SHANIA Hernandez MD;  Location: [facility] OR     RELEASE ULNAR NERVE (ELBOW) Left 1/2/2015    Procedure: RELEASE ULNAR NERVE (ELBOW);  Surgeon: SHANIA Hernandez MD;  Location: [facility] OR       Hx of Blood transfusions/reactions: none    Hx of abnormal bleeding or anti-platelet use: noen    Menstrual history: No LMP recorded. Patient has had a hysterectomy.:     Steroid use in the last year: none    Personal or FH with difficulty with Anesthesia:  None      Prior to Admission Medications  Current Outpatient Medications   Medication Sig Dispense Refill     ALPRAZolam (XANAX) 0.5 MG tablet Take 1 tablet (0.5 mg) by mouth daily as needed 30 tablet 3     baclofen (LIORESAL) 20 MG tablet Take 1 tablet (20 mg) by mouth 3 times daily (Patient taking differently: Take 20 mg by mouth 2 times daily ) 90 tablet 3     gabapentin (NEURONTIN) 100 MG capsule Take 1 capsule every night for 1-3 days, then 1 capsule twice daily for 1-3 days, then 1 capsule three times daily (Patient taking differently: Take 100 mg by mouth every morning Take 1 capsule every night for 1-3 days, then 1 capsule twice daily for 1-3 days, then 1 capsule three times daily) 90 capsule 1     ibuprofen (ADVIL/MOTRIN)  200 MG tablet Take 600 mg by mouth as needed for mild pain       Nutritional Supplements (ENSURE COMPLETE SHAKE) LIQD Take 237 mLs by mouth 3 times daily (Patient taking differently: Take 237 mLs by mouth daily ) 90 Bottle 3     sertraline (ZOLOFT) 100 MG tablet Take 1 tablet (100 mg) by mouth daily (Patient taking differently: Take 100 mg by mouth every morning ) 90 tablet 1     botulinum toxin type A (BOTOX) 100 units injection Inject 200 units 4x per year 800 Units 0       Allergies  Allergies   Allergen Reactions     Sulfa Drugs Swelling and Rash     Atorvastatin      Leg cramps     Simvastatin      Leg cramp       Social History  Social History     Socioeconomic History     Marital status:      Spouse name: Not on file     Number of children: 3     Years of education: 14     Highest education level: Not on file   Social Needs     Financial resource strain: Not on file     Food insecurity - worry: Not on file     Food insecurity - inability: Not on file     Transportation needs - medical: Not on file     Transportation needs - non-medical: Not on file   Occupational History     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   Tobacco Use     Smoking status: Never Smoker     Smokeless tobacco: Never Used   Substance and Sexual Activity     Alcohol use: Yes     Comment: occasionally     Drug use: No     Sexual activity: Yes     Partners: Male   Other Topics Concern     Parent/sibling w/ CABG, MI or angioplasty before 65F 55M? No      Service No     Blood Transfusions No     Caffeine Concern No     Occupational Exposure No     Hobby Hazards No     Sleep Concern No     Stress Concern No     Weight Concern No     Special Diet No     Back Care No     Exercise Yes     Comment: walk     Bike Helmet Yes     Seat Belt Yes     Self-Exams Yes   Social History Narrative        [place]    Daughter Karyn Sanchez        benign essential tremor with a strained quality to her voice consistent with mild laryngeal spasm        ALLERGIES:  She is allergic to sulfa drugs, atorvastatin and simvastatin.  The statin drugs caused leg cramps.            FAMILY HISTORY:  As noted above with 1 sister developing a voice tremor and another sister having what is described as a facial tremor.  Mother with hand tremors          SOCIAL HISTORY:  She does not smoke.  She does use alcohol on occasion. She previously worked as a RN.         [facility]azmyn 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             Updated 6/13/17: Client was born in [place], [place] to parents Jerome and Berenice.  Client grew up w/ three sisters Tea, Carrie, and Jazmyn who are currently still living.  Client graduated from high school, attended college for two years, and graduated w/ a RN Degree.  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  She was  to her ex-spouse for 25 years and had three children; one son Bryan and two daughters Halley/Ysabel.  One daughter Halley Sanchez resides nearby and her other daughter Ysabel and her son Bryan reside in [place], [place].  Bryan has two daughters ages five and eight years old.  Client enjoys flying for free to [place] to visit her two granddaughters. Michelle Sutton, [facility] ([phone], Fax: [phone]).           Family History  Family History   Problem Relation Age of Onset     Hypertension Father         and mother     Cerebrovascular Disease Father      Tremor Mother      Lung Cancer Mother      Neurologic Disorder Sister         dystonic voice x 2 botox     Neurologic Disorder Sister         jose m mn. facial movement             Anesthesia Pre-Procedure Evaluation    Patient: Irish Rosales   MRN:     [id number] Gender:   female   Age:    68 year old :      1950        Preoperative Diagnosis: Essential Tremor    Procedure(s):  Stealth Assisted Right Deep Brain Stimulator Placement, Phase One, Placement Of Right Side Deep Brain Stimulator Electrode Target Right Ventral Intermediate Nucleus Of The Thalamus Possible Second Electrode With Microelectrode Recording     Past Medical History:   Diagnosis Date     Depression      Dyslipidemia      Dystonic movements 2017     Dystonic tremor 2017     Encounter for neuropsychological testing 3/13/2017    2017 evaluation  IMPRESSIONS AND RECOMMENDATIONS   Current results indicate performance that falls within normal limits across cognitive domains, generally in the average to above average range. Objective personality assessment also falls within normal limits.   This pattern of performance does not reflect dementia at this time, nor is it suggestive of focal or lateralized cerebral involvement. Sh     Family history of movement disorder 2017     Family history of tremor 2017     H/O magnetic resonance imaging of brain and brain stem 2017    MR BRAIN W/O CONTRAST 2017 11:46 AM  Provided History: dbs brain surgery for dystonia, Other specified forms of tremor, Dystonia, unspecified, Tremor, unspecified  Comparison:  No similar prior studies   Technique: Volumetric sagittal T1 and T2 weighted, axial T2-weighted, turboFLAIR and diffusion-weighted with ADC map images of the brain were obtained without intravenous contrast.  Findings:     mild abnormality in carotid study 3/2/2017    BILATERAL DUPLEX CAROTID ULTRASOUND 2017 1:01 PM    HISTORY: Other specified symptoms and signs involving the circulatory and respiratory systems.   COMPARISON: None.   FINDINGS: There is mild atherosclerotic plaque in the carotid bifurcations. Flow velocities and  waveforms show less than 50% diameter stenosis in both the right and left internal carotid arteries as assessed by each ICA PS     Osteoporosis 8/2/2010      Past Surgical History:   Procedure Laterality Date     ------------OTHER-------------  2004    vocal cord thyroplasty at [facility]     C BSO, OMENTECTOMY W/XAVIER  1997    hysterectomy     C HAND/FINGER SURGERY UNLISTED  1998    right carpal tunnel surgery     IMPLANT DEEP BRAIN STIMULATION GENERATOR / BATTERY Left 3/13/2018    Procedure: IMPLANT DEEP BRAIN STIMULATION GENERATOR / BATTERY;  Deep Brain Stimulator Placement, Phase II, Placement Of Deep Brain Stimulator Generator/Battery Over The Left Chest Wall;  Surgeon: Aleksander Morales MD;  [facility]     OPTICAL TRACKING SYSTEM INSERTION DEEP BRAIN STIMULATION Left 3/6/2018    Procedure: OPTICAL TRACKING SYSTEM INSERTION DEEP BRAIN STIMULATION;  Stealth Assisted Left Deep Brain Stimulator Placement, Phase I, Placement Of Left Side Deep Brain Stimulator Electrode, Target Left Ventral Intermediate Nucleus Of The Thalamus With Microelectrode Recording;  Surgeon: Aleksander Morales MD;  [facility]     RELEASE CARPAL TUNNEL Left 1/2/2015    Procedure: RELEASE CARPAL TUNNEL;  Surgeon: SHANIA Hernandez MD;  Location: [facility] OR     RELEASE ULNAR NERVE (ELBOW) Left 1/2/2015    Procedure: RELEASE ULNAR NERVE (ELBOW);  Surgeon: SHANIA Hernandez MD;  Location: [facility] OR          Anesthesia Evaluation     . Pt has had prior anesthetic. Type: General and MAC           ROS/MED HX    ENT/Pulmonary:     (+)other ENT- vocal cord spasms, , . .    Neurologic:     (+)other neuro familial tremor    Cardiovascular:     (+) Dyslipidemia, ----. : . . . :. . Previous cardiac testing date:results:date: results:ECG reviewed date: results: date: results:          METS/Exercise Tolerance:  >4 METS   Hematologic:  - neg hematologic  ROS       Musculoskeletal:  - neg musculoskeletal ROS       GI/Hepatic:  - neg  "GI/hepatic ROS       Renal/Genitourinary:  - ROS Renal section negative       Endo:  - neg endo ROS       Psychiatric:  - neg psychiatric ROS       Infectious Disease:  - neg infectious disease ROS       Malignancy:      - no malignancy   Other:    (+) No chance of pregnancy no H/O Chronic Pain,no other significant disability                    PHYSICAL EXAM:   Mental Status/Neuro: A/A/O   Airway: Facies: Feasible  Mallampati: I  Mouth/Opening: Full  TM distance: > 6 cm  Neck ROM: Full   Respiratory: Auscultation: CTAB     Resp. Rate: Normal     Resp. Effort: Normal      CV: Rhythm: Regular  Rate: Age appropriate  Heart: Normal Sounds   Comments:      Dental: Normal                    Preop Vitals  BP Readings from Last 3 Encounters:   12/21/18 122/68   12/20/18 134/76   12/07/18 138/78    Pulse Readings from Last 3 Encounters:   12/21/18 78   12/20/18 78   12/07/18 76      Resp Readings from Last 3 Encounters:   12/21/18 18   06/29/18 24   05/25/18 24    SpO2 Readings from Last 3 Encounters:   12/21/18 95%   12/07/18 97%   11/02/18 96%      Temp Readings from Last 1 Encounters:   12/21/18 98.3  F (36.8  C) (Oral)    Ht Readings from Last 1 Encounters:   12/21/18 1.549 m (5' 1\")      Wt Readings from Last 1 Encounters:   12/21/18 54.7 kg (120 lb 8 oz)    Estimated body mass index is 22.77 kg/m  as calculated from the following:    Height as of this encounter: 1.549 m (5' 1\").    Weight as of this encounter: 54.7 kg (120 lb 8 oz).     LDA:              The complete review of systems is negative other than noted in the HPI or here.   Temp: 98.3  F (36.8  C) Temp src: Oral BP: 122/68 Pulse: 78   Resp: 18 SpO2: 95 %         120 lbs 8 oz  5' 1\"   Body mass index is 22.77 kg/m .       Physical Exam  Constitutional: Awake, alert, cooperative, no apparent distress, and appears stated age.  Eyes: Pupils equal, round and reactive to light, extra ocular muscles intact, sclera clear, conjunctiva normal.  HENT: Normocephalic, " oral pharynx with moist mucus membranes, good dentition. No goiter appreciated.   Respiratory: Clear to auscultation bilaterally, no crackles or wheezing.  Cardiovascular: Regular rate and rhythm, normal S1 and S2, and no murmur noted.  Carotids +2, no bruits. No edema. Palpable pulses to radial  DP and PT arteries.   GI: Normal bowel sounds, soft, non-distended, non-tender, no masses palpated, no hepatosplenomegaly.  Left chest battery placement   Lymph/Hematologic: No cervical lymphadenopathy and no supraclavicular lymphadenopathy.  Genitourinary:  Deferred   Skin: Warm and dry.  No rashes at anticipated surgical site.   Musculoskeletal: Full ROM of neck. There is no redness, warmth, or swelling of the joints. Significant tremors/dystonia involving head and neck  Neurologic: Awake, alert, oriented to name, place and time. Cranial nerves II-XII are grossly intact. Gait is normal.   Neuropsychiatric: Calm, cooperative. Normal affect.     Labs: (personally reviewed)  Results for HARSHAD PAYNE (MRN [id number]) as of 12/21/2018 12:32   Ref. Range 12/21/2018 09:58 12/21/2018 10:00   Sodium Latest Ref Range: 133 - 144 mmol/L 137    Potassium Latest Ref Range: 3.4 - 5.3 mmol/L 4.0    Chloride Latest Ref Range: 94 - 109 mmol/L 102    Carbon Dioxide Latest Ref Range: 20 - 32 mmol/L 28    Urea Nitrogen Latest Ref Range: 7 - 30 mg/dL 19    Creatinine Latest Ref Range: 0.52 - 1.04 mg/dL 0.60    GFR Estimate Latest Ref Range: >60 mL/min/1.73_m2 >90    GFR Estimate If Black Latest Ref Range: >60 mL/min/1.73_m2 >90    Calcium Latest Ref Range: 8.5 - 10.1 mg/dL 8.8    Anion Gap Latest Ref Range: 3 - 14 mmol/L 8    Glucose Latest Ref Range: 70 - 99 mg/dL 84    WBC Latest Ref Range: 4.0 - 11.0 10e9/L 4.9    Hemoglobin Latest Ref Range: 11.7 - 15.7 g/dL 13.2    Hematocrit Latest Ref Range: 35.0 - 47.0 % 39.6    Platelet Count Latest Ref Range: 150 - 450 10e9/L 270    RBC Count Latest Ref Range: 3.8 - 5.2 10e12/L 4.06    MCV  Latest Ref Range: 78 - 100 fl 98    MCH Latest Ref Range: 26.5 - 33.0 pg 32.5    MCHC Latest Ref Range: 31.5 - 36.5 g/dL 33.3    RDW Latest Ref Range: 10.0 - 15.0 % 11.4    INR Latest Ref Range: 0.86 - 1.14  0.90    PTT Latest Ref Range: 22 - 37 sec 26    ABO Unknown A    RH(D) Unknown Pos    Antibody Screen Unknown Neg    Test Valid Only At Latest Units:     [facility]..    Specimen Expires Unknown 12/24/2018    Blood Bank Comment Unknown [facility] FOR...    Color Urine Unknown  Yellow   Appearance Urine Unknown  Clear   Glucose Urine Latest Ref Range: NEG^Negative mg/dL  Negative   Bilirubin Urine Latest Ref Range: NEG^Negative   Negative   Ketones Urine Latest Ref Range: NEG^Negative mg/dL  Negative   Specific Gravity Urine Latest Ref Range: 1.003 - 1.035   1.019   pH Urine Latest Ref Range: 5.0 - 7.0 pH  5.0   Protein Albumin Urine Latest Ref Range: NEG^Negative mg/dL  Negative   Urobilinogen mg/dL Latest Ref Range: 0.0 - 2.0 mg/dL  2.0   Nitrite Urine Latest Ref Range: NEG^Negative   Negative   Blood Urine Latest Ref Range: NEG^Negative   Negative   Leukocyte Esterase Urine Latest Ref Range: NEG^Negative   Trace (A)   Source Unknown  Midstream Urine   WBC Urine Latest Ref Range: 0 - 5 /HPF  2   RBC Urine Latest Ref Range: 0 - 2 /HPF  1   Squamous Epithelial /HPF Urine Latest Ref Range: 0 - 1 /HPF  <1   Mucous Urine Latest Ref Range: NEG^Negative /LPF  Present (A)       EKG: Personally reviewed but formal cardiology read pending: 3/6/2018 SR      Outside records reviewed from: care everywhere        ASSESSMENT and PLAN  Irish Rosales is a 68 year old female scheduled to undergo right DBS part 1 on 1/8/2018 and part 2 1/15/2018 by Dr. Morales in treatment of tremors. She has the following specific operative considerations:   - RCRI : Low serious cardiac risks.  0.4% risk of major adverse cardiac event.   - Anesthesia considerations:  Refer to PAC assessment in anesthesia records  - VTE risk: 0.5%  -  SHERLY # of risks 2/8 = low risk  - Post-op delirium risk: high risk due to age  - Risk of PONV score = 2.  If > 2, anti-emetic intervention recommended.      I spent 30 minutes with patient, greater than 50% educating on preop meds, counseling on anesthesia and coordinating care for DBS    Pt optimized for surgery. AVS with information on surgery time/arrival time, meds and NPO status given by nursing staff      Patient was discussed with Dr Bustamante.    RACHANA Hartman CNS  Preoperative Assessment Center  [facility]  Phone: [phone]  Fax: [phone]

## 2018-12-26 NOTE — PROGRESS NOTES
[facility] Care Coordination Contact    2nd Attempt: Signed Letter not received from Client pura Coburn per process.     Helena Rahman  Case Management Specialist   [facility]   [phone]

## 2019-01-02 ENCOUNTER — TELEPHONE (OUTPATIENT)
Dept: NEUROLOGY | Facility: CLINIC | Age: 69
End: 2019-01-02

## 2019-01-02 DIAGNOSIS — G25.0 ESSENTIAL TREMOR: Primary | ICD-10-CM

## 2019-01-02 NOTE — TELEPHONE ENCOUNTER
Called patient to check if 2/13/19 will work for her to come in for initial programming of her 2nd side. This works fine for her. Letter out today detailing appointments.

## 2019-01-08 ENCOUNTER — ANESTHESIA (OUTPATIENT)
Dept: SURGERY | Facility: CLINIC | Age: 69
DRG: 027 | End: 2019-01-08
Payer: COMMERCIAL

## 2019-01-08 ENCOUNTER — APPOINTMENT (OUTPATIENT)
Dept: CT IMAGING | Facility: CLINIC | Age: 69
DRG: 027 | End: 2019-01-08
Attending: NEUROLOGICAL SURGERY
Payer: COMMERCIAL

## 2019-01-08 ENCOUNTER — HOSPITAL ENCOUNTER (INPATIENT)
Facility: CLINIC | Age: 69
LOS: 1 days | Discharge: HOME | DRG: 027 | End: 2019-01-09
Attending: NEUROLOGICAL SURGERY | Admitting: NEUROLOGICAL SURGERY
Payer: COMMERCIAL

## 2019-01-08 ENCOUNTER — APPOINTMENT (OUTPATIENT)
Dept: GENERAL RADIOLOGY | Facility: CLINIC | Age: 69
DRG: 027 | End: 2019-01-08
Attending: NEUROLOGICAL SURGERY
Payer: COMMERCIAL

## 2019-01-08 ENCOUNTER — HOSPITAL ENCOUNTER (OUTPATIENT)
Dept: CT IMAGING | Facility: CLINIC | Age: 69
DRG: 027 | End: 2019-01-08
Attending: NEUROLOGICAL SURGERY | Admitting: NEUROLOGICAL SURGERY
Payer: COMMERCIAL

## 2019-01-08 DIAGNOSIS — G25.0 ESSENTIAL TREMOR: ICD-10-CM

## 2019-01-08 DIAGNOSIS — Z96.89 S/P DEEP BRAIN STIMULATOR PLACEMENT: Primary | ICD-10-CM

## 2019-01-08 DIAGNOSIS — G25.2 DYSTONIC TREMOR: ICD-10-CM

## 2019-01-08 LAB — GLUCOSE BLDC GLUCOMTR-MCNC: 82 MG/DL (ref 70–99)

## 2019-01-08 PROCEDURE — 25000125 ZZHC RX 250: Performed by: NEUROLOGICAL SURGERY

## 2019-01-08 PROCEDURE — 25000128 H RX IP 250 OP 636: Performed by: NEUROLOGICAL SURGERY

## 2019-01-08 PROCEDURE — 8E09XBG COMPUTER ASSISTED PROCEDURE OF HEAD AND NECK REGION, WITH COMPUTERIZED TOMOGRAPHY: ICD-10-PCS | Performed by: NEUROLOGICAL SURGERY

## 2019-01-08 PROCEDURE — 25000125 ZZHC RX 250: Performed by: NURSE ANESTHETIST, CERTIFIED REGISTERED

## 2019-01-08 PROCEDURE — 71000015 ZZH RECOVERY PHASE 1 LEVEL 2 EA ADDTL HR: Performed by: NEUROLOGICAL SURGERY

## 2019-01-08 PROCEDURE — 70450 CT HEAD/BRAIN W/O DYE: CPT | Mod: 77

## 2019-01-08 PROCEDURE — 25000128 H RX IP 250 OP 636: Performed by: STUDENT IN AN ORGANIZED HEALTH CARE EDUCATION/TRAINING PROGRAM

## 2019-01-08 PROCEDURE — 27210794 ZZH OR GENERAL SUPPLY STERILE: Performed by: NEUROLOGICAL SURGERY

## 2019-01-08 PROCEDURE — 40000170 ZZH STATISTIC PRE-PROCEDURE ASSESSMENT II: Performed by: NEUROLOGICAL SURGERY

## 2019-01-08 PROCEDURE — 70450 CT HEAD/BRAIN W/O DYE: CPT

## 2019-01-08 PROCEDURE — 25000128 H RX IP 250 OP 636: Performed by: ANESTHESIOLOGY

## 2019-01-08 PROCEDURE — 25000128 H RX IP 250 OP 636: Performed by: NURSE ANESTHETIST, CERTIFIED REGISTERED

## 2019-01-08 PROCEDURE — 40000277 XR SURGERY CARM FLUORO LESS THAN 5 MIN W STILLS: Mod: TC

## 2019-01-08 PROCEDURE — 36000076 ZZH SURGERY LEVEL 6 EA 15 ADDTL MIN - UMMC: Performed by: NEUROLOGICAL SURGERY

## 2019-01-08 PROCEDURE — 40000986 XR SKULL PORT 1/3 VW

## 2019-01-08 PROCEDURE — 27810169 ZZH OR IMPLANT GENERAL: Performed by: NEUROLOGICAL SURGERY

## 2019-01-08 PROCEDURE — 37000009 ZZH ANESTHESIA TECHNICAL FEE, EACH ADDTL 15 MIN: Performed by: NEUROLOGICAL SURGERY

## 2019-01-08 PROCEDURE — C1778 LEAD, NEUROSTIMULATOR: HCPCS | Performed by: NEUROLOGICAL SURGERY

## 2019-01-08 PROCEDURE — 4A1004G MONITORING OF CENTRAL NERVOUS ELECTRICAL ACTIVITY, INTRAOPERATIVE, OPEN APPROACH: ICD-10-PCS | Performed by: NEUROLOGICAL SURGERY

## 2019-01-08 PROCEDURE — 25000132 ZZH RX MED GY IP 250 OP 250 PS 637: Performed by: STUDENT IN AN ORGANIZED HEALTH CARE EDUCATION/TRAINING PROGRAM

## 2019-01-08 PROCEDURE — 36000074 ZZH SURGERY LEVEL 6 1ST 30 MIN - UMMC: Performed by: NEUROLOGICAL SURGERY

## 2019-01-08 PROCEDURE — 71000014 ZZH RECOVERY PHASE 1 LEVEL 2 FIRST HR: Performed by: NEUROLOGICAL SURGERY

## 2019-01-08 PROCEDURE — 00H03MZ INSERTION OF NEUROSTIMULATOR LEAD INTO BRAIN, PERCUTANEOUS APPROACH: ICD-10-PCS | Performed by: NEUROLOGICAL SURGERY

## 2019-01-08 PROCEDURE — 27210995 ZZH RX 272: Performed by: NEUROLOGICAL SURGERY

## 2019-01-08 PROCEDURE — 00000146 ZZHCL STATISTIC GLUCOSE BY METER IP

## 2019-01-08 PROCEDURE — 20000004 ZZH R&B ICU UMMC

## 2019-01-08 PROCEDURE — 37000008 ZZH ANESTHESIA TECHNICAL FEE, 1ST 30 MIN: Performed by: NEUROLOGICAL SURGERY

## 2019-01-08 DEVICE — IMP BUR HOLE COVER GUARDIAN 6010ANS: Type: IMPLANTABLE DEVICE | Site: CRANIAL | Status: FUNCTIONAL

## 2019-01-08 DEVICE — KIT DBS LEAD DBS 8CH 40CM 1-3,3-1 SPACE 0.5 BLACK 6172ANS: Type: IMPLANTABLE DEVICE | Site: BRAIN | Status: FUNCTIONAL

## 2019-01-08 RX ORDER — OXYCODONE HYDROCHLORIDE 5 MG/1
5-10 TABLET ORAL EVERY 4 HOURS PRN
Status: DISCONTINUED | OUTPATIENT
Start: 2019-01-08 | End: 2019-01-09 | Stop reason: HOSPADM

## 2019-01-08 RX ORDER — EPHEDRINE SULFATE 50 MG/ML
INJECTION, SOLUTION INTRAMUSCULAR; INTRAVENOUS; SUBCUTANEOUS PRN
Status: DISCONTINUED | OUTPATIENT
Start: 2019-01-08 | End: 2019-01-08

## 2019-01-08 RX ORDER — HYDRALAZINE HYDROCHLORIDE 20 MG/ML
2.5-5 INJECTION INTRAMUSCULAR; INTRAVENOUS EVERY 10 MIN PRN
Status: DISCONTINUED | OUTPATIENT
Start: 2019-01-08 | End: 2019-01-08 | Stop reason: HOSPADM

## 2019-01-08 RX ORDER — POTASSIUM CHLORIDE 7.45 MG/ML
10 INJECTION INTRAVENOUS
Status: DISCONTINUED | OUTPATIENT
Start: 2019-01-08 | End: 2019-01-09 | Stop reason: HOSPADM

## 2019-01-08 RX ORDER — METOCLOPRAMIDE HYDROCHLORIDE 5 MG/ML
5 INJECTION INTRAMUSCULAR; INTRAVENOUS EVERY 6 HOURS PRN
Status: DISCONTINUED | OUTPATIENT
Start: 2019-01-08 | End: 2019-01-09 | Stop reason: HOSPADM

## 2019-01-08 RX ORDER — POTASSIUM CHLORIDE 1.5 G/1.58G
20-40 POWDER, FOR SOLUTION ORAL
Status: DISCONTINUED | OUTPATIENT
Start: 2019-01-08 | End: 2019-01-09 | Stop reason: HOSPADM

## 2019-01-08 RX ORDER — LIDOCAINE HYDROCHLORIDE 20 MG/ML
INJECTION, SOLUTION INFILTRATION; PERINEURAL PRN
Status: DISCONTINUED | OUTPATIENT
Start: 2019-01-08 | End: 2019-01-08

## 2019-01-08 RX ORDER — CEFAZOLIN SODIUM 2 G/100ML
2 INJECTION, SOLUTION INTRAVENOUS
Status: COMPLETED | OUTPATIENT
Start: 2019-01-08 | End: 2019-01-08

## 2019-01-08 RX ORDER — MAGNESIUM SULFATE HEPTAHYDRATE 40 MG/ML
4 INJECTION, SOLUTION INTRAVENOUS EVERY 4 HOURS PRN
Status: DISCONTINUED | OUTPATIENT
Start: 2019-01-08 | End: 2019-01-09 | Stop reason: HOSPADM

## 2019-01-08 RX ORDER — FENTANYL CITRATE 50 UG/ML
25-50 INJECTION, SOLUTION INTRAMUSCULAR; INTRAVENOUS
Status: DISCONTINUED | OUTPATIENT
Start: 2019-01-08 | End: 2019-01-08 | Stop reason: HOSPADM

## 2019-01-08 RX ORDER — NALOXONE HYDROCHLORIDE 0.4 MG/ML
.1-.4 INJECTION, SOLUTION INTRAMUSCULAR; INTRAVENOUS; SUBCUTANEOUS
Status: DISCONTINUED | OUTPATIENT
Start: 2019-01-08 | End: 2019-01-09 | Stop reason: HOSPADM

## 2019-01-08 RX ORDER — ONDANSETRON 2 MG/ML
INJECTION INTRAMUSCULAR; INTRAVENOUS PRN
Status: DISCONTINUED | OUTPATIENT
Start: 2019-01-08 | End: 2019-01-08

## 2019-01-08 RX ORDER — CEFAZOLIN SODIUM 1 G/3ML
1 INJECTION, POWDER, FOR SOLUTION INTRAMUSCULAR; INTRAVENOUS SEE ADMIN INSTRUCTIONS
Status: DISCONTINUED | OUTPATIENT
Start: 2019-01-08 | End: 2019-01-08 | Stop reason: HOSPADM

## 2019-01-08 RX ORDER — PROPOFOL 10 MG/ML
INJECTION, EMULSION INTRAVENOUS PRN
Status: DISCONTINUED | OUTPATIENT
Start: 2019-01-08 | End: 2019-01-08

## 2019-01-08 RX ORDER — LIDOCAINE HYDROCHLORIDE AND EPINEPHRINE 10; 10 MG/ML; UG/ML
INJECTION, SOLUTION INFILTRATION; PERINEURAL PRN
Status: DISCONTINUED | OUTPATIENT
Start: 2019-01-08 | End: 2019-01-08 | Stop reason: HOSPADM

## 2019-01-08 RX ORDER — SODIUM CHLORIDE, SODIUM LACTATE, POTASSIUM CHLORIDE, CALCIUM CHLORIDE 600; 310; 30; 20 MG/100ML; MG/100ML; MG/100ML; MG/100ML
INJECTION, SOLUTION INTRAVENOUS CONTINUOUS PRN
Status: DISCONTINUED | OUTPATIENT
Start: 2019-01-08 | End: 2019-01-08

## 2019-01-08 RX ORDER — MAGNESIUM HYDROXIDE 1200 MG/15ML
LIQUID ORAL PRN
Status: DISCONTINUED | OUTPATIENT
Start: 2019-01-08 | End: 2019-01-08 | Stop reason: HOSPADM

## 2019-01-08 RX ORDER — HYDROMORPHONE HYDROCHLORIDE 1 MG/ML
.3-.5 INJECTION, SOLUTION INTRAMUSCULAR; INTRAVENOUS; SUBCUTANEOUS EVERY 5 MIN PRN
Status: DISCONTINUED | OUTPATIENT
Start: 2019-01-08 | End: 2019-01-08 | Stop reason: HOSPADM

## 2019-01-08 RX ORDER — LABETALOL HYDROCHLORIDE 5 MG/ML
10 INJECTION, SOLUTION INTRAVENOUS
Status: COMPLETED | OUTPATIENT
Start: 2019-01-08 | End: 2019-01-08

## 2019-01-08 RX ORDER — ONDANSETRON 4 MG/1
4 TABLET, ORALLY DISINTEGRATING ORAL EVERY 6 HOURS PRN
Status: DISCONTINUED | OUTPATIENT
Start: 2019-01-08 | End: 2019-01-09 | Stop reason: HOSPADM

## 2019-01-08 RX ORDER — SODIUM CHLORIDE 9 MG/ML
INJECTION, SOLUTION INTRAVENOUS CONTINUOUS
Status: DISPENSED | OUTPATIENT
Start: 2019-01-08 | End: 2019-01-08

## 2019-01-08 RX ORDER — ACETAMINOPHEN 325 MG/1
650 TABLET ORAL EVERY 4 HOURS PRN
Status: DISCONTINUED | OUTPATIENT
Start: 2019-01-11 | End: 2019-01-09 | Stop reason: HOSPADM

## 2019-01-08 RX ORDER — LIDOCAINE 40 MG/G
CREAM TOPICAL
Status: DISCONTINUED | OUTPATIENT
Start: 2019-01-08 | End: 2019-01-09 | Stop reason: HOSPADM

## 2019-01-08 RX ORDER — PROCHLORPERAZINE MALEATE 5 MG
5 TABLET ORAL EVERY 6 HOURS PRN
Status: DISCONTINUED | OUTPATIENT
Start: 2019-01-08 | End: 2019-01-09 | Stop reason: HOSPADM

## 2019-01-08 RX ORDER — CEFAZOLIN SODIUM 2 G/100ML
2 INJECTION, SOLUTION INTRAVENOUS EVERY 8 HOURS
Status: COMPLETED | OUTPATIENT
Start: 2019-01-08 | End: 2019-01-09

## 2019-01-08 RX ORDER — SODIUM CHLORIDE, SODIUM LACTATE, POTASSIUM CHLORIDE, CALCIUM CHLORIDE 600; 310; 30; 20 MG/100ML; MG/100ML; MG/100ML; MG/100ML
INJECTION, SOLUTION INTRAVENOUS CONTINUOUS
Status: DISCONTINUED | OUTPATIENT
Start: 2019-01-08 | End: 2019-01-08 | Stop reason: HOSPADM

## 2019-01-08 RX ORDER — METOCLOPRAMIDE 5 MG/1
5 TABLET ORAL EVERY 6 HOURS PRN
Status: DISCONTINUED | OUTPATIENT
Start: 2019-01-08 | End: 2019-01-09 | Stop reason: HOSPADM

## 2019-01-08 RX ORDER — ONDANSETRON 2 MG/ML
4 INJECTION INTRAMUSCULAR; INTRAVENOUS EVERY 6 HOURS PRN
Status: DISCONTINUED | OUTPATIENT
Start: 2019-01-08 | End: 2019-01-09 | Stop reason: HOSPADM

## 2019-01-08 RX ORDER — POTASSIUM CL/LIDO/0.9 % NACL 10MEQ/0.1L
10 INTRAVENOUS SOLUTION, PIGGYBACK (ML) INTRAVENOUS
Status: DISCONTINUED | OUTPATIENT
Start: 2019-01-08 | End: 2019-01-09 | Stop reason: HOSPADM

## 2019-01-08 RX ORDER — ACETAMINOPHEN 325 MG/1
975 TABLET ORAL EVERY 8 HOURS
Status: DISCONTINUED | OUTPATIENT
Start: 2019-01-08 | End: 2019-01-09 | Stop reason: HOSPADM

## 2019-01-08 RX ORDER — POTASSIUM CHLORIDE 29.8 MG/ML
20 INJECTION INTRAVENOUS
Status: DISCONTINUED | OUTPATIENT
Start: 2019-01-08 | End: 2019-01-09 | Stop reason: HOSPADM

## 2019-01-08 RX ORDER — ONDANSETRON 2 MG/ML
4 INJECTION INTRAMUSCULAR; INTRAVENOUS EVERY 30 MIN PRN
Status: DISCONTINUED | OUTPATIENT
Start: 2019-01-08 | End: 2019-01-08 | Stop reason: HOSPADM

## 2019-01-08 RX ORDER — LABETALOL HYDROCHLORIDE 5 MG/ML
10-40 INJECTION, SOLUTION INTRAVENOUS EVERY 10 MIN PRN
Status: DISCONTINUED | OUTPATIENT
Start: 2019-01-08 | End: 2019-01-09 | Stop reason: HOSPADM

## 2019-01-08 RX ORDER — PROPOFOL 10 MG/ML
INJECTION, EMULSION INTRAVENOUS CONTINUOUS PRN
Status: DISCONTINUED | OUTPATIENT
Start: 2019-01-08 | End: 2019-01-08

## 2019-01-08 RX ORDER — ONDANSETRON 4 MG/1
4 TABLET, ORALLY DISINTEGRATING ORAL EVERY 30 MIN PRN
Status: DISCONTINUED | OUTPATIENT
Start: 2019-01-08 | End: 2019-01-08 | Stop reason: HOSPADM

## 2019-01-08 RX ORDER — POTASSIUM CHLORIDE 1500 MG/1
20-40 TABLET, EXTENDED RELEASE ORAL
Status: DISCONTINUED | OUTPATIENT
Start: 2019-01-08 | End: 2019-01-09 | Stop reason: HOSPADM

## 2019-01-08 RX ORDER — HYDRALAZINE HYDROCHLORIDE 20 MG/ML
10-20 INJECTION INTRAMUSCULAR; INTRAVENOUS EVERY 30 MIN PRN
Status: DISCONTINUED | OUTPATIENT
Start: 2019-01-08 | End: 2019-01-09 | Stop reason: HOSPADM

## 2019-01-08 RX ADMIN — CEFAZOLIN 1 G: 1 INJECTION, POWDER, FOR SOLUTION INTRAMUSCULAR; INTRAVENOUS at 13:35

## 2019-01-08 RX ADMIN — PROPOFOL 20 MG: 10 INJECTION, EMULSION INTRAVENOUS at 09:53

## 2019-01-08 RX ADMIN — PROPOFOL 30 MG: 10 INJECTION, EMULSION INTRAVENOUS at 08:19

## 2019-01-08 RX ADMIN — HYDRALAZINE HYDROCHLORIDE 10 MG: 20 INJECTION INTRAMUSCULAR; INTRAVENOUS at 16:33

## 2019-01-08 RX ADMIN — PROPOFOL 20 MG: 10 INJECTION, EMULSION INTRAVENOUS at 10:03

## 2019-01-08 RX ADMIN — Medication 10 MG: at 14:38

## 2019-01-08 RX ADMIN — PROPOFOL 30 MG: 10 INJECTION, EMULSION INTRAVENOUS at 08:34

## 2019-01-08 RX ADMIN — ACETAMINOPHEN 975 MG: 325 TABLET, FILM COATED ORAL at 20:21

## 2019-01-08 RX ADMIN — PROPOFOL 30 MG: 10 INJECTION, EMULSION INTRAVENOUS at 08:17

## 2019-01-08 RX ADMIN — PROPOFOL 20 MG: 10 INJECTION, EMULSION INTRAVENOUS at 08:20

## 2019-01-08 RX ADMIN — CEFAZOLIN SODIUM 2 G: 2 INJECTION, SOLUTION INTRAVENOUS at 09:37

## 2019-01-08 RX ADMIN — PROPOFOL 40 MG: 10 INJECTION, EMULSION INTRAVENOUS at 08:15

## 2019-01-08 RX ADMIN — PROPOFOL 30 MG: 10 INJECTION, EMULSION INTRAVENOUS at 08:26

## 2019-01-08 RX ADMIN — LIDOCAINE HYDROCHLORIDE 30 MG: 20 INJECTION, SOLUTION INFILTRATION; PERINEURAL at 08:15

## 2019-01-08 RX ADMIN — SODIUM CHLORIDE, POTASSIUM CHLORIDE, SODIUM LACTATE AND CALCIUM CHLORIDE: 600; 310; 30; 20 INJECTION, SOLUTION INTRAVENOUS at 07:58

## 2019-01-08 RX ADMIN — CEFAZOLIN SODIUM 2 G: 2 INJECTION, SOLUTION INTRAVENOUS at 17:46

## 2019-01-08 RX ADMIN — Medication 5 MG: at 10:17

## 2019-01-08 RX ADMIN — PROPOFOL 50 MCG/KG/MIN: 10 INJECTION, EMULSION INTRAVENOUS at 12:28

## 2019-01-08 RX ADMIN — ONDANSETRON 4 MG: 2 INJECTION INTRAMUSCULAR; INTRAVENOUS at 09:46

## 2019-01-08 RX ADMIN — PROPOFOL 20 MG: 10 INJECTION, EMULSION INTRAVENOUS at 12:28

## 2019-01-08 RX ADMIN — SODIUM CHLORIDE: 9 INJECTION, SOLUTION INTRAVENOUS at 14:32

## 2019-01-08 RX ADMIN — CEFAZOLIN 1 G: 1 INJECTION, POWDER, FOR SOLUTION INTRAMUSCULAR; INTRAVENOUS at 11:35

## 2019-01-08 RX ADMIN — DEXMEDETOMIDINE HYDROCHLORIDE 0.3 MCG/KG/HR: 100 INJECTION, SOLUTION INTRAVENOUS at 09:00

## 2019-01-08 RX ADMIN — PROPOFOL 30 MG: 10 INJECTION, EMULSION INTRAVENOUS at 08:25

## 2019-01-08 RX ADMIN — PROPOFOL 30 MG: 10 INJECTION, EMULSION INTRAVENOUS at 09:52

## 2019-01-08 RX ADMIN — FENTANYL CITRATE 25 MCG: 50 INJECTION, SOLUTION INTRAMUSCULAR; INTRAVENOUS at 14:23

## 2019-01-08 ASSESSMENT — ACTIVITIES OF DAILY LIVING (ADL)
AMBULATION: 0-->INDEPENDENT
TRANSFERRING: 0-->INDEPENDENT
TOILETING: 0-->INDEPENDENT
RETIRED_EATING: 0-->INDEPENDENT
FALL_HISTORY_WITHIN_LAST_SIX_MONTHS: NO
RETIRED_COMMUNICATION: 0-->UNDERSTANDS/COMMUNICATES WITHOUT DIFFICULTY
COGNITION: 0 - NO COGNITION ISSUES REPORTED
PRIOR_FUNCTIONAL_LEVEL_COMMENT: INDEPENDENT WITH ADL'S
DRESS: 0-->INDEPENDENT
ADLS_ACUITY_SCORE: 12
BATHING: 0-->INDEPENDENT
SWALLOWING: 0-->SWALLOWS FOODS/LIQUIDS WITHOUT DIFFICULTY

## 2019-01-08 ASSESSMENT — VISUAL ACUITY
OU: GLASSES

## 2019-01-08 ASSESSMENT — PAIN DESCRIPTION - DESCRIPTORS
DESCRIPTORS: ACHING
DESCRIPTORS: SORE

## 2019-01-08 ASSESSMENT — MIFFLIN-ST. JEOR: SCORE: 1026.38

## 2019-01-08 NOTE — PROGRESS NOTES
SPIRITUAL HEALTH SERVICES  [facility] ([place])  3C  PRE-SURGERY VISIT    Had pre-surgery visit with Julia and her 2 daughters.  Provided spiritual support, prayer.     Jeannie Watt MDiv    Pager [phone]

## 2019-01-08 NOTE — OR NURSING
"Writer contacted Dr. Lynch for brief op note and transfer patient order at this time. Also informed Dr. Lynch that skull x ray completed per order in PACU. Also inquired regarding timing of CT, Dr. Lynch stated that CT should be performed \"on the way up to ICU.\" Writer will arrange for CT.   "

## 2019-01-08 NOTE — ANESTHESIA CARE TRANSFER NOTE
Patient: Irish Rosales    Procedure(s):  Stealth Assisted Right Deep Brain Stimulator Placement, Phase One, Placement Of Right Side Deep Brain Stimulator Electrode Target Right Ventral Intermediate Nucleus Of The Thalamus and placement Second Electrode With Microelectrode Recording    Diagnosis: Essential Tremor   Diagnosis Additional Information: No value filed.    Anesthesia Type:   No value filed.     Note:  Airway :Nasal Cannula  Patient transferred to:PACU  Comments: Awake, alert, responding appropriately. Stable, report to RN. Handoff Report: Identifed the Patient, Identified the Reponsible Provider, Reviewed the pertinent medical history, Discussed the surgical course, Reviewed Intra-OP anesthesia mangement and issues during anesthesia, Set expectations for post-procedure period and Allowed opportunity for questions and acknowledgement of understanding      Vitals: (Last set prior to Anesthesia Care Transfer)    CRNA VITALS  1/8/2019 1321 - 1/8/2019 1357      1/8/2019             Pulse:  79    SpO2:  81 %  (Abnormal)     Resp Rate (set):  10                Electronically Signed By: RACHANA Galvin CRNA  January 8, 2019  1:57 PM

## 2019-01-08 NOTE — ANESTHESIA POSTPROCEDURE EVALUATION
Anesthesia POST Procedure Evaluation    Patient: Irish Rosales   MRN:     [id number] Gender:   female   Age:    68 year old :      1950        Preoperative Diagnosis: Essential Tremor    Procedure(s):  Stealth Assisted Right Deep Brain Stimulator Placement, Phase One, Placement Of Right Side Deep Brain Stimulator Electrode Target Right Ventral Intermediate Nucleus Of The Thalamus and placement Second Electrode With Microelectrode Recording   Postop Comments: No value filed.       Anesthesia Type:  MAC    Reportable Event: NO     PAIN: Uncomplicated   Sign Out status: Comfortable, Well controlled pain     PONV: No PONV   Sign Out status:  No Nausea or Vomiting     Neuro/Psych: Uneventful perioperative course   Sign Out Status: Preoperative baseline; Age appropriate mentation     Airway/Resp.: Uneventful perioperative course   Sign Out Status: Non labored breathing, age appropriate RR; Resp. Status within EXPECTED Parameters     CV: Uneventful perioperative course   Sign Out status: Appropriate BP and perfusion indices; Appropriate HR/Rhythm     Disposition:   Sign Out in:  ICU  Disposition:  ICU  Recovery Course: Recovery in ICU  Follow-Up: Not required           Last Anesthesia Record Vitals:  CRNA VITALS  2019 1321 - 2019 1421      2019             Pulse:  79    SpO2:  81 %  (Abnormal)     Resp Rate (set):  10          Last PACU/Preop Vitals:  Vitals:    19 1445 19 1500 19 1515   BP: 139/75 137/79 135/79   Pulse:  73    Resp: 16 16 16   Temp:   36.4  C (97.5  F)   SpO2: 100% 100% 100%         Electronically Signed By: Aleksander Garibay MD, 2019, 3:24 PM

## 2019-01-08 NOTE — BRIEF OP NOTE
"   [facility], [place]    Brief Operative Note    Pre-operative diagnosis: Essential Tremor   Post-operative diagnosis Essential Tremor  Procedure: Procedure(s):  Stealth Assisted Right Deep Brain Stimulator Placement, Phase One, Placement Of Right Side Deep Brain Stimulator Electrode Target Right Ventral Intermediate Nucleus Of The Thalamus and placement Second Electrode With Microelectrode Recording  Surgeon: Surgeon(s) and Role:     * Aleksander Morales MD - Primary     * Jonnathan Doss MD - Resident - Assisting  Anesthesia: Combined MAC with Local   Estimated blood loss: 10 mL  Drains: None  Specimens: * No specimens in log *  Findings:   Impedances within normal limits.  Final electrode location for Vim: anterior Navarro Gun position, 0.5 mm below target.  Final electrode location: relative to the Vim electrode, 2 mm anterior and 1 mm medial, at the same depth of 0.5 mm below target depth on the drive.  Complications: None.  Implants:   1.  Perez [place] Hole Cover, REF # [id number], Lot # [id number]  2.  Perez DBS electrode, Infinity 0.5, REF # [id number], S/N [id number], for the Right Vim, indicated as contacts 9 - 16.  3.  Perez DBS electrode, Infinity 0.5, REF # [id number], S/N [id number], for the Right Vo, indicated as contacts 1 - 8.    Shayne \"Ford\" MD Cris   Neurosurgery, PGY-2    Please contact neurosurgery resident on call with questions.    Dial * * *[phone], enter [phone] when prompted.         "

## 2019-01-09 ENCOUNTER — PATIENT OUTREACH (OUTPATIENT)
Dept: CARE COORDINATION | Facility: CLINIC | Age: 69
End: 2019-01-09

## 2019-01-09 VITALS
OXYGEN SATURATION: 98 % | SYSTOLIC BLOOD PRESSURE: 142 MMHG | DIASTOLIC BLOOD PRESSURE: 79 MMHG | TEMPERATURE: 99 F | RESPIRATION RATE: 18 BRPM | HEART RATE: 87 BPM | HEIGHT: 61 IN | WEIGHT: 121.03 LBS | BODY MASS INDEX: 22.85 KG/M2

## 2019-01-09 PROBLEM — Z71.89 ADVANCE CARE PLANNING: Status: RESOLVED | Noted: 2017-06-16 | Resolved: 2019-01-09

## 2019-01-09 LAB
ANION GAP SERPL CALCULATED.3IONS-SCNC: 8 MMOL/L (ref 3–14)
BUN SERPL-MCNC: 10 MG/DL (ref 7–30)
CALCIUM SERPL-MCNC: 8.4 MG/DL (ref 8.5–10.1)
CHLORIDE SERPL-SCNC: 106 MMOL/L (ref 94–109)
CO2 SERPL-SCNC: 27 MMOL/L (ref 20–32)
CREAT SERPL-MCNC: 0.54 MG/DL (ref 0.52–1.04)
ERYTHROCYTE [DISTWIDTH] IN BLOOD BY AUTOMATED COUNT: 11.9 % (ref 10–15)
GFR SERPL CREATININE-BSD FRML MDRD: >90 ML/MIN/{1.73_M2}
GLUCOSE SERPL-MCNC: 121 MG/DL (ref 70–99)
HCT VFR BLD AUTO: 37.7 % (ref 35–47)
HGB BLD-MCNC: 12.3 G/DL (ref 11.7–15.7)
MCH RBC QN AUTO: 32.9 PG (ref 26.5–33)
MCHC RBC AUTO-ENTMCNC: 32.6 G/DL (ref 31.5–36.5)
MCV RBC AUTO: 101 FL (ref 78–100)
PLATELET # BLD AUTO: 242 10E9/L (ref 150–450)
POTASSIUM SERPL-SCNC: 3.2 MMOL/L (ref 3.4–5.3)
RBC # BLD AUTO: 3.74 10E12/L (ref 3.8–5.2)
SODIUM SERPL-SCNC: 140 MMOL/L (ref 133–144)
WBC # BLD AUTO: 9 10E9/L (ref 4–11)

## 2019-01-09 PROCEDURE — 25000128 H RX IP 250 OP 636: Performed by: STUDENT IN AN ORGANIZED HEALTH CARE EDUCATION/TRAINING PROGRAM

## 2019-01-09 PROCEDURE — 80048 BASIC METABOLIC PNL TOTAL CA: CPT | Performed by: STUDENT IN AN ORGANIZED HEALTH CARE EDUCATION/TRAINING PROGRAM

## 2019-01-09 PROCEDURE — 85027 COMPLETE CBC AUTOMATED: CPT | Performed by: STUDENT IN AN ORGANIZED HEALTH CARE EDUCATION/TRAINING PROGRAM

## 2019-01-09 PROCEDURE — 25000132 ZZH RX MED GY IP 250 OP 250 PS 637: Performed by: STUDENT IN AN ORGANIZED HEALTH CARE EDUCATION/TRAINING PROGRAM

## 2019-01-09 PROCEDURE — 36415 COLL VENOUS BLD VENIPUNCTURE: CPT | Performed by: STUDENT IN AN ORGANIZED HEALTH CARE EDUCATION/TRAINING PROGRAM

## 2019-01-09 RX ORDER — OXYCODONE HYDROCHLORIDE 5 MG/1
5-10 TABLET ORAL EVERY 4 HOURS PRN
Qty: 20 TABLET | Refills: 0 | Status: SHIPPED | OUTPATIENT
Start: 2019-01-09 | End: 2019-06-12

## 2019-01-09 RX ORDER — IBUPROFEN 200 MG
600 TABLET ORAL PRN
Qty: 100 TABLET | Status: ON HOLD | COMMUNITY
Start: 2019-01-16 | End: 2019-01-15

## 2019-01-09 RX ADMIN — POTASSIUM CHLORIDE 40 MEQ: 1500 TABLET, EXTENDED RELEASE ORAL at 07:23

## 2019-01-09 RX ADMIN — CEFAZOLIN SODIUM 2 G: 2 INJECTION, SOLUTION INTRAVENOUS at 08:28

## 2019-01-09 RX ADMIN — ACETAMINOPHEN 975 MG: 325 TABLET, FILM COATED ORAL at 04:16

## 2019-01-09 RX ADMIN — CEFAZOLIN SODIUM 2 G: 2 INJECTION, SOLUTION INTRAVENOUS at 01:07

## 2019-01-09 ASSESSMENT — VISUAL ACUITY
OU: GLASSES

## 2019-01-09 ASSESSMENT — ACTIVITIES OF DAILY LIVING (ADL)
ADLS_ACUITY_SCORE: 12
ADLS_ACUITY_SCORE: 10
ADLS_ACUITY_SCORE: 12

## 2019-01-09 NOTE — DISCHARGE SUMMARY
[facility] Discharge Summary and Instructions    Irish Rosales MRN# [id number]   Age: 68 year old YOB: 1950     Date of Admission:  1/8/2019  Date of Discharge::  1/9/2019  Admitting Physician:  Aleksander Morales MD  Discharge Physician:  Aleksander Morales MD          Admission Diagnoses:   S/P deep brain stimulator placement [Z96.89]          Discharge Diagnosis:   S/P deep brain stimulator placement [Z96.89]          Procedures:   1/8/2019  Stealth Assisted Right Deep Brain Stimulator Placement, Phase One, Placement Of Right Side Deep Brain Stimulator Electrode Target Right Ventral Intermediate Nucleus Of The Thalamus and placement Second Electrode With Microelectrode Recording               Brief History of Illness:   Ms. Irish Rosales presents for her right side DBS surgery.  She already underwent placement of left side DBS electrode on 3/6/2018 followed by the implantation of left side DBS generator/battery on 3/13/2018.  Briefly, patient is a 68 year old right-handed female with history of dystonic tremor.  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  She also has cervical dystonia.  Her right side was more affected than her left side.  Her goals for DBS surgery are to decrease tremor, especially head and voice and improve dystonia.  Currently, her symptoms are not resolved.  For a few days following her left side surgery she seemed to have improved symptoms but since then her symptoms have continued to worsen.  She continues to wear a soft collar for her weak neck.  She was approved for the left side implantation, with wait and see strategy.  Subseqeuntly, patient completed her evaluation for DBS implantation for her second side, right side.  Due to her left side symptoms, which are debilitating, she is unable to perform some of the basic daily  activities of living.  Her case was discussed at the [employer] meeting and she was approved for the second side surgery.  However, given her symptoms, the plan was to place two electrodes, one in the right Vim and another in the right Vo.  We did discuss both phase I and II of DBS surgery.  During phase I, we would place the DBS electrode on the right side under MAC and microelectrode recording.  She would then return one week later for the phase II with placement of the DBS generator/battery over the right chest wall.  Risks, benefits and alternative therapies were discussed with the patient, including but not limited to infection and bleeding.  Surgical procedure was discussed in detail.  All questions were answered and she expressed understanding.  We also briefly discussed the device options, although [employer] Infinity system was previously recommended and she already has the [employer] Infinity system on the left side.               Hospital Course:   Following surgery the patient was monitored in the ICU where she remained medically and neurologically stable.  Post operative imaging revealed proper positioning of catheters without evidence of hemorrhage.    Patient was tolerating diet without nausea, ambulating independently, voiding, pain was controlled with analgesia, and passing flatus.   Patient will follow up with Dr Morales on 1/15/19 for phase II.              Discharge Medications:     Current Discharge Medication List      START taking these medications    Details   oxyCODONE (ROXICODONE) 5 MG tablet Take 1-2 tablets (5-10 mg) by mouth every 4 hours as needed  Qty: 20 tablet, Refills: 0    Associated Diagnoses: S/P deep brain stimulator placement         CONTINUE these medications which have CHANGED    Details   ibuprofen (ADVIL/MOTRIN) 200 MG tablet Take 3 tablets (600 mg) by mouth as needed for mild pain  Qty: 100 tablet         CONTINUE these medications which have NOT CHANGED     "Details   ALPRAZolam (XANAX) 0.5 MG tablet Take 1 tablet (0.5 mg) by mouth daily as needed  Qty: 30 tablet, Refills: 3    Associated Diagnoses: Dystonic tremor      baclofen (LIORESAL) 20 MG tablet Take 1 tablet (20 mg) by mouth 3 times daily  Qty: 90 tablet, Refills: 3    Comments: Cancel the previous Rx for BID  Associated Diagnoses: Cervical dystonia      botulinum toxin type A (BOTOX) 100 units injection Inject 200 units 4x per year  Qty: 800 Units, Refills: 0    Associated Diagnoses: Cervical dystonia      gabapentin (NEURONTIN) 100 MG capsule Take 1 capsule every night for 1-3 days, then 1 capsule twice daily for 1-3 days, then 1 capsule three times daily  Qty: 90 capsule, Refills: 1    Associated Diagnoses: Cervicalgia      ROSUVASTATIN CALCIUM PO Take by mouth daily      sertraline (ZOLOFT) 100 MG tablet Take 1 tablet (100 mg) by mouth daily  Qty: 90 tablet, Refills: 1    Associated Diagnoses: History of major depression      Nutritional Supplements (ENSURE COMPLETE SHAKE) LIQD Take 237 mLs by mouth 3 times daily  Qty: 90 Bottle, Refills: 3    Associated Diagnoses: Loss of weight         Exam:   Physical Exam  /79   Pulse 87   Temp 99  F (37.2  C) (Oral)   Resp 18   Ht 1.549 m (5' 1\")   Wt 54.9 kg (121 lb 0.5 oz)   SpO2 98%   BMI 22.87 kg/m    General: Appears comfortable, NAD  Wound: Incision, clean, dry, intact without strikethrough  Neurologic Exam:  - AOx3.  - Follows commands.  - Speech fluent, spontaneous. No aphasia or dysarthria.  - No gaze preference. No apparent hemineglect.  - PERRL, EOMI.  - Face symmetric with sensation intact to light touch.  - Palate elevates symmetrically, uvula midline, tongue protrudes midline.  - Trapezii and sternocleidomastoid muscles 5/5 bilaterally.  - No pronator drift.  Motor: Normal bulk/tone; no tremor, rigidity, or bradykinesia.   Right:  Deltoid 5/5, tricep 5/5, bicep 5/5, wrist flexor 5/5, wrist extensor 5/5, finger intrinsic 5/5  Left:  Deltoid " 5/5, tricep 5/5, bicep 5/5, wrist flexor 5/5, wrist extensor 5/5, finger intrinsic 5/5  Right: Iliopsoas 5/5, quadricep 5/5, hamstring 5/5, tibialis anterior 5/5, gastrocnemius 5/5, EHL 5/5  Left:  Iliopsoas 5/5, quadricep 5/5, hamstring 5/5, tibialis anterior 5/5, gastrocnemius 5/5, EHL 5/5    Sensation intact in bilateral L4-S1 dermatones                 Discharge Instructions and Follow-Up:   Discharge diet: Regular   Discharge activity: You may advance activity as tolerated. No strenuous exercise or heay lifting greater than 10 lbs for 4 weeks or until seen and cleared in clinic.   Discharge follow-up: Follow-up with Dr. Aleksander Morales MD on 1/15/19    Wound care: Ok to shower,however no scrubbing of the wound and no soaking of the wound, meaning no bathtubs or swimming pools. Pat dry only. Leave wound open to air.  Sutures are not absorbable and need to be removed in 2 weeks. If patient still at rehab by this time, the sutures may be removed by the rehab physician if he or she considers that the wound has healed completely.     Please call if you have:  1. increased pain, redness, drainage, swelling at your incision  2. fevers > 101.5 F degrees  3. with any questions or concerns.  You may reach the Neurosurgery clinic at [phone] during regular work hours. ER at [phone].    and ask for the Neurosurgery Resident on call at [phone], during off hours or weekends.         Discharge Disposition:   Discharged to home        RACHANA العراقي, CNP  Department of Neurosurgery  Pager: [phone]

## 2019-01-09 NOTE — PLAN OF CARE
D/I: S/P right DBS part 1      Neuro: Alert and oriented X4, PERRLA, 5/5 strengths in extremities, no numbness or tingling.    CV:  SR with occasional PVCs. HR 80-90;s  SBP < 140,   Pulm: On RA sat's 95-98%RA , LS clear,    GI/: Tolerated diet, passing gas no stool, voiding spont  Skin: Approximated R scalp incision open to air .    Pain: denies     Plan: Discharge to home later today.

## 2019-01-09 NOTE — PLAN OF CARE
D/I:?Patient on unit 4A Surgical/Neuro ICU following part one of R DBS insertion  Neuro- A&O, 5/5 strength in extremities. PERRLA. Denies any numbness or tingling. Baseline tremor most note able in upper extremities that increases with purposeful movement. Uses call light appropriately.   CV- SR with no noted ectopy. Systolic BP goal of <140, 10 mg IV hydralazine given once. Afebrile.   Pulm- On RA with good saturations. Capnography with EtCO2 of 35-40 and IPI of 8-10. ?   GI- Passed swallow eval and tolerated regular diet for supper. Active bowel sounds, last BM yesterday.   - Sharpe removed on arrival to ICU and commode ordered.   Gtts- NS @ 75 mL/hr.   Skin- Approximated R scalp incision open to air with bilateral pin sites with dried drainage on dressing.   Pain- Denies.   See flow sheets for further interventions and assessments.   A: Stable   P:Continue with POC. Notify MD of significant changes. Likely discharge to home tomorrow.

## 2019-01-09 NOTE — PROGRESS NOTES
Admitted/transferred from: PACU  Reason for admission/transfer: Step 1 of DBS insertion  Patient status upon admission/transfer: Stable, alert and oriented  Interventions: 10 mg IV hydralazine given for HTN  Plan: q 1hr neuro chekcs  2 RN skin assessment: completed by Alba Ugalde and Faye Lucas   Result of skin assessment and interventions/actions: Approximated R scalp incision and bilateral pin sites  Height, weight, drug calc weight: done  Patient belongings: With patient/family   MDRO education (if applicable): n/a

## 2019-01-09 NOTE — PROGRESS NOTES
Patient discharge to home with daughters. Acknowledged and agreed to all post op and discharge instructions. Agrees to plan for follow up. Narcotic paper prescription and all belongings sent with patient. Declined wheelchair ride, left ambulatory with daughters.

## 2019-01-09 NOTE — PROGRESS NOTES
"S: Feeling well.  Denies headache.    O:  Temp:  [97.2  F (36.2  C)-99.1  F (37.3  C)] 98.6  F (37  C)  Pulse:  [] 76  Heart Rate:  [] 76  Resp:  [16-20] 19  BP: (114-153)/(63-99) 130/76  SpO2:  [95 %-100 %] 96 %    Exam:  General: Awake;  Alert, In No Acute Distress; intention tremor  Pulm: Breathing Comfortably on room air   Mental status: Oriented x 3   Cranial Nerves: Cranial Nerves II-XII Intact Bilaterally  Strength:      Del Tr Bi WE WF Gr  R 5 5 5 5 5 5  L 5 5 5 5 5 5     HF KE KF DF PF   R 5 5 5 5 5   L 5 5 5 5 5     Pronator Drift: Absent  Sensory: Intact to Light Touch  INCISION: covered by dermabond, clean/dry/intact     Assessment:   # s/p Deep Brain Stimulator   # Essential tremor   # Dystonia     Plan by problem:     Neuro:   Sutures out: absorbable     Cardiovascular: blood pressure goals: SBP <140    Pulmonary: Incentive spirometry     Gastrointestinal: advance diet     Renal: no issues     Heme: no issues     Endocrine: No issues    Infectious Monitor for fever; ancef x3 doses for post-operative infection ppx    DVT prophylaxis: Sequential compression devices    Ulcer prophylaxis: none indicated    DISPO:  Anticipate D/C 1/9    Barriers: Ancef    Bella \"Ford\" MD Cris   Neurosurgery, PGY-2    Please contact neurosurgery resident on call with questions.    Dial * * [phone], enter [phone] when prompted.     "

## 2019-01-09 NOTE — OP NOTE
PATIENT NAME: HARSHAD PAYNE  YOB: 1950  MRN:   [id number]  ACCOUNT:  [id number]      DATE OF PROCEDURE:  01/08/2019    PREOPERATIVE DIAGNOSIS:    1.  Dystonic tremor  2.  Essential Tremor  3.  S/p left side deep brain stimulator placement, phase I, placement of left side deep brain stimulator electrode, target left ventral intermediate nucleus of the thalamus, with microelectrode recording, on 3/6/2018  4.  S/p deep brain stimulator placement, phase II, placement of deep brain stimulator generator/battery over the left chest wall on 3/13/2018    POSTOPERATIVE DIAGNOSIS:  1.  Dystonic tremor  2.  Essential Tremor  3.  S/p left side deep brain stimulator placement, phase I, placement of left side deep brain stimulator electrode, target left ventral intermediate nucleus of the thalamus, with microelectrode recording, on 3/6/2018  4.  S/p deep brain stimulator placement, phase II, placement of deep brain stimulator generator/battery over the left chest wall on 3/13/2018    PROCEDURES PERFORMED:   1.  Placement of CRW stereotactic headframe.   2.  Stereotactic neuronavigation planning and CT of head.   3.  Stereotactic neuronavigation using the [employer] system for surgical planning, targeting and approach. The target is right ventral intermediate nucleus of the thalamus (Vim).   4.  Right side deep brain stimulator placement, phase I, placement of right side deep brain stimulator electrode, target is right ventral intermediate nucleus of the thalamus (Vim).   5.  Stereotactic neuronavigation using the [employer] system for surgical planning, targeting and approach. The target is right ventral oralis nucleus of the thalamus (Vo).   6.  Right side deep brain stimulator placement, phase I, placement of right side deep brain stimulator electrode, target is right ventral oralis nucleus of the thalamus (Vo).   7.  Use of intraoperative microelectrode recording.   8.  Use of intraoperative  fluoroscopy.    ATTENDING SURGEON:  Aleksander Morales MD, PhD     RESIDENT SURGEON:  Jonnathan Doss MD     ANESTHESIA:  Monitored anesthesia care and local anesthetic.     ESTIMATED BLOOD LOSS: 10 mL.     COMPLICATIONS: None.     IMPLANTS:   1.  Perez DBS electrode, Infinity 0.5, REF# [id number], S/N [id number] (ventral intermediate nucleus)   2.  Abbott DBS electrode, Infinity 0.5, REF# [id number], S/N [id number] (ventral oralis nucleus, tagged electrode cover with vicryl suture)   3.  Perez Guardian Yarelis Hole Cover, REF# [id number], Lot# [id number]    FINDINGS:   1.  Impedances of Vim and Vo leads within normal limits.   2.  Electrode for the Vim: first lead implanted in the anterior Navarro Gun position, 0.5 mm below target.   3.  Electrode for the Vo: After the Vim electrode placement, X-Y stage shift 2 mm anteriorly and 1 mm medially, then adjusted 2 mm posteriorly and 0.5 mm medially due to observed brain shift, with total effective move of 1.5 mm medially, second electrode implanted in the anterior Navarro Gun position, 0.5 mm below target.  Based on intraoperative x-ray, approximately 2 mm anterior and slightly medial to the Vim electrode.    INDICATIONS FOR PROCEDURE:  Ms. Irish Rosales presents for her right side DBS surgery.  She already underwent placement of left side DBS electrode on 3/6/2018 followed by the implantation of left side DBS generator/battery on 3/13/2018.  Briefly, patient is a 68 year old right-handed female with history of dystonic tremor.  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  She also has cervical dystonia.  Her right side was more affected than her left side.  Her goals for DBS surgery are to decrease tremor, especially head and voice and improve dystonia.  Currently, her symptoms are not resolved.  For a few days following her left side surgery she seemed to have improved symptoms but  since then her symptoms have continued to worsen.  She continues to wear a soft collar for her weak neck.  She was approved for the left side implantation, with wait and see strategy.  Subseqeuntly, patient completed her evaluation for DBS implantation for her second side, right side.  Due to her left side symptoms, which are debilitating, she is unable to perform some of the basic daily activities of living.  Her case was discussed at the Movement Disorder [employer] meeting and she was approved for the second side surgery.  However, given her symptoms, the plan was to place two electrodes, one in the right Vim and another in the right Vo.  We did discuss both phase I and II of DBS surgery.  During phase I, we would place the DBS electrode on the right side under MAC and microelectrode recording.  She would then return one week later for the phase II with placement of the DBS generator/battery over the right chest wall.  Risks, benefits and alternative therapies were discussed with the patient, including but not limited to infection and bleeding.  Surgical procedure was discussed in detail.  All questions were answered and she expressed understanding.  We also briefly discussed the device options, although Abbott Infinity system was previously recommended and she already has the [employer] Infinity system on the left side.    DESCRIPTION OF PROCEDURE:     CRW head frame placement and stereotactic neuronavigation planning:    The patient was brought to the operating room and placed in a sitting position on the rney.  A brief timeout was performed for the placement of a CRW head frame.  She was given sedation to make her comfortable.  The intended pin sites, 2 in the front and 2 in the back of the head, were cleaned and injected with 24 mL of 1% lidocaine with epinephrine.  The CRW stereotactic head frame was placed onto her head with the 4 pin sites for fixation.  Care was taken to ensure that the fiducial box  fit over the patient's head without any interference from her forehead or her nose.  Also, posterior left pin site was carefully chosen to stay away from the buried extension wire.  Once the head frame was on, the fiducial box was easily placed without interference from her forehead.  Sharpe catheter was also placed.  The patient tolerated the procedure well and her sedation was lightened and she was awakened.     After the CRW stereotactic head frame was placed, the patient was taken directly to the CT scanner, at which time CT of the head, stereotactic protocol, was obtained.  Subsequently, the patient was taken back to the operating room, where the patient was placed supine on the operative bed with the CRW head frame affixed to the bed.  Patient was positioned into a slight sitting up position with the neck in a neutral position.  The bed was positioned so that it would be a beach chair reclining position.  Patient s comfort was confirmed.  All pressure points were well padded.  Care was taken to make sure that the neck was in neutral position and that the [place] head pastor device had room for manipulation in case more flexion or extension is needed throughout the case.    At this time, attention was turned to the neuronavigation imaging that was obtained.  The Stealth neuronavigation device was loaded with the CT head that was just obtained.  The device also had an MRI of the head, stereotactic protocol, that was obtained prior to the surgery.  We used the Stealth MRI to assist with the localization and targeting of the right Vim.  The CT head was merged with the previously obtained MRI of the brain as well as previous CT brain with contrast for visualization of blood vessels.  The merge demonstrated good coherence/registration.  Then, using this merged image, neuronavigation was used to stereotactically target the right Vim.  The technique used was AC-PC line localization technique to target the Vim using  the stereotactic coordinates, and the XYZ as well as a ring and arc angles for the CRW head frame were obtained for the right side.  The target was then adjusted so that the distance from the wall of the 3rd ventricle was approximately 10.5 mm.  We also adjusted the target posteriorly by 0.5 mm in order to map the Vc.  The entry into the skull, as well as the trajectory of the electrode were checked with the probe's eye view to avoid any sulci, ventricle or vascular structures.    The stereotactic coordinates for the right side were then transferred to the [facility] stereotactic targeting apparatus as well as the phantom base.  The coordinates were confirmed and double checked for accuracy.  The accuracy was also confirmed using the phantom base.  The coordinates were X = +12.3, Y = -11.1, Z = +6.4, ring angle = 60.5 degrees anterior and arc angle = 14.8 degrees to the right.     At this time, attention was turned to the patient.  Using a hair clipper, her hair over the right frontal area was clipped using a surgical clipper.  A semicircular incision was planned on the right side and this was marked.  Then, this surgical area was prepared and draped in routine surgical fashion.  Patient was also made comfortable.  Time out was performed confirming the patient's identity, procedure to be performed, side and site of the surgery identified, imaging corresponding with the patient and the administration of preoperative prophylactic antibiotic    Right-sided electrode placement with microelectrode recording: target right Vim:     The CRW targeting apparatus was attached to the head frame on top of the sterile drapes and the entry point was marked on the scalp on the right side.  The C-shaped incision and the area posterior to this and towards the right parietal area designated for the pocket were injected with 15 ml of a 50:50 mixture of 1% Lidocaine with epinephrine and 0.5% Marcaine plain.  A #10 blade was used to incise the  incision down to the pericranium.  Hemostasis was obtained using the bipolar cautery.  A thin layer of pericranium tissue was left behind on the skull and the skin flap was reflected posteriorly.  Using blunt dissection, a pocket was created posteriorly and towards the right parietal area for the tail of the electrode/connector placement and for future tunneling.  The targeting apparatus was again positioned and the entry point into the skull was marked.  The pericranial tissue was dissected using monopolar cautery.  An acorn drill was used to make a dorota hole at the entry point.  The bony edges were waxed and the underlying dura was coagulated with the bipolar cautery.  The electrode fixation cover was affixed to the skull using two new screws.  Care was taken to overlap the pericranium over the edge of the cover to provide a smooth tissue transition.  The electrode  was attached to the targeting apparatus and we verified the entry into the dura.  It appeared that all positions of the Navarro Gun electrode pastor were accessible without any interference from the bone edges.  Using a monopolar cautery and a #4 Penfield instrument, an opening was made in the dura as well as a small corticectomy.  We verified there was no active bleeding at this point.  There was a small cortical vessel that was at the posterior aspect of the opening which appeared to be potentially interfering with cannula insertion and this was coagulated and controlled with bipolar cautery.    Dr. Edwar Colvin and Dr. Bryan Middleton from the Neurology Department participated in the recording and neurological testing.  During the microelectrode recording and testing, Ale Colvin and Kane took detailed notes of the electrophysiologic and neurologic exam as well as any stimulation effects.     The electrode guide tubes were inserted in the center and posterior Navarro Gun position and were inserted into the brain and advanced slowly until at which point the  tips would be well above the target.  No abnormal resistance was noted.  Duraseal was used to seal the entry point to the dura and to minimize cerebrospinal fluid leak and air entry.  The recording microelectrodes were then placed into the guide tubes and connected for intraoperative recording.  The tips of the electrodes were now 15 mm above target.  The patient was awake at this time for serial neurologic exams as we advanced the electrode slowly towards the target.  The microdrive was used to advance the electrode slowly and allow for microelectrode recording data to be collected for mapping of the tract.  Both tracks yielded moderately dense cellular activity.  The center track yielded dense thalamic activity with somatosensory response in shoulder, wrist and thumb.  No Vc activity or tactile activity was encountered.  The posterior track yielded dense thalamic activity with somatosensory response in knee, ankle, elbow and tongue protrusion.  Tactile response in the mouth was noted.  Microstimulation yielded low paresthesia thresholds as expected with no internal capsular effects.    Based on the electrophysiology data, it was decided that the current anterior Navarro Gun position may be the best location for the right Vim electrode.    Based on the mapping data, it was decided that the current anterior Navarro Gun position may be the best placement.  The electrode drive was positioned to the depth of 1.5 mm above the target level.  Then, the electrodes were pulled out of the guide tubes.  With the center Navarro Gun guide tube in place to stabilize the brain, the posterior Navarro Gun guide tube was removed and placed into the anterior Navarro Gun location.  No abnormal resistance was noted.  Duraseal was used to seal the entry point to the dura and to minimize cerebrospinal fluid leak and air entry.  Using the microdrive, the recording microelectrode was inserted and removed to the target depth to create a track.  Then, the  permanent DBS electrode was brought into the field and placed in the anterior guide tube with the tip being placed at 1.5 mm above the target depth.  The electrode demonstrated good impedance values.  The electrode was tested.  The stimulation test showed significant tremor reduction in the arm and transient paresthesia, up to 5.0 mA.  No signs of internal capsular effects were noted.  The electrode was advanced 2.0 mm to the depth of 0.5 mm below the target level to place the segmented contact at the site of stimulation test.  No further testing was performed.  Based on our electrophysiology data and the stimulation thresholds, we decided that the current position was satisfactory for placement of the stimulating electrode.    With the satisfactory testing of the electrode position and the stimulation parameters, the electrode was secured at this location.  The guide tubes was removed out of the brain.  Duraseal was again used to seal any opening.  The area was generously irrigated.  Hemostasis was also obtained.  Fluoroscopy was brought into the field to test that the electrode did not move with each manipulation.  The electrode tip was seen to be below the target, as expected.  The directional marker, on fluoroscopy, also demonstrated that the contact 2A is facing anteriorly.  The electrode clamp was applied to hold the electrode.  Then, the electrode stylet was removed. The electrode was then removed from the electrode pastor.  Since a second electrode was being placed anterior to the current electrode location, the electrode was freed from the electrode drive.  The DBS electrode was marked at the electrode clamp level to keep track of the location of the electrode and to visually confirm that it has not moved.  The electrode clamp was then opened so that it would release the current electrode and make the opening big enough for the second guide tube insertion.    Second right-sided electrode placement: target  right Vo:     Given the patient's symptoms, it was felt the patient would benefit from a second electrode targeting the right ventral oralis nucleus.  To target this nucleus, we had to perform X-Y stage shift of 2.0 mm anteriorly and 1.0 mm medially.  Then, the final anterior Navarro Gun location would be the location of the Vo.  The electrode guide tube was inserted into the anterior Navarro Gun location and into the brain.  A second guide tube was inserted into the center Navarro Gun location but not into the brain.  It provided a place to temporarily secure the first electrode with a steri-strip.  No abnormal resistance was noted.    Upon visual inspection during the insertion of the anterior guide tube, it was noted that the cortical penetration site appeared more than 2 mm anterior to the first electrode location.  In fact, there was significant brain shift downward, likely due to loss of cerebrospinal fluid, which was causing the brain to settle down.  With the guide tube partially in, lateral x-ray did confirm that the new trajectory was more than 2 mm anterior to the first electrode.  The guide tube was pulled out of the brain.  To adjust for the shift, it was decided that the X-Y shift may need to be reversed.  We adjusted the X-Y stage posteriorly by 2.0 mm.  To also account for the possible lateral shift, the X-Y stage was adjusted further by 0.5 mm medially.  Therefore, the overall effective X-Y stage shift was 1.5 mm medial to the initial position.  However, visually, the new cortical penetration site appeared to be now about 2 mm anterior to the first penetration site.  The anterior guide tube was inserted into the brain.  No abnormal resistance was noted.  A second permanent DBS electrode was brought into the field and placed in the anterior guide tube with tip being placed 0.5 mm below target depth.  No testing was needed for this electrode.  All the impedances were also within normal.      Now, both electrodes  were placed.  The patient was again made comfortable.  The anterior guide tube was pulled out of the brain.  Duraseal was again used to seal any opening.  The area was generously irrigated.  Then, the electrode clamp was engaged again, thus locking both electrodes in place.  The yasir on the first electrode did not move, confirming that the first electrode remained in the desired location.  Then, the electrode stylet for the Vo electrode was removed.  The electrode was then removed from the electrode pastor.  Now both guide tubes were removed and both electrodes freed from any anchors.  The cap cover was finally placed and secured in place.  The electrodes were oriented so that the Vim electrode is directed posteriorly and the Vo electrode is directed anteriorly.  Fluoroscopy confirmed that the tips of the electrodes did not change in position with each manipulation.  The directional marker, on fluoroscopy, also demonstrated that the contact 2A is facing anteriorly for the second electrode also.  The lateral x-ray showed that the two electrodes were in parallel and about 2 mm apart.  We also obtained AP x-ray which seemed to show that the two electrodes are indeed parallel and that the Vo electrode is at least in line if not medial to the Vim electrode.    The connecting portion of the two electrodes were covered with a protective covering and a dead-end connector.  The Vo electrode cover was tagged with a vicryl suture for later identification.  These electrode covers were inserted into the subgaleal space/pocket towards the right parietal area that was created at the beginning of the case.  The Vim electrode cover was located in front of the Vo electrode cover.  The excess wire was also buried posteriorly in the previously created pocket.  Fluoroscopy confirmed that the tip did not move.  The wound was then generously irrigated.    We began closing the wound.  The galeal layer was reapproximated using 3-0 Vicryl  sutures and the skin was reapproximated using 4-0 Monocryl suture in a running fashion.  The wound was cleaned and dried and ChloraPrep was applied. Dermabond was placed over the closure.     Removal of the head frame and end of procedure.    The stereotactic targeting apparatus was removed.  The sterile drapes were then removed and the patient was taken out of the CRW head frame.  The 4 pin sites were clean and dry with no active bleeding noted.  Antibiotic ointment was placed over the pin sites.  The patient was then further awakened and taken to the recovery room in stable condition.     The sponge, needle and instrument counts were correct x2 at the end of the procedure.  There were no complications.    ISolange M.D., Ph.D., Neurosurgery Attending, was present and scrubbed for the entire case and performed the key and critical portions of the case.      SOLANGE CHU MD, PHD

## 2019-01-10 ENCOUNTER — PATIENT OUTREACH (OUTPATIENT)
Dept: GERIATRIC MEDICINE | Facility: CLINIC | Age: 69
End: 2019-01-10

## 2019-01-10 NOTE — PROGRESS NOTES
[facility] Care Coordination Contact    CC received notification of discharge to home. Discharge occurred on (01-) from [facility].   CC contacted member and reviewed discharge summary.  Member has a follow-up appointment with PCP in 7 days: Yes: scheduled on 1/31/19   Member has had a change in condition: No  Home visit needed: No  Care plan reviewed and updated.  New referrals placed: No  Transition log completed.   PCP notified of transition back to home via EMR.    Procedure, stayed over night.    INNA Diallo  [facility]  [phone]  Fax: [phone]

## 2019-01-11 ENCOUNTER — PATIENT OUTREACH (OUTPATIENT)
Dept: NEUROSURGERY | Facility: CLINIC | Age: 69
End: 2019-01-11

## 2019-01-11 NOTE — PROGRESS NOTES
[facility]: Post-Discharge Note  SITUATION                                                      Admission:    Admission Date: 01/08/19   Reason for Admission: Right DBS lead placement  Discharge:   Discharge Date: 01/09/19  Discharge Diagnosis: Dystonic tremor, essential tremor  Discharge Service: Neurosurgery  Discharge Plan: Routine post op follow up    BACKGROUND                                                      Neurosurgery Discharge Coordination Note     Attending physician: Dr. Morales  Discharge to: Home     Current status: Patient states she  feels very good since surgery and denies pain. Denies redness, swelling, increased tenderness, drainage, incision opening or elevated temp. Reports Incision CDI without signs of infection.  Denies current bowel or bladder issues.    Discharge instructions and medications reviewed with patient.  Follow up appointments/imaging/tests needed: DBS battery placement surgery 1/15/19 at 3:40 p.m. Pt indicates that Dr. Morales suggested she arrive on 3C at 1:00 pm in case his first case ends early.     RN triage/on call number given: [phone]/ [phone]        ASSESSMENT      Discharge Assessment  Patient reports symptoms are: Unchanged  Does the patient have all of their medications?: Yes  Does patient know what their new medications are for?: Yes  Does patient have a follow-up appointment scheduled?: Yes  Does patient have any other questions or concerns?: No    Post-op  Did the patient have surgery or a procedure: Yes  Incision: closed;healing  Drainage: No  Bleeding: none  Fever: No  Chills: No  Redness: No  Warmth: No  Swelling: No  Incision site pain: No  Closure: suture;dissolving  Eating & Drinking: eating and drinking without complaints/concerns  PO Intake: regular diet  Bowel Function: normal  Urinary Status: voiding without complaint/concerns        PLAN                                                      Outpatient Plan:  Routine post op  follow up    Future Appointments   Date Time Provider Department Center   1/31/2019 10:30 AM Renuka Mack APRN CNP [facility]   2/13/2019  2:00 PM [facility]   2/13/2019  3:00 PM  MOVEMENT DISORDER FELLOW [facility]           NORBERTO GARCIA RN

## 2019-01-14 ENCOUNTER — ANESTHESIA EVENT (OUTPATIENT)
Dept: SURGERY | Facility: CLINIC | Age: 69
End: 2019-01-14
Payer: COMMERCIAL

## 2019-01-14 NOTE — ANESTHESIA PREPROCEDURE EVALUATION
Anesthesia Pre-Procedure Evaluation    Patient: Irish Rosales   MRN:     [id number] Gender:   female   Age:    68 year old :      1950        Preoperative Diagnosis: Essential Tremor    Procedure(s):  Deep Brain Stimulator Placement, Phase II, Placement Of Deep Brain Stimulator Generator/Battery Over The Right Chest Wall     Past Medical History:   Diagnosis Date     Depression      Dyslipidemia      Dystonic movements 2017     Dystonic tremor 2017     Family history of movement disorder 2017     mild abnormality in carotid study 3/2/2017    BILATERAL DUPLEX CAROTID ULTRASOUND 2017 1:01 PM    HISTORY: Other specified symptoms and signs involving the circulatory and respiratory systems.   COMPARISON: None.   FINDINGS: There is mild atherosclerotic plaque in the carotid bifurcations. Flow velocities and waveforms show less than 50% diameter stenosis in both the right and left internal carotid arteries as assessed by each ICA PS     Osteoporosis 2010      Past Surgical History:   Procedure Laterality Date     ------------OTHER-------------      vocal cord thyroplasty at [facility]     C BSO, OMENTECTOMY W/XAVIER      hysterectomy     C HAND/FINGER SURGERY UNLISTED      right carpal tunnel surgery     IMPLANT DEEP BRAIN STIMULATION GENERATOR / BATTERY Left 3/13/2018    Procedure: IMPLANT DEEP BRAIN STIMULATION GENERATOR / BATTERY;  Deep Brain Stimulator Placement, Phase II, Placement Of Deep Brain Stimulator Generator/Battery Over The Left Chest Wall;  Surgeon: Aleksander Morales MD;  Location: [facility] OR     OPTICAL TRACKING SYSTEM INSERTION DEEP BRAIN STIMULATION Left 3/6/2018    Procedure: OPTICAL TRACKING SYSTEM INSERTION DEEP BRAIN STIMULATION;  Stealth Assisted Left Deep Brain Stimulator Placement, Phase I, Placement Of Left Side Deep Brain Stimulator Electrode, Target Left Ventral Intermediate Nucleus Of The Thalamus With Microelectrode Recording;  Surgeon: Carmen  Aleksander Rubio MD;  Location: [facility] OR     OPTICAL TRACKING SYSTEM INSERTION DEEP BRAIN STIMULATION Right 1/8/2019    Procedure: Stealth Assisted Right Deep Brain Stimulator Placement, Phase One, Placement Of Right Side Deep Brain Stimulator Electrode Target Right Ventral Intermediate Nucleus Of The Thalamus and placement Second Electrode With Microelectrode Recording;  Surgeon: Aleksander Morales MD;  Location: [facility] OR     RELEASE CARPAL TUNNEL Left 1/2/2015    Procedure: RELEASE CARPAL TUNNEL;  Surgeon: SHANIA Hernandez MD;  Location: [facility] OR     RELEASE ULNAR NERVE (ELBOW) Left 1/2/2015    Procedure: RELEASE ULNAR NERVE (ELBOW);  Surgeon: SHANIA Hernandez MD;  Location: [facility] OR          Anesthesia Evaluation     . Pt has had prior anesthetic. Type: General and MAC           ROS/MED HX    ENT/Pulmonary:     (+)other ENT- vocal cord spasms, , . .    Neurologic:     (+)other neuro familial tremor    Cardiovascular:     (+) Dyslipidemia, ----. : . . . :. . Previous cardiac testing date:results:date: results:ECG reviewed date: results: date: results:          METS/Exercise Tolerance:  >4 METS   Hematologic:  - neg hematologic  ROS       Musculoskeletal:  - neg musculoskeletal ROS       GI/Hepatic:  - neg GI/hepatic ROS       Renal/Genitourinary:  - ROS Renal section negative       Endo:  - neg endo ROS       Psychiatric:  - neg psychiatric ROS       Infectious Disease:  - neg infectious disease ROS       Malignancy:      - no malignancy   Other:    (+) No chance of pregnancy no H/O Chronic Pain,no other significant disability                        PHYSICAL EXAM:   Mental Status/Neuro: A/A/O   Airway: Facies: Feasible  Mallampati: I  Mouth/Opening: Full  TM distance: > 6 cm  Neck ROM: Full   Respiratory: Auscultation: CTAB     Resp. Rate: Normal     Resp. Effort: Normal      CV: Rhythm: Regular  Rate: Age appropriate  Heart: Normal Sounds   Comments:      Dental: Normal                  Lab Results  "  Component Value Date    WBC 9.0 01/09/2019    HGB 12.3 01/09/2019    HCT 37.7 01/09/2019     01/09/2019     01/09/2019    POTASSIUM 3.2 (L) 01/09/2019    CHLORIDE 106 01/09/2019    CO2 27 01/09/2019    BUN 10 01/09/2019    CR 0.54 01/09/2019     (H) 01/09/2019    TJ 8.4 (L) 01/09/2019    PHOS 4.1 08/19/2010    ALBUMIN 4.4 10/25/2018    PROTTOTAL 7.9 10/25/2018    ALT 22 10/25/2018    AST 17 10/25/2018    ALKPHOS 62 10/25/2018    BILITOTAL 0.5 10/25/2018    PTT 26 12/21/2018    INR 0.90 12/21/2018    TSH 1.44 10/25/2018       Preop Vitals  BP Readings from Last 3 Encounters:   01/09/19 142/79   12/21/18 122/68   12/20/18 134/76    Pulse Readings from Last 3 Encounters:   01/09/19 87   12/21/18 78   12/20/18 78      Resp Readings from Last 3 Encounters:   01/09/19 18   12/21/18 18   06/29/18 24    SpO2 Readings from Last 3 Encounters:   01/09/19 98%   12/21/18 95%   12/07/18 97%      Temp Readings from Last 1 Encounters:   01/09/19 37.2  C (99  F) (Oral)    Ht Readings from Last 1 Encounters:   01/08/19 1.549 m (5' 1\")      Wt Readings from Last 1 Encounters:   01/08/19 54.9 kg (121 lb 0.5 oz)    Estimated body mass index is 22.87 kg/m  as calculated from the following:    Height as of 1/8/19: 1.549 m (5' 1\").    Weight as of 1/8/19: 54.9 kg (121 lb 0.5 oz).     LDA:  Peripheral IV 01/08/19 Right Hand (Active)   Number of days: 6            Assessment:   ASA SCORE: 2    NPO Status: > 6 hours since completed Solid Foods; > 2 hours since completed Clear Liquids   Documentation: H&P complete; Preop Testing complete; Consents complete   Proceeding: Proceed without further delay  Tobacco Use:  NO Active use of Tobacco/UNKNOWN Tobacco use status     Plan:   Anes. Type:  MAC   Pre-Induction: Midazolam IV   Induction:  Not applicable   Airway: Native Airway   Access/Monitoring: PIV   Maintenance: N/a   Emergence: N/a   Logistics: Same Day Surgery     Postop Pain/Sedation Strategy:  Standard-Options: " Opioids PRN     PONV Management:  Adult Risk Factors: Female, Non-Smoker, Postop Opioids  Prevention: Ondansetron; Dexamethasone     CONSENT: Direct conversation   Plan and risks discussed with: Patient   Blood Products: Consent Deferred (Minimal Blood Loss)                         Davie Chakraborty MD

## 2019-01-15 ENCOUNTER — HOSPITAL ENCOUNTER (OUTPATIENT)
Facility: CLINIC | Age: 69
Discharge: HOME | End: 2019-01-15
Attending: NEUROLOGICAL SURGERY | Admitting: NEUROLOGICAL SURGERY
Payer: COMMERCIAL

## 2019-01-15 ENCOUNTER — ANESTHESIA (OUTPATIENT)
Dept: SURGERY | Facility: CLINIC | Age: 69
End: 2019-01-15
Payer: COMMERCIAL

## 2019-01-15 VITALS
SYSTOLIC BLOOD PRESSURE: 125 MMHG | BODY MASS INDEX: 22.35 KG/M2 | OXYGEN SATURATION: 98 % | TEMPERATURE: 97.7 F | HEART RATE: 85 BPM | DIASTOLIC BLOOD PRESSURE: 67 MMHG | HEIGHT: 61 IN | RESPIRATION RATE: 16 BRPM | WEIGHT: 118.39 LBS

## 2019-01-15 DIAGNOSIS — G25.0 ESSENTIAL TREMOR: Primary | ICD-10-CM

## 2019-01-15 LAB — GLUCOSE BLDC GLUCOMTR-MCNC: 104 MG/DL (ref 70–99)

## 2019-01-15 PROCEDURE — 37000009 ZZH ANESTHESIA TECHNICAL FEE, EACH ADDTL 15 MIN: Performed by: NEUROLOGICAL SURGERY

## 2019-01-15 PROCEDURE — 25000128 H RX IP 250 OP 636: Performed by: NEUROLOGICAL SURGERY

## 2019-01-15 PROCEDURE — 40000065 ZZH STATISTIC EKG NON-CHARGEABLE

## 2019-01-15 PROCEDURE — 82962 GLUCOSE BLOOD TEST: CPT

## 2019-01-15 PROCEDURE — 36000059 ZZH SURGERY LEVEL 3 EA 15 ADDTL MIN UMMC: Performed by: NEUROLOGICAL SURGERY

## 2019-01-15 PROCEDURE — C1767 GENERATOR, NEURO NON-RECHARG: HCPCS | Performed by: NEUROLOGICAL SURGERY

## 2019-01-15 PROCEDURE — 25000125 ZZHC RX 250: Performed by: NURSE ANESTHETIST, CERTIFIED REGISTERED

## 2019-01-15 PROCEDURE — 25000128 H RX IP 250 OP 636: Performed by: ANESTHESIOLOGY

## 2019-01-15 PROCEDURE — 37000008 ZZH ANESTHESIA TECHNICAL FEE, 1ST 30 MIN: Performed by: NEUROLOGICAL SURGERY

## 2019-01-15 PROCEDURE — 93010 ELECTROCARDIOGRAM REPORT: CPT | Performed by: INTERNAL MEDICINE

## 2019-01-15 PROCEDURE — 25000125 ZZHC RX 250: Performed by: NEUROLOGICAL SURGERY

## 2019-01-15 PROCEDURE — 36000057 ZZH SURGERY LEVEL 3 1ST 30 MIN - UMMC: Performed by: NEUROLOGICAL SURGERY

## 2019-01-15 PROCEDURE — 71000014 ZZH RECOVERY PHASE 1 LEVEL 2 FIRST HR: Performed by: NEUROLOGICAL SURGERY

## 2019-01-15 PROCEDURE — 71000027 ZZH RECOVERY PHASE 2 EACH 15 MINS: Performed by: NEUROLOGICAL SURGERY

## 2019-01-15 PROCEDURE — 25000566 ZZH SEVOFLURANE, EA 15 MIN: Performed by: NEUROLOGICAL SURGERY

## 2019-01-15 PROCEDURE — C1883 ADAPT/EXT, PACING/NEURO LEAD: HCPCS | Performed by: NEUROLOGICAL SURGERY

## 2019-01-15 PROCEDURE — 25000128 H RX IP 250 OP 636: Performed by: NURSE ANESTHETIST, CERTIFIED REGISTERED

## 2019-01-15 PROCEDURE — 25000128 H RX IP 250 OP 636: Performed by: STUDENT IN AN ORGANIZED HEALTH CARE EDUCATION/TRAINING PROGRAM

## 2019-01-15 PROCEDURE — 27211024 ZZHC OR SUPPLY OTHER OPNP: Performed by: NEUROLOGICAL SURGERY

## 2019-01-15 PROCEDURE — 27210794 ZZH OR GENERAL SUPPLY STERILE: Performed by: NEUROLOGICAL SURGERY

## 2019-01-15 PROCEDURE — 40000170 ZZH STATISTIC PRE-PROCEDURE ASSESSMENT II: Performed by: NEUROLOGICAL SURGERY

## 2019-01-15 PROCEDURE — 25000125 ZZHC RX 250: Performed by: STUDENT IN AN ORGANIZED HEALTH CARE EDUCATION/TRAINING PROGRAM

## 2019-01-15 DEVICE — LEAD EXTENSION W/EXTEND TECHNOLOGY 50CM 6371ANS: Type: IMPLANTABLE DEVICE | Site: CHEST  WALL | Status: FUNCTIONAL

## 2019-01-15 DEVICE — GENERATOR DBS SYSTEM INFINITY 7 IPG 6662ANS
Type: IMPLANTABLE DEVICE | Site: CHEST  WALL | Status: NON-FUNCTIONAL
Removed: 2023-10-12

## 2019-01-15 RX ORDER — FENTANYL CITRATE 50 UG/ML
25-50 INJECTION, SOLUTION INTRAMUSCULAR; INTRAVENOUS
Status: DISCONTINUED | OUTPATIENT
Start: 2019-01-15 | End: 2019-01-15 | Stop reason: HOSPADM

## 2019-01-15 RX ORDER — ONDANSETRON 2 MG/ML
4 INJECTION INTRAMUSCULAR; INTRAVENOUS EVERY 30 MIN PRN
Status: DISCONTINUED | OUTPATIENT
Start: 2019-01-15 | End: 2019-01-15 | Stop reason: HOSPADM

## 2019-01-15 RX ORDER — CEPHALEXIN 500 MG/1
500 CAPSULE ORAL 3 TIMES DAILY
Qty: 21 CAPSULE | Refills: 0 | Status: SHIPPED | OUTPATIENT
Start: 2019-01-15 | End: 2019-06-12

## 2019-01-15 RX ORDER — ONDANSETRON 2 MG/ML
INJECTION INTRAMUSCULAR; INTRAVENOUS PRN
Status: DISCONTINUED | OUTPATIENT
Start: 2019-01-15 | End: 2019-01-15

## 2019-01-15 RX ORDER — HYDROMORPHONE HYDROCHLORIDE 1 MG/ML
.3-.5 INJECTION, SOLUTION INTRAMUSCULAR; INTRAVENOUS; SUBCUTANEOUS EVERY 5 MIN PRN
Status: DISCONTINUED | OUTPATIENT
Start: 2019-01-15 | End: 2019-01-15 | Stop reason: HOSPADM

## 2019-01-15 RX ORDER — FENTANYL CITRATE 50 UG/ML
INJECTION, SOLUTION INTRAMUSCULAR; INTRAVENOUS PRN
Status: DISCONTINUED | OUTPATIENT
Start: 2019-01-15 | End: 2019-01-15

## 2019-01-15 RX ORDER — ONDANSETRON 4 MG/1
4 TABLET, ORALLY DISINTEGRATING ORAL EVERY 30 MIN PRN
Status: DISCONTINUED | OUTPATIENT
Start: 2019-01-15 | End: 2019-01-15 | Stop reason: HOSPADM

## 2019-01-15 RX ORDER — LIDOCAINE 40 MG/G
CREAM TOPICAL
Status: DISCONTINUED | OUTPATIENT
Start: 2019-01-15 | End: 2019-01-15 | Stop reason: HOSPADM

## 2019-01-15 RX ORDER — CEFAZOLIN SODIUM 2 G/100ML
2 INJECTION, SOLUTION INTRAVENOUS
Status: COMPLETED | OUTPATIENT
Start: 2019-01-15 | End: 2019-01-15

## 2019-01-15 RX ORDER — EPHEDRINE SULFATE 50 MG/ML
INJECTION, SOLUTION INTRAMUSCULAR; INTRAVENOUS; SUBCUTANEOUS PRN
Status: DISCONTINUED | OUTPATIENT
Start: 2019-01-15 | End: 2019-01-15

## 2019-01-15 RX ORDER — NALOXONE HYDROCHLORIDE 0.4 MG/ML
.1-.4 INJECTION, SOLUTION INTRAMUSCULAR; INTRAVENOUS; SUBCUTANEOUS
Status: CANCELLED | OUTPATIENT
Start: 2019-01-15 | End: 2019-01-16

## 2019-01-15 RX ORDER — CEFAZOLIN SODIUM 1 G/3ML
1 INJECTION, POWDER, FOR SOLUTION INTRAMUSCULAR; INTRAVENOUS SEE ADMIN INSTRUCTIONS
Status: DISCONTINUED | OUTPATIENT
Start: 2019-01-15 | End: 2019-01-15 | Stop reason: HOSPADM

## 2019-01-15 RX ORDER — LIDOCAINE HYDROCHLORIDE 20 MG/ML
INJECTION, SOLUTION INFILTRATION; PERINEURAL PRN
Status: DISCONTINUED | OUTPATIENT
Start: 2019-01-15 | End: 2019-01-15

## 2019-01-15 RX ORDER — OXYCODONE HYDROCHLORIDE 5 MG/1
5 CAPSULE ORAL EVERY 6 HOURS PRN
Qty: 20 CAPSULE | Refills: 0 | Status: SHIPPED | OUTPATIENT
Start: 2019-01-15 | End: 2019-06-12

## 2019-01-15 RX ORDER — SODIUM CHLORIDE, SODIUM LACTATE, POTASSIUM CHLORIDE, CALCIUM CHLORIDE 600; 310; 30; 20 MG/100ML; MG/100ML; MG/100ML; MG/100ML
INJECTION, SOLUTION INTRAVENOUS CONTINUOUS
Status: DISCONTINUED | OUTPATIENT
Start: 2019-01-15 | End: 2019-01-15 | Stop reason: HOSPADM

## 2019-01-15 RX ORDER — PROPOFOL 10 MG/ML
INJECTION, EMULSION INTRAVENOUS PRN
Status: DISCONTINUED | OUTPATIENT
Start: 2019-01-15 | End: 2019-01-15

## 2019-01-15 RX ORDER — DEXAMETHASONE SODIUM PHOSPHATE 4 MG/ML
INJECTION, SOLUTION INTRA-ARTICULAR; INTRALESIONAL; INTRAMUSCULAR; INTRAVENOUS; SOFT TISSUE PRN
Status: DISCONTINUED | OUTPATIENT
Start: 2019-01-15 | End: 2019-01-15

## 2019-01-15 RX ADMIN — ROCURONIUM BROMIDE 45 MG: 10 INJECTION INTRAVENOUS at 14:47

## 2019-01-15 RX ADMIN — ONDANSETRON 4 MG: 2 INJECTION INTRAMUSCULAR; INTRAVENOUS at 16:05

## 2019-01-15 RX ADMIN — Medication 5 MG: at 15:33

## 2019-01-15 RX ADMIN — SUGAMMADEX 120 MG: 100 INJECTION, SOLUTION INTRAVENOUS at 16:15

## 2019-01-15 RX ADMIN — FENTANYL CITRATE 50 MCG: 50 INJECTION, SOLUTION INTRAMUSCULAR; INTRAVENOUS at 15:23

## 2019-01-15 RX ADMIN — LIDOCAINE HYDROCHLORIDE 100 MG: 20 INJECTION, SOLUTION INFILTRATION; PERINEURAL at 14:47

## 2019-01-15 RX ADMIN — CEFAZOLIN SODIUM 2 G: 2 INJECTION, SOLUTION INTRAVENOUS at 15:00

## 2019-01-15 RX ADMIN — PROPOFOL 90 MG: 10 INJECTION, EMULSION INTRAVENOUS at 14:47

## 2019-01-15 RX ADMIN — DEXAMETHASONE SODIUM PHOSPHATE 6 MG: 4 INJECTION, SOLUTION INTRA-ARTICULAR; INTRALESIONAL; INTRAMUSCULAR; INTRAVENOUS; SOFT TISSUE at 15:09

## 2019-01-15 RX ADMIN — SODIUM CHLORIDE, POTASSIUM CHLORIDE, SODIUM LACTATE AND CALCIUM CHLORIDE: 600; 310; 30; 20 INJECTION, SOLUTION INTRAVENOUS at 14:43

## 2019-01-15 RX ADMIN — ROCURONIUM BROMIDE 5 MG: 10 INJECTION INTRAVENOUS at 15:36

## 2019-01-15 RX ADMIN — FENTANYL CITRATE 50 MCG: 50 INJECTION, SOLUTION INTRAMUSCULAR; INTRAVENOUS at 14:47

## 2019-01-15 ASSESSMENT — MIFFLIN-ST. JEOR: SCORE: 1014.38

## 2019-01-15 NOTE — PROGRESS NOTES
"SPIRITUAL HEALTH SERVICES  [facility] ([facility] [address]   PRE-SURGERY VISIT      Had pre-surgery visit with \"Julia,\" pt; her sister Tea and her daughter Halley.    Julia said that, at the time of her surgery last week, \"she really felt the prayers.\"    I provided spiritual support and prayer.     Matthew Stephens  Chaplain Resident  Pager [phone]    "

## 2019-01-15 NOTE — ANESTHESIA CARE TRANSFER NOTE
Patient: Irish Rosales    Procedure(s):  Deep Brain Stimulator Placement, Phase II, Placement Of Deep Brain Stimulator Generator/Battery Over The Right Chest Wall    Diagnosis: Essential Tremor   Diagnosis Additional Information: No value filed.    Anesthesia Type:   No value filed.     Note:  Airway :Face Mask  Patient transferred to:PACU  Comments: VSS. Denies pain. Report to RN at bedside.Handoff Report: Identifed the Patient, Identified the Reponsible Provider, Reviewed the pertinent medical history, Discussed the surgical course, Reviewed Intra-OP anesthesia mangement and issues during anesthesia, Set expectations for post-procedure period and Allowed opportunity for questions and acknowledgement of understanding      Vitals: (Last set prior to Anesthesia Care Transfer)    CRNA VITALS  1/15/2019 1553 - 1/15/2019 1627      1/15/2019             Pulse:  94    SpO2:  100 %    Resp Rate (observed):  3  (Abnormal)                 Electronically Signed By: RACHANA Johns CRNA  January 15, 2019  4:27 PM

## 2019-01-15 NOTE — DISCHARGE INSTRUCTIONS
Take it easy when you get home.  Remember, same day surgery DOES NOT MEAN SAME DAY RECOVERY!  Healing is a gradual process.  You will need some time to recover - you may be more tired than you realize at first.  Rest and relax for at least the first 24 hours at home.  You'll feel better and heal faster if you take good care of yourself.    [facility]   Adult Discharge Orders & Instructions     For 24 hours after surgery    1. Get plenty of rest.  A responsible adult must stay with you for at least 24 hours after you leave the hospital.   2. Do not drive or use heavy equipment.  If you have weakness or tingling, don't drive or use heavy equipment until this feeling goes away.  3. Do not drink alcohol.  4. Avoid strenuous or risky activities.  Ask for help when climbing stairs.   5. You may feel lightheaded.  IF so, sit for a few minutes before standing.  Have someone help you get up.   6. If you have nausea (feel sick to your stomach): Drink only clear liquids such as apple juice, ginger ale, broth or 7-Up.  Rest may also help.  Be sure to drink enough fluids.  Move to a regular diet as you feel able.  7. You may have a slight fever. Call the doctor if your fever is over 100 F (37.7 C) (taken under the tongue) or lasts longer than 24 hours.  8. You may have a dry mouth, a sore throat, muscle aches or trouble sleeping.  These should go away after 24 hours.  9. Do not make important or legal decisions.   Call your doctor for any of the followin.  Signs of infection (fever, growing tenderness at the surgery site, a large amount of drainage or bleeding, severe pain, foul-smelling drainage, redness, swelling).    2. It has been over 8 to 10 hours since surgery and you are still not able to urinate (pass water).    3.  Headache for over 24 hours.      To contact a doctor, call Dr Morales's office at [phone] at the Neurosurgery Clinic from 8 am till 5 pm --     At night, or on the weekend, call [phone] ans ask for the  on call for Neurosurgery.     [place] [facility] [phone]

## 2019-01-15 NOTE — BRIEF OP NOTE
"   [facility], [place]    Brief Operative Note    Pre-operative diagnosis: Essential Tremor   Post-operative diagnosis Essential Tremor   Procedure: Procedure(s):  Deep Brain Stimulator Placement, Phase II, Placement Of Deep Brain Stimulator Generator/Battery Over The Right Chest Wall  Surgeon: Surgeon(s) and Role:     * Aleksander Morales MD - Primary     * Salvador Barr MD - Resident - Assisting     * Shayne Lynch MD - Resident - Assisting  Anesthesia: General   Estimated blood loss: 5 mL  Drains: None  Specimens: None   Findings:   Impedances within normal limits for newly implanted electrodes. Left sided therapy was turned on at end of case   Complications: None.  Implants:   1. Abbott Infinity 7  S/N .1  2. [employer] Infinity DBS System Right Vo Ports #1-8, marked with silk tie; REF 6371 S/N [id number]  3. Abbott [employer] Infinity DBS System Right ViM Ports #9-16; REF 6371 S/N [id number]    Local anesthetic volume: 24 ml    Shayne \"Ford\" MD Cris   Neurosurgery, PGY-2    Please contact neurosurgery resident on call with questions.    Dial * * *[phone], enter [phone] when prompted.           "

## 2019-01-15 NOTE — OP NOTE
PATIENT NAME: HARSHAD PAYNE  YOB: 1950  MRN:   [id number]  ACCOUNT:  [id number]      DATE OF PROCEDURE:  01/15/2019    PREOPERATIVE DIAGNOSIS:  1.  Dystonic tremor  2.  Essential tremor  3.  S/p left side deep brain stimulator placement, phase I, placement of left side deep brain stimulator electrode, target left ventral intermediate nucleus of the thalamus, with microelectrode recording, on 3/6/2018  4.  S/p deep brain stimulator placement, phase II, placement of deep brain stimulator generator/battery over the left chest wall on 3/13/2018  5.  S/p right side deep brain stimulator placement, phase I, placement of right side deep brain stimulator electrode, target right ventral intermediate nucleus of the thalamus, with microelectrode recording, and with second electrode placement in the left ventral oralis nucleus of the thalamus on 1/8/2019    POSTOPERATIVE DIAGNOSIS:  1.  Dystonic tremor  2.  Essential tremor  3.  S/p left side deep brain stimulator placement, phase I, placement of left side deep brain stimulator electrode, target left ventral intermediate nucleus of the thalamus, with microelectrode recording, on 3/6/2018  4.  S/p deep brain stimulator placement, phase II, placement of deep brain stimulator generator/battery over the left chest wall on 3/13/2018  5.  S/p right side deep brain stimulator placement, phase I, placement of right side deep brain stimulator electrode, target right ventral intermediate nucleus of the thalamus, with microelectrode recording, and with second electrode placement in the left ventral oralis nucleus of the thalamus on 1/8/2019    PROCEDURES PERFORMED:  1.  Deep brain stimulator placement, phase II, placement of new deep brain stimulator generator/battery, model Infinity 7, over right chest wall.    2.  Placement of two extension wires and connection of the right side deep brain stimulator electrodes, two electrode arrays, to the new  generator/battery.  3.  Electrical interrogation and analysis of the deep brain stimulator system.    ATTENDING SURGEON:  Aleksander Morales MD, PhD     RESIDENT SURGEON:  Shayne Lynch MD    ANESTHESIA:  General endotracheal anesthesia    ESTIMATED BLOOD LOSS:  5 mL    COMPLICATIONS:  None    FINDINGS:  Impedance values within normal limits for newly connected right sided electrodes.  Left sided therapy was turned on at the end of the case.     IMPLANTS:   1.  Abbott Infinity 7 DBS generator/battery, REF 6662, S/N [id number]  2.  Abbott extension wire, REF 6371, S/N [id number] - Right Vim; connected as channels 9-16  3.  Abbott extension wire, REF 6371, S/N [id number] - Right Vo; connected as channels 1-8    INDICATIONS FOR PROCEDURE:  Ms. Irish Rosales returns today for her ongoing DBS surgery.  She is s/p right side deep brain stimulator placement, phase I, placement of right side deep brain stimulator electrodes, target left ventral intermediate nucleus and ventral oralis nucleus of the thalamus, with microelectrode recording on 1/8/2019.  The surgery was without any complications and patient tolerated it well.  Her postoperative CT head demonstrated expected findings with no hemorrhage.  She was discharged on POD#1.  She states that she has been doing well.  She denies any fevers, chills, nausea, vomiting, dizziness, weakness, numbness or seizure like activity.  She also denies any significant headaches.  She also denies any problems or issues with the wound.  She states that her neck pain is improved.  She is now here for her phase II of the surgery.  Briefly, patient is a 68 year old right-handed female with history of dystonic tremor.  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  She also has cervical dystonia.  Her right side is more affected than her left side.  Her goals for DBS surgery are to  decrease tremor, especially head and voice and improve dystonia.  She stated that she can live with her hand tremors but wants other symptoms treated.  Her case was discussed at the Movement Disorder Consensus Group meeting and the recommendation was left side Vim DBS with Abbott device and wait and see strategy.  She subsequently had the left side implanted back in March of 2018.  She then wanted to get her right side implanted.  She was again evaluated and subsequently approved for the right side implantation.  For the right side, two electrodes were recommended:  Vim and Vo.  She did well with the surgery and she has no complaints at this time.  She is ready for her phase II surgery.  We discussed the details of the surgery.  Under general anesthesia, we will place the DBS generator/battery over the right chest wall.  Extension wires will be placed and will be connected to the previously implanted electrodes.  Risks, benefits and alternative therapies were discussed with the patient, including but not limited to infection and bleeding.  Surgical procedure was discussed in detail.  All questions were answered and she expressed understanding.  Since she has two DBS electrodes, we will plan on implanting Abbott Infinity 7 generator/battery.    DESCRIPTION OF PROCEDURE:  Patient was taken to the operating theater and positioned supine on the operating room table.  All pressure points were appropriately padded.  With placement of the endotracheal tube, general endotracheal anesthesia was induced.  Her head was turned to the left side, thus exposing the right parietal area of the head.  The distal end or connector portion of the previously implanted stimulating electrodes could be palpated on the right side of the head.  A small area of hair was removed over the palpable connectors using a surgical hair clipper.  Then the right side of the head, neck and chest area was prepped and draped in sterile fashion.  Local  anesthetic was injected in the areas of intended incisions at the right scalp and right chest wall as well as along the path of the intended tunneling.  A total of 24 mL of 1% lidocaine with epinephrine mixed with 0.25% Marcaine plain in a 50:50 combination was used.  A timeout was performed confirming the patient's identity, procedure to be performed, site and side of surgery and administration of preoperative prophylactic antibiotics.  Her left side DBS system was placed in a surgery mode for the surgery.    Attention was turned to the right chest wall area.  A #10 blade scalpel was used to make a 6 cm incision on the right chest wall.  Monopolar cautery was used to finish our dissection down to the proper plane less than 1 cm below the skin.  We found a nice dissecting plane superficial to the pectoralis muscle.  A combination of blunt and sharp dissection technique with the Metzenbaum scissors was used to create a subcutaneous pocket about 3 fingerbreadths wide and deep enough to encompass the full length of the fingers.  Hemostasis was achieved with bipolar cautery.  The pocket was copiously irrigated with antibiotic-impregnated saline and two sponges were placed in the pocket.     Attention was turned to the head.  A #10 blade scalpel was used to make a 2 cm skin incision at the distal end of the connectors that were palpated in her scalp.  Hemostasis was achieved with bipolar cautery.  The incision was carried down to the pericranium and galea opened.  Previously buried protected connectors were visualized.  The mosquito was used to hold the protective cover, and we gently pulled out both connectors.  The DBS electrode wire with the tagged electrode cover designating the Vo lead was visualized.  It was located relatively posterior to the Vim electrode.  Both electrodes were found to be fully intact.  At that point, a pocket was made using a Cecilia clamp down toward behind the ear into the nuchal line.  This  was in preparation for tunneling of the extension wires.    At this point, a tunneler with a plastic sheath was brought into the field.  We tunneled a passage from the head incision to the chest wall incision.  Care was taken to create a path subcutaneously and not too superficial to the skin.  The path of the tunneler was confirmed to be over the clavicle.  The tunneler was removed, leaving behind a plastic sheath.  Two extension wires were passed from the head incision to the chest incision.  Subsequently, the plastic sheath was pulled out from the chest incision, leaving behind the tunneled extension wires.  We placed a silk tie around one of the extension wires at the connector head to designate its assignment as the wire for the Vo lead.     We then proceeded to make the connection between the right intracranial leads and the extension wires.  The connection was made one electrode at a time.  The protective covering was removed from the connector portion of the stimulating electrode for the Vim.  The contacts were inspected to be clean and without damage.  Then, the stimulating electrode was inserted into the extension wire connection.  The connection was secured with screwdriver.  Then, the extension wire was gently pulled from the chest incision and the excess wire length towards the head was also buried superiorly until the connector was within the incision.  Then, the connector was buried further superiorly in the incision until it was no longer directly under the incision.    The protective covering was removed from the connector portion of the stimulating electrode for the Vo, tagged with the suture.  The contacts were inspected to be clean and without damage.  Then, the stimulating electrode was inserted into the extension wire connection.  The connection was secured with screwdriver.  Then, the extension wire was gently pulled from the chest incision and the excess wire length towards the head was also  buried superiorly until the connector was within the incision.  This was also located posterior to the Vim electrode connection.  Then, the connector was buried further superiorly in the incision until it was no longer directly under the incision.    At this time, a new Infinity 7 generator/battery was brought onto the field.  The extension wire connected to the Vo intracranial lead, which was marked by a suture, was inserted and connected to the anterior portal which is for the contacts 1-8.  The extension wire, connected to the Vim intracranial lead, was inserted and connected to the posterior portal which is for the contacts 9-16.  All connections were secured with the screwdriver until clicks were heard.      At this time, both sponges were removed from the subcutaneous pocket in the right chest wall.  The pocket was inspected, and hemostasis was performed with bipolar cautery.  The pocket was irrigated thoroughly and dried.  Then, the new generator/battery with the excess wires being placed posterior to the generator/battery was placed within the right chest wall pocket that was created previously.  The entire apparatus fit into the pocket comfortably.  The generator/battery was anchored to the pocket using two 2-0 Ethibond sutures.     The system was tested wirelessly.  All the impedance values of right side stimulating electrodes were within normal limits.  No problems were found with the system.    With the satisfactory placement of the new system, we began closing the wound.  The right chest incision was closed in the following manner.  The deep pocket was reapproximated using 3-0 Vicryl sutures in an inverted interrupted fashion.  The subcutaneous fat layer was reapproximated using 3-0 Vicryl sutures in an inverted interrupted fashion.  The dermal layer was reapproximated using 3-0 Vicryl sutures in an inverted interrupted fashion.  The skin was reapproximated using 4-0 Monocryl suture in a subcuticular  "fashion.  The right parietal head incision was irrigated with antibiotic solution and dried.  Meticulous hemostasis was obtained.  It was then closed in the following manner.  The galea was reapproximated over the implanted hardware using 3-0 Vicryl sutures in an inverted interrupted fashion.  This provided a protective layer.  The skin was then reapproximated using 4-0 Monocryl suture in a running fashion.  Both wounds were cleaned and dried.  ChoraPrep was used to clean the incisions again.  Then, Dermabond was applied to both incisions.    At the end of the case, all counts including needle, sponge and instrument counts were correct x 2.  There were no complications.  Patient was extubated and taken to the recovery room in a stable condition.  Her left side DBS system was taken out of the surgery mode and her therapy was turned back on.    Dr. Morales was present and scrubbed throughout the entirety of the case.      ALEKSANDER MORALES MD, PHD    As prepared by Shayne \"Ford\" MD Cris, Neurosurgery, PGY-2      NEUROSURGERY ATTENDING ATTESTATION: IAleksander M.D., Ph.D., Neurosurgery Attending, was present and scrubbed for the entire case and performed the key and critical portions of the case.        "

## 2019-01-15 NOTE — H&P
NEUROSURGERY    HISTORY AND PHYSICAL EXAM UPDATE    Chief Complaint   Patient presents with     RECHECK       Dystonic tremor.  Ongoing DBS surgery.  S/p right side deep brain stimulator placement, phase I, placement of right side deep brain stimulator electrodes, target left ventral intermediate nucleus and ventral oralis nucleus of the thalamus, with microelectrode recording on 1/8/2019.  S/p left side deep brain stimulator placement in 3/6/2018 and 3/13/2018.       HISTORY OF PRESENT ILLNESS  Ms. Irish Rosales returns today for her ongoing DBS surgery.  She is s/p right side deep brain stimulator placement, phase I, placement of right side deep brain stimulator electrodes, target left ventral intermediate nucleus and ventral oralis nucleus of the thalamus, with microelectrode recording on 1/8/2019.  The surgery was without any complications and patient tolerated it well.  Her postoperative CT head demonstrated expected findings with no hemorrhage.  She was discharged on POD#1.  She states that she has been doing well.  She denies any fevers, chills, nausea, vomiting, dizziness, weakness, numbness or seizure like activity.  She also denies any significant headaches.  She also denies any problems or issues with the wound.  She states that her neck pain is improved.  She is now here for her phase II of the surgery.      Briefly, patient is a 68 year old right-handed female with history of dystonic tremor.  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  She also has cervical dystonia.  Her right side is more affected than her left side.  Her goals for DBS surgery are to decrease tremor, especially head and voice and improve dystonia.  She stated that she can live with her hand tremors but wants other symptoms treated.  Her case was discussed at the Movement Disorder [employer] meeting and the  recommendation was left side Vim DBS with Abbott device and wait and see strategy.  She subsequently had the left side implanted back in March of 2018.  She then wanted to get her right side implanted.  She was again evaluated and subsequently approved for the right side implantation.  For the right side, two electrodes were recommended:  Vim and Vo      Past Medical History:   Diagnosis Date     Depression      Dyslipidemia      Dystonic movements 1/21/2017     Dystonic tremor 1/21/2017     Family history of movement disorder 1/21/2017     mild abnormality in carotid study 3/2/2017    BILATERAL DUPLEX CAROTID ULTRASOUND March 1, 2017 1:01 PM    HISTORY: Other specified symptoms and signs involving the circulatory and respiratory systems.   COMPARISON: None.   FINDINGS: There is mild atherosclerotic plaque in the carotid bifurcations. Flow velocities and waveforms show less than 50% diameter stenosis in both the right and left internal carotid arteries as assessed by each ICA PS     Osteoporosis 8/2/2010     Past Surgical History:   Procedure Laterality Date     ------------OTHER-------------  2004    vocal cord thyroplasty at [facility]     C BSO, OMENTECTOMY W/XAVIER  1997    hysterectomy     C HAND/FINGER SURGERY UNLISTED  1998    right carpal tunnel surgery     IMPLANT DEEP BRAIN STIMULATION GENERATOR / BATTERY Left 3/13/2018    Procedure: IMPLANT DEEP BRAIN STIMULATION GENERATOR / BATTERY;  Deep Brain Stimulator Placement, Phase II, Placement Of Deep Brain Stimulator Generator/Battery Over The Left Chest Wall;  Surgeon: Aleksander Morales MD;  Location: [facility] OR     OPTICAL TRACKING SYSTEM INSERTION DEEP BRAIN STIMULATION Left 3/6/2018    Procedure: OPTICAL TRACKING SYSTEM INSERTION DEEP BRAIN STIMULATION;  Stealth Assisted Left Deep Brain Stimulator Placement, Phase I, Placement Of Left Side Deep Brain Stimulator Electrode, Target Left Ventral Intermediate Nucleus Of The Thalamus With Microelectrode Recording;   Surgeon: Aleksander Morales MD;  Location: [facility] OR     OPTICAL TRACKING SYSTEM INSERTION DEEP BRAIN STIMULATION Right 1/8/2019    Procedure: Stealth Assisted Right Deep Brain Stimulator Placement, Phase One, Placement Of Right Side Deep Brain Stimulator Electrode Target Right Ventral Intermediate Nucleus Of The Thalamus and placement Second Electrode With Microelectrode Recording;  Surgeon: Aleksander Morales MD;  Location: [facility] OR     RELEASE CARPAL TUNNEL Left 1/2/2015    Procedure: RELEASE CARPAL TUNNEL;  Surgeon: SHANIA Hernandez MD;  Location: [facility] OR     RELEASE ULNAR NERVE (ELBOW) Left 1/2/2015    Procedure: RELEASE ULNAR NERVE (ELBOW);  Surgeon: SHANIA Hernandez MD;  Location: [facility] OR     Social History     Socioeconomic History     Marital status:      Spouse name: Not on file     Number of children: 3     Years of education: 14     Highest education level: Not on file   Social Needs     Financial resource strain: Not on file     Food insecurity - worry: Not on file     Food insecurity - inability: Not on file     Transportation needs - medical: Not on file     Transportation needs - non-medical: Not on file   Occupational History     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   Tobacco Use     Smoking status: Never Smoker     Smokeless tobacco: Never Used   Substance and Sexual Activity     Alcohol use: Yes     Comment: occasionally     Drug use: No     Sexual activity: Yes     Partners: Male   Other Topics Concern     Parent/sibling w/ CABG, MI or angioplasty before 65F 55M? No      Service No     Blood Transfusions No     Caffeine Concern No     Occupational Exposure No     Hobby Hazards No     Sleep Concern No     Stress Concern No     Weight Concern No     Special Diet No     Back Care No     Exercise Yes     Comment: walk     Bike Helmet Yes     Seat Belt  Yes     Self-Exams Yes   Social History Narrative        Lives in [place]    Daughter Karyn Sanchez        benign essential tremor with a strained quality to her voice consistent with mild laryngeal spasm        ALLERGIES:  She is allergic to sulfa drugs, atorvastatin and simvastatin.  The statin drugs caused leg cramps.            FAMILY HISTORY:  As noted above with 1 sister developing a voice tremor and another sister having what is described as a facial tremor.  Mother with hand tremors          SOCIAL HISTORY:  She does not smoke.  She does use alcohol on occasion. She previously worked as a RN.         Jazmyn volt  [id number] orofacial dyskinesias    Piedad Bel [phone]  laryngeal dystonia and laryngeal tremor and laryngeal spasm.             Updated 6/13/17: Client was born in [place], [place] to parents Jerome and Berenice.  Client grew up w/ three sisters Tea, Carrie, and Jazmyn who are currently still living.  Client graduated from high school, attended college for two years, and graduated w/ a RN Degree.  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  She was  to her ex-spouse for 25 years and had three children; one son Bryan and two daughters Halley/Ysabel.  One daughter Halley Sanchez resides nearby and her other daughter Ysabel and her son Bryan reside in [place], [place].  Bryan has two daughters ages five and eight years old.  Client enjoys flying for free to [place] to visit her two granddaughters. Michelle Sutton, [facility] ([phone], Fax: [phone]).         Family History   Problem Relation Age of Onset     Hypertension Father         and mother     Cerebrovascular Disease Father      Tremor Mother      Lung Cancer Mother      Neurologic Disorder Sister         dystonic voice x 2 botox     Neurologic Disorder  "Sister         jose m olea. facial movement         Allergies   Allergen Reactions     Sulfa Drugs Swelling and Rash     Atorvastatin      Leg cramps     Simvastatin      Leg cramp     Current Facility-Administered Medications   Medication     ceFAZolin (ANCEF) 1 g vial to attach to  ml bag for ADULT or 50 ml bag for PEDS     ceFAZolin (ANCEF) intermittent infusion 2 g in 100 mL dextrose PRE-MIX     lactated ringers infusion     lidocaine (LMX4) cream     lidocaine 1 % 1 mL     sodium chloride (PF) 0.9% PF flush 3 mL     sodium chloride (PF) 0.9% PF flush 3 mL     Current Outpatient Prescriptions   Medication     sertraline (ZOLOFT) 100 MG tablet     ALPRAZolam (XANAX) 0.5 MG tablet     rosuvastatin (CRESTOR) 5 MG tablet     calcium carbonate (OS- MG [place]) 500 MG tablet     botulinum toxin type A (BOTOX) 100 UNITS injection       PHYSICAL EXAM  Vital signs:  Temp: 98.7  F (37.1  C) Temp src: Oral BP: 153/84   Heart Rate: 89 Resp: 18 SpO2: 97 % O2 Device: None (Room air)   Height: 154.9 cm (5' 1\") Weight: 53.7 kg (118 lb 6.2 oz)  Estimated body mass index is 22.37 kg/m  as calculated from the following:    Height as of this encounter: 1.549 m (5' 1\").    Weight as of this encounter: 53.7 kg (118 lb 6.2 oz).    General: Awake, alert, oriented. Well nourished, well developed, she is not in any acute distress.  HEENT: Head normocephalic, atraumatic. Neck supple. Good range of motion. No palpable thyroid mass. Soft carotid bruit on the right side.  Wearing a soft cervical collar.  Heart: Regular rhythm and rate. No murmurs.  Lungs: Clear to auscultation and percussion bilaterally. No ronchi, rales or wheeze.  Abdomen: Soft, non-tender, non-distended. No hepatosplenomegaly.  Extremity: Warm with no clubbing, cyanosis or edema. No lower extremity edema.  Right frontal incision clean/dry and intact.  No redness.  No drainage.  No fluid collection.  Left frontal incision, left parietal incision and left chest " wall incisions have healed very well.    Neurological  Awake, alert and oriented to date, time, place and person. Speech fluent but tremulous.   Pupils equal, round, reactive to light.  Extraocular movement intact.  Hearing is grossly normal to finger rub.   Facial sensation intact.  Face symmetric.  Tongue midline.  Uvula elevates equally.    Motor: full strength throughout.  Sensation: intact to light touch and pinpoint.  Deep tendon reflexes: 2+ throughout. Negative for clonus. Negative for Gilbert's sign. No dysmetria.      ASSESSMENT  66 year old female with a history of dystonia/dystonic tremors and bilateral tremors, vocal dystonia and tremor, head tremor.  Right side is worse than left side.  S/p left side deep brain stimulator placement, phase I, placement of left side deep brain stimulator electrode, target left ventral intermediate nucleus of the thalamus, with microelectrode recording on 3/6/2018.  S/p deep brain stimulator placement, phase II, placement of deep brain stimulator generator/battery over the left chest wall on 3/13/2018  S/p right side deep brain stimulator placement, phase I, placement of right side deep brain stimulator electrodes, target left ventral intermediate nucleus and ventral oralis nucleus of the thalamus, with microelectrode recording on 1/8/2019.    She did well with the surgery and she has no complaints at this time.  She is ready for her phase II surgery.  We discussed the details of the surgery.  Under general anesthesia, we will place the DBS generator/battery over the right chest wall.  Extension wires will be placed and will be connected to the previously implanted electrodes.  Risks, benefits and alternative therapies were discussed with the patient, including but not limited to infection and bleeding.  Surgical procedure was discussed in detail.  All questions were answered and she expressed understanding.    Since she has two DBS electrodes, we will plan on implanting  Abbott Infinity 7 generator/battery.      PLAN  1.  Deep brain stimulator placement, phase II, placement of deep brain stimulator generator/battery over the right chest wall.  Abbott Infinity system, Infinity 7.

## 2019-01-16 LAB — INTERPRETATION ECG - MUSE: NORMAL

## 2019-01-16 NOTE — ANESTHESIA POSTPROCEDURE EVALUATION
Anesthesia POST Procedure Evaluation    Patient: Irish Rosales   MRN:     [id number] Gender:   female   Age:    68 year old :      1950        Preoperative Diagnosis: Essential Tremor    Procedure(s):  Deep Brain Stimulator Placement, Phase II, Placement Of Deep Brain Stimulator Generator/Battery Over The Right Chest Wall   Postop Comments: No value filed.       Anesthesia Type:  MAC    Reportable Event: NO     PAIN: Uncomplicated   Sign Out status: Comfortable, Well controlled pain     PONV: No PONV   Sign Out status:  No Nausea or Vomiting     Neuro/Psych: Uneventful perioperative course   Sign Out Status: Preoperative baseline; Age appropriate mentation     Airway/Resp.: Uneventful perioperative course   Sign Out Status: Non labored breathing, age appropriate RR; Resp. Status within EXPECTED Parameters     CV: Uneventful perioperative course   Sign Out status: Appropriate BP and perfusion indices; Appropriate HR/Rhythm     Disposition:   Sign Out in:  PACU  Disposition:  Phase II; Home  Recovery Course: Uneventful  Follow-Up: Not required           Last Anesthesia Record Vitals:  CRNA VITALS  1/15/2019 1553 - 1/15/2019 1653      1/15/2019             Pulse:  94    SpO2:  100 %    Resp Rate (observed):  3  (Abnormal)           Last PACU/Preop Vitals:  Vitals:    01/15/19 1728 01/15/19 1735 01/15/19 1800   BP:  137/80 125/67   Pulse:   85   Resp:  16 16   Temp:  36.5  C (97.7  F)    SpO2: 97% 97% 98%         Electronically Signed By: Austyn Byrd MD, January 15, 2019, 7:23 PM

## 2019-01-17 ENCOUNTER — PATIENT OUTREACH (OUTPATIENT)
Dept: GERIATRIC MEDICINE | Facility: CLINIC | Age: 69
End: 2019-01-17

## 2019-01-17 ENCOUNTER — PATIENT OUTREACH (OUTPATIENT)
Dept: NEUROSURGERY | Facility: CLINIC | Age: 69
End: 2019-01-17

## 2019-01-17 ENCOUNTER — HOSPITAL ENCOUNTER (EMERGENCY)
Facility: CLINIC | Age: 69
Discharge: HOME | End: 2019-01-17
Attending: EMERGENCY MEDICINE | Admitting: EMERGENCY MEDICINE
Payer: COMMERCIAL

## 2019-01-17 ENCOUNTER — APPOINTMENT (OUTPATIENT)
Dept: CT IMAGING | Facility: CLINIC | Age: 69
End: 2019-01-17
Payer: COMMERCIAL

## 2019-01-17 VITALS
SYSTOLIC BLOOD PRESSURE: 129 MMHG | TEMPERATURE: 99.1 F | OXYGEN SATURATION: 96 % | WEIGHT: 120 LBS | BODY MASS INDEX: 22.66 KG/M2 | RESPIRATION RATE: 16 BRPM | DIASTOLIC BLOOD PRESSURE: 84 MMHG | HEIGHT: 61 IN | HEART RATE: 93 BPM
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
## 2019-01-17 DIAGNOSIS — S01.112A EYEBROW LACERATION, LEFT, INITIAL ENCOUNTER: ICD-10-CM

## 2019-01-17 DIAGNOSIS — S00.83XA CONTUSION OF FACE, INITIAL ENCOUNTER: ICD-10-CM

## 2019-01-17 PROCEDURE — 12011 RPR F/E/E/N/L/M 2.5 CM/<: CPT

## 2019-01-17 PROCEDURE — 99284 EMERGENCY DEPT VISIT MOD MDM: CPT | Mod: 25

## 2019-01-17 PROCEDURE — 70450 CT HEAD/BRAIN W/O DYE: CPT

## 2019-01-17 ASSESSMENT — ENCOUNTER SYMPTOMS
SEIZURES: 0
BACK PAIN: 0
HEADACHES: 0
ARTHRALGIAS: 0
WOUND: 1

## 2019-01-17 ASSESSMENT — MIFFLIN-ST. JEOR: SCORE: 1021.7

## 2019-01-17 NOTE — PROGRESS NOTES
[facility] Care Coordination Contact  CC received notification of Emergency Room visit.  ER visit occurred on 1/17/19 at [facility] with Dx of Fall  CC contacted member and reviewed discharge summary.  Member has a follow-up appointment with PCP: Yes: scheduled on /in Feb  Member has had a change in condition: No  New referrals placed: No  Home Visit Needed: No  Care plan reviewed and updated.  PCP notified of ED visit via EMR.    CC received notification of discharge to home. Discharge occurred on (Date 1/15/19) from [facility].   CC contacted member and reviewed discharge summary.  Member has a follow-up appointment with PCP in 7 days: Yes: scheduled on /in Feb   Member has had a change in condition: No  Home visit needed: No  Care plan reviewed and updated.  The following home based services None were resumed.  New referrals placed: No  Transition log completed.   PCP notified of transition back to home via EMR.    INNA Diallo  [facility]  [phone]  Fax: [phone]

## 2019-01-17 NOTE — PROGRESS NOTES
[facility]: Post-Discharge Note  SITUATION                                                      Admission:    Admission Date: 01/15/19   Reason for Admission: DBS battery placement  Discharge:   Discharge Date: 01/15/19  Discharge Diagnosis: Essential tremor  Discharge Service: Neurosurgery  Discharge Plan: Routine post op followup    BACKGROUND                                                      Neurosurgery Discharge Coordination Note     Attending physician: Dr. Morales  Discharge to: Home     Current status: In reviewing chart before calling pt, I noted that she went to the ED last night. Pt slipped on a magazine at home last night and fell. She sustained abrasions under her nose and a laceration above the left eye. Denies LOC, headache, hip, knee, back or other pain. Head CT clear. Pt states she has no current pain from the fall and has felt fine today.     Re her recent surgery, pt states she is feeling good and has only minimal surgical pain for which she is taking Tylenol PRN with good relief. Denies redness, swelling, increased tenderness, drainage, incision opening or elevated temp. Reports Incision CDI without signs of infection.  Denies current bowel or bladder issues.    Advised pt that if she or family notices a change in her neurological status, such as confusion, lethargy, change in speech, nausea/vomiting, headache, etc., go to ED/call 911.     Pt indicates that family will be staying with her or she will be staying with family over the next few days to make sure she is safe (Pt's daughter Halley was with her at time of this call).     Discharge instructions and medications reviewed with patient.  Follow up appointments/imaging/tests needed: See below    RN triage/on call number given: [phone]/ [phone]        ASSESSMENT      Discharge Assessment  Patient reports symptoms are: Improved  Does the patient have all of their medications?: Yes  Does patient know what their new  medications are for?: Yes  Does patient have a follow-up appointment scheduled?: Yes  Does patient have any other questions or concerns?: No    Post-op  Did the patient have surgery or a procedure: Yes  Incision: closed;healing  Drainage: No  Bleeding: none  Fever: No  Chills: No  Redness: No  Warmth: No  Swelling: No  Incision site pain: No  Closure: suture;dissolving  Eating & Drinking: eating and drinking without complaints/concerns  PO Intake: regular diet  Bowel Function: normal  Urinary Status: voiding without complaint/concerns        PLAN                                                      Outpatient Plan:  Routine post op f/u     Future Appointments   Date Time Provider Department Center   1/31/2019 10:30 AM Renuka Mack APRN CNP [facility]   2/13/2019  2:00 PM [id number]   2/13/2019  3:00 PM  MOVEMENT DISORDER FELLOW [facility]           NORBERTO GARCIA RN

## 2019-01-17 NOTE — OR NURSING
Contacted patient at home for post-op phone call. Patient had fallen at home secondary to hypotension (75/40 per patient report within 24 hours discharge. Was brought to [facility] via ambulance. CT was completed. Patient was discharged to home from [facility] ER. Current /74 per patient report. Patient stated that her sister is staying with her and she is feeling better.

## 2019-01-17 NOTE — ED NOTES
Bed: ED02  Expected date: 1/17/19  Expected time: 12:10 AM  Means of arrival: Ambulance  Comments:  HEMS 426 69F fall; head inj

## 2019-01-17 NOTE — ED PROVIDER NOTES
History     Chief Complaint:  Fall    HPI   Irish Rosales is a 68 year old female who presents with a fall. The patient reports that she was walking at home tonight when she slipped on a magazine and fell. She states she was unable to get up on her own. The patient currently reports to the ED with a laceration above her left eyebrow and an abrasion under her nose. The patient denies any loss of consciousness and states she can remember the fall. She denies any headache, hip, knee, back or other pain.    Of note, the patient had a deep brain stimulator placed for an essential tremor 9 days ago on 1/19/19 by Dr. Aleksander Morales at the [facility]. The battery/generator for the deep brain stimulator was then placed over the right chest wall 2 days ago on 1/15/19. She states that she has been feeling well since these procedures. She denies any seizure-like activity.     Allergies:  Sulfa Drugs  Atorvastatin  Simvastatin     Medications:    Xanax  Lioresal   Botox  Keflex  Neurontin  Ensure Complete Shake  Oxy-IR  Rosuvastatin Calcium PO  Zoloft    Past Medical History:    Depression  Dyslipidemia  Dystonic movements  Dystonic tremor  Mild abnormality in carotid study  Osteoporosis  Hyperlipidemia  NAOMI  Essential Tremor  Spasm of vocal cords  Carpal Tunnel Syndrome  Cervical radiculopathy  Impingement syndrome of right shoulder  Cervicalgia    Past Surgical History:    Vocal cord thyroplasty  Hysterectomy  Right Carpal Tunnel Surgery  Implant Deep Brain Stimulation Generator/Battery, Left  Implant Deep Brain Stimulation Generator/Battery, right  Optical tracking system insertion deep brain stimulation, left  Optical tracking system insertion deep brain stimulation, right  Release Carpal Tunnel, left  Release Ulnar Nerve (Elbow), left    Family History:    Hypertension  Cerebrovascular Disease  Tremor  Lung Cancer  Dystonic Voice  Neurologic Disorder    Social History:  Smoking Status: Never  "Smoker  Alcohol Use: Occasional  Patient presents alone.  Marital Status:       Review of Systems   Musculoskeletal: Negative for arthralgias and back pain.   Skin: Positive for wound.   Neurological: Negative for seizures, syncope and headaches.   All other systems reviewed and are negative.    Physical Exam     Patient Vitals for the past 24 hrs:   BP Temp Temp src Pulse Heart Rate Resp SpO2 Height Weight   01/17/19 0344 129/84 -- -- -- 89 -- 96 % -- --   01/17/19 0340 129/89 -- -- 93 -- -- -- -- --   01/17/19 0017 112/64 99.1  F (37.3  C) Oral -- 84 16 96 % 1.549 m (5' 1\") 54.4 kg (120 lb)     Physical Exam  Constitutional:  Appears well-developed and well-nourished. Cooperative.   HENT:   Head:    Atraumatic. No palpable bony defect.  Mouth/Throat:   Oropharynx is without erythema or exudate and mucous membranes are moist.  Nose:     No bony tenderness or deformity in the nose.  Eyes:    Conjunctivae normal and EOM are normal.      Pupils are equal, round, and reactive to light.   Neck:    Normal range of motion. Neck supple.   Cardiovascular:  Normal rate, regular rhythm, normal heart sounds and radial and dorsalis pedis pulses are 2+ and symmetric.    Pulmonary/Chest:  Effort normal and breath sounds normal.   Abdominal:   Soft. Bowel sounds are normal.      No splenomegaly or hepatomegaly. No tenderness. No rebound.   Musculoskeletal:  Normal range of motion. No edema and no tenderness.      C-spine, pelvis, chest wall stable, non-tender, extremities otherwise atraumatic.  Neurological:  Alert. Normal strength. No cranial nerve deficit. Gait intact. GCS 15.  Skin:    Skin is warm and dry.      1.4 cm vertical laceration just above medial left eyebrow with surrounding hematoma.      Superficial abrasion above upper lip.     Superficial abrasions on the right knee.  Psychiatric:   Normal mood and affect.     Emergency Department Course     Imaging:  Radiographic findings were communicated with the " patient who voiced understanding of the findings.    Head CT w/o contrast  No acute abnormality.    As read by radiology.    Procedures:    Narrative: Procedure: Laceration Repair        LACERATION:  A simple clean 1.4 cm laceration.      LOCATION:  Left medial eyebrow      FUNCTION:  Distally sensation and circulation are intact.      ANESTHESIA:  LET - Topical      PREPARATION:  Irrigation with Normal Saline and Shur Clens      DEBRIDEMENT:  no debridement      CLOSURE:  Wound was closed with One Layer.  Skin closed with 4 x 6.0 Ethylon using interrupted sutures.    Emergency Department Course:  Past medical records, nursing notes, and vitals reviewed.  0028: I performed an exam of the patient and obtained history, as documented above.    The patient was sent for a head CT while in the emergency department, findings above.    Laceration was repaired as noted above.    0331: I rechecked the patient. Findings and plan explained to the Patient. Patient discharged home with instructions regarding supportive care, medications, and reasons to return. The importance of close follow-up was reviewed.     Impression & Plan      Medical Decision Making:  Irish Rosales is a 68 year old female who suffered a minor head trauma after a fall.  Given her recent surgery to implant a deep brain stimulator, the patient underwent head CT. CT reveals no skull fracture or intracerebral hemorrhage. The patient is at their normal baseline neurologic state. Thus the patient will be discharged home although the patient was made aware of the possibility of delayed intracerebral hemorrhage and the need to return to the ER immediately for worsening headache, vision change, confusion, numbness, weakness, vomiting or any other concerns.      The patient also has a laceration above her left eyebrow. The wound was carefully evaluated and explored.  The laceration was closed with sutures as noted above.There is no evidence of muscular, tendon,  or bony damage with this laceration.  No signs of foreign body.  Possible complications (infection, scarring) were reviewed with the patient. Follow up with primary care will be indicated for suture removal as noted in the discharge section.    Diagnosis:    ICD-10-CM    1. Fall, initial encounter W19.XXXA    2. Eyebrow laceration, left, initial encounter S01.112A    3. Contusion of face, initial encounter S00.83XA        Disposition:  Discharged to home.      Slime Stratton  1/17/2019    EMERGENCY DEPARTMENT  I, Slime Stratton, am serving as a scribe at 12:28 AM on 1/17/2019 to document services personally performed by Danilo Hwang MD based on my observations and the provider's statements to me.        Danilo Hwang MD  01/17/19 0914

## 2019-01-17 NOTE — ED TRIAGE NOTES
Pt had deep brain stimulation surgery last week - pt was walking to kitchen and had a fall - lac noted to left eyebrow, unknown loc. Pt unsure how she fell. Pt states she was unable to get off the floor and called ems. Ems reports pt was hypotensive on scene (60s systole), but after giving some iv fluids, systolic 120. Pt alert and oriented x4. Denies bloodthinners. Pt did not want to come to er -  placed on transport hold

## 2019-01-22 ENCOUNTER — PATIENT OUTREACH (OUTPATIENT)
Dept: CARE COORDINATION | Facility: CLINIC | Age: 69
End: 2019-01-22

## 2019-01-25 ENCOUNTER — PATIENT OUTREACH (OUTPATIENT)
Dept: GERIATRIC MEDICINE | Facility: CLINIC | Age: 69
End: 2019-01-25

## 2019-01-25 PROBLEM — Z76.89 HEALTH CARE HOME: Status: ACTIVE | Noted: 2017-06-01

## 2019-01-25 NOTE — PROGRESS NOTES
[facility] Care Coordination Contact    Dr. Victoria,    I am the [facility] care coordinator for Irish Rosales, and I am writing to notify you of a change in services.   [employer] services have been terminated    This change has occurred because Irish requested to terminate this service. Irish receives meals from family and friends. She also has [employer] every other week grocery delivery. Spoke w/ Melanie from [employer] and cancelled services effective today.    I am required by the health plan to notify you of this change. No action is required on your part. Please do not hesitate to contact me with any questions or concerns.     Thank you,  Michelle Corcoran, [facility]  [phone]  Fax: [phone]

## 2019-01-28 ENCOUNTER — PATIENT OUTREACH (OUTPATIENT)
Dept: GERIATRIC MEDICINE | Facility: CLINIC | Age: 69
End: 2019-01-28

## 2019-01-30 NOTE — PROGRESS NOTES
[facility] Care Coordination Contact    No Letter Received: 60 day tracking of letter complete, no letter received from Irish. Tracking discontinued.    Helena Rahman  Case Management Specialist   [facility]   [phone]

## 2019-01-31 ENCOUNTER — OFFICE VISIT (OUTPATIENT)
Dept: NEUROSURGERY | Facility: CLINIC | Age: 69
End: 2019-01-31
Payer: COMMERCIAL

## 2019-01-31 VITALS
WEIGHT: 122.5 LBS | TEMPERATURE: 98.1 F | HEART RATE: 67 BPM | SYSTOLIC BLOOD PRESSURE: 141 MMHG | RESPIRATION RATE: 16 BRPM | OXYGEN SATURATION: 96 % | DIASTOLIC BLOOD PRESSURE: 57 MMHG | HEIGHT: 61 IN | BODY MASS INDEX: 23.13 KG/M2

## 2019-01-31 DIAGNOSIS — Z48.89 ENCOUNTER FOR POST SURGICAL WOUND CHECK: ICD-10-CM

## 2019-01-31 DIAGNOSIS — Z96.89 S/P DEEP BRAIN STIMULATOR PLACEMENT: ICD-10-CM

## 2019-01-31 DIAGNOSIS — G24.9 DYSTONIA: ICD-10-CM

## 2019-01-31 DIAGNOSIS — G25.0 ESSENTIAL TREMOR: ICD-10-CM

## 2019-01-31 ASSESSMENT — MIFFLIN-ST. JEOR: SCORE: 1033.04

## 2019-01-31 ASSESSMENT — PAIN SCALES - GENERAL: PAINLEVEL: NO PAIN (0)

## 2019-01-31 NOTE — LETTER
1/31/2019       RE: Irish Rosales  [address]  [place] [address]     Dear Colleague,    Thank you for referring your patient, Irish Rosales, to the [facility] NEUROSURGERY at [facility]. Please see a copy of my visit note below.    NEUROSURGERY PROGRESS NOTE    REASON FOR VISIT: Routine postop wound check.    DATE OF PROCEDURE:  01/15/2019  PREOPERATIVE DIAGNOSIS:  1.  Dystonic tremor  2.  Essential tremor  3.  S/p left side deep brain stimulator placement, phase I, placement of left side deep brain stimulator electrode, target left ventral intermediate nucleus of the thalamus, with microelectrode recording, on 3/6/2018  4.  S/p deep brain stimulator placement, phase II, placement of deep brain stimulator generator/battery over the left chest wall on 3/13/2018  5.  S/p right side deep brain stimulator placement, phase I, placement of right side deep brain stimulator electrode, target right ventral intermediate nucleus of the thalamus, with microelectrode recording, and with second electrode placement in the left ventral oralis nucleus of the thalamus on 1/8/201 9  PROCEDURES PERFORMED:  1.  Deep brain stimulator placement, phase II, placement of new deep brain stimulator generator/battery, model Infinity 7, over right chest wall.    2.  Placement of two extension wires and connection of the right side deep brain stimulator electrodes, two electrode arrays, to the new generator/battery.  3.  Electrical interrogation and analysis of the deep brain stimulator system.  FINDINGS:  Impedance values within normal limits for newly connected right sided electrodes.  Left sided therapy was turned on at the end of the case.    IMPLANTS:   1.  A[employer]ott Infinity 7 DBS generator/battery, REF 6662, S/N [id number]  2.  Abbott extension wire, REF 6371, S/N [id number] - Right Vim; connected as channels 9-16  3.  Abbott extension wire, REF 6371, S/N [id number] - Right Vo;  connected as channels 1-8  SURGEON:  Aleksander Morales MD, PhD     HPI:  Ms. 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  She also has cervical dystonia.  Her right side is more affected than her left side. She underwent thorough evaluation at the [facility]. The patient was offered DBS surgery with goals to decrease tremor, especially head and voice and improve dystonia. She consented for the procedure and underwent right side deep brain stimulator placement, phase I, placement of right side deep brain stimulator electrodes, target left ventral intermediate nucleus and ventral oralis nucleus of the thalamus, with microelectrode recording on 1/8/2019 by Dr. Morales. The surgery was without any complications and patient tolerated it well.  Her postoperative CT head demonstrated expected findings with no hemorrhage. She then returned on 1/15/2019 for her phase II of the surgery. The procedure went without incident. Since then, she presents to our office for routine wound check.    The patient denies any fevers, chills, nausea, vomiting, dizziness, weakness, numbness or seizure like activity.  She also denies any significant headaches.  She also denies any problems or issues with the wound.  She states that her neck pain is improved.  She has no questions or concerns today.    STATUS REPORT  Patient Supplied Answers To the [facility] Pain Questionnaire  [facility] Pain -  Patient Entered Questionnaire/Answers 1/31/2019   What number best describes your pain right now:  0 = No pain  to  10 = Worst pain imaginable 0   How would you describe the pain? -   Which of the following worsen your pain? -   Which of the following improve or reduce your pain?  -   What number best describes your average pain for the past week:  0 = No pain  to  10 = Worst pain imaginable -   What number best  describes your LOWEST pain in past 24 hours:  0 = No pain  to  10 = Worst pain imaginable -   What number best describes your WORST pain in past 24 hours:  0 = No pain  to  10 = Worst pain imaginable -   When is your pain worst? -   What non-medicine treatments have you already had for your pain? -   Have you tried treating your pain with medication?  -   Are you currently taking medications for your pain? -       CURRENT MEDICATIONS:  Current Outpatient Medications:      ALPRAZolam (XANAX) 0.5 MG tablet, Take 1 tablet (0.5 mg) by mouth daily as needed, Disp: 30 tablet, Rfl: 3     botulinum toxin type A (BOTOX) 100 units injection, Inject 200 units 4x per year, Disp: 800 Units, Rfl: 0     Nutritional Supplements (ENSURE COMPLETE SHAKE) LIQD, Take 237 mLs by mouth 3 times daily (Patient taking differently: Take 237 mLs by mouth daily ), Disp: 90 Bottle, Rfl: 3     ROSUVASTATIN CALCIUM PO, Take by mouth daily, Disp: , Rfl:      sertraline (ZOLOFT) 100 MG tablet, Take 1 tablet (100 mg) by mouth daily (Patient taking differently: Take 100 mg by mouth every morning ), Disp: 90 tablet, Rfl: 1     baclofen (LIORESAL) 20 MG tablet, Take 1 tablet (20 mg) by mouth 3 times daily (Patient not taking: Reported on 1/31/2019), Disp: 90 tablet, Rfl: 3     gabapentin (NEURONTIN) 100 MG capsule, Take 1 capsule every night for 1-3 days, then 1 capsule twice daily for 1-3 days, then 1 capsule three times daily (Patient not taking: Reported on 1/31/2019), Disp: 90 capsule, Rfl: 1     oxyCODONE (OXY-IR) 5 MG capsule, Take 1 capsule (5 mg) by mouth every 6 hours as needed for severe pain (Patient not taking: Reported on 1/31/2019), Disp: 20 capsule, Rfl: 0    ALLERGIES:  Allergies   Allergen Reactions     Sulfa Drugs Swelling and Rash     Atorvastatin      Leg cramps     Simvastatin      Leg cramp       PMH, SOC HIST, FAM HIST, PROBLEM LIST:  All reviewed in EPIC.      FOCUS EXAMINATION:  /57   Pulse 67   Temp 98.1  F (36.7  C)  "(Oral)   Resp 16   Ht 1.549 m (5' 1\")   Wt 55.6 kg (122 lb 8 oz)   SpO2 96%   BMI 23.15 kg/m     Well developed well nourished female found seated comfortably in exam chair.  No apparent distress. She is A&O X3.  Mood and affect WNL. Language and fund of knowledge intact.  Is able to sit and rise independently.   She has a nicely healed incision.    She has a nicely healed incision. The incision was closed by Monocryl in subcuticular fashion that require no removal.    ASSESSMENT:  1. Essential tremor    2. Dystonia    3. S/P deep brain stimulator placement    4. Encounter for post surgical wound check    Irish Rosales is doing well.The wound is healthy without evidence of infection.       PLAN:  We discussed wound care.  *  Keep it open to air  *  May shower and shampoo with mild soap/shampoo including the incision. No hair conditioners, hair treatments or skin cream for two more weeks.  *  May swim or bathe when all scabbing is gone from the incision.  *  Call our services if you develop fever, chills, redness, swelling, and/or drainage from incision.  We discussed medication.    *  Follow up with Dr. Trejo in Neurology  We discussed activities.  *  There are no restrictions on her ADLs in general from neurosurgical perspective. However, we recommend she discuss any restrictions if any with Neurology when she comes in for her DBS programming.  We discussed follow up    *  We are comfortable releasing her to PRN follow up.  We have not made a specific return appointment but will be happy to see her again should the need arise.    All the patient's questions have been answered and they demonstrate good understanding of the above.  The patient has our contact information and is aware that she should call if she has questions comments or concerns.     We appreciate the opportunity to be of service in the care of this pleasant patient.  Please do call if there is anything more we can do    Renuka Mack, " DNP, APRN, FNP-BC  Department of Neurosurgery

## 2019-01-31 NOTE — NURSING NOTE
Chief Complaint   Patient presents with     Surgical Followup     [facility] RETURN 2 WEEK POST OP     Preventive Care:    Breast Cancer Screening: During our visit today, we discussed that it is recommended you receive breast cancer screening. Please call or make an appointment with your primary care provider to discuss this with them. You may also call the [facility] scheduling line ([phone]) to set up a mammography appointment at the [facility] within the [facility].      Asim Herrera, EMT

## 2019-01-31 NOTE — PROGRESS NOTES
NEUROSURGERY PROGRESS NOTE    REASON FOR VISIT: Routine postop wound check.      DATE OF PROCEDURE:  01/15/2019  PREOPERATIVE DIAGNOSIS:  1.  Dystonic tremor  2.  Essential tremor  3.  S/p left side deep brain stimulator placement, phase I, placement of left side deep brain stimulator electrode, target left ventral intermediate nucleus of the thalamus, with microelectrode recording, on 3/6/2018  4.  S/p deep brain stimulator placement, phase II, placement of deep brain stimulator generator/battery over the left chest wall on 3/13/2018  5.  S/p right side deep brain stimulator placement, phase I, placement of right side deep brain stimulator electrode, target right ventral intermediate nucleus of the thalamus, with microelectrode recording, and with second electrode placement in the left ventral oralis nucleus of the thalamus on 1/8/2019  PROCEDURES PERFORMED:  1.  Deep brain stimulator placement, phase II, placement of new deep brain stimulator generator/battery, model Infinity 7, over right chest wall.    2.  Placement of two extension wires and connection of the right side deep brain stimulator electrodes, two electrode arrays, to the new generator/battery.  3.  Electrical interrogation and analysis of the deep brain stimulator system.  FINDINGS:  Impedance values within normal limits for newly connected right sided electrodes.  Left sided therapy was turned on at the end of the case.    IMPLANTS:   1.  Abbott Infinity 7 DBS generator/battery, REF 6662, S/N [id number]  2.  Abbott extension wire, REF 6371, S/N [id number] - Right Vim; connected as channels 9-16  3.  Abbott extension wire, REF 6371, S/N [id number] - Right Vo; connected as channels 1-8  SURGEON:  Aleksander Morales MD, PhD       HPI:  Ms. 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  She also has cervical dystonia.  Her right  side is more affected than her left side. She underwent thorough evaluation at the [facility]. The patient was offered DBS surgery with goals to decrease tremor, especially head and voice and improve dystonia. She consented for the procedure and underwent right side deep brain stimulator placement, phase I, placement of right side deep brain stimulator electrodes, target left ventral intermediate nucleus and ventral oralis nucleus of the thalamus, with microelectrode recording on 1/8/2019 by Dr. Morales. The surgery was without any complications and patient tolerated it well.  Her postoperative CT head demonstrated expected findings with no hemorrhage. She then returned on 1/15/2019 for her phase II of the surgery. The procedure went without incident. Since then, she presents to our office for routine wound check.    The patient denies any fevers, chills, nausea, vomiting, dizziness, weakness, numbness or seizure like activity.  She also denies any significant headaches.  She also denies any problems or issues with the wound.  She states that her neck pain is improved.  She has no questions or concerns today.    STATUS REPORT  Patient Supplied Answers To the  Pain Questionnaire  [facility] -  Patient Entered Questionnaire/Answers 1/31/2019   What number best describes your pain right now:  0 = No pain  to  10 = Worst pain imaginable 0   How would you describe the pain? -   Which of the following worsen your pain? -   Which of the following improve or reduce your pain?  -   What number best describes your average pain for the past week:  0 = No pain  to  10 = Worst pain imaginable -   What number best describes your LOWEST pain in past 24 hours:  0 = No pain  to  10 = Worst pain imaginable -   What number best describes your WORST pain in past 24 hours:  0 = No pain  to  10 = Worst pain imaginable -   When is your pain worst? -   What non-medicine treatments have you already had for your pain? -  "  Have you tried treating your pain with medication?  -   Are you currently taking medications for your pain? -       CURRENT MEDICATIONS:    Current Outpatient Medications:      ALPRAZolam (XANAX) 0.5 MG tablet, Take 1 tablet (0.5 mg) by mouth daily as needed, Disp: 30 tablet, Rfl: 3     botulinum toxin type A (BOTOX) 100 units injection, Inject 200 units 4x per year, Disp: 800 Units, Rfl: 0     Nutritional Supplements (ENSURE COMPLETE SHAKE) LIQD, Take 237 mLs by mouth 3 times daily (Patient taking differently: Take 237 mLs by mouth daily ), Disp: 90 Bottle, Rfl: 3     ROSUVASTATIN CALCIUM PO, Take by mouth daily, Disp: , Rfl:      sertraline (ZOLOFT) 100 MG tablet, Take 1 tablet (100 mg) by mouth daily (Patient taking differently: Take 100 mg by mouth every morning ), Disp: 90 tablet, Rfl: 1     baclofen (LIORESAL) 20 MG tablet, Take 1 tablet (20 mg) by mouth 3 times daily (Patient not taking: Reported on 1/31/2019), Disp: 90 tablet, Rfl: 3     gabapentin (NEURONTIN) 100 MG capsule, Take 1 capsule every night for 1-3 days, then 1 capsule twice daily for 1-3 days, then 1 capsule three times daily (Patient not taking: Reported on 1/31/2019), Disp: 90 capsule, Rfl: 1     oxyCODONE (OXY-IR) 5 MG capsule, Take 1 capsule (5 mg) by mouth every 6 hours as needed for severe pain (Patient not taking: Reported on 1/31/2019), Disp: 20 capsule, Rfl: 0      ALLERGIES:  Allergies   Allergen Reactions     Sulfa Drugs Swelling and Rash     Atorvastatin      Leg cramps     Simvastatin      Leg cramp       PMH, SOC HIST, FAM HIST, PROBLEM LIST:  All reviewed in EPIC.      FOCUS EXAMINATION:  /57   Pulse 67   Temp 98.1  F (36.7  C) (Oral)   Resp 16   Ht 1.549 m (5' 1\")   Wt 55.6 kg (122 lb 8 oz)   SpO2 96%   BMI 23.15 kg/m    Well developed well nourished female found seated comfortably in exam chair.  No apparent distress. She is A&O X3.  Mood and affect WNL. Language and fund of knowledge intact.  Is able to sit and " rise independently.   She has a nicely healed incision.    She has a nicely healed incision. The incision was closed by Monocryl in subcuticular fashion that require no removal.        ASSESSMENT:  1. Essential tremor    2. Dystonia    3. S/P deep brain stimulator placement    4. Encounter for post surgical wound check    Irish Rosales is doing well.The wound is healthy without evidence of infection.       PLAN:  We discussed wound care.  *  Keep it open to air  *  May shower and shampoo with mild soap/shampoo including the incision. No hair conditioners, hair treatments or skin cream for two more weeks.  *  May swim or bathe when all scabbing is gone from the incision.  *  Call our services if you develop fever, chills, redness, swelling, and/or drainage from incision.  We discussed medication.    *  Follow up with Dr. Trejo in Neurology  We discussed activities.  *  There are no restrictions on her ADLs in general from neurosurgical perspective. However, we recommend she discuss any restrictions if any with Neurology when she comes in for her DBS programming.  We discussed follow up    *  We are comfortable releasing her to PRN follow up.  We have not made a specific return appointment but will be happy to see her again should the need arise.    All the patient's questions have been answered and they demonstrate good understanding of the above.  The patient has our contact information and is aware that she should call if she has questions comments or concerns.     We appreciate the opportunity to be of service in the care of this pleasant patient.  Please do call if there is anything more we can do    Renuka Mack, SABRINA, APRN, FNP-BC  Department of Neurosurgery

## 2019-02-04 DIAGNOSIS — Z86.59 HISTORY OF MAJOR DEPRESSION: ICD-10-CM

## 2019-02-04 NOTE — TELEPHONE ENCOUNTER
Sertraline  Last Written Prescription Date:  6-19-18  Last Fill Quantity: 90,  # refills: 1   Last office visit: 10/25/2018 with prescribing provider:  annie   Future Office Visit:

## 2019-02-06 RX ORDER — SERTRALINE HYDROCHLORIDE 100 MG/1
100 TABLET, FILM COATED ORAL DAILY
Qty: 90 TABLET | Refills: 0 | Status: SHIPPED | OUTPATIENT
Start: 2019-02-06 | End: 2019-06-29

## 2019-02-12 NOTE — PROGRESS NOTES
R Vim, R Vo    X+12.51, Y-6.48, Z+0.01, AP:56.8, MSP:15.0    Pen1, Track1, Center hole  +9.8, possibly ventral tier.  +7.8,7.5 possibly tremor-related  +2.9 proprioceptive thumb  +2.6 possibly wrist extension  +2.11 shoulder adduction  +1.0 ti 0.11 ill-defined bottom  Microstim gave hand paresthesias 20 at -1.06, -0.07, rising to 70 at +5.0    Pen1, Track2, Posterior hole  +9.8 possibly ventral tier  +8.85 proprio, shoulder adduction  +6.85 knee and/or ankle flex  +4.7 elbow flex  +4.12 tongue protrusion (proprioceptive)  +3.8 knee flex (background)  +3.5 tongue protrusion, possibly elbow  +2.6 tongue protrusion background  +2.26, +2.11 tongue protrusion possibly tactile  +1.844 intraoral, proably tactile  +1.0 tongue protrusion, probably tactiole  +0.45 possibly bottom  Microstim gave hand paresthesias, 20 uA at -2.03, dipping to 10 uA at -0.06, -0.07, rising to 60 at +5.0    Abbott Infinity 0.5 placed in the anterior hole, at a depth of +1.5  [id number], 60 usec, 130 Hz, transient paeresthesias from 0.75 mA to 5.0mA, tremor suppresion from 1.0 mA, almosst complete suppression by 4.0 mA  The lead was advanced by 2.0 mm, to put a segmented ring where ring 1 had been.  A second lead was placed, at the same depth, 2 mm anterior, and one medial to the first (in the XY-stage / BenGun frame of reference)    Mappinh  Programmin.5h

## 2019-02-13 ENCOUNTER — ANCILLARY PROCEDURE (OUTPATIENT)
Dept: CT IMAGING | Facility: CLINIC | Age: 69
End: 2019-02-13
Attending: PSYCHIATRY & NEUROLOGY
Payer: COMMERCIAL

## 2019-02-13 ENCOUNTER — OFFICE VISIT (OUTPATIENT)
Dept: NEUROLOGY | Facility: CLINIC | Age: 69
End: 2019-02-13
Payer: COMMERCIAL

## 2019-02-13 VITALS
RESPIRATION RATE: 16 BRPM | WEIGHT: 122.1 LBS | TEMPERATURE: 97.6 F | HEIGHT: 61 IN | HEART RATE: 76 BPM | OXYGEN SATURATION: 97 % | BODY MASS INDEX: 23.05 KG/M2 | DIASTOLIC BLOOD PRESSURE: 69 MMHG | SYSTOLIC BLOOD PRESSURE: 141 MMHG

## 2019-02-13 DIAGNOSIS — G25.0 ESSENTIAL TREMOR: Primary | ICD-10-CM

## 2019-02-13 DIAGNOSIS — G25.0 ESSENTIAL TREMOR: ICD-10-CM

## 2019-02-13 ASSESSMENT — MIFFLIN-ST. JEOR: SCORE: 1031.22

## 2019-02-13 ASSESSMENT — PAIN SCALES - GENERAL: PAINLEVEL: NO PAIN (0)

## 2019-02-13 NOTE — NURSING NOTE
Chief Complaint   Patient presents with     RECHECK     MOVEMENT DISORDER - DBS PROGRAMMING     Karuna Osuna

## 2019-02-13 NOTE — PROGRESS NOTES
"[facility] Neurology  [facility]    Irish Rosales  YOB: 1950  MRN: [id number]    REASON FOR VISIT: DBS programming for essential tremor    Diagnosis: Essential tremor with dystonic features  DBS Target(s): Left Vim, Right Vim, Right Vop  Date(s) of DBS Lead Placement:   Left Vim 3/6/2018, Right Vim and Right Vop 1/8/2019  Date(s) of IPG Placement:   Left chest wall 3/13/2018, Right chest wall 1/15/2019    HISTORY OF PRESENT ILLNESS:  Irish Rosales is a 68 year old woman with essential tremor with dystonic features s/p bilateral DBS (left Vim 3/2018; right Vim/Vop dual leads 1/8/2018) who returns for initial programming of the right brain. Surgery went well and she has healed well. She has no pain. Incisions are nontender. After generator placement she slipped on a magazine at home, fell, and hit her head. She had low BP at the ED but BP has since been normal, so she thinks she may have been volume down. She has a general sense that her balance is \"off\" since the second DBS surgery, but cannot describe any specific gait problem. She denied stumbling. There have been no falls except for the single incident described above.     She reported a microlesion effect after surgery with suppression of LUE tremor and voice tremor lasting a few weeks and now starting to wear off. Neck pain resolved after surgery. She did not find Botox helpful for neck pain prior to surgery.       MEDICATIONS:  Outpatient Medications Marked as Taking for the 2/13/19 encounter (Office Visit) with  MOVEMENT DISORDER FELLOW   Medication Sig     ALPRAZolam (XANAX) 0.5 MG tablet Take 1 tablet (0.5 mg) by mouth daily as needed     botulinum toxin type A (BOTOX) 100 units injection Inject 200 units 4x per year     Nutritional Supplements (ENSURE COMPLETE SHAKE) LIQD Take 237 mLs by mouth 3 times daily (Patient taking differently: Take 237 mLs by mouth daily )     ROSUVASTATIN CALCIUM PO Take by mouth daily     " "sertraline (ZOLOFT) 100 MG tablet Take 1 tablet (100 mg) by mouth daily     Current Facility-Administered Medications for the 2/13/19 encounter (Office Visit) with  MOVEMENT DISORDER FELLOW   Medication     botulinum toxin type A (BOTOX) 100 units injection 200 Units       ALLERGIES:  Sulfa drugs; Atorvastatin; and Simvastatin     PAST MEDICAL HISTORY:  Medical history reviewed and updated in Epic, no new problems    REVIEW OF SYSTEMS:  12 point review of systems completed. Pertinent positives and negatives above and in HPI    PHYSICAL EXAM:  VITALS: /69 (BP Location: Left arm, Patient Position: Sitting, Cuff Size: Adult Regular)   Pulse 76   Temp 97.6  F (36.4  C) (Oral)   Resp 16   Ht 1.549 m (5' 1\")   Wt 55.4 kg (122 lb 1.6 oz)   SpO2 97%   BMI 23.07 kg/m    GENERAL: Cooperative, no acute distress  HEENT: Normocephalic and nontraumatic, sclera white, moist mucous membranes  CARDIAC: Regular rate and rhythm  RESPIRATORY: Nonlabored breathing  EXTREMITIES: Distal pulses intact. No UE or LE edema     NEUROLOGIC:  MENTAL STATUS: Fully alert, attentive and oriented.  SPEECH: Voice tremor fluctuated through exam. Prior to programming rated 2. Speech more easily understood compared to previous exams.   FACIAL EXPRESSION: Normal.    CRANIAL NERVES: EOM full with smooth pursuit. No nystagmus. Facial movements symmetric. Palate elevation symmetric, uvula midline. No dysarthria. Tongue protrusion midline.    MOTOR:   INVOLUNTARY MOVEMENTS - Head tremor 2, voice tremor 2, RUE postural tremor 1 and 3 on action. LUE tremor 1 on posture and 2 action. No tremor in the lower extremities.      COORDINATION: Mild ataxia with FNF on the right, probably stimulation related. No ataxia on the left.   GAIT: Able to rise from chair with arms crossed over chest. Posture is upright. Normal base, normal stride. Arm swing normal. Turns in 1-2 steps. Can only tandem 2-3 steps.      DBS interrogation and details    Left " "Brain:  Model: Abbott Infinity  Site of implantation: Left Vim   Date of implantation: 3/6/2018  Lead type: Abbott Infinity 0.5 mm, REF 6172  IPG: Abbott Infinity 5, REF 6660, implanted 3/13/2018  Battery: 2.95 volts  Current settings: C+, 2b- 3b-, 4.0 mA, 30 ms, 180 Hz,  [patient  range 0-5.0 mA]  No changes made to left Vim  Impedances checked - no problems found, no open or closed circuits    Right Brain:  Model: Abbott Infinity  Site of implantation: Dual leads, Right Vim and Right Vop  Date of implantation: 1/8/2019  Lead type: Abbott Infinity 0.5 mm, REF 6172 (Vim)  Lead type: Abbott Infinity 0.5 mm, REF 6172 (Vop)  IPG: Abbott Infinity 7, REF 6662, implanted 1/15/2019  Battery : >3.0 volts  Right Vim Current settings: OFF  Right Vop Current settings: OFF    Impedances checked - no problems found, no open or closed circuits      MONOPOLAR REVIEW - Right Vim  Contacts Amplitude  (V or mA) PW (ms) Rate (Hz) Effects Adverse effects   Baseline OFF OFF OFF Postural tremor 1, Action 2. Head 2. Voice 2.     Case+, 9- 0.25 60 130 NA No paresthesias    0.50 60 130 Trace postural. Action 1. Head 2.      1.0 60 130 Postural trace, action trace. Head 2, voice 2.  Very slight ataxia    1.5 60 130 No change Slight ataxia, more noticeable    2.0 60 130 No change Slight ataxia    2.5 60 130 No change LUE ataxia worse    3.0 60 130 No change LUE ataxia worse, would be bothersome   OFF OFF OFF OFF Trace postural & action tremor, incomplete washout NA   Case+, 10-abc 0.25 60 130 NA None    0.50 60 130 Postural 0. Action 0.  None    1.0 60 130 Postural 0, action trace. Voice 2.  None    1.5 60 130 No change  None    2.0 60 130 Postural & action 0.  Gait feels \"off\" but no overt gait ataxia.    2.5 60 130 No change None    3.0 60 130 No change Slight LUE ataxia?    3.5 60 130 No change Mild LUE ataxia, more noticeable.     4.0 60 130 No change More LUE ataxia   OFF OFF OFF OFF Posture trace, action 1 NA   Case+, 11-abc " "0.5 60 130 Posture 0, action 1.  None    1.0 60 130 Posture 0, action 1.  None    1.5 60 130 Posture 0, action 0.  None    2.0 60 130 No change None    2.5 60 130 Posture 0, action 0, voice 1. Voice clearer, subjectively.  Gait feels \"off.\" No overt gait ataxia. No limb ataxia.     3.0 60 130 No change None    3.5 60 130 No change None    4.0 60 130 No change Slight LUE ataxia? Vs proximal tremor returning?    4.5 60 130 Postural & action tremor 0. Head tremor trace.  Slight LUE ataxia.    OFF OFF OFF OFF Postural tremor 1, action 1 NA   Case+, 12- 0.5 60 130 Trace postural tremor, action 1. Head tremor 2.  None    1.0 60 130 No change None    1.5 60 130 No change None    2.0 60 130 Postural tremor 0, trace action. Head 1.  None    2.5 60 130 No change None    3.0 60 130 No change None    3.5 60 130 No change None    4.0 60 130 No change None   OFF OFF OFF OFF Head tremor 2, postural tremor 1, action 1.  NA   Case+ 10abc- 2.0 60 130 Voice tremor 2, head tremor 1. Postural and action tremor 0. None   Case+, 11abc- 2.5 60 130 Voice tremor 1, Head tremor 1. Postural & action LUE tremor 0. No limb ataxia.        MONOPOLAR REVIEW - Right Vop  Contacts Amplitude  (V or mA) PW (ms) Rate (Hz) Effects Adverse effects   Baseline OFF OFF OFF Postural tremor 1, Action 2. Head 2. Voice 2.     Case+, 1- 0.25 60 130 NA None    0.50 60 130 Postural tremor 0.5, Action 2, but better.      0.75 60 130 Postural tremor trace, action 1.      1.0 60 130 Postural tremor 0, action trace. Head 2, but better.  Felt a \"tweak\" or \"jolt.\" No specific location. Ramp was used.     1.25 60 130 NA None    1.50 60 130 No change     1.75 60 130 No change     2.0 60 130 Postural & action tremor 0.  ? Slight ataxia.     2.25 60 130 No change Slight ataxia    2.50 60 130 No change No change    3.0 60 130 No change Slight ataxia, a bit more noticeable.     3.5 60 130 Head tremor 1. Postural & action tremor 0. Voice tremor better. Voice more clear, " "subjectively.  Mild LUE ataxia.     4.0 60 130 Head tremor 1, voice is more clear than baseline. No change in LUE tremor.  LUE ataxia worse, would be bothersome.    OFF OFF OFF OFF Trace postural tremor, Action 1, incomplete washout    Case+, 2-abc 0.25 60 130 NA None    0.50 60 130 Trace postural, action 1.  None    1.0 60 130 Trace postural, action 1.  None    1.25 60 130 Trace postural, action trace. Voice tremor 1. Head tremor 1.  None    1.5 60 130 Postural 0. Action trace. Voice tremor 1.5, fluctuating. Head tremor 1.  No gait ataxia. No limb ataxia.     2.0 60 130 Postural & Action tremor 0. Voice tremor 1. Voice clear. Head tremor 1.      2.5 60 130 No change Gait \"feels off.\" \"I feel stiff\" No clear gait ataxia.     3.0 60 130 No change Gait feels \"off.\" Appears cautious.     3.5 60 130 No change Slight LUE ataxia.     4.0 60 130 No change Slight LUE ataxia   OFF OFF OFF OFF NA NA   Case+, 3-abc 0.50 60 130 NA None    1.0 60 130 Postural tremor trace, action 1.  None    1.5 60 130 Postural 0, action trace None    2.0 60 130 Postural 0, action 0. Voice 2. Head 1-2.  None    2.5 60 130 Postural & action tremor 0. Head 1.  None    3.0 60  130 No change None    3.5 60 130 No change. Head tremor 1.  Gait \"Feels slightly off\". Appears cautious, but no overt gait ataxia.     4.0 60 130 No change None   OFF OFF OFF OFF NA NA   Case+, 4- 0.50 60 130 Postural 1, action 1, head 2.  None    1.0 60 130 Postural 0, action 1. Head 2.  None    1.5 60 130 Postural 0, action trace. Head 1.  None    2.0 60 130 Postural 0, action 0.  None    2.5 60  130 No change None    3.0 60 130 Postural & action 0. Head 1.  None    3.5 60 130 No change None    4.0 60 130 No change None         Final settings  Right Vim: C+, 11abc -, 2.5 mA, 60 ms, 130 Hz  Right Vop: C+, 2abc -, 1.5 mA, 60 ms, 130 Hz    Examined with both leads on:   LUE postural tremor 0, action trace. Voice 1, head 1. Spiral slightly improved compared to baseline, but " tremor still perceptible - see scan.     No changes to left Vim DBS parameters.     Electrode impedances were checked and no issues were found. No open or closed circuits. The data will be scanned.        IMPRESSION:   Irish Rosales is a 68 year old woman s/p bilateral DBS (left Vim and dual right Vim/Vop leads). LUE tremor was suppressed independently with either the Vim or Vop lead. On final settings, she had near complete LUE tremor suppression with only trace action tremor. Interestingly, voice and head tremor improved with bilateral stimulation. This could be assessed with formal objective assessment in the future. Ventral contacts of both leads induced slight limb ataxia, although tremor suppression was achieved at lower amplitude without ataxia. She reported a sense of imbalance during gait testing during DBS programing and during baseline assessment. No clear gait ataxia was observed. She did not report any paresthesias with programming today up to an amplitude of 4-4.5 mA.        PLAN:  Final settings:   Right Vim: C+, 11abc -, 2.5 mA, 60 ms, 130 Hz   Right Vop: C+, 2abc -, 1.5 mA, 60 ms, 130 Hz    I was not able to pair the patient  to the new generator. Will speak with [employer] regarding pairing. Ms. Rosales will also update the software before her next appointment.     She will return in 6 weeks for programming.       Sandy Hernandez MD  Movement Disorders Fellow    I spent 2 hours on DBS programming.

## 2019-02-13 NOTE — LETTER
"2/13/2019      RE: Irish Rosales  [address]  [facility] [address]       [facility] Neurology  Movement Disorder Clinic    Irish Rosales  YOB: 1950  MRN: [id number]    REASON FOR VISIT: DBS programming for essential tremor    Diagnosis: Essential tremor with dystonic features  DBS Target(s): Left Vim, Right Vim, Right Vop  Date(s) of DBS Lead Placement:   Left Vim 3/6/2018, Right Vim and Right Vop 1/8/2019  Date(s) of IPG Placement:   Left chest wall 3/13/2018, Right chest wall 1/15/2019    HISTORY OF PRESENT ILLNESS:  Irish Rosales is a 68 year old woman with essential tremor with dystonic features s/p bilateral DBS (left Vim 3/2018; right Vim/Vop dual leads 1/8/2018) who returns for initial programming of the right brain. Surgery went well and she has healed well. She has no pain. Incisions are nontender. After generator placement she slipped on a magazine at home, fell, and hit her head. She had low BP at the ED but BP has since been normal, so she thinks she may have been volume down. She has a general sense that her balance is \"off\" since the second DBS surgery, but cannot describe any specific gait problem. She denied stumbling. There have been no falls except for the single incident described above.     She reported a microlesion effect after surgery with suppression of LUE tremor and voice tremor lasting a few weeks and now starting to wear off. Neck pain resolved after surgery. She did not find Botox helpful for neck pain prior to surgery.       MEDICATIONS:  Outpatient Medications Marked as Taking for the 2/13/19 encounter (Office Visit) with  MOVEMENT DISORDER FELLOW   Medication Sig     ALPRAZolam (XANAX) 0.5 MG tablet Take 1 tablet (0.5 mg) by mouth daily as needed     botulinum toxin type A (BOTOX) 100 units injection Inject 200 units 4x per year     Nutritional Supplements (ENSURE COMPLETE SHAKE) LIQD Take 237 mLs by mouth 3 times daily (Patient taking " "differently: Take 237 mLs by mouth daily )     ROSUVASTATIN CALCIUM PO Take by mouth daily     sertraline (ZOLOFT) 100 MG tablet Take 1 tablet (100 mg) by mouth daily     Current Facility-Administered Medications for the 2/13/19 encounter (Office Visit) with  MOVEMENT DISORDER FELLOW   Medication     botulinum toxin type A (BOTOX) 100 units injection 200 Units       ALLERGIES:  Sulfa drugs; Atorvastatin; and Simvastatin     PAST MEDICAL HISTORY:  Medical history reviewed and updated in Epic, no new problems    REVIEW OF SYSTEMS:  12 point review of systems completed. Pertinent positives and negatives above and in HPI    PHYSICAL EXAM:  VITALS: /69 (BP Location: Left arm, Patient Position: Sitting, Cuff Size: Adult Regular)   Pulse 76   Temp 97.6  F (36.4  C) (Oral)   Resp 16   Ht 1.549 m (5' 1\")   Wt 55.4 kg (122 lb 1.6 oz)   SpO2 97%   BMI 23.07 kg/m     GENERAL: Cooperative, no acute distress  HEENT: Normocephalic and nontraumatic, sclera white, moist mucous membranes  CARDIAC: Regular rate and rhythm  RESPIRATORY: Nonlabored breathing  EXTREMITIES: Distal pulses intact. No UE or LE edema     NEUROLOGIC:  MENTAL STATUS: Fully alert, attentive and oriented.  SPEECH: Voice tremor fluctuated through exam. Prior to programming rated 2. Speech more easily understood compared to previous exams.   FACIAL EXPRESSION: Normal.    CRANIAL NERVES: EOM full with smooth pursuit. No nystagmus. Facial movements symmetric. Palate elevation symmetric, uvula midline. No dysarthria. Tongue protrusion midline.    MOTOR:   INVOLUNTARY MOVEMENTS - Head tremor 2, voice tremor 2, RUE postural tremor 1 and 3 on action. LUE tremor 1 on posture and 2 action. No tremor in the lower extremities.      COORDINATION: Mild ataxia with FNF on the right, probably stimulation related. No ataxia on the left.   GAIT: Able to rise from chair with arms crossed over chest. Posture is upright. Normal base, normal stride. Arm swing normal. " "Turns in 1-2 steps. Can only tandem 2-3 steps.      DBS interrogation and details    Left Brain:  Model: Abbott Infinity  Site of implantation: Left Vim   Date of implantation: 3/6/2018  Lead type: Abbott Infinity 0.5 mm, REF 6172  IPG: Abbott Infinity 5, REF 6660, implanted 3/13/2018  Battery: 2.95 volts  Current settings: C+, 2b- 3b-, 4.0 mA, 30 ms, 180 Hz,  [patient  range 0-5.0 mA]  No changes made to left Vim  Impedances checked - no problems found, no open or closed circuits    Right Brain:  Model: Abbott Infinity  Site of implantation: Dual leads, Right Vim and Right Vop  Date of implantation: 1/8/2019  Lead type: Abbott Infinity 0.5 mm, REF 6172 (Vim)  Lead type: Abbott Infinity 0.5 mm, REF 6172 (Vop)  IPG: Abbott Infinity 7, REF 6662, implanted 1/15/2019  Battery : >3.0 volts  Right Vim Current settings: OFF  Right Vop Current settings: OFF    Impedances checked - no problems found, no open or closed circuits      MONOPOLAR REVIEW - Right Vim  Contacts Amplitude  (V or mA) PW (ms) Rate (Hz) Effects Adverse effects   Baseline OFF OFF OFF Postural tremor 1, Action 2. Head 2. Voice 2.     Case+, 9- 0.25 60 130 NA No paresthesias    0.50 60 130 Trace postural. Action 1. Head 2.      1.0 60 130 Postural trace, action trace. Head 2, voice 2.  Very slight ataxia    1.5 60 130 No change Slight ataxia, more noticeable    2.0 60 130 No change Slight ataxia    2.5 60 130 No change LUE ataxia worse    3.0 60 130 No change LUE ataxia worse, would be bothersome   OFF OFF OFF OFF Trace postural & action tremor, incomplete washout NA   Case+, 10-abc 0.25 60 130 NA None    0.50 60 130 Postural 0. Action 0.  None    1.0 60 130 Postural 0, action trace. Voice 2.  None    1.5 60 130 No change  None    2.0 60 130 Postural & action 0.  Gait feels \"off\" but no overt gait ataxia.    2.5 60 130 No change None    3.0 60 130 No change Slight LUE ataxia?    3.5 60 130 No change Mild LUE ataxia, more noticeable.     4.0 60 " "130 No change More LUE ataxia   OFF OFF OFF OFF Posture trace, action 1 NA   Case+, 11-abc 0.5 60 130 Posture 0, action 1.  None    1.0 60 130 Posture 0, action 1.  None    1.5 60 130 Posture 0, action 0.  None    2.0 60 130 No change None    2.5 60 130 Posture 0, action 0, voice 1. Voice clearer, subjectively.  Gait feels \"off.\" No overt gait ataxia. No limb ataxia.     3.0 60 130 No change None    3.5 60 130 No change None    4.0 60 130 No change Slight LUE ataxia? Vs proximal tremor returning?    4.5 60 130 Postural & action tremor 0. Head tremor trace.  Slight LUE ataxia.    OFF OFF OFF OFF Postural tremor 1, action 1 NA   Case+, 12- 0.5 60 130 Trace postural tremor, action 1. Head tremor 2.  None    1.0 60 130 No change None    1.5 60 130 No change None    2.0 60 130 Postural tremor 0, trace action. Head 1.  None    2.5 60 130 No change None    3.0 60 130 No change None    3.5 60 130 No change None    4.0 60 130 No change None   OFF OFF OFF OFF Head tremor 2, postural tremor 1, action 1.  NA   Case+ 10abc- 2.0 60 130 Voice tremor 2, head tremor 1. Postural and action tremor 0. None   Case+, 11abc- 2.5 60 130 Voice tremor 1, Head tremor 1. Postural & action LUE tremor 0. No limb ataxia.        MONOPOLAR REVIEW - Right Vop  Contacts Amplitude  (V or mA) PW (ms) Rate (Hz) Effects Adverse effects   Baseline OFF OFF OFF Postural tremor 1, Action 2. Head 2. Voice 2.     Case+, 1- 0.25 60 130 NA None    0.50 60 130 Postural tremor 0.5, Action 2, but better.      0.75 60 130 Postural tremor trace, action 1.      1.0 60 130 Postural tremor 0, action trace. Head 2, but better.  Felt a \"tweak\" or \"jolt.\" No specific location. Ramp was used.     1.25 60 130 NA None    1.50 60 130 No change     1.75 60 130 No change     2.0 60 130 Postural & action tremor 0.  ? Slight ataxia.     2.25 60 130 No change Slight ataxia    2.50 60 130 No change No change    3.0 60 130 No change Slight ataxia, a bit more noticeable.     3.5 60 " "130 Head tremor 1. Postural & action tremor 0. Voice tremor better. Voice more clear, subjectively.  Mild LUE ataxia.     4.0 60 130 Head tremor 1, voice is more clear than baseline. No change in LUE tremor.  LUE ataxia worse, would be bothersome.    OFF OFF OFF OFF Trace postural tremor, Action 1, incomplete washout    Case+, 2-abc 0.25 60 130 NA None    0.50 60 130 Trace postural, action 1.  None    1.0 60 130 Trace postural, action 1.  None    1.25 60 130 Trace postural, action trace. Voice tremor 1. Head tremor 1.  None    1.5 60 130 Postural 0. Action trace. Voice tremor 1.5, fluctuating. Head tremor 1.  No gait ataxia. No limb ataxia.     2.0 60 130 Postural & Action tremor 0. Voice tremor 1. Voice clear. Head tremor 1.      2.5 60 130 No change Gait \"feels off.\" \"I feel stiff\" No clear gait ataxia.     3.0 60 130 No change Gait feels \"off.\" Appears cautious.     3.5 60 130 No change Slight LUE ataxia.     4.0 60 130 No change Slight LUE ataxia   OFF OFF OFF OFF NA NA   Case+, 3-abc 0.50 60 130 NA None    1.0 60 130 Postural tremor trace, action 1.  None    1.5 60 130 Postural 0, action trace None    2.0 60 130 Postural 0, action 0. Voice 2. Head 1-2.  None    2.5 60 130 Postural & action tremor 0. Head 1.  None    3.0 60  130 No change None    3.5 60 130 No change. Head tremor 1.  Gait \"Feels slightly off\". Appears cautious, but no overt gait ataxia.     4.0 60 130 No change None   OFF OFF OFF OFF NA NA   Case+, 4- 0.50 60 130 Postural 1, action 1, head 2.  None    1.0 60 130 Postural 0, action 1. Head 2.  None    1.5 60 130 Postural 0, action trace. Head 1.  None    2.0 60 130 Postural 0, action 0.  None    2.5 60  130 No change None    3.0 60 130 Postural & action 0. Head 1.  None    3.5 60 130 No change None    4.0 60 130 No change None         Final settings  Right Vim: C+, 11abc -, 2.5 mA, 60 ms, 130 Hz  Right Vop: C+, 2abc -, 1.5 mA, 60 ms, 130 Hz    Examined with both leads on:   LUE postural tremor 0, " action trace. Voice 1, head 1. Spiral slightly improved compared to baseline, but tremor still perceptible - see scan.     No changes to left Vim DBS parameters.     Electrode impedances were checked and no issues were found. No open or closed circuits. The data will be scanned.        IMPRESSION:   Irish Rosales is a 68 year old woman s/p bilateral DBS (left Vim and dual right Vim/Vop leads). LUE tremor was suppressed independently with either the Vim or Vop lead. On final settings, she had near complete LUE tremor suppression with only trace action tremor. Interestingly, voice and head tremor improved with bilateral stimulation. This could be assessed with formal objective assessment in the future. Ventral contacts of both leads induced slight limb ataxia, although tremor suppression was achieved at lower amplitude without ataxia. She reported a sense of imbalance during gait testing during DBS programing and during baseline assessment. No clear gait ataxia was observed. She did not report any paresthesias with programming today up to an amplitude of 4-4.5 mA.        PLAN:  Final settings:   Right Vim: C+, 11abc -, 2.5 mA, 60 ms, 130 Hz   Right Vop: C+, 2abc -, 1.5 mA, 60 ms, 130 Hz    I was not able to pair the patient  to the new generator. Will speak with [employer] regarding pairing. Ms. Rosales will also update the software before her next appointment.     She will return in 6 weeks for programming.       Sandy Hernandez MD  Movement Disorders Fellow    I spent 2 hours on DBS programming.      MOVEMENT DISORDER FELLOW

## 2019-02-14 NOTE — PATIENT INSTRUCTIONS
We completed initial programming for the right thalamus (right Vim and Vop).    Right Vim is at 2.5 mA  Right Vop is at 1.5 mA    Come back in six weeks.     I'll check on the patient  issues with [employer].

## 2019-02-15 DIAGNOSIS — G25.2 DYSTONIC TREMOR: ICD-10-CM

## 2019-02-19 RX ORDER — ALPRAZOLAM 0.5 MG
TABLET ORAL
Qty: 30 TABLET | Refills: 0 | Status: SHIPPED | OUTPATIENT
Start: 2019-02-19 | End: 2019-08-08

## 2019-02-19 NOTE — TELEPHONE ENCOUNTER
Rx faxed to pharmacy. No need to contact patient, refill came from pharmacy.  Shruti ARANGO, CMA

## 2019-03-25 ENCOUNTER — PATIENT OUTREACH (OUTPATIENT)
Dept: GERIATRIC MEDICINE | Facility: CLINIC | Age: 69
End: 2019-03-25

## 2019-03-25 NOTE — PROGRESS NOTES
Received message from Cammie stating that she isn't needing as much help and would like to discuss this.    Spoke w/ Cammie who reported she cancelled [employer] and would like to reduce homemaking hours to 2.5 hours per week.    Spoke w/ Sonia at [facility] about reducing homemaking services. Sonia reported that [facility] has a 4 hour per week minimum for providing homemaking services, because otherwise they aren't able to find staffing.    LM for Cammie informing her of what [facility] reported. Stated that if she wanted to reduce homemaking services then a new homemaking company would need to be found. Informed Cammie she could return writer's phone all or that this could be discuss at her annual home visit next week.    Annual home visit scheduled for Mon, April 1st at 1:00pm.    Michelle Corcoran, [facility]  [phone]  Fax: [phone]

## 2019-03-25 NOTE — PROGRESS NOTES
Received a request to submit a DTR for the termination of [employer]. Documentation completed and faxed to the health plan. Care Coordinator aware.    Melanie Hatfield RN  Utilization   [facility]  [phone]

## 2019-03-25 NOTE — PROGRESS NOTES
[facility] Care Coordination Contact    Dr. Victoria    I am the [facility] care coordinator for Irish Rosales, and I am writing to notify you of a change in services.   Other [employer] (chore services) services have been terminated  This change has occurred because Cammie cancelled services d/t not needing the assistance any longer.    I am required by the health plan to notify you of this change. No action is required on your part. Please do not hesitate to contact me with any questions or concerns. Thank you.    Michelle Corcoran, [facility] [place]  [phone]  Fax: [phone]

## 2019-04-01 ENCOUNTER — PATIENT OUTREACH (OUTPATIENT)
Dept: GERIATRIC MEDICINE | Facility: CLINIC | Age: 69
End: 2019-04-01

## 2019-04-01 ASSESSMENT — ACTIVITIES OF DAILY LIVING (ADL): DEPENDENT_IADLS:: CLEANING;COOKING;LAUNDRY;MEAL PREPARATION

## 2019-04-01 NOTE — PROGRESS NOTES
[facility] Care Coordination Contact    [facility] Home Visit Assessment     Home visit for Health Risk Assessment with Irish Rosales completed on April 1, 2019    Type of residence:: Apartment - handicap accessible  Current living arrangement:: I live alone     Assessment completed with:: Patient    Current Care Plan  Member currently receiving the following home care services: No     Member currently receiving the following community resources: Housekeeping/[employer]    Medication Review  Medication reconciliation completed in Epic: Yes  Medication set-up & administration: Independent-does not set up.  Self-administers medications.  Medication Risk Assessment Medication (1 or more, place referral to MTM): N/A: No risk factors identified  MTM Referral Placed: No: No risk factors idenified    Mental/Behavioral Health   Depression Screening: See PHQ assessment flowsheet.   Mental health DX:: Yes   Mental health DX how managed:: Medication  Mental Health Diagnosis: Yes: Generalized Anxiety DO and Depression, major, in remission   Mental Health Services: None: No further intervention needed at this time.    Falls Assessment:   Fallen 2 or more times in the past year?: No   Any fall with injury in the past year?: Yes    ADL/IADL Dependencies:   Dependent ADLs:: Independent  Dependent IADLs:: Cleaning, Cooking, Laundry, Meal Preparation    [facility] Plan sponsored benefits: Shared information re: Silver Sneakers/gym memberships, ASA, Calcium +D.    PCA Assessment completed at visit: Not applicable     Elderly Waiver Eligibility: Yes-will continue on EW    Care Plan & Recommendations: Tremors have improved since surgery. Has difficulty with fine motor skill d/t minimal tremors. States having a homemaker is helpful d/t difficulty with fine motor skills. Is going to speak with the specialists she sees regarding the tremors she still experiences.    See [facility] for detailed assessment  information.    Follow-Up Plan: Member informed of future contact, plan to f/u with member with a 6 month telephone assessment.  Contact information shared with member and family, encouraged member to call with any questions or concerns at any time.     [place] care continuum providers: Please refer to Health Care Home on the [facility] Problem List to view this patient's [facility] Care Plan Summary.      INNA Diallo  [facility]  [phone]  Fax: [phone]

## 2019-04-03 ENCOUNTER — ANCILLARY PROCEDURE (OUTPATIENT)
Dept: BONE DENSITY | Facility: CLINIC | Age: 69
End: 2019-04-03
Attending: INTERNAL MEDICINE
Payer: COMMERCIAL

## 2019-04-03 ENCOUNTER — ANCILLARY PROCEDURE (OUTPATIENT)
Dept: MAMMOGRAPHY | Facility: CLINIC | Age: 69
End: 2019-04-03
Attending: INTERNAL MEDICINE
Payer: COMMERCIAL

## 2019-04-03 DIAGNOSIS — Z12.31 VISIT FOR SCREENING MAMMOGRAM: ICD-10-CM

## 2019-04-03 DIAGNOSIS — M85.89 OSTEOPENIA OF MULTIPLE SITES: ICD-10-CM

## 2019-04-03 PROCEDURE — 77067 SCR MAMMO BI INCL CAD: CPT | Performed by: RADIOLOGY

## 2019-04-03 PROCEDURE — 77080 DXA BONE DENSITY AXIAL: CPT | Performed by: RADIOLOGY

## 2019-04-04 NOTE — RESULT ENCOUNTER NOTE
Dear Irish,   Your recent lab results showed the following:  -- The bone density test shows osteopenia. This is an intermediate category that is in between normal and osteoporosis.  People with osteopenia should work on taking in 5770-1497 mg of calcium with vitamin D daily. They should also be getting daily weight bearing exercise (walking works)     Please call or Mychart to our office if you have further questions.     Braulio Victoria MD-PhD

## 2019-04-10 PROBLEM — G24.9 DYSTONIC MOVEMENTS: Chronic | Status: ACTIVE | Noted: 2017-01-21

## 2019-04-11 ENCOUNTER — PATIENT OUTREACH (OUTPATIENT)
Dept: GERIATRIC MEDICINE | Facility: CLINIC | Age: 69
End: 2019-04-11

## 2019-04-11 NOTE — LETTER
April 11, 2019    Important Plan Information    HARSHAD PAYNE  [address] [facility]   [place] [address]  Your Care Plan  Dear Harshad,  When we spoke recently, I promised to send you a Care Plan. The plan enclosed is a summary of our discussion. It includes the steps we agreed would help you meet your health goals. In addition, I can help you with:  [id number]  This program is available to members who need a ride to medical and dental visits. To schedule a ride, call [phone] or [phone] (toll free). TTY/TTD: 711. You can call Monday - Thursday 8 a.m. to 5 p.m. and Fridays 9 a.m. to 5 p.m.   [employer]   The [employer] program empowers you to improve your health through education and exercise. To learn more, visit [employer], or call [employer] at [phone] (toll free) (TTY:711) from 7 a.m. - 7 p.m. Central Time, Monday-Friday.  Health Care Directive   This form helps you outline your health care wishes. You can request a form from me and I will answer any questions you have before you discuss it with your doctor.   Annual Physical  Take a key step on your path to good health and set up an annual physical at your clinic.  Questions?  Call me at [phone] Monday-Friday between 8am and 5pm.  TTY/TTD: 711. As we discussed, I plan to be in touch with you again in 6 months to follow up via phone.  Sincerely,    INNA Diallo    E-mail: [email or web address]  Phone:[phone]      [facility]            cc: member records            American Indians can continue to use [place] and [facility] ([facility]) clinics. We will not require prior approval or impose any conditions for you to get services at these clinics. For elders age 65 years and older this includes Elderly Waiver (EW) services accessed through the [place]. If a doctor or other provider in a [place] or [facility] clinic refers you to a provider in our network, we  will not require you to see your primary care provider prior to the referral.    For accessible formats of this publication or assistance with equal or access to our services, visit [email or web address], or call [phone] (toll free) or use your preferred relay service.    Auxiliary Aids and Services.   [facility] provides auxiliary aids and services, like qualified interpreters or information in accessible formats, free of charge and in a timely manner, to ensure an equal opportunity to participate in our health care programs. Contact [facility] Customer Service at [email or web address] or call [phone] (toll free) or use your preferred relay service.    Language Assistance Services.   [facility] provides translated documents and spoken language interpreting, free of charge and in a timely manner, when language assistance services are necessary to ensure limited English speakers have meaningful access to our information and services. Contact [employer] at [email or web address] or call [phone] (toll free) or use your preferred relay service.     Civil Rights Notice  Discrimination is against the law. [facility] does not discriminate on the basis of any of the following:    Race    Color    National Origin    Creed    Sabianist    Age    Public Assistance Status    Receipt of Health Care Services    Disability (including physical or mental impairment)    Sex (including sex stereotypes and gender identity)    Marital Status    Political Beliefs    Medical Condition    Genetic Information    Sexual Orientation    Claims Experience    Medical History    Health Status    Civil Rights Complaints.   You have the right to file a discrimination complaint if you believe you were treated in a discriminatory way by [facility]. You may contact any of the following four agencies directly to file a discrimination complaint.    U.S. Department of Health and Human Services  Office for Civil Rights  (OCR)  You have the right to file a complaint with the OCR, a federal agency, if you believe you have been discriminated against because of any of the following:    Race    Disability    Color    Sex (including sex stereotypes and gender identity)    National Origin    Age    Contact the OCR directly to file a complaint:         Director         U.S. Department of Health and Human Services  Office for Civil Rights         [address] [place] [address] [address] [address]         [phone] (Voice)         [phone] (TDD)         Complaint Portal - [email or web address]     [place] Department of Human Rights (MDHR)  In [place], you have the right to file a complaint with the MD if you believe you have been discriminated against because of any of the following:      Race    Color    National Origin    Hoahaoism    Creed    Sex    Sexual Orientation    Marital Status    Public Assistance Status    Contact the [facility] directly to file a complaint:         [place] Department of Human Rights         [address] [address]         [phone] (voice)          [phone] (toll free)         711 or [phone] ([place] Relay)         [phone] (Fax)         [email or web address] (Email)     [place] Department of Human Services (DHS)  You have the right to file a complaint with [facility] if you believe you have been discriminated against in our health care programs because of any of the following:    Race    Color    National Origin    Creed    Hoahaoism    Age    Public Assistance Status    Receipt of Health Care Services    Disability (including physical or mental impairment)    Sex (including sex stereotypes and gender identity)    Marital Status    Political Beliefs    Medical Condition    Genetic Information    Sexual Orientation    Claims Experience    Medical History    Health Status    Complaints  must be in writing and filed within 180 days of the date you discovered the alleged discrimination. The complaint must contain your name and address and describe the discrimination you are complaining about. After we get your complaint, we will review it and notify you in writing about whether we have authority to investigate. If we do, we will investigate the complaint.      [facility] will notify you in writing of the investigation s outcome. You have a right to appeal the outcome if you disagree with the decision. To appeal, you must send a written request to have [facility] review the investigation outcome period. Be brief and state why you disagree with the decision. Include additional information you think is important.      If you file a complaint in this way, the people who work for the agency named in the complaint cannot retaliate against you. This means they cannot punish you in any way for filing a complaint. Filing a complaint in this way does not stop you from seeking out other legal or administration actions.     Contact [facility] directly to file a discrimination complaint:        ATTN: Civil Rights Coordinator        [place] Department of Human Services        Equal Opportunity and Access Division        [address] [address]        [place] [address] [phone] (voice) or use your preferred relay service     [facility] Complaint Notice   Contact [facility] directly to file a discrimination complaint:  [facility] Civil Rights Coordinator  Mail Route   [address]  [place] [address] [phone] (voice) or use your preferred relay service  [email or web address]

## 2019-04-11 NOTE — PROGRESS NOTES
[facility] Care Coordination Contact    Received after visit chart from care coordinator.  Completed following tasks: Mailed copy of care plan to client, Updated services in access and Submitted referrals/auths for hmkg  Chart was returned to CC.    and Provider Signature - No POC Shared:  Member indicates that they do not want their POC shared with any EW providers.   Chayo Hickman  Case Management Specialist  [facility]  [phone]

## 2019-04-16 ENCOUNTER — OFFICE VISIT (OUTPATIENT)
Dept: OTOLARYNGOLOGY | Facility: CLINIC | Age: 69
End: 2019-04-16
Payer: COMMERCIAL

## 2019-04-16 ENCOUNTER — OFFICE VISIT (OUTPATIENT)
Dept: NEUROLOGY | Facility: CLINIC | Age: 69
End: 2019-04-16
Payer: COMMERCIAL

## 2019-04-16 VITALS
DIASTOLIC BLOOD PRESSURE: 50 MMHG | OXYGEN SATURATION: 97 % | WEIGHT: 122 LBS | SYSTOLIC BLOOD PRESSURE: 118 MMHG | HEART RATE: 81 BPM | HEIGHT: 61 IN | BODY MASS INDEX: 23.03 KG/M2

## 2019-04-16 DIAGNOSIS — G25.2 DYSTONIC TREMOR: ICD-10-CM

## 2019-04-16 DIAGNOSIS — G25.0 ESSENTIAL TREMOR: ICD-10-CM

## 2019-04-16 DIAGNOSIS — R49.0 DYSPHONIA: Primary | ICD-10-CM

## 2019-04-16 DIAGNOSIS — R49.8 VOCAL TREMOR: ICD-10-CM

## 2019-04-16 DIAGNOSIS — G25.0 ESSENTIAL TREMOR: Primary | ICD-10-CM

## 2019-04-16 ASSESSMENT — PAIN SCALES - GENERAL: PAINLEVEL: NO PAIN (0)

## 2019-04-16 ASSESSMENT — MIFFLIN-ST. JEOR: SCORE: 1030.77

## 2019-04-16 NOTE — PROGRESS NOTES
[facility]    Clinician: Cristobal Gaytan M.M., M.A., CCC/SLP  Referring physician:  Dr. Colvin  Patient: Irish Rosales  Date of Visit: 4/16/2019     HISTORY  Chief complaint: Irish Rosales is a 67 year old woman presenting today for evaluation of voice.    Salient history: She has a history significant for dystonic tremor.  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 She underwent left DBS implantation on 3/13/18 with Dr. Morales, and programming of the DBS was performed on 4/11/18.  At the request of her care team in neurology she presented on 4/18/18 for objective evaluation of her improvement with and without activation of DBS. Perceptual, acoustic, and aerodynamic measures taken at that time showed reduction in severity of dysphonia, frequency perturbation, and consistency / efficiency of airflow.  Since that time patient has undergone right DBS implantation in January of this year.  Given this change in status she returns for similar objective evaluation to that performed last year.     INTERIM HISTORY / CURRENT SYMPTOMS:    She feels that her voice is much better than prior to the DBS  o Speaking on the phone and in public     Doesn't feel her voice is bothering her at all    Feels that she is 90% better with regard to tremor overall    OBJECTIVE  Patient Supplied Answers To Last 2 VHI Questionnaires  Voice Handicap Index (VHI-10) 4/18/2018 4/16/2019   My voice makes it difficult for people to hear me 3 1   People have difficulty understanding me in a noisy room 3 1   My voice difficulties restrict my personal and social life.  3 1   I feel left out of conversations because of my voice 3 0   My voice problem causes me to lose income 3 0   I feel as though I have to strain to produce voice 2 1   The clarity of my voice is unpredictable 3 1   My voice problem upsets me 3 1   My voice  "makes me feel handicapped 3 0   People ask, \"What's wrong with your voice?\" 2 0   VHI-10 28 6     PATIENT REPORTED MEASURES  WITH DBS:  ? Effort to talk: 3 / 10 (0-10 in which 10 represents maximal effort)  ? Effort to sin / 10 (0-10 in which 10 represents maximal effort)  ? Voice quality: 7 / 10 (0-10 in which 10 represents best possible voice)     WITHOUT DBS:  ? Effort to talk: 7 / 10 (0-10 in which 10 represents maximal effort)  ? Effort to sin / 10 (0-10 in which 10 represents maximal effort)  ? Voice quality: 3 / 10 (0-10 in which 10 represents best possible voice)     PERCEPTUAL EVALUATION (CPT 25333)  *ratings of perceptual quality were averaged from two clinicians reviewing blinded samples with >20 years of combined experience rating voices *    WITH DBS:  BREATHING:     appears within normal limits and adequate     VOICE:    Roughness: Mild Intermittent    Breathiness: Minimal    Strain: Mild Intermittent    Vocal Tremor: Mild to moderate Intermittent    Loudness    Conversational speech: minimally reduced    Pitch:    Conversational speech:  WNL    Pitch glide:     Clinician support required to access loft register    No notable breaks    Resonance:    Mildly narrowed    Mild hypernasality    Singing vs. Speech:  Reduced salience of tremor in singing. Improved phonatory respiratory coordination in singing vs. speech    Other: mildly slow rate of speech (informally judged)    CAPE-V Overall Severity:  44 /100    WITHOUT DBS:  BREATHING:     Intermittently disrupted with involuntary movements of her torso    VOICE:    Roughness: Mild Intermittent    Breathiness: Minimal    Strain: Mild Intermittent    Vocal Tremor: Moderate to severe Consistent    Loudness    Conversational speech:  Mildly reduced    Pitch:    Conversational speech:  WNL    Pitch glide:     Reduced access to both loft and low modal register    Disruptions in voicing resulting from tremor, but no consistent " "breaks    Resonance:    Narrowed resonance    Mild hypernasality    Tongue base tension    Singing vs. Speech:  Singing voice improves over the course of the sample. Improved phonatory respiratory coordination vs. Running speech    CAPE-V Overall Severity:  64/100    ____________________________________________________________________  Laryngeal Function Studies (CPT 49271)  Acoustic measures WITH DBS:  Fundamental frequency Metrics     /a/ mean F0 = 140 Hz (SD = 49.75 Hz)     /i/ mean F0 = 231 Hz (SD = 1.82 Hz)     [place] Mean f0 = 189 Hz (SD 51.10 Hz)     [place]:         Min F0 = 74 Hz (artifact of roughness)        Max F0 = 522 Hz    Cepstral Measures     CPPS /a/ = 29.65 dB     CPPS /i/ = 26.90 dB     CPPS \"all voiced\" = 22.88 dB     AVQI (v.3.01) = 1.27    Additional Measures     Harmonic to Noise Ratio /a/ = 19.74 dB     Harmonic to Noise Ratio: [place] = 17.29 dB     Jitter (local) /a/ = 0.680 %     Shimmer (local) /a/ = 5.707 %    Acoustic measures WITHOUT DBS:  Fundamental frequency Metrics     /a/ mean F0 = 180 Hz (SD = 47.86 Hz)     /i/ mean F0 = 139 Hz (SD = 55.13 Hz)     [place] Mean f0 = 197 Hz (SD 49.53 Hz)     [place]:         Min F0 = 60 Hz (artifact of roughness)        Max F0 = 490 Hz    Cepstral Measures     CPPS /a/ = 23.16 dB     CPPS /i/ = 25.55 dB     CPPS \"all voiced\" = 20.78 dB     AVQI (v.3.01) = 1.71     Additional Measures     Harmonic to Noise Ratio /a/ = 15.68 dB     Harmonic to Noise Ratio: [place] = 16.15 dB     Jitter (local) /a/ = 1.742 %     Shimmer (local) /a/ = 5.988 %      Aerodynamic measures:  Aerodynamic Measures  DBS ON    Protocol / Parameter Result Normative Mean (SD)   Vital Capacity     Expiratory Volume 2.23 Liters 2.24 (0.6) Liters   Comfortable Sustained Phonation     Mean SPL 71.07 dB 79.89 (5.47) dB   Mean Pitch 202.86 Hz 185.27 (25.93) Hz   Peak Expiratory Airflow 0.22 Lit/Sec 0.17 (0.09) Lit/Sec   Mean Expiratory Airflow 0.094 " Lit/Sec 0.1 (0.06) Lit/Sec   Voicing Efficiency     Peak Air Pressure 11.42 cm H2O 9.7 (5.12) cm H2O   Mean Peak Air Pressure 9.79 cm H2O 7.95 (4.28) cm H2O   Peak Expiratory Airflow 0.174 Lit/Sec 0.25 (0.19) Lit/Sec   Mean Airflow During Voicing 0.125 Lit/Sec 0.13 (0.07) Lit/Sec   Running Speech: All Voiced Stimulus     Mean SPL 66.1 dB    Mean Pitch 213.02 Hz    Peak Expiratory Airflow 0.313 Lit/Sec    Mean Expiratory Airflow 0.122 Lit/Sec    Mean Airflow During Voicing 0.123 Lit/Sec    Running Speech: [place]     Mean SPL 58.22 dB    SPL Range 48.14 dB    Mean Pitch 205.04 Hz    Pitch Range 246.08 Hz    Peak Expiratory Airflow 1.389 Lit/Sec    Mean Expiratory Airflow 0.156 Lit/Sec    Expiratory Volume 1.14 Liters    Mean Airflow During Voicing 0.162 Lit/Sec    Peak Inspiratory Airflow -1.351 Lit/Sec    Inspiratory Volume -0.95 Liters      Aerodynamic Measures DBS OFF    Protocol / Parameter Result Normative Mean (SD)   Vital Capacity     Expiratory Volume 2.2 Liters 2.24 (0.6) Liters   Comfortable Sustained Phonation     Mean SPL 74.01 dB 79.89 (5.47) dB   Mean Pitch 223.85 Hz 185.27 (25.93) Hz   Peak Expiratory Airflow 0.238 Lit/Sec 0.17 (0.09) Lit/Sec   Mean Expiratory Airflow 0.101 Lit/Sec 0.1 (0.06) Lit/Sec   Voicing Efficiency     Peak Air Pressure 17.01 cm H2O 9.7 (5.12) cm H2O   Mean Peak Air Pressure 13.23 cm H2O 7.95 (4.28) cm H2O   Peak Expiratory Airflow 0.242 Lit/Sec 0.25 (0.19) Lit/Sec   Mean Airflow During Voicing 0.105 Lit/Sec 0.13 (0.07) Lit/Sec   Running Speech: All Voiced Stimulus     Mean SPL 66.46 dB    Mean Pitch 215.23 Hz    Peak Expiratory Airflow 0.301 Lit/Sec    Mean Expiratory Airflow 0.133 Lit/Sec    Mean Airflow During Voicing 0.138 Lit/Sec    Running Speech: [place]     Mean SPL 57.98 dB    SPL Range 53.41 dB    Mean Pitch 217.74 Hz    Pitch Range 259.05 Hz    Peak Expiratory Airflow 1.524 Lit/Sec    Mean Expiratory Airflow 0.175 Lit/Sec    Expiratory Volume  4.81 Liters    Mean Airflow During Voicing 0.158 Lit/Sec    Peak Inspiratory Airflow -1.896 Lit/Sec    Inspiratory Volume -4.58 Liters    ____________________________________________________________________    ASSESSMENT / PLAN  IMPRESSIONS: Irish Rosales is presenting today with a Dysphonia (R49.0) and vocal tremor (R49.8) in the context of Essential Tremor (G25.0) and Dystonic Tremor (G25.2) status post implantation of a Deep Brain Stimulator in March of this year. Today's evaluation shows improvement across perceptual, acoustic, and aerodynamic measures with the use of DBS.    Perceptually: two expert blinded raters rated the patient both with and without the use of deep brain stimulation using the consensus audio perceptual evaluation of voice (CAPE-V).  Overall severity with a symmetric is rated on a 100 mm of the visual analog scale.  This showed a decrease of 20 points with activation of the DBS, with significant reduction in the salience of vocal tremor particularly during tasks with sustained voicing such as singing or pitch glides. Patient self-report of effort for speech and singing dropped by 4 points, using a 10 point scale where 10 represents maximal effort.  Patient self-report of voice quality increased by 4 points using a 10 point scale in which 10 represents the best possible voice.  Additionally Voice Handicap Index (VHI-10) score, a 40 point index in which lower score indicates less disruption of quality of life, dropped from a score of 28 last year to a 6 this year.    Acoustically: activation of the DBS reduced frequency perturbation significantly, and allowed for significantly increase Cepstral Peak Prominence.  These changes manifested in a decreased AVQI score (lower score indicative of decreased dysphonia).       Aerodynamically: the patient demonstrated measurements within normal limits both with and without the DBS; however, the nature of the gathered metrics (peak and mean  measurements) fail to capture the perturbation of airflow immediately apparent when visually analyzing the signal.  Below Comfortable sustained phonation is listed as an example:    RECOMMENDATIONS:     Given the patients overall improvement in voice quality, and ability to meet her daily vocal demands, no further speech services are required at this time.    She was encouraged to maintain contact with the clinic and re-engage if her needs or status change in the future.    This treatment plan was developed with the patient who agreed with the recommendations.    TOTAL SERVICE TIME: 70 minutes  EVALUATION OF VOICE AND RESONANCE (78021)  LARYNGEAL FUNCTION STUDIES (95860)  NO CHARGE FACILITY FEE (80563)    Cristobal Gaytan M.M., M.A., CCC-SLP  Speech-Language Pathologist  Certificate of Vocology  [phone]    *this report was created in part through the use of computerized dictation software, and though reviewed following completion, some typographic errors may persist.  If there is confusion regarding any of this notes contents, please contact me for clarification.*

## 2019-04-16 NOTE — PROGRESS NOTES
[facility] Follow-up    CC: DBS follow-up    HPI: Irish Rosales is a 68 year old woman with essential tremor with dystonic features s/p bilateral DBS (left Vim 3/2018; right Vim/Vop dual leads 1/8/2019). She presents for follow-up after initial programming of R VIM/VOP leads which were both turned on.    She is very happy with the programming. She reports her neck pain is gone, her voice is much better. She denies dysarthria, imbalance, or dysphagia. Tremor is also improved on left.    Her main request today would be to have better control in the RIGHT arm, as she is right-handed and has been using the left hand to eat.    Medications:  Current Outpatient Medications   Medication     ALPRAZolam (XANAX) 0.5 MG tablet     baclofen (LIORESAL) 20 MG tablet     botulinum toxin type A (BOTOX) 100 units injection     gabapentin (NEURONTIN) 100 MG capsule     Nutritional Supplements (ENSURE COMPLETE SHAKE) LIQD     ROSUVASTATIN CALCIUM PO     sertraline (ZOLOFT) 100 MG tablet     Current Facility-Administered Medications   Medication     botulinum toxin type A (BOTOX) 100 units injection 200 Units       Past Medical History:   Diagnosis Date     Depression      Dyslipidemia      Dystonic movements 1/21/2017     Dystonic tremor 1/21/2017     Family history of movement disorder 1/21/2017     mild abnormality in carotid study 3/2/2017    BILATERAL DUPLEX CAROTID ULTRASOUND March 1, 2017 1:01 PM    HISTORY: Other specified symptoms and signs involving the circulatory and respiratory systems.   COMPARISON: None.   FINDINGS: There is mild atherosclerotic plaque in the carotid bifurcations. Flow velocities and waveforms show less than 50% diameter stenosis in both the right and left internal carotid arteries as assessed by each ICA PS     Osteoporosis 8/2/2010     Social History     Social History Narrative        Lives in [place]    Daughter Karyn olmos  "essential tremor with a strained quality to her voice consistent with mild laryngeal spasm        ALLERGIES:  She is allergic to sulfa drugs, atorvastatin and simvastatin.  The statin drugs caused leg cramps.            FAMILY HISTORY:  As noted above with 1 sister developing a voice tremor and another sister having what is described as a facial tremor.  Mother with hand tremors          SOCIAL HISTORY:  She does not smoke.  She does use alcohol on occasion. She previously worked as a RN.         Jazmyn [facility]  [phone] orofacial dyskinesias    Piedad Candice [phone]  laryngeal dystonia and laryngeal tremor and laryngeal spasm.             Updated 6/13/17: Client was born in [place], [place] to parents Jerome and Berenice.  Client grew up w/ three sisters Tea, Carrie, and Jazmyn who are currently still living.  Client graduated from high school, attended college for two years, and graduated w/ a RN Degree.  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  She was  to her ex-spouse for 25 years and had three children; one son Bryan and two daughters Halley/Ysabel.  One daughter Halley Sanchez resides nearby and her other daughter Ysabel and her son Bryan reside in [place], [place].  Bryan has two daughters ages five and eight years old.  Client enjoys flying for free to [place] to visit her two granddaughters. Michelle Sutton, [facility] ([phone], Fax: [phone]).           ROS:  A complete ROS was otherwise noncontributory except as noted above in HPI    Exam:  /50 (BP Location: Right leg, Patient Position: Sitting, Cuff Size: Adult Regular)   Pulse 81   Ht 1.549 m (5' 1\")   Wt 55.3 kg (122 lb)   SpO2 97%   BMI 23.05 kg/m    NAD  Breathing comfortably  No peripheral edema    Neurological Examination (R/L):  Mental Status: Awake, alert. " "Fluent. Affect normal.  Cranial Nerves: Versions full. No hypomimia. No hypophonia. Slight rightward shift of head. Rare no-no head tremor, mostly with walking. Tongue protrudes midline. Mild vocal tremor.  Motor: Slight right postural tremor, absent left postural tremor. Slightly worse in wing-beating position. Moderate right and mild left intention tremor. See scanned spirals.  No resting tremor.   Gait: Can rise from chair with arms crossed. Gait narrow-based with stiff appearing arms. Can tandem.     DBS interrogation and details     Left Brain:  Model: Abbott Infinity  Site of implantation: Left Vim   Date of implantation: 3/6/2018  Lead type: Abbott Infinity 0.5 mm, REF 6172  IPG: Abbott Infinity 5, REF 6660, implanted 3/13/2018  Battery: 2.94 volts  Current settings: C+, 2b- 3b-, 4.0 mA, 30 ms, 180 Hz,  [patient  range 0-5.0 mA]  Impedances checked - no problems found, no open or closed circuits     Right Brain:  Model: Abbott Infinity  Site of implantation: Dual leads, Right Vim and Right Vop  Date of implantation: 1/8/2019  Lead type: Abbott Infinity 0.5 mm, REF 6172 (Vim)  Lead type: Abbott Infinity 0.5 mm, REF 6172 (Vop)  IPG: Abbott Infinity 7, REF 6662, implanted 1/15/2019  Battery : 2.97 volts  Right Vim: C+, 11abc -, 2.5 mA, 60 ms, 130 Hz  Right Vop: C+, 2abc -, 1.5 mA, 60 ms, 130 Hz     Impedances checked - no problems found, no open or closed circuits       Left Hemisphere Review    Contacts Amplitude    mA PW   (usec) Rate  (Hz) Assessment Adverse Effects   C+2B-3B- 4.5 30 180  Transient R arm tingling     5.0    none    5.5         6.0   Best spiral and intention tremor     6.5    Transient R hand tingling     8.0    R leg feels strange with walking     4 60        5.0    Right arm feels \"weird\" R face capsular activation, mild dysarthria                             Final settings   NO CHANGE TO R DBS  Right Vim: C+, 11abc -, 2.5 mA, 60  s, 130 Hz  Right Vop: C+, 2abc -, 1.5 mA, 60  s, " 130 Hz    Left VIM:  C+, 2b- 3b-, 4.0 mA, 30  s, 180 Hz,  [patient  range 0-6.0 mA]    Assessment: 68 year old woman with essential tremor with dystonic features s/p bilateral DBS (left Vim 3/2018; right Vim/Vop dual leads 1/8/2019). Has done quite well following initial programming of R VIM and VOP leads. Did not change her settings on this side as her main complaint today was of right hand tremor.    Increased the current for the current settings. She had large thresholds and improved tremor that seemed to be best at 6.0mA and was largely stable or slightly worse after. Increased the current to 5.0mA and increased her range to 6.0mA. She did not have imbalance in the clinic but will need to keep an eye out for that with chronic stimulation.    Plan:   -increased current of left VIM from 4 to 5mA    Leave it on this for about 2 weeks. If you have side effects, let us know. You can turn it back down to 4.5 or 4.0 if needed.    If after 2 weeks you have no side effects but the right hand tremor is still bothersome, you can turn it up to 5.5. If after two more weeks at this setting you still have no side effects but some bothersome tremor, you can turn it up to 6.0.    Follow-up 2 months    DBS programming time 45 minutes    Tushar Deluca MD  Movement Disorders Fellow

## 2019-04-16 NOTE — LETTER
4/16/2019       RE: Irish Rosales  [id number]  [place] [address]     Dear Colleague,    Thank you for referring your patient, Irish Rosales, to the [facility] VOICE at [facility]. Please see a copy of my visit note below.    [facility] VOICE [facility]INIC    Clinician: Cristobal Gaytan M.M., M.A., CCC/SLP  Referring physician:  Dr. Colvin  Patient: Irish Rosales  Date of Visit: 4/16/2019     HISTORY  Chief complaint: Irish Rosales is a 67 year old woman presenting today for evaluation of voice.    Salient history: She has a history significant for dystonic tremor.  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 She underwent left DBS implantation on 3/13/18 with Dr. Morales, and programming of the DBS was performed on 4/11/18.   At the request of her care team in neurology she presented on 4/18/18 for objective evaluation of her improvement with and without activation of DBS. Perceptual, acoustic, and aerodynamic measures taken at that time showed reduction in severity of dysphonia, frequency perturbation, and consistency / efficiency of airflow.  Since that time patient has undergone right DBS implantation in January of this year.  Given this change in status she returns for similar objective evaluation to that performed last year.     INTERIM HISTORY / CURRENT SYMPTOMS:    She feels that her voice is much better than prior to the DBS  o Speaking on the phone and in public     Doesn't feel her voice is bothering her at all    Feels that she is 90% better with regard to tremor overall    OBJECTIVE  Patient Supplied Answers To Last 2 VHI Questionnaires  Voice Handicap Index (VHI-10) 4/18/2018 4/16/2019   My voice makes it difficult for people to hear me 3 1   People have difficulty understanding me in a noisy room 3 1   My voice difficulties restrict  "my personal and social life.  3 1   I feel left out of conversations because of my voice 3 0   My voice problem causes me to lose income 3 0   I feel as though I have to strain to produce voice 2 1   The clarity of my voice is unpredictable 3 1   My voice problem upsets me 3 1   My voice makes me feel handicapped 3 0   People ask, \"What's wrong with your voice?\" 2 0   VHI-10 28 6     PATIENT REPORTED MEASURES  WITH DBS:  ? Effort to talk:  3 / 10 (0-10 in which 10 represents maximal effort)  ? Effort to sin / 10 (0-10 in which 10 represents maximal effort)  ? Voice quality:  7 / 10 (0-10 in which 10 represents best possible voice)     WITHOUT DBS:  ? Effort to talk:  7 / 10 (0-10 in which 10 represents maximal effort)  ? Effort to sin / 10 (0-10 in which 10 represents maximal effort)  ? Voice quality:  3 / 10 (0-10 in which 10 represents best possible voice)     PERCEPTUAL EVALUATION (CPT 20096)  *ratings of perceptual quality were averaged from two clinicians reviewing blinded samples with >20 years of combined experience rating voices *    WITH DBS:  BREATHING:     appears within normal limits and adequate     VOICE:    Roughness: Mild Intermittent    Breathiness: Minimal    Strain: Mild Intermittent    Vocal Tremor: Mild to moderate Intermittent    Loudness    Conversational speech: minimally reduced    Pitch:    Conversational speech:  WNL    Pitch glide:     Clinician support required to access loft register    No notable breaks    Resonance:    Mildly narrowed    Mild hypernasality    Singing vs. Speech:  Reduced salience of tremor in singing. Improved phonatory respiratory coordination in singing vs. speech    Other: mildly slow rate of speech (informally judged)    CAPE-V Overall Severity:  44 /100    WITHOUT DBS:  BREATHING:     Intermittently disrupted with involuntary movements of her torso    VOICE:    Roughness: Mild Intermittent    Breathiness: Minimal    Strain: Mild Intermittent    Vocal " "Tremor: Moderate to severe Consistent    Loudness    Conversational speech:  Mildly reduced    Pitch:    Conversational speech:  WNL    Pitch glide:     Reduced access to both loft and low modal register    Disruptions in voicing resulting from tremor, but no consistent breaks    Resonance:    Narrowed resonance    Mild hypernasality    Tongue base tension    Singing vs. Speech:  Singing voice improves over the course of the sample. Improved phonatory respiratory coordination vs. Running speech    CAPE-V Overall Severity:  64/100    ____________________________________________________________________  Laryngeal Function Studies (CPT 65423)  Acoustic measures WITH DBS:  Fundamental frequency Metrics     /a/ mean F0 = 140 Hz (SD = 49.75 Hz)     /i/ mean F0 = 231 Hz (SD = 1.82 Hz)     [place] Mean f0 = 189 Hz (SD 51.10 Hz)     [place]:         Min F0 = 74 Hz (artifact of roughness)        Max F0 = 522 Hz    Cepstral Measures     CPPS /a/ = 29.65 dB     CPPS /i/ = 26.90 dB     CPPS \"all voiced\" = 22.88 dB     AVQI (v.3.01) = 1.27    Additional Measures     Harmonic to Noise Ratio /a/ = 19.74 dB     Harmonic to Noise Ratio: [place] = 17.29 dB     Jitter (local) /a/ = 0.680 %     [place]immer (local) /a/ = 5.707 %    Acoustic measures WITHOUT DBS:  Fundamental frequency Metrics     /a/ mean F0 = 180 Hz (SD = 47.86 Hz)     /i/ mean F0 = 139 Hz (SD = 55.13 Hz)     [place] Mean f0 = 197 Hz (SD 49.53 Hz)     [place]:         Min F0 = 60 Hz (artifact of roughness)        Max F0 = 490 Hz    Cepstral Measures     CPPS /a/ = 23.16 dB     CPPS /i/ = 25.55 dB     CPPS \"all voiced\" = 20.78 dB     AVQI (v.3.01) = 1.71     Additional Measures     Harmonic to Noise Ratio /a/ = 15.68 dB     Harmonic to Noise Ratio: [place] = 16.15 dB     Jitter (local) /a/ = 1.742 %     Shimmer (local) /a/ = 5.988 %      Aerodynamic measures:  Aerodynamic Measures  DBS ON    Protocol / Parameter Result Normative Mean (SD)   Vital " Capacity     Expiratory Volume 2.23 Liters 2.24 (0.6) Liters   Comfortable Sustained Phonation     Mean SPL 71.07 dB 79.89 (5.47) dB   Mean Pitch 202.86 Hz 185.27 (25.93) Hz   Peak Expiratory Airflow 0.22 Lit/Sec 0.17 (0.09) Lit/Sec   Mean Expiratory Airflow 0.094 Lit/Sec 0.1 (0.06) Lit/Sec   Voicing Efficiency     Peak Air Pressure 11.42 cm H2O 9.7 (5.12) cm H2O   Mean Peak Air Pressure 9.79 cm H2O 7.95 (4.28) cm H2O   Peak Expiratory Airflow 0.174 Lit/Sec 0.25 (0.19) Lit/Sec   Mean Airflow During Voicing 0.125 Lit/Sec 0.13 (0.07) Lit/Sec   Running Speech: All Voiced Stimulus     Mean SPL 66.1 dB    Mean Pitch 213.02 Hz    Peak Expiratory Airflow 0.313 Lit/Sec    Mean Expiratory Airflow 0.122 Lit/Sec    Mean Airflow During Voicing 0.123 Lit/Sec    Running Speech: [place]     Mean SPL 58.22 dB    SPL Range 48.14 dB    Mean Pitch 205.04 Hz    Pitch Range 246.08 Hz    Peak Expiratory Airflow 1.389 Lit/Sec    Mean Expiratory Airflow 0.156 Lit/Sec    Expiratory Volume 1.14 Liters    Mean Airflow During Voicing 0.162 Lit/Sec    Peak Inspiratory Airflow -1.351 Lit/Sec    Inspiratory Volume -0.95 Liters      Aerodynamic Measures DBS OFF    Protocol / Parameter Result Normative Mean (SD)   Vital Capacity     Expiratory Volume 2.2 Liters 2.24 (0.6) Liters   Comfortable Sustained Phonation     Mean SPL 74.01 dB 79.89 (5.47) dB   Mean Pitch 223.85 Hz 185.27 (25.93) Hz   Peak Expiratory Airflow 0.238 Lit/Sec 0.17 (0.09) Lit/Sec   Mean Expiratory Airflow 0.101 Lit/Sec 0.1 (0.06) Lit/Sec   Voicing Efficiency     Peak Air Pressure 17.01 cm H2O 9.7 (5.12) cm H2O   Mean Peak Air Pressure 13.23 cm H2O 7.95 (4.28) cm H2O   Peak Expiratory Airflow 0.242 Lit/Sec 0.25 (0.19) Lit/Sec   Mean Airflow During Voicing 0.105 Lit/Sec 0.13 (0.07) Lit/Sec   Running Speech: All Voiced Stimulus     Mean SPL 66.46 dB    Mean Pitch 215.23 Hz    Peak Expiratory Airflow 0.301 Lit/Sec    Mean Expiratory Airflow 0.133 Lit/Sec    Mean Airflow  During Voicing 0.138 Lit/Sec    Running Speech: [place]     Mean SPL 57.98 dB    SPL Range 53.41 dB    Mean Pitch 217.74 Hz    Pitch Range 259.05 Hz    Peak Expiratory Airflow 1.524 Lit/Sec    Mean Expiratory Airflow 0.175 Lit/Sec    Expiratory Volume 4.81 Liters    Mean Airflow During Voicing 0.158 Lit/Sec    Peak Inspiratory Airflow -1.896 Lit/Sec    Inspiratory Volume -4.58 Liters    ____________________________________________________________________    ASSESSMENT / PLAN  IMPRESSIONS: Irish Rosales is presenting today with a Dysphonia (R49.0) and vocal tremor (R49.8) in the context of Essential Tremor (G25.0) and Dystonic Tremor (G25.2) status post implantation of a Deep Brain Stimulator in March of this year. Today's evaluation shows improvement across perceptual, acoustic, and aerodynamic measures with the use of DBS.    Perceptually: two expert blinded raters rated the patient both with and without the use of deep brain stimulation using the consensus audio perceptual evaluation of voice (CAPE-V).  Overall severity with a symmetric is rated on a 100 mm of the visual analog scale.  This showed a decrease of 20 points with activation of the DBS, with significant reduction in the salience of vocal tremor particularly during tasks with sustained voicing such as singing or pitch glides. Patient self-report of effort for speech and singing dropped by 4 points, using a 10 point scale where 10 represents maximal effort.  Patient self-report of voice quality increased by 4 points using a 10 point scale in which 10 represents the best possible voice.  Additionally Voice Handicap Index (VHI-10) score, a 40 point index in which lower score indicates less disruption of quality of life, dropped from a score of 28 last year to a 6 this year.    Acoustically: activation of the DBS reduced frequency perturbation significantly, and allowed for significantly increase Cepstral Peak Prominence.  These changes  manifested in a decreased AVQI score (lower score indicative of decreased dysphonia).       Aerodynamically: the patient demonstrated measurements within normal limits both with and without the DBS; however, the nature of the gathered metrics (peak and mean measurements) fail to capture the perturbation of airflow immediately apparent when visually analyzing the signal.  Below Comfortable sustained phonation is listed as an example:    RECOMMENDATIONS:     Given the patients overall improvement in voice quality, and ability to meet her daily vocal demands, no further speech services are required at this time.    She was encouraged to maintain contact with the clinic and re-engage if her needs or status change in the future.    This treatment plan was developed with the patient who agreed with the recommendations.    TOTAL SERVICE TIME: 70 minutes  EVALUATION OF VOICE AND RESONANCE (03981)  LARYNGEAL FUNCTION STUDIES (59618)  NO CHARGE FACILITY FEE (72731)    Cristobal Gaytan M.M., M.A., CCC-SLP  Speech-Language Pathologist  Certificate of Vocology  [phone]    *this report was created in part through the use of computerized dictation software, and though reviewed following completion, some typographic errors may persist.  If there is confusion regarding any of this notes contents, please contact me for clarification.*      Again, thank you for allowing me to participate in the care of your patient.      Sincerely,    Francisco Gaytan, SLP

## 2019-04-16 NOTE — PATIENT INSTRUCTIONS
I increased the current for the stimulator in the LEFT brain for the RIGHT body.  It used to be 4.0mA. Now it is 5.0mA.    Leave it on this for about 2 weeks. If you have side effects, let us know. You can turn it back down to 4.5 or 4.0 if needed.    If after 2 weeks you have no side effects but the right hand tremor is still bothersome, you can turn it up to 5.5. If after two more weeks at this setting you still have no side effects but some bothersome tremor, you can turn it up to 6.0.      Call or send [employer] messages if you have questions or issues.    REMINDER:  When logging into your , it will tell you the side of the brain it is implanted in. So if you want to log in to the stimulator controlling your RIGHT body, choose LEFT. You can double check you are on the correct side if you look at the picture of the body next to the stimulation setting it will be blue on the side of the body that is being stimulated.

## 2019-04-18 ENCOUNTER — TELEPHONE (OUTPATIENT)
Dept: NEUROLOGY | Facility: CLINIC | Age: 69
End: 2019-04-18

## 2019-04-18 NOTE — TELEPHONE ENCOUNTER
[facility] Call Center    Phone Message    May a detailed message be left on voicemail: yes    Reason for Call: Other: Irish calling to schedule her DBS fu with Sandy Hernandez.     Action Taken: Message routed to:  [facility] ([facility] clinic coord neurosci

## 2019-05-12 ENCOUNTER — MYC MEDICAL ADVICE (OUTPATIENT)
Dept: PEDIATRICS | Facility: CLINIC | Age: 69
End: 2019-05-12

## 2019-05-12 DIAGNOSIS — M54.2 NECK PAIN: Primary | ICD-10-CM

## 2019-05-17 ENCOUNTER — THERAPY VISIT (OUTPATIENT)
Dept: PHYSICAL THERAPY | Facility: CLINIC | Age: 69
End: 2019-05-17
Attending: INTERNAL MEDICINE
Payer: COMMERCIAL

## 2019-05-17 DIAGNOSIS — M54.2 NECK PAIN: ICD-10-CM

## 2019-05-17 PROBLEM — M43.6 NECK STIFFNESS: Status: ACTIVE | Noted: 2019-05-17

## 2019-05-17 PROCEDURE — 97530 THERAPEUTIC ACTIVITIES: CPT | Mod: GP | Performed by: PHYSICAL THERAPIST

## 2019-05-17 PROCEDURE — 97140 MANUAL THERAPY 1/> REGIONS: CPT | Mod: GP | Performed by: PHYSICAL THERAPIST

## 2019-05-17 PROCEDURE — 97161 PT EVAL LOW COMPLEX 20 MIN: CPT | Mod: GP | Performed by: PHYSICAL THERAPIST

## 2019-05-17 PROCEDURE — 97110 THERAPEUTIC EXERCISES: CPT | Mod: GP | Performed by: PHYSICAL THERAPIST

## 2019-05-17 NOTE — PROGRESS NOTES
Cervical Spine Evaluation    Physical Therapy Initial Examination/Evaluation  May 17, 2019    Irish Rosales is a 68 year old female referred to physical therapy by Braulio Victoria MD for treatment of neck pain with Precautions/Restrictions/MD instructions evaluate and treat    Therapist Impression:   Suspected wiring from DBS to generator as cause of tension in the neck, moderately-severely limiting AROM  Primary complaint is tightness in the neck, no pain  1) severe forward head posture, zero active/passive cervical retraction ROM due to tension  2) hypertonic R UT  3) UE WNL, denies symptoms into R UE      SUBJECTIVE:  DBS surgery in January - had pain prior to surgery, no pain now  Primary complaint now is stiffness that she feels is due to the wiring from her DBS to the generator in her chest - some clicking/cracking of neck that is not associated with pain  Has not seen the surgeon since surgery - only has followed up with neurologist    DOI/onset: January 2019 after surgery DOS: January 2019  Acute Injury or Gradual Onset?: Acute injury onset  Mechanism of Injury: surgery  Previous Treatment: Heat, some stretching Effect of prior treatment: fair  Imaging: None  Chief Complaint/Functional Limitations:   Looking in the blindspot   Pain: rest 0 /10, activity 0/10 Location: R sided stiffness Frequency: Constant Described as: tightness   Alleviated by: Heat   Progression of Symptoms: Unchanged   Time of day when pain is worse: Morning  Sleeping: No issues/uninterrupted   Occupation: retired  Current HEP/exercise regimen: stretching, swimming, walk for exercise  Patient's goals are improve neck mobility and stiffness    Other pertinent PMH/Red Flags: None   Barriers at home/work: None as reported by patient  Pertinent Surgical History: deep brain stimulator on L 3/2018, on R 1/2019  Medications: Anti-depressants and statin  General health as reported by patient: good  Return to MD:   PRN    OBJECTIVE:    Posture  Forward Head: severe  Rounded Shoulders: moderate/severe  Thoracic Spine: kyphotic    Range of Motion (measured in % restriction)  Flexion: 25%  Extension: 10%  Sidebend Left: >50%, increased tightness on R Right: >50%, increased tightness on R  Rotation Left: >50%, increased tightness on R Right: >50%, increased tightness on R  Protraction: 0%  Retraction: 100%, unable due to tightness  Joint play: NT  Upper extremity screen: WNL, no discomfort    Cervical Strength   Flexion: WNL  Extension: WNL    Upper Extremity     Strength  DNT myotomes - pt denies radicular symptoms  All MMT +4/5 without pain in all directions, BL     Dermatomes  DNT myotomes - pt denies radicular symptoms     Special Tests  Spurling's: Negative R  Dynamic Scapular Assessment: protracted BL      Palpation  Mild tenderness to palpation at R UT    Assessment/Plan:  Patient is a 68 year old female with cervical complaints.    Patient has the following significant findings with corresponding treatment plan.                Diagnosis 1:  Neck tightness  Decreased ROM/flexibility - manual therapy, therapeutic exercise, therapeutic activity and home program  Decreased joint mobility - manual therapy, therapeutic exercise, therapeutic activity and home program  Impaired muscle performance - neuro re-education and home program  Decreased function - therapeutic activities and home program  Impaired posture - neuro re-education, therapeutic activities and home program    Therapy Evaluation Codes:     Cumulative Therapy Evaluation is: Low complexity.    Previous and current functional limitations:  (See Goal Flow Sheet for this information)    Short term and Long term goals: (See Goal Flow Sheet for this information)     Communication ability:  Patient appears to be able to clearly communicate and understand verbal and written communication and follow directions correctly.  Treatment Explanation - The following has been  discussed with the patient:   RX ordered/plan of care  Anticipated outcomes  Possible risks and side effects  This patient would benefit from PT intervention to resume normal activities.   Rehab potential is questionable.    Frequency:  1 X week, once daily  Duration:  for 4 weeks  Discharge Plan:  Achieve all LTG.  Independent in home treatment program.  Reach maximal therapeutic benefit.    Recommended that patient contact surgeon for further evaluation.    Please refer to the daily flowsheet for treatment today, total treatment time and time spent performing 1:1 timed codes.

## 2019-05-21 ENCOUNTER — TELEPHONE (OUTPATIENT)
Dept: NEUROLOGY | Facility: CLINIC | Age: 69
End: 2019-05-21

## 2019-05-21 NOTE — TELEPHONE ENCOUNTER
[facility] Call Center    Phone Message    May a detailed message be left on voicemail: yes    Reason for Call: Other: Irish would like to schedule a botox injection with Dr. Hernandez.-  Not in guideline.     Action Taken: Message routed to:  [facility] ([facility]): clinic coord

## 2019-05-24 ENCOUNTER — THERAPY VISIT (OUTPATIENT)
Dept: PHYSICAL THERAPY | Facility: CLINIC | Age: 69
End: 2019-05-24
Payer: COMMERCIAL

## 2019-05-24 ENCOUNTER — PATIENT OUTREACH (OUTPATIENT)
Dept: NEUROSURGERY | Facility: CLINIC | Age: 69
End: 2019-05-24

## 2019-05-24 DIAGNOSIS — M54.2 NECK PAIN: ICD-10-CM

## 2019-05-24 PROCEDURE — 97140 MANUAL THERAPY 1/> REGIONS: CPT | Mod: GP | Performed by: PHYSICAL THERAPIST

## 2019-05-24 NOTE — PROGRESS NOTES
Spoke with physical therapist Mimi Carvajal re pt's neck stiffness in the context of left side DBS extension wire (pt has bilateral leads), implanted on 3/13/19.     Per therapist, pt has a forward head posture and cannot move her head/neck to a more neutral position without feeling extreme tension at the proximal end of the extension wire. Per physical therapist, the extension wire does not seem to have any give. The wire is palpable and visibile (this is normal), but she said she can feel the tension in the wire when pt attempts to move her neck.     Pt also has limited range of motion from side to side (about 25 degrees) and worse turning head to the right.     I explained that the Abbott extension wire is flexible and stretchy, so the tension is  unusual. Therapist stated pt would be more comfortable moving ahead with PT if she has an appointment with Dr. Morales first. I will f/u with Dr. Morales re appointment and any further recommendations.

## 2019-06-03 ENCOUNTER — TELEPHONE (OUTPATIENT)
Dept: NEUROLOGY | Facility: CLINIC | Age: 69
End: 2019-06-03

## 2019-06-03 ENCOUNTER — OFFICE VISIT (OUTPATIENT)
Dept: NEUROSURGERY | Facility: CLINIC | Age: 69
End: 2019-06-03
Payer: COMMERCIAL

## 2019-06-03 VITALS
DIASTOLIC BLOOD PRESSURE: 73 MMHG | HEART RATE: 74 BPM | SYSTOLIC BLOOD PRESSURE: 130 MMHG | OXYGEN SATURATION: 98 % | WEIGHT: 122 LBS | HEIGHT: 61 IN | BODY MASS INDEX: 23.03 KG/M2

## 2019-06-03 DIAGNOSIS — G24.9 DYSTONIA: ICD-10-CM

## 2019-06-03 DIAGNOSIS — G25.0 ESSENTIAL TREMOR: Primary | ICD-10-CM

## 2019-06-03 DIAGNOSIS — Z96.89 S/P DEEP BRAIN STIMULATOR PLACEMENT: ICD-10-CM

## 2019-06-03 ASSESSMENT — PAIN SCALES - GENERAL: PAINLEVEL: NO PAIN (0)

## 2019-06-03 ASSESSMENT — PATIENT HEALTH QUESTIONNAIRE - PHQ9: SUM OF ALL RESPONSES TO PHQ QUESTIONS 1-9: 0

## 2019-06-03 ASSESSMENT — MIFFLIN-ST. JEOR: SCORE: 1025.77

## 2019-06-03 NOTE — LETTER
6/3/2019       RE: Irish Rosales  [address]  [place] [facility] [place] [address]     Dear Colleague,    Thank you for referring your patient, Irish Rosales, to the [facility]UROS[facility]GERY at [facility]. Please see a copy of my visit note below.    HISTORY AND PHYSICAL EXAM    Chief Complaint   Patient presents with     RECHECK     DECREASED RANGE OF MOTION IN NECK, S/P DEEP BRAIN STIMULATOR SURGERY       HISTORY OF PRESENT ILLNESS  We saw Ms. Irish Rosales back in Neurosurgery Clinic after her DBS surgery to discuss her neck stiffness and decreased range of motion.  Patient is s/p left side deep brain stimulator placement, phase I, placement of left side deep brain stimulator electrode, target left ventral intermediate nucleus of the thalamus, with microelectrode recording on 3/6/2018 and s/p deep brain stimulator placement, phase II, placement of deep brain stimulator generator/battery over the left chest wall on 3/13/2018.  Then, she underwent right side deep brain stimulator placement, phase I, placement of right side deep brain stimulator electrodes, target left ventral intermediate nucleus and ventral oralis nucleus of the thalamus, with microelectrode recording on 1/8/2019 and deep brain stimulator placement, phase II, placement of deep brain stimulator generator/battery over the right chest wall on 1/15/2019.  Patient tolerated all the procedures well and there were no complications.  Patient wishes to been today because she has been having neck stiffness and decreased range of motion.  She was seen by her physical therapist who thought that the patient's neck stiffness was due to the extension wire in her neck on the left side.  The following information was relayed to us.    Per therapist, pt has a forward head posture and cannot move her head/neck to a more neutral position without feeling extreme tension at the proximal end of the extension wire. Per  physical therapist, the extension wire does not seem to have any give. The wire is palpable and visibile (this is normal), but she said she can feel the tension in the wire when pt attempts to move her neck.     Patient was informed that she has the Abbott system which has the extension wires that stretch to some degree.  Also, it is thinner than Medtronic device.  Therefore, it is not the wire that is giving her tension, if any.  It is likely the scar tissue around the wire at worst.  Anyway, patient wanted to be seen to discuss her neck issues.  When further discussed, we did discuss her known diagnosis of cervical dystonia.  She states that her neck stiffness is without any pain.  She denies any pain.  She has received Botox injections for her cervical dystonia in the past with good relief, except for her last injection which was prior to her recent DBS surgery.    In terms of her tremors, patient is doing very well and she is very pleased with the results of the surgery.  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  Briefly, patient is a 69 year old right-handed female with history of dystonic tremor.  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 She also has cervical dystonia. Her right side was more affected than her left side.       Past Medical History:   Diagnosis Date     Depression      Dyslipidemia      Dystonic movements 1/21/2017     Dystonic tremor 1/21/2017     Family history of movement disorder 1/21/2017     mild abnormality in carotid study 3/2/2017    BILATERAL DUPLEX CAROTID ULTRASOUND March 1, 2017 1:01 PM    HISTORY: Other specified symptoms and signs involving the circulatory and respiratory systems.   COMPARISON: None.   FINDINGS: There is mild atherosclerotic plaque in the carotid bifurcations. Flow velocities and waveforms show less than 50% diameter stenosis  in both the right and left internal carotid arteries as assessed by each ICA PS     Osteoporosis 8/2/2010       Past Surgical History:   Procedure Laterality Date     ------------OTHER-------------  2004    vocal cord thyroplasty at [facility]     C BSO, OMENTECTOMY W/XAVIER  1997    hysterectomy     C HAND/FINGER SURGERY UNLISTED  1998    right carpal tunnel surgery     IMPLANT DEEP BRAIN STIMULATION GENERATOR / BATTERY Left 3/13/2018    Procedure: IMPLANT DEEP BRAIN STIMULATION GENERATOR / BATTERY;  Deep Brain Stimulator Placement, Phase II, Placement Of Deep Brain Stimulator Generator/Battery Over The Left Chest Wall;  Surgeon: Aleksander Morales MD;  Location: [facility] OR     IMPLANT DEEP BRAIN STIMULATION GENERATOR / BATTERY Right 1/15/2019    Procedure: Deep Brain Stimulator Placement, Phase II, Placement Of Deep Brain Stimulator Generator/Battery Over The Right Chest Wall;  Surgeon: Aleksander Morales MD;  Location: [facility] OR     OPTICAL TRACKING SYSTEM INSERTION DEEP BRAIN STIMULATION Left 3/6/2018    Procedure: OPTICAL TRACKING SYSTEM INSERTION DEEP BRAIN STIMULATION;  Stealth Assisted Left Deep Brain Stimulator Placement, Phase I, Placement Of Left Side Deep Brain Stimulator Electrode, Target Left Ventral Intermediate Nucleus Of The Thalamus With Microelectrode Recording;  Surgeon: Aleksander Morales MD;  Location: [facility] OR     OPTICAL TRACKING SYSTEM INSERTION DEEP BRAIN STIMULATION Right 1/8/2019    Procedure: Stealth Assisted Right Deep Brain Stimulator Placement, Phase One, Placement Of Right Side Deep Brain Stimulator Electrode Target Right Ventral Intermediate Nucleus Of The Thalamus and placement Second Electrode With Microelectrode Recording;  Surgeon: Aleksander Morales MD;  Location: [facility] OR     RELEASE CARPAL TUNNEL Left 1/2/2015    Procedure: RELEASE CARPAL TUNNEL;  Surgeon: SHANIA Hernandez MD;  Location: [facility] OR     RELEASE ULNAR NERVE (ELBOW) Left 1/2/2015    Procedure: RELEASE ULNAR  NERVE (ELBOW);  Surgeon: SHANIA Hernandez MD;  Location: [facility] OR       Family History   Problem Relation Age of Onset     Hypertension Father         and mother     Cerebrovascular Disease Father      Tremor Mother      Lung Cancer Mother      Neurologic Disorder Sister         dystonic voice x 2 botox     Neurologic Disorder Sister         jose m [place]. 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   Social Needs     Financial resource strain: Not on file     Food insecurity:     Worry: Not on file     Inability: Not on file     Transportation needs:     Medical: Not on file     Non-medical: Not on file   Tobacco Use     Smoking status: Never Smoker     Smokeless tobacco: Never Used   Substance and Sexual Activity     Alcohol use: Yes     Comment: occasionally     Drug use: No     Sexual activity: Yes     Partners: Male   Lifestyle     Physical activity:     Days per week: Not on file     Minutes per session: Not on file     Stress: Not on file   Relationships     Social connections:     Talks on phone: Not on file     Gets together: Not on file     Attends Faith service: Not on file     Active member of club or organization: Not on file     Attends meetings of clubs or organizations: Not on file     Relationship status: Not on file     Intimate partner violence:     Fear of current or ex partner: Not on file     Emotionally abused: Not on file     Physically abused: Not on file     Forced sexual activity: Not on file   Other Topics Concern     Parent/sibling w/ CABG, MI or angioplasty before 65F 55M? No      Service No     Blood Transfusions No      Caffeine Concern No     Occupational Exposure No     Hobby Hazards No     Sleep Concern No     Stress Concern No     Weight Concern No     Special Diet No     Back Care No     Exercise Yes     Comment: walk     Bike Helmet Yes     Seat Belt Yes     Self-Exams Yes   Social History Narrative        Lives in [place]    Daughter Karyn Sanchez        benign essential tremor with a strained quality to her voice consistent with mild laryngeal spasm        ALLERGIES:  She is allergic to sulfa drugs, atorvastatin and simvastatin.  The statin drugs caused leg cramps.            FAMILY HISTORY:  As noted above with 1 sister developing a voice tremor and another sister having what is described as a facial tremor.  Mother with hand tremors          SOCIAL HISTORY:  She does not smoke.  She does use alcohol on occasion. She previously worked as a RN.         Jazmyn barbara  [phone] orofacial dyskinesias    Piedad Harvey [phone]  laryngeal dystonia and laryngeal tremor and laryngeal spasm.             Updated 6/13/17: Client was born in [place], [place] to parents Jerome and Berenice.  Client grew up w/ three sisters Tea, Carrie, and Jazmyn who are currently still living.  Client graduated from high school, attended college for two years, and graduated w/ a RN Degree.  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  She was  to her ex-spouse for 25 years and had three children; one son Bryan and two daughters Halley/Ysabel.  One daughter Halley Sanchez resides nearby and her other daughter Ysabel and her son Bryan reside in [place], [place].  Bryan has two daughters ages five and eight years old.  Client enjoys flying for free to [place] to visit her two granddaughters. Michelle Sutton, [facility] ([phone], Fax: [phone]).              Allergies  "  Allergen Reactions     Sulfa Drugs Swelling and Rash     Atorvastatin      Leg cramps     Simvastatin      Leg cramp       Current Outpatient Medications   Medication     ALPRAZolam (XANAX) 0.5 MG tablet     botulinum toxin type A (BOTOX) 100 units injection     Nutritional Supplements (ENSURE COMPLETE SHAKE) LIQD     ROSUVASTATIN CALCIUM PO     sertraline (ZOLOFT) 100 MG tablet     Current Facility-Administered Medications   Medication     botulinum toxin type A (BOTOX) 100 units injection 200 Units       Answers for HPI/ROS submitted by the patient on 9/3/2018- UPDATED 6/3/2019, also per HPI.  General Symptoms: Yes  Skin Symptoms: No  HENT Symptoms: No  EYE SYMPTOMS: No  HEART SYMPTOMS: No  LUNG SYMPTOMS: No  INTESTINAL SYMPTOMS: No  URINARY SYMPTOMS: No  GYNECOLOGIC SYMPTOMS: No  BREAST SYMPTOMS: No  SKELETAL SYMPTOMS: Yes  BLOOD SYMPTOMS: No  NERVOUS SYSTEM SYMPTOMS: Yes  MENTAL HEALTH SYMPTOMS: No  Fever: No  Loss of appetite: Yes  Weight loss: Yes  Weight gain: No  Fatigue: Yes  Night sweats: No  Chills: No  Increased stress: No  Excessive hunger: No  Excessive thirst: No  Feeling hot or cold when others believe the temperature is normal: Yes  Loss of height: No  Post-operative complications: No  Surgical site pain: No  Hallucinations: No  Change in or Loss of Energy: Yes  Hyperactivity: No  Confusion: No  Back pain: No  Muscle aches: Yes  Neck pain: Yes  Swollen joints: No  Joint pain: No  Bone pain: No  Muscle cramps: No  Muscle weakness: No  Joint stiffness: No  Bone fracture: No  Trouble with coordination: No  Dizziness or trouble with balance: No  Fainting or black-out spells: No  Memory loss: No  Headache: No  Speech problems: No  Tingling: No  Tremor: Yes  Weakness: No  Difficulty walking: No  Paralysis: No  Numbness: No      PHYSICAL EXAM  /73 (BP Location: Left arm, Patient Position: Sitting, Cuff Size: Adult Regular)   Pulse 74   Ht 1.549 m (5' 1\")   Wt 55.3 kg (122 lb)   SpO2 98%   BMI " "23.05 kg/m       General: Awake, alert, oriented. Well nourished, well developed, she is not in any acute distress.  HEENT: Head normocephalic, atraumatic. Neck supple. Good range of motion. No palpable thyroid mass. Soft carotid bruit on the right side.  Heart: Regular rhythm and rate. No murmurs.  Lungs: Clear to auscultation and percussion bilaterally. No ronchi, rales or wheeze.  Abdomen: Soft, non-tender, non-distended. No hepatosplenomegaly.  Extremity: Warm with no clubbing, cyanosis or edema. No lower extremity edema.  Left frontal and parietal area incisions well healed.  Left chest wall incision well healed.  Right frontal and parietal area incisions well healed. Right chest wall incision well healed. Right side extension wire is relaxed, takes a curved subcutaneous route, and not under notable stress, even with neck rotation.  The left side extension wire is hard to palpate and is not visible.  No palpable \"wire\" tension on the left side with neck movement.    Neurological  Awake, alert and oriented to date, time, place and person. Speech fluent but less tremulous now (minimal).   Pupils equal, round, reactive to light.  Extraocular movement intact.  Hearing is grossly normal to finger rub.   Facial sensation intact.  Face symmetric.  Tongue midline.  Uvula elevates equally.    Motor: full strength throughout.  Sensation: intact to light touch and pinpoint.  Deep tendon reflexes: 2+ throughout. Negative for clonus. Negative for Gilbert's sign. No dysmetria.      ASSESSMENT  69 year old female with a history of dystonia/dystonic tremors and bilateral tremors, vocal dystonia and tremor, head tremor.   S/p left side deep brain stimulator placement, phase I, placement of left side deep brain stimulator electrode, target left ventral intermediate nucleus of the thalamus, with microelectrode recording on 3/6/2018  S/p deep brain stimulator placement, phase II, placement of deep brain stimulator " generator/battery over the left chest wall on 3/13/2018.  S/p right side deep brain stimulator placement, phase I, placement of right side deep brain stimulator electrodes, target left ventral intermediate nucleus and ventral oralis nucleus of the thalamus, with microelectrode recording on 1/8/2019  S/p deep brain stimulator placement, phase II, placement of deep brain stimulator generator/battery over the right chest wall on 1/15/2019.  Decreased range of motion in her neck and stiffness, without pain.    Patient is here today with limited range of motion of her neck.  Concern was expressed by her physical therapist that her limited range of motion is from the extension wires.  Upon physical exam, I cannot appreciate tension on the wires.  The right side extension wire is visible and palpable.  It takes a curved route and does not become tense when patient's neck is moved.  On the left side, the extension wire is hard to palpate.  It seems to be buried well in the subcutaneous space.  I also could not appreciate any tension when neck is moved.  I'm not sure if her therapist is palpating the same area that I am for the wires.    I explained to the patient that her decreased range of motion and neck stiffness is not due to the extension wire.  It is less likely the cause, as she has the Abbott extension wire which is thin and it stretches.  It could be the scar tissue around the extension wire but given that it takes a curved route, that is unlikely.  Since she also has no pain with the decreased range of motion, it is unlikely the result of surgery causing neck spasms.      We discussed that patient does have a diagnosis of cervical dystonia and she does have a history of being treated with Botox.  It is very likely that she has cervical dystonia that is now symptomatic, as before.  I recommended that she follow up with Neurology to get another Botox injection treatment for her cervical dystonia.  Patient is in  agreement.      PLAN  1.  We will refer/arrange for her to have a Botox injection for stiffness secondary to cervical dystonia.    I, Sandra Andrei, am serving as a scribe to document services personally performed by Aleksander Morales MD, PhD, based upon my observations and the provider's statements to me. All documentation has been reviewed and edited by the aforementioned doctor prior to being entered into the official medical record.    I, Aleksander Morales, attest that above named individual is acting in scribe capacity, has observed my performance of the services and has documented them in accordance with my direction. The documentation recorded by the scribe accurately reflects the service I personally performed and the decisions made by me. The document was also partially recorded by me and the entire document was edited by me as well.     Again, thank you for allowing me to participate in the care of your patient.      Sincerely,    Aleksander Morales MD

## 2019-06-03 NOTE — PROGRESS NOTES
HISTORY AND PHYSICAL EXAM    Chief Complaint   Patient presents with     RECHECK     DECREASED RANGE OF MOTION IN NECK, S/P DEEP BRAIN STIMULATOR SURGERY       HISTORY OF PRESENT ILLNESS  We saw Ms. Irish Rosales back in Neurosurgery Clinic after her DBS surgery to discuss her neck stiffness and decreased range of motion.  Patient is s/p left side deep brain stimulator placement, phase I, placement of left side deep brain stimulator electrode, target left ventral intermediate nucleus of the thalamus, with microelectrode recording on 3/6/2018 and s/p deep brain stimulator placement, phase II, placement of deep brain stimulator generator/battery over the left chest wall on 3/13/2018.  Then, she underwent right side deep brain stimulator placement, phase I, placement of right side deep brain stimulator electrodes, target left ventral intermediate nucleus and ventral oralis nucleus of the thalamus, with microelectrode recording on 1/8/2019 and deep brain stimulator placement, phase II, placement of deep brain stimulator generator/battery over the right chest wall on 1/15/2019.  Patient tolerated all the procedures well and there were no complications.  Patient wishes to been today because she has been having neck stiffness and decreased range of motion.  She was seen by her physical therapist who thought that the patient's neck stiffness was due to the extension wire in her neck on the left side.  The following information was relayed to us.    Per therapist, pt has a forward head posture and cannot move her head/neck to a more neutral position without feeling extreme tension at the proximal end of the extension wire. Per physical therapist, the extension wire does not seem to have any give. The wire is palpable and visibile (this is normal), but she said she can feel the tension in the wire when pt attempts to move her neck.     Patient was informed that she has the Abbott system which has the extension wires that  stretch to some degree.  Also, it is thinner than Medtronic device.  Therefore, it is not the wire that is giving her tension, if any.  It is likely the scar tissue around the wire at worst.  Anyway, patient wanted to be seen to discuss her neck issues.  When further discussed, we did discuss her known diagnosis of cervical dystonia.  She states that her neck stiffness is without any pain.  She denies any pain.  She has received Botox injections for her cervical dystonia in the past with good relief, except for her last injection which was prior to her recent DBS surgery.    In terms of her tremors, patient is doing very well and she is very pleased with the results of the surgery.  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  Briefly, patient is a 69 year old right-handed female with history of dystonic tremor.  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 She also has cervical dystonia. Her right side was more affected than her left side.       Past Medical History:   Diagnosis Date     Depression      Dyslipidemia      Dystonic movements 1/21/2017     Dystonic tremor 1/21/2017     Family history of movement disorder 1/21/2017     mild abnormality in carotid study 3/2/2017    BILATERAL DUPLEX CAROTID ULTRASOUND March 1, 2017 1:01 PM    HISTORY: Other specified symptoms and signs involving the circulatory and respiratory systems.   COMPARISON: None.   FINDINGS: There is mild atherosclerotic plaque in the carotid bifurcations. Flow velocities and waveforms show less than 50% diameter stenosis in both the right and left internal carotid arteries as assessed by each ICA PS     Osteoporosis 8/2/2010       Past Surgical History:   Procedure Laterality Date     ------------OTHER-------------  2004    vocal cord thyroplasty at [facility]     C BSO, OMENTECTOMY W/XAVIER  1997    hysterectomy      C HAND/FINGER SURGERY UNLISTED  1998    right carpal tunnel surgery     IMPLANT DEEP BRAIN STIMULATION GENERATOR / BATTERY Left 3/13/2018    Procedure: IMPLANT DEEP BRAIN STIMULATION GENERATOR / BATTERY;  Deep Brain Stimulator Placement, Phase II, Placement Of Deep Brain Stimulator Generator/Battery Over The Left Chest Wall;  Surgeon: Aleksander Morales MD;  Location: [facility] OR     IMPLANT DEEP BRAIN STIMULATION GENERATOR / BATTERY Right 1/15/2019    Procedure: Deep Brain Stimulator Placement, Phase II, Placement Of Deep Brain Stimulator Generator/Battery Over The Right Chest Wall;  Surgeon: Aleksander Morales MD;  Location: [facility] OR     OPTICAL TRACKING SYSTEM INSERTION DEEP BRAIN STIMULATION Left 3/6/2018    Procedure: OPTICAL TRACKING SYSTEM INSERTION DEEP BRAIN STIMULATION;  Stealth Assisted Left Deep Brain Stimulator Placement, Phase I, Placement Of Left Side Deep Brain Stimulator Electrode, Target Left Ventral Intermediate Nucleus Of The Thalamus With Microelectrode Recording;  Surgeon: Aleksander Morales MD;  Location: [facility] OR     OPTICAL TRACKING SYSTEM INSERTION DEEP BRAIN STIMULATION Right 1/8/2019    Procedure: Stealth Assisted Right Deep Brain Stimulator Placement, Phase One, Placement Of Right Side Deep Brain Stimulator Electrode Target Right Ventral Intermediate Nucleus Of The Thalamus and placement Second Electrode With Microelectrode Recording;  Surgeon: Aleksander Morales MD;  Location: [facility] OR     RELEASE CARPAL TUNNEL Left 1/2/2015    Procedure: RELEASE CARPAL TUNNEL;  Surgeon: SHANIA Hernandez MD;  Location: [facility] OR     RELEASE ULNAR NERVE (ELBOW) Left 1/2/2015    Procedure: RELEASE ULNAR NERVE (ELBOW);  Surgeon: SHANIA Hernandez MD;  Location: [facility] OR       Family History   Problem Relation Age of Onset     Hypertension Father         and mother     Cerebrovascular Disease Father      Tremor Mother      Lung Cancer Mother      Neurologic Disorder Sister         dystonic  voice x 2 botox     Neurologic Disorder Sister         jose m olea. 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   Social Needs     Financial resource strain: Not on file     Food insecurity:     Worry: Not on file     Inability: Not on file     Transportation needs:     Medical: Not on file     Non-medical: Not on file   Tobacco Use     Smoking status: Never Smoker     Smokeless tobacco: Never Used   Substance and Sexual Activity     Alcohol use: Yes     Comment: occasionally     Drug use: No     Sexual activity: Yes     Partners: Male   Lifestyle     Physical activity:     Days per week: Not on file     Minutes per session: Not on file     Stress: Not on file   Relationships     Social connections:     Talks on phone: Not on file     Gets together: Not on file     Attends Baptism service: Not on file     Active member of club or organization: Not on file     Attends meetings of clubs or organizations: Not on file     Relationship status: Not on file     Intimate partner violence:     Fear of current or ex partner: Not on file     Emotionally abused: Not on file     Physically abused: Not on file     Forced sexual activity: Not on file   Other Topics Concern     Parent/sibling w/ CABG, MI or angioplasty before 65F 55M? No      Service No     Blood Transfusions No     Caffeine Concern No     Occupational Exposure No     Hobby Hazards No     Sleep Concern No     Stress Concern No     Weight Concern No     Special Diet No     Back Care No     Exercise Yes     Comment: walk     Bike Helmet Yes     Seat Belt Yes     Self-Exams Yes   Social History Narrative         Lives in [place]    Daughter Karyn Sanchez        benign essential tremor with a strained quality to her voice consistent with mild laryngeal spasm        ALLERGIES:  She is allergic to sulfa drugs, atorvastatin and simvastatin.  The statin drugs caused leg cramps.            FAMILY HISTORY:  As noted above with 1 sister developing a voice tremor and another sister having what is described as a facial tremor.  Mother with hand tremors          SOCIAL HISTORY:  She does not smoke.  She does use alcohol on occasion. She previously worked as a RN.         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             Updated 6/13/17: Client was born in [place], [place] to parents Jerome and Berenice.  Client grew up w/ three sisters Tea, Carrie, and Jazmyn who are currently still living.  Client graduated from high school, attended college for two years, and graduated w/ a RN Degree.  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  She was  to her ex-spouse for 25 years and had three children; one son Bryan and two daughters Halley/Ysabel.  One daughter Halley Sanchez resides nearby and her other daughter Ysabel and her son Bryan reside in [place], [place].  Bryan has two daughters ages five and eight years old.  Client enjoys flying for free to [place] to visit her two granddaughters. Michelle Sutton, [facility] ([phone], Fax: [phone]).              Allergies   Allergen Reactions     Sulfa Drugs Swelling and Rash     Atorvastatin      Leg cramps     Simvastatin      Leg cramp       Current Outpatient Medications   Medication     ALPRAZolam (XANAX) 0.5 MG tablet     botulinum toxin type A (BOTOX) 100 units injection     Nutritional Supplements  "(ENSURE COMPLETE SHAKE) LIQD     ROSUVASTATIN CALCIUM PO     sertraline (ZOLOFT) 100 MG tablet     Current Facility-Administered Medications   Medication     botulinum toxin type A (BOTOX) 100 units injection 200 Units       Answers for HPI/ROS submitted by the patient on 9/3/2018- UPDATED 6/3/2019, also per HPI.  General Symptoms: Yes  Skin Symptoms: No  HENT Symptoms: No  EYE SYMPTOMS: No  HEART SYMPTOMS: No  LUNG SYMPTOMS: No  INTESTINAL SYMPTOMS: No  URINARY SYMPTOMS: No  GYNECOLOGIC SYMPTOMS: No  BREAST SYMPTOMS: No  SKELETAL SYMPTOMS: Yes  BLOOD SYMPTOMS: No  NERVOUS SYSTEM SYMPTOMS: Yes  MENTAL HEALTH SYMPTOMS: No  Fever: No  Loss of appetite: Yes  Weight loss: Yes  Weight gain: No  Fatigue: Yes  Night sweats: No  Chills: No  Increased stress: No  Excessive hunger: No  Excessive thirst: No  Feeling hot or cold when others believe the temperature is normal: Yes  Loss of height: No  Post-operative complications: No  Surgical site pain: No  Hallucinations: No  Change in or Loss of Energy: Yes  Hyperactivity: No  Confusion: No  Back pain: No  Muscle aches: Yes  Neck pain: Yes  Swollen joints: No  Joint pain: No  Bone pain: No  Muscle cramps: No  Muscle weakness: No  Joint stiffness: No  Bone fracture: No  Trouble with coordination: No  Dizziness or trouble with balance: No  Fainting or black-out spells: No  Memory loss: No  Headache: No  Speech problems: No  Tingling: No  Tremor: Yes  Weakness: No  Difficulty walking: No  Paralysis: No  Numbness: No      PHYSICAL EXAM  /73 (BP Location: Left arm, Patient Position: Sitting, Cuff Size: Adult Regular)   Pulse 74   Ht 1.549 m (5' 1\")   Wt 55.3 kg (122 lb)   SpO2 98%   BMI 23.05 kg/m      General: Awake, alert, oriented. Well nourished, well developed, she is not in any acute distress.  HEENT: Head normocephalic, atraumatic. Neck supple. Good range of motion. No palpable thyroid mass. Soft carotid bruit on the right side.  Heart: Regular rhythm and rate. No " "murmurs.  Lungs: Clear to auscultation and percussion bilaterally. No ronchi, rales or wheeze.  Abdomen: Soft, non-tender, non-distended. No hepatosplenomegaly.  Extremity: Warm with no clubbing, cyanosis or edema. No lower extremity edema.  Left frontal and parietal area incisions well healed.  Left chest wall incision well healed.  Right frontal and parietal area incisions well healed. Right chest wall incision well healed. Right side extension wire is relaxed, takes a curved subcutaneous route, and not under notable stress, even with neck rotation.  The left side extension wire is hard to palpate and is not visible.  No palpable \"wire\" tension on the left side with neck movement.    Neurological  Awake, alert and oriented to date, time, place and person. Speech fluent but less tremulous now (minimal).   Pupils equal, round, reactive to light.  Extraocular movement intact.  Hearing is grossly normal to finger rub.   Facial sensation intact.  Face symmetric.  Tongue midline.  Uvula elevates equally.    Motor: full strength throughout.  Sensation: intact to light touch and pinpoint.  Deep tendon reflexes: 2+ throughout. Negative for clonus. Negative for Gilbert's sign. No dysmetria.      ASSESSMENT  69 year old female with a history of dystonia/dystonic tremors and bilateral tremors, vocal dystonia and tremor, head tremor.   S/p left side deep brain stimulator placement, phase I, placement of left side deep brain stimulator electrode, target left ventral intermediate nucleus of the thalamus, with microelectrode recording on 3/6/2018  S/p deep brain stimulator placement, phase II, placement of deep brain stimulator generator/battery over the left chest wall on 3/13/2018.  S/p right side deep brain stimulator placement, phase I, placement of right side deep brain stimulator electrodes, target left ventral intermediate nucleus and ventral oralis nucleus of the thalamus, with microelectrode recording on 1/8/2019  S/p " deep brain stimulator placement, phase II, placement of deep brain stimulator generator/battery over the right chest wall on 1/15/2019.  Decreased range of motion in her neck and stiffness, without pain.    Patient is here today with limited range of motion of her neck.  Concern was expressed by her physical therapist that her limited range of motion is from the extension wires.  Upon physical exam, I cannot appreciate tension on the wires.  The right side extension wire is visible and palpable.  It takes a curved route and does not become tense when patient's neck is moved.  On the left side, the extension wire is hard to palpate.  It seems to be buried well in the subcutaneous space.  I also could not appreciate any tension when neck is moved.  I'm not sure if her therapist is palpating the same area that I am for the wires.    I explained to the patient that her decreased range of motion and neck stiffness is not due to the extension wire.  It is less likely the cause, as she has the Abbott extension wire which is thin and it stretches.  It could be the scar tissue around the extension wire but given that it takes a curved route, that is unlikely.  Since she also has no pain with the decreased range of motion, it is unlikely the result of surgery causing neck spasms.      We discussed that patient does have a diagnosis of cervical dystonia and she does have a history of being treated with Botox.  It is very likely that she has cervical dystonia that is now symptomatic, as before.  I recommended that she follow up with Neurology to get another Botox injection treatment for her cervical dystonia.  Patient is in agreement.      PLAN  1.  We will refer/arrange for her to have a Botox injection for stiffness secondary to cervical dystonia.    I, Sandra Forbes, am serving as a scribe to document services personally performed by Aleksander Morales MD, PhD, based upon my observations and the provider's statements to me.  All documentation has been reviewed and edited by the aforementioned doctor prior to being entered into the official medical record.    I, Aleksander Morales, attest that above named individual is acting in scribe capacity, has observed my performance of the services and has documented them in accordance with my direction. The documentation recorded by the scribe accurately reflects the service I personally performed and the decisions made by me. The document was also partially recorded by me and the entire document was edited by me as well.

## 2019-06-03 NOTE — TELEPHONE ENCOUNTER
Situation:  Irish reported not being able to move her neck in her neurosurgery appointment, she informed Carrie Fitzgerald that she is wanting to repeat Botox injections.    Background  Last injected on 12/20/2018 with 160 Units by Dr. Sandy Hernandez    Message sent to clinic coordinators to get her scheduled.    Plan:  Irish stated she is available 6/12 at 6 PM for repeat injections.

## 2019-06-03 NOTE — NURSING NOTE
Chief Complaint   Patient presents with     RECHECK     EVALUATE CRANIOTOMY INCISION       Kaylie Marte MA

## 2019-06-12 ENCOUNTER — OFFICE VISIT (OUTPATIENT)
Dept: NEUROLOGY | Facility: CLINIC | Age: 69
End: 2019-06-12
Payer: COMMERCIAL

## 2019-06-12 VITALS
SYSTOLIC BLOOD PRESSURE: 137 MMHG | DIASTOLIC BLOOD PRESSURE: 81 MMHG | WEIGHT: 125.3 LBS | HEART RATE: 69 BPM | BODY MASS INDEX: 23.68 KG/M2 | OXYGEN SATURATION: 98 %

## 2019-06-12 DIAGNOSIS — G24.3 CERVICAL DYSTONIA: Primary | ICD-10-CM

## 2019-06-12 ASSESSMENT — PAIN SCALES - GENERAL: PAINLEVEL: NO PAIN (0)

## 2019-06-12 NOTE — PROGRESS NOTES
[facility] Neurology  Movement Disorder Clinic    Irish Rosales  YOB: 1950  MRN: [id number]    REASON FOR VISIT: Botox injections    HISTORY OF PRESENT ILLNESS:  Irish Rosales is a 69 year old woman with dystonic tremor who presents for Botox injections.     Her last Botox injection was over six months ago prior to DBS surgery. She felt that the injections were helpful for neck pain and head tremor. After surgery she did not have neck pain, but still has reduced range of motion of the neck, pulling and slight head tremor.     She denied side effects with previous injections.     MEDICATIONS:  Outpatient Medications Marked as Taking for the 6/12/19 encounter (Office Visit) with  MOVEMENT DISORDER FELLOW   Medication Sig     ALPRAZolam (XANAX) 0.5 MG tablet TAKE 1 TABLET BY MOUTH DAILY AS NEEDED     botulinum toxin type A (BOTOX) 100 units injection Inject 200 units 4x per year     CALCIUM-VITAMIN D PO Take 2 chew tab by mouth daily     ROSUVASTATIN CALCIUM PO Take 5 mg by mouth daily      sertraline (ZOLOFT) 100 MG tablet Take 1 tablet (100 mg) by mouth daily     Current Facility-Administered Medications for the 6/12/19 encounter (Office Visit) with  MOVEMENT DISORDER FELLOW   Medication     botulinum toxin type A (BOTOX) 100 units injection 200 Units       ALLERGIES:  Sulfa drugs; Atorvastatin; and Simvastatin     PAST MEDICAL HISTORY:  Medical history reviewed and updated in Epic, no new problems    REVIEW OF SYSTEMS:  12 point review of systems completed. Pertinent positives and negatives above and in HPI    PHYSICAL EXAM:  VITALS: /81 (BP Location: Left arm, Patient Position: Sitting, Cuff Size: Adult Regular)   Pulse 69   Wt 56.8 kg (125 lb 4.8 oz)   SpO2 98%   BMI 23.68 kg/m    GENERAL: Cooperative, no acute distress      NEUROLOGIC:  Anterior shift. Slight left turn. Slight left tilt. She feels more discomfort on the right, however.       DBS interrogation and  details     Left Brain:  Model: Abbott Infinity  Site of implantation: Left Vim   Date of implantation: 3/6/2018  Lead type: Abbott Infinity 0.5 mm, REF 6172  IPG: Abbott Infinity 5, REF [id number], implanted 3/13/2018  Battery: 2.93 volts  Initial settings: C+, 2b- 3b-, 5.0 mA, 30 ms, 180 Hz  [range 0-5.0 mA]  Final settings: C+, 2b- 3b-, 4.5 mA, 30 ms, 180 Hz  [range 0-5.0 mA]  Impedances checked - no problems found, no open or closed circuits     Right Brain:  Model: Abbott Infinity  Site of implantation: Dual leads, Right Vim and Right Vop  Date of implantation: 2019  Lead type: Abbott Infinity 0.5 mm, REF 6172 (Vim)  Lead type: Abbott Infinity 0.5 mm, REF 6172 (Vop)  IPG: Abbott Infinity 7, REF [id number], implanted 1/15/2019  Battery: 2.97 volts  Right Vim settings: C+, 11-, 2.5 mA, 60 ms, 130 Hz    [no range]  Right Vop settings: C+, 2abc-, 1.5 mA, 60 ms, 130 Hz   [no range]  Impedances checked - no problems found, no open or closed circuits      BOTULINUM NEUROTOXIN INJECTION PROCEDURES:    VERIFICATION OF PATIENT IDENTIFICATION AND PROCEDURE     Initials   Patient Name AS   Patient  AS   Procedure Verified by: AS     Prior to the start of the procedure and with procedural staff participation, I verbally confirmed the patient s identity using two indicators, relevant allergies, that the procedure was appropriate and matched the consent or emergent situation, and that the correct equipment/implants were available. Immediately prior to starting the procedure I conducted the Time Out with the procedural staff and re-confirmed the patient s name, procedure, and site/side. (The Joint Commission universal protocol was followed.)  Yes    Sedation (Moderate or Deep): None      Above assessments performed by:  Sandy Hernandez MD          INDICATION/S FOR PROCEDURE/S:  Irish Rosales is a 69 year old year old patient with dystonia affecting the  head, neck and shoulder girdle musculature secondary to a diagnosis of  cervical dystonia with associated  loss of joint motion, loss of volitional motor control and difficulty with activities of daily living.     Irish Rosales's baseline symptoms have been recalcitrant to oral medications and conservative therapy.  She is here today for an injection of Botox.      GOAL OF PROCEDURE:  The goal of this procedure is to increase active range of motion, improve volitional motor control and enhance functional independence associated with dystonic movements.    TOTAL DOSE ADMINISTERED:  Dose Administered:  135 units Botox    Diluent Used:  Preservative Free Normal Saline  Total Volume of Diluent Used:  1.35 ml  Lot # /[id number] with Expiration Date:  10/2021  NDC #: Botox 100u ([address]-89)    Medication guide was offered to patient and was declined.    CONSENT:  The risks, benefits, and treatment options were discussed with Irish Rosales and she agreed to proceed.      Written consent was obtained by AS.     EQUIPMENT USED:  Needle-37mm stimulating/recording    SKIN PREPARATION:  Skin preparation was performed using an alcohol wipe.    GUIDANCE DESCRIPTION:  DBS was turned off for the procedure. Extension wires were located and marked bilaterally to avoid during Botox injections.   Electro-myographic guidance was necessary throughout the procedure to accurately identify all areas of dystonic muscles while avoiding injection of non-dystonic muscles, neighboring nerves and nearby vascular structures.     AREA/MUSCLE INJECTED:        Muscles Injected Units Injected Number of Injections   Left lateral trapezius  30 3   Left upper trapezius 20 2   Right lateral trapezius  30 3   Right upper trapezius  15 2   Right levator scapulae at origin 15 1   Right rhomboid 15 1   Right paraspinal 5 1   Right sternocleidomastoid 5 1   Total Units Injected: 135     Unavoidable Waste: 65     Total Units Billed 200          IMPRESSION and PLAN:   Irish Rosales is a 69 year old woman with cervical  dystonia who presented for Botox injections. She tolerated injections well.     Return in 12 weeks for repeat injections      Sandy Hernandez MD  Movement Disorders Fellow

## 2019-06-13 NOTE — PATIENT INSTRUCTIONS
Today we did Botox for the back of the neck and upper back. Please return in 3 months to repeat injections.     You can follow up with Dr. Hawk or Dr. Myers for Botox.    For DBS, after you see me at the end of the month, you can see Dr. Colvin.     I reduced the stimulation for the Left Vim (right body) to 4.5 mA.

## 2019-06-14 ENCOUNTER — THERAPY VISIT (OUTPATIENT)
Dept: PHYSICAL THERAPY | Facility: CLINIC | Age: 69
End: 2019-06-14
Payer: COMMERCIAL

## 2019-06-14 DIAGNOSIS — M54.2 NECK PAIN: ICD-10-CM

## 2019-06-14 PROCEDURE — 97110 THERAPEUTIC EXERCISES: CPT | Mod: GP | Performed by: PHYSICAL THERAPIST

## 2019-06-14 PROCEDURE — 97140 MANUAL THERAPY 1/> REGIONS: CPT | Mod: GP | Performed by: PHYSICAL THERAPIST

## 2019-06-14 NOTE — PROGRESS NOTES
PROGRESS REPORT    Irish has been in therapy from May 17, 2019 to Jun 14, 2019 for treatment of neck stiffness.    Therapist Impression:  Presents following MD visit and botox injection 6/12. MD note reports no concern for wire from deep brain stimulator limiting cervical ROM. Julia demonstrates improved AROM today at beginning of session without tension at proximal attachment of wire. Able to progress BL rotation and extension active motion today following cervical joint mobilizations to C2-3 and with repeated retraction + extension. Updated HEP to include new AROM and NMR exercises. Julia will benefit from continued therapy to continue progressing cervical AROM.    Subjective:  Prior to injection, no change - exercise still going well  Following injection possibly mild improvement    Objective:  AROM: extension limited ~20% (feels in back of neck), flexion limited <10% (feels strong stretch in back of neck BL), BL ROT limited by 25% symmetrically, no tension behind the ear. SB limited >25% BL with contralateral stretch, no tension behind the ear today.   Improved cervical retraction ROM - still >75% limited, but has some active motion into retraction today  EXT AROM improved following repeated cervical retraction + EXT with rotation  Improved ROT AROM BL following C2-3 z joint mobilization  Able to maintain improved ROT until end of session by using NMR with eye movements in direction of rotation following mobilizations.    Mild TTP in BL UT, relieved with trigger point release today.       ASSESSMENT/PLAN  Updated problem list and treatment plan: The encounter diagnosis was Neck pain. Decreased ROM/flexibility - HEP  Impaired posture - HEP  Decreased joint mobility - HEP  STG/LTGs have been met or progress has been made towards goals:  Yes (See Goal flow sheet completed today.)  Assessment of Progress: The patient's condition is improving.  Self Management Plans:  Patient has been instructed in a home  treatment program.  Patient  has been instructed in self management of symptoms.  I have re-evaluated this patient and find that the nature, scope, duration and intensity of the therapy is appropriate for the medical condition of the patient.  Irish continues to require the following intervention to meet STG and LTG's:  PT    Recommendations:  This patient would benefit from continued therapy.     Frequency:  1 X week, once daily  Duration:  for 3 weeks              Please refer to the daily flowsheet for treatment today, total treatment time and time spent performing 1:1 timed codes.

## 2019-06-21 ENCOUNTER — OFFICE VISIT (OUTPATIENT)
Dept: NEUROLOGY | Facility: CLINIC | Age: 69
End: 2019-06-21
Payer: COMMERCIAL

## 2019-06-21 ENCOUNTER — THERAPY VISIT (OUTPATIENT)
Dept: PHYSICAL THERAPY | Facility: CLINIC | Age: 69
End: 2019-06-21
Payer: COMMERCIAL

## 2019-06-21 VITALS
OXYGEN SATURATION: 98 % | SYSTOLIC BLOOD PRESSURE: 119 MMHG | WEIGHT: 124 LBS | HEIGHT: 61 IN | TEMPERATURE: 98 F | DIASTOLIC BLOOD PRESSURE: 74 MMHG | HEART RATE: 68 BPM | RESPIRATION RATE: 15 BRPM | BODY MASS INDEX: 23.41 KG/M2

## 2019-06-21 DIAGNOSIS — M54.2 NECK PAIN: ICD-10-CM

## 2019-06-21 DIAGNOSIS — G25.0 ESSENTIAL TREMOR: Primary | ICD-10-CM

## 2019-06-21 PROCEDURE — 97110 THERAPEUTIC EXERCISES: CPT | Mod: GP | Performed by: PHYSICAL THERAPIST

## 2019-06-21 PROCEDURE — 97140 MANUAL THERAPY 1/> REGIONS: CPT | Mod: GP | Performed by: PHYSICAL THERAPIST

## 2019-06-21 ASSESSMENT — ENCOUNTER SYMPTOMS
STIFFNESS: 0
NECK PAIN: 1
DIZZINESS: 0
MUSCLE WEAKNESS: 0
MEMORY LOSS: 0
LOSS OF CONSCIOUSNESS: 0
MUSCLE CRAMPS: 0
HEADACHES: 0
TINGLING: 0
JOINT SWELLING: 0
MYALGIAS: 1
DISTURBANCES IN COORDINATION: 0
ARTHRALGIAS: 0
SPEECH CHANGE: 0
SEIZURES: 0
BACK PAIN: 0

## 2019-06-21 ASSESSMENT — MIFFLIN-ST. JEOR: SCORE: 1034.84

## 2019-06-21 ASSESSMENT — PAIN SCALES - GENERAL: PAINLEVEL: NO PAIN (0)

## 2019-06-21 NOTE — PROGRESS NOTES
[facility]  Movement Disorder Clinic    Irish Rosales  YOB: 1950  MRN: [id number]    Diagnosis: Essential tremor with dystonic features  DBS Target(s): Left Vim, Right Vim, Right Vop  Date(s) of DBS Lead Placement:   Left Vim 3/6/2018, Right Vim and Right Vop 1/8/2019  Date(s) of IPG Placement:   Left chest wall 3/13/2018, Right chest wall 1/15/2019    REASON FOR VISIT: DBS programming    HISTORY OF PRESENT ILLNESS:  Irish Rosales is a 69 year old woman with essential tremor with dystonic features s/p bilateral DBS (left Vim 3/2018; right Vim/Vop dual leads 1/8/2019) who returns for follow up.     She was last seen a couple weeks ago for Botox injections. She found the injections helpful. She now has improved range of motion of her neck. At the same time left Vim DBS was reduced from 5.0 to 4.5 mA. This did not help her right arm tremor or movements.     In the past 1.5 years, we have struggled with left Vim DBS programming. Postural tremor has responded well. However, action tremor has been difficult to optimize. She also has RUE ataxia probably related to stimulation. We have weighed benefits of stimulation against mild RUE ataxia. Despite this, she still has moderate RUE action tremor which interferes with eating and drinking.      Right Vim/Vop DBS has resulted in good left arm tremor control, but again she still has a mild LUE tremor - although not as severe as the right arm. She felt that when the right Vim/Vop was first programmed - both the left and right body improved. Now, however, the right arm is back to previous level of tremor control.     Speech is better since right Vim/Vop was implanted.     She denied side effects such as paresthesias or gait disturbance.     MEDICATIONS:  Outpatient Medications Marked as Taking for the 6/21/19 encounter (Office Visit) with  MOVEMENT DISORDER FELLOW   Medication Sig     ALPRAZolam (XANAX) 0.5 MG tablet TAKE 1 TABLET BY MOUTH DAILY  "AS NEEDED     botulinum toxin type A (BOTOX) 100 units injection Inject 200 units 4x per year     CALCIUM-VITAMIN D PO Take 2 chew tab by mouth daily     ROSUVASTATIN CALCIUM PO Take 5 mg by mouth daily      sertraline (ZOLOFT) 100 MG tablet Take 1 tablet (100 mg) by mouth daily     Current Facility-Administered Medications for the 6/21/19 encounter (Office Visit) with  MOVEMENT DISORDER FELLOW   Medication     botulinum toxin type A (BOTOX) 100 units injection 200 Units       ALLERGIES:  Sulfa drugs; Atorvastatin; and Simvastatin     PAST MEDICAL HISTORY:  Medical history reviewed and updated in Epic, no new problems    REVIEW OF SYSTEMS:  12 point review of systems completed. Pertinent positives and negatives above and in HPI    PHYSICAL EXAM:  VITALS: /74   Pulse 68   Temp 98  F (36.7  C) (Oral)   Resp 15   Ht 1.549 m (5' 1\")   Wt 56.2 kg (124 lb)   SpO2 98%   BMI 23.43 kg/m    GENERAL: Cooperative, no acute distress  HEENT: Normocephalic and nontraumatic, sclera white, moist mucous membranes  CARDIAC: Regular rate and rhythm  RESPIRATORY: Nonlabored breathing  EXTREMITIES: Distal pulses intact. No UE or LE edema    NEUROLOGIC:  MENTAL STATUS: Fully alert, attentive and oriented.  SPEECH: Dysphonia. Speech is nearly 100% understandable, a large improvement from her baseline prior to programming. Objective speech evaluation completed 4/16/2019  FACIAL EXPRESSION: Facial grimacing noted intermittently    CRANIAL NERVES: EOM full with smooth pursuit. No nystagmus. Normal saccades. Facial movements symmetric. Palate elevation symmetric, uvula midline. No dysarthria. Tongue protrusion midline.    MOTOR:   Occasional myoclonus noted RUE.   Cervical dystonia with anterior sagittal shift. Improved range of motion of neck laterally. Head tremor noted.   No rest tremor in upper extremities.   RUE - no rest tremor, postural tremor 0. Action tremor 3.    LUE - no rest tremor, postural tremo 0. Action tremor 2. "   No tremor in the lower extremities.   Spirals obtained.     COORDINATION: RUE ataxia. No ataxia LUE.   GAIT: Normal posture. Normal casual gait.       DBS interrogation and details  Left Brain:  Model: Abbott Infinity  Site of implantation: Left Vim   Date of implantation: 3/6/2018  Lead type: Abbott Infinity 0.5 mm, REF 6172  IPG: Abbott Infinity 5, REF [id number], implanted 3/13/2018  Battery: 2.95 volts  Impedances checked - no problems found, no open or closed circuits     Right Brain:  Model: Abbott Infinity  Site of implantation: Dual leads, Right Vim and Right Vop  Date of implantation: 1/8/2019  Lead type: Abbott Infinity 0.5 mm, REF 6172 (Vim)  Lead type: Abbott Infinity 0.5 mm, REF 6172 (Vop)  IPG: Abbott Infinity 7, REF [id number], implanted 1/15/2019  Battery : >3.0 volts       Impedances checked - no problems found, no open or closed circuits         Programming Notes  Left Vim  Contacts Amplitude  (V or mA) PW (ms) Rate (Hz) Effects Adverse effects   Baseline OFF OFF OFF     Cpos, 2bneg, 3bneg 4.0 20 186 RUE postural tremor 0, action 3. Spiral obtained None   Cpos, 2cneg, 3cneg 3.0 20 186 RUE tremor on posture 2 None    4.0 20 186 Tremor on posture 1 None    4.0 30 186 Slightly better action tremor.  Transient tingling arm. Speech ok.      Right Vop  Contacts Amplitude  (V or mA) PW (ms) Rate (Hz) Effects Adverse effects   Cpos, 2abcneg 1.5 60 130 LUE postural tremor 0, action 2, spiral obtained None    2.0 60 130 No change None    2.5 60 130 No change None     Right Vim  Contacts Amplitude  (V or mA) PW (ms) Rate (Hz) Effects Adverse effects   Cpos, 2abcneg 2.5 60 130 LUE postural tremor 0, action 2, spiral obtained None    3.0 60 130 No change None    3.0 60 180 No change. Spiral obtained None    3.5 60 180 Action tremor worse 3, jerk at movement close to mouth.  None    3.0 60 180 Action tremor better than baseline, but still 2.  None         Stimulation Parameters    LEFT BRAIN    Initial Final   Contacts  "C+, 2b-,3b- No change   Amplitude (mA) 4.0 No change   Pulse Width (ms) 30 No change   Frequency (Hz) 180 186         Stimulation Parameters    Right Vop Right Vim    Initial Final Initial Final   Contacts C+, 2abc- No change C+, 11abc- No change   Amplitude (mA) 1.5 2.5 2.5 3.0   Pulse Width (ms) 60 No change 60 No change   Frequency (Hz) 130 No change 130 180       Final settings  Left Vim:  C+, 2b-, 3b-, 4.0 mA, 30 ms, 186 Hz    Right Vim: C+, 11abc-, 3.0 mA, 60 ms, 180 Hz  Right Vop: C+, 2abc-, 2.5 mA, 60 ms, 130 Hz        Data reviewed:    3/2018: Skull xray checked left lead rotated laterally.   1/8/2019: Skull xray shows that right lead is rotated medially        IMPRESSION:   Irish Rosales is a 69 year old woman with essential tremor with dystonic features s/p bilateral DBS (left Vim 3/2018; right Vim/Vop dual leads 1/8/2019) who presented for programming. I found that the left lead was rotated laterally on skull xray and so I explored the \"c\" directional contact for the left Vim which should actually be directed posteriorly. This did not provide any additional benefit. In fact, I believe this was previously looked into as well.     I also tried to narrow the pulse width even more to 20 ms to investigate whether this would improve RUE ataxia. However - a pulse width of 20 ms resulted in worsened postural tremor.     I moved on to adjust the right Vim/Vop. Amplitude was increased for both thalamic leads. Frequency was increased for the right Vim to 180 Hz. Action tremor was slightly improved for the LUE.       PLAN:   Final settings are:   Left Vim:  C+, 2b-, 3b-, 4.0 mA, 30 ms, 186 Hz  Right Vim: C+, 11abc-, 3.0 mA, 60 ms, 180 Hz  Right Vop: C+, 2abc-, 2.5 mA, 60 ms, 130 Hz      Follow up in 3 months with Dr. Colvin, who she has previously seen for programming    Sandy Hernandez MD  Movement Disorders Fellow    DBS programming time 1.5 hours    Answers for HPI/ROS submitted by the patient on 6/21/2019 "   General Symptoms: No  Skin Symptoms: No  HENT Symptoms: No  EYE SYMPTOMS: No  HEART SYMPTOMS: No  LUNG SYMPTOMS: No  INTESTINAL SYMPTOMS: No  URINARY SYMPTOMS: No  GYNECOLOGIC SYMPTOMS: No  BREAST SYMPTOMS: No  SKELETAL SYMPTOMS: Yes  BLOOD SYMPTOMS: No  NERVOUS SYSTEM SYMPTOMS: Yes  MENTAL HEALTH SYMPTOMS: No  Back pain: No  Muscle aches: Yes  Neck pain: Yes  Swollen joints: No  Joint pain: No  Bone pain: No  Muscle cramps: No  Muscle weakness: No  Joint stiffness: No  Bone fracture: No  Trouble with coordination: No  Dizziness or trouble with balance: No  Fainting or black-out spells: No  Memory loss: No  Headache: No  Seizures: No  Speech problems: No  Tingling: No

## 2019-06-21 NOTE — NURSING NOTE
Chief Complaint   Patient presents with     DBS Programming     [facility] RETURN MOVEMENT DISORDER 2 MONTH F/U DBS        Asim Herrera, EMT

## 2019-06-21 NOTE — PATIENT INSTRUCTIONS
Thank you for coming to the [facility] for your neurology visit.     Today I adjusted your DBS settings.      The left brain/ thalamus for the right body is set at 4.0 mA. Your maximum is 5.0 mA. I increased the frequency today.    The right brain/ thalamus for the left body is set at 2.5 (lead 1/ vop) and 3.0 (lead 2/ Vim). Your max is 2.5 for lead 1 and 4.0 mA for lead 2.     Please return in 3 months for DBS programming

## 2019-06-25 DIAGNOSIS — E78.5 HYPERLIPIDEMIA LDL GOAL <160: Primary | ICD-10-CM

## 2019-06-25 NOTE — TELEPHONE ENCOUNTER
Pending Prescriptions:                       Disp   Refills    rosuvastatin (CRESTOR) 5 MG tablet        30 tab*0            Sig: Take 1 tablet (5 mg) by mouth daily    Last Written Prescription Date:  NA  Last Fill Quantity: NA,  # refills: NA  Last office visit: 10/25/2018 with prescribing provider:  Braulio Victoria   Future Office Visit:      Routing refill request to provider for review/approval because:  Medication is reported/historical

## 2019-06-27 RX ORDER — ROSUVASTATIN CALCIUM 5 MG/1
5 TABLET, COATED ORAL DAILY
Qty: 90 TABLET | Refills: 0 | Status: SHIPPED | OUTPATIENT
Start: 2019-06-27 | End: 2019-09-20

## 2019-06-28 ENCOUNTER — THERAPY VISIT (OUTPATIENT)
Dept: PHYSICAL THERAPY | Facility: CLINIC | Age: 69
End: 2019-06-28
Payer: COMMERCIAL

## 2019-06-28 DIAGNOSIS — M54.2 NECK PAIN: ICD-10-CM

## 2019-06-28 PROCEDURE — 97112 NEUROMUSCULAR REEDUCATION: CPT | Mod: GP | Performed by: PHYSICAL THERAPIST

## 2019-06-28 PROCEDURE — 97110 THERAPEUTIC EXERCISES: CPT | Mod: GP | Performed by: PHYSICAL THERAPIST

## 2019-06-28 PROCEDURE — 97140 MANUAL THERAPY 1/> REGIONS: CPT | Mod: GP | Performed by: PHYSICAL THERAPIST

## 2019-06-29 ENCOUNTER — MYC REFILL (OUTPATIENT)
Dept: PEDIATRICS | Facility: CLINIC | Age: 69
End: 2019-06-29

## 2019-06-29 DIAGNOSIS — Z86.59 HISTORY OF MAJOR DEPRESSION: ICD-10-CM

## 2019-07-01 RX ORDER — SERTRALINE HYDROCHLORIDE 100 MG/1
100 TABLET, FILM COATED ORAL DAILY
Qty: 90 TABLET | Refills: 0 | Status: SHIPPED | OUTPATIENT
Start: 2019-07-01 | End: 2019-09-24

## 2019-07-05 ENCOUNTER — OFFICE VISIT (OUTPATIENT)
Dept: NEUROLOGY | Facility: CLINIC | Age: 69
End: 2019-07-05
Payer: COMMERCIAL

## 2019-07-05 VITALS
BODY MASS INDEX: 23.41 KG/M2 | RESPIRATION RATE: 16 BRPM | HEIGHT: 61 IN | OXYGEN SATURATION: 97 % | WEIGHT: 124 LBS | TEMPERATURE: 97.6 F | HEART RATE: 67 BPM | SYSTOLIC BLOOD PRESSURE: 117 MMHG | DIASTOLIC BLOOD PRESSURE: 67 MMHG

## 2019-07-05 DIAGNOSIS — R25.1 TREMOR: Primary | ICD-10-CM

## 2019-07-05 ASSESSMENT — PAIN SCALES - GENERAL: PAINLEVEL: NO PAIN (0)

## 2019-07-05 ASSESSMENT — MIFFLIN-ST. JEOR: SCORE: 1034.84

## 2019-07-05 NOTE — PATIENT INSTRUCTIONS
"Your Left(Vim) stimulator (for right body) is the same.  Your Right (Vim & Vop stimulator (for left body) is back the way it was 3 visits ago (two adjustments ago). Those were the settings that seemed to work very well.  That is \"Program 1\"    Your right brain (for left body) stimulator now has two new programs, which you are not currently using.  They are \"Vim only\" and \"Vop only\"  Today we practiced using your patient  to switch between programs.    Allow some time -- minimum 2 weeks -- to see how \"Program 1\" is working for you.  If it does not work out well, we might try a strategy of alternating between \"Vim only\" and \"Vop only\" at intervals of, maybe, one week.  But don't do that unless you've discussed it with us, first.     For now, plan on keeping your Sept. 6 appointment.  If it turns out you don't need it, we can always cancel it.  "

## 2019-07-19 ENCOUNTER — MYC MEDICAL ADVICE (OUTPATIENT)
Dept: NEUROLOGY | Facility: CLINIC | Age: 69
End: 2019-07-19

## 2019-07-19 NOTE — TELEPHONE ENCOUNTER
"Side 1   LVIM  1 lead  Program name \"2b3b\"  NO CHANGE    Side 2  RVIM  2 leads  Lead 1 = RVop  Lead 2 = RVim    Program names=   Program 1   Vim only   Vop only  ---------------------------------  Spoke to patient and walked her through using her patient  to change her RVIM program to \"Vop Only\". She did this and will keep it here for 1 week. Next Friday 7/26, she will change her RVIM to \"VIM only\". This therapy will hopefully, help her if she is an adapter-rebounder.    She was encouraged to call or A.P [facility] message with any questions or concerns.    "

## 2019-07-19 NOTE — TELEPHONE ENCOUNTER
"From the 7/5/19 office visit note after visit summary:        I will call her to see if I can help walk her through switching to Program 2.  ---------------------------------  When she chooses the side that shows the Left side highlighted in blue (Right VIM), she sees only 1 program choice. It is called \"2B3B\" and it is set at 4.0. There seems to be no other program choice, no \"2B\" or \"orig settings\".    When she chooses the side that shows the Right side highlighted in blue (Left VIM), she sees only 1 program \"Program 1\".     It appears that the two new programs may not have translated onto her IPG, or have not been programmed into her IPG.    "

## 2019-07-29 ENCOUNTER — MYC MEDICAL ADVICE (OUTPATIENT)
Dept: PEDIATRICS | Facility: CLINIC | Age: 69
End: 2019-07-29

## 2019-07-30 NOTE — TELEPHONE ENCOUNTER
Called patient, relayed message & patient verbalized understanding. Scheduled 8/8.  Irish Valerio RN

## 2019-07-30 NOTE — TELEPHONE ENCOUNTER
Please schedule pt to be evaluated for anemia. The last hemoglobin on file from January this year is normal. Do not take anything until seen.

## 2019-07-31 ENCOUNTER — MYC MEDICAL ADVICE (OUTPATIENT)
Dept: NEUROLOGY | Facility: CLINIC | Age: 69
End: 2019-07-31

## 2019-07-31 NOTE — TELEPHONE ENCOUNTER
"Patient having trouble switching her patient program to the alternate program. She wants it to go from the VIM to VOP.    From 7/19/19:    Side 1   LVIM  1 lead  Program name \"2b3b\"  NO CHANGE      Side 2  RVIM  2 leads  Lead 1 = RVop  Lead 2 = RVim     Program names=                Program 1                Vim only                Vop only    I walked her through using her patient  to change her RVIM program to \"Vop Only\". She did this and will keep it here for 1 week. Next Friday 7/26, she will change her RVIM to \"VIM only\". This therapy will hopefully, help her if she is an adapter-rebounder.    She is supposed to be RVIM only (on side 2) until 7/26, then she is to change to RVop only on 8/2.  --------------------  Patient reports that she does notice any change from the previous program. I helped her change her program to Vop only. She had been on the VIM only since 7/26. I asked her to not change the program back to VIM only until next Wednesday. Patient verbalized agreement and understanding of this plan of care.          "

## 2019-08-08 ENCOUNTER — OFFICE VISIT (OUTPATIENT)
Dept: PEDIATRICS | Facility: CLINIC | Age: 69
End: 2019-08-08
Payer: COMMERCIAL

## 2019-08-08 VITALS
SYSTOLIC BLOOD PRESSURE: 117 MMHG | TEMPERATURE: 98.4 F | WEIGHT: 124 LBS | OXYGEN SATURATION: 96 % | BODY MASS INDEX: 23.43 KG/M2 | HEART RATE: 95 BPM | DIASTOLIC BLOOD PRESSURE: 69 MMHG

## 2019-08-08 DIAGNOSIS — G25.2 DYSTONIC TREMOR: Primary | ICD-10-CM

## 2019-08-08 DIAGNOSIS — Z12.11 SPECIAL SCREENING FOR MALIGNANT NEOPLASMS, COLON: ICD-10-CM

## 2019-08-08 DIAGNOSIS — D64.9 ANEMIA, UNSPECIFIED TYPE: ICD-10-CM

## 2019-08-08 LAB
ERYTHROCYTE [DISTWIDTH] IN BLOOD BY AUTOMATED COUNT: 11.8 % (ref 10–15)
HCT VFR BLD AUTO: 42.3 % (ref 35–47)
HGB BLD-MCNC: 14.3 G/DL (ref 11.7–15.7)
IRON SATN MFR SERPL: 33 % (ref 15–46)
IRON SERPL-MCNC: 141 UG/DL (ref 35–180)
MCH RBC QN AUTO: 32.4 PG (ref 26.5–33)
MCHC RBC AUTO-ENTMCNC: 33.8 G/DL (ref 31.5–36.5)
MCV RBC AUTO: 96 FL (ref 78–100)
PLATELET # BLD AUTO: 286 10E9/L (ref 150–450)
RBC # BLD AUTO: 4.41 10E12/L (ref 3.8–5.2)
TIBC SERPL-MCNC: 421 UG/DL (ref 240–430)
VIT B12 SERPL-MCNC: 143 PG/ML (ref 193–986)
WBC # BLD AUTO: 5.2 10E9/L (ref 4–11)

## 2019-08-08 PROCEDURE — 36415 COLL VENOUS BLD VENIPUNCTURE: CPT | Performed by: INTERNAL MEDICINE

## 2019-08-08 PROCEDURE — 82607 VITAMIN B-12: CPT | Performed by: INTERNAL MEDICINE

## 2019-08-08 PROCEDURE — 99214 OFFICE O/P EST MOD 30 MIN: CPT | Performed by: INTERNAL MEDICINE

## 2019-08-08 PROCEDURE — 83550 IRON BINDING TEST: CPT | Performed by: INTERNAL MEDICINE

## 2019-08-08 PROCEDURE — 83540 ASSAY OF IRON: CPT | Performed by: INTERNAL MEDICINE

## 2019-08-08 PROCEDURE — 85027 COMPLETE CBC AUTOMATED: CPT | Performed by: INTERNAL MEDICINE

## 2019-08-08 RX ORDER — ALPRAZOLAM 0.5 MG
0.5 TABLET ORAL DAILY PRN
Qty: 30 TABLET | Refills: 0 | Status: SHIPPED | OUTPATIENT
Start: 2019-08-08 | End: 2020-01-14

## 2019-08-08 NOTE — RESULT ENCOUNTER NOTE
Dear Irish,   Your recent lab results showed the following:  -- your blood work showed no anemia and no iron deficiency. So the test result from the blood donation was not accurate.   -- you should be able to donate blood in the future.     Please call or Mychart to our office if you have further questions.     Braulio Victoria MD-PhD

## 2019-08-08 NOTE — PROGRESS NOTES
Subjective     Irish Rosales is a 69 year old female who presents to clinic today for the following health issues:    HPI     Anemia? Was advised abnormal labs. Hbg 10 at [facility] 2 weeks ago. Gives blood at [facility] and was declined. Pt states not tired, no black stools.     Patient is here to follow-up on anemia.  She is at baseline health until 2 weeks ago when she went to donate blood.  She was rejected as a blood donor and was informed that her hemoglobin was 10.  Patient reported no change in her health.  She feels well.  She is not tired no lightheadedness or dizziness.  No stomach pain no nausea vomiting, no bright red blood in the stool and no black tarry stool.  She has made changes in her diet where she does not eat as much meat.  She is not a strict vegetarian by any means.    According to our chart her last hemoglobin was in January after she had DBS surgery, 12.3, which is still normal.  Her MCV was 101.    She needs refill for alprazolam.  She takes it for the dystonic tremor that she has.  Since the DBS as surgery, this is much less.  She takes it about a few times a month.    In regards to colon cancer screening, on her record he indicated that patient had colonoscopy in 2008 and 2018 all per outside record.  I was not able to locate any records of the specific colonoscopies.  Patient does not recall having had a colonoscopy done in 2018.    ROS:  Constitutional, HEENT, cardiovascular, pulmonary, gi and gu systems are negative, except as otherwise noted.         Current Outpatient Medications on File Prior to Visit:  botulinum toxin type A (BOTOX) 100 units injection Inject 200 units 4x per year   CALCIUM-VITAMIN D PO Take 2 chew tab by mouth daily   rosuvastatin (CRESTOR) 5 MG tablet Take 1 tablet (5 mg) by mouth daily   sertraline (ZOLOFT) 100 MG tablet Take 1 tablet (100 mg) by mouth daily   Nutritional Supplements (ENSURE COMPLETE SHAKE) LIQD Take 237 mLs by mouth 3 times daily (Patient not  taking: Reported on 8/8/2019)     Current Facility-Administered Medications on File Prior to Visit:  botulinum toxin type A (BOTOX) 100 units injection 200 Units          Patient Active Problem List   Diagnosis     Spasm of vocal cords     Essential tremor     Osteoporosis     Hyperlipidemia LDL goal <160     Generalized anxiety disorder     History of major depression     Carpal tunnel syndrome     Closed nondisp fracture of distal phalanx of lesser toe of right foot     Cervical radiculopathy     Right shoulder pain     Impingement syndrome of right shoulder     Family history of tremor     Family history of movement disorder     Dystonic tremor     Dystonic movements     H/O magnetic resonance imaging of brain and brain stem     mild abnormality in carotid study     Encounter for neuropsychological testing     Health Care Home     Depression, major, in remission (H)     Neck pain     Loss of weight     Disabling essential tremor     Neck stiffness     Past Surgical History:   Procedure Laterality Date     ------------OTHER-------------  2004    vocal cord thyroplasty at [facility]     C BSO, OMENTECTOMY W/XAVIER  1997    hysterectomy     C HAND/FINGER SURGERY UNLISTED  1998    right carpal tunnel surgery     IMPLANT DEEP BRAIN STIMULATION GENERATOR / BATTERY Left 3/13/2018    Procedure: IMPLANT DEEP BRAIN STIMULATION GENERATOR / BATTERY;  Deep Brain Stimulator Placement, Phase II, Placement Of Deep Brain Stimulator Generator/Battery Over The Left Chest Wall;  Surgeon: Aleksander Morales MD;  Location: [facility] OR     IMPLANT DEEP BRAIN STIMULATION GENERATOR / BATTERY Right 1/15/2019    Procedure: Deep Brain Stimulator Placement, Phase II, Placement Of Deep Brain Stimulator Generator/Battery Over The Right Chest Wall;  Surgeon: Aleksander Morales MD;  Location: [facility] OR     OPTICAL TRACKING SYSTEM INSERTION DEEP BRAIN STIMULATION Left 3/6/2018    Procedure: OPTICAL TRACKING SYSTEM INSERTION DEEP BRAIN STIMULATION;   Stealth Assisted Left Deep Brain Stimulator Placement, Phase I, Placement Of Left Side Deep Brain Stimulator Electrode, Target Left Ventral Intermediate Nucleus Of The Thalamus With Microelectrode Recording;  Surgeon: Aleksander Morales MD;  Location: [facility] OR     OPTICAL TRACKING SYSTEM INSERTION DEEP BRAIN STIMULATION Right 1/8/2019    Procedure: Stealth Assisted Right Deep Brain Stimulator Placement, Phase One, Placement Of Right Side Deep Brain Stimulator Electrode Target Right Ventral Intermediate Nucleus Of The Thalamus and placement Second Electrode With Microelectrode Recording;  Surgeon: Aleksander Morales MD;  Location: [facility] OR     RELEASE CARPAL TUNNEL Left 1/2/2015    Procedure: RELEASE CARPAL TUNNEL;  Surgeon: SHANIA Hernandez MD;  Location: [facility] OR     RELEASE ULNAR NERVE (ELBOW) Left 1/2/2015    Procedure: RELEASE ULNAR NERVE (ELBOW);  Surgeon: SHANIA Hernandez MD;  Location: [facility] OR       Social History     Tobacco Use     Smoking status: Never Smoker     Smokeless tobacco: Never Used   Substance Use Topics     Alcohol use: Yes     Comment: occasionally     Family History   Problem Relation Age of Onset     Hypertension Father         and mother     Cerebrovascular Disease Father      Tremor Mother      Lung Cancer Mother      Neurologic Disorder Sister         dystonic voice x 2 botox     Neurologic Disorder Sister         jose m mn. facial movement              Problem list, Medication list, Allergies, and Medical/Social/Surgical histories reviewed in [facility] and updated as appropriate.    OBJECTIVE:                                                    /69   Pulse 95   Temp 98.4  F (36.9  C) (Temporal)   Wt 56.2 kg (124 lb)   SpO2 96%   BMI 23.43 kg/m      GENERAL: healthy, alert and no distress  HEENT: unremarkable  Neck: no adenopathy/mass/stiffness. Thyroid normal.  Lung: clear, no wheezing/rhonchi/crackles  Heart: RRR, normal S1/2, no murmur/gallop/rup  Abd: soft, normal BS,  non tender, no organomegaly   Ext: no cyanosis/clubbing/edema            ASSESSMENT/PLAN:                                                      69 year old female with the following diagnoses and treatment plan:      ICD-10-CM    1. Dystonic tremor G25.2 ALPRAZolam (XANAX) 0.5 MG tablet   2. Anemia, unspecified type D64.9 CBC with platelets     Iron and iron binding capacity     Vitamin B12   3. Special screening for malignant neoplasms, colon Z12.11 Fecal colorectal cancer screen FIT       -- we'll get labs to confirm that she has anemia, check iron/B12 level given less meat consumption.   --We have no record of colonoscopy on file.  We will obtain stool testing.  Patient does not want to do colonoscopy.    Will call or return to clinic if worsening or symptoms not improving as discussed.  See Patient Instructions.      Braulio Victoria MD-PhD  [facility]    (Note: Chart documentation was done in part with Dragon Voice Recognition software. Although reviewed after completion, some word and grammatical errors may remain.)

## 2019-08-08 NOTE — PATIENT INSTRUCTIONS
Make appointment(s) for:   -- get labs and stool testing kit        Medication(s) prescribed today:    Orders Placed This Encounter   Medications     ALPRAZolam (XANAX) 0.5 MG tablet     Sig: Take 1 tablet (0.5 mg) by mouth daily as needed for anxiety (average a few times a month)     Dispense:  30 tablet     Refill:  0

## 2019-08-12 ENCOUNTER — TELEPHONE (OUTPATIENT)
Dept: NEUROLOGY | Facility: CLINIC | Age: 69
End: 2019-08-12

## 2019-08-12 NOTE — TELEPHONE ENCOUNTER
I informed Irish she does not needs a referral to see Dr. Haile and she is good to go for her October appointment.

## 2019-08-12 NOTE — TELEPHONE ENCOUNTER
[facility] Call Center    Phone Message    May a detailed message be left on voicemail: yes    Reason for Call: Other: Per call from PT has a referral from Dr Colvin to see Dr Haile but the referral has been .  PT scheduled to see Dr Haile in 10/3. Please enter a new referral if it's require.     Action Taken: Message routed to:  [facility] ([facility]): Neurology

## 2019-08-13 ENCOUNTER — MYC MEDICAL ADVICE (OUTPATIENT)
Dept: PEDIATRICS | Facility: CLINIC | Age: 69
End: 2019-08-13

## 2019-08-13 DIAGNOSIS — E53.8 VITAMIN B12 DEFICIENCY (NON ANEMIC): Primary | ICD-10-CM

## 2019-08-14 ENCOUNTER — HOSPITAL ENCOUNTER (OUTPATIENT)
Facility: CLINIC | Age: 69
Setting detail: SPECIMEN
Discharge: HOME | End: 2019-08-14
Admitting: INTERNAL MEDICINE
Payer: COMMERCIAL

## 2019-08-14 PROBLEM — E53.8 VITAMIN B12 DEFICIENCY (NON ANEMIC): Status: ACTIVE | Noted: 2019-08-14

## 2019-08-14 PROCEDURE — 82274 ASSAY TEST FOR BLOOD FECAL: CPT | Performed by: INTERNAL MEDICINE

## 2019-08-14 RX ORDER — LANOLIN ALCOHOL/MO/W.PET/CERES
1000 CREAM (GRAM) TOPICAL DAILY
Qty: 100 TABLET | Refills: 3 | Status: SHIPPED | OUTPATIENT
Start: 2019-08-14 | End: 2020-09-05

## 2019-08-20 LAB — HEMOCCULT STL QL IA: NEGATIVE

## 2019-08-21 NOTE — RESULT ENCOUNTER NOTE
Dear Irish,   Your recent test result are within acceptable range or at baseline. Please continue with your current plan of care.       Please call or Mychart to our office if you have further questions.     Braulio Victoria MD-PhD

## 2019-09-06 ENCOUNTER — OFFICE VISIT (OUTPATIENT)
Dept: NEUROLOGY | Facility: CLINIC | Age: 69
End: 2019-09-06
Payer: COMMERCIAL

## 2019-09-06 VITALS
HEART RATE: 68 BPM | BODY MASS INDEX: 23.88 KG/M2 | WEIGHT: 126.4 LBS | SYSTOLIC BLOOD PRESSURE: 152 MMHG | DIASTOLIC BLOOD PRESSURE: 74 MMHG | OXYGEN SATURATION: 97 %

## 2019-09-06 DIAGNOSIS — R25.1 TREMOR: Primary | ICD-10-CM

## 2019-09-06 ASSESSMENT — PAIN SCALES - GENERAL: PAINLEVEL: NO PAIN (0)

## 2019-09-06 NOTE — PATIENT INSTRUCTIONS
We interrogated your DBS today and no problems were found.    Right brain:    1. We advise to alternate between VOP and VIM on increased settings. Resume every 2 weeks alternation at the new increased settings.     2.Today we did not find any side effects with increased settings, if you experience any side effects at home while on VOP , continue alternations but reduce VOP setting from 4.0 to 2.0 mA.    3. We send you home on Right VIM only active .      Left brain :    1. You have 2 programs for the left bran, right body( 2b and 3 abc) . We are going to send you home on 2b.       We want you to alternate between programs in the following way:       Weeks                 Right brain        Left brain        1                      VIM           2b        2                      VIM          3abc        3                      VOP          3abc        4                      VOP           2b       And then repeat in same sequence.

## 2019-09-06 NOTE — PROGRESS NOTES
Department of Neurology  Movement Disorders Division     Patient: Irish Rosales   MRN: [id number]   : 1950   Date of Visit: 2019     Reason for visit: ET, DBS management    History of Present Illness  Ms. Rosales is a 69 year old R handed female with strong FHx of Essential tremor ( orofacial dyskinesias and another sister with laryngeal dystonia and laryngeal tremor and laryngeal spasm, mother with hand tremor) , s/p  L Vim DBS ([facility]) Mar 2018;  R Vim & Vop DBS ([facility]) 2019 , she is receiving botulinum toxin injection for cervical dystonia with good response to pain and improved range of motion.    In summary , she has had a good but transient response of her tremor to DBS. She underwent extended programming for the left Vim , her tremor was definitely worse without stimulation , but more aggressive stimulation created limb ataxia which was difficult to differentiate between original tremor . And the stimulation improved  the distal tremor more than proximal one , so she was continuing to have difficulty with feeding herself, combing hair but with marked improvement of the handwriting . 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     Patient was last seen on 2019 when left ( Vim) stimulator for the right body remained the same.   Side 1 , left VIM , program name 2b3b - no change.    Side 2 . R VIM, 2 leads:  lead 1- R VOp, lead 2- R VIM.  Program names:1. Program 1                                                                                                                       2.VIM only                                                                                                                        3. VOp only    The right ( Vim & Vop stimulator (for the left body)  were adjusted : R Vop ( decreased amplitude from 2.5 mA to 1.5 mA ) and for the right  "Vim - decreased amplitude to 2.5 mA and increased frequency from 130 Hz to 180 Hz .So a new \"Program 1\"  was created.     The right brain ( left body) programs : \" Vim only\" and \"Vop only\" ( inactive at the time when she was send home). She practiced using patient's  how to switch between the 2 programs.    She was sent home on Program 1 and within 2 weeks was instructed to alternate between 'Vim only\" and 'Vop only\" at 1 week intervals.     Since last clinic visit she is doing about the same, with ongoing difficulty feeding and eating with the right hand ( dominant) , unable to comb hair using the right hand . She tried to alternate between the two programs \"Vim only\"  and \"VOp only\" but she does not like the way she feels on \"VOp only\" program , gives her a headache and lightheadedness.   Again she mentioned that she is not disappointed in surgery , she feels way much better now than before surgery , speech improved tremendously, before surgery she was not able to hold a conversation over the phone .  She received Botox injections for \" neck tightness \" in  .  Handwriting improved .  No new medical problems and no new medications since last clinic visit.     Review of Systems:  Other than that mentioned above, the remainder of 12 systems reviewed were negative.  TRG Essential Tremor Rating Assessment Scale (TETRAS)    Activities of Daily Living Subscale:  1.  Speaking:undertsatnadbel, voice tremor  2   2.  Feeding with a spoon:spills  3   3.  Drinking from a glass: spills  3   4.  Hygiene:with difficultly  2   5.  Dressing:no problem 1   6.  Pouring:spills  3   7.  Carrying food trays, plates, or similar items:Ok 0   8.  Using Keys:diiifcult  3   9.  Writing:OK 0   10.  Workin   11.  Overall disability with most affected task:    Task: eating with a spoon   3   12.  Social Impact: do not eat in front of people  4     Total for ADL Subscale:   (48 Max)   24       Medications:  Current Outpatient " "Medications   Medication Sig Dispense Refill     ALPRAZolam (XANAX) 0.5 MG tablet Take 1 tablet (0.5 mg) by mouth daily as needed for anxiety (average a few times a month) 30 tablet 0     botulinum toxin type A (BOTOX) 100 units injection Inject 200 units 4x per year 800 Units 0     CALCIUM-VITAMIN D PO Take 2 chew tab by mouth daily       cyanocobalamin (VITAMIN B-12) 1000 MCG tablet Take 1 tablet (1,000 mcg) by mouth daily 100 tablet 3     rosuvastatin (CRESTOR) 5 MG tablet Take 1 tablet (5 mg) by mouth daily 90 tablet 0     sertraline (ZOLOFT) 100 MG tablet Take 1 tablet (100 mg) by mouth daily 90 tablet 0     Nutritional Supplements (ENSURE COMPLETE SHAKE) LIQD Take 237 mLs by mouth 3 times daily (Patient not taking: Reported on 8/8/2019) 90 Bottle 3          Movement Disorder-related Medications                      Xanax 0.5 mg PRN once a month          Physical Exam:  The patient's  weight is 57.3 kg (126 lb 6.4 oz). Her blood pressure is 152/74 (abnormal) and her pulse is 68. Her oxygen saturation is 97%.       Irish is alert and oriented, provides details of interval hx.  Voice is tremulous but easily understandable.No head tremor.  No resting tremor , no postural tremor with outstretched upper extremities.  UE tremor - barely visible in BL wing beating position. Kinetic tremor 1-3 cm amplitude .  LE- no resting tremor, no postural , no orthostatic tremor.  Gait: natural, good arm swing, good stride length . Noted \"no-no\" head tremor.    Spirals: right- 4  severe- unrecognizable figure                 left    -2- mild tremulous   Dot approximation: unable   Handwriting( right handed) : normal.    Procedure: DBS Programming   Left VIM  Battery status: ON  Left IPG 2.94V Infinity , serial number [id number]  Implanted generator:03/13/2018  Impedance Check: Impedances were checked and no issues were found in patient's leads.    Right brain:  Battery status: ON  Right IPG 2.97 V Infinity   Implanted date " "01/15/2019   Leads: lead 1 - R VO , lead 2 R VIM  Impedance Check: Impedances were checked and no issues were found in patient's leads.      Narrative summary of DBS settings and programming ( refer to the table format)     Settings for the right brain / left body:   R VIM        3.5mA  20 msec   200 Hz ( patient's range 0.0-4.0, increment 0.25 mA  R VOP        4 mA  20 msec  200 HZ ( 0.0-4.0 . step size 0.25 mA)    Settings for the left brain / right body:  2 b         4.0mA  20 msec  200 Hz  ( patient's range 0.0 -5.0 , increment 0.25 mA )   3 abc     4.o mA    20 msec    200 Hz  ( 0-4.0, incement 0.25 mA)       Old left brain settings:                                         \"2b3b\" LEFT Vim        Contacts C+, 2b-,3b-   Amplitude (mA) 4.0   Pulse Width (ms) 30   Frequency (Hz) 186      Program \"2b3b\" was deleted.    New left brain/right body settings:          Left VIM    Active       Inactive         Program        \"2b\"      \"3abc\"   Amplitude mA        4 [0-5 by 0.25]       4 [0-4 by 0.25]   Pulse width (usec)       20      20   Freq (Hz)       200      200   Contacts: C       Cpos      Cpos   Contacts: 4            Contacts: 3          3abcNeg   Contacts: 2  2bNeg     Contacts: 1         R brain programs:      \"Program 1\"   ( currently not used, inactive)  Right Vop                 Right Vim    Lead 1                         Lead 2         Contacts C+, 2abc- C+, 11abc-   Amplitude (mA) 1.5 2.5   Pulse Width (ms) 60 60   Frequency (Hz) 130 180          Program   \"Vim only\"   ( active)                     Lead 1                    Right Vop                   Lead 2                  Right Vim     Initial Final Initial Final   Contacts  C+, 2abc- C+, 2abc-  C+, 11abc- C+, 11abc-   Amplitude (mA)  0 no range 0 no range  3.5 3.5 [0-4 by 0.25]   Pulse Width (ms)  60 20  30 20   Frequency ( Hz)   130 200 180 200          Program   \"Vop only\"  ( inactive)                      Lead 1                     Right Vop        " "            Lead 2                    Right Vim     Initial Final Initial Final   Contacts  C+, 2abc-    C+, 2abc-  c+,11abc-  C+, 11abc-   Amplitude (mA)  3.0    4.0 [0-4 by 0.25]   0, no range  0, no range   Pulse Width (ms)  30    20   60  20   Frequency ( Hz)   180    200   180   200      Spirals scanned in Epic.    Impression: Ms. Rosales is a 69 year old R handed female with Essential tremor , s/p   L Vim DBS ([facility]) Mar 2018;  R Vim & Vop DBS ([facility]) Jan 2019 , Cervical dystonia( with good response to Botulinum toxin injections) . The DBS programming was difficult due to refractory proximal and axial tremor( with dystonic component).  On the theory that she is an \"adapter-rebounder\" multiple programs were created with the idea that she might alternate.     For the Right VOP we increased the amplitude and frequency , decreased the pulse width.  For the Right VOM frequency was increased and decreased the pulse width.    For the left brain 2 programs were created : 2 b and 3 abc.   .   Plan:   We interrogated your DBS today and no problems were found.    Right brain:    1. We advise to alternate between VOP and VIM on increased settings. Resume every 2 weeks alternation at the new increased settings.     2.Today we did not find any side effects with increased settings, if you experience any side effects at home while on VOP , continue alternations but reduce VOP setting from 4.0 to 2.0 mA.    3. We send you home on Right VIM only active .      Left brain :    1. You have 2 programs for the left bran, right body( 2b and 3 abc) . We are going to send you home on 2b.     We want you to alternate between programs in the following way:       Weeks                 Right brain        Left brain        1                      VIM           2b        2                      VIM          3abc        3                      VOP          3abc        4                      VOP           2b     And then repeat in the same " sequence.    Patient seen and discussed with .    Dorita Jade MD  Movement Disorders Fellow    Patient seen and examined with Dr. Jade and I agree with the assessment and plan as outlined.  I was present throughout, participated in, and closely supervised the (unusually complex) neurostimulator programmin hours.    Edawr Colvin PhD, MD

## 2019-09-06 NOTE — NURSING NOTE
Chief Complaint   Patient presents with     RECHECK     [facility] RETURN - 3 MONTH FOLLOW UP       Bobby Serrano, EMT

## 2019-09-20 DIAGNOSIS — E78.5 HYPERLIPIDEMIA LDL GOAL <160: ICD-10-CM

## 2019-09-20 RX ORDER — ROSUVASTATIN CALCIUM 5 MG/1
TABLET, COATED ORAL
Qty: 90 TABLET | Refills: 0 | OUTPATIENT
Start: 2019-09-20

## 2019-09-20 RX ORDER — ROSUVASTATIN CALCIUM 5 MG/1
TABLET, COATED ORAL
Qty: 30 TABLET | Refills: 0 | Status: SHIPPED | OUTPATIENT
Start: 2019-09-20 | End: 2019-10-17

## 2019-09-20 NOTE — TELEPHONE ENCOUNTER
Crestor- patient is overdue for wellness visit with fasting labs. See today's refill encounter. Sent message to pharmacy.  Irish Valerio RN

## 2019-09-20 NOTE — LETTER
September 20, 2019      Irish Rosales  [address]  [place] [address]              Dear Irish,    We recently received a refill request from your pharmacy requesting a refill of : Crestor.    A review of your chart indicates that your last office visit was on 8/8.  A physical is duewith your provider with fasting labs. We have authorized a one time 30 day refill of your medication to allow time for you to schedule.     Please call the clinic at [phone], or log in to your [employer] account at [email or web address] to schedule your appointment within that time frame so there is no delay in next month's refill.    Thank you for taking an active role in your healthcare.    Sincerely,          Braulio Victoria MD    If you need assistance with your [employer] log in, please call [phone].

## 2019-09-24 DIAGNOSIS — Z86.59 HISTORY OF MAJOR DEPRESSION: ICD-10-CM

## 2019-09-24 RX ORDER — SERTRALINE HYDROCHLORIDE 100 MG/1
TABLET, FILM COATED ORAL
Qty: 90 TABLET | Refills: 1 | Status: SHIPPED | OUTPATIENT
Start: 2019-09-24 | End: 2020-03-19

## 2019-09-26 ENCOUNTER — TELEPHONE (OUTPATIENT)
Dept: NEUROLOGY | Facility: CLINIC | Age: 69
End: 2019-09-26

## 2019-10-01 ENCOUNTER — HEALTH MAINTENANCE LETTER (OUTPATIENT)
Age: 69
End: 2019-10-01

## 2019-10-16 ENCOUNTER — OFFICE VISIT (OUTPATIENT)
Dept: NEUROLOGY | Facility: CLINIC | Age: 69
End: 2019-10-16
Payer: COMMERCIAL

## 2019-10-16 VITALS
OXYGEN SATURATION: 97 % | BODY MASS INDEX: 24.24 KG/M2 | HEART RATE: 75 BPM | DIASTOLIC BLOOD PRESSURE: 74 MMHG | SYSTOLIC BLOOD PRESSURE: 152 MMHG | HEIGHT: 61 IN | WEIGHT: 128.4 LBS

## 2019-10-16 DIAGNOSIS — G24.3 CERVICAL DYSTONIA: Primary | ICD-10-CM

## 2019-10-16 SDOH — HEALTH STABILITY: MENTAL HEALTH: HOW OFTEN DO YOU HAVE A DRINK CONTAINING ALCOHOL?: 2-4 TIMES A MONTH

## 2019-10-16 SDOH — HEALTH STABILITY: MENTAL HEALTH: HOW MANY STANDARD DRINKS CONTAINING ALCOHOL DO YOU HAVE ON A TYPICAL DAY?: 1 OR 2

## 2019-10-16 ASSESSMENT — PAIN SCALES - GENERAL: PAINLEVEL: NO PAIN (0)

## 2019-10-16 ASSESSMENT — MIFFLIN-ST. JEOR: SCORE: 1056.78

## 2019-10-16 NOTE — PROGRESS NOTES
Movement Disorders Botulinum Toxin Clinic Note    Chief Complaint: cervical dystonia     History of Present Illness:  Irish Rosales is a 69 year old female who presents to clinic for botulinum toxin injections for treatment of cervical dystonia.       Response to Last Injection: She last received botulinum toxin injections on 6/12/2019.    Onset of effect:  Within a week of injections.     Benefit from last injections:  Improved neck ROM and feeling of tightness.     Wearing off: She noticed wearing off about 3 weeks prior to this visit.     Side effects: She reports no side effects.     Additional interval history: Had a flu shot a months ago. She denies SEs to previous injections.       Current Outpatient Medications   Medication Sig Dispense Refill     ALPRAZolam (XANAX) 0.5 MG tablet Take 1 tablet (0.5 mg) by mouth daily as needed for anxiety (average a few times a month) 30 tablet 0     botulinum toxin type A (BOTOX) 100 units injection Inject 200 units 4x per year 800 Units 0     CALCIUM-VITAMIN D PO Take 2 chew tab by mouth daily       cyanocobalamin (VITAMIN B-12) 1000 MCG tablet Take 1 tablet (1,000 mcg) by mouth daily 100 tablet 3     rosuvastatin (CRESTOR) 5 MG tablet TAKE 1 TABLET(5 MG) BY MOUTH DAILY 30 tablet 0     sertraline (ZOLOFT) 100 MG tablet TAKE 1 TABLET(100 MG) BY MOUTH DAILY 90 tablet 1     Nutritional Supplements (ENSURE COMPLETE SHAKE) LIQD Take 237 mLs by mouth 3 times daily (Patient not taking: Reported on 8/8/2019) 90 Bottle 3       Allergies: She is allergic to sulfa drugs; atorvastatin; and simvastatin.    Past Medical History:   Diagnosis Date     Depression      Dyslipidemia      Dystonic movements 1/21/2017     Dystonic tremor 1/21/2017     Family history of movement disorder 1/21/2017     mild abnormality in carotid study 3/2/2017    BILATERAL DUPLEX CAROTID ULTRASOUND March 1, 2017 1:01 PM    HISTORY: Other specified symptoms and signs involving the circulatory and respiratory  systems.   COMPARISON: None.   FINDINGS: There is mild atherosclerotic plaque in the carotid bifurcations. Flow velocities and waveforms show less than 50% diameter stenosis in both the right and left internal carotid arteries as assessed by each ICA PS     Osteoporosis 8/2/2010       Past Surgical History:   Procedure Laterality Date     ------------OTHER-------------  2004    vocal cord thyroplasty at [facility]     C BSO, OMENTECTOMY W/XAVIER  1997    hysterectomy     C HAND/FINGER SURGERY UNLISTED  1998    right carpal tunnel surgery     IMPLANT DEEP BRAIN STIMULATION GENERATOR / BATTERY Left 3/13/2018    Procedure: IMPLANT DEEP BRAIN STIMULATION GENERATOR / BATTERY;  Deep Brain Stimulator Placement, Phase II, Placement Of Deep Brain Stimulator Generator/Battery Over The Left Chest Wall;  Surgeon: Aleksander Morales MD;  Location: [facility] OR     IMPLANT DEEP BRAIN STIMULATION GENERATOR / BATTERY Right 1/15/2019    Procedure: Deep Brain Stimulator Placement, Phase II, Placement Of Deep Brain Stimulator Generator/Battery Over The Right Chest Wall;  Surgeon: Aleksander Morales MD;  Location: [facility] OR     OPTICAL TRACKING SYSTEM INSERTION DEEP BRAIN STIMULATION Left 3/6/2018    Procedure: OPTICAL TRACKING SYSTEM INSERTION DEEP BRAIN STIMULATION;  Stealth Assisted Left Deep Brain Stimulator Placement, Phase I, Placement Of Left Side Deep Brain Stimulator Electrode, Target Left Ventral Intermediate Nucleus Of The Thalamus With Microelectrode Recording;  Surgeon: Aleksander Morales MD;  Location: [facility] OR     OPTICAL TRACKING SYSTEM INSERTION DEEP BRAIN STIMULATION Right 1/8/2019    Procedure: Stealth Assisted Right Deep Brain Stimulator Placement, Phase One, Placement Of Right Side Deep Brain Stimulator Electrode Target Right Ventral Intermediate Nucleus Of The Thalamus and placement Second Electrode With Microelectrode Recording;  Surgeon: Aleksander Morales MD;  Location: [facility] OR     RELEASE CARPAL TUNNEL Left  1/2/2015    Procedure: RELEASE CARPAL TUNNEL;  Surgeon: SHANIA Hernandez MD;  Location: [facility] OR     RELEASE ULNAR NERVE (ELBOW) Left 1/2/2015    Procedure: RELEASE ULNAR NERVE (ELBOW);  Surgeon: SHANIA Hernandez MD;  Location: [facility] OR       Social History     Socioeconomic History     Marital status:      Spouse name: Not on file     Number of children: 3     Years of education: 14     Highest education level: Not on file   Occupational History     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   Social Needs     Financial resource strain: Not on file     Food insecurity:     Worry: Not on file     Inability: Not on file     Transportation needs:     Medical: Not on file     Non-medical: Not on file   Tobacco Use     Smoking status: Never Smoker     Smokeless tobacco: Never Used   Substance and Sexual Activity     Alcohol use: Yes     Frequency: 2-4 times a month     Drinks per session: 1 or 2     Comment: occasionally     Drug use: No     Sexual activity: Yes     Partners: Male   Lifestyle     Physical activity:     Days per week: Not on file     Minutes per session: Not on file     Stress: Not on file   Relationships     Social connections:     Talks on phone: Not on file     Gets together: Not on file     Attends Anglican service: Not on file     Active member of club or organization: Not on file     Attends meetings of clubs or organizations: Not on file     Relationship status: Not on file     Intimate partner violence:     Fear of current or ex partner: Not on file     Emotionally abused: Not on file     Physically abused: Not on file     Forced sexual activity: Not on file   Other Topics Concern     Parent/sibling w/ CABG, MI or angioplasty before 65F 55M? No      Service No     Blood Transfusions No     Caffeine Concern No     Occupational Exposure No     Hobby Hazards No      Sleep Concern No     Stress Concern No     Weight Concern No     Special Diet No     Back Care No     Exercise Yes     Comment: walk     Bike Helmet Yes     Seat Belt Yes     Self-Exams Yes   Social History Narrative        Lives in [place]    Daughter Karyn Sanchez        benign essential tremor with a strained quality to her voice consistent with mild laryngeal spasm        ALLERGIES:  She is allergic to sulfa drugs, atorvastatin and simvastatin.  The statin drugs caused leg cramps.            FAMILY HISTORY:  As noted above with 1 sister developing a voice tremor and another sister having what is described as a facial tremor.  Mother with hand tremors          SOCIAL HISTORY:  She does not smoke.  She does use alcohol on occasion. She previously worked as a RN.         Jazmyn voltz  [id number] orofacial dyskinesias    Piedad Belz [phone]  laryngeal dystonia and laryngeal tremor and laryngeal spasm.             Updated 6/13/17: Client was born in [place], [place] to parents Jerome and Berenice.  Client grew up w/ three sisters Tea, Carrie, and Jazmyn who are currently still living.  Client graduated from high school, attended college for two years, and graduated w/ a RN Degree.  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  She was  to her ex-spouse for 25 years and had three children; one son Bryan and two daughters Halley/Ysabel.  One daughter Halley Sanchez resides nearby and her other daughter Ysabel and her son Bryan reside in [place], [place].  Bryan has two daughters ages five and eight years old.  Client enjoys flying for free to [place] to visit her two granddaughters. Michelle Sutton, [employer] ([phone], Fax: [phone]).           Family History   Problem Relation Age of Onset     Hypertension Father         and  "mother     Cerebrovascular Disease Father      Tremor Mother      Lung Cancer Mother      Neurologic Disorder Sister         dystonic voice x 2 botox     Neurologic Disorder Sister         jose m m[place]. facial movement        Physical Examination:  Vital Signs:   height is 1.553 m (5' 1.13\") and weight is 58.2 kg (128 lb 6.4 oz). Her blood pressure is 152/74 (abnormal) and her pulse is 75. Her oxygen saturation is 97%.   Physical Exam:  GENERAL: alert, active, attentive, appropriately groomed   HEENT: normocephalic, eyes open with no discharge, nares patent, oropharynx clear-no lesions  CHEST: normal configuration, chest rise equal b/l, non labored breathing   EXTREMITIES: no edema/cyanosis in BUE/BLE, distal pulses intact  PSYCH: mood stable     Neurologic Exam:  MENTAL STATUS: Alert and oriented to person, place, time, and situation. Follows commands. Recent and remote memory intact. Attention span and concentration intact. Fund of knowledge intact to current events.  SPEECH: Fluent, intact comprehension and vocal tremor with frequent pauses  CN: visual fields intact in all fields, EOMIB,  no nystagmus, normal saccades, PERRL, facial sensation intact, facial movement symmetric, hearing grossly intact to conversation, tongue protrudes midline   MOTOR: Moves all extremities equally against gravity without difficulty  Involuntary movements: neck with right rotation about 5-10 degrees and left tilt 10 degrees, anterior shift  SENSATION: intact to light touch throughout   GAIT: able to rise unassisted from a seated position, normal arm swing and length of gait, no en bloc turns, no ataxia    DBS interrogation and details     Left Brain:  Model: Abbott Infinity  Site of implantation: Left Vim   Date of implantation: 3/6/2018  Lead type: Abbott Infinity 0.5 mm, REF 6172  IPG: Abbott Infinity 5, REF 6660, implanted 3/13/2018  Battery: 2.94 volts  VIM settings: C+, 3abc-, 4.0 mA, 20 ms, 200 Hz  [range 0-4.0 mA]  Impedances " checked - no problems found, no open or closed circuits     Right Brain:  Model: Abbott Infinity  Site of implantation: Dual leads, Right Vim and Right Vop  Date of implantation: 2019  Lead type: Abbott Infinity 0.5 mm, REF 6172 (Vim)  Lead type: Abbott Infinity 0.5 mm, REF 6172 (Vop)  IPG: Abbott Infinity 7, REF 6662, implanted 1/15/2019  Battery: 2.96 volts (2.97 volts)  Right Vim settings: C+, 11abc-, 3.5 mA, 200 ms, 200 Hz    [range 0-4.0 mA]  Right Vop settings: C+, 2abc-, 0.0 mA, 20 ms, 200 Hz   [no range]  Impedances checked - no problems found, no open or closed circuits    BOTULINUM NEUROTOXIN INJECTION PROCEDURES:    VERIFICATION OF PATIENT IDENTIFICATION AND PROCEDURE     Initials   Patient Name KMD   Patient  KMD   Procedure Verified by: BRANDAN     Prior to the start of the procedure and with procedural staff participation, I verbally confirmed the patient s identity using two indicators, relevant allergies, that the procedure was appropriate and matched the consent or emergent situation, and that the correct equipment/implants were available. Immediately prior to starting the procedure I conducted the Time Out with the procedural staff and re-confirmed the patient s name, procedure, and site/side. (The Joint Commission universal protocol was followed.)  Yes    Sedation (Moderate or Deep): None      Above assessments performed by:  Resident/Fellow         Angelita Soriano DO          The attending provider was present for the pertinent parts of the procedure documented below and was available throughout the entirety.      Dr. Myers    INDICATION/S FOR PROCEDURE/S:  Irish Rosales is a 69 year old year old patient with dystonia affecting the  head, neck and shoulder girdle musculature secondary to a diagnosis of cervical dystonia with associated  loss of joint motion, loss of volitional motor control and difficulty with activities of daily living.      Irish Rosales's baseline symptoms  have been recalcitrant to oral medications and conservative therapy.  She is here today for an injection of Botox.       GOAL OF PROCEDURE:  The goal of this procedure is to increase active range of motion, improve volitional motor control and enhance functional independence associated with dystonic movements    TOTAL DOSE ADMINISTERED:  Dose Administered:  150 units Botox    Diluent Used:  Preservative Free Normal Saline  Total Volume of Diluent Used:  2 ml  Lot # [id number] with Expiration Date:  4/22  NDC #: Botox 100u ([address]-01)    Medication guide was offered to patient and was declined.    CONSENT:  The risks, benefits, and treatment options were discussed with Irish Rosales and she agreed to proceed.      Written consent was obtained by BRANDAN.     EQUIPMENT USED:  Needle-37mm stimulating/recording    SKIN PREPARATION:  Skin preparation was performed using an alcohol wipe.    GUIDANCE DESCRIPTION:  DBS was turned off for the procedure. Extension wires were located and marked bilaterally to avoid during Botox injections.   Electro-myographic guidance was necessary throughout the procedure to accurately identify all areas of dystonic muscles while avoiding injection of non-dystonic muscles, neighboring nerves and nearby vascular structures.     AREA/MUSCLE INJECTED:    Muscles Injected Units Injected Number of Injections   Left trapezius  50 5   Right trapezius  60 6   Right levator scapulae at origin 15 1   Right rhomboid 15 1   Right paraspinal 5 1   Right sternocleidomastoid 5 1        Total Units Injected: 150     Unavoidable Waste: 50     Total Units Billed 200        The patient tolerated the injections without difficulty.      Assessment:    Irish Rosales is a 69 year old female with cervical dystonia.  Today we did repeat botulinum toxin injections.    Plan  Follow-up in 3 months' time to consider repeat injections.   DBS evaluated and no problems found. DBS turned back on at the completion of  this visit.    I, Ming Myers MD, attest that I have reviewed Irish Rosales's history, examination with Dr.Kristine Soriano.  I agree with Dr. Soriano's impression and plan of care.   I personally reviewed the vital signs, medications and labs/imaging.    Answers for HPI/ROS submitted by the patient on 10/16/2019   General Symptoms: No  Skin Symptoms: No  HENT Symptoms: No  EYE SYMPTOMS: No  HEART SYMPTOMS: No  LUNG SYMPTOMS: No  INTESTINAL SYMPTOMS: No  URINARY SYMPTOMS: No  GYNECOLOGIC SYMPTOMS: No  BREAST SYMPTOMS: No  SKELETAL SYMPTOMS: No  BLOOD SYMPTOMS: No  NERVOUS SYSTEM SYMPTOMS: No  MENTAL HEALTH SYMPTOMS: No

## 2019-10-17 DIAGNOSIS — E78.5 HYPERLIPIDEMIA LDL GOAL <160: ICD-10-CM

## 2019-10-18 RX ORDER — ROSUVASTATIN CALCIUM 5 MG/1
TABLET, COATED ORAL
Qty: 30 TABLET | Refills: 0 | Status: SHIPPED | OUTPATIENT
Start: 2019-10-18 | End: 2019-10-30

## 2019-10-24 ENCOUNTER — PATIENT OUTREACH (OUTPATIENT)
Dept: GERIATRIC MEDICINE | Facility: CLINIC | Age: 69
End: 2019-10-24

## 2019-10-24 ENCOUNTER — TELEPHONE (OUTPATIENT)
Dept: NEUROLOGY | Facility: CLINIC | Age: 69
End: 2019-10-24

## 2019-10-24 NOTE — TELEPHONE ENCOUNTER
[facility] Call Center    Phone Message    May a detailed message be left on voicemail: yes    Reason for Call: Medication Refill Request    Has the patient contacted the pharmacy for the refill? Yes   Name of medication being requested: Zonisamide  Provider who prescribed the medication: [facility]  Pharmacy: [id number] - [place], [place] - [address] AT [facility] [address] [place] [address]    Date medication is needed: ASAP    Action Taken: Message routed to:  [facility] ([facility]): Neuro

## 2019-10-24 NOTE — PROGRESS NOTES
[facility] Care Coordination Contact      [facility] Six-Month Telephone Assessment    6 month telephone assessment completed on 10/24/19.    ER visits: No  Hospitalizations: No  TCU stays: No  Significant health status changes: None  Falls/Injuries: No  ADL/IADL changes: No  Changes in services: No. Reported services are going well.    Caregiver Assessment follow up: NA    Goals: See POC in chart for goal progress documentation.     Will see member in 6 months for an annual health risk assessment.   Encouraged member to call CC with any questions or concerns in the meantime.     INNA Diallo  [facility]  [phone]  Fax: [phone]

## 2019-10-24 NOTE — TELEPHONE ENCOUNTER
11/2/2018 - Office Visit with Dr. Sandy Hernandez:  I had suggested zonisamide as possible treatment for tremor over the phone - but due to sulfa allergy she did not start this medication. We had discussed a slow titration since her allergy was skin only - but I agree given the minimal evidence for zonisamide for tremor and the patient's failure of a similar medication (topiramate) it may not be worth the risk.     Irish is not currently taking Zonisamide and is not on her medication list.

## 2019-10-30 ENCOUNTER — OFFICE VISIT (OUTPATIENT)
Dept: PEDIATRICS | Facility: CLINIC | Age: 69
End: 2019-10-30
Payer: COMMERCIAL

## 2019-10-30 VITALS
WEIGHT: 128 LBS | HEIGHT: 61 IN | SYSTOLIC BLOOD PRESSURE: 126 MMHG | TEMPERATURE: 96.9 F | HEART RATE: 66 BPM | BODY MASS INDEX: 24.17 KG/M2 | OXYGEN SATURATION: 100 % | DIASTOLIC BLOOD PRESSURE: 66 MMHG

## 2019-10-30 DIAGNOSIS — Z00.00 MEDICARE ANNUAL WELLNESS VISIT, SUBSEQUENT: Primary | ICD-10-CM

## 2019-10-30 DIAGNOSIS — E78.5 HYPERLIPIDEMIA LDL GOAL <160: ICD-10-CM

## 2019-10-30 DIAGNOSIS — Z86.59 HISTORY OF MAJOR DEPRESSION: ICD-10-CM

## 2019-10-30 DIAGNOSIS — R29.898 NECK TIGHTNESS: ICD-10-CM

## 2019-10-30 DIAGNOSIS — E53.8 VITAMIN B12 DEFICIENCY (NON ANEMIC): ICD-10-CM

## 2019-10-30 LAB
CHOLEST SERPL-MCNC: 263 MG/DL
HDLC SERPL-MCNC: 114 MG/DL
LDLC SERPL CALC-MCNC: 133 MG/DL
NONHDLC SERPL-MCNC: 149 MG/DL
TRIGL SERPL-MCNC: 81 MG/DL
VIT B12 SERPL-MCNC: 550 PG/ML (ref 193–986)

## 2019-10-30 PROCEDURE — 82607 VITAMIN B-12: CPT | Performed by: INTERNAL MEDICINE

## 2019-10-30 PROCEDURE — G0009 ADMIN PNEUMOCOCCAL VACCINE: HCPCS | Performed by: INTERNAL MEDICINE

## 2019-10-30 PROCEDURE — 99213 OFFICE O/P EST LOW 20 MIN: CPT | Mod: 25 | Performed by: INTERNAL MEDICINE

## 2019-10-30 PROCEDURE — 36415 COLL VENOUS BLD VENIPUNCTURE: CPT | Performed by: INTERNAL MEDICINE

## 2019-10-30 PROCEDURE — G0438 PPPS, INITIAL VISIT: HCPCS | Performed by: INTERNAL MEDICINE

## 2019-10-30 PROCEDURE — 90732 PPSV23 VACC 2 YRS+ SUBQ/IM: CPT | Performed by: INTERNAL MEDICINE

## 2019-10-30 PROCEDURE — 80061 LIPID PANEL: CPT | Performed by: INTERNAL MEDICINE

## 2019-10-30 RX ORDER — ROSUVASTATIN CALCIUM 10 MG/1
10 TABLET, COATED ORAL DAILY
Qty: 90 TABLET | Refills: 3 | Status: SHIPPED | OUTPATIENT
Start: 2019-10-30 | End: 2020-10-23

## 2019-10-30 ASSESSMENT — MIFFLIN-ST. JEOR: SCORE: 1056.94

## 2019-10-30 NOTE — RESULT ENCOUNTER NOTE
Dear Irish,   Your recent lab results showed the following:  --Lipid panel is still elevated, there is slight improvement in bad cholesterol LDL number.  I recommend increasing the Crestor to 10 mg daily. I sent a new prescription to your pharmacy. Will recheck fasting lipid in 3 months.  Lab appointment only.     Please call or Mychart to our office if you have further questions.     Braulio Victoria MD-PhD

## 2019-10-30 NOTE — PROGRESS NOTES
"  SUBJECTIVE:   Irish Rosales is a 69 year old female who presents for Preventive Visit.    Are you in the first 12 months of your Medicare Part B coverage?  No    HPI:  Neck tightness from dysonia. botox didn't help. It is still quite stiff.   Had DBS placed, has helped her tremor significantly. She is happy with the result.     Physical Health:    In general, how would you rate your overall physical health? good    Outside of work, how many days during the week do you exercise? 2-3 days/week    Outside of work, approximately how many minutes a day do you exercise?30-45 minutes    If you drink alcohol do you typically have >3 drinks per day or >7 drinks per week? No    Do you usually eat at least 4 servings of fruit and vegetables a day, include whole grains & fiber and avoid regularly eating high fat or \"junk\" foods? Yes    Do you have any problems taking medications regularly?  No    Do you have any side effects from medications? none    Needs assistance for the following daily activities: no assistance needed    Which of the following safety concerns are present in your home?  none identified     Hearing impairment: No    In the past 6 months, have you been bothered by leaking of urine? no    Mental Health:    In general, how would you rate your overall mental or emotional health? good  PHQ-2 Score:      Do you feel safe in your environment? Yes    Have you ever done Advance Care Planning? (For example, a Health Directive, POLST, or a discussion with a medical provider about your wishes): Yes, advance care planning is on file.    Additional concerns to address?  No    Fall risk:  Fallen 2 or more times in the past year?: No  Any fall with injury in the past year?: Yes  Timed Up and Go Test (>13.5 is fall risk; contact physician) : 10  click delete button to remove this line now  Cognitive Screenin) Repeat 3 items (Leader, Season, Table)    2) Clock draw: NORMAL  3) 3 item recall: Recalls 2 objects "   Results: NORMAL clock, 1-2 items recalled: COGNITIVE IMPAIRMENT LESS LIKELY    Mini-CogTM Copyright TAHIRA Barbosa. Licensed by the author for use in [facility]; reprinted with permission (arnold@[facility]). All rights reserved.      Do you have sleep apnea, excessive snoring or daytime drowsiness?: no        -------------------------------------    Reviewed and updated as needed this visit by clinical staff  Tobacco  Allergies  Meds  Med Hx  Surg Hx  Fam Hx  Soc Hx        Reviewed and updated as needed this visit by Provider        Social History     Tobacco Use     Smoking status: Never Smoker     Smokeless tobacco: Never Used   Substance Use Topics     Alcohol use: Yes     Frequency: 2-4 times a month     Drinks per session: 1 or 2     Comment: occasionally                           Current providers sharing in care for this patient include:   Patient Care Team:  Braulio Victoria MD PhD as PCP - General (Internal Medicine)  Michelle Corcoran LSW as Lead Care Coordinator  Truman Julian MD as Assigned PCP    The following health maintenance items are reviewed in Epic and correct as of today:  Health Maintenance   Topic Date Due     ZOSTER IMMUNIZATION (2 of 3) 06/29/2015     MEDICARE ANNUAL WELLNESS VISIT  05/27/2017     PNEUMOCOCCAL IMMUNIZATION 65+ LOW/MEDIUM RISK (2 of 2 - PPSV23) 10/23/2018     LIPID  02/28/2019     PHQ-9  12/03/2019     MAMMO SCREENING  04/03/2020     FALL RISK ASSESSMENT  08/08/2020     DEXA  04/03/2021     DTAP/TDAP/TD IMMUNIZATION (2 - Td) 07/20/2021     ADVANCE CARE PLANNING  01/09/2024     COLONOSCOPY  11/02/2028     HEPATITIS C SCREENING  Completed     DEPRESSION ACTION PLAN  Completed     INFLUENZA VACCINE  Completed     IPV IMMUNIZATION  Aged Out     MENINGITIS IMMUNIZATION  Aged Out     Labs reviewed in EPIC  Pneumonia Vaccine:Adults age 65+ who have not received previous Pneumovax (PPSV23) or PCV13 as an adult: Should first be given PCV13 AND then should be given PPSV23  "6-12 months after PCV13  Mammogram Screening: Mammogram Screening: Patient over age 50, mutual decision to screen reflected in health maintenance.    ROS:  Constitutional, HEENT, cardiovascular, pulmonary, gi and gu systems are negative, except as otherwise noted.    OBJECTIVE:   There were no vitals taken for this visit. Estimated body mass index is 24.16 kg/m  as calculated from the following:    Height as of 10/16/19: 1.553 m (5' 1.13\").    Weight as of 10/16/19: 58.2 kg (128 lb 6.4 oz).  EXAM:   GENERAL: healthy, alert and no distress  EYES: Eyes grossly normal to inspection, PERRL and conjunctivae and sclerae normal  HENT: ear canals and TM's normal, nose and mouth without ulcers or lesions  NECK: no adenopathy, no asymmetry, masses, or scars and thyroid normal to palpation  RESP: lungs clear to auscultation - no rales, rhonchi or wheezes  BREAST: normal without masses, tenderness or nipple discharge and no palpable axillary masses or adenopathy  CV: regular rate and rhythm, normal S1 S2, no S3 or S4, no murmur, click or rub, no peripheral edema and peripheral pulses strong  ABDOMEN: soft, nontender, no hepatosplenomegaly, no masses and bowel sounds normal  MS: no gross musculoskeletal defects noted, no edema  SKIN: no suspicious lesions or rashes  NEURO: Normal strength and tone, mentation intact and speech normal  PSYCH: mentation appears normal, affect normal/bright    Diagnostic Test Results:  Results for orders placed or performed in visit on 10/30/19 (from the past 24 hour(s))   Lipid panel reflex to direct LDL Fasting   Result Value Ref Range    Cholesterol 263 (H) <200 mg/dL    Triglycerides 81 <150 mg/dL    HDL Cholesterol 114 >49 mg/dL    LDL Cholesterol Calculated 133 (H) <100 mg/dL    Non HDL Cholesterol 149 (H) <130 mg/dL       ASSESSMENT / PLAN:       ICD-10-CM    1. Medicare annual wellness visit, subsequent Z00.00    2. Hyperlipidemia LDL goal <160 E78.5 Lipid panel reflex to direct LDL Fasting " "    rosuvastatin (CRESTOR) 10 MG tablet   3. Neck tightness R29.898 [facility] PT, HAND, AND CHIROPRACTIC REFERRAL   4. History of major depression Z86.59      -- lipid panel is still quite high. Increase Crestor to 10 mg. Recheck labs in 3 months  -- neck tightness from dystonia: PT.     Preventive screening/immunizations:   Mammogram: up to date  Colonoscopy: up to date  Immunizations: up to date   -- Shingrix (RZV) advised. Pt will check insurance coverage.      COUNSELING:  Reviewed preventive health counseling, as reflected in patient instructions    Estimated body mass index is 24.16 kg/m  as calculated from the following:    Height as of 10/16/19: 1.553 m (5' 1.13\").    Weight as of 10/16/19: 58.2 kg (128 lb 6.4 oz).         reports that she has never smoked. She has never used smokeless tobacco.      Appropriate preventive services were discussed with this patient, including applicable screening as appropriate for cardiovascular disease, diabetes, osteopenia/osteoporosis, and glaucoma.  As appropriate for age/gender, discussed screening for colorectal cancer, prostate cancer, breast cancer, and cervical cancer. Checklist reviewing preventive services available has been given to the patient.    Reviewed patients plan of care and provided an AVS. The Intermediate Care Plan ( asthma action plan, low back pain action plan, and migraine action plan) for Irish meets the Care Plan requirement. This Care Plan has been established and reviewed with the Patient.    Counseling Resources:  ATP IV Guidelines  Pooled Cohorts Equation Calculator  Breast Cancer Risk Calculator  FRAX Risk Assessment  ICSI Preventive Guidelines  Dietary Guidelines for Americans, 2010  USDA's MyPlate  ASA Prophylaxis  Lung CA Screening    Braulio Victoria MD PhD  [facility]  "

## 2019-10-30 NOTE — PATIENT INSTRUCTIONS
Make appointment(s) for:   -- wellness visit in one year   -- physical therapy      Check with your insurance regarding coverage for Shingrix (RZV) - the new shingles vaccine.         Preventive Health Recommendations    See your health care provider every year to    Review health changes.     Discuss preventive care.      Review your medicines if your doctor has prescribed any.      You no longer need a yearly Pap test unless you've had an abnormal Pap test in the past 10 years. If you have vaginal symptoms, such as bleeding or discharge, be sure to talk with your provider about a Pap test.      Every 1 to 2 years, have a mammogram.  If you are over 69, talk with your health care provider about whether or not you want to continue having screening mammograms.      Every 10 years, have a colonoscopy. Or, have a yearly FIT test (stool test). These exams will check for colon cancer.       Have a cholesterol test every 5 years, or more often if your doctor advises it.       Have a diabetes test (fasting glucose) every three years. If you are at risk for diabetes, you should have this test more often.       At age 65, have a bone density scan (DEXA) to check for osteoporosis (brittle bone disease).    Shots:    Get a flu shot each year.    Get a tetanus shot every 10 years.    Talk to your doctor about your pneumonia vaccines. There are now two you should receive - Pneumovax (PPSV 23) and Prevnar (PCV 13).    Talk to your pharmacist about the shingles vaccine.    Talk to your doctor about the hepatitis B vaccine.    Nutrition:     Eat at least 5 servings of fruits and vegetables each day.      Eat whole-grain bread, whole-wheat pasta and brown rice instead of white grains and rice.      Get adequate about Calcium and Vitamin D.     Lifestyle    Exercise at least 150 minutes a week (30 minutes a day, 5 days a week). This will help you control your weight and prevent disease.      Limit alcohol to one drink per  day.      No smoking.       Wear sunscreen to prevent skin cancer.       See your dentist twice a year for an exam and cleaning.      See your eye doctor every 1 to 2 years to screen for conditions such as glaucoma, macular degeneration, cataracts, etc.    Personalized Prevention Plan  You are due for the preventive services outlined below.  Your care team is available to assist you in scheduling these services.  If you have already completed any of these items, please share that information with your care team to update in your medical record.    Health Maintenance Due   Topic Date Due     Zoster (Shingles) Vaccine (2 of 3) 06/29/2015     Annual Wellness Visit  05/27/2017     Pneumococcal Vaccine (2 of 2 - PPSV23) 10/23/2018     Cholesterol Lab  02/28/2019

## 2019-10-30 NOTE — NURSING NOTE
Prior to immunization administration, verified patients identity using patient s name and date of birth. Please see Immunization Activity for additional information.     Screening Questionnaire for Adult Immunization    Are you sick today?   No   Do you have allergies to medications, food, a vaccine component or latex?   No   Have you ever had a serious reaction after receiving a vaccination?   No   Do you have a long-term health problem with heart disease, lung disease, asthma, kidney disease, metabolic disease (e.g. diabetes), anemia, or other blood disorder?   No   Do you have cancer, leukemia, HIV/AIDS, or any other immune system problem?   No   In the past 3 months, have you taken medications that affect  your immune system, such as prednisone, other steroids, or anticancer drugs; drugs for the treatment of rheumatoid arthritis, Crohn s disease, or psoriasis; or have you had radiation treatments?   No   Have you had a seizure, or a brain or other nervous system problem?   No   During the past year, have you received a transfusion of blood or blood     products, or been given immune (gamma) globulin or antiviral drug?   No   For women: Are you pregnant or is there a chance you could become        pregnant during the next month?   No   Have you received any vaccinations in the past 4 weeks?   No     Immunization questionnaire answers were all negative.        Per orders of Dr. Victoria, injection of PVC23 given by Dionne Lorenz LPN. Patient instructed to remain in clinic for 15 minutes afterwards, and to report any adverse reaction to me immediately.       Screening performed by Dionne Lorenz LPN on 10/30/2019 at 1:43 PM.

## 2019-10-31 NOTE — RESULT ENCOUNTER NOTE
Dear Irish,   Your recent lab results showed the following:  -- B12 level is now normal. Continue to take vitamin B12 supplement.     Please call or Mychart to our office if you have further questions.     Braulio Victoria MD-PhD

## 2019-11-29 DIAGNOSIS — M54.2 CERVICALGIA: ICD-10-CM

## 2019-11-29 RX ORDER — GABAPENTIN 100 MG/1
CAPSULE ORAL
Refills: 1 | COMMUNITY
Start: 2019-10-23 | End: 2021-01-21

## 2019-11-29 RX ORDER — GABAPENTIN 100 MG/1
100 CAPSULE ORAL 3 TIMES DAILY
Qty: 90 CAPSULE | Refills: 0 | Status: SHIPPED | OUTPATIENT
Start: 2019-11-29 | End: 2021-01-21

## 2019-11-29 NOTE — TELEPHONE ENCOUNTER
Last Written Prescription Date:  10/23/19  Last Fill Quantity: ?,  # refills: 1   Last office visit: 10/30/2019 with prescribing provider:  Julien   Future Office Visit:      Routing refill request to provider for review/approval because:  Drug not on the [facility] refill protocol

## 2019-12-23 DIAGNOSIS — G25.2 DYSTONIC TREMOR: Primary | ICD-10-CM

## 2020-01-03 ENCOUNTER — OFFICE VISIT (OUTPATIENT)
Dept: NEUROLOGY | Facility: CLINIC | Age: 70
End: 2020-01-03
Payer: COMMERCIAL

## 2020-01-03 VITALS — OXYGEN SATURATION: 100 % | SYSTOLIC BLOOD PRESSURE: 128 MMHG | DIASTOLIC BLOOD PRESSURE: 71 MMHG | HEART RATE: 76 BPM

## 2020-01-03 DIAGNOSIS — R25.1 TREMOR: Primary | ICD-10-CM

## 2020-01-03 ASSESSMENT — PATIENT HEALTH QUESTIONNAIRE - PHQ9: SUM OF ALL RESPONSES TO PHQ QUESTIONS 1-9: 0

## 2020-01-03 ASSESSMENT — PAIN SCALES - GENERAL: PAINLEVEL: NO PAIN (0)

## 2020-01-03 NOTE — PROGRESS NOTES
"70 YO RHF with strong FHx of tremor and dystonia (orofacial dyskinesias and another sister with laryngeal dystonia and laryngeal tremor and laryngeal spasm, mother with hand tremor) , s/p Â L Vim DBS ([facility]) Mar 2018; Â R Vim &Â Vop DBS ([facility]) Jan 2019, sldo is botulinum toxin injection for cervical dystonia with good response of pain and improved range of motion.  Â   She has had a good but transient response of her tremor to L Vim DBS. She underwent extended programming for the left Vim , her tremor was definitely worse without stimulation , but more aggressive stimulation created limb ataxia which was difficult to distinguish from original tremor. The stimulation improved  the distal tremor (e.g. improved handwriting) but proximal/axial tremor seemed to be contributing a lot to functional diasability (e.g. feeding herself, combing hair), therefore we implanted the second side and, because of the relatively refractory nature of the tremor, and possible dystonic component, implanted two leads on that side, one in Vim, the other anteriorly, in Vop.      Althought reporting she feels much better now than before surgery , speech improved (before surgery she was not able to hold a conversation over the phone), handwriting improved, she continued to have difficulty feeding and eating with the right hand, unable to comb hair using the right hand.  Nonetheldess, as of July 2019 \"I could live with this\" \"Thrilled with my results;  Anything else is icing on the cake\"    Two visits ago, on the hypothesis that adaptation-rebound might be contributing to the difficulty of controlling her tremor, we set her up with a set of multiple protrams, alternating Vim-only and Vop-only stimulation on  the R.  One visit ago, she reported that she'd given up on the Vop-only stimulation due at least in part to headache.  On the hypothesis that the former might represent inadequate control of cervical dystonia, with Vop amplitude " "increased (to from 3 to 4.0mA, also frequency increased from 180 to 200.    Since last visit, a botulinum toxin  appointment with Dr. Soriano Oct 2019, no future Botox appointments scheduled, currently.    Today    unacompanied    Tremor stable.  \"the only real problem\" is eating & drinking.  \"If this is as good as it gets then I'm OK with it.    Botox didn't make a difference that she noticed.    1. Speaking             0 = Normal.  1 = Slight voice tremulousness, only when \"nervous\". -- yes \"much improved\"  2 = Mild voice tremor. All words easily understood.  3 = Moderate voice tremor.  Some words difficult to understand.  4 = Severe voice tremor.  Most words difficult to understand.    2. Feeding with a spoon          0 = Normal  1 = Slightly abnormal. Tremor is present but does not interfere with feeding with a spoon.  2 = Mildly abnormal. Spills a little.  3 = Moderately abnormal.  Spills a lot or changes strategy to complete task such as using two hands or leaning over.--yes, salad, no soup  4 = Severely abnormal.  Cannot feed with a spoon.    3. Drinking from a glass          0 = Normal.  1 = Slightly abnormal. Tremor is present but does not interfere with drinking from a glass.  2 = Mildly abnormal. Spills a little.  3 = Moderately abnormal.  Spills a lot or changes strategy to complete task such as using two hands or leaning over.--yes \"I usually use a straw.  4 = Severely abnormal.  Cannot drink from a glass or uses straw or sippy cup.    4. Hygiene             0 = Normal.  1 = Slightly abnormal.  Tremor is present but does not interfere with hygiene.  2 = Mildly abnormal.  Some difficulty but can complete task. -- can do makeup, but not mascara  3 = Moderately abnormal.  Unable to do most fine tasks such as putting on lipstick or shaving unless changes strategy such as using two hands or using the less affected hand.  4 = Severely abnormal.  Cannot complete hygiene activities independently.     5. " "Dressing             0 = Normal.--yes  1 = Slightly abnormal.  Tremor is present but does not interfere with dressing.   2 = Mildly abnormal.  Able to do everything but has difficulty due to tremor.  3 = Moderately abnormal.  Unable to do most dressing unless uses strategy such as using Velcro,  buttoning shirt before putting it on or avoiding shoes with laces.   4 = Severely abnormal.  Cannot dress independently.        6. Pouring                   0 = Normal.  1 = Slightly abnormal. Tremor is present but does not interfere with pouring.  2 = Mildly abnormal. Must be very careful to avoid spilling but may spill occasionally.--yes \"I can pour water into the coffee[nakker] but I have to be careful\"  3 = Moderately abnormal. Must use two hands or uses other strategies to avoid spilling.  4 = Severely abnormal. Cannot pour.    7. Carrying food trays, plates or similar items  0 = Normal  1 = Slightly abnormal. Tremor is present but does not interfere with carrying food trays, plates or similar items.--yes (can do it better with arms extended then flexed)  2 = Mildly abnormal. Must be very careful to avoid spilling items on food tray.  3 = Moderately abnormal. Uses strategies such as holding tightly against body to carry.   4 = Severely abnormal. Cannot carry food trays or similar items.    8. Using Keys                                                                                                                                                      0 = Normal  1 = Slightly abnormal. Tremor is present but can insert key with one hand without difficulty.  2 = Mildly abnormal. Commonly misses target but still routinely puts key in lock with one hand. --yes  3 = Moderately abnormal. Needs to use two hands or other strategies to put key in lock.   4 = Severely abnormal. Cannot put key in lock.    9. Writing  0 = Normal  1 = Slightly abnormal. Tremor present but does not interfere with writing.--yes  2 = Mildly abnormal.  " "Difficulty writing due to the tremor  3 = Moderately abnormal.  Cannot write without using strategies such as holding the writing hand with the other hand, holding pen differently or using large pen.  4 = Severely abnormal.  Cannot write.     10. Working. If patient is retired, ask as if they were still working. If the patient is a housewife, ask the question as it relates to housework:  0 = Normal.  1 = Slightly abnormal. Tremor is present but does not affect performance at work or at home. -- cutting vegetables more diffiuclt  2 = Mildly abnormal. Tremor interferes with work; able to do everything, but with errors.  3 = Moderately abnormal. Unable to continue working without using strategies such as changing jobs or using special equipment.    4 = Severely abnormal. Cannot perform any job or household work.    11. Overall disability with the most affected task (Name task, e.g. using computer mouse, writing, etc)    0 = Normal.      1 = Slightly abnormal. Tremor present but does not affect task.    2 = Mildly abnormal. Tremor interferes with task but is still able to perform task.   3 = Moderately abnormal. Can do task but must use strategies. --yes  4 = Severely abnormal. Cannot do the task.    Task: eating       12. Social Impact   0 = None  1 = Aware of tremor, but it does not affect lifestyle or professional life.  2 = Feels embarrassed by tremor in some social situations or professional meetings.-- yes \"If I go out w/ my family they dont care,  I try to order stuff that's easy to eat.  3 = Avoids participating in some social situations or professional meetings because of tremor.  4 = Avoids participating in most social situations or professional meetings because of tremor.    Meds as of last visit    Movement Disorder-related Medications                       Xanax 0.5 mg PRN once a month               Settings as-of last visit    L brain programs  Battery 2.93 OK  System impedences all OK         Left VIM    " "Active       Inactive         Program        \"2b\"      \"3abc\"   Amplitude mA        4 [0-5 by 0.25]       4 [0-4 by 0.25]   Pulse width (usec)       20      20   Freq (Hz)       200      200   Contacts: C       Cpos      Cpos   Contacts: 4            Contacts: 3          3abcNeg   Contacts: 2  2bNeg     Contacts: 1          R brain programs:   Battery 2.96V OK  System impedences all OK             \"Program 1\"   ( currently not used, inactive)  Right Vop                 Right Vim    Lead 1                         Lead 2           Contacts C+, 2abc- C+, 11abc-   Amplitude (mA) 1.5 2.5   Pulse Width (ms) 60 60   Frequency (Hz) 130 180                 Program   \"Vim only\"   (active)                     Lead 1                    Right Vop                   Lead 2                  Right Vim      Final  Final   Contacts  C+, 2abc-  C+, 11abc-   Amplitude (mA)   0 no range  3.5 [0-4 by 0.25]   Pulse Width (ms)   20  20   Frequency ( Hz)  200  200                 Program   \"Vop only\"  ( inactive)                      Lead 1                     Right Vop                    Lead 2                    Right Vim     Initial Final Initial Final   Contacts  C+, 2abc-    C+, 2abc-  c+,11abc-  C+, 11abc-   Amplitude (mA)  3.0    4.0 [0-4 by 0.25]   0, no range  0, no range   Pulse Width (ms)  30    20   60  20   Frequency ( Hz)   180    200   180   200        Plan:  Continue cycling -- it's not bothersome to her, and it may be responsible for her current comparatively satisfactory symptom control.  She'll consider scheduling Botox follow up appt, perhaps for another try ith larger dose.  Programming 1h.                                                                                              "

## 2020-01-03 NOTE — NURSING NOTE
Chief Complaint   Patient presents with     RECHECK     [facility] RETURN - FOLLOW UP       Bobby Serrano, EMT

## 2020-01-03 NOTE — PATIENT INSTRUCTIONS
"We are keeping your stimulation \"as-is\"  Continue cycling between programs (same as you have been doing) as follows:          Weeks                 Right brain        Left brain        1                      VIM           2b        2                      VIM          3abc        3                      VOP          3abc        4                      VOP           2b      Consider scheduling a follow up appointment for botulinum toxin (\"Botox\") injections for your head tremor.  It's possible a different dose, or different injection locations might be worth trying.       "

## 2020-01-03 NOTE — LETTER
"1/3/2020       RE: Irish Rosales  [address]  [place] [address]     Dear Colleague,    Thank you for referring your patient, Irish Rosales, to the [facility] NEUROLOGY at [facility]. Please see a copy of my visit note below.    68 YO RHF with strong FHx of tremor and dystonia (orofacial dyskinesias and another sister with laryngeal dystonia and laryngeal tremor and laryngeal spasm, mother with hand tremor) , s/p Â L Vim DBS (Perez) Mar 2018; Â R Vim &Â Vop DBS (Abbott) Jan 2019, sldo is botulinum toxin injection for cervical dystonia with good response of pain and improved range of motion.  Â   She has had a good but transient response of her tremor to L Vim DBS. She underwent extended programming for the left Vim , her tremor was definitely worse without stimulation , but more aggressive stimulation created limb ataxia which was difficult to distinguish from original tremor. The stimulation improved  the distal tremor (e.g. improved handwriting) but proximal/axial tremor seemed to be contributing a lot to functional diasability (e.g. feeding herself, combing hair), therefore we implanted the second side and, because of the relatively refractory nature of the tremor, and possible dystonic component, implanted two leads on that side, one in Vim, the other anteriorly, in Vop.      Althought reporting she feels much better now than before surgery , speech improved (before surgery she was not able to hold a conversation over the phone), handwriting improved, she continued to have difficulty feeding and eating with the right hand, unable to comb hair using the right hand.  Nonetheldess, as of July 2019 \"I could live with this\" \"Thrilled with my results;  Anything else is icing on the cake\"    Two visits ago, on the hypothesis that adaptation-rebound might be contributing to the difficulty of controlling her tremor, we set her up with a set of multiple " "protrams, alternating Vim-only and Vop-only stimulation on  the R.  One visit ago, she reported that she'd given up on the Vop-only stimulation due at least in part to headache.  On the hypothesis that the former might represent inadequate control of cervical dystonia, with Vop amplitude increased (to from 3 to 4.0mA, also frequency increased from 180 to 200.    Since last visit, a botulinum toxin  appointment with Dr. Soriano Oct 2019, no future Botox appointments scheduled, currently.    maybe Vop C as likely the most posterior segment      1. Speaking             0 = Normal.  1 = Slight voice tremulousness, only when \"nervous\".   2 = Mild voice tremor. All words easily understood.  3 = Moderate voice tremor.  Some words difficult to understand.  4 = Severe voice tremor.  Most words difficult to understand.    2. Feeding with a spoon          0 = Normal  1 = Slightly abnormal. Tremor is present but does not interfere with feeding with a spoon.  2 = Mildly abnormal. Spills a little.  3 = Moderately abnormal.  Spills a lot or changes strategy to complete task such as using two hands or leaning over.  4 = Severely abnormal.  Cannot feed with a spoon.    3. Drinking from a glass          0 = Normal.  1 = Slightly abnormal. Tremor is present but does not interfere with drinking from a glass.  2 = Mildly abnormal. Spills a little.  3 = Moderately abnormal.  Spills a lot or changes strategy to complete task such as using two hands or leaning over.  4 = Severely abnormal.  Cannot drink from a glass or uses straw or sippy cup.    4. Hygiene             0 = Normal.  1 = Slightly abnormal.  Tremor is present but does not interfere with hygiene.  2 = Mildly abnormal.  Some difficulty but can complete task.  3 = Moderately abnormal.  Unable to do most fine tasks such as putting on lipstick or shaving unless changes strategy such as using two hands or using the less affected hand.  4 = Severely abnormal.  Cannot complete " hygiene activities independently.     5. Dressing             0 = Normal.  1 = Slightly abnormal.  Tremor is present but does not interfere with dressing.   2 = Mildly abnormal.  Able to do everything but has difficulty due to tremor.  3 = Moderately abnormal.  Unable to do most dressing unless uses strategy such as using Velcro,  buttoning shirt before putting it on or avoiding shoes with laces.   4 = Severely abnormal.  Cannot dress independently.    6. Pouring                   0 = Normal.  1 = Slightly abnormal. Tremor is present but does not interfere with pouring.  2 = Mildly abnormal. Must be very careful to avoid spilling but may spill occasionally.  3 = Moderately abnormal. Must use two hands or uses other strategies to avoid spilling.  4 = Severely abnormal. Cannot pour.    7. Carrying food trays, plates or similar items  0 = Normal  1 = Slightly abnormal. Tremor is present but does not interfere with carrying food trays, plates or similar items.  2 = Mildly abnormal. Must be very careful to avoid spilling items on food tray.  3 = Moderately abnormal. Uses strategies such as holding tightly against body to carry.   4 = Severely abnormal. Cannot carry food trays or similar items.    8. Using Keys                                                                                                                                                      0 = Normal  1 = Slightly abnormal. Tremor is present but can insert key with one hand without difficulty.  2 = Mildly abnormal. Commonly misses target but still routinely puts key in lock with one hand.  3 = Moderately abnormal. Needs to use two hands or other strategies to put key in lock.   4 = Severely abnormal. Cannot put key in lock.    9. Writing  0 = Normal  1 = Slightly abnormal. Tremor present but does not interfere with writing.  2 = Mildly abnormal.  Difficulty writing due to the tremor  3 = Moderately abnormal.  Cannot write without using strategies such as  "holding the writing hand with the other hand, holding pen differently or using large pen.  4 = Severely abnormal.  Cannot write.     10. Working. If patient is retired, ask as if they were still working. If the patient is a housewife, ask the question as it relates to housework:  0 = Normal.  1 = Slightly abnormal. Tremor is present but does not affect performance at work or at home.  2 = Mildly abnormal. Tremor interferes with work; able to do everything, but with errors.  3 = Moderately abnormal. Unable to continue working without using strategies such as changing jobs or using special equipment.    4 = Severely abnormal. Cannot perform any job or household work.    11. Overall disability with the most affected task (Name task, e.g. using computer mouse, writing, etc)    0 = Normal.      1 = Slightly abnormal. Tremor present but does not affect task.    2 = Mildly abnormal. Tremor interferes with task but is still able to perform task.   3 = Moderately abnormal. Can do task but must use strategies.   4 = Severely abnormal. Cannot do the task.    Task:       12. Social Impact   0 = None  1 = Aware of tremor, but it does not affect lifestyle or professional life.  2 = Feels embarrassed by tremor in some social situations or professional meetings.  3 = Avoids participating in some social situations or professional meetings because of tremor.  4 = Avoids participating in most social situations or professional meetings because of tremor.    Settings as-of last visit         Left VIM    Active       Inactive         Program        \"2b\"      \"3abc\"   Amplitude mA        4 [0-5 by 0.25]       4 [0-4 by 0.25]   Pulse width (usec)       20      20   Freq (Hz)       200      200   Contacts: C       Cpos      Cpos   Contacts: 4            Contacts: 3          3abcNeg   Contacts: 2  2bNeg     Contacts: 1          R brain programs:            \"Program 1\"   ( currently not used, inactive)  Right Vop                 Right Vim    " "Lead 1                         Lead 2           Contacts C+, 2abc- C+, 11abc-   Amplitude (mA) 1.5 2.5   Pulse Width (ms) 60 60   Frequency (Hz) 130 180                 Program   \"Vim only\"   ( active)                     Lead 1                    Right Vop                   Lead 2                  Right Vim     Initial Final Initial Final   Contacts  C+, 2abc- C+, 2abc-  C+, 11abc- C+, 11abc-   Amplitude (mA)  0 no range 0 no range  3.5 3.5 [0-4 by 0.25]   Pulse Width (ms)  60 20  30 20   Frequency ( Hz)   130 200 180 200                 Program   \"Vop only\"  ( inactive)                      Lead 1                     Right Vop                    Lead 2                    Right Vim     Initial Final Initial Final   Contacts  C+, 2abc-    C+, 2abc-  c+,11abc-  C+, 11abc-   Amplitude (mA)  3.0    4.0 [0-4 by 0.25]   0, no range  0, no range   Pulse Width (ms)  30    20   60  20   Frequency ( Hz)   180    200   180   200            70 YO RHF with strong FHx of tremor and dystonia (orofacial dyskinesias and another sister with laryngeal dystonia and laryngeal tremor and laryngeal spasm, mother with hand tremor) , s/p Â L Vim DBS (Perez) Mar 2018; Â R Vim &Â Vop DBS (Abbott) Jan 2019, sldo is botulinum toxin injection for cervical dystonia with good response of pain and improved range of motion.  Â   She has had a good but transient response of her tremor to L Vim DBS. She underwent extended programming for the left Vim , her tremor was definitely worse without stimulation , but more aggressive stimulation created limb ataxia which was difficult to distinguish from original tremor. The stimulation improved  the distal tremor (e.g. improved handwriting) but proximal/axial tremor seemed to be contributing a lot to functional diasability (e.g. feeding herself, combing hair), therefore we implanted the second side and, because of the relatively refractory nature of the tremor, and possible dystonic component, implanted two " "leads on that side, one in Vim, the other anteriorly, in Vop.      Althought reporting she feels much better now than before surgery , speech improved (before surgery she was not able to hold a conversation over the phone), handwriting improved, she continued to have difficulty feeding and eating with the right hand, unable to comb hair using the right hand.  Nonetheldess, as of July 2019 \"I could live with this\" \"Thrilled with my results;  Anything else is icing on the cake\"    Two visits ago, on the hypothesis that adaptation-rebound might be contributing to the difficulty of controlling her tremor, we set her up with a set of multiple protrams, alternating Vim-only and Vop-only stimulation on  the R.  One visit ago, she reported that she'd given up on the Vop-only stimulation due at least in part to headache.  On the hypothesis that the former might represent inadequate control of cervical dystonia, with Vop amplitude increased (to from 3 to 4.0mA, also frequency increased from 180 to 200.    Since last visit, a botulinum toxin  appointment with Dr. Soriano Oct 2019, no future Botox appointments scheduled, currently.    maybe Vop C as likely the most posterior segment      1. Speaking             0 = Normal.  1 = Slight voice tremulousness, only when \"nervous\".   2 = Mild voice tremor. All words easily understood.  3 = Moderate voice tremor.  Some words difficult to understand.  4 = Severe voice tremor.  Most words difficult to understand.    2. Feeding with a spoon          0 = Normal  1 = Slightly abnormal. Tremor is present but does not interfere with feeding with a spoon.  2 = Mildly abnormal. Spills a little.  3 = Moderately abnormal.  Spills a lot or changes strategy to complete task such as using two hands or leaning over.  4 = Severely abnormal.  Cannot feed with a spoon.    3. Drinking from a glass          0 = Normal.  1 = Slightly abnormal. Tremor is present but does not interfere with drinking from a " glass.  2 = Mildly abnormal. Spills a little.  3 = Moderately abnormal.  Spills a lot or changes strategy to complete task such as using two hands or leaning over.  4 = Severely abnormal.  Cannot drink from a glass or uses straw or sippy cup.    4. Hygiene             0 = Normal.  1 = Slightly abnormal.  Tremor is present but does not interfere with hygiene.  2 = Mildly abnormal.  Some difficulty but can complete task.  3 = Moderately abnormal.  Unable to do most fine tasks such as putting on lipstick or shaving unless changes strategy such as using two hands or using the less affected hand.  4 = Severely abnormal.  Cannot complete hygiene activities independently.     5. Dressing             0 = Normal.  1 = Slightly abnormal.  Tremor is present but does not interfere with dressing.   2 = Mildly abnormal.  Able to do everything but has difficulty due to tremor.  3 = Moderately abnormal.  Unable to do most dressing unless uses strategy such as using Velcro,  buttoning shirt before putting it on or avoiding shoes with laces.   4 = Severely abnormal.  Cannot dress independently.    6. Pouring                   0 = Normal.  1 = Slightly abnormal. Tremor is present but does not interfere with pouring.  2 = Mildly abnormal. Must be very careful to avoid spilling but may spill occasionally.  3 = Moderately abnormal. Must use two hands or uses other strategies to avoid spilling.  4 = Severely abnormal. Cannot pour.    7. Carrying food trays, plates or similar items  0 = Normal  1 = Slightly abnormal. Tremor is present but does not interfere with carrying food trays, plates or similar items.  2 = Mildly abnormal. Must be very careful to avoid spilling items on food tray.  3 = Moderately abnormal. Uses strategies such as holding tightly against body to carry.   4 = Severely abnormal. Cannot carry food trays or similar items.    8. Using Keys                                                                                                                                                       0 = Normal  1 = Slightly abnormal. Tremor is present but can insert key with one hand without difficulty.  2 = Mildly abnormal. Commonly misses target but still routinely puts key in lock with one hand.  3 = Moderately abnormal. Needs to use two hands or other strategies to put key in lock.   4 = Severely abnormal. Cannot put key in lock.    9. Writing  0 = Normal  1 = Slightly abnormal. Tremor present but does not interfere with writing.  2 = Mildly abnormal.  Difficulty writing due to the tremor  3 = Moderately abnormal.  Cannot write without using strategies such as holding the writing hand with the other hand, holding pen differently or using large pen.  4 = Severely abnormal.  Cannot write.     10. Working. If patient is retired, ask as if they were still working. If the patient is a housewife, ask the question as it relates to housework:  0 = Normal.  1 = Slightly abnormal. Tremor is present but does not affect performance at work or at home.  2 = Mildly abnormal. Tremor interferes with work; able to do everything, but with errors.  3 = Moderately abnormal. Unable to continue working without using strategies such as changing jobs or using special equipment.    4 = Severely abnormal. Cannot perform any job or household work.    11. Overall disability with the most affected task (Name task, e.g. using computer mouse, writing, etc)    0 = Normal.      1 = Slightly abnormal. Tremor present but does not affect task.    2 = Mildly abnormal. Tremor interferes with task but is still able to perform task.   3 = Moderately abnormal. Can do task but must use strategies.   4 = Severely abnormal. Cannot do the task.    Task:       12. Social Impact   0 = None  1 = Aware of tremor, but it does not affect lifestyle or professional life.  2 = Feels embarrassed by tremor in some social situations or professional meetings.  3 = Avoids participating in some social  "situations or professional meetings because of tremor.  4 = Avoids participating in most social situations or professional meetings because of tremor.    Meds as of last visit    Movement Disorder-related Medications                       Xanax 0.5 mg PRN once a month           Settings as-of last visit         Left VIM    Active       Inactive         Program        \"2b\"      \"3abc\"   Amplitude mA        4 [0-5 by 0.25]       4 [0-4 by 0.25]   Pulse width (usec)       20      20   Freq (Hz)       200      200   Contacts: C       Cpos      Cpos   Contacts: 4            Contacts: 3          3abcNeg   Contacts: 2  2bNeg     Contacts: 1          R brain programs:            \"Program 1\"   ( currently not used, inactive)  Right Vop                 Right Vim    Lead 1                         Lead 2           Contacts C+, 2abc- C+, 11abc-   Amplitude (mA) 1.5 2.5   Pulse Width (ms) 60 60   Frequency (Hz) 130 180                 Program   \"Vim only\"   ( active)                     Lead 1                    Right Vop                   Lead 2                  Right Vim     Initial Final Initial Final   Contacts  C+, 2abc- C+, 2abc-  C+, 11abc- C+, 11abc-   Amplitude (mA)  0 no range 0 no range  3.5 3.5 [0-4 by 0.25]   Pulse Width (ms)  60 20  30 20   Frequency ( Hz)   130 200 180 200                 Program   \"Vop only\"  ( inactive)                      Lead 1                     Right Vop                    Lead 2                    Right Vim     Initial Final Initial Final   Contacts  C+, 2abc-    C+, 2abc-  c+,11abc-  C+, 11abc-   Amplitude (mA)  3.0    4.0 [0-4 by 0.25]   0, no range  0, no range   Pulse Width (ms)  30    20   60  20   Frequency ( Hz)   180    200   180   200        Again, thank you for allowing me to participate in the care of your patient.      Sincerely,    Edwar Colvin MD      "

## 2020-01-13 DIAGNOSIS — G25.2 DYSTONIC TREMOR: ICD-10-CM

## 2020-01-14 RX ORDER — ALPRAZOLAM 0.5 MG
TABLET ORAL
Qty: 30 TABLET | Refills: 1 | Status: SHIPPED | OUTPATIENT
Start: 2020-01-14 | End: 2020-09-03

## 2020-01-14 NOTE — TELEPHONE ENCOUNTER
Alprazolam  Last Written Prescription Date:  8/8/19  Last Fill Quantity: 30,  # refills: 1   Last office visit: 10/30/2019 with prescribing provider:  Julien   Future Office Visit:      Routing refill request to provider for review/approval because:  Drug not on the [facility] refill protocol

## 2020-01-30 ENCOUNTER — PATIENT OUTREACH (OUTPATIENT)
Dept: GERIATRIC MEDICINE | Facility: CLINIC | Age: 70
End: 2020-01-30

## 2020-01-30 NOTE — PROGRESS NOTES
[facility] Care Coordination Contact    Called member to schedule annual HRA home visit. HRA has been scheduled for Tuesday, Feb 4th at 9:00am.     INNA Diallo  [facility]  [phone]  Fax: [phone]

## 2020-02-05 ENCOUNTER — PATIENT OUTREACH (OUTPATIENT)
Dept: GERIATRIC MEDICINE | Facility: CLINIC | Age: 70
End: 2020-02-05

## 2020-02-05 ASSESSMENT — ACTIVITIES OF DAILY LIVING (ADL): DEPENDENT_IADLS:: CLEANING;COOKING;LAUNDRY;MEAL PREPARATION

## 2020-02-05 NOTE — PROGRESS NOTES
[facility] Care Coordination Contact    [facility] Home Visit Assessment     Home visit for Health Risk Assessment with Irish Rosales completed on February 5, 2020    Type of residence:: Apartment - handicap accessible  Current living arrangement:: I live alone     Assessment completed with:: Patient    Current Care Plan  Member currently receiving the following home care services: NA   Member currently receiving the following community resources: Housekeeping/Chore Agency    Medication Review  Medication reconciliation completed in Epic: Yes  Medication set-up & administration: Independent-does not set up.  Self-administers medications.  Medication Risk Assessment Medication (1 or more, place referral to MTM): N/A: No risk factors identified  MTM Referral Placed: No: No risk factors idenified    Mental/Behavioral Health   Depression Screening: See PHQ assessment flowsheet.   Mental health DX:: Yes   Mental health DX how managed:: Medication  Mental Health Diagnosis: Yes: Depression, major, in remission, generalized anxiety do  Mental Health Services: None: No further intervention needed at this time.    Falls Assessment:   Fallen 2 or more times in the past year?: No   Any fall with injury in the past year?: No    ADL/IADL Dependencies:   Dependent ADLs:: Independent  Dependent IADLs:: Cleaning, Cooking, Laundry, Meal Preparation    [facility] Plan sponsored benefits: Shared information re: Silver Sneakers/gym memberships, ASA, Calcium +D.    PCA Assessment completed at visit: Not applicable     Elderly Waiver Eligibility: Yes-will continue on EW    Care Plan & Recommendations: Reported she is doing well. No concerns.    See [facility]CC for detailed assessment information.    Follow-Up Plan: Member informed of future contact, plan to f/u with member with a 6 month telephone assessment.  Contact information shared with member and family, encouraged member to call with any questions or concerns at any  time.    [facility] care continuum providers: Please refer to Health Care Home on the Epic Problem List to view this patient's [facility] Care Plan Summary.    INNA Diallo  [facility]  [phone]  Fax: [phone]

## 2020-02-06 ENCOUNTER — OFFICE VISIT (OUTPATIENT)
Dept: NEUROLOGY | Facility: CLINIC | Age: 70
End: 2020-02-06
Payer: COMMERCIAL

## 2020-02-06 VITALS
SYSTOLIC BLOOD PRESSURE: 157 MMHG | WEIGHT: 132.7 LBS | HEART RATE: 70 BPM | OXYGEN SATURATION: 99 % | BODY MASS INDEX: 24.87 KG/M2 | TEMPERATURE: 97.8 F | DIASTOLIC BLOOD PRESSURE: 79 MMHG

## 2020-02-06 DIAGNOSIS — G24.3 CERVICAL DYSTONIA: Primary | ICD-10-CM

## 2020-02-06 ASSESSMENT — PAIN SCALES - GENERAL: PAINLEVEL: SEVERE PAIN (7)

## 2020-02-06 NOTE — LETTER
2/6/2020       RE: Irish Rosales  [address]  [place] [address]     Dear Colleague,    Thank you for referring your patient, Irish Rosales, to the [facility] NEUROLOGY at [facility]. Please see a copy of my visit note below.    Movement Disorders Botulinum Toxin Procedure Note    Chief Complaint: neck pain    History of Present Illness:  Irish Rosales is a 69 year old female who presents to clinic for botulinum toxin injections for treatment of Cervical Dystonia .    She not get much benefit in terms of neck and shoulder pain form previous injections on10/03/2019, no head tremor but still with bilateral neck pain and bilateral upper shoulder pain. No side effects from injections , no difficulty swallowing, no head drop. Still with decreased range of motion in the neck.    Prior to the start of the procedure and with procedural staff participation, I verbally confirmed the patient s identity using two indicators, relevant allergies, that the procedure was appropriate and matched the consent or emergent situation, and that the correct equipment/implants were available. Immediately prior to starting the procedure I conducted the Time Out with the procedural staff and re-confirmed the patient s name, procedure, and site/side. (The Joint Commission universal protocol was followed.)  Yes    On exam today with mild retrocollis , slight left torticollis and mild sagittal shift.  DBS was turned off during the procedure.Extenio wires were located and avoided during the injections.  DBS was turned back on after the procedure.    TOTAL DOSE ADMINISTERED:  Dose Administered:  185 units Botox    Diluent Used:  Preservative Free Normal Saline  Total Volume of Diluent Used:  2 ml  Lot # [id number] with Expiration Date:  06/2022    Medication guide was offered to patient and was accepted.    CONSENT:  The risks, benefits, and treatment options were discussed with  Irish Rosales and she agreed to proceed.      Written consent was obtained byYes    EQUIPMENT USED:  EMG/NCS Machine    SKIN PREPARATION:  Skin preparation was performed using an alcohol wipe.    GUIDANCE DESCRIPTION:  EMG guidance:Yes  Ultrasound guidance:No    AREA/MUSCLE INJECTED:      Muscles Injected Units Injected Number of Injections   Left Trapezius  35 3   Right Trapezius  65 6   Left Splenius capitis 40 2   Right Splenius capitis  20 1   Right sternocleidomastoideus  25 1   Total Units Injected: 185    Unavoidable Waste: 15    Total Units Billed 200        The patient tolerated the injections without difficulty.    Summary:    The patient was injected today with  185 units of Onabotulinum toxin A under EMG  guidance as treatment of Cervical Dystonia.  The patient tolerated the procedure well.    Plan  Follow-up in 3 months  time to consider repeat injections    Dorita Jade MD,   Movement Disorder Fellow      Neurology Attending Attestation:     I, Sheila Haile MD, personally saw this patient with our Movement Disorders Fellow and agree with the fellow's findings and plan of care as documented in the movement disorder fellow's note. I personally performed salient aspects of the history and neurological examination.     I personally reviewed the vital signs, medications, and labs. I personally viewed the imaging, and agree with the interpretation documented by the fellow.    I personally performed or supervised all procedures.    Sheila Haile MD    of Neurology

## 2020-02-06 NOTE — NURSING NOTE
Chief Complaint   Patient presents with     Botox     [facility] RETURN BOTOX      Kristyn Mcgarry, EMT

## 2020-02-06 NOTE — PROGRESS NOTES
Movement Disorders Botulinum Toxin Procedure Note    Chief Complaint: neck pain    History of Present Illness:  Irish Rosales is a 69 year old female who presents to clinic for botulinum toxin injections for treatment of Cervical Dystonia .    She not get much benefit in terms of neck and shoulder pain form previous injections on10/03/2019, no head tremor but still with bilateral neck pain and bilateral upper shoulder pain. No side effects from injections , no difficulty swallowing, no head drop. Still with decreased range of motion in the neck.    Prior to the start of the procedure and with procedural staff participation, I verbally confirmed the patient s identity using two indicators, relevant allergies, that the procedure was appropriate and matched the consent or emergent situation, and that the correct equipment/implants were available. Immediately prior to starting the procedure I conducted the Time Out with the procedural staff and re-confirmed the patient s name, procedure, and site/side. (The Joint Commission universal protocol was followed.)  Yes    On exam today with mild retrocollis , slight left torticollis and mild sagittal shift.  DBS was turned off during the procedure.Extenio wires were located and avoided during the injections.  DBS was turned back on after the procedure.    TOTAL DOSE ADMINISTERED:  Dose Administered:  185 units Botox    Diluent Used:  Preservative Free Normal Saline  Total Volume of Diluent Used:  2 ml  Lot # [id number] with Expiration Date:  06/2022    Medication guide was offered to patient and was accepted.    CONSENT:  The risks, benefits, and treatment options were discussed with Irish Rosales and she agreed to proceed.      Written consent was obtained byYes    EQUIPMENT USED:  EMG/NCS Machine    SKIN PREPARATION:  Skin preparation was performed using an alcohol wipe.    GUIDANCE DESCRIPTION:  EMG guidance:Yes  Ultrasound guidance:No    AREA/MUSCLE INJECTED:       Muscles Injected Units Injected Number of Injections   Left Trapezius  35 3   Right Trapezius  65 6   Left Splenius capitis 40 2   Right Splenius capitis  20 1   Right sternocleidomastoideus  25 1   Total Units Injected: 185    Unavoidable Waste: 15    Total Units Billed 200        The patient tolerated the injections without difficulty.    Summary:    The patient was injected today with  185 units of Onabotulinum toxin A under EMG  guidance as treatment of Cervical Dystonia.  The patient tolerated the procedure well.    Plan  Follow-up in 3 months  time to consider repeat injections    Dorita Jade MD,   Movement Disorder Fellow      Neurology Attending Attestation:     I, Sheila Haile MD, personally saw this patient with our Movement Disorders Fellow and agree with the fellow's findings and plan of care as documented in the movement disorder fellow's note. I personally performed salient aspects of the history and neurological examination.     I personally reviewed the vital signs, medications, and labs. I personally viewed the imaging, and agree with the interpretation documented by the fellow.    I personally performed or supervised all procedures.    Sheila Haile MD    of Neurology

## 2020-02-06 NOTE — LETTER
2/6/2020      RE: Irish Rosales  [address]  [facility] [address]       Movement Disorders Botulinum Toxin Procedure Note    Chief Complaint: neck pain    History of Present Illness:  Irish Rosales is a 69 year old female who presents to clinic for botulinum toxin injections for treatment of Cervical Dystonia .    She not get much benefit in terms of neck and shoulder pain form previous injections on10/03/2019, no head tremor but still with bilateral neck pain and bilateral upper shoulder pain. No side effects from injections , no difficulty swallowing, no head drop. Still with decreased range of motion in the neck.    Prior to the start of the procedure and with procedural staff participation, I verbally confirmed the patient s identity using two indicators, relevant allergies, that the procedure was appropriate and matched the consent or emergent situation, and that the correct equipment/implants were available. Immediately prior to starting the procedure I conducted the Time Out with the procedural staff and re-confirmed the patient s name, procedure, and site/side. (The Joint Commission universal protocol was followed.)  Yes    On exam today with mild retrocollis , slight left torticollis and mild sagittal shift.  DBS was turned off during the procedure.Extenio wires were located and avoided during the injections.  DBS was turned back on after the procedure.    TOTAL DOSE ADMINISTERED:  Dose Administered:  185 units Botox    Diluent Used:  Preservative Free Normal Saline  Total Volume of Diluent Used:  2 ml  Lot # [id number] with Expiration Date:  06/2022    Medication guide was offered to patient and was accepted.    CONSENT:  The risks, benefits, and treatment options were discussed with Irish Rosales and she agreed to proceed.      Written consent was obtained byYes    EQUIPMENT USED:  EMG/NCS Machine    SKIN PREPARATION:  Skin preparation was performed using an alcohol  wipe.    GUIDANCE DESCRIPTION:  EMG guidance:Yes  Ultrasound guidance:No    AREA/MUSCLE INJECTED:      Muscles Injected Units Injected Number of Injections   Left Trapezius  35 3   Right Trapezius  65 6   Left Splenius capitis 40 2   Right Splenius capitis  20 1   Right sternocleidomastoideus  25 1   Total Units Injected: 185    Unavoidable Waste: 15    Total Units Billed 200        The patient tolerated the injections without difficulty.    Summary:    The patient was injected today with  185 units of Onabotulinum toxin A under EMG  guidance as treatment of Cervical Dystonia.  The patient tolerated the procedure well.    Plan  Follow-up in 3 months  time to consider repeat injections    Dorita Jade MD,   Movement Disorder Fellow      Neurology Attending Attestation:     I, Sheila Haile MD, personally saw this patient with our Movement Disorders Fellow and agree with the fellow's findings and plan of care as documented in the movement disorder fellow's note. I personally performed salient aspects of the history and neurological examination.     I personally reviewed the vital signs, medications, and labs. I personally viewed the imaging, and agree with the interpretation documented by the fellow.    I personally performed or supervised all procedures.    Sheila Haile MD    of Neurology              Sheila Haile MD

## 2020-02-24 ENCOUNTER — PATIENT OUTREACH (OUTPATIENT)
Dept: GERIATRIC MEDICINE | Facility: CLINIC | Age: 70
End: 2020-02-24

## 2020-02-24 NOTE — PROGRESS NOTES
Per Irish's request called [employer] to inquire if massage is covered by her insurance. Irish stated that Massage is helpful for her sore/stiff neck. [employer] reported that massage is only covered if it is part of the treatment plan when being seen by physical or occupation therapy.  for Irish informing her of this information.    Michelle Corcoran, [facility]  [phone]  Fax: [phone]

## 2020-02-27 ENCOUNTER — PATIENT OUTREACH (OUTPATIENT)
Dept: GERIATRIC MEDICINE | Facility: CLINIC | Age: 70
End: 2020-02-27

## 2020-02-27 NOTE — LETTER
February 27, 2020    Important [employer] Information    HARSHAD PAYNE  [address]   [place] [address]  Your Care Plan  Dear Harshad,  When we spoke recently, I promised to send you a Care Plan. The plan enclosed is a summary of our discussion. It includes the steps we agreed would help you meet your health goals. In addition, I can help you with:  [employer]  This program is available to members who need a ride to medical and dental visits. To schedule a ride, call [phone] or [phone] (toll free). TTY/TTD: 711. You can call 8 a.m. to 8 p.m. Seven days a week. Access to a representative may be limited at times.    [employer]   The [employer] program empowers you to improve your health through education and exercise. To learn more, visit [employer], or call [employer] at [phone] (toll free) (TTY:711) from 7 a.m. - 7 p.m. Central Time, Monday-Friday.  Health Care Directive   This form helps you outline your health care wishes. You can request a form from me and I will answer any questions you have before you discuss it with your doctor.   Annual Physical  Take a key step on your path to good health and set up an annual physical at your clinic.  Questions?  Call me at [phone] Monday-Friday between 8am and 5pm.  TTY/TTD: 711. As we discussed, I plan to be in touch with you again in 6 months to follow up via phone.  Sincerely,    INNA Diallo    E-mail: [email or web address]  Phone:[phone]      [employer]            cc: member records                    Civil Rights Notice  Discrimination is against the law. [employer] does not discriminate on the basis of any of the following:    Race    Color    National Origin    Creed    Mormon    Age    Public Assistance Status    Receipt of Health Care Services    Disability (including physical or mental impairment)    Sex (including sex stereotypes and gender  identity)    Marital Status    Political Beliefs    Medical Condition    Genetic Information    Sexual Orientation    Claims Experience    Medical History    Health Status    Auxiliary Aids and Services:  [employer] provides auxiliary aids and services, like qualified interpreters or information in accessible formats, free of charge and in a timely manner, to ensure an equal opportunity to participate in our health care programs. Contact [employer] Customer Service at [employer]/contact medicaid or call [phone] (toll free); TTY:715 or at [employer]/contactAquaBlingcaid.    Language Assistance Services:  [employer] provides translated documents and spoken language interpreting, free of charge and in a timely manner, when language assistance services are necessary to ensure limited English speakers have meaningful access to our information and services. Contact [employer] at [phone] (toll free); TTY: 925 or [employer]/contactmedicaid.     Civil Rights Complaints  You have the right to file a discrimination complaint if you believe you were treated in a discriminatory way by [employer]. You may contact any of the following four agencies directly to file a discrimination complaint.    U.S. Department of Health and Human Services  Office for Civil Rights (OCR)  You have the right to file a complaint with the OCR, a federal agency, if you believe you have been discriminated against because of any of the following:    Race    Disability    Color    Sex    National Origin    Age      Contact the OCR directly to file a complaint:         Director         U.S. Department of Health and Human Services  Office for Civil Rights         [address] [place] [address], [place] [address]         [phone] (Voice)         [phone] (TDD)         Complaint Portal - https://ocrportal.hhs.gov/ocr/portal/lobby.jsf     [place] Department of Human Rights ([facility])  In [place], you have the right to  file a complaint with the MDHR if you believe you have been discriminated against because of any of the following:      Race    Color    National Origin    Buddhism    Creed    Sex    Sexual Orientation    Marital Status    Public Assistance Status    Disability    Contact the MDHR directly to file a complaint:         [place] Department of Human Rights         [facility], [address] [place] [address]         [phone] (voice)          [phone] (toll free)         711 or [phone] ([place] Relay)         [phone] (Fax)         [email or web address] (Email)     [place] Department of Human Services (DHS)  You have the right to file a complaint with [facility] if you believe you have been discriminated against in our health care programs because of any of the following:    Race    Color    National Origin    Creed    Buddhism    Age    Public Assistance Status    Receipt of Health Care Services    Disability (including physical or mental impairment)    Sex (including sex stereotypes and gender identity)    Marital Status    Political Beliefs    Medical Condition    Genetic Information    Sexual Orientation    Claims Experience    Medical History    Health Status    Complaints must be in writing and filed within 180 days of the date you discovered the alleged discrimination. The complaint must contain your name and address and describe the discrimination you are complaining about. After we get your complaint, we will review it and notify you in writing about whether we have authority to investigate. If we do, we will investigate the complaint.      [facility] will notify you in writing of the investigation s outcome. You have a right to appeal the outcome if you disagree with the decision. To appeal, you must send a written request to have [facility] review the investigation outcome period. Be brief and state why you disagree with the decision. Include additional information you think is important.       If you file a complaint in this way, the people who work for the agency named in the complaint cannot retaliate against you. This means they cannot punish you in any way for filing a complaint. Filing a complaint in this way does not stop you from seeking out other legal or administration actions.     Contact DHS directly to file a discrimination complaint:        Civil Rights Coordinator        [facility]        Equal Opportunity and Access Division        [address] [address]        [place] [address] [phone] (voice) or use your preferred relay service     [employer] Complaint Notice   You have the right to file a complaint with [employer] if you believe you have been discriminated against because of any of the following:       Medical condition    Health status    Receipt of health care services    Claims experience    Medical history    Genetic information    Disability (including mental or physical impairment)    Marital status    Age    Sex (including sex stereotypes and gender identity)    Sexual orientation    National origin    Race    Color    Cheondoism    Creed    Public assistance status    Political beliefs    You can file a complaint and ask for help in filing a complaint in person or by mail, phone, fax, or email at:     [employer] Civil Rights Coordinator  [facility] [employer] [facility]  [address], [place] [address] [phone] (voice and fax) or TTY:926  Email: [email or web address]    American Indians can continue to use [facility] and  Health Services (IHS) clinics. We will not require prior approval or impose any conditions for you to get services at these clinics. For elders age 65 years and older this includes Elderly Waiver (EW) services accessed through the [place]. If a doctor or other provider in a [facility] or [facility] clinic refers you to a provider in our network, we will not require you to see your primary care provider prior to the  referral.    For accessible formats of this publication or assistance with equal or access to our services, visit [employer]/contactmedicaid, or call [phone] (toll free) or use your preferred relay service.

## 2020-02-27 NOTE — PROGRESS NOTES
[facility] Care Coordination Contact    Received after visit chart from care coordinator.  Completed following tasks: Mailed copy of care plan to client, Updated services in access and Submitted referrals/auths for hmkg  Chart was returned to CC.    and Provider Signature - No POC Shared:  Member indicates that they do not want their POC shared with any EW providers.   Chayo Hickman  Case Management Specialist  [facility]  [phone]

## 2020-03-19 DIAGNOSIS — Z86.59 HISTORY OF MAJOR DEPRESSION: ICD-10-CM

## 2020-03-19 RX ORDER — SERTRALINE HYDROCHLORIDE 100 MG/1
TABLET, FILM COATED ORAL
Qty: 90 TABLET | Refills: 0 | Status: SHIPPED | OUTPATIENT
Start: 2020-03-19 | End: 2020-06-19

## 2020-03-19 NOTE — TELEPHONE ENCOUNTER
Please refer to RN refill guidelines. Refilled per protocol.    Janine Juarez RN   [facility], [facility]

## 2020-04-16 PROBLEM — M54.2 NECK PAIN: Status: RESOLVED | Noted: 2018-08-24 | Resolved: 2020-04-16

## 2020-05-14 ENCOUNTER — TELEPHONE (OUTPATIENT)
Dept: PEDIATRICS | Facility: CLINIC | Age: 70
End: 2020-05-14

## 2020-05-14 NOTE — TELEPHONE ENCOUNTER
Prior Authorization Approval    Authorization Effective Date: 4/14/2020  Authorization Expiration Date: 5/14/2021  Medication: ALPRAZolam (XANAX) 0.5 MG tablet-APPROVED  Approved Dose/Quantity:   Reference #:     Insurance Company: [employer] - Phone [phone] Fax [phone]  Expected CoPay:       CoPay Card Available:      Foundation Assistance Needed:    Which Pharmacy is filling the prescription (Not needed for infusion/clinic administered): [id number] - [place], [place] - [address] AT [facility] [place] [address] [facility] [address]  Pharmacy Notified: Yes-Pharmacy will notify patient when ready.  Patient Notified: No

## 2020-06-17 DIAGNOSIS — Z86.59 HISTORY OF MAJOR DEPRESSION: ICD-10-CM

## 2020-06-18 ENCOUNTER — OFFICE VISIT (OUTPATIENT)
Dept: NEUROLOGY | Facility: CLINIC | Age: 70
End: 2020-06-18
Payer: COMMERCIAL

## 2020-06-18 DIAGNOSIS — G24.3 CERVICAL DYSTONIA: Primary | ICD-10-CM

## 2020-06-18 NOTE — LETTER
6/18/2020       RE: Irish Rosales  [id number]  [place] [id number]     Dear Colleague,    Thank you for referring your patient, Irish Rosales, to the [facility] NEUROLOGY at [facility]. Please see a copy of my visit note below.    Movement Disorders Botulinum Toxin Clinic Note    Chief Complaint: cervical dystonia.    History of Present Illness:  Irish Rosales is a 70 year old female who presents to clinic for botulinum toxin injections for treatment of cervical dystonia.      Response to Last Injection:      Great response. Best so far.     Wearing off: She noticed wearing off 2 weeks ago.    Side effects: She reports no side effects.      Current Outpatient Medications   Medication Sig Dispense Refill     ALPRAZolam (XANAX) 0.5 MG tablet TAKE 1 TABLET BY MOUTH DAILY AS NEEDED FOR ANXIETY( AVERAGE A FEW TIMES A MONTH) 30 tablet 1     botulinum toxin type A (BOTOX) 100 units injection Inject 200 units 4x per year 800 Units 0     CALCIUM-VITAMIN D PO Take 2 chew tab by mouth daily       cyanocobalamin (VITAMIN B-12) 1000 MCG tablet Take 1 tablet (1,000 mcg) by mouth daily 100 tablet 3     gabapentin (NEURONTIN) 100 MG capsule Take 1 capsule (100 mg) by mouth 3 times daily (Patient not taking: Reported on 1/3/2020) 90 capsule 0     gabapentin (NEURONTIN) 100 MG capsule   1     Nutritional Supplements (ENSURE COMPLETE SHAKE) LIQD Take 237 mLs by mouth 3 times daily (Patient not taking: Reported on 1/3/2020) 90 Bottle 3     rosuvastatin (CRESTOR) 10 MG tablet Take 1 tablet (10 mg) by mouth daily 90 tablet 3     sertraline (ZOLOFT) 100 MG tablet TAKE 1 TABLET(100 MG) BY MOUTH DAILY 90 tablet 0       Allergies: She is allergic to sulfa drugs; atorvastatin; and simvastatin.    Past Medical History:   Diagnosis Date     Depression      Dyslipidemia      Dystonic movements 1/21/2017     Dystonic tremor 1/21/2017     Family history of movement disorder  1/21/2017     mild abnormality in carotid study 3/2/2017    BILATERAL DUPLEX CAROTID ULTRASOUND March 1, 2017 1:01 PM    HISTORY: Other specified symptoms and signs involving the circulatory and respiratory systems.   COMPARISON: None.   FINDINGS: There is mild atherosclerotic plaque in the carotid bifurcations. Flow velocities and waveforms show less than 50% diameter stenosis in both the right and left internal carotid arteries as assessed by each ICA PS     Osteoporosis 8/2/2010       Past Surgical History:   Procedure Laterality Date     ------------OTHER-------------  2004    vocal cord [facility]plasty at [facility]     C BSO, OMENTECTOMY W/XAVIER  1997    hysterectomy     C HAND/FINGER SURGERY UNLISTED  1998    right carpal tunnel surgery     IMPLANT DEEP BRAIN STIMULATION GENERATOR / BATTERY Left 3/13/2018    Procedure: IMPLANT DEEP BRAIN STIMULATION GENERATOR / BATTERY;  Deep Brain Stimulator Placement, Phase II, Placement Of Deep Brain Stimulator Generator/Battery Over The Left Chest Wall;  Surgeon: Aleksander Morales MD;  Location: [facility]     IMPLANT DEEP BRAIN STIMULATION GENERATOR / BATTERY Right 1/15/2019    Procedure: Deep Brain Stimulator Placement, Phase II, Placement Of Deep Brain Stimulator Generator/Battery Over The Right Chest Wall;  Surgeon: Aleksander Morales MD;  Location: [facility] OR     OPTICAL TRACKING SYSTEM INSERTION DEEP BRAIN STIMULATION Left 3/6/2018    Procedure: OPTICAL TRACKING SYSTEM INSERTION DEEP BRAIN STIMULATION;  Stealth Assisted Left Deep Brain Stimulator Placement, Phase I, Placement Of Left Side Deep Brain Stimulator Electrode, Target Left Ventral Intermediate Nucleus Of The Thalamus With Microelectrode Recording;  Surgeon: Aleksander Morales MD;  Location: [facility] OR     OPTICAL TRACKING SYSTEM INSERTION DEEP BRAIN STIMULATION Right 1/8/2019    Procedure: Stealth Assisted Right Deep Brain Stimulator Placement, Phase One, Placement Of Right Side Deep Brain Stimulator Electrode  Target Right Ventral Intermediate Nucleus Of The Thalamus and placement Second Electrode With Microelectrode Recording;  Surgeon: Aleksander Morales MD;  [facility] OR     RELEASE CARPAL TUNNEL Left 1/2/2015    Procedure: RELEASE CARPAL TUNNEL;  Surgeon: SHANIA Hernandez MD;  Location: [facility] OR     RELEASE ULNAR NERVE (ELBOW) Left 1/2/2015    Procedure: RELEASE ULNAR NERVE (ELBOW);  Surgeon: SHANIA Hernandez MD;  Location: [facility] OR       Social History     Socioeconomic History     Marital status:      Spouse name: Not on file     Number of children: 3     Years of education: 14     Highest education level: Not on file   Occupational History     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   Social Needs     Financial resource strain: Not on file     Food insecurity     Worry: Not on file     Inability: Not on file     Transportation needs     Medical: Not on file     Non-medical: Not on file   Tobacco Use     Smoking status: Never Smoker     Smokeless tobacco: Never Used   Substance and Sexual Activity     Alcohol use: Yes     Frequency: 2-4 times a month     Drinks per session: 1 or 2     Comment: occasionally     Drug use: No     Sexual activity: Yes     Partners: Male   Lifestyle     Physical activity     Days per week: Not on file     Minutes per session: Not on file     Stress: Not on file   Relationships     Social connections     Talks on phone: Not on file     Gets together: Not on file     Attends Baptism service: Not on file     Active member of club or organization: Not on file     Attends meetings of clubs or organizations: Not on file     Relationship status: Not on file     Intimate partner violence     Fear of current or ex partner: Not on file     Emotionally abused: Not on file     Physically abused: Not on file     Forced sexual activity: Not on file   Other  Topics Concern     Parent/sibling w/ CABG, MI or angioplasty before 65F 55M? No      Service No     Blood Transfusions No     Caffeine Concern No     Occupational Exposure No     Hobby Hazards No     Sleep Concern No     Stress Concern No     Weight Concern No     Special Diet No     Back Care No     Exercise Yes     Comment: walk     Bike Helmet Yes     Seat Belt Yes     Self-Exams Yes   Social History Narrative        Lives in [place]    Daughter Karyn Sanchez        benign essential tremor with a strained quality to her voice consistent with mild laryngeal spasm        ALLERGIES:  She is allergic to sulfa drugs, atorvastatin and simvastatin.  The statin drugs caused leg cramps.            FAMILY HISTORY:  As noted above with 1 sister developing a voice tremor and another sister having what is described as a facial tremor.  Mother with hand tremors          SOCIAL HISTORY:  She does not smoke.  She does use alcohol on occasion. She previously worked as a RN.         Jazmyn barbara  [phone] orofacial dyskinesias    Piedad Candice [phone]  laryngeal dystonia and laryngeal tremor and laryngeal spasm.             Updated 6/13/17: Client was born in [place], [place] to parents Jerome and Berenice.  Client grew up w/ three sisters Tea, Carrie, and Jazmyn who are currently still living.  Client graduated from high school, attended college for two years, and graduated w/ a RN Degree.  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  She was  to her ex-spouse for 25 years and had three children; one son Bryan and two daughters Halley/Ysabel.  One daughter Halley Sanchez resides nearby and her other daughter Ysabel and her son Bryan reside in [place], [place].  Bryan has two daughters ages five and eight years old.  Client enjoys flying for free to  [place] to visit her two granddaughters. Michelleshasha Sutton, hospitals [employer] ([phone], Fax: [phone]).           Family History   Problem Relation Age of Onset     Hypertension Father         and mother     Cerebrovascular Disease Father      Tremor Mother      Lung Cancer Mother      Neurologic Disorder Sister         dystonic voice x 2 botox     Neurologic Disorder Sister         jose m [place]. facial movement        Physical Examination:  Vital Signs:   vitals were not taken for this visit.   She is alert and oriented and has fluent speech without dysarthria and is able to provide an interval medical history  Slight retrocollis, slight left retrocollis.    BOTULINUM NEUROTOXIN INJECTION PROCEDURES:    VERIFICATION OF PATIENT IDENTIFICATION AND PROCEDURE     Initials   Patient Name LES   Patient  LES   Procedure Verified by: LES     Above assessments performed by:  Sheila Haile MD        INDICATION/S FOR PROCEDURE/S:  Irish Rosales is a 70 year old year old patient with dystonia affecting the  head, neck and shoulder girdle musculature secondary to a diagnosis of cervical dystonia with associated  pain and spasms.     Her baseline symptoms have been recalcitrant to oral medications and conservative therapy.  She is here today for an injection of Botox.      GOAL OF PROCEDURE:  The goal of this procedure is to increase active range of motion and decrease pain  associated with dystonic movements.    TOTAL DOSE ADMINISTERED:  Dose Administered:  190 units Botox    Diluent Used:  Preservative Free Normal Saline  Total Volume of Diluent Used:  2 ml  Lot # [id number] with Expiration Date: 2023  NDC #: Botox 100u ([address]-37)x2    CONSENT:  The risks, benefits, and treatment options were discussed with Irish Rosales and she agreed to proceed.      Written consent was obtained by LES     EQUIPMENT USED:  Needle-37mm stimulating/recording    SKIN PREPARATION:  Skin preparation was performed using an  alcohol wipe.    GUIDANCE DESCRIPTION:  Electro-myographic guidance was necessary throughout the procedure to accurately identify all areas of dystonic muscles while avoiding injection of non-dystonic muscles, neighboring nerves and nearby vascular structures.     AREA/MUSCLE INJECTED:      Visual display of locations injections are scanned into the chart under MEDIA tab.    Muscles Injected Units Injected Number of Injections   Left trapezius 35 3   Right trapezius 55 4   Left splenius capitis 45 3   Right splenius capitis 20 1   Right SCM 25 1   Right levator capitis 10 1        Total Units Injected: 190    Unavoidable Waste: 10    Total Units Billed 200      The patient tolerated the injections without difficulty.      Assessment:    Irish Rosales is a 70 year old female with cervical dystonia.  Today we did repeat botulinum toxin injections.    Plan  Follow-up in 3 months' time to consider repeat injections      Sheila Haile MD    of Neurology

## 2020-06-18 NOTE — PROGRESS NOTES
Movement Disorders Botulinum Toxin Clinic Note    Chief Complaint: cervical dystonia.    History of Present Illness:  Irish Rosales is a 70 year old female who presents to clinic for botulinum toxin injections for treatment of cervical dystonia.      Response to Last Injection:      Great response. Best so far.     Wearing off: She noticed wearing off 2 weeks ago.    Side effects: She reports no side effects.      Current Outpatient Medications   Medication Sig Dispense Refill     ALPRAZolam (XANAX) 0.5 MG tablet TAKE 1 TABLET BY MOUTH DAILY AS NEEDED FOR ANXIETY( AVERAGE A FEW TIMES A MONTH) 30 tablet 1     botulinum toxin type A (BOTOX) 100 units injection Inject 200 units 4x per year 800 Units 0     CALCIUM-VITAMIN D PO Take 2 chew tab by mouth daily       cyanocobalamin (VITAMIN B-12) 1000 MCG tablet Take 1 tablet (1,000 mcg) by mouth daily 100 tablet 3     gabapentin (NEURONTIN) 100 MG capsule Take 1 capsule (100 mg) by mouth 3 times daily (Patient not taking: Reported on 1/3/2020) 90 capsule 0     gabapentin (NEURONTIN) 100 MG capsule   1     Nutritional Supplements (ENSURE COMPLETE SHAKE) LIQD Take 237 mLs by mouth 3 times daily (Patient not taking: Reported on 1/3/2020) 90 Bottle 3     rosuvastatin (CRESTOR) 10 MG tablet Take 1 tablet (10 mg) by mouth daily 90 tablet 3     sertraline (ZOLOFT) 100 MG tablet TAKE 1 TABLET(100 MG) BY MOUTH DAILY 90 tablet 0       Allergies: She is allergic to sulfa drugs; atorvastatin; and simvastatin.    Past Medical History:   Diagnosis Date     Depression      Dyslipidemia      Dystonic movements 1/21/2017     Dystonic tremor 1/21/2017     Family history of movement disorder 1/21/2017     mild abnormality in carotid study 3/2/2017    BILATERAL DUPLEX CAROTID ULTRASOUND March 1, 2017 1:01 PM    HISTORY: Other specified symptoms and signs involving the circulatory and respiratory systems.   COMPARISON: None.   FINDINGS: There is mild atherosclerotic plaque in the carotid  bifurcations. Flow velocities and waveforms show less than 50% diameter stenosis in both the right and left internal carotid arteries as assessed by each ICA PS     Osteoporosis 8/2/2010       Past Surgical History:   Procedure Laterality Date     ------------OTHER-------------  2004    vocal cord thyroplasty at [facility]     C BSO, OMENTECTOMY W/XAVIER  1997    hysterectomy     C HAND/FINGER SURGERY UNLISTED  1998    right carpal tunnel surgery     IMPLANT DEEP BRAIN STIMULATION GENERATOR / BATTERY Left 3/13/2018    Procedure: IMPLANT DEEP BRAIN STIMULATION GENERATOR / BATTERY;  Deep Brain Stimulator Placement, Phase II, Placement Of Deep Brain Stimulator Generator/Battery Over The Left Chest Wall;  Surgeon: Aleksander Morales MD;  Location: [facility] OR     IMPLANT DEEP BRAIN STIMULATION GENERATOR / BATTERY Right 1/15/2019    Procedure: Deep Brain Stimulator Placement, Phase II, Placement Of Deep Brain Stimulator Generator/Battery Over The Right Chest Wall;  Surgeon: Aleksander Morales MD;  Location: [facility] OR     OPTICAL TRACKING SYSTEM INSERTION DEEP BRAIN STIMULATION Left 3/6/2018    Procedure: OPTICAL TRACKING SYSTEM INSERTION DEEP BRAIN STIMULATION;  Stealth Assisted Left Deep Brain Stimulator Placement, Phase I, Placement Of Left Side Deep Brain Stimulator Electrode, Target Left Ventral Intermediate Nucleus Of The Thalamus With Microelectrode Recording;  Surgeon: Aleksander Morales MD;  Location: [facility] OR     OPTICAL TRACKING SYSTEM INSERTION DEEP BRAIN STIMULATION Right 1/8/2019    Procedure: Stealth Assisted Right Deep Brain Stimulator Placement, Phase One, Placement Of Right Side Deep Brain Stimulator Electrode Target Right Ventral Intermediate Nucleus Of The Thalamus and placement Second Electrode With Microelectrode Recording;  Surgeon: Aleksander Morales MD;  Location: [facility] OR     RELEASE CARPAL TUNNEL Left 1/2/2015    Procedure: RELEASE CARPAL TUNNEL;  Surgeon: SHANIA Hernandez MD;  [facility] OR     RELEASE ULNAR NERVE (ELBOW) Left 1/2/2015    Procedure: RELEASE ULNAR NERVE (ELBOW);  Surgeon: SHANIA Hernandez MD;  Location: [facility] OR       Social History     Socioeconomic History     Marital status:      Spouse name: Not on file     Number of children: 3     Years of education: 14     Highest education level: Not on file   Occupational History     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   Social Needs     Financial resource strain: Not on file     Food insecurity     Worry: Not on file     Inability: Not on file     Transportation needs     Medical: Not on file     Non-medical: Not on file   Tobacco Use     Smoking status: Never Smoker     Smokeless tobacco: Never Used   Substance and Sexual Activity     Alcohol use: Yes     Frequency: 2-4 times a month     Drinks per session: 1 or 2     Comment: occasionally     Drug use: No     Sexual activity: Yes     Partners: Male   Lifestyle     Physical activity     Days per week: Not on file     Minutes per session: Not on file     Stress: Not on file   Relationships     Social connections     Talks on phone: Not on file     Gets together: Not on file     Attends Anabaptist service: Not on file     Active member of club or organization: Not on file     Attends meetings of clubs or organizations: Not on file     Relationship status: Not on file     Intimate partner violence     Fear of current or ex partner: Not on file     Emotionally abused: Not on file     Physically abused: Not on file     Forced sexual activity: Not on file   Other Topics Concern     Parent/sibling w/ CABG, MI or angioplasty before 65F 55M? No      Service No     Blood Transfusions No     Caffeine Concern No     Occupational Exposure No     Hobby Hazards No     Sleep Concern No     Stress Concern No     Weight Concern No     Special Diet No     Back Care  No     Exercise Yes     Comment: walk     Bike Helmet Yes     Seat Belt Yes     Self-Exams Yes   Social History Narrative        Lives in [place]    Daughter Karyn Sanchez        benign essential tremor with a strained quality to her voice consistent with mild laryngeal spasm        ALLERGIES:  She is allergic to sulfa drugs, atorvastatin and simvastatin.  The statin drugs caused leg cramps.            FAMILY HISTORY:  As noted above with 1 sister developing a voice tremor and another sister having what is described as a facial tremor.  Mother with hand tremors          SOCIAL HISTORY:  She does not smoke.  She does use alcohol on occasion. She previously worked as a RN.         Jazmyn barbara  [phone] orofacial dyskinesias    Piedad Candice [phone]  laryngeal dystonia and laryngeal tremor and laryngeal spasm.             Updated 6/13/17: Client was born in [place], [place] to parents Jerome and Berenice.  Client grew up w/ three sisters Tea, Carrie, and Jazmyn who are currently still living.  Client graduated from high school, attended college for two years, and graduated w/ a RN Degree.  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  She was  to her ex-spouse for 25 years and had three children; one son Bryan and two daughters Halley/Ysabel.  One daughter Halley Sanchez resides nearby and her other daughter Ysabel and her son Bryan reside in [place], [place].  Bryan has two daughters ages five and eight years old.  Client enjoys flying for free to [place] to visit her two granddaughters. Michelle Sutton, [facility] ([phone], Fax: [phone]).           Family History   Problem Relation Age of Onset     Hypertension Father         and mother     Cerebrovascular Disease Father      Tremor Mother      Lung Cancer Mother      Neurologic  Disorder Sister         dystonic voice x 2 botox     Neurologic Disorder Sister         jose m [place]. facial movement        Physical Examination:  Vital Signs:   vitals were not taken for this visit.   She is alert and oriented and has fluent speech without dysarthria and is able to provide an interval medical history  Slight retrocollis, slight left retrocollis.    BOTULINUM NEUROTOXIN INJECTION PROCEDURES:    VERIFICATION OF PATIENT IDENTIFICATION AND PROCEDURE     Initials   Patient Name LES   Patient  LES   Procedure Verified by: LES     Above assessments performed by:  Sheila Haile MD        INDICATION/S FOR PROCEDURE/S:  Irish Rosales is a 70 year old year old patient with dystonia affecting the  head, neck and shoulder girdle musculature secondary to a diagnosis of cervical dystonia with associated  pain and spasms.     Her baseline symptoms have been recalcitrant to oral medications and conservative therapy.  She is here today for an injection of Botox.      GOAL OF PROCEDURE:  The goal of this procedure is to increase active range of motion and decrease pain  associated with dystonic movements.    TOTAL DOSE ADMINISTERED:  Dose Administered:  190 units Botox    Diluent Used:  Preservative Free Normal Saline  Total Volume of Diluent Used:  2 ml  Lot # [id number] with Expiration Date: 2023  NDC #: Botox 100u ([address]-59)x2    CONSENT:  The risks, benefits, and treatment options were discussed with Irish Rosales and she agreed to proceed.      Written consent was obtained by LES     EQUIPMENT USED:  Needle-37mm stimulating/recording    SKIN PREPARATION:  Skin preparation was performed using an alcohol wipe.    GUIDANCE DESCRIPTION:  Electro-myographic guidance was necessary throughout the procedure to accurately identify all areas of dystonic muscles while avoiding injection of non-dystonic muscles, neighboring nerves and nearby vascular structures.     AREA/MUSCLE INJECTED:      Visual display  of locations injections are scanned into the chart under MEDIA tab.    Muscles Injected Units Injected Number of Injections   Left trapezius 35 3   Right trapezius 55 4   Left splenius capitis 45 3   Right splenius capitis 20 1   Right SCM 25 1   Right levator capitis 10 1        Total Units Injected: 190    Unavoidable Waste: 10    Total Units Billed 200      The patient tolerated the injections without difficulty.      Assessment:    Irish Rosales is a 70 year old female with cervical dystonia.  Today we did repeat botulinum toxin injections.    Plan  Follow-up in 3 months' time to consider repeat injections      Sheila Haile MD    of Neurology

## 2020-06-19 RX ORDER — SERTRALINE HYDROCHLORIDE 100 MG/1
100 TABLET, FILM COATED ORAL DAILY
Qty: 90 TABLET | Refills: 1 | Status: SHIPPED | OUTPATIENT
Start: 2020-06-19 | End: 2020-12-21

## 2020-07-01 ENCOUNTER — TELEPHONE (OUTPATIENT)
Dept: NEUROLOGY | Facility: CLINIC | Age: 70
End: 2020-07-01

## 2020-07-01 ENCOUNTER — ANCILLARY PROCEDURE (OUTPATIENT)
Dept: MAMMOGRAPHY | Facility: CLINIC | Age: 70
End: 2020-07-01
Attending: INTERNAL MEDICINE
Payer: COMMERCIAL

## 2020-07-01 DIAGNOSIS — Z12.31 VISIT FOR SCREENING MAMMOGRAM: ICD-10-CM

## 2020-07-01 PROCEDURE — 77067 SCR MAMMO BI INCL CAD: CPT | Performed by: RADIOLOGY

## 2020-07-01 NOTE — TELEPHONE ENCOUNTER
"Called and left voice mail for patient asking her to let us know if Friday's appointment will be in -person or via video. Dr. Colvin requested I call her. He stated the following in his [employer] message 6/29/20:    As things stand, it is scheduled as an ordinary \"in-person\" appointment.  Because of the COVID-19 pandemic, up until July, almost all such appointments were being converted to \"telemedicine\" (secure video conferencing or, when that was impossible, telephone).  However, as of July, we are doing in-person appointments again, at reduced capacity, to allow for increased social distancing, protective equipment, extensive cleaning of each room between patients, etc.     Still, even with those precautions, any time you come to a public place like the clinic, there's some risk of getting, or spreading, the virus, so you & I should decide whether you need this Friday appointment as an in-person one, or should convert it to a telemedicine appointment.  ----------------  Her appointment was listed as routine.    "

## 2020-07-03 ENCOUNTER — VIRTUAL VISIT (OUTPATIENT)
Dept: NEUROLOGY | Facility: CLINIC | Age: 70
End: 2020-07-03
Payer: COMMERCIAL

## 2020-07-03 DIAGNOSIS — R25.1 TREMOR: Primary | ICD-10-CM

## 2020-07-03 NOTE — PATIENT INSTRUCTIONS
1.Continue cycling programs every week, with no changes.       Weeks                 Right brain        Left brain        1                      VIM           2b        2                      VIM          3abc        3                      VOP          3abc        4                      VOP           2b         2. Continue checking battery level ever week .

## 2020-07-03 NOTE — PROGRESS NOTES
"Irish Rosales is a 70 year old female who is being evaluated via a billable video visit.      The patient has been notified of following:     \"This video visit will be conducted via a call between you and your physician/provider. We have found that certain health care needs can be provided without the need for an in-person physical exam.  This service lets us provide the care you need with a video conversation.  If a prescription is necessary we can send it directly to your pharmacy.  If lab work is needed we can place an order for that and you can then stop by our lab to have the test done at a later time.    Video visits are billed at different rates depending on your insurance coverage.  Please reach out to your insurance provider with any questions.    If during the course of the call the physician/provider feels a video visit is not appropriate, you will not be charged for this service.\"    Patient has given verbal consent for Video visit? YES-NO  Default Yes:[id number]::\"Yes\"        Video-Visit Details    Type of service:  Video Visit    Distant Location (provider location):  [facility] NEUROLOGY       Nathan Doty, EMT  "

## 2020-07-03 NOTE — PROGRESS NOTES
"Irish Rosales is a 70 year old female who is being evaluated via a billable video visit.      The patient has been notified of following:     \"This video visit will be conducted via a call between you and your physician/provider. We have found that certain health care needs can be provided without the need for an in-person physical exam.  This service lets us provide the care you need with a video conversation.  If a prescription is necessary we can send it directly to your pharmacy.  If lab work is needed we can place an order for that and you can then stop by our lab to have the test done at a later time.    Video visits are billed at different rates depending on your insurance coverage.  Please reach out to your insurance provider with any questions.    If during the course of the call the physician/provider feels a video visit is not appropriate, you will not be charged for this service.\"    Patient has given verbal consent for Video visit? No  How would you like to obtain your AVS? [facility]  Patient would like the video invitation sent by: [phone]  Will anyone else be joining your video visit? No        Department of Neurology  Movement Disorders Division     Patient: Irish Rosales   MRN: [id number]   : 1950   Date of Visit: 7/3/2020     History of Present Illness  Ms. Rosales is a 70 year old R handed woman with strong FHx of tremor and dystonia (orofacial dyskinesias and another sister with laryngeal dystonia and laryngeal tremor and laryngeal spasm, mother with hand tremor) , s/p L Vim DBS ([facility]) Mar 2018;  R Vim & Vop DBS ([facility]) 2019,  botulinum toxin injection for cervical dystonia.    In summary , she has had a good but transient response of her tremor to DBS. She underwent extended programming for the left Vim , her tremor was definitely worse without stimulation , but more aggressive stimulation created limb ataxia which was difficult to differentiate between original tremor " ". And the stimulation improved  the distal tremor more than proximal one , so she was continuing to have difficulty with feeding herself, combing hair but with marked improvement of the handwriting . 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     Althought reporting she feels much better now than before surgery , speech improved (before surgery she was not able to hold a conversation over the phone), handwriting improved, she continued to have difficulty feeding and eating with the right hand, unable to comb hair using the right hand.  Nonetheldess, as of July 2019 \"I could live with this\" \"Thrilled with my results;  Anything else is icing on the cake\"     In 07/20219, on the hypothesis that adaptation-rebound might be contributing to the difficulty of controlling her tremor, we set her up with a set of multiple programs, alternating Vim-only and Vop-only stimulation on  the R. Next visit after alternating programs were recommended she   reported that she'd given up on the Vop-only stimulation due at least in part to headache.  On the hypothesis that the former might represent inadequate control of cervical dystonia, with Vop amplitude increased ( from 3 to 4.0mA, also frequency increased from 180 to 200 Hz).    Last clinic appt 01/03/2020  she was advised to continue cycling and botulinum toxin injections for cervical dystonia.    Today she reports she continues to do well, cycling DBS programs every Friday . She keeps battery ON during the night due to tremor, checks generator on a regular basis, checked this morning. No warning signs.  She had to charge Patient  while talking to us - 25% , no issues using patient  .  Due to technical difficulties with Amwelll ( frozen image aftr 10 min into the visit), still with good audio we were not able to complete an " exam.    Review of Systems:  Other than that mentioned above, the remainder of 12 systems reviewed were negative.  TRG Essential Tremor Rating Assessment Scale (TETRAS)    Activities of Daily Living Subscale:      1.  Speaking:undertsatnadble, voice tremor  2   2.  Feeding with a spoon:spills , using left hand which is better 3   3.  Drinking from a glass: uses a straw  3   4.  Hygiene:with difficultly , unable to curl hair, diif with toothbrushing   2   5.  Dressing:no problem 1   6.  Pouring:spills  3   7.  Carrying food trays, plates, or similar items:Ok 0   8.  Using Keys:it is tricky , can make it work  3   9.  Writing:OK 0   10.  Working: slicing having troubles but make it work  1   11.  Overall disability with most affected task:     Task: eating with a spoon    3   12.  Social Impact:I did accept it ( improved form last time) 1      Total for ADL Subscale:   (48 Max)    22   previous 24        Medications:  Current Outpatient Medications   Medication Sig Dispense Refill     ALPRAZolam (XANAX) 0.5 MG tablet TAKE 1 TABLET BY MOUTH DAILY AS NEEDED FOR ANXIETY( AVERAGE A FEW TIMES A MONTH) 30 tablet 1     botulinum toxin type A (BOTOX) 100 units injection Inject 200 units 4x per year 800 Units 0     CALCIUM-VITAMIN D PO Take 2 chew tab by mouth daily       cyanocobalamin (VITAMIN B-12) 1000 MCG tablet Take 1 tablet (1,000 mcg) by mouth daily 100 tablet 3     gabapentin (NEURONTIN) 100 MG capsule Take 1 capsule (100 mg) by mouth 3 times daily (Patient not taking: Reported on 1/3/2020) 90 capsule 0     gabapentin (NEURONTIN) 100 MG capsule   1     Nutritional Supplements (ENSURE COMPLETE SHAKE) LIQD Take 237 mLs by mouth 3 times daily (Patient not taking: Reported on 1/3/2020) 90 Bottle 3     rosuvastatin (CRESTOR) 10 MG tablet Take 1 tablet (10 mg) by mouth daily 90 tablet 3     sertraline (ZOLOFT) 100 MG tablet Take 1 tablet (100 mg) by mouth daily 90 tablet 1           Movement Disorder-related  "Medications                       Xanax 0.5 mg PRN may be once a month         Data Reviewed:     L brain programs  Battery 2.93 OK ( 01/03/2020)             Left VIM    Active       Inactive         Program        \"2b\"      \"3abc\"   Amplitude mA        4 [0-5 by 0.25]       4 [0-4 by 0.25]   Pulse width (usec)       20      20   Freq (Hz)       200      200   Contacts: C       Cpos      Cpos   Contacts: 4            Contacts: 3          3abcNeg   Contacts: 2  2bNeg     Contacts: 1           R brain programs:   Battery 2.96V OK ( 01/03/2020)               \"Program 1\"   ( currently not used, inactive)  Right Vop                 Right Vim    Lead 1                         Lead 2           Contacts C+, 2abc- C+, 11abc-   Amplitude (mA) 1.5 2.5   Pulse Width (ms) 60 60   Frequency (Hz) 130 180                      Program   \"Vim only\"   (active)                     Lead 1                    Right Vop                   Lead 2                  Right Vim       Final   Final   Contacts   C+, 2abc-   C+, 11abc-   Amplitude (mA)   0 no range   3.5 [0-4 by 0.25]   Pulse Width (ms)   20   20   Frequency ( Hz)   200   200                      Program   \"Vop only\"  ( inactive)                      Lead 1                     Right Vop                    Lead 2                    Right Vim     Initial Final Initial Final   Contacts  C+, 2abc-    C+, 2abc-  c+,11abc-  C+, 11abc-   Amplitude (mA)  3.0    4.0 [0-4 by 0.25]   0, no range  0, no range   Pulse Width (ms)  30    20   60  20   Frequency ( Hz)   180    200   180   200           Impression:  Irish Rosales is a 70 year old R handed woman with strong FHx of tremor and dystonia (orofacial dyskinesias and another sister with laryngeal dystonia and laryngeal tremor and laryngeal spasm, mother with hand tremor) , s/p L Vim DBS ([facility]) Mar 2018;  R Vim & Vop DBS ([facility]) Jan 2019,  botulinum toxin injection for cervical dystonia.    On the hypothesis that adaptation-rebound " might be contributing to the difficulty of controlling her tremor, we set her up with a set of multiple programs, alternating Vim-only and Vop-only stimulation on  the R and she remains pleased with the current tremor control and does not find difficult to change programs weekly.    For cervical dystonia with neck pain continue botulinum toxin injections.    Plan:     1.Continue cycling programs every week, with no changes.       Weeks                 Right brain        Left brain        1                      VIM           2b        2                      VIM          3abc        3                      VOP          3abc        4                      VOP           2b     2. Continue checking battery level every week .    Patient to return in 6 months, for in-person visit, 75 minutes. Bring .    Video-Visit Details    Type of service:  Video Visit    Start: 07/03/2020 12:51 pm   Stop: 07/03/2020  1:32 pm   With interruptions in the video, frozen images     Originating Location (pt. Location): Home    Distant Location (provider location):   [facility]     Platform used for Video Visit: [facility]     Patient seen and discussed with Dr. Colvin.    Summary of orders placed this encounter:  No orders of the defined types were placed in this encounter.    Dorita Jade MD  Movement Disorders Fellow    I was present throughout this telemedicine visit as documented above.    Edwar Colvin PhD, MD

## 2020-09-02 DIAGNOSIS — E53.8 VITAMIN B12 DEFICIENCY (NON ANEMIC): ICD-10-CM

## 2020-09-03 DIAGNOSIS — G25.2 DYSTONIC TREMOR: ICD-10-CM

## 2020-09-03 RX ORDER — ALPRAZOLAM 0.5 MG
0.5 TABLET ORAL DAILY PRN
Qty: 30 TABLET | Refills: 0 | Status: SHIPPED | OUTPATIENT
Start: 2020-09-03 | End: 2021-08-29

## 2020-09-03 NOTE — TELEPHONE ENCOUNTER
ALPRAZOLAM 0.5MG TABLETS   Last Written Prescription Date:  1/14/2020  Last Fill Quantity: 30,   # refills: 1  Last Office Visit : 10/30/2019  Future Office visit:  *None    Routing refill request to provider for review/approval because:  Drug not on the [facility], P or  Health refill protocol or controlled substance      Dorita Daley RN  Central Triage Red Flags/Med Refills

## 2020-09-05 RX ORDER — LANOLIN ALCOHOL/MO/W.PET/CERES
1000 CREAM (GRAM) TOPICAL DAILY
Qty: 100 TABLET | Refills: 0 | Status: SHIPPED | OUTPATIENT
Start: 2020-09-05 | End: 2021-06-14

## 2020-09-16 ENCOUNTER — PATIENT OUTREACH (OUTPATIENT)
Dept: GERIATRIC MEDICINE | Facility: CLINIC | Age: 70
End: 2020-09-16

## 2020-09-16 NOTE — PROGRESS NOTES
[facility] Care Coordination Contact      [facility] Six-Month Telephone Assessment    6 month telephone assessment completed on 09/16/20.    ER visits: No  Hospitalizations: No  TCU stays: No  Significant health status changes: None  Falls/Injuries: No  ADL/IADL changes: No  Changes in services: No    Caregiver Assessment follow up:  No    Goals: See POC in chart for goal progress documentation.      Will see member in 6 months for an annual health risk assessment.   Encouraged member to call CC with any questions or concerns in the meantime.     INNA Diallo  [facility]  [phone]  Fax: [phone]

## 2020-10-06 NOTE — PROGRESS NOTES
Dear Irish,   Here are your recent results.   -- lipid panel much improved from 5 months ago. It is still a little high but I think we can continue Crestor at 5 mg daily without change since you are sensitive to statins. In the meantime, low fat low cholesterol diet.     Please call or Mychart to our office if you have further questions.     Braulio Victoria MD-PhD Melolabial Interpolation Flap Text: A decision was made to reconstruct the defect utilizing an interpolation axial flap and a staged reconstruction.  A telfa template was made of the defect.  This telfa template was then used to outline the melolabial interpolation flap.  The donor area for the pedicle flap was then injected with anesthesia.  The flap was excised through the skin and subcutaneous tissue down to the layer of the underlying musculature.  The pedicle flap was carefully excised within this deep plane to maintain its blood supply.  The edges of the donor site were undermined.   The donor site was closed in a primary fashion.  The pedicle was then rotated into position and sutured.  Once the tube was sutured into place, adequate blood supply was confirmed with blanching and refill.  The pedicle was then wrapped with xeroform gauze and dressed appropriately with a telfa and gauze bandage to ensure continued blood supply and protect the attached pedicle.

## 2020-10-21 DIAGNOSIS — E78.5 HYPERLIPIDEMIA LDL GOAL <160: ICD-10-CM

## 2020-10-23 RX ORDER — ROSUVASTATIN CALCIUM 10 MG/1
10 TABLET, COATED ORAL DAILY
Qty: 90 TABLET | Refills: 0 | Status: SHIPPED | OUTPATIENT
Start: 2020-10-23 | End: 2021-01-21

## 2020-10-23 NOTE — TELEPHONE ENCOUNTER
ROSUVASTATIN 10MG TABLETS   Last Written Prescription Date:  10/30/2019  Last Fill Quantity: 90,   # refills: 3  Last Office Visit : 10/30/2019  Future Office visit:  None  90 Tabs, sent to pharm 10/23/2020      Dorita Daley RN  Central Triage Red Flags/Med Refills    Warnings Override History for rosuvastatin (CRESTOR) 10 MG tablet [[id number]]    Overridden by Braulio Victoria MD PhD on Oct 30, 2019 3:37 PM   Allergy/Contraindication   1. ATORVASTATIN [Level: Drug Class Match] [Reason: Will monitor drug levels/drug effects ]   2. SIMVASTATIN [Level: Drug Class Match] [Reason: Will monitor drug levels/drug effects ]

## 2020-12-16 DIAGNOSIS — Z86.59 HISTORY OF MAJOR DEPRESSION: ICD-10-CM

## 2020-12-21 RX ORDER — SERTRALINE HYDROCHLORIDE 100 MG/1
100 TABLET, FILM COATED ORAL DAILY
Qty: 30 TABLET | Refills: 0 | Status: SHIPPED | OUTPATIENT
Start: 2020-12-21 | End: 2021-01-19

## 2020-12-31 ENCOUNTER — PATIENT OUTREACH (OUTPATIENT)
Dept: GERIATRIC MEDICINE | Facility: CLINIC | Age: 70
End: 2020-12-31

## 2020-12-31 ENCOUNTER — DOCUMENTATION ONLY (OUTPATIENT)
Dept: LAB | Facility: CLINIC | Age: 70
End: 2020-12-31

## 2020-12-31 DIAGNOSIS — E78.5 HYPERLIPIDEMIA LDL GOAL <160: ICD-10-CM

## 2020-12-31 DIAGNOSIS — E53.8 VITAMIN B12 DEFICIENCY (NON ANEMIC): Primary | ICD-10-CM

## 2020-12-31 NOTE — PROGRESS NOTES
[facility] Care Coordination Contact    Called member to schedule annual telephonic HRA. HRA has been scheduled for Wed, Jan 6th at 10:00am.     INNA Diallo  [facility]  [phone]  Fax: [phone]

## 2021-01-06 ENCOUNTER — PATIENT OUTREACH (OUTPATIENT)
Dept: GERIATRIC MEDICINE | Facility: CLINIC | Age: 71
End: 2021-01-06

## 2021-01-06 PROCEDURE — 84460 ALANINE AMINO (ALT) (SGPT): CPT | Performed by: INTERNAL MEDICINE

## 2021-01-06 PROCEDURE — 80061 LIPID PANEL: CPT | Performed by: INTERNAL MEDICINE

## 2021-01-06 PROCEDURE — 82607 VITAMIN B-12: CPT | Performed by: INTERNAL MEDICINE

## 2021-01-06 PROCEDURE — 36415 COLL VENOUS BLD VENIPUNCTURE: CPT | Performed by: INTERNAL MEDICINE

## 2021-01-06 SDOH — SOCIAL STABILITY: SOCIAL NETWORK: ARE YOU MARRIED, WIDOWED, DIVORCED, SEPARATED, NEVER MARRIED, OR LIVING WITH A PARTNER?: NOT ASKED

## 2021-01-06 SDOH — SOCIAL STABILITY: SOCIAL NETWORK: HOW OFTEN DO YOU GET TOGETHER WITH FRIENDS OR RELATIVES?: THREE TIMES A WEEK

## 2021-01-06 ASSESSMENT — ACTIVITIES OF DAILY LIVING (ADL): DEPENDENT_IADLS:: CLEANING;COOKING;LAUNDRY;MEAL PREPARATION

## 2021-01-06 NOTE — PROGRESS NOTES
[facility] Care Coordination Contact    [facility] Home Visit Assessment     Telephonic Health Risk Assessment with Irish Rosales completed on January 6, 2021    Type of residence:: Apartment - handicap accessible  Current living arrangement:: I live alone     Assessment completed with:: Patient    Current Care Plan  Member currently receiving the following home care services: NA   Member currently receiving the following community resources: Housekeeping/Chore Agency, Transportation Services    Medication Review  Medication reconciliation completed in Epic: Yes  Medication set-up & administration: Independent-does not set up.  Self-administers medications.  Medication Risk Assessment Medication (1 or more, place referral to MTM): N/A: No risk factors identified  MTM Referral Placed: No: No risk factors idenified    Mental/Behavioral Health   Depression Screening:   PHQ-2 Total Score (Adult) - Positive if 3 or more points; Administer PHQ-9 if positive: 0       Mental health DX:: Yes   Mental health DX how managed:: Medication    Falls Assessment:   Fallen 2 or more times in the past year?: No   Any fall with injury in the past year?: No    ADL/IADL Dependencies:   Dependent ADLs:: Independent  Dependent IADLs:: Cleaning, Cooking, Laundry, Meal Preparation    [facility] Health Plan sponsored benefits: Shared information re: Silver Sneakers/gym memberships, ASA, Calcium +D.    PCA Assessment completed at visit: Not Applicable     Elderly Waiver Eligibility: Yes-will continue on EW    Care Plan & Recommendations: Is experiencing more hand trembles. Has called the clinic to schedule a follow up visit. No other medical or mental health concerns noted.    See [facility] for detailed assessment information.    Follow-Up Plan: Member informed of future contact, plan to f/u with member with a 6 month telephone assessment.  Contact information shared with member and family, encouraged member to call with any questions  or concerns at any time.    [place] care continuum providers: Please refer to Health Care Home on the Epic Problem List to view this patient's [facility] Care Plan Summary.    INNA Diallo  [facility]  [phone]  Fax: [phone]

## 2021-01-07 LAB
ALT SERPL W P-5'-P-CCNC: 24 U/L (ref 0–50)
CHOLEST SERPL-MCNC: 282 MG/DL
HDLC SERPL-MCNC: 119 MG/DL
LDLC SERPL CALC-MCNC: 146 MG/DL
NONHDLC SERPL-MCNC: 163 MG/DL
TRIGL SERPL-MCNC: 84 MG/DL
VIT B12 SERPL-MCNC: 576 PG/ML (ref 193–986)

## 2021-01-13 ENCOUNTER — OFFICE VISIT (OUTPATIENT)
Dept: NEUROLOGY | Facility: CLINIC | Age: 71
End: 2021-01-13
Payer: COMMERCIAL

## 2021-01-13 VITALS
OXYGEN SATURATION: 99 % | RESPIRATION RATE: 16 BRPM | SYSTOLIC BLOOD PRESSURE: 175 MMHG | HEART RATE: 84 BPM | DIASTOLIC BLOOD PRESSURE: 92 MMHG

## 2021-01-13 DIAGNOSIS — G24.3 CERVICAL DYSTONIA: ICD-10-CM

## 2021-01-13 DIAGNOSIS — G25.2 DYSTONIC TREMOR: ICD-10-CM

## 2021-01-13 DIAGNOSIS — G25.0 ESSENTIAL TREMOR: Primary | ICD-10-CM

## 2021-01-13 PROCEDURE — 99213 OFFICE O/P EST LOW 20 MIN: CPT | Mod: 25

## 2021-01-13 PROCEDURE — 95984 ALYS BRN NPGT PRGRMG ADDL 15: CPT

## 2021-01-13 PROCEDURE — 95983 ALYS BRN NPGT PRGRMG 15 MIN: CPT

## 2021-01-13 ASSESSMENT — ACTIVITIES OF DAILY LIVING (ADL)
WRITING: (0) NORMAL
DRINKING_FROM_A_GLASS: (3) MODERATELY ABNORMAL. SPILLS A LOT OR CHANGES STRATEGY TO COMPLETE TASK SUCH AS USING TWO HANDS OR LEANING OVER.
TOTAL_SCORE: 23
SPEAKING: (2) MILD VOICE TREMOR. ALL WORDS EASILY UNDERSTOOD.
POURING: (3) MODERATELY ABNORMAL. MUST USE TWO HANDS OR USES OTHER STRATEGIES TO AVOID SPILLING.
WORKING: (3) MODERATELY ABNORMAL. UNABLE TO CONTINUE WORKING WITHOUT USING STRATEGIES SUCH AS CHANGING JOBS OR USING SPECIAL EQUIPMENT.
SOCIAL_IMPACT: (1) AWARE OF TREMOR, BUT IT DOES NOT AFFECT LIFESTYLE OR PROFESSIONAL LIFE.
CARRYING_FOOD_TRAYS_PLATES_OR_SIMILAR_ITEMS: (0) NORMAL
HYGIENE: (1) SLIGHTLY ABNORMAL. TREMOR IS PRESENT BUT DOES NOT INTERFERE WITH HYGIENE.
OVERALL_DISABILITY_WITH_THE_MOST_AFFECTED_TASK: (3) MODERATELY ABNORMAL. CAN DO TASK BUT MUST USE STRATEGIES.
FEEDING_WITH_A_SPOON: (3) MODERATELY ABNORMAL. SPILLS A LOT OR CHANGES STRATEGY TO COMPLETE TASK SUCH AS USING TWO HANDS OR LEANING OVER.
USING_KEYS: (3) MODERATELY ABNORMAL. NEEDS TO USE TWO HANDS OR OTHER STRATEGIES TO PUT KEY IN LOCK.
DRESS: (1) SLIGHTLY ABNORMAL. TREMOR IS PRESENT BUT DOES NOT INTERFERE WITH DRESSING.

## 2021-01-13 ASSESSMENT — PAIN SCALES - GENERAL: PAINLEVEL: NO PAIN (0)

## 2021-01-13 NOTE — PROGRESS NOTES
"  Department of Neurology  Movement Disorders Division     Patient: Irish Rosales   MRN: [id number]   : 1950   Date of Visit: 2021     Reason for visit: tremor follow up     History of Present Illness  Ms. Rosales is a 70 year old R handed woman with Essential tremor with dystonic features , strong FHx of tremor and dystonia ( sister with laryngeal dystonia and laryngeal tremor and laryngeal spasm, mother with hand tremor) , s/p L Vim DBS ([facility]) Mar 2018;  R Vim & Vop DBS ([facility]) 2019,  botulinum toxin injection for cervical dystonia.      In summary , she has had a good but transient response of her tremor to DBS. She underwent extended programming for the left Vim ,  tremor was definitely worse without stimulation , but more aggressive stimulation created limb ataxia which was difficult to differentiate between original tremor . The stimulation improved  the distal tremor more than proximal one , so she was continuing to have difficulty with feeding herself, combing hair but with marked improvement of the handwriting . The conclusion was to treat the contralateral tremor (left body)  and decided for dual Vim/VOP leads to control the refractory , proximal and axial tremor ( with dystonic component) .      Despite reporting she feels much better than before surgery with improvement in speech  (before surgery she was not able to hold a conversation over the phone), handwriting improved, she continued to have difficulty feeding and eating with the right hand, unable to comb hair using the right hand.  Nonetheldess, as of 2019 \"I could live with this\" \"Thrilled with my results;  Anything else is icing on the cake\"     In , on the hypothesis that adaptation-rebound might be contributing to the difficulty of controlling her tremor, we set her up with a set of multiple programs, alternating Vim-only and Vop-only stimulation on  the R brain. Next visit after alternating programs were " recommended she reported that she'd given up on the Vop-only stimulation due at least in part to headache.  On the hypothesis that the former might represent inadequate control of cervical dystonia,  Vop amplitude increased ( from 3 to 4.0mA, also frequency increased from 180 to 200 Hz).    Patient was last seen on 07/03/2020, video visit. She continued to cycle the programs every week and was overall pleased with the tremor control.    Since last clinic visit   Today she states family pointed out she has more tremor on the left side, she continues to eat with the left hand , does everything with the left hand , except writing , continues to cycle the programs . She noted more difificulty picking up a per cup and drinking. Overall she is doing the same , same type of activities than before, no new medical problems, no new medications.    She is not having any neck pulling sensation, does not require botulinum toxin injections for cervical dystonia.    She is on week 3 in her cycling .       Weeks                 Right brain        Left brain        1                      VIM           2b        2                      VIM          3abc        3 ( active cycle)                       VOP          3abc        4                      VOP           2b     Review of Systems:  Other than that mentioned above, the remainder of 12 systems reviewed were negative.       ADL Sub-Scale 1/13/2021   1. Speaking 2   2. Feeding with a spoon 3   3. Drinking from a glass 3   4. Hygiene 1   5. Dressing 1   6. Pouring 3   7. Carrying food trays, plates or similar items 0   8. Using keys 3   9. Writing 0   10. Working 3   11. Overall disability with the most affected task 3   Name of most affected task: (No Data)   12. Social impact 1   ADL TOTAL 23   ( previous 22<--24)       Medications:  Current Outpatient Medications   Medication Sig Dispense Refill     ALPRAZolam (XANAX) 0.5 MG tablet Take 1 tablet (0.5 mg) by mouth daily as needed  for anxiety 30 tablet 0     botulinum toxin type A (BOTOX) 100 units injection Inject 200 units 4x per year (Patient not taking: Reported on 1/6/2021) 800 Units 0     CALCIUM-VITAMIN D PO Take 2 chew tab by mouth daily       cyanocobalamin (VITAMIN B-12) 1000 MCG tablet Take 1 tablet (1,000 mcg) by mouth daily 100 tablet 0     gabapentin (NEURONTIN) 100 MG capsule Take 1 capsule (100 mg) by mouth 3 times daily (Patient not taking: Reported on 1/3/2020) 90 capsule 0     gabapentin (NEURONTIN) 100 MG capsule   1     Nutritional Supplements (ENSURE COMPLETE SHAKE) LIQD Take 237 mLs by mouth 3 times daily (Patient not taking: Reported on 1/3/2020) 90 Bottle 3     rosuvastatin (CRESTOR) 10 MG tablet Take 1 tablet (10 mg) by mouth daily *Office visit and labs due before next fill. Please call [phone] to schedule*   Thank you 90 tablet 0     sertraline (ZOLOFT) 100 MG tablet Take 1 tablet (100 mg) by mouth daily 30 tablet 0        Movement Disorder-related Medications                       Xanax 0.5 mg PRN still may be once a month         Physical Exam:  BP (!) 175/92   Pulse 84   Resp 16   LMP  (LMP Unknown)   SpO2 99%   Alert and oriented, tremulous voice .  No head tremor.  No resting tremor, no postural tremor with arms outstretched, wing beating L ( < 1 cm)  >R ( barely visible)  .   Dot approximation improved in comparison  to 09/06/2019.     Motor (Performance) Sub-Scale 1/13/2021   Assessment Time 1:10 PM   Medication None   DBS - Right Brain On   DBS - Left Brain On   Head 0   Face & Jaw 0   Voice 2   Outstretched - RIGHT 0   Outstretched - LEFT 0   Wingbeating - RIGHT 1   Wingbeating - LEFT 1.5   Kinetic - RIGHT 1.5   Kinetic - LEFT 1   Lower Limb - RIGHT 0   Lower Limb - LEFT 0   Lower Limb (Max) 0   Spiral - RIGHT 3   Spiral - LEFT 2.5   Handwriting 0   Dot approx - RIGHT 2.5   Dot approx - LEFT 2   Trunk (Standing) 0   Total Right 8   Total Left 7   Axial 2   TOTAL 17       She was able to drink from  "a cup using the left hand , did spill while pouring from one glass to another. \" today is a good day\".    Gait:Berenice unassisted with arms crossed over the chest , normal stride length and speed, narrow based, symmetric bilateral arm swing, during  ambulation with head jerking movements.      L brain programs    Battery : 2.89 <-- 2.93 OK     Initial and final , no changes.           Left VIM    INactive       Active         Program        \"2b\"      \"3abc\"   Amplitude mA        4 [0-5 by 0.25]       4 [0-4 by 0.25]   Pulse width (usec)       20      20   Freq (Hz)       200      200   Contacts: C       Cpos      Cpos   Contacts: 4            Contacts: 3          3abcNeg   Contacts: 2  2bNeg     Contacts1     Impedance: (?)          1125         R brain programs:     Battery 2.95<-- 2.96 V OK                 \"Program 1\"   ( currently not used, inactive)  Right Vop                 Right Vim    Lead 1                         Lead 2           Contacts C+, 2abc- C+, 11abc-   Amplitude (mA) 1.5 2.5   Pulse Width (ms) 60 60   Frequency (Hz) 130 180                      Program   \"Vim only\"  ( inactive)                    Lead 1                    Right Vop                   Lead 2                  Right Vim      Initial and final Final  initial  Final   Contacts   C+, 2abc-  C+ , 11 abc - C+, 11abc-   Amplitude (mA)   0 no range  3.5 [0-4 by 0.25] 3.6 [0-4 by 0.1]   Pulse Width (ms)   20  20 20   Frequency ( Hz)   200  200 200                      Program   \"Vop only\"  ( active)                      Lead 1                     Right Vop                    Lead 2                    Right Vim     Initial Final Initial Final   Contacts  C+, 2abc-   c+,11abc-  C+, 11abc-   Amplitude (mA)  4.0 [0-4 by 0.25]       0, no range  0, no range   Pulse Width (ms)  20       60  20   Frequency ( Hz)  200    180   200   Impedance ( ?)     825       862           R brain \"VIM only\"  3.6 mA- drinking from a  cup slightly better   3.7 mA- " "spiral is the same, drinking from a cup is better, no ataxia on FTN  3.8 mA- no SE, no ataxia on FTN  3.9 mA- no SE  4.0 mA- gait is at baseline, no ataxia on FTN       Impression:  Irish Rosales is a 70 year old R handed woman with Essential tremor with dystonic features , strong FHx of tremor and dystonia ( sister with laryngeal dystonia, laryngeal tremor and laryngeal spasm, mother with hand tremor) , s/p L Vim DBS ([facility]) Mar 2018;  R Vim & Vop DBS ([facility]) Jan 2019,  botulinum toxin injection for cervical dystonia.    On the hypothesis that adaptation-rebound might be contributing to the difficulty of controlling her tremor, she was set her with a set of multiple programs, alternating Vim-only and Vop-only stimulation on  the R brain  and she remained pleased with the tremor on this regimen for over 1 year . In the last few months family pointed out more tremor while picking up a paper cup, continues to use the left hand for most activities . TETRAS and TETRAS ADLs remain pretty much the same . Overall she remains pleased with the  results after DBS surgery , she does not require Botulinum toxin injections anymore due to improvement in the neck pulling sensation and no more pain ,( dystonic head tremor noted during ambulation).       Plan:     1. Today we increased the stimulation for R VIM ( left body ) from 3.5 mA to 3.6 mA and you noted improvement while picking up the paper cup and drinking from it. You have room to increase to 4.0 mA if necessary .    2. We recommend to continue cycling the programs weekly and keep a diary if any particular  programs provide a better benefit .    We sent you home on \"VOP only\", the active program you arrived on , in this way you can continue your usual routine.      Weeks                 Right brain        Left brain        1                      VIM           2b        2                      VIM          3abc        3                       VOP          3abc        4 "                      VOP           2b       3. We checked your DBS today and no issues found.    Will return to our clinic in 6 months or sooner as needed.      Dorita Jade MD  Movement Disorders Fellow     Neurology Attending Attestation:     I, Sheila Haile MD, personally saw this patient with our Movement Disorders Fellow and agree with the fellow's findings and plan of care as documented in the movement disorder fellow's note. I personally performed salient aspects of the history and neurological examination.     I personally reviewed the vital signs, medications, and labs. I personally viewed the imaging, and agree with the interpretation documented by the fellow.    I personally performed or supervised all procedures.    25 minutes spent on the date of the encounter doing chart review, history and exam, discussing the case and plan of care with Dr. Jdae, documentation and further activities as noted above    Additional time spent for separate DBS programmin minutes    Sheila Haile MD    of Neurology

## 2021-01-13 NOTE — PATIENT INSTRUCTIONS
"1. Today we increased the stimulation for R VIM ( left body ) from 3.5 mA to 3.6 mA and you noted improvement while picking up the paper cup and drinking from it. You have room to increase to 4.0 mA if necessary .    2. We recommend to continue cycling the programs weekly and keep a diary if any particular  programs provide a better benefit .    We sent you home on \"VOP only\", the active program you arrived on , in this way you can continue your usual routine.      Weeks                 Right brain        Left brain        1                      VIM           2b        2                      VIM          3abc        3                       VOP          3abc        4                      VOP           2b       3. We checked your DBS today and no issues found.    "

## 2021-01-13 NOTE — NURSING NOTE
Chief Complaint   Patient presents with     RECHECK     P RETURN - MOVEMENT DISORDER        Nathan Doty

## 2021-01-15 ENCOUNTER — HEALTH MAINTENANCE LETTER (OUTPATIENT)
Age: 71
End: 2021-01-15

## 2021-01-18 DIAGNOSIS — Z86.59 HISTORY OF MAJOR DEPRESSION: ICD-10-CM

## 2021-01-19 RX ORDER — SERTRALINE HYDROCHLORIDE 100 MG/1
TABLET, FILM COATED ORAL
Qty: 30 TABLET | Refills: 0 | Status: SHIPPED | OUTPATIENT
Start: 2021-01-19 | End: 2021-01-21

## 2021-01-19 NOTE — TELEPHONE ENCOUNTER
Routing refill request to provider for review/approval because:  Radha given x1 and patient is now scheduled for 1/21/21  Sallie Pace RN  [facility]

## 2021-01-21 ENCOUNTER — OFFICE VISIT (OUTPATIENT)
Dept: FAMILY MEDICINE | Facility: CLINIC | Age: 71
End: 2021-01-21
Payer: COMMERCIAL

## 2021-01-21 VITALS
RESPIRATION RATE: 14 BRPM | OXYGEN SATURATION: 97 % | SYSTOLIC BLOOD PRESSURE: 136 MMHG | HEIGHT: 61 IN | DIASTOLIC BLOOD PRESSURE: 78 MMHG | HEART RATE: 76 BPM | BODY MASS INDEX: 25.07 KG/M2 | TEMPERATURE: 98.4 F

## 2021-01-21 DIAGNOSIS — E78.5 HYPERLIPIDEMIA LDL GOAL <130: ICD-10-CM

## 2021-01-21 DIAGNOSIS — Z00.00 ENCOUNTER FOR MEDICARE ANNUAL WELLNESS EXAM: Primary | ICD-10-CM

## 2021-01-21 DIAGNOSIS — F32.5 DEPRESSION, MAJOR, IN REMISSION (H): ICD-10-CM

## 2021-01-21 PROBLEM — M43.6 NECK STIFFNESS: Status: RESOLVED | Noted: 2019-05-17 | Resolved: 2021-01-21

## 2021-01-21 PROBLEM — R63.4 LOSS OF WEIGHT: Status: RESOLVED | Noted: 2018-11-03 | Resolved: 2021-01-21

## 2021-01-21 PROCEDURE — G0439 PPPS, SUBSEQ VISIT: HCPCS | Performed by: INTERNAL MEDICINE

## 2021-01-21 PROCEDURE — 99213 OFFICE O/P EST LOW 20 MIN: CPT | Mod: 25 | Performed by: INTERNAL MEDICINE

## 2021-01-21 RX ORDER — ROSUVASTATIN CALCIUM 20 MG/1
20 TABLET, COATED ORAL DAILY
Qty: 90 TABLET | Refills: 3 | Status: SHIPPED | OUTPATIENT
Start: 2021-01-21 | End: 2022-01-18

## 2021-01-21 RX ORDER — SERTRALINE HYDROCHLORIDE 100 MG/1
100 TABLET, FILM COATED ORAL DAILY
Qty: 90 TABLET | Refills: 3 | Status: SHIPPED | OUTPATIENT
Start: 2021-01-21 | End: 2022-01-25

## 2021-01-21 ASSESSMENT — PATIENT HEALTH QUESTIONNAIRE - PHQ9: SUM OF ALL RESPONSES TO PHQ QUESTIONS 1-9: 0

## 2021-01-21 ASSESSMENT — ACTIVITIES OF DAILY LIVING (ADL): CURRENT_FUNCTION: NO ASSISTANCE NEEDED

## 2021-01-21 NOTE — PATIENT INSTRUCTIONS
Make appointment(s) for:   -- fasting lab in 3 months.         Medication(s) prescribed today:    Orders Placed This Encounter   Medications     rosuvastatin (CRESTOR) 20 MG tablet     Sig: Take 1 tablet (20 mg) by mouth daily *Office visit and labs due before next fill. Please call [phone] to schedule*   Thank you     Dispense:  90 tablet     Refill:  3     sertraline (ZOLOFT) 100 MG tablet     Sig: Take 1 tablet (100 mg) by mouth daily     Dispense:  90 tablet     Refill:  3           Patient Education   Personalized Prevention Plan  You are due for the preventive services outlined below.  Your care team is available to assist you in scheduling these services.  If you have already completed any of these items, please share that information with your care team to update in your medical record.  Health Maintenance Due   Topic Date Due     Depression Assessment  07/03/2020     Flu Vaccine (1) 09/01/2020     Annual Wellness Visit  10/30/2020

## 2021-01-21 NOTE — PROGRESS NOTES
"SUBJECTIVE:   Irish Rosales is a 70 year old female who presents for Preventive Visit.    HPI:  Doing well.  /82 done at her apartment for RN.     Patient has been advised of split billing requirements and indicates understanding: Yes   Are you in the first 12 months of your Medicare coverage?  No    Healthy Habits:    In general, how would you rate your overall health?  Very good    Frequency of exercise:  4-5 days/week    Duration of exercise:  30-45 minutes    Do you usually eat at least 4 servings of fruit and vegetables a day, include whole grains    & fiber and avoid regularly eating high fat or \"junk\" foods?  Yes    Taking medications regularly:  No    Barriers to taking medications:  None    Medication side effects:  None    Ability to successfully perform activities of daily living:  No assistance needed    Home Safety:  No safety concerns identified    Hearing Impairment:  No hearing concerns    In the past 6 months, have you been bothered by leaking of urine?  No    In general, how would you rate your overall mental or emotional health?  Very good      PHQ-2 Total Score:    Additional concerns today:  No    Do you feel safe in your environment? Yes    Have you ever done Advance Care Planning? (For example, a Health Directive, POLST, or a discussion with a medical provider or your loved ones about your wishes): Yes, advance care planning is on file.       Fall risk  Fallen 2 or more times in the past year?: No  Any fall with injury in the past year?: No    Cognitive Screening   1) Repeat 3 items (Leader, Season, Table)    2) Clock draw: NORMAL  3) 3 item recall: Recalls 3 objects  Results: 3 items recalled: COGNITIVE IMPAIRMENT LESS LIKELY    Mini-CogTM Copyright TAHIRA Barbosa. Licensed by the author for use in [facility]; reprinted with permission (arnold@.[email or web address]). All rights reserved.      Do you have sleep apnea, excessive snoring or daytime drowsiness?: no    PHQ-9 SCORE 6/3/2019 " 1/3/2020 1/21/2021   PHQ-9 Total Score - - -   PHQ-9 Total Score MyChart - - -   PHQ-9 Total Score 0 0 0     NAOMI-7 SCORE 3/28/2016 6/15/2016 10/25/2018   Total Score - - -   Total Score 1 1 0       Reviewed and updated as needed this visit by clinical staff  Tobacco  Allergies  Meds              Reviewed and updated as needed this visit by Provider                Social History     Tobacco Use     Smoking status: Never Smoker     Smokeless tobacco: Never Used   Substance Use Topics     Alcohol use: Yes     Frequency: 2-4 times a month     Drinks per session: 1 or 2     Comment: occasionally     If you drink alcohol do you typically have >3 drinks per day or >7 drinks per week? No    No flowsheet data found.        -------------------------------------    Current providers sharing in care for this patient include:   Patient Care Team:  Braulio Victoria MD PhD as PCP - General (Internal Medicine)  Michelle Corcoran LSW as Lead Care Coordinator  Braulio Victoria MD PhD as Assigned PCP  Edwar Colvin MD as Assigned Neuroscience Provider    The following health maintenance items are reviewed in Epic and correct as of today:  Health Maintenance   Topic Date Due     DEXA  04/03/2021     DTAP/TDAP/TD IMMUNIZATION (2 - Td) 07/20/2021     PHQ-9  07/21/2021     LIPID  01/06/2022     FALL RISK ASSESSMENT  01/06/2022     MEDICARE ANNUAL WELLNESS VISIT  01/21/2022     ADVANCE CARE PLANNING  01/21/2026     COLORECTAL CANCER SCREENING  11/02/2028     HEPATITIS C SCREENING  Completed     DEPRESSION ACTION PLAN  Completed     INFLUENZA VACCINE  Completed     Pneumococcal Vaccine: 65+ Years  Completed     ZOSTER IMMUNIZATION  Completed     Pneumococcal Vaccine: Pediatrics (0 to 5 Years) and At-Risk Patients (6 to 64 Years)  Aged Out     IPV IMMUNIZATION  Aged Out     MENINGITIS IMMUNIZATION  Aged Out     HEPATITIS B IMMUNIZATION  Aged Out     Labs reviewed in [facility]  Mammogram Screening: Recommended mammography every 1-2 years with  "patient discussion and risk factor consideration    Review of Systems  Constitutional, HEENT, cardiovascular, pulmonary, gi and gu systems are negative, except as otherwise noted.    OBJECTIVE:   /78   Pulse 76   Temp 98.4  F (36.9  C) (Oral)   Resp 14   Ht 1.549 m (5' 1\")   LMP  (LMP Unknown)   SpO2 97%   BMI 25.07 kg/m   Estimated body mass index is 25.07 kg/m  as calculated from the following:    Height as of this encounter: 1.549 m (5' 1\").    Weight as of 2/6/20: 60.2 kg (132 lb 11.2 oz).  Physical Exam  GENERAL: healthy, alert and no distress  EYES: Eyes grossly normal to inspection, PERRL and conjunctivae and sclerae normal  HENT: ear canals and TM's normal, nose and mouth without ulcers or lesions  NECK: no adenopathy, no asymmetry, masses, or scars and thyroid normal to palpation  RESP: lungs clear to auscultation - no rales, rhonchi or wheezes  BREAST: normal without masses, tenderness or nipple discharge and no palpable axillary masses or adenopathy  CV: regular rate and rhythm, normal S1 S2, no S3 or S4, no murmur, click or rub, no peripheral edema and peripheral pulses strong  ABDOMEN: soft, nontender, no hepatosplenomegaly, no masses and bowel sounds normal  MS: no gross musculoskeletal defects noted, no edema  SKIN: no suspicious lesions or rashes  NEURO: Normal strength and tone, mentation intact and speech normal  PSYCH: mentation appears normal, affect normal/bright    Diagnostic Test Results:  Labs reviewed in Epic  Orders Only on 01/06/2021   Component Date Value Ref Range Status     ALT 01/06/2021 24  0 - 50 U/L Final     Cholesterol 01/06/2021 282* <200 mg/dL Final    Desirable:       <200 mg/dl     Triglycerides 01/06/2021 84  <150 mg/dL Final     HDL Cholesterol 01/06/2021 119  >49 mg/dL Final     LDL Cholesterol Calculated 01/06/2021 146* <100 mg/dL Final    Comment: Above desirable:  100-129 mg/dl  Borderline High:  130-159 mg/dL  High:             160-189 mg/dL  Very high:     " "  >189 mg/dl       Non HDL Cholesterol 01/06/2021 163* <130 mg/dL Final    Comment: Above Desirable:  130-159 mg/dl  Borderline high:  160-189 mg/dl  High:             190-219 mg/dl  Very high:       >219 mg/dl       Vitamin B12 01/06/2021 576  193 - 986 pg/mL Final          ASSESSMENT / PLAN:   Irish was seen today for physical.    Diagnoses and all orders for this visit:    Encounter for Medicare annual wellness exam  -     Full Code    Hyperlipidemia LDL goal <130  -     rosuvastatin (CRESTOR) 20 MG tablet; Take 1 tablet (20 mg) by mouth daily *Office visit and labs due before next fill. Please call [phone] to schedule*   Thank you  -     Lipid panel reflex to direct LDL Fasting; Future    Depression, major, in remission (H)  -     sertraline (ZOLOFT) 100 MG tablet; Take 1 tablet (100 mg) by mouth daily  -     SD BEHAV ASSMT W/SCORE & DOCD/STAND INSTRUMENT        Doing well. Continue sertraline 100 mg daily. Increase crestor to 20 mg. Recheck fasting lipids in 3 months.     Patient has been advised of split billing requirements and indicates understanding: No  COUNSELING:  Reviewed preventive health counseling, as reflected in patient instructions    Estimated body mass index is 25.07 kg/m  as calculated from the following:    Height as of this encounter: 1.549 m (5' 1\").    Weight as of 2/6/20: 60.2 kg (132 lb 11.2 oz).        She reports that she has never smoked. She has never used smokeless tobacco.      Appropriate preventive services were discussed with this patient, including applicable screening as appropriate for cardiovascular disease, diabetes, osteopenia/osteoporosis, and glaucoma.  As appropriate for age/gender, discussed screening for colorectal cancer, prostate cancer, breast cancer, and cervical cancer. Checklist reviewing preventive services available has been given to the patient.    Reviewed patients plan of care and provided an AVS. The Basic Care Plan (routine screening as documented " in Health Maintenance) for Irish meets the Care Plan requirement. This Care Plan has been established and reviewed with the Patient.    Counseling Resources:  ATP IV Guidelines  Pooled Cohorts Equation Calculator  Breast Cancer Risk Calculator  Breast Cancer: Medication to Reduce Risk  FRAX Risk Assessment  ICSI Preventive Guidelines  Dietary Guidelines for Americans, 2010  USDA's MyPlate  ASA Prophylaxis  Lung CA Screening    Braulio Victoria MD PhD  [facility]    Identified Health Risks:

## 2021-01-24 DIAGNOSIS — E78.5 HYPERLIPIDEMIA LDL GOAL <160: ICD-10-CM

## 2021-01-25 RX ORDER — ROSUVASTATIN CALCIUM 10 MG/1
TABLET, COATED ORAL
Qty: 90 TABLET | Refills: 0 | OUTPATIENT
Start: 2021-01-25

## 2021-01-27 DIAGNOSIS — G24.3 CERVICAL DYSTONIA: Primary | ICD-10-CM

## 2021-02-01 DIAGNOSIS — G24.3 CERVICAL DYSTONIA: Primary | ICD-10-CM

## 2021-02-02 ENCOUNTER — PATIENT OUTREACH (OUTPATIENT)
Dept: GERIATRIC MEDICINE | Facility: CLINIC | Age: 71
End: 2021-02-02

## 2021-02-02 PROBLEM — Z76.89 HEALTH CARE HOME: Status: RESOLVED | Noted: 2017-06-01 | Resolved: 2021-02-02

## 2021-02-02 NOTE — LETTER
February 2, 2021    Important [employer] Information    HARSHAD PAYNE  [address] [facility]   [facility] [address]  Your Care Plan  Dear Harshad,  When we spoke recently, I promised to send you a Care Plan. The plan enclosed is a summary of our discussion. It includes the steps we agreed would help you meet your health goals. In addition, I can help you with:  [id number]-Y-[id number]  This program is available to members who need a ride to medical and dental visits. To schedule a ride, call [phone] or [phone] (toll free). TTY/TTD: 711. You can call 8 a.m. to 8 p.m. Seven days a week. Access to a representative may be limited at times.    Pipefish   The Pipefish program empowers you to improve your health through education and exercise. To learn more, visit [employer], or call [facility] at [phone] (toll free) (TTY:711) from 7 a.m. - 7 p.m. Central Time, Monday-Friday.  Health Care Directive   This form helps you outline your health care wishes. You can request a form from me and I will answer any questions you have before you discuss it with your doctor.   Annual Physical  Take a key step on your path to good health and set up an annual physical at your clinic.  Questions?  Call me at [phone] Monday-Friday between 8am and 5pm.  TTY/TTD: 711. As we discussed, I plan to be in touch with you again in 6 months to follow up via phone.  Sincerely,    INNA Diallo    E-mail: [email or web address]  Phone:[phone]      [employer]            cc: member records                    Civil Rights Notice  Discrimination is against the law. [employer] does not discriminate on the basis of any of the following:    Race    Color    National Origin    Creed    Baptist    Age    Public Assistance Status    Receipt of Health Care Services    Disability (including physical or mental impairment)    Sex (including sex stereotypes and gender  identity)    Marital Status    Political Beliefs    Medical Condition    Genetic Information    Sexual Orientation    Claims Experience    Medical History    Health Status    Auxiliary Aids and Services:  [employer] provides auxiliary aids and services, like qualified interpreters or information in accessible formats, free of charge and in a timely manner, to ensure an equal opportunity to participate in our health care programs. Contact [employer] Customer Service at [employer]/contact medicaid or call [phone] (toll free); TTY:71 or at [employer]/contactPrice Ignite Systemscaid.    Language Assistance Services:  [employer] provides translated documents and spoken language interpreting, free of charge and in a timely manner, when language assistance services are necessary to ensure limited English speakers have meaningful access to our information and services. Contact [employer] at [phone] (toll free); TTY: 411 or [email or web address]/[email or web address].     Civil Rights Complaints  You have the right to file a discrimination complaint if you believe you were treated in a discriminatory way by [employer]. You may contact any of the following four agencies directly to file a discrimination complaint.    U.S. Department of Health and Human Services  Office for Civil Rights (OCR)  You have the right to file a complaint with the OCR, a federal agency, if you believe you have been discriminated against because of any of the following:    Race    Disability    Color    Sex    National Origin    Age      Contact the OCR directly to file a complaint:         Director         U.S. Department of Health and Human Services  Office for Civil Rights         [address] [place] [address], [place] [address]         [phone] (Voice)         [phone] (TDD)         Complaint Portal - https://ocrportal.hhs.gov/ocr/portal/lobby.jsf     [place] Department of Human Rights (Formerly [facility])  In [place], you have the right to  file a complaint with the MDHR if you believe you have been discriminated against because of any of the following:      Race    Color    National Origin    Rastafarian    Creed    Sex    Sexual Orientation    Marital Status    Public Assistance Status    Disability    Contact the MDHR directly to file a complaint:         [place] Department of Human Rights         [facility], [address] [place] [address]         [phone] (voice)          [phone] (toll free)         711 or [phone] ([place] Relay)         [phone] (Fax)         [email or web address] (Email)     [place] Department of Human Services (DHS)  You have the right to file a complaint with [facility] if you believe you have been discriminated against in our health care programs because of any of the following:    Race    Color    National Origin    Creed    Rastafarian    Age    Public Assistance Status    Receipt of Health Care Services    Disability (including physical or mental impairment)    Sex (including sex stereotypes and gender identity)    Marital Status    Political Beliefs    Medical Condition    Genetic Information    Sexual Orientation    Claims Experience    Medical History    Health Status    Complaints must be in writing and filed within 180 days of the date you discovered the alleged discrimination. The complaint must contain your name and address and describe the discrimination you are complaining about. After we get your complaint, we will review it and notify you in writing about whether we have authority to investigate. If we do, we will investigate the complaint.      [facility] will notify you in writing of the investigation s outcome. You have a right to appeal the outcome if you disagree with the decision. To appeal, you must send a written request to have [facility] review the investigation outcome period. Be brief and state why you disagree with the decision. Include additional information you think is important.       If you file a complaint in this way, the people who work for the agency named in the complaint cannot retaliate against you. This means they cannot punish you in any way for filing a complaint. Filing a complaint in this way does not stop you from seeking out other legal or administration actions.     Contact DHS directly to file a discrimination complaint:        Civil Rights Coordinator        [facility]        Equal Opportunity and Access Division        [address] [address]        [place] [address] [phone] (voice) or use your preferred relay service     [facility] Complaint Notice   You have the right to file a complaint with [employer] if you believe you have been discriminated against because of any of the following:       Medical condition    Health status    Receipt of health care services    Claims experience    Medical history    Genetic information    Disability (including mental or physical impairment)    Marital status    Age    Sex (including sex stereotypes and gender identity)    Sexual orientation    National origin    Race    Color    Bahai    Creed    Public assistance status    Political beliefs    You can file a complaint and ask for help in filing a complaint in person or by mail, phone, fax, or email at:     [facility] Civil Rights Coordinator  [facility] [address] [facility]  [address], [place] [address] [phone] (voice and fax) or TTY:930  Email: [email or web address]    American Indians can continue to use [place] and  Health Services (IHS) clinics. We will not require prior approval or impose any conditions for you to get services at these clinics. For elders age 65 years and older this includes Elderly Waiver (EW) services accessed through the [facility]. If a doctor or other provider in a [place] or [facility] clinic refers you to a provider in our network, we will not require you to see your primary care provider prior to the  referral.    For accessible formats of this publication or assistance with equal or access to our services, visit [email or web address], or call [phone] (toll free) or use your preferred relay service.

## 2021-02-09 ENCOUNTER — VIRTUAL VISIT (OUTPATIENT)
Dept: NEUROPSYCHOLOGY | Facility: CLINIC | Age: 71
End: 2021-02-09
Payer: COMMERCIAL

## 2021-02-09 DIAGNOSIS — G25.0 ESSENTIAL TREMOR: Primary | ICD-10-CM

## 2021-02-09 DIAGNOSIS — F09 MENTAL DISORDER DUE TO GENERAL MEDICAL CONDITION: ICD-10-CM

## 2021-02-09 DIAGNOSIS — G24.3 CERVICAL DYSTONIA: ICD-10-CM

## 2021-02-09 PROCEDURE — 90791 PSYCH DIAGNOSTIC EVALUATION: CPT | Mod: TEL

## 2021-02-09 NOTE — PROGRESS NOTES
Irish Rosales is a 70 year old who is being evaluated via a billable telephone visit.        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  Information was obtained via interview with the patient and review of her medical records, including prior neuropsychological evaluations.  Ms. Rosales has a history of essential tremor, with cervical and vocal dystonia as well as a dystonic tremor.  The tremor has been present in her hands her entire life, and her voice and head tremor started about 17 years ago.  She is s/p left VIM DBS in March 2018, and right VIM and VOP DBS in January 2019.  There has been some difficulty controlling her tremor, and she was given a set of multiple programs alternating VIM only and VOP only stimulation on the right brain.  She has had better tremor control with this method, but has had more tremor recently when picking up a paper cup so that she continues to use her left hand for most activities.  She was referred for neuropsychological evaluation in 1 year follow-up to her most recent DBS surgery, and the evaluation was delayed due to the pandemic.  Her DBS team requested additional information regarding cognitive functioning and mood, to assist with treatment planning.    Ms. Rosales has undergone 2 prior neuropsychological evaluations, on 2/22/2017, and again on 9/20/2018.  Please refer to the reports from these dates for full details regarding her history and the findings of the evaluations.  Briefly, findings from her 2017 evaluation indicated performance that fell within normal limits across cognitive domains, generally in the average to above average range.  Personality assessment also fell within normal limits.  At her 9/20/2018 evaluation, she was noted to have mild distractibility and performance otherwise fell within normal limits across cognitive  domains.  Since 2017 she had improvements in some aspects of expressive language and memory for visual information, with performance otherwise remaining stable across cognitive domains.    Precautions are in place associated with COVID-19.  In an effort to reduce the amount of time spent in the clinic, the interview took place remotely on 2/9/2021.  She was unable to establish a video connection, so the interview took place over the telephone. She was scheduled to complete the testing portion of the evaluation in the clinic on 2/11/2021, but cancelled due to transportation problems. She will be rescheduled for that portion of the evaluation.     CLINICAL INTERVIEW FINDINGS    Behavioral observations were limited. We were unable to establish a video connection for the interview, so the interview took place over the telephone. She had a vocal tremor but it was not difficult to understand her speech. Speech was fluent, with normal volume. She presented her thoughts in a clear, logical manner. Mood was neutral. Memory and attention appeared to be good. Judgement and insight also appeared to be good.    Upon interview, Ms. Rosales stated that her tremor and dystonia are much better after some changes in her stimulation parameters.  She met with the neurology fellow last month, and feels that she did not get quite as much benefit as she might have, but she continues to meet with a neurologist every 6 months to address these issues.    Ms. Rosales has not noticed any changes in cognition, including memory, word finding, attention or concentration, decision making, or organization.  She continues to live by herself, and manages her own finances, medications, driving, and cooking, apparently without difficulty.  She handles her personal cares independently.    When asked to describe her mood, Ms. Rosales stated that it is up and down.  It is hard not being able to see her family.  She does talk to them on the phone, and she has  3 friends with whom she is in close contact.  She does not feel depressed, sad, helpless, worthless, or guilty.  She does not feel particularly anxious, noting that she exercises a couple of times a week which helps with being cooped up.  She does not startle easily.  Sleep is generally good.  She sleeps 8 hours a night, and is not restless at night.  She does not nap during the day.  Her appetite has been strong and she has gained 5 to 8 pounds during the pandemic.  Her energy level and interest level are good.  She denied visual or auditory hallucinations.  She denied suicidal ideation or any history of attempts to commit suicide.  She consumes 1 or 2 alcoholic beverages couple of times a week.  She is denied illicit drug use or tobacco use.    Since her last evaluation, Ms. Rosales has not had any major illnesses or injuries.  Her balance has been good and she has not been falling.  She denied unilateral weakness or numbness.  She still has tremor when she tries to eat, when her arm is at the alf point to her mouth.  For this reason, she has been eating with her left hand and she cannot eat soup.  She is not bothered by headaches.  Her neck is sometimes stiff.    PAST MEDICAL HISTORY: Medical records indicate a history of depression, generalized anxiety disorder, cervical dystonia, vitamin D B12 deficiency, essential tremor, impingement syndrome of the right shoulder, cervical radiculopathy, carpal tunnel syndrome, osteoporosis.    CURRENT MEDICATIONS:  Include alprazolam, calcium-vitamin D, cyanocobalamin, rosuvastatin, sertraline.    FAMILY MEDICAL HISTORY:  Significant for a sister with laryngeal dystonia with onset in her 50s, another sister with laryngeal dystonia with onset in the 50s and more recent blepharospasm, a mother who  at 72 with lung cancer who had mild hand tremor, and a father with cerebrovascular disease.    IMPRESSIONS AND RECOMMENDATIONS    Irish Rosales is a 70-year-old woman with  a history of essential tremor with dystonic features, who was s/p left VIM DBS in March 2018, and right VIM and VOP DBS in January 2019.  She has had good but perhaps transient response of tremor.  This neuropsychological evaluation was undertaken in 1 year follow-up to her most recent surgery, delayed because of the pandemic. We completed the interview over the telephone due to problems with the video technology. She was scheduled to be seen in the clinic the next day for the testing portion of the evaluation, but had to cancel due to transportation concerns.     Ms. Rosales continues to work with her neurologist on optimal symptom control, but does report having had good benefit. She is not reporting significant cognitive changes. Although she is not reporting significant depression or anxiety she does note that her mood has varied during the pandemic, and that it is hard not being able to see her family. She remains in close contact with them over the telephone, along with three good friends. She consumes up to two alcoholic beverages a couple of times a week. Overall, she is doing well.    Formal testing will take place in the near future, and will allow for comparisons with her two prior neuropsychological evaluations, the most recent of which, in 2018, suggested mild distractibility and performance otherwise falling within normal limits across cognitive domains.       Lilo Drew, Ph.D., ABPP  Licensed Psychologist, LP 4336  Board Certified in Clinical Neuropsychology    Time spent:  One hour professional time, including interview,biopsychosocial assessment, and report writing(CPT 68319) ICD-10 diagnosis: G25; G24.3; F06.8.    Total length of telephone visit: 28 minutes

## 2021-02-17 DIAGNOSIS — F32.5 DEPRESSION, MAJOR, IN REMISSION (H): ICD-10-CM

## 2021-02-17 RX ORDER — SERTRALINE HYDROCHLORIDE 100 MG/1
TABLET, FILM COATED ORAL
Qty: 30 TABLET | Refills: 0
Start: 2021-02-17

## 2021-02-17 NOTE — TELEPHONE ENCOUNTER
90 day supply with 1 refills sent on 1/21/21. Should have refills on file at pharmacy.      Abdi Aranda RN, BSN, PHN

## 2021-03-14 ENCOUNTER — MYC MEDICAL ADVICE (OUTPATIENT)
Dept: FAMILY MEDICINE | Facility: CLINIC | Age: 71
End: 2021-03-14

## 2021-03-17 ENCOUNTER — VIRTUAL VISIT (OUTPATIENT)
Dept: FAMILY MEDICINE | Facility: CLINIC | Age: 71
End: 2021-03-17
Payer: COMMERCIAL

## 2021-03-17 DIAGNOSIS — G47.00 INSOMNIA, UNSPECIFIED TYPE: Primary | ICD-10-CM

## 2021-03-17 PROCEDURE — 99213 OFFICE O/P EST LOW 20 MIN: CPT | Mod: TEL | Performed by: PHYSICIAN ASSISTANT

## 2021-03-17 RX ORDER — TRAZODONE HYDROCHLORIDE 50 MG/1
50 TABLET, FILM COATED ORAL AT BEDTIME
Qty: 30 TABLET | Refills: 1 | Status: SHIPPED | OUTPATIENT
Start: 2021-03-17 | End: 2021-05-17

## 2021-03-17 NOTE — PROGRESS NOTES
"Irish is a 70 year old who is being evaluated via a billable telephone visit.      What phone number would you like to be contacted at?   How would you like to obtain your AVS? MyChart    Assessment & Plan     Insomnia, unspecified type  Trial of trazodone- has worked well in past   - traZODone (DESYREL) 50 MG tablet  Dispense: 30 tablet; Refill: 1               Patient Instructions   Try trazodone 50 mg at bedtime  Follow up with Dr. Braulio Victoria in approximately one month- sooner if side effects or not helpful.   Return urgently if any change in symptoms or concerns.       Return in about 4 weeks (around 4/14/2021), or if symptoms worsen or fail to improve, for using a video visit.    Kaylie Andino PA-C  [facility]   Irish is a 70 year old who presents for the following health issues     HPI       Insomnia.   Patient reports \"I was requesting low dose of trazodone for sleep at night.  Taken years ago and really helped.  Last month or so having difficulty with sleep.    Wakes up from 3AM -5AM before can get back to sleep. Is very groggy during the day.    My mood is fine not depressed or anxious.  No idea why i'm waking up   Wide awake for couple hours  Exercises 5 days of week.  Goes to [facility]   Xanax maybe once a month.  On zoloft 100 mg daily and last phq9 0 in January and reports answers would be the same now.    Review of Systems   Constitutional, HEENT, cardiovascular, pulmonary, gi and gu systems are negative, except as otherwise noted.      Objective           Vitals:  No vitals were obtained today due to virtual visit.    Physical Exam   healthy, alert and no distress  PSYCH: Alert and oriented times 3; coherent speech, normal   rate and volume, able to articulate logical thoughts, able   to abstract reason, no tangential thoughts, no hallucinations   or delusions  Her affect is normal and pleasant  RESP: No cough, no audible " wheezing, able to talk in full sentences  Remainder of exam unable to be completed due to telephone visits                Phone call duration: 6 minutes

## 2021-03-17 NOTE — PATIENT INSTRUCTIONS
Try trazodone 50 mg at bedtime  Follow up with Dr. Braulio Victoria in approximately one month- sooner if side effects or not helpful.   Return urgently if any change in symptoms or concerns.

## 2021-05-13 ENCOUNTER — OFFICE VISIT (OUTPATIENT)
Dept: NEUROPSYCHOLOGY | Facility: CLINIC | Age: 71
End: 2021-05-13
Payer: COMMERCIAL

## 2021-05-13 DIAGNOSIS — G24.3 CERVICAL DYSTONIA: ICD-10-CM

## 2021-05-13 DIAGNOSIS — G25.0 ESSENTIAL TREMOR: Primary | ICD-10-CM

## 2021-05-13 DIAGNOSIS — F09 MENTAL DISORDER DUE TO GENERAL MEDICAL CONDITION: ICD-10-CM

## 2021-05-13 PROCEDURE — 96138 PSYCL/NRPSYC TECH 1ST: CPT

## 2021-05-13 PROCEDURE — 96133 NRPSYC TST EVAL PHYS/QHP EA: CPT

## 2021-05-13 PROCEDURE — 96139 PSYCL/NRPSYC TST TECH EA: CPT

## 2021-05-13 PROCEDURE — 96132 NRPSYC TST EVAL PHYS/QHP 1ST: CPT

## 2021-05-13 NOTE — PROGRESS NOTES
Pt was seen for neuropsychological evaluation at the request of Sheila Haile MD for the purposes of diagnostic clarification and treatment planning. 252 minutes of test administration and scoring were provided by this writer. Please see Dr. Lilo Drew's report for a full interpretation of the findings.      Mary Dempsey  Psychometrist

## 2021-05-13 NOTE — LETTER
5/13/2021      RE: Irish Rosales  [address]  [facility] [address]       Pt was seen for neuropsychological evaluation at the request of Sheila Haile MD for the purposes of diagnostic clarification and treatment planning. 252 minutes of test administration and scoring were provided by this writer. Please see Dr. Lilo Drew's report for a full interpretation of the findings.      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 Ms. Rosales has a history of essential tremor, with cervical and vocal dystonia as well as a dystonic tremor.  The tremor has been present in her hands her entire life, and her voice and head tremor started about 17 years ago.  She is s/p left VIM DBS in March 2018, and right VIM and VOP DBS in January 2019.  There has been some difficulty controlling her tremor, and she was given a set of multiple programs alternating VIM only and VOP only stimulation on the right brain.  She has had better tremor control with this method, but has had more tremor recently when picking up a paper cup so that she continues to use her left hand for most activities.  She was referred for neuropsychological evaluation in on year follow-up to her most recent DBS surgery, and the evaluation was delayed due to the pandemic.  Her DBS team requested additional information regarding cognitive functioning and mood, to assist with treatment planning.     Precautions are in place related to COVID-19. In an effort to reduce the amount of time spent in the clinic, the interview took place remotely on 2/9/2021.  She was unable to establish a video connection, so the interview took place over the telephone. She was scheduled to complete the testing portion of the evaluation in the clinic on 2/11/2021, but cancelled due to transportation  problems.Ultimately, she was seen in the clinic today to complete the testing portion of the evaluation.     Please refer to the report dated 2/9/2021 for details from her interview. Briefly, she continued to work with her neurologist on optimal symptom control, but did report having had good benefit. She was not reporting significant cognitive changes. Although she was not reporting significant depression or anxiety she did note that her mood had varied during the pandemic, and that it was hard not being able to see her family. She remained in close contact with them over the telephone, along with three good friends. She consumes up to two alcoholic beverages a couple of times a week. Overall, she was doing well.    Ms. Rosales has undergone two prior neuropsychological evaluations, on 2/22/2017, and again on 9/20/2018.  Please refer to the reports from these dates for full details regarding her history and the findings of the evaluations.  Briefly, findings from her 2017 evaluation indicated performance that fell within normal limits across cognitive domains, generally in the average to above average range.  Personality assessment also fell within normal limits.  At her 9/20/2018 evaluation, she was noted to have mild distractibility and performance otherwise fell within normal limits across cognitive domains.  Since 2017 she had improvements in some aspects of expressive language and memory for visual information, with performance otherwise remaining stable across cognitive domains.     PAST MEDICAL HISTORY: Medical records indicate a history of depression, generalized anxiety disorder, cervical dystonia, vitamin B12 deficiency, essential tremor, impingement syndrome of the right shoulder, cervical radiculopathy, carpal tunnel syndrome, osteoporosis.     CURRENT MEDICATIONS:  Include alprazolam, calcium-vitamin D, cyanocobalamin, rosuvastatin, sertraline, trazodone.     FAMILY MEDICAL HISTORY:  Significant for a  sister with laryngeal dystonia with onset in her 50s, another sister with laryngeal dystonia with onset in the 50s and more recent blepharospasm, a mother who  at 72 with lung cancer who had mild hand tremor, and a father with cerebrovascular disease.    BEHAVIORAL OBSERVATIONS    During the evaluation, Ms. Rosales was pleasant, cooperative, and seemed to understand the instructions. She was alert and oriented to person, place, and time. Tremors were observed clinically in her right hand when it was in use. There was also a vocal tremor. Mood was neutral. Speech was fluent, with normal volume and rate. Spontaneous conversation was sparse but entirely appropriate. Internal performance validity measures fell within normal limits. The results are believed to accurately reflect her current level of functioning.     MEASURES ADMINISTERED    The following measures were administered by a trained psychometrist, under the direct supervision of a licensed psychologist.    Subtests of the Wechsler Adult Intelligence Scale-4; subtests of the Wechsler Memory Scale-Revised; Woodard Verbal Learning Test-Revised, Form 4; [place] Naming Test; D-KEFS Verbal Fluency, Alternate Form; Trail Making Test; Stroop; [place] Card Sorting Test - 64; Posey Judgement of Line Orientation Form V; Clock Drawing; Dementia Rating Scale - 2 (DRS-2) Standard Form;  Upton Depression Inventory-2 (BDI-2), HAM-D, HAM-A, Apathy Scale, RBDSQ; QUEST; ESS.     RESULTS AND INTERPRETATION    Overall intellectual functioning was estimated to fall in the high average range, above premorbid estimates of average based on single word reading abilities administered at a previous evaluation. Performance on a screening measure of dementia fell in the average range (DRS-2 Total Score  = 139/144).    Confrontation naming was average for her age. Verbal abstract reasoning was high average. Ability to comprehend and articulate responses to complex social situations was  high average. Letter fluency and generative naming to category were exceptionally high, and switching fluency was high average.     Attention span was average for her age. Divided attention was above average. Performance on a measure of distractibility was high average. Psychomotor processing speed was average.    Basic visual perception was average for her age. Construction of a clock was notable in that she reversed the hands. Nonverbal deductive reasoning was high average.    Novel problem solving, including the ability to generate strategies and solutions, fell within normal limits.    Immediate and 30 minute delayed recall of verbal narrative material was high average. On a multiple trial list learning task, immediate recall was above average, with above average recall following a 25 minute delay. Immediate and 30 minute delayed recall of visual material was average.    On the BDI-2, a self-report questionnaire, she endorsed minimal depressive symptomatology. She did not report significant apathy on a scale.     IMPRESSIONS AND RECOMMENDATIONS    Irish Rosales is a 70-year-old woman with a history of essential tremor with dystonic features, who was s/p left VIM DBS in March 2018, and right VIM and VOP DBS in January 2019.  She has had good but perhaps transient response of tremor.  This neuropsychological evaluation was undertaken in 1 year follow-up to her most recent surgery, delayed because of the pandemic.    Results indicate performance that falls within normal limits across cognitive domains, generally in the high average range or above. She does not report significant depressive symptomatology, anxiety, or apathy.    Compared to her most recent evaluation in 2018, she has had significant improvements in complex attentional processing. She has had declines in switching fluency, but this remains well within normal limits. Performance is otherwise stable across cognitive domains.    This pattern of  performance does not reflect dementia at this time, nor is it suggestive of focal or lateralized cerebral involvement. She is doing well after surgery, and although she feels that her tremor and dystonia are much improved, she does not feel that she has gotten quite as much benefit as she might have. She continues to meet with her neurologist to address these issues.    In terms of daily functioning, Ms. Rosales is not experiencing cognitive difficulties that might interfere with her ability to actively participate in treatment, or to manage her instrumental activities of daily living independently.     Results may serve as an updated baseline of her neurocognitive functioning, and the evaluation may be repeated in the future for comparison, should a change in mental status occur.    Lilo Drew, Ph.D., ABPP  Licensed Psychologist, LP [id number]  Board Certified in Clinical Neuropsychology    Time spent: 60 minutes (1 unit) neuropsychological testing evaluation by licensed and board-certified neuropsychologist, including integration of patient data, interpretation of standardized test results and clinical data, clinical decision-making, treatment planning, report, and interactive feedback to the patient, first hour (CPT 70284). 59 additional minutes (1 unit) of neuropsychological testing evaluation by licensed and board-certified neuropsychologist, including integration of patient data, interpretation of standardized test results and clinical data, clinical decision-making, treatment planning, report, and interactive feedback to the patient, subsequent hours (CPT 71876). 30 minutes of neuropsychological test administration and scoring by technician, first 30 minutes (CPT 07848). 222 additional minutes (7 units) neuropsychological test administration and scoring by technician, subsequent 30 minutes (CPT 07785). ICD-10 Diagnoses: G25; G24.3; F06.8.          Lilo Drew, PhD LP

## 2021-05-27 NOTE — PROGRESS NOTES
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 Ms. Rosales has a history of essential tremor, with cervical and vocal dystonia as well as a dystonic tremor.  The tremor has been present in her hands her entire life, and her voice and head tremor started about 17 years ago.  She is s/p left VIM DBS in March 2018, and right VIM and VOP DBS in January 2019.  There has been some difficulty controlling her tremor, and she was given a set of multiple programs alternating VIM only and VOP only stimulation on the right brain.  She has had better tremor control with this method, but has had more tremor recently when picking up a paper cup so that she continues to use her left hand for most activities.  She was referred for neuropsychological evaluation in on year follow-up to her most recent DBS surgery, and the evaluation was delayed due to the pandemic.  Her DBS team requested additional information regarding cognitive functioning and mood, to assist with treatment planning.     Precautions are in place related to COVID-19. In an effort to reduce the amount of time spent in the clinic, the interview took place remotely on 2/9/2021.  She was unable to establish a video connection, so the interview took place over the telephone. She was scheduled to complete the testing portion of the evaluation in the clinic on 2/11/2021, but cancelled due to transportation problems.Ultimately, she was seen in the clinic today to complete the testing portion of the evaluation.     Please refer to the report dated 2/9/2021 for details from her interview. Briefly, she continued to work with her neurologist on optimal symptom control, but did report having had good benefit. She was not reporting significant cognitive changes. Although she was not reporting significant depression or anxiety she did note that her mood had  varied during the pandemic, and that it was hard not being able to see her family. She remained in close contact with them over the telephone, along with three good friends. She consumes up to two alcoholic beverages a couple of times a week. Overall, she was doing well.    Ms. Rosales has undergone two prior neuropsychological evaluations, on 2017, and again on 2018.  Please refer to the reports from these dates for full details regarding her history and the findings of the evaluations.  Briefly, findings from her 2017 evaluation indicated performance that fell within normal limits across cognitive domains, generally in the average to above average range.  Personality assessment also fell within normal limits.  At her 2018 evaluation, she was noted to have mild distractibility and performance otherwise fell within normal limits across cognitive domains.  Since 2017 she had improvements in some aspects of expressive language and memory for visual information, with performance otherwise remaining stable across cognitive domains.     PAST MEDICAL HISTORY: Medical records indicate a history of depression, generalized anxiety disorder, cervical dystonia, vitamin B12 deficiency, essential tremor, impingement syndrome of the right shoulder, cervical radiculopathy, carpal tunnel syndrome, osteoporosis.     CURRENT MEDICATIONS:  Include alprazolam, calcium-vitamin D, cyanocobalamin, rosuvastatin, sertraline, trazodone.     FAMILY MEDICAL HISTORY:  Significant for a sister with laryngeal dystonia with onset in her 50s, another sister with laryngeal dystonia with onset in the 50s and more recent blepharospasm, a mother who  at 72 with lung cancer who had mild hand tremor, and a father with cerebrovascular disease.    BEHAVIORAL OBSERVATIONS    During the evaluation, Ms. Rosales was pleasant, cooperative, and seemed to understand the instructions. She was alert and oriented to person, place, and time. Tremors  were observed clinically in her right hand when it was in use. There was also a vocal tremor. Mood was neutral. Speech was fluent, with normal volume and rate. Spontaneous conversation was sparse but entirely appropriate. Internal performance validity measures fell within normal limits. The results are believed to accurately reflect her current level of functioning.     MEASURES ADMINISTERED    The following measures were administered by a trained psychometrist, under the direct supervision of a licensed psychologist.    Subtests of the Wechsler Adult Intelligence Scale-4; subtests of the Wechsler Memory Scale-Revised; Woodard Verbal Learning Test-Revised, Form 4; [place] Naming Test; D-KEFS Verbal Fluency, Alternate Form; Trail Making Test; Stroop; [place] Card Sorting Test - 64; Posey Judgement of Line Orientation Form V; Clock Drawing; Dementia Rating Scale - 2 (DRS-2) Standard Form;  Upton Depression Inventory-2 (BDI-2), HAM-D, HAM-A, Apathy Scale, RBDSQ; QUEST; ESS.     RESULTS AND INTERPRETATION    Overall intellectual functioning was estimated to fall in the high average range, above premorbid estimates of average based on single word reading abilities administered at a previous evaluation. Performance on a screening measure of dementia fell in the average range (DRS-2 Total Score  = 139/144).    Confrontation naming was average for her age. Verbal abstract reasoning was high average. Ability to comprehend and articulate responses to complex social situations was high average. Letter fluency and generative naming to category were exceptionally high, and switching fluency was high average.     Attention span was average for her age. Divided attention was above average. Performance on a measure of distractibility was high average. Psychomotor processing speed was average.    Basic visual perception was average for her age. Construction of a clock was notable in that she reversed the hands. Nonverbal deductive  reasoning was high average.    Novel problem solving, including the ability to generate strategies and solutions, fell within normal limits.    Immediate and 30 minute delayed recall of verbal narrative material was high average. On a multiple trial list learning task, immediate recall was above average, with above average recall following a 25 minute delay. Immediate and 30 minute delayed recall of visual material was average.    On the BDI-2, a self-report questionnaire, she endorsed minimal depressive symptomatology. She did not report significant apathy on a scale.     IMPRESSIONS AND RECOMMENDATIONS    Irish Rosales is a 70-year-old woman with a history of essential tremor with dystonic features, who was s/p left VIM DBS in March 2018, and right VIM and VOP DBS in January 2019.  She has had good but perhaps transient response of tremor.  This neuropsychological evaluation was undertaken in 1 year follow-up to her most recent surgery, delayed because of the pandemic.    Results indicate performance that falls within normal limits across cognitive domains, generally in the high average range or above. She does not report significant depressive symptomatology, anxiety, or apathy.    Compared to her most recent evaluation in 2018, she has had significant improvements in complex attentional processing. She has had declines in switching fluency, but this remains well within normal limits. Performance is otherwise stable across cognitive domains.    This pattern of performance does not reflect dementia at this time, nor is it suggestive of focal or lateralized cerebral involvement. She is doing well after surgery, and although she feels that her tremor and dystonia are much improved, she does not feel that she has gotten quite as much benefit as she might have. She continues to meet with her neurologist to address these issues.    In terms of daily functioning, Ms. Rosales is not experiencing cognitive difficulties  that might interfere with her ability to actively participate in treatment, or to manage her instrumental activities of daily living independently.     Results may serve as an updated baseline of her neurocognitive functioning, and the evaluation may be repeated in the future for comparison, should a change in mental status occur.    Lilo Drew, Ph.D., ABPP  Licensed Psychologist, LP [id number]  Board Certified in Clinical Neuropsychology    Time spent: 60 minutes (1 unit) neuropsychological testing evaluation by licensed and board-certified neuropsychologist, including integration of patient data, interpretation of standardized test results and clinical data, clinical decision-making, treatment planning, report, and interactive feedback to the patient, first hour (CPT 27779). 59 additional minutes (1 unit) of neuropsychological testing evaluation by licensed and board-certified neuropsychologist, including integration of patient data, interpretation of standardized test results and clinical data, clinical decision-making, treatment planning, report, and interactive feedback to the patient, subsequent hours (CPT 03889). 30 minutes of neuropsychological test administration and scoring by technician, first 30 minutes (CPT 34102). 222 additional minutes (7 units) neuropsychological test administration and scoring by technician, subsequent 30 minutes (CPT 67967). ICD-10 Diagnoses: G25; G24.3; F06.8.

## 2021-05-27 NOTE — PROGRESS NOTES
Patient Irish Rosales 2021    MRN [id number]  Provider      1950   Tech     Sex Female   Occupation     Education 14   Language     Preferred Hand Right   Station OP    Age 70                 DEMENTIA RATING SCALE - 2   TEST FORM Standard     Raw MAS       Attention 35 10       Init/Psv 37 11       Construct 6 10       Concept 37 10       Memory 24 10       Total / 144 139 10                WAIS-IV          Raw SS       Similarities 29 12       Comprehension 27 12       Letter Num Seq 18 9       Digit Span 28 11       Matrix Reasoning 17 12                WECHSLER MEMORY SCALE - REVISED        Raw MAS/SS       Info/Orientation 14        LM Immediate 29 12       LM 30 Minute 25 13       VR I 32 10       VR II 25 10       VR Recognition 4                 [place] NAMING TEST         Score (Correct+Stim Cue)  56 MAS 11     # Correct Stimulus Cue  0 T 54     # Correct Phonemic Cue  4                D-KEFS VERBAL FLUENCY    TEST FORM Alternate     Raw SS       Letter Fluency 54 16       Category Fluency 55 19       Switching Fluency             Total Correct 15 13       Switching Accuracy 14 12                CLOCK DRAWING         Command 2 /3 Rating   Bordelrine    Copy 3 /3 Rating  Normal    TRAIL MAKING TEST          Seconds Errors MAS z     Trails A 42 0 8 0     Trails B 47 0 14 0              STROOP             Raw    +    Tad    = Tad Tot.         T Raw MAS   Word 111 0 0 0 111 13   Color  82 0 0 0 82 14   Color/Word 41 0 0 0 41 12            [place] CARD SORTING TEST - 1 deck       # Categories 5 %ile >16      # Persev Error 5 T 0      Concept. Lev Resp. 55 T 0      FMS 0                 MARINELLI JUDGMENT OF LINE ORIENTATION  TEST FORM V    Raw Score 23 Raw Correction 25     MAS 11 Marinelli %ile 56              HVLT-R    TEST FORM 4     Raw T       Trial 1 8        Trial 2 11        Trial 3 12        Total 1-3 31 64       Learning 4        Delay 12 63       Percent Retained 1 56       True  Positives 10        Discrimination Index 10 47       False Positives 0                 BDI         Score 7        Interpretation Minimal                 APATHY SCALE         Score 4                 NON-PD QUESTIONNAIRES        ESS         RDBSQ         QUEST                  [facility] = [place] Older Adult Normative Study Age Adj. Scaled Score

## 2021-06-22 ENCOUNTER — TELEPHONE (OUTPATIENT)
Dept: NEUROLOGY | Facility: CLINIC | Age: 71
End: 2021-06-22

## 2021-06-22 DIAGNOSIS — G24.3 CERVICAL DYSTONIA: Primary | ICD-10-CM

## 2021-06-22 NOTE — TELEPHONE ENCOUNTER
[facility] Call Center    Phone Message    May a detailed message be left on voicemail: yes     Reason for Call: Appointment Intake    Referring Provider Name: N/A  Diagnosis and/or Symptoms: Botox     Pt was last seen on 01/2021. She would like to schedule botox apt. Pt has upcoming apt with Dr. Colvin on 07/09. Please review and call pt back to schedule botox.       Action Taken: Message routed to:  [facility] ([facility]):  neuro     Travel Screening: Not Applicable

## 2021-06-24 ENCOUNTER — MYC MEDICAL ADVICE (OUTPATIENT)
Dept: NEUROLOGY | Facility: CLINIC | Age: 71
End: 2021-06-24

## 2021-07-09 ENCOUNTER — OFFICE VISIT (OUTPATIENT)
Dept: NEUROLOGY | Facility: CLINIC | Age: 71
End: 2021-07-09
Payer: COMMERCIAL

## 2021-07-09 VITALS
DIASTOLIC BLOOD PRESSURE: 88 MMHG | OXYGEN SATURATION: 98 % | SYSTOLIC BLOOD PRESSURE: 137 MMHG | HEART RATE: 83 BPM | RESPIRATION RATE: 16 BRPM

## 2021-07-09 DIAGNOSIS — G25.0 ESSENTIAL TREMOR: Primary | ICD-10-CM

## 2021-07-09 PROCEDURE — 95984 ALYS BRN NPGT PRGRMG ADDL 15: CPT | Mod: GC | Performed by: PSYCHIATRY & NEUROLOGY

## 2021-07-09 PROCEDURE — 99214 OFFICE O/P EST MOD 30 MIN: CPT | Mod: 25 | Performed by: PSYCHIATRY & NEUROLOGY

## 2021-07-09 PROCEDURE — 95983 ALYS BRN NPGT PRGRMG 15 MIN: CPT | Mod: GC | Performed by: PSYCHIATRY & NEUROLOGY

## 2021-07-09 ASSESSMENT — ACTIVITIES OF DAILY LIVING (ADL)
SPEAKING: (0) NORMAL
OVERALL_DISABILITY_WITH_THE_MOST_AFFECTED_TASK: EATING
HYGIENE: (3) MODERATELY ABNORMAL. UNABLE TO DO MOST FINE TASKS SUCH AS PUTTING ON LIPSTICK OR SHAVING UNLESS CHANGES STRATEGY SUCH AS USING TWO HANDS OR USING THE LESS AFFECTED HAND.
DRINKING_FROM_A_GLASS: (1) SLIGHTLY ABNORMAL. TREMOR IS PRESENT BUT DOES NOT INTERFERE WITH DRINKING FROM A GLASS.
POURING: (3) MODERATELY ABNORMAL. MUST USE TWO HANDS OR USES OTHER STRATEGIES TO AVOID SPILLING.
WORKING: (0) NORMAL
WRITING: (0) NORMAL
DRESS: (0) NORMAL
USING_KEYS: (3) MODERATELY ABNORMAL. NEEDS TO USE TWO HANDS OR OTHER STRATEGIES TO PUT KEY IN LOCK.
SOCIAL_IMPACT: (0) NORMAL
OVERALL_DISABILITY_WITH_THE_MOST_AFFECTED_TASK: (2) MILDLY ABNORMAL. TREMOR INTERFERES WITH TASK BUT IS STILL ABLE TO PERFORM TASK.
CARRYING_FOOD_TRAYS_PLATES_OR_SIMILAR_ITEMS: (0) NORMAL
FEEDING_WITH_A_SPOON: (2) MILDLY ABNORMAL. SPILLS A LITTLE.
TOTAL_SCORE: 14

## 2021-07-09 ASSESSMENT — PAIN SCALES - GENERAL: PAINLEVEL: NO PAIN (0)

## 2021-07-09 NOTE — PATIENT INSTRUCTIONS
We increased 2b from 4.0 to 4.5.  Stay on this program for 1 more week before you resume your regular schedule.  Next time you switch back to 2b, increase it from 4.5 to 5.0.    Weeks  Right brain/Left body Left brain/Right body Amplitude        1                      VIM           2b 3.6 / 4.5        2                      VIM          3abc 3.6 / 4.0        3                       VOP          3abc 4.0 / 4.0        4                      VOP           2b 4.0 / 5.0

## 2021-07-09 NOTE — NURSING NOTE
Chief Complaint   Patient presents with     DBS Programming     [facility] programing     Sarah Hinds

## 2021-07-09 NOTE — PROGRESS NOTES
Department of Neurology  Movement Disorders Division   DBS Follow-up Note    Patient: Irish Rosales  MRN: [id number]   : 1950   Date of Visit: 2021    Diagnosis: Tremor  DBS Target(s): LVIM, RVIM, RVop  Date(s) of DBS Lead Placement:     Date(s) of IPG Placement: R 1/15/2019  Device: Abbott    Chief Complaint:  Irish Rosales is a 71 year old right handed woman who returns to clinic for follow up of tremor status post bilateral VIM + right Vop DBS.   She also receives botulinum toxin injections for cervical dystonia.  She was last seen 2021 at which time RVIM was increased to address tremor.    Interval History:  She has noticed her right hand tremor worsening over the past few months, especially with cutting or buttering bread.  No clear correlation with different programs.  She is still following the schedule below.  The left hand is doing well.  She eats with that hand.    Her balance and walking is good.    She made an appointment for this month to get Botox due to shoulder tightness.      Weeks                 Right brain        Left brain        1                      VIM           2b        2                      VIM          3abc        3                       VOP          3abc        4                      VOP           2b        Tremor ADL Scale  1. Speaking 0 (0) Normal   2. Feeding with a spoon 2 (2) Mildly abnormal. Spills a little.   3. Drinking from a glass 1 (1) Slightly abnormal. Tremor is present but does not interfere with drinking from a glass.   4. Hygiene 3 (3) Moderately abnormal. Unable to do most fine tasks such as putting on lipstick or shaving unless changes strategy such as using two hands or using the less affected hand.   5. Dressing 0 (0) Normal   6. Pouring 3 (3) Moderately abnormal. Must use two hands or uses other strategies to avoid spilling.   7. Carrying food trays, plates or similar items 0 (0) Normal   8. Using keys 3 (3) Moderately abnormal. Needs to  use two hands or other strategies to put key in lock.   9. Writing 0 (0) Normal   10. Working 0 (0) Normal   11. Overall disability with the most affected task 2 (2) Mildly abnormal. Tremor interferes with task but is still able to perform task.   Name of most affected task Eating Eating   12. Social impact 0 (0) Normal   ADL TOTAL 14    Prior 23 on 1/13/2021      Review of Systems:  Other than that noted at the end of this note, the remainder of 12 systems reviewed were negative.    Medications:  Current Outpatient Medications   Medication Sig Dispense Refill     ALPRAZolam (XANAX) 0.5 MG tablet Take 1 tablet (0.5 mg) by mouth daily as needed for anxiety 30 tablet 0     CALCIUM-VITAMIN D PO Take 2 chew tab by mouth daily       cyanocobalamin (VITAMIN B-12) 1000 MCG tablet TAKE 1 TABLET(1000 MCG) BY MOUTH DAILY 100 tablet 2     rosuvastatin (CRESTOR) 20 MG tablet Take 1 tablet (20 mg) by mouth daily *Office visit and labs due before next fill. Please call [phone] to schedule*   Thank you 90 tablet 3     sertraline (ZOLOFT) 100 MG tablet Take 1 tablet (100 mg) by mouth daily 90 tablet 3     traZODone (DESYREL) 50 MG tablet Take 1 tablet (50 mg) by mouth At Bedtime 90 tablet 1          Allergies: is allergic to sulfa drugs; atorvastatin; and simvastatin.    Past Medical History:  Past Medical History:   Diagnosis Date     Depression      Depressive disorder      Dyslipidemia      Dystonic movements 1/21/2017     Dystonic tremor 1/21/2017     Family history of movement disorder 1/21/2017     mild abnormality in carotid study 3/2/2017    BILATERAL DUPLEX CAROTID ULTRASOUND March 1, 2017 1:01 PM    HISTORY: Other specified symptoms and signs involving the circulatory and respiratory systems.   COMPARISON: None.   FINDINGS: There is mild atherosclerotic plaque in the carotid bifurcations. Flow velocities and waveforms show less than 50% diameter stenosis in both the right and left internal carotid arteries as  assessed by each ICA PS     Osteoporosis 8/2/2010       Past Surgical History:  Past Surgical History:   Procedure Laterality Date     ------------OTHER-------------  2004    vocal cord thyroplasty at [facility]     APPENDECTOMY  1997     C BSO, OMENTECTOMY W/XAVIER  1997    hysterectomy     C HAND/FINGER SURGERY UNLISTED  1998    right carpal tunnel surgery     COLONOSCOPY       IMPLANT DEEP BRAIN STIMULATION GENERATOR / BATTERY Left 3/13/2018    Procedure: IMPLANT DEEP BRAIN STIMULATION GENERATOR / BATTERY;  Deep Brain Stimulator Placement, Phase II, Placement Of Deep Brain Stimulator Generator/Battery Over The Left Chest Wall;  Surgeon: Aleksander Morales MD;  Location: [facility] OR     IMPLANT DEEP BRAIN STIMULATION GENERATOR / BATTERY Right 1/15/2019    Procedure: Deep Brain Stimulator Placement, Phase II, Placement Of Deep Brain Stimulator Generator/Battery Over The Right Chest Wall;  Surgeon: Aleksander Morales MD;  Location: [facility] OR     OPTICAL TRACKING SYSTEM INSERTION DEEP BRAIN STIMULATION Left 3/6/2018    Procedure: OPTICAL TRACKING SYSTEM INSERTION DEEP BRAIN STIMULATION;  Stealth Assisted Left Deep Brain Stimulator Placement, Phase I, Placement Of Left Side Deep Brain Stimulator Electrode, Target Left Ventral Intermediate Nucleus Of The Thalamus With Microelectrode Recording;  Surgeon: Aleksander Morales MD;  Location: [facility] OR     OPTICAL TRACKING SYSTEM INSERTION DEEP BRAIN STIMULATION Right 1/8/2019    Procedure: Stealth Assisted Right Deep Brain Stimulator Placement, Phase One, Placement Of Right Side Deep Brain Stimulator Electrode Target Right Ventral Intermediate Nucleus Of The Thalamus and placement Second Electrode With Microelectrode Recording;  Surgeon: Aleksander Morales MD;  Location: [facility] OR     RELEASE CARPAL TUNNEL Left 1/2/2015    Procedure: RELEASE CARPAL TUNNEL;  Surgeon: SHANIA Hernandez MD;  Location: [facility] OR     RELEASE ULNAR NERVE (ELBOW) Left 1/2/2015    Procedure:  RELEASE ULNAR NERVE (ELBOW);  Surgeon: SHANIA Hernandez MD;  Location: [facility] OR       Social History:  Social History     Socioeconomic History     Marital status:      Spouse name: Not on file     Number of children: 3     Years of education: 14     Highest education level: Not on file   Occupational History     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   Social Needs     Financial resource strain: Not on file     Food insecurity     Worry: Not on file     Inability: Not on file     Transportation needs     Medical: Not on file     Non-medical: Not on file   Tobacco Use     Smoking status: Never Smoker     Smokeless tobacco: Never Used   Substance and Sexual Activity     Alcohol use: Yes     Frequency: 2-4 times a month     Drinks per session: 1 or 2     Comment: occasionally     Drug use: No     Sexual activity: Yes     Partners: Male   Lifestyle     Physical activity     Days per week: Not on file     Minutes per session: Not on file     Stress: Not on file   Relationships     Social connections     Talks on phone: Not on file     Gets together: Three times a week     Attends Yarsanism service: Not on file     Active member of club or organization: Not on file     Attends meetings of clubs or organizations: Not on file     Relationship status: Not on file     Intimate partner violence     Fear of current or ex partner: Not on file     Emotionally abused: Not on file     Physically abused: Not on file     Forced sexual activity: Not on file   Other Topics Concern     Parent/sibling w/ CABG, MI or angioplasty before 65F 55M? No      Service No     Blood Transfusions No     Caffeine Concern No     Occupational Exposure No     Hobby Hazards No     Sleep Concern No     Stress Concern No     Weight Concern No     Special Diet No     Back Care No     Exercise Yes     Comment: walk      Bike Helmet Yes     Seat Belt Yes     Self-Exams Yes   Social History Narrative        Lives in [place]    Daughter Karyn Sanchez        benign essential tremor with a strained quality to her voice consistent with mild laryngeal spasm        ALLERGIES:  She is allergic to sulfa drugs, atorvastatin and simvastatin.  The statin drugs caused leg cramps.            FAMILY HISTORY:  As noted above with 1 sister developing a voice tremor and another sister having what is described as a facial tremor.  Mother with hand tremors          SOCIAL HISTORY:  She does not smoke.  She does use alcohol on occasion. She previously worked as a RN.         Jazmyn [facility]  [phone] orofacial dyskinesias    Piedad [facility] [phone]  laryngeal dystonia and laryngeal tremor and laryngeal spasm.             Updated 6/13/17: Client was born in [place], [place] to parents Jerome and Berenice.  Client grew up w/ three sisters Tea, Carrie, and Jazmyn who are currently still living.  Client graduated from high school, attended college for two years, and graduated w/ a RN Degree.  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  She was  to her ex-spouse for 25 years and had three children; one son Bryan and two daughters Halley/Ysabel.  One daughter Halley Sanchez resides nearby and her other daughter Ysabel and her son Bryan reside in [place], [place].  Bryan has two daughters ages five and eight years old.  Client enjoys flying for free to [place] to visit her two granddaughters. Michelle Sutton, [facility] ([phone], Fax: [phone]).           Family History:  Family History   Problem Relation Age of Onset     Hypertension Father         and mother     Cerebrovascular Disease Father      Tremor Mother      Lung Cancer Mother      Neurologic Disorder Sister          "dystonic voice x 2 botox     Neurologic Disorder Sister         jose m olea. facial movement        Physical Exam:  The patient's  blood pressure is 137/88 and her pulse is 83. Her respiration is 16 and oxygen saturation is 98%.         Neurological Examination  Gait: normal stride length and arm swing   Motor (Performance) Sub-Scale 7/9/2021   Assessment Time 11:13 AM   Medication None   DBS - Right Brain On   DBS - Left Brain On   Head 1   Face & Jaw 0.5   Voice 2   Outstretched - RIGHT 1.5   Outstretched - LEFT 1   Wingbeating - RIGHT 2   Wingbeating - LEFT 2   Kinetic - RIGHT 1.5   Kinetic - LEFT 1   Lower Limb - RIGHT 0   Lower Limb - LEFT 0   Lower Limb (Max) 0   Spiral - RIGHT 4   Spiral - LEFT 3   Handwriting 1   Dot approx - RIGHT 2.5   Dot approx - LEFT 2.5   Trunk (Standing) 0   Total Right 11.5   Total Left 9.5   Axial 3.5   TOTAL 25.5   Prior 17 on 1/13/2021      Procedure: DBS Interrogation & Programming    Lead(s):   Side Left Right Right   Target VIM Vop VIM   Lead  Abbott Abbott Abbott   Lead Model Infinity 0.5 Infinity 0.5 Infinity 0.5   Lead Implant Date 3/6/2018 1/8/2019 1/8/2019   IPG # 1 2 2     IPG(s):  IPG # 1 2   IPG  Abbott Abbott   IPG Model Infinity 5 Infinity 7   IPG Implant Date 3/13/2018 1/15/2019   Location Left chest Right chest   Battery (V) 2.84V (2.89V) 2.94V (2.95V)     Impedance Check: No problems found  See scanned report for impedance details.  Left 2b 1475 Ohms  Right Vim only 750/800 Ohms    IPG # 1     Program Name \"2b\"  \"3abc\"   Side Left  Left   Initial/Final Initial Final Initial & Final   Active/Inactive Active Active Inactive   Amplitude mA 4 [0-5 by 0.25] 4.5 [0-5 by 0.25] 4 [0-4 by 0.25]   Pulse width (usec) 20 20 20   Freq (Hz) 200 200 200   Contacts C+2b- C+2b- C+3abc-   Ther Imp Ohm 1475     Ther Imp mA          IPG # 2    Program name Program 2    \"Side\" (i.e. target) Right Vop, lead 1 Right Vim lead 2   Initial/Final Initial & Final Initial " "& Final   Active/Inactive Inactive Inactive   Contacts C+, 2abc- C+, 11abc-   Amplitude (mA) 1.5 no range 2.5 [0-4 by 0.1]   Pulse Width (ms) 60 60   Frequency (Hz) 130 130         IPG # 2    Program name Vim only    \"Side\" (i.e. target) Right Vop, lead 1 Right Vim, lead 2    Initial/Final Initial & Final Initial & Final   Active/Inactive Active Active   Contacts C+2abc-  C+ , 11 abc -   Amplitude (mA)  0 no range  3.6 [0-4 by 0.1]   Pulse Width (ms)  20  20   Frequency ( Hz)  200  200       IPG # 2    Program name Vop only    Initial/Final Initial & Final Initial & Final   Active/Inactive Inactive Inactive   \"Side\" (i.e. target) Right Vop, lead 1 Right Vim, lead 2   Contacts C+, 2abc- C+,11abc-   Amplitude (mA) 4.0 [0-4 by 0.1] 0, no range   Pulse Width (ms) 20 20   Frequency ( Hz) 200 200         Assessment/Plan:  Irish Rosales is a 71 year old right handed woman who returns to clinic for follow up of tremor status post bilateral VIM + right Vop DBS.  She has had worsening of right hand tremor so we increased the stimulation of the program which had remaining room before the upper limit.    - Increased 2b amplitude with plans to increase further per AVS  - RTC 6 months, 1 hr programming      Isidra Brock MD  Movement Disorders Fellow    Patient seen and examined with Dr. Brock and I agree with the assessment and plan as outlined.  I was present throughout, and participated in DBS programming 0.5h.  Aditional time in-room, reviewing chart, & documenting 40 min.  "

## 2021-07-15 ENCOUNTER — TELEPHONE (OUTPATIENT)
Dept: FAMILY MEDICINE | Facility: CLINIC | Age: 71
End: 2021-07-15

## 2021-07-15 NOTE — TELEPHONE ENCOUNTER
Patient Quality Outreach      Summary:    Patient has the following on her problem list/HM:     Depression / Dysthymia review    6 Month Remission: 4-8 month window range:   12 Month Remission: 10-14 month window range:        PHQ-9 SCORE 6/3/2019 1/3/2020 1/21/2021   PHQ-9 Total Score - - -   PHQ-9 Total Score MyChart - - -   PHQ-9 Total Score 0 0 0       If PHQ-9 recheck is 5 or more, route to provider for next steps.    Patient is due/failing the following:   Colonoscopy    Type of outreach:    Sent [employer] message.    Questions for provider review:    None                                                                                                                                     Debbie Walden

## 2021-07-21 ENCOUNTER — ANCILLARY PROCEDURE (OUTPATIENT)
Dept: MAMMOGRAPHY | Facility: CLINIC | Age: 71
End: 2021-07-21
Attending: INTERNAL MEDICINE
Payer: COMMERCIAL

## 2021-07-21 DIAGNOSIS — Z12.31 VISIT FOR SCREENING MAMMOGRAM: ICD-10-CM

## 2021-07-21 PROCEDURE — 77067 SCR MAMMO BI INCL CAD: CPT | Mod: TC | Performed by: RADIOLOGY

## 2021-08-01 NOTE — PROGRESS NOTES
Movement Disorders Botulinum Toxin Clinic Note    Chief Complaint: cervical dystonia     History of Present Illness:  Irish Rosales is a 71 year old female who presents to clinic for botulinum toxin injections for treatment of cervical dystonia. She also has essential tremor status post DBS.     Tighter when looking to right      Response to Last Injection: 6/18/2020    Onset of effect: a few days    Benefit from last injections: good, complete relief in pain and pulling, helps with tremor    Wearing off: She noticed wearing off after 6 months    Side effects: She reports none        Current Outpatient Medications   Medication Sig Dispense Refill     ALPRAZolam (XANAX) 0.5 MG tablet Take 1 tablet (0.5 mg) by mouth daily as needed for anxiety 30 tablet 0     CALCIUM-VITAMIN D PO Take 2 chew tab by mouth daily       cyanocobalamin (VITAMIN B-12) 1000 MCG tablet TAKE 1 TABLET(1000 MCG) BY MOUTH DAILY 100 tablet 2     rosuvastatin (CRESTOR) 20 MG tablet Take 1 tablet (20 mg) by mouth daily *Office visit and labs due before next fill. Please call [phone] to schedule*   Thank you 90 tablet 3     sertraline (ZOLOFT) 100 MG tablet Take 1 tablet (100 mg) by mouth daily 90 tablet 3     traZODone (DESYREL) 50 MG tablet Take 1 tablet (50 mg) by mouth At Bedtime 90 tablet 1       Allergies: She is allergic to sulfa drugs, atorvastatin, and simvastatin.    Past Medical History:   Diagnosis Date     Depression      Depressive disorder      Dyslipidemia      Dystonic movements 1/21/2017     Dystonic tremor 1/21/2017     Family history of movement disorder 1/21/2017     mild abnormality in carotid study 3/2/2017    BILATERAL DUPLEX CAROTID ULTRASOUND March 1, 2017 1:01 PM    HISTORY: Other specified symptoms and signs involving the circulatory and respiratory systems.   COMPARISON: None.   FINDINGS: There is mild atherosclerotic plaque in the carotid bifurcations. Flow velocities and waveforms show less than 50% diameter  stenosis in both the right and left internal carotid arteries as assessed by each ICA PS     Osteoporosis 8/2/2010       Past Surgical History:   Procedure Laterality Date     ------------OTHER-------------  2004    vocal cord [facility]roplasty at [facility]     APPENDECTOMY  1997     C BSO, OMENTECTOMY W/XAVIER  1997    hysterectomy     C HAND/FINGER SURGERY UNLISTED  1998    right carpal tunnel surgery     COLONOSCOPY       IMPLANT DEEP BRAIN STIMULATION GENERATOR / BATTERY Left 3/13/2018    Procedure: IMPLANT DEEP BRAIN STIMULATION GENERATOR / BATTERY;  Deep Brain Stimulator Placement, Phase II, Placement Of Deep Brain Stimulator Generator/Battery Over The Left Chest Wall;  Surgeon: Aleksander Morales MD;  Location: [facility] OR     IMPLANT DEEP BRAIN STIMULATION GENERATOR / BATTERY Right 1/15/2019    Procedure: Deep Brain Stimulator Placement, Phase II, Placement Of Deep Brain Stimulator Generator/Battery Over The Right Chest Wall;  Surgeon: Aleksander Morales MD;  Location: [facility] OR     OPTICAL TRACKING SYSTEM INSERTION DEEP BRAIN STIMULATION Left 3/6/2018    Procedure: OPTICAL TRACKING SYSTEM INSERTION DEEP BRAIN STIMULATION;  Stealth Assisted Left Deep Brain Stimulator Placement, Phase I, Placement Of Left Side Deep Brain Stimulator Electrode, Target Left Ventral Intermediate Nucleus Of The Thalamus With Microelectrode Recording;  Surgeon: Aleksander Morales MD;  Location: [facility] OR     OPTICAL TRACKING SYSTEM INSERTION DEEP BRAIN STIMULATION Right 1/8/2019    Procedure: Stealth Assisted Right Deep Brain Stimulator Placement, Phase One, Placement Of Right Side Deep Brain Stimulator Electrode Target Right Ventral Intermediate Nucleus Of The Thalamus and placement Second Electrode With Microelectrode Recording;  Surgeon: Aleksander Morales MD;  Location: [facility] OR     RELEASE CARPAL TUNNEL Left 1/2/2015    Procedure: RELEASE CARPAL TUNNEL;  Surgeon: SHANIA Hernandez MD;  Location: [facility] OR     RELEASE ULNAR NERVE  (ELBOW) Left 1/2/2015    Procedure: RELEASE ULNAR NERVE (ELBOW);  Surgeon: SHANIA Hernandez MD;  Location:  OR       Social History     Socioeconomic History     Marital status:      Spouse name: Not on file     Number of children: 3     Years of education: 14     Highest education level: Not on file   Occupational History     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   Tobacco Use     Smoking status: Never Smoker     Smokeless tobacco: Never Used   Substance and Sexual Activity     Alcohol use: Yes     Comment: occasionally     Drug use: No     Sexual activity: Yes     Partners: Male   Other Topics Concern     Parent/sibling w/ CABG, MI or angioplasty before 65F 55M? No      Service No     Blood Transfusions No     Caffeine Concern No     Occupational Exposure No     Hobby Hazards No     Sleep Concern No     Stress Concern No     Weight Concern No     Special Diet No     Back Care No     Exercise Yes     Comment: walk     Bike Helmet Yes     Seat Belt Yes     Self-Exams Yes   Social History Narrative        Lives in [place]    Daughter Karyn Sanchez        benign essential tremor with a strained quality to her voice consistent with mild laryngeal spasm        ALLERGIES:  She is allergic to sulfa drugs, atorvastatin and simvastatin.  The statin drugs caused leg cramps.            FAMILY HISTORY:  As noted above with 1 sister developing a voice tremor and another sister having what is described as a facial tremor.  Mother with hand tremors          SOCIAL HISTORY:  She does not smoke.  She does use alcohol on occasion. She previously worked as a RN.         Jazmyn jimenez  [id number] orofacial dyskinesias    Piedad Harvey [phone]  laryngeal dystonia and laryngeal tremor and laryngeal spasm.             Updated 6/13/17: Client was born in [place], [place] to parents Jerome  and Berenice.  Client grew up w/ three sisters Tea, Carrie, and Jazmyn who are currently still living.  Client graduated from high school, attended college for two years, and graduated w/ a RN Degree.  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  She was  to her ex-spouse for 25 years and had three children; one son Bryan and two daughters Halley/Ysabel.  One daughter Halley Sanchez resides nearby and her other daughter Ysabel and her son Bryan reside in [place], [place].  Bryan has two daughters ages five and eight years old.  Client enjoys flying for free to [place] to visit her two granddaughters. Michelle Sutton, [facility] ([phone], Fax: [phone]).         Social Determinants of Health     Financial Resource Strain:      Difficulty of Paying Living Expenses:    Food Insecurity:      Worried About Running Out of Food in the Last Year:      Ran Out of Food in the Last Year:    Transportation Needs:      Lack of Transportation (Medical):      Lack of Transportation (Non-Medical):    Physical Activity:      Days of Exercise per Week:      Minutes of Exercise per Session:    Stress:      Feeling of Stress :    Social Connections: Unknown     Frequency of Communication with Friends and Family: Not on file     Frequency of Social Gatherings with Friends and Family: Three times a week     Attends Denominational Services: Not on file     Active Member of Clubs or Organizations: Not on file     Attends Club or Organization Meetings: Not on file     Marital Status: Not asked   Intimate Partner Violence:      Fear of Current or Ex-Partner:      Emotionally Abused:      Physically Abused:      Sexually Abused:        Family History   Problem Relation Age of Onset     Hypertension Father         and mother     Cerebrovascular Disease Father       Tremor Mother      Lung Cancer Mother      Neurologic Disorder Sister         dystonic voice x 2 botox     Neurologic Disorder Sister         jose m mn. facial movement        Physical Examination:  Vital Signs:   blood pressure is 166/85 (abnormal) and her pulse is 68. Her respiration is 16 and oxygen saturation is 98%.   Slight retrocollis, slight left laterocollis. Prominent right SCM hypertrophy. Some limitation of head turning to the right. No head tremor.    BOTULINUM NEUROTOXIN INJECTION PROCEDURES:    VERIFICATION OF PATIENT IDENTIFICATION AND PROCEDURE     Initials   Patient Name [employer]   Patient  acc   Procedure Verified by: [employer] [employer]     Above assessments performed by:  Resident/Fellow         Isidra Brock MD          The attending provider was present for the entire procedure documented below.      Sheila Haile MD      INDICATION/S FOR PROCEDURE/S:  Irish Rosales is a 71 year old year old patient with dystonia affecting the  head, neck and shoulder girdle musculature secondary to a diagnosis of cervical dystonia with associated  pain and spasms.     Her baseline symptoms have been recalcitrant to oral medications and conservative therapy.  She is here today for an injection of Botox.      GOAL OF PROCEDURE:  The goal of this procedure is to increase active range of motion and decrease pain  associated with dystonic movements.    TOTAL DOSE ADMINISTERED:  Dose Administered:  300 units Botox    Diluent Used:  Preservative Free Normal Saline  Total Volume of Diluent Used:  3 ml  Lot # [id number] with Expiration Date:  2023  NDC #: Botox 100u ([address]-91)    CONSENT:  The risks, benefits, and treatment options were discussed with Irish Rosales and she agreed to proceed.      Written consent was obtained by [employer].     EQUIPMENT USED:  Needle-37mm stimulating/recording  EMG/NCS Machine    SKIN PREPARATION:  Skin preparation was performed using an alcohol wipe.    GUIDANCE  DESCRIPTION:  Electro-myographic guidance was necessary throughout the procedure to accurately identify all areas of dystonic muscles while avoiding injection of non-dystonic muscles, neighboring nerves and nearby vascular structures.     AREA/MUSCLE INJECTED:      Visual display of locations injections are scanned into the chart under MEDIA tab.    Muscles Injected Units Injected Number of Injections   Left trapezius 50 (35) 4   Left splenius capitis 45 (45) 2   Left levator scapulae 15 (0) 1   Right trapezius 75 (55) 5   Right splenius capitis 40 (20) 2   Right SCM 60 (25) 3   Right levator scapulae 15 (10) 1        Total Units Injected: 300    Unavoidable Waste: 0    Total Units Billed 300      The patient tolerated the injections without difficulty.      Assessment:    Irish Rosales is a 71 year old female with cervical dystonia and essential tremor status post DBS.  Today we did repeat botulinum toxin injections with increased dose to the right splenius capitis, right SCM (had severe hypertrophy), right trapezius, left trapezius, and we added an injection to the left levator scapulae.    Plan  Follow-up in 3 months' time to consider repeat injections.    Neurology Attending Attestation:     I, Sheila Haile MD, personally saw this patient with our Movement Disorders Fellow and agree with the fellow's findings and plan of care as documented in the movement disorder fellow's note. I personally performed salient aspects of the history and neurological examination.     I personally reviewed the vital signs, medications, and labs. I personally viewed the imaging, and agree with the interpretation documented by the fellow.    I personally supervised all procedures.      Sheila Haile MD    of Neurology

## 2021-08-03 ENCOUNTER — OFFICE VISIT (OUTPATIENT)
Dept: NEUROLOGY | Facility: CLINIC | Age: 71
End: 2021-08-03
Payer: COMMERCIAL

## 2021-08-03 VITALS
OXYGEN SATURATION: 98 % | DIASTOLIC BLOOD PRESSURE: 85 MMHG | SYSTOLIC BLOOD PRESSURE: 166 MMHG | RESPIRATION RATE: 16 BRPM | HEART RATE: 68 BPM

## 2021-08-03 DIAGNOSIS — G24.3 CERVICAL DYSTONIA: Primary | ICD-10-CM

## 2021-08-03 PROCEDURE — 64616 CHEMODENERV MUSC NECK DYSTON: CPT | Performed by: PSYCHIATRY & NEUROLOGY

## 2021-08-03 PROCEDURE — 95874 GUIDE NERV DESTR NEEDLE EMG: CPT | Performed by: PSYCHIATRY & NEUROLOGY

## 2021-08-03 ASSESSMENT — PAIN SCALES - GENERAL: PAINLEVEL: NO PAIN (0)

## 2021-08-04 ENCOUNTER — PATIENT OUTREACH (OUTPATIENT)
Dept: GERIATRIC MEDICINE | Facility: CLINIC | Age: 71
End: 2021-08-04

## 2021-08-04 NOTE — PROGRESS NOTES
[facility] Care Coordination Contact      [facility] Six-Month Telephone Assessment    6 month telephone assessment completed on 08/04/2021.    ER visits: No  Hospitalizations: No  TCU stays: No  Significant health status changes: None reported  Falls/Injuries: No  ADL/IADL changes: No  Changes in services: Yes: current homemaker is going to be leaving for college. Irish is going to contact the homemaking provider to see if they have other staffing to work with her and update CC re: the outcome.    Caregiver Assessment follow up: NA    Goals: See POC in chart for goal progress documentation.      Will see member in 6 months for an annual health risk assessment.   Encouraged member to call CC with any questions or concerns in the meantime.     INNA Diallo  [facility]  [phone]  Fax: [phone]

## 2021-08-31 ENCOUNTER — PATIENT OUTREACH (OUTPATIENT)
Dept: GERIATRIC MEDICINE | Facility: CLINIC | Age: 71
End: 2021-08-31

## 2021-08-31 ENCOUNTER — TELEPHONE (OUTPATIENT)
Dept: FAMILY MEDICINE | Facility: CLINIC | Age: 71
End: 2021-08-31

## 2021-08-31 NOTE — PROGRESS NOTES
Homemaking Provider Search:    1. [facility] ([phone]): Spoke w/ Kristy. No staffing available.    2. [facility] ([phone]): Current authorized provider. [facility] ([phone]): Spoke w/ Debra Hernandez [email or web address]). Will look for staffing and call if available. Emailed referral to Debra.     3. [facility] [phone]): Does not provide service for [place].    4. [facility] [phone]): Spoke w/ Viktoriya and LM for  to return call.  5. [facility] [phone]): LM in general mailbox requesting a call back.  6. [facility] [phone]): Does provide service for [place]  7. [facility] [phone]): LM in general mailbox requesting a call back.    8. [facility] [phone]): Spoke w/ Becca Figueroa  [email or web address]). Will look for staffing and call if available. Emailed referral to Becca.   09/02/2021: Received an email from Becca. [facility] has a male homemaker available if Irish is okay with him having different days or hours depending on the week. Spoke w/ Irish and she is okay with being flexible and working with a male homemaker. Spoke w/ Becca who will call CC by the end of next week to confirm a start date for homemaking services.    9. [facility] [phone]): Not accepting new referrals d/t staffing shortage.   10. [facility]: Does not provide PCA/Homemaking. Listed as a provider.  11. [facility] [phone]): LM requesting a call back.  12. [facility] [phone]): Does not have a [employer] contract.  13. [facility] [phone]): LM requesting a call back  14. [facility] [phone]): Not a working phone number. Listed as a provider.    15. [facility] [phone],  [email or web address]): FABI for Berenice Carlson requesting a call back and emailed demographics.    Michelle Corcoran [facility]  [phone]  Fax: [phone]

## 2021-08-31 NOTE — TELEPHONE ENCOUNTER
Re: ALPRAZolam (XANAX) 0.5 MG tablet    Plan does not cover ALPRAZolam (XANAX) 0.5 MG tablet.  Please call to initiate Prior Auth or change med.    Additional Information: the preferred alternative is CLONAZEPAM, DIAZEPAM, LORAZEPAM    Helena Pinzon

## 2021-08-31 NOTE — TELEPHONE ENCOUNTER
Central Prior Authorization Team   Phone: [phone]      PA Initiation    Medication: ALPRAZolam (XANAX) 0.5 MG tablet-Initiated  Insurance Company: [employer] - Phone [phone] Fax [phone]  Pharmacy Filling the Rx: [id number] - [place] - [address] AT [facility] [address] [place] [address]  Filling Pharmacy Phone: [phone]  Filling Pharmacy Fax:    Start Date: 8/31/2021

## 2021-08-31 NOTE — TELEPHONE ENCOUNTER
Prior Authorization Approval    Authorization Effective Date: 8/1/2021  Authorization Expiration Date: 8/31/2022  Medication: ALPRAZolam (XANAX) 0.5 MG tablet-APPROVED  Approved Dose/Quantity:   Reference #:     Insurance Company: [employer] - Phone [phone] Fax [phone]  Expected CoPay:       CoPay Card Available:      Foundation Assistance Needed:    Which Pharmacy is filling the prescription (Not needed for infusion/clinic administered): [id number] - [place], [place] - [address] AT [facility] [address] [place] [address]  Pharmacy Notified: Yes  Patient Notified: No    Pharmacy will notify patient when medication is ready.

## 2021-09-04 ENCOUNTER — HEALTH MAINTENANCE LETTER (OUTPATIENT)
Age: 71
End: 2021-09-04

## 2021-09-09 NOTE — PROGRESS NOTES
[facility] Care Coordination Contact  Communication History     User: Michelle Sutton LSW Date/time: 7/26/2018 10:14 AM    Context:  Outcome: Left Message    Phone number: [phone] Phone Type: Home Phone    Comm. type: Telephone Call type: Outgoing    Contact: Irish Rosales Relation to patient: Self    User: Michelle Sutton, LSAMANDA Date/time: 7/26/2018 10:13 AM    Comment: Yeison Castellanos    Context:  Outcome:     Phone number: [phone] Phone Type:     Comm. type: Telephone Call type: Outgoing    Contact: [facility]: Emanuel Relation to patient:     User: Michelle Sutton, INNA Date/time: 7/26/2018 10:13 AM    Context:  Outcome:     Phone number: [phone] Phone Type: Home Phone    Comm. type: Telephone Call type: Incoming    Contact: Irish Rosales Relation to patient: Self        Irish called inquiring if a new homemaker had been found or if another homemaking company should be contacted for services.    Spoke michael Castellanos at [employer]. Emanuel stated he had found another homemaker that should be able to start next week. He is working out a schedule with the new homemaker and will contact Irish once that is in place.    LM for Irish informing her of the information that Emanuel gave writer.    INNA Heck  [facility]  [phone]  Fax: [phone]     no lesions,  no deformities,  no traumatic injuries,  no significant scars are present,  chest wall non-tender,  no masses present, breathing is unlabored without accessory muscle use,normal breath sounds

## 2021-09-23 ENCOUNTER — PATIENT OUTREACH (OUTPATIENT)
Dept: GERIATRIC MEDICINE | Facility: CLINIC | Age: 71
End: 2021-09-23
Payer: COMMERCIAL

## 2021-09-23 NOTE — PROGRESS NOTES
Received email from Shanice Juárez  at [employer] ([email or web address], [phone] Office, [phone] Fax) stating that the homemaker that was found for Irish is no longer available and they have been unable to find a new homemaker. [facility] was wanting to know if they should continue looking for a homemaker.    Spoke w/ Irish. She would like to remain with the same homemaking provider to see if they are able to find a homemaker. Irish is also going to ask her neighbors about which providers they use to see if there is staffing available. Responded to Shanice's email informing her that Irish is still interested in a homemaker.    Michelle Corcoran, [facility]  [phone]  Fax: [phone]

## 2021-10-04 ENCOUNTER — MYC MEDICAL ADVICE (OUTPATIENT)
Dept: FAMILY MEDICINE | Facility: CLINIC | Age: 71
End: 2021-10-04

## 2021-10-04 NOTE — TELEPHONE ENCOUNTER
Left message on answering machine for patient to call back.  [facility] [phone]  Keiko Jarvis RN

## 2021-10-04 NOTE — TELEPHONE ENCOUNTER
Pt states she has been having numbness on and off in different body parts for the last 3-4 weeks.  She denies any mental changes, confusion, headache,chest pain, vision changes, weakness, trouble speaking. Hx of heavy lifting or different movements for her.  Pt was away visiting a grandchild when this started.  She is not currently having sx.  Advised ov today or tomorrow, if none available then come into urgent care at [place] for evaluation. Advised if more consistent or worse or new sx go to ER.  Pt agrees to plan and is transferred back to .    Emerald GARCIAN, RN    Telephone Triage Protocols for Nurses fifth edition by Perlita Washington. Pp. [phone]

## 2021-10-05 ENCOUNTER — OFFICE VISIT (OUTPATIENT)
Dept: FAMILY MEDICINE | Facility: CLINIC | Age: 71
End: 2021-10-05
Payer: COMMERCIAL

## 2021-10-05 VITALS
OXYGEN SATURATION: 97 % | TEMPERATURE: 97.5 F | SYSTOLIC BLOOD PRESSURE: 134 MMHG | DIASTOLIC BLOOD PRESSURE: 70 MMHG | HEART RATE: 85 BPM | RESPIRATION RATE: 16 BRPM

## 2021-10-05 DIAGNOSIS — G24.9 DYSTONIC MOVEMENTS: Primary | Chronic | ICD-10-CM

## 2021-10-05 DIAGNOSIS — M75.41 IMPINGEMENT SYNDROME OF RIGHT SHOULDER: ICD-10-CM

## 2021-10-05 DIAGNOSIS — M54.12 CERVICAL RADICULOPATHY: ICD-10-CM

## 2021-10-05 PROCEDURE — 99214 OFFICE O/P EST MOD 30 MIN: CPT | Performed by: FAMILY MEDICINE

## 2021-10-05 ASSESSMENT — ANXIETY QUESTIONNAIRES
5. BEING SO RESTLESS THAT IT IS HARD TO SIT STILL: NOT AT ALL
4. TROUBLE RELAXING: NOT AT ALL
7. FEELING AFRAID AS IF SOMETHING AWFUL MIGHT HAPPEN: NOT AT ALL
8. IF YOU CHECKED OFF ANY PROBLEMS, HOW DIFFICULT HAVE THESE MADE IT FOR YOU TO DO YOUR WORK, TAKE CARE OF THINGS AT HOME, OR GET ALONG WITH OTHER PEOPLE?: NOT DIFFICULT AT ALL
7. FEELING AFRAID AS IF SOMETHING AWFUL MIGHT HAPPEN: NOT AT ALL
6. BECOMING EASILY ANNOYED OR IRRITABLE: NOT AT ALL
1. FEELING NERVOUS, ANXIOUS, OR ON EDGE: NOT AT ALL
GAD7 TOTAL SCORE: 0
GAD7 TOTAL SCORE: 0
2. NOT BEING ABLE TO STOP OR CONTROL WORRYING: NOT AT ALL
3. WORRYING TOO MUCH ABOUT DIFFERENT THINGS: NOT AT ALL
GAD7 TOTAL SCORE: 0

## 2021-10-05 ASSESSMENT — PATIENT HEALTH QUESTIONNAIRE - PHQ9
SUM OF ALL RESPONSES TO PHQ QUESTIONS 1-9: 0
10. IF YOU CHECKED OFF ANY PROBLEMS, HOW DIFFICULT HAVE THESE PROBLEMS MADE IT FOR YOU TO DO YOUR WORK, TAKE CARE OF THINGS AT HOME, OR GET ALONG WITH OTHER PEOPLE: NOT DIFFICULT AT ALL
SUM OF ALL RESPONSES TO PHQ QUESTIONS 1-9: 0

## 2021-10-05 ASSESSMENT — PAIN SCALES - GENERAL: PAINLEVEL: NO PAIN (0)

## 2021-10-05 NOTE — PROGRESS NOTES
Assessment & Plan     Dystonic movements      Impingement syndrome of right shoulder      Cervical radiculopathy  Patient may have a slight moderate cervical radiculopathy which is not any worsening.  We discussed about getting some physical therapy evaluation.  Advised to get some range of motion exercises to see if that helps.  I do not appreciate any acute signs or symptoms of any acute abnormality.  At this time physical therapy along with range of motion exercise should help .  No acute neurological symptoms.  She is advised to follow-up with her primary care and discussed any further evaluation if needed.            No follow-ups on file.    Saud Forrester MD  [facility] [place] is a 71 year old who presents for the following health issues     HPI     Concern - Numbness  Onset: on and off for some time  Description: pt has had numbness on and off in right arm,   Intensity: moderate  Progression of Symptoms:  same  Accompanying Signs & Symptoms: none  Previous history of similar problem: none  Precipitating factors:        Worsened by: none  Alleviating factors:        Improved by: none  Therapies tried and outcome:  none   She denies any acute exacerbation of her symptoms complain of some right shoulder pain sometimes feeling of some tenderness numbness which moves along the right arm.  There is no speech difficulty.  There is no walking difficulty no back pain.  Denies any headache.  Neck pain which has been ongoing there is no acute change.        Review of Systems   Constitutional, HEENT, cardiovascular, pulmonary, gi and gu systems are negative, except as otherwise noted.      Objective    LMP  (LMP Unknown)   There is no height or weight on file to calculate BMI.  Physical Exam   GENERAL: healthy, alert and no distress  NECK: no adenopathy, no asymmetry, masses, or scars and thyroid normal to palpation  RESP: lungs clear to auscultation - no rales, rhonchi or  wheezes  CV: regular rate and rhythm, normal S1 S2, no S3 or S4, no murmur, click or rub, no peripheral edema and peripheral pulses strong  MS: no gross musculoskeletal defects noted, no edema

## 2021-10-06 ASSESSMENT — PATIENT HEALTH QUESTIONNAIRE - PHQ9: SUM OF ALL RESPONSES TO PHQ QUESTIONS 1-9: 0

## 2021-10-06 ASSESSMENT — ANXIETY QUESTIONNAIRES: GAD7 TOTAL SCORE: 0

## 2021-10-19 ENCOUNTER — APPOINTMENT (OUTPATIENT)
Dept: LAB | Facility: CLINIC | Age: 71
End: 2021-10-19

## 2021-10-19 ENCOUNTER — OFFICE VISIT (OUTPATIENT)
Dept: DERMATOLOGY | Facility: CLINIC | Age: 71
End: 2021-10-19

## 2021-10-19 DIAGNOSIS — Z00.6 RESEARCH SUBJECT: Primary | ICD-10-CM

## 2021-10-19 PROCEDURE — 99207 PR NO CHARGE LOS: CPT

## 2021-11-22 ENCOUNTER — PATIENT OUTREACH (OUTPATIENT)
Dept: GERIATRIC MEDICINE | Facility: CLINIC | Age: 71
End: 2021-11-22
Payer: COMMERCIAL

## 2021-11-22 NOTE — PROGRESS NOTES
[facility] Care Coordination Contact    Called member to schedule annual HRA home visit. Irish was unable to talk when CC called and asked for a call back tomorrow.    INNA Diallo  [facility]  [phone]  Fax: [phone]

## 2021-11-29 NOTE — PROGRESS NOTES
11/29/2021: Received call from Irish. Scheduled annual assessment for Monday, December 6th at 1:00pm.    Michelle Corcoran, [facility]  [phone]  Fax: [phone]

## 2021-12-06 ENCOUNTER — PATIENT OUTREACH (OUTPATIENT)
Dept: GERIATRIC MEDICINE | Facility: CLINIC | Age: 71
End: 2021-12-06
Payer: COMMERCIAL

## 2021-12-06 NOTE — PROGRESS NOTES
12/06/2021:    Received email from Shanice Juárez  at [employer] ([email or web address], [phone] Office, [phone] Fax)from Shanice Juárez  at [employer] ([email or web address], [phone] Office, [phone] Fax) that they will be discharging Irish as a client since they haven't had staffing available. If staffing becomes available for [place] in the future, then they will work with Irish again.    Michelle Corcoran, [facility]  [phone]  Fax: [phone]

## 2021-12-07 ASSESSMENT — ACTIVITIES OF DAILY LIVING (ADL): DEPENDENT_IADLS:: CLEANING;COOKING;LAUNDRY;MEAL PREPARATION

## 2021-12-07 NOTE — PROGRESS NOTES
[facility] Care Coordination Contact    [facility] Home Visit Assessment     Telephonic ome visit for Health Risk Assessment with Irish Rosales completed on December 6, 2021    Type of residence:: Apartment - handicap accessible  Current living arrangement:: I live alone     Assessment completed with:: Patient    Current Care Plan  Member currently receiving the following home care services: NA   Member currently receiving the following community resources: Housekeeping/Chore Agency,Transportation Services    Medication Review  Medication reconciliation completed in Epic: Yes  Medication set-up & administration: Independent-does not set up.  Self-administers medications.  Medication Risk Assessment Medication (1 or more, place referral to MTM): N/A: No risk factors identified  MTM Referral Placed: No: No risk factors idenified    Mental/Behavioral Health   Depression Screening:   PHQ-2 Total Score (Adult) - Positive if 3 or more points; Administer PHQ-9 if positive: 0       Mental health DX:: Yes   Mental health DX how managed:: Medication    Falls Assessment:   Fallen 2 or more times in the past year?: No   Any fall with injury in the past year?: No    ADL/IADL Dependencies:   Dependent ADLs:: Independent  Dependent IADLs:: Cleaning,Cooking,Laundry,Meal Preparation    [facility] sponsored benefits: Shared information re: Silver Sneakers/gym memberships, ASA, Calcium +D.    PCA Assessment completed at visit: Not Applicable     Elderly Waiver Eligibility: Yes-will continue on EW    Care Plan & Recommendations: Currently does not have a homemaker. Will assist with finding staffing. Would like to reduce the amount of shaking she's experiencing in her hand and is going to speak with her neurologist about this at her next appointment.    See [facility] for detailed assessment information.    Follow-Up Plan: Member informed of future contact, plan to f/u with member with a 6 month telephone assessment.   Contact information shared with member and family, encouraged member to call with any questions or concerns at any time.    [facility] continuum providers: Please refer to Health Care Home on the Epic Problem List to view this patient's [facility] Care Plan Summary.    INNA Diallo  [facility]  [phone]  Fax: [phone]

## 2021-12-08 ENCOUNTER — PATIENT OUTREACH (OUTPATIENT)
Dept: GERIATRIC MEDICINE | Facility: CLINIC | Age: 71
End: 2021-12-08
Payer: COMMERCIAL

## 2021-12-08 NOTE — PROGRESS NOTES
Homemaking Provider Search:    1. [facility] [phone]): Phone number disconnected.  2. [facility] [phone]): No staffing available.  3. [facility] [phone]): No staffing available. Too long of waiting list.  4. [facility] [phone]): Does not provide homemaking or PCA services (listed on health plan website as providing this service).  5. [facility] [phone]): No staffing available.  6. [facility] [phone]): No staffing available.   7. [facility] [phone]): No staffing available.  8. [facility] [phone]): No staffing available. Only staffing in [place] and [place].  9. [facility] [phone]): No staffing available.  10. [facility] [phone]): No staffing available.    11. [facility] [phone]): LM for Tayo inquiring if PCA  staffing is available and requested a call back or email communication.    12. [facility] [phone]): No staffing availability.  13. [facility] [phone]): No staffing availability.  14. [facility] [phone]): No staffing availability.    15. [facility] [phone]): No staffing availability. Took note where client lives, service needed, and how many hours in case staffing becomes available.    16. [email or web address] ( [phone]): Will look for staffing and call if staffing is available.     17. [facility] [phone], [email or web address]): Requested for referral to be emailed. Will call CC if they are able to find staffing.    INNA Diallo  [facility]  [phone]  Fax: [phone]

## 2021-12-09 ENCOUNTER — PATIENT OUTREACH (OUTPATIENT)
Dept: GERIATRIC MEDICINE | Facility: CLINIC | Age: 71
End: 2021-12-09
Payer: COMMERCIAL

## 2021-12-09 NOTE — PROGRESS NOTES
Per Irish's request, order placed with [employer] for four frozen meals per week delivered weekly.  Order placed on 12/09/2021. Access updated.      INNA Diallo  [facility]  [phone]  Fax: [phone]

## 2021-12-21 ENCOUNTER — PATIENT OUTREACH (OUTPATIENT)
Dept: GERIATRIC MEDICINE | Facility: CLINIC | Age: 71
End: 2021-12-21
Payer: COMMERCIAL

## 2021-12-21 NOTE — PROGRESS NOTES
[facility] Care Coordination Contact    Received after visit chart from care coordinator.  Completed following tasks: Mailed copy of care plan to client, Updated services in access, Submitted referrals/auths for hmkg and mailed POC sig sheet w/SASE.   and Provider Signature - No POC Shared:  Member indicates that they do not want their POC shared with any EW providers.   Chayo Hickman  Case Management Specialist  [facility]  [phone]

## 2021-12-21 NOTE — LETTER
December 21, 2021    Important [employer] Information    HARSHAD PAYNE  [address]   [place] [address]  Your Care Plan  Dear Harshad,  When we spoke recently, I promised to send you a Care Plan. The plan enclosed is a summary of our discussion. It includes the steps we agreed would help you meet your health goals. In addition, I can help you with:  [id number]  This program is available to members who need a ride to medical and dental visits. To schedule a ride, call [phone] or [phone] (toll free). TTY: 711. You can call 8 a.m. to 8 p.m. Seven days a week. Access to a representative may be limited at times.    Agrivida   The Agrivida program empowers you to improve your health through education and exercise. To learn more, visit [employer], or call [employer] at [phone] (toll free) (TTY:711) from 7 a.m. - 7 p.m. Central Time, Monday-Friday.  Health Care Directive   This form helps you outline your health care wishes. You can request a form from me and I will answer any questions you have before you discuss it with your doctor.   Annual Physical  Take a key step on your path to good health and set up an annual physical at your clinic.  Questions?  Call me at [phone] Monday-Friday between 8am and 5pm.  TTY: 711. As we discussed, I plan to be in touch with you again in 6 months to follow up via phone.  Sincerely,    INNA Diallo    E-mail: [email or web address]  Phone: [phone]      [facility]      cc: member records                                                                                             CB5 ([facility]) (5-2020)    Civil Rights Notice  Discrimination is against the law. [employer] does not discriminate on the basis of any of the following:    Race    Color    National Origin    Creed    Restorationism    Age    Public Assistance Status    Receipt of Health Care Services    Disability  (including physical or mental impairment)    Sex (including sex stereotypes and gender identity)    Marital Status    Political Beliefs    Medical Condition    Genetic Information    Sexual Orientation    Claims Experience    Medical History    Health Status    Auxiliary Aids and Services:  [employer] provides auxiliary aids and services, like qualified interpreters or information in accessible formats, free of charge and in a timely manner, to ensure an equal opportunity to participate in our health care programs. Contact [employer] at [employer]/contact medicaid or call [phone] (toll free); TTY:648 or at [email or web address].    Language Assistance Services:  [employer] provides translated documents and spoken language interpreting, free of charge and in a timely manner, when language assistance services are necessary to ensure limited English speakers have meaningful access to our information and services. Contact [employer] at [phone] (toll free); TTY: 012 or [email or web address].     Civil Rights Complaints  You have the right to file a discrimination complaint if you believe you were treated in a discriminatory way by [employer]. You may contact any of the following four agencies directly to file a discrimination complaint.    U.S. Department of Health and Human Services  Office for Civil Rights (OCR)  You have the right to file a complaint with the OCR, a federal agency, if you believe you have been discriminated against because of any of the following:    Race    Disability    Color    Sex    National Origin    Age    Oriental orthodox (in some cases)    Contact the OCR directly to file a complaint:         Director         U.S. Department of Health and Human Services  Office for Civil Rights         [address] [place] [address]         Customer Response Center: Toll-free: [phone]          TDD: [phone]         Email:  [email or web address]    [place] Department of Human Rights (MDHR)  In [place], you have the right to file a complaint with the MDHR if you believe you have been discriminated against because of any of the following:      Race    Color    National Origin    Yarsani    Creed    Sex    Sexual Orientation    Marital Status    Public Assistance Status    Disability    Contact the MDHR directly to file a complaint:         [place] Department of Human Rights         [address] [address]         [phone] (voice)          [phone] (toll free)         [phone] or [phone] ([place] Relay)         [phone] (Fax)         [email or web address] (Email)     [place] Department of Human Services (DHS)  You have the right to file a complaint with [facility] if you believe you have been discriminated against in our health care programs because of any of the following:    Race    Color    National Origin    Creed    Yarsani    Age    Public Assistance Status    Receipt of Health Care Services    Disability (including physical or mental impairment)    Sex (including sex stereotypes and gender identity)    Marital Status    Political Beliefs    Medical Condition    Genetic Information    Sexual Orientation    Claims Experience    Medical History    Health Status    Complaints must be in writing and filed within 180 days of the date you discovered the alleged discrimination. The complaint must contain your name and address and describe the discrimination you are complaining about. After we get your complaint, we will review it and notify you in writing about whether we have authority to investigate. If we do, we will investigate the complaint.      [facility] will notify you in writing of the investigation s outcome. You have a right to appeal the outcome if you disagree with the decision. To appeal, you must send a written request to have [facility] review the investigation outcome. Be brief and  state why you disagree with the decision. Include additional information you think is important.      If you file a complaint in this way, the people who work for the agency named in the complaint cannot retaliate against you. This means they cannot punish you in any way for filing a complaint. Filing a complaint in this way does not stop you from seeking out other legal or administration actions.     Contact DHS directly to file a discrimination complaint:        Civil Rights Coordinator        [place] Department of Human Services        Equal Opportunity and Access Division        [address] [address]        [place] [address] [phone] (voice) or use your preferred relay service     [employer] Complaint Notice   You have the right to file a complaint with [employer] if you believe you have been discriminated against because of any of the following:       Medical condition    Health status    Receipt of health care services    Claims experience    Medical history    Genetic information    Disability (including mental or physical impairment)    Marital status    Age    Sex (including sex stereotypes and gender identity)    Sexual orientation    National origin    Race    Color    Protestant    Creed    Public assistance status    Political beliefs    You can file a complaint and ask for help in filing a complaint in person or by mail, phone, fax, or email at:     [employer] Civil Rights Coordinator  [facility]  [address], [place] [address] [phone] (voice and fax) or GSW:739  Email: [email or web address]    American Indians can begin or continue to use [facility] and [facility] clinics. We will not require prior approval or impose any conditions for you to get services at these clinics. For elders age 65 years and older this includes Elderly Waiver (EW) services accessed through the [place]. If a doctor or other provider in a [facility] or [facility] clinic  refers you to a provider in our network, we will not require you to see your primary care provider prior to the referral.

## 2021-12-22 ENCOUNTER — PATIENT OUTREACH (OUTPATIENT)
Dept: GERIATRIC MEDICINE | Facility: CLINIC | Age: 71
End: 2021-12-22
Payer: COMMERCIAL

## 2021-12-22 NOTE — PROGRESS NOTES
Per Irish's request, emailed her a list of housing resources. She is considering moving and requested resources in case she decided to look into relocating further.    Michelle Corcoran, [facility]  [phone]  Fax: [phone]

## 2022-01-03 ENCOUNTER — PATIENT OUTREACH (OUTPATIENT)
Dept: GERIATRIC MEDICINE | Facility: CLINIC | Age: 72
End: 2022-01-03
Payer: COMMERCIAL

## 2022-01-03 NOTE — PROGRESS NOTES
Homemaking Provider Search:    1. [facility] ([phone]): LM inquiring about available staffing and requested a call back.    2. [facility] ([phone]): Spoke w/  Maritza who took a message and will have PCA/Homemaking  return CC Call.    3. [facility] ([phone]): No staffing available.    4. [facility] ([phone]): Spoke w/ Deborah who will call CC once she has information about staffing availability.    5. [facility] ([phone]): PCA Choice. Must have own PCA.    6. [facility] ([phone]): LM for nurse Dana inquiring about staffing and requested a call back.  7. [facility] ([phone]): Spoke w/ Sheng. No staffing available. Took CC name/phone number in case staffing becomes available in the future.  8. [facility] ([phone]): No staffing available.  9. [facility] ([phone]): No staffing available.  9. [facility] ([phone]): No staffing available.  10. [facility]: Number not in service. No other number available. Listed as provider on health plan website.    11. [facility] ([phone]): Spoke with Berenice who is going to check with her employees who provide service in the [facility] to see if they are interested in taking on another client. Berenice will call CC if she is able to find staffing.    Michelle Corcoran, [facility]  [phone]  Fax: [phone]

## 2022-01-10 NOTE — PROGRESS NOTES
Department of Neurology  Movement Disorders Division   DBS Follow-up Note    Patient: Irish Rosales  MRN: [id number]   : 1950   Date of Visit: 2022    Diagnosis: Tremor  DBS Target(s): LVIM, RVIM, RVop  Date(s) of DBS Lead Placement:     Date(s) of IPG Placement: R 1/15/2019  Device: Abbott    Chief Complaint:  Irish Rosales is a 71 year old right handed woman who returns to clinic for follow up of tremor status post bilateral VIM + right Vop DBS.   She also receives botulinum toxin injections for cervical dystonia.  She was last seen 2021 at which time the amplitude of 2b was increased to try to improve tremor.    Interval History:  There may have been a slight improvement after the last change.  The last few months she has noticed worsening her both hands.  The right hand is worse outstretched and the left when towards to mouth.  No clear difference between programs.    No falls.       Tremor ADL Scale  1. Speaking 0 (0) Normal   2. Feeding with a spoon 3 (3) Moderately abnormal. Spills a lot or changes strategy to complete task such as using two hands or leaning over.   3. Drinking from a glass 1 (1) Slightly abnormal. Tremor is present but does not interfere with drinking from a glass.   4. Hygiene 2 (2) Mildly abnormal. Some difficulty but can complete task.   5. Dressing 0 (0) Normal   6. Pouring 3 (3) Moderately abnormal. Must use two hands or uses other strategies to avoid spilling.   7. Carrying food trays, plates or similar items 0 (0) Normal   8. Using keys 3 (3) Moderately abnormal. Needs to use two hands or other strategies to put key in lock.   9. Writing 2 (2) Mildly abnormal. Difficulty writing due to the tremor.   10. Working 0 (0) Normal   11. Overall disability with the most affected task 3 (3) Moderately abnormal. Can do task but must use strategies.   Name of most affected task Eating Eating   12. Social impact 1 (1) Aware of tremor, but it does not affect lifestyle or  professional life.   ADL TOTAL 18    Prior 14 on 7/9/2021      Weeks                 Right brain        Left brain        1                      VIM           2b        2                      VIM          3abc        3                       VOP          3abc        4                      VOP           2b         Review of Systems:  Other than that noted at the end of this note, the remainder of 12 systems reviewed were negative.    Medications:  Current Outpatient Medications   Medication Sig Dispense Refill     CALCIUM-VITAMIN D PO Take 2 chew tab by mouth daily       cyanocobalamin (VITAMIN B-12) 1000 MCG tablet TAKE 1 TABLET(1000 MCG) BY MOUTH DAILY 100 tablet 2     rosuvastatin (CRESTOR) 20 MG tablet Take 1 tablet (20 mg) by mouth daily *Office visit and labs due before next fill. Please call [phone] to schedule*   Thank you 90 tablet 3     sertraline (ZOLOFT) 100 MG tablet Take 1 tablet (100 mg) by mouth daily 90 tablet 3     ALPRAZolam (XANAX) 0.5 MG tablet Take 1 tablet (0.5 mg) by mouth daily as needed for anxiety (Patient not taking: Reported on 1/14/2022) 30 tablet 0     traZODone (DESYREL) 50 MG tablet TAKE 1 TABLET(50 MG) BY MOUTH AT BEDTIME (Patient not taking: Reported on 1/14/2022) 90 tablet 0        Allergies: is allergic to sulfa drugs, atorvastatin, and simvastatin.    Past Medical History:  Past Medical History:   Diagnosis Date     Depression      Depressive disorder      Dyslipidemia      Dystonic movements 1/21/2017     Dystonic tremor 1/21/2017     Family history of movement disorder 1/21/2017     mild abnormality in carotid study 3/2/2017    BILATERAL DUPLEX CAROTID ULTRASOUND March 1, 2017 1:01 PM    HISTORY: Other specified symptoms and signs involving the circulatory and respiratory systems.   COMPARISON: None.   FINDINGS: There is mild atherosclerotic plaque in the carotid bifurcations. Flow velocities and waveforms show less than 50% diameter stenosis in both the right and left  internal carotid arteries as assessed by each ICA PS     Osteoporosis 8/2/2010       Past Surgical History:  Past Surgical History:   Procedure Laterality Date     ------------OTHER-------------  2004    vocal cord thyroplasty at [facility]     APPENDECTOMY  1997     COLONOSCOPY       IMPLANT DEEP BRAIN STIMULATION GENERATOR / BATTERY Left 3/13/2018    Procedure: IMPLANT DEEP BRAIN STIMULATION GENERATOR / BATTERY;  Deep Brain Stimulator Placement, Phase II, Placement Of Deep Brain Stimulator Generator/Battery Over The Left Chest Wall;  Surgeon: Aleksander Morales MD;  Location: [facility] OR     IMPLANT DEEP BRAIN STIMULATION GENERATOR / BATTERY Right 1/15/2019    Procedure: Deep Brain Stimulator Placement, Phase II, Placement Of Deep Brain Stimulator Generator/Battery Over The Right Chest Wall;  Surgeon: Aleksander Morales MD;  Location: [facility] OR     OPTICAL TRACKING SYSTEM INSERTION DEEP BRAIN STIMULATION Left 3/6/2018    Procedure: OPTICAL TRACKING SYSTEM INSERTION DEEP BRAIN STIMULATION;  Stealth Assisted Left Deep Brain Stimulator Placement, Phase I, Placement Of Left Side Deep Brain Stimulator Electrode, Target Left Ventral Intermediate Nucleus Of The Thalamus With Microelectrode Recording;  Surgeon: Aleksander Morales MD;  Location: [facility] OR     OPTICAL TRACKING SYSTEM INSERTION DEEP BRAIN STIMULATION Right 1/8/2019    Procedure: Stealth Assisted Right Deep Brain Stimulator Placement, Phase One, Placement Of Right Side Deep Brain Stimulator Electrode Target Right Ventral Intermediate Nucleus Of The Thalamus and placement Second Electrode With Microelectrode Recording;  Surgeon: Aleksander Morales MD;  Location: [facility] OR     RELEASE CARPAL TUNNEL Left 1/2/2015    Procedure: RELEASE CARPAL TUNNEL;  Surgeon: SHANIA Hernandez MD;  Location: [facility] OR     RELEASE ULNAR NERVE (ELBOW) Left 1/2/2015    Procedure: RELEASE ULNAR NERVE (ELBOW);  Surgeon: SHANIA Hernandez MD;  Location:  OR     [facility],  OMENTECTOMY W/XAVIER  1997    hysterectomy     ZZC HAND/FINGER SURGERY UNLISTED  1998    right carpal tunnel surgery       Social History:  Social History     Socioeconomic History     Marital status:      Spouse name: Not on file     Number of children: 3     Years of education: 14     Highest education level: Not on file   Occupational History     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   Tobacco Use     Smoking status: Never Smoker     Smokeless tobacco: Never Used   Substance and Sexual Activity     Alcohol use: Yes     Comment: occasionally     Drug use: No     Sexual activity: Yes     Partners: Male   Other Topics Concern     Parent/sibling w/ CABG, MI or angioplasty before 65F 55M? No      Service No     Blood Transfusions No     Caffeine Concern No     Occupational Exposure No     Hobby Hazards No     Sleep Concern No     Stress Concern No     Weight Concern No     Special Diet No     Back Care No     Exercise Yes     Comment: walk     Bike Helmet Yes     Seat Belt Yes     Self-Exams Yes   Social History Narrative        Lives in [place]    Daughter Karyn Sanchez        benign essential tremor with a strained quality to her voice consistent with mild laryngeal spasm        ALLERGIES:  She is allergic to sulfa drugs, atorvastatin and simvastatin.  The statin drugs caused leg cramps.            FAMILY HISTORY:  As noted above with 1 sister developing a voice tremor and another sister having what is described as a facial tremor.  Mother with hand tremors          SOCIAL HISTORY:  She does not smoke.  She does use alcohol on occasion. She previously worked as a RN.         Jazmyn jimenez  [phone] orofacial dyskinesias    Piedad Harvey [phone]  laryngeal dystonia and laryngeal tremor and laryngeal spasm.             Updated 6/13/17: Client was born in [place], [place] to  parents Jerome and Berenice.  Client grew up w/ three sisters Tea, Carrie, and Jazmyn who are currently still living.  Client graduated from high school, attended college for two years, and graduated w/ a RN Degree.  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  She was  to her ex-spouse for 25 years and had three children; one son Bryan and two daughters Halley/Ysabel.  One daughter Halley Sanchez resides nearby and her other daughter Ysabel and her son Bryan reside in [place], [place].  Bryan has two daughters ages five and eight years old.  Client enjoys flying for free to [place] to visit her two granddaughters. Michelle Sutton, [facility] ([phone], Fax: [phone]).         Social Determinants of Health     Financial Resource Strain: Not on file   Food Insecurity: Not on file   Transportation Needs: Not on file   Physical Activity: Not on file   Stress: Not on file   Social Connections: Unknown     Frequency of Communication with Friends and Family: Not on file     Frequency of Social Gatherings with Friends and Family: Three times a week     Attends Islam Services: Not on file     Active Member of Clubs or Organizations: Not on file     Attends Club or Organization Meetings: Not on file     Marital Status: Not asked   Intimate Partner Violence: Not on file   Housing Stability: Not on file       Family History:  Family History   Problem Relation Age of Onset     Hypertension Father         and mother     Cerebrovascular Disease Father      Tremor Mother      Lung Cancer Mother      Neurologic Disorder Sister         dystonic voice x 2 botox     Neurologic Disorder Sister         jose m m[place]. facial movement        Physical Exam:  The patient's  blood pressure is 123/62 and her pulse is 94. Her respiration is 16 and oxygen saturation  "is 98%.      Neurological Examination:   Motor (Performance) Sub-Scale 1/14/2022   Assessment Time 11:28 AM   Medication None   DBS - Right Brain On   DBS - Left Brain On   Head 0   Face & Jaw 0.5   Voice 2   Outstretched - RIGHT 0   Outstretched - LEFT 0   Wingbeating - RIGHT 0.5   Wingbeating - LEFT 1.5   Kinetic - RIGHT 0   Kinetic - LEFT 1   Lower Limb - RIGHT 0   Lower Limb - LEFT 0   Lower Limb (Max) 0   Spiral - RIGHT 2   Spiral - LEFT 2   Handwriting 0.5   Dot approx - RIGHT 2   Dot approx - LEFT 2.5   Trunk (Standing) 0   Total Right 4.5   Total Left 7   Axial 2.5   TOTAL 14.5   Prior: 25.5 on 7/9/2021      Procedure: DBS Interrogation & Programming    Lead(s):   Side Left Right Right   Target VIM Vop VIM   Lead  Abbott Abbott Abbott   Lead Model Infinity 0.5 Infinity 0.5 Infinity 0.5   Lead Implant Date 3/6/2018 1/8/2019 1/8/2019   IPG # 1 2 2      IPG(s):  IPG # 1 2   IPG  Abbott Abbott   IPG Model Infinity 5 Infinity 7   IPG Implant Date 3/13/2018 1/15/2019   Location Left chest Right chest   Battery (V) 2.76V (2.84V) 2.92V (2.94V)       Impedance Check: No problems found  See scanned report for impedance details.    IPG # 1       Program Name \"2b\"   \"3abc\"   Side Left   Left   Initial/Final Initial Final Initial & Final   Active/Inactive Active Active Inactive   Amplitude mA 5.0 [0-5 by 0.25] 5.0 [0-5 by 0.25] 4 [0-4 by 0.25]   Pulse width (usec) 20 20 20   Freq (Hz) 200 200 200   Contacts C+2b- C+2b- C+3abc-   Ther Imp Ohm 1400       Ther Imp mA           IPG # 2     Program name Program 2     \"Side\" (i.e. target) Right Vop, lead 1 Right Vim lead 2   Initial/Final Initial & Final Initial & Final   Active/Inactive Inactive Inactive   Contacts C+, 2abc- C+, 11abc-   Amplitude (mA) 1.5 no range 2.5 [0-4 by 0.1]   Pulse Width (ms) 60 60   Frequency (Hz) 130 130     IPG # 2      Program name Vim only      \"Side\" (i.e. target) Right Vop, lead 1 Right Vim, lead 2     Initial/Final " "Initial & Final Initial Final   Active/Inactive Inactive Inactive Inactive   Contacts C+2abc-  C+ , 11 abc - C+11abc   Amplitude (mA)  0 no range  3.6 [0-4 by 0.1] 4.0 [0-4 by 0.1]   Pulse Width (ms)  20  20 20   Frequency ( Hz)  200  200 200     IPG # 2     Program name Vop only     Initial/Final Initial & Final Initial & Final   Active/Inactive Active Active   \"Side\" (i.e. target) Right Vop, lead 1 Right Vim, lead 2   Contacts C+, 2abc- C+,11abc-   Amplitude (mA) 4.0 [0-4 by 0.1] 0, no range   Pulse Width (ms) 20 20   Frequency ( Hz) 200 200   Ther Imp Oh 122 632       Assessment/Plan:  Irish Rosales is a 71 year old right handed woman who returns to clinic for follow up of tremor status post bilateral VIM + right Vop DBS.  There has been improvement in her tremor but some still remains    - Increased \"VIM only\" to maximum amplitude  - RTC 3 months, 1 hr DBS programming      Isidra Brock MD  Movement Disorders Fellow    Patient seen and examined with Dr. Brock and I agree with the assessment and plan as outlined.  DBS programming 0.5h.  Time this date with patient, reviewing records, & documenting 0.75h    Edwar Colvin PhD, MD  "

## 2022-01-14 ENCOUNTER — OFFICE VISIT (OUTPATIENT)
Dept: NEUROLOGY | Facility: CLINIC | Age: 72
End: 2022-01-14
Payer: COMMERCIAL

## 2022-01-14 VITALS
DIASTOLIC BLOOD PRESSURE: 62 MMHG | HEART RATE: 94 BPM | OXYGEN SATURATION: 98 % | RESPIRATION RATE: 16 BRPM | SYSTOLIC BLOOD PRESSURE: 123 MMHG

## 2022-01-14 DIAGNOSIS — Z96.89 STATUS POST DEEP BRAIN STIMULATOR PLACEMENT: ICD-10-CM

## 2022-01-14 DIAGNOSIS — G25.0 ESSENTIAL TREMOR: Primary | ICD-10-CM

## 2022-01-14 PROCEDURE — 95984 ALYS BRN NPGT PRGRMG ADDL 15: CPT | Mod: GC | Performed by: PSYCHIATRY & NEUROLOGY

## 2022-01-14 PROCEDURE — 99215 OFFICE O/P EST HI 40 MIN: CPT | Mod: 25 | Performed by: PSYCHIATRY & NEUROLOGY

## 2022-01-14 PROCEDURE — 95983 ALYS BRN NPGT PRGRMG 15 MIN: CPT | Mod: GC | Performed by: PSYCHIATRY & NEUROLOGY

## 2022-01-14 ASSESSMENT — ACTIVITIES OF DAILY LIVING (ADL)
DRINKING_FROM_A_GLASS: (1) SLIGHTLY ABNORMAL. TREMOR IS PRESENT BUT DOES NOT INTERFERE WITH DRINKING FROM A GLASS.
CARRYING_FOOD_TRAYS_PLATES_OR_SIMILAR_ITEMS: (0) NORMAL
OVERALL_DISABILITY_WITH_THE_MOST_AFFECTED_TASK: EATING
WORKING: (0) NORMAL
USING_KEYS: (3) MODERATELY ABNORMAL. NEEDS TO USE TWO HANDS OR OTHER STRATEGIES TO PUT KEY IN LOCK.
POURING: (3) MODERATELY ABNORMAL. MUST USE TWO HANDS OR USES OTHER STRATEGIES TO AVOID SPILLING.
DRESS: (0) NORMAL
WRITING: (2) MILDLY ABNORMAL. DIFFICULTY WRITING DUE TO THE TREMOR.
SOCIAL_IMPACT: (1) AWARE OF TREMOR, BUT IT DOES NOT AFFECT LIFESTYLE OR PROFESSIONAL LIFE.
TOTAL_SCORE: 18
OVERALL_DISABILITY_WITH_THE_MOST_AFFECTED_TASK: (3) MODERATELY ABNORMAL. CAN DO TASK BUT MUST USE STRATEGIES.
FEEDING_WITH_A_SPOON: (3) MODERATELY ABNORMAL. SPILLS A LOT OR CHANGES STRATEGY TO COMPLETE TASK SUCH AS USING TWO HANDS OR LEANING OVER.
HYGIENE: (2) MILDLY ABNORMAL. SOME DIFFICULTY BUT CAN COMPLETE TASK.
SPEAKING: (0) NORMAL

## 2022-01-14 ASSESSMENT — PAIN SCALES - GENERAL: PAINLEVEL: NO PAIN (0)

## 2022-01-14 NOTE — PATIENT INSTRUCTIONS
"Today we increased \"VIM only\" from 3.6 to 4.0.  Hopefully this will improve your left arm tremor.  "

## 2022-01-23 DIAGNOSIS — F32.5 DEPRESSION, MAJOR, IN REMISSION (H): ICD-10-CM

## 2022-01-24 NOTE — TELEPHONE ENCOUNTER
"Requested Prescriptions   Pending Prescriptions Disp Refills    sertraline (ZOLOFT) 100 MG tablet [Pharmacy Med Name: SERTRALINE 100MG TABLETS] 90 tablet 3     Sig: TAKE 1 TABLET(100 MG) BY MOUTH DAILY        SSRIs Protocol Failed - 1/23/2022  8:08 AM        Failed - Recent (6 mo) or future (30 days) visit within the authorizing provider's specialty     Patient had office visit in the last 6 months or has a visit in the next 30 days with authorizing provider or within the authorizing provider's specialty.  See \"Patient Info\" tab in inbasket, or \"Choose Columns\" in Meds & Orders section of the refill encounter.            Passed - PHQ-9 score less than 5 in past 6 months     Please review last PHQ-9 score.           Passed - Medication is active on med list        Passed - Patient is age 18 or older        Passed - No active pregnancy on record        Passed - No positive pregnancy test in last 12 months              "

## 2022-01-25 RX ORDER — SERTRALINE HYDROCHLORIDE 100 MG/1
100 TABLET, FILM COATED ORAL DAILY
Qty: 90 TABLET | Refills: 0 | Status: SHIPPED | OUTPATIENT
Start: 2022-01-25 | End: 2022-02-24

## 2022-01-27 NOTE — TELEPHONE ENCOUNTER
Called patient to make an appointment, patient answered and we were able to make her an appointment in February. Thank you.

## 2022-02-07 ENCOUNTER — OFFICE VISIT (OUTPATIENT)
Dept: NEUROSURGERY | Facility: CLINIC | Age: 72
End: 2022-02-07
Payer: COMMERCIAL

## 2022-02-07 ENCOUNTER — LAB (OUTPATIENT)
Dept: LAB | Facility: CLINIC | Age: 72
End: 2022-02-07

## 2022-02-07 VITALS — HEART RATE: 83 BPM | OXYGEN SATURATION: 97 % | DIASTOLIC BLOOD PRESSURE: 85 MMHG | SYSTOLIC BLOOD PRESSURE: 134 MMHG

## 2022-02-07 DIAGNOSIS — R79.1 ABNORMAL COAGULATION PROFILE: ICD-10-CM

## 2022-02-07 DIAGNOSIS — F32.5 DEPRESSION, MAJOR, IN REMISSION (H): ICD-10-CM

## 2022-02-07 DIAGNOSIS — Z01.818 PREOPERATIVE EVALUATION TO RULE OUT SURGICAL CONTRAINDICATION: Primary | ICD-10-CM

## 2022-02-07 DIAGNOSIS — Z96.89 STATUS POST DEEP BRAIN STIMULATOR PLACEMENT: ICD-10-CM

## 2022-02-07 DIAGNOSIS — Z45.42 END OF BATTERY LIFE OF DEEP BRAIN STIMULATOR: ICD-10-CM

## 2022-02-07 DIAGNOSIS — G25.2 DYSTONIC TREMOR: ICD-10-CM

## 2022-02-07 DIAGNOSIS — G24.3 CERVICAL DYSTONIA: Primary | ICD-10-CM

## 2022-02-07 DIAGNOSIS — Z01.818 PREOPERATIVE EVALUATION TO RULE OUT SURGICAL CONTRAINDICATION: ICD-10-CM

## 2022-02-07 LAB
ALBUMIN UR-MCNC: NEGATIVE MG/DL
ANION GAP SERPL CALCULATED.3IONS-SCNC: 8 MMOL/L (ref 3–14)
APPEARANCE UR: ABNORMAL
APTT PPP: 30 SECONDS (ref 22–38)
BILIRUB UR QL STRIP: NEGATIVE
BUN SERPL-MCNC: 13 MG/DL (ref 7–30)
CALCIUM SERPL-MCNC: 8.6 MG/DL (ref 8.5–10.1)
CHLORIDE BLD-SCNC: 107 MMOL/L (ref 94–109)
CO2 SERPL-SCNC: 25 MMOL/L (ref 20–32)
COLOR UR AUTO: YELLOW
CREAT SERPL-MCNC: 0.64 MG/DL (ref 0.52–1.04)
ERYTHROCYTE [DISTWIDTH] IN BLOOD BY AUTOMATED COUNT: 13.4 % (ref 10–15)
GFR SERPL CREATININE-BSD FRML MDRD: >90 ML/MIN/1.73M2
GLUCOSE BLD-MCNC: 80 MG/DL (ref 70–99)
GLUCOSE UR STRIP-MCNC: NEGATIVE MG/DL
HCT VFR BLD AUTO: 41.8 % (ref 35–47)
HGB BLD-MCNC: 13.8 G/DL (ref 11.7–15.7)
HGB UR QL STRIP: NEGATIVE
HYALINE CASTS: 1 /LPF
INR PPP: 0.95 (ref 0.85–1.15)
KETONES UR STRIP-MCNC: 5 MG/DL
LEUKOCYTE ESTERASE UR QL STRIP: ABNORMAL
MCH RBC QN AUTO: 31.2 PG (ref 26.5–33)
MCHC RBC AUTO-ENTMCNC: 33 G/DL (ref 31.5–36.5)
MCV RBC AUTO: 94 FL (ref 78–100)
MUCOUS THREADS #/AREA URNS LPF: PRESENT /LPF
NITRATE UR QL: NEGATIVE
PH UR STRIP: 5 [PH] (ref 5–7)
PLATELET # BLD AUTO: 293 10E3/UL (ref 150–450)
POTASSIUM BLD-SCNC: 4 MMOL/L (ref 3.4–5.3)
RBC # BLD AUTO: 4.43 10E6/UL (ref 3.8–5.2)
RBC URINE: 1 /HPF
SODIUM SERPL-SCNC: 140 MMOL/L (ref 133–144)
SP GR UR STRIP: 1.02 (ref 1–1.03)
SQUAMOUS EPITHELIAL: 1 /HPF
UROBILINOGEN UR STRIP-MCNC: NORMAL MG/DL
WBC # BLD AUTO: 4.7 10E3/UL (ref 4–11)
WBC URINE: 3 /HPF

## 2022-02-07 PROCEDURE — 85730 THROMBOPLASTIN TIME PARTIAL: CPT | Performed by: PATHOLOGY

## 2022-02-07 PROCEDURE — 85027 COMPLETE CBC AUTOMATED: CPT | Performed by: PATHOLOGY

## 2022-02-07 PROCEDURE — 80048 BASIC METABOLIC PNL TOTAL CA: CPT | Performed by: PATHOLOGY

## 2022-02-07 PROCEDURE — 87086 URINE CULTURE/COLONY COUNT: CPT | Performed by: NEUROLOGICAL SURGERY

## 2022-02-07 PROCEDURE — 81001 URINALYSIS AUTO W/SCOPE: CPT | Performed by: PATHOLOGY

## 2022-02-07 PROCEDURE — 36415 COLL VENOUS BLD VENIPUNCTURE: CPT | Performed by: PATHOLOGY

## 2022-02-07 PROCEDURE — 99214 OFFICE O/P EST MOD 30 MIN: CPT | Performed by: NEUROLOGICAL SURGERY

## 2022-02-07 PROCEDURE — 85610 PROTHROMBIN TIME: CPT | Performed by: PATHOLOGY

## 2022-02-07 NOTE — LETTER
2/7/2022       RE: Irish Rosales  [address]  [place] [address]     Dear Colleague,    Thank you for referring your patient, Irish Rosales, to the [facility]. Please see a copy of my visit note below.    NEUROSURGERY    HISTORY AND PHYSICAL EXAM    Chief Complaint   Patient presents with     RECHECK     UMP RETURN, DEPLETED DBS GENERATOR/BATTERY OVER THE LEFT CHEST WALL       HISTORY OF PRESENT ILLNESS  Ms. Irish Rosales is a 71 year old female with a history of dystonia/dystonic tremors and bilateral tremors, vocal dystonia and tremor, head tremor who now presents with depleted DBS generator/battery over the left chest wall.  She is well known to me and she is s/p left side deep brain stimulator placement, phase I, placement of left side deep brain stimulator electrode, target left ventral intermediate nucleus of the thalamus, with microelectrode recording on 3/6/2018, s/p deep brain stimulator placement, phase II, placement of deep brain stimulator generator/battery over the left chest wall on 3/13/2018, s/p right side deep brain stimulator placement, phase I, placement of right side deep brain stimulator electrodes, target left ventral intermediate nucleus and ventral oralis nucleus of the thalamus, with microelectrode recording on 1/8/2019, and s/p deep brain stimulator placement, phase II, placement of deep brain stimulator generator/battery over the right chest wall on 1/15/2019.  She tolerated the procedures well and there were no complications.  She did present for an evaluation with complaints of decreased range of motion in her neck and stiffness, without pain but it could not be attributed to DBS.  Since then she has done well and she is getting good therapy from the DBS.  She is in her usual state of health and she does not report any recent illness.  She did test positive  for Covid 3 weeks ago.  She did not have any symptoms or illness associated with it.  She has not had any further testing.  She is not on any anticoagulant or antiplatelet therapy.    Briefly, the patient is a 71 year old right-handed female with history of dystonic tremor.  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  She also has cervical dystonia.  Her right side is more affected than her left side.  Her goals for DBS surgery are to decrease tremor, especially head and voice and improve dystonia.  She stated that she can live with her hand tremors but wants other symptoms treated.  Her case was discussed at the Movement Disorder Consensus Group meeting and the recommendation was left side Vim DBS with Abbott device and wait and see strategy.  She subsequently had the left side implanted back in March of 2018.  She then wanted to get her right side implanted.  She was again evaluated and subsequently approved for the right side implantation.  For the right side, two electrodes were recommended:  Vim and Vo.      Past Medical History:   Diagnosis Date     Depression      Depressive disorder      Dyslipidemia      Dystonic movements 1/21/2017     Dystonic tremor 1/21/2017     Family history of movement disorder 1/21/2017     mild abnormality in carotid study 3/2/2017    BILATERAL DUPLEX CAROTID ULTRASOUND March 1, 2017 1:01 PM    HISTORY: Other specified symptoms and signs involving the circulatory and respiratory systems.   COMPARISON: None.   FINDINGS: There is mild atherosclerotic plaque in the carotid bifurcations. Flow velocities and waveforms show less than 50% diameter stenosis in both the right and left internal carotid arteries as assessed by each ICA PS     Osteoporosis 8/2/2010       Past Surgical History:   Procedure Laterality Date     ------------OTHER-------------  2004    vocal cord  thyroplasty at [facility]     APPENDECTOMY  1997     COLONOSCOPY       IMPLANT DEEP BRAIN STIMULATION GENERATOR / BATTERY Left 3/13/2018    Procedure: IMPLANT DEEP BRAIN STIMULATION GENERATOR / BATTERY;  Deep Brain Stimulator Placement, Phase II, Placement Of Deep Brain Stimulator Generator/Battery Over The Left Chest Wall;  Surgeon: Aleksander Morales MD;  Location: [facility] OR     IMPLANT DEEP BRAIN STIMULATION GENERATOR / BATTERY Right 1/15/2019    Procedure: Deep Brain Stimulator Placement, Phase II, Placement Of Deep Brain Stimulator Generator/Battery Over The Right Chest Wall;  Surgeon: Aleksander Morales MD;  Location: [facility] OR     OPTICAL TRACKING SYSTEM INSERTION DEEP BRAIN STIMULATION Left 3/6/2018    Procedure: OPTICAL TRACKING SYSTEM INSERTION DEEP BRAIN STIMULATION;  Stealth Assisted Left Deep Brain Stimulator Placement, Phase I, Placement Of Left Side Deep Brain Stimulator Electrode, Target Left Ventral Intermediate Nucleus Of The Thalamus With Microelectrode Recording;  Surgeon: Aleksander Morales MD;  Location: [facility] OR     OPTICAL TRACKING SYSTEM INSERTION DEEP BRAIN STIMULATION Right 1/8/2019    Procedure: Stealth Assisted Right Deep Brain Stimulator Placement, Phase One, Placement Of Right Side Deep Brain Stimulator Electrode Target Right Ventral Intermediate Nucleus Of The Thalamus and placement Second Electrode With Microelectrode Recording;  Surgeon: Aleksander Morales MD;  Location: [facility] OR     RELEASE CARPAL TUNNEL Left 1/2/2015    Procedure: RELEASE CARPAL TUNNEL;  Surgeon: SHANIA Hernandez MD;  Location: [facility] OR     RELEASE ULNAR NERVE (ELBOW) Left 1/2/2015    Procedure: RELEASE ULNAR NERVE (ELBOW);  Surgeon: SHANIA Hernandez MD;  Location: [facility] OR     ZZC BSO, OMENTECTOMY W/XAVIER  1997    hysterectomy     ZZC HAND/FINGER SURGERY UNLISTED  1998    right carpal tunnel surgery       Family History   Problem Relation Age of Onset     Hypertension Father         and mother      Cerebrovascular Disease Father      Tremor Mother      Lung Cancer Mother      Neurologic Disorder Sister         dystonic voice x 2 botox     Neurologic Disorder Sister         jose m mn. 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   Tobacco Use     Smoking status: Never Smoker     Smokeless tobacco: Never Used   Substance and Sexual Activity     Alcohol use: Yes     Comment: occasionally     Drug use: No     Sexual activity: Yes     Partners: Male   Other Topics Concern     Parent/sibling w/ CABG, MI or angioplasty before 65F 55M? No      Service No     Blood Transfusions No     Caffeine Concern No     Occupational Exposure No     Hobby Hazards No     Sleep Concern No     Stress Concern No     Weight Concern No     Special Diet No     Back Care No     Exercise Yes     Comment: walk     Bike Helmet Yes     Seat Belt Yes     Self-Exams Yes   Social History Narrative        Lives in [place]    Daughter Karyn Sanchez        benign essential tremor with a strained quality to her voice consistent with mild laryngeal spasm        ALLERGIES:  She is allergic to sulfa drugs, atorvastatin and simvastatin.  The statin drugs caused leg cramps.            FAMILY HISTORY:  As noted above with 1 sister developing a voice tremor and another sister having what is described as a facial tremor.  Mother with hand tremors          SOCIAL HISTORY:  She does not smoke.  She does use alcohol on occasion. She previously worked as a RN.         Jazmyn jimenez  [phone] orofacial dyskinesias    Piedad Harvey [phone]  laryngeal dystonia and laryngeal tremor and  laryngeal spasm.             Updated 6/13/17: Client was born in [place], [place] to parents Jerome and Berenice.  Client grew up w/ three sisters Tea, Carrie, and Jazmyn who are currently still living.  Client graduated from high school, attended college for two years, and graduated w/ a RN Degree.  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  She was  to her ex-spouse for 25 years and had three children; one son Bryan and two daughters Halley/Ysabel.  One daughter Halley Sanchez resides nearby and her other daughter Ysabel and her son Bryan reside in [place], [place].  Bryan has two daughters ages five and eight years old.  Client enjoys flying for free to [place] to visit her two granddaughters. Michelle Sutton, [facility] ([phone], Fax: [phone]).         Social Determinants of Health     Financial Resource Strain: Not on file   Food Insecurity: Not on file   Transportation Needs: Not on file   Physical Activity: Not on file   Stress: Not on file   Social Connections: Not on file   Intimate Partner Violence: Not on file   Housing Stability: Not on file          Allergies   Allergen Reactions     Sulfa Drugs Swelling and Rash     Atorvastatin      Leg cramps     Simvastatin      Leg cramp       Current Outpatient Medications   Medication     CALCIUM-VITAMIN D PO     cyanocobalamin (VITAMIN B-12) 1000 MCG tablet     rosuvastatin (CRESTOR) 20 MG tablet     sertraline (ZOLOFT) 100 MG tablet     ALPRAZolam (XANAX) 0.5 MG tablet     traZODone (DESYREL) 50 MG tablet     Current Facility-Administered Medications   Medication     botulinum toxin type A (BOTOX) 100 units injection 300 Units       REVIEW OF SYSTEMS:  General: Negative for chills/sweats/fever, difficulty sleeping, headache, recent fatigue, or weight gain/loss.  Eyes: Negative for  blurred vision, crossed eyes, double vision, recent eye infections, vision flashes, or vision halos.  Ears/Nose/Mouth/Throat: Negative for bleeding gums, difficulty swallowing, earache, ear discharge, hearing loss, hoarseness, nosebleeds, tinnitus, or sinus problems.  Respiratory: Negative for chronic cough, coughing blood, night sweats, shortness of breath, Tuberculosis, or wheezing.  Cardiovascular: Negative for chest pain, dyspnea at night, heart murmur, palpitations, pacemaker, poor circulation, swollen legs/feet, or varicose veins.  Gastrointestinal: Negative for melena, hematochezia, chronic diarrhea, heartburn, Hepatitis A/B/C, increasing constipation, Liver Disease, nausea, or vomiting.   Genitourinary: Negative for urinary retention, genital discharge, urinary incontinence, urgency, or UTI.   Neurological: Negative for syncope, headaches, numbness of arms/legs, tingling in hands/arms/legs, memory problems, or seizures.  Psychological: Negative for anxiety, depression, panic attacks, or restlessness.  Skin: Negative for chronic skin itching, color changes in hand/feet when cold, poor scarring, non-healing ulcers, skin rashes/hives, unusual moles.  Musculoskeletal: Negative for arthritis, joint swelling in hands/wrists/hips/knees/joints, muscle tenderness in arms/legs, or osteoporosis.  Endocrine: Negative for excessive thirst/hunger, intolerance for warm rooms, loss of libido, multiple broken bones, rapid weight gain/loss, galactorrhea, or thyroid issues.  Hematologic/Lymphatic: Negative for easy skin bruising, significant fatigue, prolonged bleeding, tender glands/lymph nodes.  Allergies: Negative for asthma or hay fever.      PHYSICAL EXAM  /85   Pulse 83   LMP  (LMP Unknown)   SpO2 97%     General: Awake, alert, oriented.  Well nourished, well developed, she is not in any acute distress.  HEENT: Head normocephalic, atraumatic. No carotid bruit. Neck supple. Good range of motion. No palpable  thyroid mass.  Heart: Regular rhythm and rate. No murmurs.  Lungs: Clear to auscultation and percussion bilaterally. No ronchi, rales or wheeze.  Abdomen: Soft, non-tender, non-distended. No hepatosplenomegaly.  Extremity: Warm with no clubbing or cyanosis. No lower extremity edema.  Incisions: Bilateral frontal incisions well healed.  Bilateral parietal incisions well healed.  Bilateral chest wall incisions well healed.  No wound breakdown.  No exposed hardware.    Neurological  Awake, alert and oriented to date, time, place and person. Speech fluent.   Pupils equal, round, reactive to light.  Extraocular movement intact.  Visual Fields are full on confrontation.  Hearing is grossly normal to finger rub.   Facial sensation intact.  Face symmetric.  Tongue midline.  Uvula elevates equally.    Motor: full strength throughout.  Sensation: intact to light touch and pinpoint.  Deep tendon reflexes: trace throughout. Negative for clonus. Negative for Gilbert's sign. No dysmetria.      ASSESSMENT   71 year old female with a history of dystonia/dystonic tremors and bilateral tremors, vocal dystonia and tremor, head tremor.   S/p left side deep brain stimulator placement, phase I, placement of left side deep brain stimulator electrode, target left ventral intermediate nucleus of the thalamus, with microelectrode recording on 3/6/2018  S/p deep brain stimulator placement, phase II, placement of deep brain stimulator generator/battery over the left chest wall on 3/13/2018.    S/p right side deep brain stimulator placement, phase I, placement of right side deep brain stimulator electrodes, target left ventral intermediate nucleus and ventral oralis nucleus of the thalamus, with microelectrode recording on 1/8/2019  S/p deep brain stimulator placement, phase II, placement of deep brain stimulator generator/battery over the right chest wall on 1/15/2019.  Depleted DBS generator/battery over the left chest wall.    Ms. Coburn  Bobby presents with depleted DBS generator/battery over the left chest wall.  Her  is giving her a message that the left side/chest wall DBS generator/battery will need replacement soon.  Back in 1/16/2022, it was interrogated also and the power levels were as follows:    LEFT chest wall IPG, Abbott Infinity 5 - 3/13/2018 (Left Vim), 2.76 V.  Close to 4 years of use.  RIGHT chest wall IPG, Abbott Infinity 7 - 1/15/2019 (two electrodes, Right Vim, Vo), 2.92 V.  Three years and still going.    Now her LEFT chest wall IPG has run out of power and is depleted, as it is giving a replacement message.  Her right side generator/battery has some time left, as it is in the 2.90's range.    We did briefly discuss that a rechargeable generator/battery option may be available in the future.  However, given her replacement interval, I do not think that a rechargeable system would be beneficial.    During today's visit, we discussed the surgical procedure for replacing the DBS generator/battery over the left chest wall. It is now depleted.  She currently has an Abbott Infinity 5 generator/battery, and this will be maintained during the upcoming procedure.  Risks, benefits, alternative therapies were discussed with the patient, including but not limited to infection and bleeding.  This surgical procedure will be performed under MAC with local anesthetic.  The thinned part of the wound/pocket may be corrected with mobilization of the tissue under the incision.  The pocket may need to be enlarged to minimize the stress on the closure.  Surgical procedure was discussed in detail.  All questions were answered, and she expressed understanding.    Complete history and physical exam has been performed today.      PLAN  1.  She will undergo replacement of the DBS generator/battery over the left chest wall under MAC with local anesthetic.   2.  Preop labs - she will get them today.  3.  PAC visit is not needed.  Complete history  and physical exam has been completed with this visit.  4.  Negative Covid test within 4 days of the surgery is not needed, as she has been tested positive three weeks ago and she has been symptom free.      19 minutes were spent face to face/on video with the patient of which more than 50% of the time was spent counseling and discussing the above issues regarding diagnosis, differential, treatment options, and steps for further evaluation.  5 minutes were spent reviewing patient's chart.  10 minutes were spent on documentation for this encounter.  34 minutes total were spent on this encounter.      Again, thank you for allowing me to participate in the care of your patient.      Sincerely,    Aleksander Morales MD

## 2022-02-07 NOTE — PROGRESS NOTES
NEUROSURGERY    HISTORY AND PHYSICAL EXAM    Chief Complaint   Patient presents with     RECHECK     UMP RETURN, DEPLETED DBS GENERATOR/BATTERY OVER THE LEFT CHEST WALL       HISTORY OF PRESENT ILLNESS  Ms. Irish Rosales is a 71 year old female with a history of dystonia/dystonic tremors and bilateral tremors, vocal dystonia and tremor, head tremor who now presents with depleted DBS generator/battery over the left chest wall.  She is well known to me and she is s/p left side deep brain stimulator placement, phase I, placement of left side deep brain stimulator electrode, target left ventral intermediate nucleus of the thalamus, with microelectrode recording on 3/6/2018, s/p deep brain stimulator placement, phase II, placement of deep brain stimulator generator/battery over the left chest wall on 3/13/2018, s/p right side deep brain stimulator placement, phase I, placement of right side deep brain stimulator electrodes, target left ventral intermediate nucleus and ventral oralis nucleus of the thalamus, with microelectrode recording on 1/8/2019, and s/p deep brain stimulator placement, phase II, placement of deep brain stimulator generator/battery over the right chest wall on 1/15/2019.  She tolerated the procedures well and there were no complications.  She did present for an evaluation with complaints of decreased range of motion in her neck and stiffness, without pain but it could not be attributed to DBS.  Since then she has done well and she is getting good therapy from the DBS.  She is in her usual state of health and she does not report any recent illness.  She did test positive for Covid 3 weeks ago.  She did not have any symptoms or illness associated with it.  She has not had any further testing.  She is not on any anticoagulant or antiplatelet therapy.    Briefly, the patient is a 71 year old right-handed female with history of dystonic tremor.  She has had bilateral upper extremity tremor her whole  life, but her vocal dystonia and tremor developed in her 40s which has been worsening in the last 3-4 years.  She also has cervical dystonia.  Her right side is more affected than her left side.  Her goals for DBS surgery are to decrease tremor, especially head and voice and improve dystonia.  She stated that she can live with her hand tremors but wants other symptoms treated.  Her case was discussed at the Movement Disorder Consensus Group meeting and the recommendation was left side Vim DBS with Abbott device and wait and see strategy.  She subsequently had the left side implanted back in March of 2018.  She then wanted to get her right side implanted.  She was again evaluated and subsequently approved for the right side implantation.  For the right side, two electrodes were recommended:  Vim and Vo.      Past Medical History:   Diagnosis Date     Depression      Depressive disorder      Dyslipidemia      Dystonic movements 1/21/2017     Dystonic tremor 1/21/2017     Family history of movement disorder 1/21/2017     mild abnormality in carotid study 3/2/2017    BILATERAL DUPLEX CAROTID ULTRASOUND March 1, 2017 1:01 PM    HISTORY: Other specified symptoms and signs involving the circulatory and respiratory systems.   COMPARISON: None.   FINDINGS: There is mild atherosclerotic plaque in the carotid bifurcations. Flow velocities and waveforms show less than 50% diameter stenosis in both the right and left internal carotid arteries as assessed by each ICA PS     Osteoporosis 8/2/2010       Past Surgical History:   Procedure Laterality Date     ------------OTHER-------------  2004    vocal cord thyroplasty at [facility]     APPENDECTOMY  1997     COLONOSCOPY       IMPLANT DEEP BRAIN STIMULATION GENERATOR / BATTERY Left 3/13/2018    Procedure: IMPLANT DEEP BRAIN STIMULATION GENERATOR / BATTERY;  Deep Brain Stimulator Placement, Phase II, Placement Of Deep Brain Stimulator Generator/Battery Over The Left Chest Wall;   Surgeon: Aleksander Morales MD;  Location: [facility] OR     IMPLANT DEEP BRAIN STIMULATION GENERATOR / BATTERY Right 1/15/2019    Procedure: Deep Brain Stimulator Placement, Phase II, Placement Of Deep Brain Stimulator Generator/Battery Over The Right Chest Wall;  Surgeon: Aleksander Morales MD;  Location: [facility] OR     OPTICAL TRACKING SYSTEM INSERTION DEEP BRAIN STIMULATION Left 3/6/2018    Procedure: OPTICAL TRACKING SYSTEM INSERTION DEEP BRAIN STIMULATION;  Stealth Assisted Left Deep Brain Stimulator Placement, Phase I, Placement Of Left Side Deep Brain Stimulator Electrode, Target Left Ventral Intermediate Nucleus Of The Thalamus With Microelectrode Recording;  Surgeon: Aleksander Morales MD;  Location: [facility] OR     OPTICAL TRACKING SYSTEM INSERTION DEEP BRAIN STIMULATION Right 1/8/2019    Procedure: Stealth Assisted Right Deep Brain Stimulator Placement, Phase One, Placement Of Right Side Deep Brain Stimulator Electrode Target Right Ventral Intermediate Nucleus Of The Thalamus and placement Second Electrode With Microelectrode Recording;  Surgeon: Aleksander Morales MD;  Location: [facility] OR     RELEASE CARPAL TUNNEL Left 1/2/2015    Procedure: RELEASE CARPAL TUNNEL;  Surgeon: SHANIA Hernandez MD;  Location: [facility] OR     RELEASE ULNAR NERVE (ELBOW) Left 1/2/2015    Procedure: RELEASE ULNAR NERVE (ELBOW);  Surgeon: SHANIA Hernandez MD;  Location: [facility] OR     ZC BSO, OMENTECTOMY W/XAVIER  1997    hysterectomy     Z HAND/FINGER SURGERY UNLISTED  1998    right carpal tunnel surgery       Family History   Problem Relation Age of Onset     Hypertension Father         and mother     Cerebrovascular Disease Father      Tremor Mother      Lung Cancer Mother      Neurologic Disorder Sister         dystonic voice x 2 botox     Neurologic Disorder Sister         jose m mn. 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   Tobacco Use     Smoking status: Never Smoker     Smokeless tobacco: Never Used   Substance and Sexual Activity     Alcohol use: Yes     Comment: occasionally     Drug use: No     Sexual activity: Yes     Partners: Male   Other Topics Concern     Parent/sibling w/ CABG, MI or angioplasty before 65F 55M? No      Service No     Blood Transfusions No     Caffeine Concern No     Occupational Exposure No     Hobby Hazards No     Sleep Concern No     Stress Concern No     Weight Concern No     Special Diet No     Back Care No     Exercise Yes     Comment: walk     Bike Helmet Yes     Seat Belt Yes     Self-Exams Yes   Social History Narrative        Lives in [place]    Daughter Karyn Sanchez        benign essential tremor with a strained quality to her voice consistent with mild laryngeal spasm        ALLERGIES:  She is allergic to sulfa drugs, atorvastatin and simvastatin.  The statin drugs caused leg cramps.            FAMILY HISTORY:  As noted above with 1 sister developing a voice tremor and another sister having what is described as a facial tremor.  Mother with hand tremors          SOCIAL HISTORY:  She does not smoke.  She does use alcohol on occasion. She previously worked as a RN.         Jazmyn barbara  [id number] orofacial dyskinesias    Piedad Candice [phone]  laryngeal dystonia and laryngeal tremor and laryngeal spasm.             Updated 6/13/17: Client was born in [place], [place] to parents Jerome and Berenice.  Client grew up w/ three sisters Tea, Carrie, and Jazmyn who are currently still living.  Client graduated from high school, attended college for two years, and graduated w/ a RN Degree.  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  She was  to her ex-spouse for 25 years and had three children; one son Bryan and two daughters Halley/Ysabel.  One daughter Halley Sanchez resides nearby and her other daughter Ysabel and her son Bryan reside in [place], [place].  Bryan has two daughters ages five and eight years old.  Client enjoys flying for free to [place] to visit her two granddaughters. Michelle Sutton, [facility] ([phone], Fax: [phone]).         Social Determinants of Health     Financial Resource Strain: Not on file   Food Insecurity: Not on file   Transportation Needs: Not on file   Physical Activity: Not on file   Stress: Not on file   Social Connections: Not on file   Intimate Partner Violence: Not on file   Housing Stability: Not on file          Allergies   Allergen Reactions     Sulfa Drugs Swelling and Rash     Atorvastatin      Leg cramps     Simvastatin      Leg cramp       Current Outpatient Medications   Medication     CALCIUM-VITAMIN D PO     cyanocobalamin (VITAMIN B-12) 1000 MCG tablet     rosuvastatin (CRESTOR) 20 MG tablet     sertraline (ZOLOFT) 100 MG tablet     ALPRAZolam (XANAX) 0.5 MG tablet     traZODone (DESYREL) 50 MG tablet     Current Facility-Administered Medications   Medication     botulinum toxin type A (BOTOX) 100 units injection 300 Units       REVIEW OF SYSTEMS:  General: Negative for chills/sweats/fever, difficulty sleeping, headache, recent fatigue, or weight gain/loss.  Eyes: Negative for blurred vision, crossed eyes, double vision, recent eye infections, vision flashes, or vision halos.  Ears/Nose/Mouth/Throat: Negative for bleeding gums, difficulty swallowing, earache, ear discharge, hearing loss, hoarseness, nosebleeds, tinnitus, or sinus problems.  Respiratory: Negative for chronic cough, coughing blood, night  sweats, shortness of breath, Tuberculosis, or wheezing.  Cardiovascular: Negative for chest pain, dyspnea at night, heart murmur, palpitations, pacemaker, poor circulation, swollen legs/feet, or varicose veins.  Gastrointestinal: Negative for melena, hematochezia, chronic diarrhea, heartburn, Hepatitis A/B/C, increasing constipation, Liver Disease, nausea, or vomiting.   Genitourinary: Negative for urinary retention, genital discharge, urinary incontinence, urgency, or UTI.   Neurological: Negative for syncope, headaches, numbness of arms/legs, tingling in hands/arms/legs, memory problems, or seizures.  Psychological: Negative for anxiety, depression, panic attacks, or restlessness.  Skin: Negative for chronic skin itching, color changes in hand/feet when cold, poor scarring, non-healing ulcers, skin rashes/hives, unusual moles.  Musculoskeletal: Negative for arthritis, joint swelling in hands/wrists/hips/knees/joints, muscle tenderness in arms/legs, or osteoporosis.  Endocrine: Negative for excessive thirst/hunger, intolerance for warm rooms, loss of libido, multiple broken bones, rapid weight gain/loss, galactorrhea, or thyroid issues.  Hematologic/Lymphatic: Negative for easy skin bruising, significant fatigue, prolonged bleeding, tender glands/lymph nodes.  Allergies: Negative for asthma or hay fever.      PHYSICAL EXAM  /85   Pulse 83   LMP  (LMP Unknown)   SpO2 97%     General: Awake, alert, oriented.  Well nourished, well developed, she is not in any acute distress.  HEENT: Head normocephalic, atraumatic. No carotid bruit. Neck supple. Good range of motion. No palpable thyroid mass.  Heart: Regular rhythm and rate. No murmurs.  Lungs: Clear to auscultation and percussion bilaterally. No ronchi, rales or wheeze.  Abdomen: Soft, non-tender, non-distended. No hepatosplenomegaly.  Extremity: Warm with no clubbing or cyanosis. No lower extremity edema.  Incisions: Bilateral frontal incisions well healed.   Bilateral parietal incisions well healed.  Bilateral chest wall incisions well healed.  No wound breakdown.  No exposed hardware.    Neurological  Awake, alert and oriented to date, time, place and person. Speech fluent.   Pupils equal, round, reactive to light.  Extraocular movement intact.  Visual Fields are full on confrontation.  Hearing is grossly normal to finger rub.   Facial sensation intact.  Face symmetric.  Tongue midline.  Uvula elevates equally.    Motor: full strength throughout.  Sensation: intact to light touch and pinpoint.  Deep tendon reflexes: trace throughout. Negative for clonus. Negative for Gilbert's sign. No dysmetria.      ASSESSMENT   71 year old female with a history of dystonia/dystonic tremors and bilateral tremors, vocal dystonia and tremor, head tremor.   S/p left side deep brain stimulator placement, phase I, placement of left side deep brain stimulator electrode, target left ventral intermediate nucleus of the thalamus, with microelectrode recording on 3/6/2018  S/p deep brain stimulator placement, phase II, placement of deep brain stimulator generator/battery over the left chest wall on 3/13/2018.    S/p right side deep brain stimulator placement, phase I, placement of right side deep brain stimulator electrodes, target left ventral intermediate nucleus and ventral oralis nucleus of the thalamus, with microelectrode recording on 1/8/2019  S/p deep brain stimulator placement, phase II, placement of deep brain stimulator generator/battery over the right chest wall on 1/15/2019.  Depleted DBS generator/battery over the left chest wall.    Ms. Irish Rosales presents with depleted DBS generator/battery over the left chest wall.  Her  is giving her a message that the left side/chest wall DBS generator/battery will need replacement soon.  Back in 1/16/2022, it was interrogated also and the power levels were as follows:    LEFT chest wall IPG, Abbott Infinity 5 - 3/13/2018  (Left Vim), 2.76 V.  Close to 4 years of use.  RIGHT chest wall IPG, Abbott Infinity 7 - 1/15/2019 (two electrodes, Right Vim, Vo), 2.92 V.  Three years and still going.    Now her LEFT chest wall IPG has run out of power and is depleted, as it is giving a replacement message.  Her right side generator/battery has some time left, as it is in the 2.90's range.    We did briefly discuss that a rechargeable generator/battery option may be available in the future.  However, given her replacement interval, I do not think that a rechargeable system would be beneficial.    During today's visit, we discussed the surgical procedure for replacing the DBS generator/battery over the left chest wall. It is now depleted.  She currently has an Abbott Infinity 5 generator/battery, and this will be maintained during the upcoming procedure.  Risks, benefits, alternative therapies were discussed with the patient, including but not limited to infection and bleeding.  This surgical procedure will be performed under MAC with local anesthetic.  The thinned part of the wound/pocket may be corrected with mobilization of the tissue under the incision.  The pocket may need to be enlarged to minimize the stress on the closure.  Surgical procedure was discussed in detail.  All questions were answered, and she expressed understanding.    Complete history and physical exam has been performed today.      PLAN  1.  She will undergo replacement of the DBS generator/battery over the left chest wall under MAC with local anesthetic.   2.  Preop labs - she will get them today.  3.  PAC visit is not needed.  Complete history and physical exam has been completed with this visit.  4.  Negative Covid test within 4 days of the surgery is not needed, as she has been tested positive three weeks ago and she has been symptom free.      19 minutes were spent face to face/on video with the patient of which more than 50% of the time was spent counseling and  discussing the above issues regarding diagnosis, differential, treatment options, and steps for further evaluation.  5 minutes were spent reviewing patient's chart.  10 minutes were spent on documentation for this encounter.  34 minutes total were spent on this encounter.

## 2022-02-08 ENCOUNTER — TELEPHONE (OUTPATIENT)
Dept: NEUROSURGERY | Facility: CLINIC | Age: 72
End: 2022-02-08
Payer: COMMERCIAL

## 2022-02-08 DIAGNOSIS — Z11.59 ENCOUNTER FOR SCREENING FOR OTHER VIRAL DISEASES: Primary | ICD-10-CM

## 2022-02-08 LAB — BACTERIA UR CULT: NORMAL

## 2022-02-08 NOTE — TELEPHONE ENCOUNTER
Called patient to schedule surgery with Dr. Morales. Patients covid test has been scheduled and pre op will be taken care of with PCP in the 30 day time frame requirement. Patient is aware that pre op nurse will be calling 2-3 days prior to confirm arrival time and instructions. Packet to be sent out within next few days.    Surgeon: Dr. Morales  Date of Surgery: 2/18/2022  Location of surgery: [facility]  Pre-Op H&P: Completed  Post-Op Appt Date: 3/7/2022   Imaging needed:  No  Discussed COVID-19 testing:  Yes  Pre-cert/Authorization completed:  Yes  Patient aware that pre-op RN will call 2-3 days prior to surgery with arrival time and instructions Yes  Packet sent out: No 02/08/22  Patient was instructed to review packet and call back with any questions or concerns.       Evangelina Correa on 2/8/2022 at 8:38 AM

## 2022-02-09 ENCOUNTER — PATIENT OUTREACH (OUTPATIENT)
Dept: GERIATRIC MEDICINE | Facility: CLINIC | Age: 72
End: 2022-02-09
Payer: COMMERCIAL

## 2022-02-09 NOTE — PROGRESS NOTES
Received call from Irish. She will be moving on April 1st to [facility] in [place]. Has not been happy with her current living situation since new management took over and there are no longer activities or supports offered. [facility] offers activities and support for residents. Will verify with Irish in April that she moved before updating address.    New Address 04/01/22:  [facility]  [address],   [address]  [place] [address]    [facility]  [phone]  Fax: [phone]

## 2022-02-17 PROBLEM — Z96.89 STATUS POST DEEP BRAIN STIMULATOR PLACEMENT: Status: ACTIVE | Noted: 2017-02-27

## 2022-02-18 ENCOUNTER — ANESTHESIA EVENT (OUTPATIENT)
Dept: SURGERY | Facility: CLINIC | Age: 72
End: 2022-02-18
Payer: COMMERCIAL

## 2022-02-18 ENCOUNTER — HOSPITAL ENCOUNTER (OUTPATIENT)
Facility: CLINIC | Age: 72
Discharge: HOME | End: 2022-02-18
Attending: NEUROLOGICAL SURGERY | Admitting: NEUROLOGICAL SURGERY
Payer: COMMERCIAL

## 2022-02-18 ENCOUNTER — PATIENT OUTREACH (OUTPATIENT)
Dept: GERIATRIC MEDICINE | Facility: CLINIC | Age: 72
End: 2022-02-18

## 2022-02-18 ENCOUNTER — ANESTHESIA (OUTPATIENT)
Dept: SURGERY | Facility: CLINIC | Age: 72
End: 2022-02-18
Payer: COMMERCIAL

## 2022-02-18 VITALS
BODY MASS INDEX: 25.76 KG/M2 | RESPIRATION RATE: 14 BRPM | TEMPERATURE: 97.7 F | HEART RATE: 77 BPM | OXYGEN SATURATION: 98 % | DIASTOLIC BLOOD PRESSURE: 70 MMHG | HEIGHT: 62 IN | SYSTOLIC BLOOD PRESSURE: 141 MMHG | WEIGHT: 139.99 LBS

## 2022-02-18 DIAGNOSIS — Z96.89 STATUS POST DEEP BRAIN STIMULATOR PLACEMENT: Primary | ICD-10-CM

## 2022-02-18 DIAGNOSIS — G24.9 DYSTONIC MOVEMENTS: Chronic | ICD-10-CM

## 2022-02-18 DIAGNOSIS — Z45.42 END OF BATTERY LIFE OF DEEP BRAIN STIMULATOR: ICD-10-CM

## 2022-02-18 DIAGNOSIS — G25.2 DYSTONIC TREMOR: ICD-10-CM

## 2022-02-18 LAB — GLUCOSE BLDC GLUCOMTR-MCNC: 96 MG/DL (ref 70–99)

## 2022-02-18 PROCEDURE — 370N000017 HC ANESTHESIA TECHNICAL FEE, PER MIN: Performed by: NEUROLOGICAL SURGERY

## 2022-02-18 PROCEDURE — 710N000012 HC RECOVERY PHASE 2, PER MINUTE: Performed by: NEUROLOGICAL SURGERY

## 2022-02-18 PROCEDURE — 250N000011 HC RX IP 250 OP 636: Performed by: NEUROLOGICAL SURGERY

## 2022-02-18 PROCEDURE — 272N000001 HC OR GENERAL SUPPLY STERILE: Performed by: NEUROLOGICAL SURGERY

## 2022-02-18 PROCEDURE — 250N000009 HC RX 250: Performed by: NEUROLOGICAL SURGERY

## 2022-02-18 PROCEDURE — 250N000011 HC RX IP 250 OP 636: Performed by: ANESTHESIOLOGY

## 2022-02-18 PROCEDURE — 999N000141 HC STATISTIC PRE-PROCEDURE NURSING ASSESSMENT: Performed by: NEUROLOGICAL SURGERY

## 2022-02-18 PROCEDURE — 61885 INSRT/REDO NEUROSTIM 1 ARRAY: CPT | Mod: LT | Performed by: NEUROLOGICAL SURGERY

## 2022-02-18 PROCEDURE — 360N000076 HC SURGERY LEVEL 3, PER MIN: Performed by: NEUROLOGICAL SURGERY

## 2022-02-18 PROCEDURE — C1767 GENERATOR, NEURO NON-RECHARG: HCPCS | Performed by: NEUROLOGICAL SURGERY

## 2022-02-18 PROCEDURE — 250N000009 HC RX 250: Performed by: ANESTHESIOLOGY

## 2022-02-18 PROCEDURE — 82962 GLUCOSE BLOOD TEST: CPT

## 2022-02-18 PROCEDURE — 258N000003 HC RX IP 258 OP 636: Performed by: ANESTHESIOLOGY

## 2022-02-18 DEVICE — GENERATOR DBS SYSTEM INFINITY 5 IPG 6660ANS: Type: IMPLANTABLE DEVICE | Site: CHEST | Status: FUNCTIONAL

## 2022-02-18 RX ORDER — SODIUM CHLORIDE, SODIUM LACTATE, POTASSIUM CHLORIDE, CALCIUM CHLORIDE 600; 310; 30; 20 MG/100ML; MG/100ML; MG/100ML; MG/100ML
INJECTION, SOLUTION INTRAVENOUS CONTINUOUS PRN
Status: DISCONTINUED | OUTPATIENT
Start: 2022-02-18 | End: 2022-02-18

## 2022-02-18 RX ORDER — CEFAZOLIN SODIUM 1 G/50ML
2 SOLUTION INTRAVENOUS
Status: COMPLETED | OUTPATIENT
Start: 2022-02-18 | End: 2022-02-18

## 2022-02-18 RX ORDER — OXYCODONE AND ACETAMINOPHEN 5; 325 MG/1; MG/1
1 TABLET ORAL EVERY 6 HOURS PRN
Qty: 12 TABLET | Refills: 0 | Status: SHIPPED | OUTPATIENT
Start: 2022-02-18 | End: 2022-02-21

## 2022-02-18 RX ORDER — OXYCODONE HYDROCHLORIDE 5 MG/1
5 TABLET ORAL EVERY 4 HOURS PRN
Status: CANCELLED | OUTPATIENT
Start: 2022-02-18

## 2022-02-18 RX ORDER — SODIUM CHLORIDE, SODIUM LACTATE, POTASSIUM CHLORIDE, CALCIUM CHLORIDE 600; 310; 30; 20 MG/100ML; MG/100ML; MG/100ML; MG/100ML
INJECTION, SOLUTION INTRAVENOUS CONTINUOUS
Status: DISCONTINUED | OUTPATIENT
Start: 2022-02-18 | End: 2022-02-18 | Stop reason: HOSPADM

## 2022-02-18 RX ORDER — DEXAMETHASONE SODIUM PHOSPHATE 4 MG/ML
INJECTION, SOLUTION INTRA-ARTICULAR; INTRALESIONAL; INTRAMUSCULAR; INTRAVENOUS; SOFT TISSUE PRN
Status: DISCONTINUED | OUTPATIENT
Start: 2022-02-18 | End: 2022-02-18

## 2022-02-18 RX ORDER — FENTANYL CITRATE 50 UG/ML
25 INJECTION, SOLUTION INTRAMUSCULAR; INTRAVENOUS EVERY 5 MIN PRN
Status: CANCELLED | OUTPATIENT
Start: 2022-02-18

## 2022-02-18 RX ORDER — ONDANSETRON 2 MG/ML
INJECTION INTRAMUSCULAR; INTRAVENOUS PRN
Status: DISCONTINUED | OUTPATIENT
Start: 2022-02-18 | End: 2022-02-18

## 2022-02-18 RX ORDER — LIDOCAINE HYDROCHLORIDE 20 MG/ML
INJECTION, SOLUTION INFILTRATION; PERINEURAL PRN
Status: DISCONTINUED | OUTPATIENT
Start: 2022-02-18 | End: 2022-02-18

## 2022-02-18 RX ORDER — ONDANSETRON 4 MG/1
4 TABLET, ORALLY DISINTEGRATING ORAL EVERY 30 MIN PRN
Status: CANCELLED | OUTPATIENT
Start: 2022-02-18

## 2022-02-18 RX ORDER — HYDROMORPHONE HYDROCHLORIDE 1 MG/ML
0.2 INJECTION, SOLUTION INTRAMUSCULAR; INTRAVENOUS; SUBCUTANEOUS EVERY 5 MIN PRN
Status: CANCELLED | OUTPATIENT
Start: 2022-02-18

## 2022-02-18 RX ORDER — PROPOFOL 10 MG/ML
INJECTION, EMULSION INTRAVENOUS PRN
Status: DISCONTINUED | OUTPATIENT
Start: 2022-02-18 | End: 2022-02-18

## 2022-02-18 RX ORDER — EPHEDRINE SULFATE 50 MG/ML
INJECTION, SOLUTION INTRAMUSCULAR; INTRAVENOUS; SUBCUTANEOUS PRN
Status: DISCONTINUED | OUTPATIENT
Start: 2022-02-18 | End: 2022-02-18

## 2022-02-18 RX ORDER — LIDOCAINE 40 MG/G
CREAM TOPICAL
Status: DISCONTINUED | OUTPATIENT
Start: 2022-02-18 | End: 2022-02-18 | Stop reason: HOSPADM

## 2022-02-18 RX ORDER — PROPOFOL 10 MG/ML
INJECTION, EMULSION INTRAVENOUS CONTINUOUS PRN
Status: DISCONTINUED | OUTPATIENT
Start: 2022-02-18 | End: 2022-02-18

## 2022-02-18 RX ORDER — MEPERIDINE HYDROCHLORIDE 25 MG/ML
12.5 INJECTION INTRAMUSCULAR; INTRAVENOUS; SUBCUTANEOUS
Status: CANCELLED | OUTPATIENT
Start: 2022-02-18

## 2022-02-18 RX ORDER — SODIUM CHLORIDE, SODIUM LACTATE, POTASSIUM CHLORIDE, CALCIUM CHLORIDE 600; 310; 30; 20 MG/100ML; MG/100ML; MG/100ML; MG/100ML
INJECTION, SOLUTION INTRAVENOUS CONTINUOUS
Status: CANCELLED | OUTPATIENT
Start: 2022-02-18

## 2022-02-18 RX ORDER — FENTANYL CITRATE 50 UG/ML
25 INJECTION, SOLUTION INTRAMUSCULAR; INTRAVENOUS
Status: CANCELLED | OUTPATIENT
Start: 2022-02-18

## 2022-02-18 RX ORDER — CEFAZOLIN SODIUM 1 G/50ML
2 SOLUTION INTRAVENOUS SEE ADMIN INSTRUCTIONS
Status: DISCONTINUED | OUTPATIENT
Start: 2022-02-18 | End: 2022-02-18 | Stop reason: HOSPADM

## 2022-02-18 RX ORDER — ONDANSETRON 2 MG/ML
4 INJECTION INTRAMUSCULAR; INTRAVENOUS EVERY 30 MIN PRN
Status: CANCELLED | OUTPATIENT
Start: 2022-02-18

## 2022-02-18 RX ORDER — CEPHALEXIN 500 MG/1
500 CAPSULE ORAL 3 TIMES DAILY
Qty: 21 CAPSULE | Refills: 0 | Status: SHIPPED | OUTPATIENT
Start: 2022-02-18 | End: 2022-02-25

## 2022-02-18 RX ORDER — FENTANYL CITRATE 50 UG/ML
INJECTION, SOLUTION INTRAMUSCULAR; INTRAVENOUS PRN
Status: DISCONTINUED | OUTPATIENT
Start: 2022-02-18 | End: 2022-02-18

## 2022-02-18 RX ADMIN — LIDOCAINE HYDROCHLORIDE 40 MG: 20 INJECTION, SOLUTION INFILTRATION; PERINEURAL at 10:20

## 2022-02-18 RX ADMIN — SODIUM CHLORIDE, POTASSIUM CHLORIDE, SODIUM LACTATE AND CALCIUM CHLORIDE: 600; 310; 30; 20 INJECTION, SOLUTION INTRAVENOUS at 09:56

## 2022-02-18 RX ADMIN — PROPOFOL 20 MG: 10 INJECTION, EMULSION INTRAVENOUS at 10:32

## 2022-02-18 RX ADMIN — DEXAMETHASONE SODIUM PHOSPHATE 4 MG: 4 INJECTION, SOLUTION INTRA-ARTICULAR; INTRALESIONAL; INTRAMUSCULAR; INTRAVENOUS; SOFT TISSUE at 10:07

## 2022-02-18 RX ADMIN — ONDANSETRON 4 MG: 2 INJECTION INTRAMUSCULAR; INTRAVENOUS at 10:07

## 2022-02-18 RX ADMIN — Medication 5 MG: at 10:40

## 2022-02-18 RX ADMIN — PROPOFOL 20 MG: 10 INJECTION, EMULSION INTRAVENOUS at 10:02

## 2022-02-18 RX ADMIN — LIDOCAINE HYDROCHLORIDE 60 MG: 20 INJECTION, SOLUTION INFILTRATION; PERINEURAL at 09:59

## 2022-02-18 RX ADMIN — CEFAZOLIN 2 G: 10 INJECTION, POWDER, FOR SOLUTION INTRAVENOUS at 10:04

## 2022-02-18 RX ADMIN — FENTANYL CITRATE 25 MCG: 50 INJECTION, SOLUTION INTRAMUSCULAR; INTRAVENOUS at 10:32

## 2022-02-18 RX ADMIN — PROPOFOL 150 MCG/KG/MIN: 10 INJECTION, EMULSION INTRAVENOUS at 10:00

## 2022-02-18 RX ADMIN — FENTANYL CITRATE 25 MCG: 50 INJECTION, SOLUTION INTRAMUSCULAR; INTRAVENOUS at 10:25

## 2022-02-18 NOTE — DISCHARGE INSTRUCTIONS
[facility]  Same-Day Surgery   Adult Discharge Orders & Instructions     For 24 hours after surgery    1. Get plenty of rest.  A responsible adult must stay with you for at least 24 hours after you leave the hospital.   2. Do not drive or use heavy equipment.  If you have weakness or tingling, don't drive or use heavy equipment until this feeling goes away.  3. Do not drink alcohol.  4. Avoid strenuous or risky activities.  Ask for help when climbing stairs.   5. You may feel lightheaded.  IF so, sit for a few minutes before standing.  Have someone help you get up.   6. If you have nausea (feel sick to your stomach): Drink only clear liquids such as apple juice, ginger ale, broth or 7-Up.  Rest may also help.  Be sure to drink enough fluids.  Move to a regular diet as you feel able.  7. You may have a slight fever. Call the doctor if your fever is over 100 F (37.7 C) (taken under the tongue) or lasts longer than 24 hours.  8. You may have a dry mouth, a sore throat, muscle aches or trouble sleeping.  These should go away after 24 hours.  9. Do not make important or legal decisions.   Call your doctor for any of the followin.  Signs of infection (fever, growing tenderness at the surgery site, a large amount of drainage or bleeding, severe pain, foul-smelling drainage, redness, swelling).    2. It has been over 8 to 10 hours since surgery and you are still not able to urinate (pass water).    3.  Headache for over 24 hours.    To contact a doctor, call Dr Moralse at [phone] at the Neurosurgery Clinic from 8 am till 5 pm  or:        [phone] and ask for the resident on call for Neurosurgery (answered 24 hours a day)      Emergency Department:    [facility]: [phone]       (TTY for hearing impaired: [phone])    [facility]: [phone]       (TTY for hearing impaired: [phone])      
Never smoker

## 2022-02-18 NOTE — ANESTHESIA POSTPROCEDURE EVALUATION
Patient: Irish Rosales    Procedure: Procedure(s):  Replacement of deep brain stimulator generator/battery over left chest wall       Diagnosis:End of battery life of deep brain stimulator [Z45.42]  Status post deep brain stimulator placement [Z96.89]  Dystonic tremor [G25.2]  Cervical dystonia [G24.3]  Depression, major, in remission (H) [F32.5]  Diagnosis Additional Information: No value filed.    Anesthesia Type:  MAC    Note:  Disposition: Outpatient   Postop Pain Control: Uneventful            Sign Out: Well controlled pain   PONV: No   Neuro/Psych: Uneventful            Sign Out: Acceptable/Baseline neuro status   Airway/Respiratory: Uneventful            Sign Out: Acceptable/Baseline resp. status   CV/Hemodynamics: Uneventful            Sign Out: Acceptable CV status; No obvious hypovolemia; No obvious fluid overload   Other NRE: NONE   DID A NON-ROUTINE EVENT OCCUR? No           Last vitals:  Vitals Value Taken Time   /83 02/18/22 1128   Temp 36.8  C (98.2  F) 02/18/22 1120   Pulse 83 02/18/22 1128   Resp 15 02/18/22 1120   SpO2 99 % 02/18/22 1120   Vitals shown include unvalidated device data.    Electronically Signed By: Jef Johnson DO  February 18, 2022  11:43 AM

## 2022-02-18 NOTE — BRIEF OP NOTE
[facility]    Brief Operative Note    Pre-operative diagnosis: End of battery life of deep brain stimulator [Z45.42]  Status post deep brain stimulator placement [Z96.89]  Dystonic tremor [G25.2]  Cervical dystonia [G24.3]  Depression, major, in remission (H) [F32.5]  Post-operative diagnosis Same as pre-operative diagnosis    Procedure: Procedure(s):  Replacement of deep brain stimulator generator/battery over left chest wall  Surgeon: Surgeon(s) and Role:     * Aleksander Morales MD - Primary     * Marcello Orlando MD - Resident - Assisting  Anesthesia: Combined MAC with Local   Estimated Blood Loss: 1 mL from 2/18/2022  9:54 AM to 2/18/2022 11:15 AM      Drains: None  Specimens: * No specimens in log *  Findings:   Left DBS battery replaced, impedances normal.  Complications: None.  Implants:   Implant Name Type Inv. Item Serial No.  Lot No. LRB No. Used Action   GENERATOR DBS SYSTEM INFINITY 5 IPG 6660ANS - [id number] Neurology device GENERATOR DBS SYSTEM INFINITY 5 IPG 6660ANS [id number] [employer] N/A Left 1 Implanted   Implantable Pulse Generator Neurology device GENERATOR DBS SYSTEM INFINITY 5 IPG 6660ANS [id number] [employer]  Left 1 Explanted       NEUROSURGERY ATTENDING ATTESTATION: Aleksander SHORT M.D., Ph.D., Neurosurgery Attending, was present and scrubbed for the entire case and performed the key and critical portions of the case.

## 2022-02-18 NOTE — ANESTHESIA CARE TRANSFER NOTE
Patient: Irish Rosales    Procedure: Procedure(s):  Replacement of deep brain stimulator generator/battery over left chest wall       Diagnosis: End of battery life of deep brain stimulator [Z45.42]  Status post deep brain stimulator placement [Z96.89]  Dystonic tremor [G25.2]  Cervical dystonia [G24.3]  Depression, major, in remission (H) [F32.5]  Diagnosis Additional Information: No value filed.    Anesthesia Type:   MAC     Note:    Oropharynx: oropharynx clear of all foreign objects  Level of Consciousness: awake  Oxygen Supplementation: room air    Independent Airway: airway patency satisfactory and stable  Dentition: dentition unchanged  Vital Signs Stable: post-procedure vital signs reviewed and stable  Report to RN Given: handoff report given  Patient transferred to: Phase II    Handoff Report: Identifed the Patient, Identified the Reponsible Provider, Reviewed the pertinent medical history, Discussed the surgical course, Reviewed Intra-OP anesthesia mangement and issues during anesthesia, Set expectations for post-procedure period and Allowed opportunity for questions and acknowledgement of understanding      Vitals:  Vitals Value Taken Time   /65 02/18/22  1122   Temp     Pulse 78 02/18/22  1122   Resp 14 02/18/22  1122   SpO2 98% 02/18/22  1122       Electronically Signed By: RACHANA Hooks CRNA  February 18, 2022  11:22 AM

## 2022-02-18 NOTE — PROGRESS NOTES
Homemaking Search:    1. [facility] ([phone]): Spoke w/ Ian. [facility] is only staffing for [facility].     2. [facility] ([phone]): No staffing available.     3. [facility] ([phone]): Spoke tamica/ Gayle. No staffing available.      4. [facility] ([phone]): LM inquiring about staffing availability. Requested a call back.     Michelle Corcoran, [facility]  [phone]  Fax: [phone]

## 2022-02-18 NOTE — ANESTHESIA PREPROCEDURE EVALUATION
Anesthesia Pre-Procedure Evaluation    Patient: Irish Rosales   MRN: [id number] : 1950        Preoperative Diagnosis: End of battery life of deep brain stimulator [Z45.42]  Status post deep brain stimulator placement [Z96.89]  Dystonic tremor [G25.2]  Cervical dystonia [G24.3]  Depression, major, in remission (H) [F32.5]    Procedure : Procedure(s):  Replacement of deep brain stimulator generator/battery over left chest wall          Past Medical History:   Diagnosis Date     Depression      Depressive disorder      Dyslipidemia      Dystonic movements 2017     Dystonic tremor 2017     Family history of movement disorder 2017     mild abnormality in carotid study 3/2/2017    BILATERAL DUPLEX CAROTID ULTRASOUND 2017 1:01 PM    HISTORY: Other specified symptoms and signs involving the circulatory and respiratory systems.   COMPARISON: None.   FINDINGS: There is mild atherosclerotic plaque in the carotid bifurcations. Flow velocities and waveforms show less than 50% diameter stenosis in both the right and left internal carotid arteries as assessed by each ICA PS     Osteoporosis 2010      Past Surgical History:   Procedure Laterality Date     ------------OTHER-------------      vocal cord thyroplasty at [facility]     APPENDECTOMY       COLONOSCOPY       IMPLANT DEEP BRAIN STIMULATION GENERATOR / BATTERY Left 3/13/2018    Procedure: IMPLANT DEEP BRAIN STIMULATION GENERATOR / BATTERY;  Deep Brain Stimulator Placement, Phase II, Placement Of Deep Brain Stimulator Generator/Battery Over The Left Chest Wall;  Surgeon: Aleksander Morales MD;  Location: [facility] OR     IMPLANT DEEP BRAIN STIMULATION GENERATOR / BATTERY Right 1/15/2019    Procedure: Deep Brain Stimulator Placement, Phase II, Placement Of Deep Brain Stimulator Generator/Battery Over The Right Chest Wall;  Surgeon: Aleksander Morales MD;  Location: [facility] OR     OPTICAL TRACKING SYSTEM INSERTION DEEP BRAIN  STIMULATION Left 3/6/2018    Procedure: OPTICAL TRACKING SYSTEM INSERTION DEEP BRAIN STIMULATION;  Stealth Assisted Left Deep Brain Stimulator Placement, Phase I, Placement Of Left Side Deep Brain Stimulator Electrode, Target Left Ventral Intermediate Nucleus Of The Thalamus With Microelectrode Recording;  Surgeon: Aleksander Morales MD;  Location: [facility] OR     OPTICAL TRACKING SYSTEM INSERTION DEEP BRAIN STIMULATION Right 1/8/2019    Procedure: Stealth Assisted Right Deep Brain Stimulator Placement, Phase One, Placement Of Right Side Deep Brain Stimulator Electrode Target Right Ventral Intermediate Nucleus Of The Thalamus and placement Second Electrode With Microelectrode Recording;  Surgeon: Aleksander Morales MD;  Location: [facility] OR     RELEASE CARPAL TUNNEL Left 1/2/2015    Procedure: RELEASE CARPAL TUNNEL;  Surgeon: SHANIA Hernandez MD;  Location: [facility] OR     RELEASE ULNAR NERVE (ELBOW) Left 1/2/2015    Procedure: RELEASE ULNAR NERVE (ELBOW);  Surgeon: SHANIA Hernandez MD;  Location: [facility] OR     ZZC BSO, OMENTECTOMY W/XAVIER  1997    hysterectomy     ZZC HAND/FINGER SURGERY UNLISTED  1998    right carpal tunnel surgery      Allergies   Allergen Reactions     Sulfa Drugs Swelling and Rash     Atorvastatin      Leg cramps     Simvastatin      Leg cramp      Social History     Tobacco Use     Smoking status: Never Smoker     Smokeless tobacco: Never Used   Substance Use Topics     Alcohol use: Yes     Comment: occasionally      Wt Readings from Last 1 Encounters:   02/06/20 60.2 kg (132 lb 11.2 oz)        Anesthesia Evaluation            ROS/MED HX  ENT/Pulmonary:  - neg pulmonary ROS     Neurologic: Comment: Dystonic movement      Cardiovascular:     (+) Dyslipidemia -----    METS/Exercise Tolerance:     Hematologic:  - neg hematologic  ROS     Musculoskeletal:  - neg musculoskeletal ROS     GI/Hepatic:  - neg GI/hepatic ROS     Renal/Genitourinary:  - neg Renal ROS     Endo:  - neg endo ROS      Psychiatric/Substance Use:  - neg psychiatric ROS     Infectious Disease:  - neg infectious disease ROS     Malignancy:  - neg malignancy ROS     Other:  - neg other ROS          Physical Exam    Airway  airway exam normal      Mallampati: II   TM distance: > 3 FB   Neck ROM: full   Mouth opening: > 3 cm    Respiratory Devices and Support         Dental  no notable dental history         Cardiovascular   cardiovascular exam normal       Rhythm and rate: regular and normal     Pulmonary   pulmonary exam normal        breath sounds clear to auscultation           OUTSIDE LABS:  CBC:   Lab Results   Component Value Date    WBC 4.7 02/07/2022    WBC 5.2 08/08/2019    HGB 13.8 02/07/2022    HGB 14.3 08/08/2019    HCT 41.8 02/07/2022    HCT 42.3 08/08/2019     02/07/2022     08/08/2019     BMP:   Lab Results   Component Value Date     02/07/2022     01/09/2019    POTASSIUM 4.0 02/07/2022    POTASSIUM 3.2 (L) 01/09/2019    CHLORIDE 107 02/07/2022    CHLORIDE 106 01/09/2019    CO2 25 02/07/2022    CO2 27 01/09/2019    BUN 13 02/07/2022    BUN 10 01/09/2019    CR 0.64 02/07/2022    CR 0.54 01/09/2019    GLC 80 02/07/2022     (H) 01/09/2019     COAGS:   Lab Results   Component Value Date    PTT 30 02/07/2022    INR 0.95 02/07/2022     POC:   Lab Results   Component Value Date     (H) 01/15/2019     HEPATIC:   Lab Results   Component Value Date    ALBUMIN 4.4 10/25/2018    PROTTOTAL 7.9 10/25/2018    ALT 24 01/06/2021    AST 17 10/25/2018    ALKPHOS 62 10/25/2018    BILITOTAL 0.5 10/25/2018     OTHER:   Lab Results   Component Value Date    TJ 8.6 02/07/2022    PHOS 4.1 08/19/2010    TSH 1.44 10/25/2018       Anesthesia Plan    ASA Status:  2   NPO Status:  NPO Appropriate    Anesthesia Type: MAC.     - Reason for MAC: immobility needed   Induction: Intravenous.   Maintenance: TIVA.        Consents    Anesthesia Plan(s) and associated risks, benefits, and realistic alternatives  discussed. Questions answered and patient/representative(s) expressed understanding.    - Discussed:     - Discussed with:  Patient      - Extended Intubation/Ventilatory Support Discussed: No.      - Patient is DNR/DNI Status: No    Use of blood products discussed: No .     Postoperative Care    Pain management: IV analgesics.   PONV prophylaxis: Ondansetron (or other 5HT-3)     Comments:                Jef Johnson DO

## 2022-02-22 ENCOUNTER — PATIENT OUTREACH (OUTPATIENT)
Dept: NEUROSURGERY | Facility: CLINIC | Age: 72
End: 2022-02-22
Payer: COMMERCIAL

## 2022-02-22 NOTE — PROGRESS NOTES
[facility]: Post-Discharge Note  SITUATION                                                      Admission:    Admission Date: 02/18/22   Reason for Admission: Replacement of deep brain stimulator generator/battery over left chest wall  Discharge:   Discharge Date: 02/18/22  Discharge Diagnosis: DBS battery at end of life    BACKGROUND                                                      Per hospital discharge summary and inpatient provider notes:  Neurosurgery Discharge Coordination Note     Attending physician: Dr. Morales  Discharge to: Home     Current status: Patient states she has felt very good since surgery and denies pain. Denies redness, swelling, increased tenderness, drainage, incision opening or elevated temp. Slight bruising around incision site. Reports Incision CDI without signs of infection.  Denies current bowel or bladder issues.    Discharge instructions and medications reviewed with patient.  Follow up appointments/imaging/tests needed: 2 week post op with Dr. Morales on 3/7/22.     RN triage/on call number given: [phone]/ [phone]        ASSESSMENT           Discharge Assessment  How are your symptoms? (Red Flag symptoms escalate to triage hotline per guidelines): Improved  Do you feel your condition is stable enough to be safe at home until your provider visit?: Yes  Does the patient have their discharge instructions? : Yes  Does the patient have questions regarding their discharge instructions? : No  Does the patient have all of their medications?: Yes  Do you have questions regarding any of your medications? : No  Discharge follow-up appointment scheduled within 14 calendar days? : Yes  Discharge Follow Up Appointment Date: 03/07/22  Discharge Follow Up Appointment Scheduled with?: Specialty Care Provider         Post-op (Clinicians Only)  Did the patient have surgery or a procedure: Yes  Incision: closed;healing  Drainage: No  Bleeding: none  Fever: No  Chills: No  Redness:  No  Warmth: No  Swelling: No  Incision site pain: No  Closure: suture;dissolving  Eating & Drinking: eating and drinking without complaints/concerns  PO Intake: regular diet  Bowel Function: normal  Urinary Status: voiding without complaint/concerns        PLAN                                                      Outpatient Plan:  Routine postop follow-up      Future Appointments   Date Time Provider Department Center   2/24/2022  1:20 PM Braulio Victoria MD PhD [facility]   3/7/2022 10:15 AM Aleksander Morales MD [facility]   4/29/2022  8:00 AM Edwar Colvin MD [facility]         For any urgent concerns, please contact our 24 hour nurse triage line: [phone] ([phone])         NORBERTO GARCIA RN

## 2022-02-24 ENCOUNTER — OFFICE VISIT (OUTPATIENT)
Dept: FAMILY MEDICINE | Facility: CLINIC | Age: 72
End: 2022-02-24
Payer: COMMERCIAL

## 2022-02-24 VITALS
DIASTOLIC BLOOD PRESSURE: 60 MMHG | TEMPERATURE: 97.9 F | RESPIRATION RATE: 20 BRPM | SYSTOLIC BLOOD PRESSURE: 140 MMHG | HEART RATE: 80 BPM | OXYGEN SATURATION: 99 %

## 2022-02-24 DIAGNOSIS — E78.5 HYPERLIPIDEMIA LDL GOAL <130: ICD-10-CM

## 2022-02-24 DIAGNOSIS — Z71.89 ADVANCED DIRECTIVES, COUNSELING/DISCUSSION: ICD-10-CM

## 2022-02-24 DIAGNOSIS — E53.8 VITAMIN B12 DEFICIENCY (NON ANEMIC): ICD-10-CM

## 2022-02-24 DIAGNOSIS — Z23 NEED FOR TDAP VACCINATION: ICD-10-CM

## 2022-02-24 DIAGNOSIS — F32.5 DEPRESSION, MAJOR, IN REMISSION (H): ICD-10-CM

## 2022-02-24 DIAGNOSIS — M85.89 OSTEOPENIA OF MULTIPLE SITES: ICD-10-CM

## 2022-02-24 DIAGNOSIS — G47.00 INSOMNIA, UNSPECIFIED TYPE: ICD-10-CM

## 2022-02-24 DIAGNOSIS — Z00.00 ENCOUNTER FOR MEDICARE ANNUAL WELLNESS EXAM: Primary | ICD-10-CM

## 2022-02-24 PROCEDURE — 90715 TDAP VACCINE 7 YRS/> IM: CPT | Performed by: INTERNAL MEDICINE

## 2022-02-24 PROCEDURE — 99213 OFFICE O/P EST LOW 20 MIN: CPT | Mod: 25 | Performed by: INTERNAL MEDICINE

## 2022-02-24 PROCEDURE — 90471 IMMUNIZATION ADMIN: CPT | Performed by: INTERNAL MEDICINE

## 2022-02-24 PROCEDURE — G0439 PPPS, SUBSEQ VISIT: HCPCS | Performed by: INTERNAL MEDICINE

## 2022-02-24 RX ORDER — SERTRALINE HYDROCHLORIDE 100 MG/1
50 TABLET, FILM COATED ORAL DAILY
Qty: 90 TABLET | Refills: 0 | Status: SHIPPED | OUTPATIENT
Start: 2022-02-24 | End: 2022-02-24 | Stop reason: DRUGHIGH

## 2022-02-24 RX ORDER — TRAZODONE HYDROCHLORIDE 50 MG/1
50 TABLET, FILM COATED ORAL
Qty: 30 TABLET | Refills: 0 | Status: SHIPPED | OUTPATIENT
Start: 2022-02-24 | End: 2022-03-25

## 2022-02-24 ASSESSMENT — PAIN SCALES - GENERAL: PAINLEVEL: NO PAIN (0)

## 2022-02-24 ASSESSMENT — ENCOUNTER SYMPTOMS
MYALGIAS: 0
FEVER: 0
DIARRHEA: 0
SORE THROAT: 0
NAUSEA: 0
FREQUENCY: 0
HEMATURIA: 0
SHORTNESS OF BREATH: 0
ARTHRALGIAS: 0
JOINT SWELLING: 0
CHILLS: 0
WEAKNESS: 0
HEARTBURN: 0
COUGH: 0
HEADACHES: 0
NERVOUS/ANXIOUS: 0
PALPITATIONS: 0
BREAST MASS: 0
EYE PAIN: 0
ABDOMINAL PAIN: 0
HEMATOCHEZIA: 0
DIZZINESS: 0
PARESTHESIAS: 0
CONSTIPATION: 0
DYSURIA: 0

## 2022-02-24 ASSESSMENT — ACTIVITIES OF DAILY LIVING (ADL): CURRENT_FUNCTION: NO ASSISTANCE NEEDED

## 2022-02-24 NOTE — PATIENT INSTRUCTIONS
Patient Education   Personalized Prevention Plan  You are due for the preventive services outlined below.  Your care team is available to assist you in scheduling these services.  If you have already completed any of these items, please share that information with your care team to update in your medical record.  Health Maintenance Due   Topic Date Due     ANNUAL REVIEW OF  ORDERS  Never done     Osteoporosis Screening  04/03/2021     Diptheria Tetanus Pertussis (DTAP/TDAP/TD) Vaccine (2 - Td or Tdap) 07/20/2021     Cholesterol Lab  01/06/2022     Annual Wellness Visit  01/21/2022

## 2022-02-24 NOTE — PROGRESS NOTES
"SUBJECTIVE:   Irish Rosales is a 71 year old female who presents for Preventive Visit.      Patient has been advised of split billing requirements and indicates understanding: Yes  Are you in the first 12 months of your Medicare coverage?  No    Doing well. Has the generator for DBS changed recently.   BP is usually high in clinic. Home BP < 140/90. Last visit with neurosurgery was 128/65. This is more her norm.     Depression: doing well. Wondering about  Going on lower dose. Uses trazodone 1-2 times a month.     Healthy Habits:     In general, how would you rate your overall health?  Good    Frequency of exercise:  4-5 days/week    Duration of exercise:  15-30 minutes    Do you usually eat at least 4 servings of fruit and vegetables a day, include whole grains    & fiber and avoid regularly eating high fat or \"junk\" foods?  Yes    Taking medications regularly:  Yes    Medication side effects:  None    Ability to successfully perform activities of daily living:  No assistance needed    Home Safety:  No safety concerns identified    Hearing Impairment:  No hearing concerns    In the past 6 months, have you been bothered by leaking of urine?  No    In general, how would you rate your overall mental or emotional health?  Excellent      PHQ-2 Total Score: 0    Additional concerns today:  No    Do you feel safe in your environment? Yes    Have you ever done Advance Care Planning? (For example, a Health Directive, POLST, or a discussion with a medical provider or your loved ones about your wishes): Yes, advance care planning is on file.       Fall risk  Fallen 2 or more times in the past year?: (P) No  Any fall with injury in the past year?: (P) No    Cognitive Screening   1) Repeat 3 items (Leader, Season, Table)    2) Clock draw: NORMAL  3) 3 item recall: Recalls 3 objects  Results: 3 items recalled: COGNITIVE IMPAIRMENT LESS LIKELY    Mini-CogTM Copyright [employer]. Licensed by the author for use in [facility]; reprinted with permission (soob@[facility]). All rights reserved.      Do you have sleep apnea, excessive snoring or daytime drowsiness?: no    Reviewed and updated as needed this visit by clinical staff   Tobacco  Allergies  Meds   Med Hx  Surg Hx  Fam Hx  Soc Hx        Reviewed and updated as needed this visit by Provider                 Social History     Tobacco Use     Smoking status: Never Smoker     Smokeless tobacco: Never Used   Substance Use Topics     Alcohol use: Yes     Comment: occasionally         Alcohol Use 2/24/2022   Prescreen: >3 drinks/day or >7 drinks/week? No   Prescreen: >3 drinks/day or >7 drinks/week? -         Current providers sharing in care for this patient include:   Patient Care Team:  Braulio Victoria MD PhD as PCP - General (Internal Medicine)  Michelle Corcoran LSW as Lead Care Coordinator  Braulio Victoria MD PhD as Assigned PCP  Piedad Rose RN as Specialty Care Coordinator (Neurology)  Pieter Chen MA as Medical Assistant (Neurology)  Edwar Colvin MD as Assigned Neuroscience Provider  Lilo Drew, PhD LP as Assigned Behavioral Health Provider    The following health maintenance items are reviewed in Epic and correct as of today:  Health Maintenance Due   Topic Date Due     DEXA  04/03/2021     LIPID  01/06/2022       Mammogram Screening: Mammogram Screening: Recommended mammography every 1-2 years with patient discussion and risk factor consideration    Breast CA Risk Assessment (FHS-7) 2/24/2022   Do you have a family history of breast, colon, or ovarian cancer? No / Unknown       Pertinent mammograms are reviewed under the imaging tab.    Review of Systems   Constitutional: Negative for chills and fever.   HENT: Negative for congestion, ear pain, hearing loss and sore throat.    Eyes: Negative for pain and visual disturbance.   Respiratory: Negative for cough and shortness of breath.    Cardiovascular: Negative for chest pain, palpitations and  "peripheral edema.   Gastrointestinal: Negative for abdominal pain, constipation, diarrhea, heartburn, hematochezia and nausea.   Breasts:  Negative for tenderness, breast mass and discharge.   Genitourinary: Negative for dysuria, frequency, genital sores, hematuria, pelvic pain, urgency, vaginal bleeding and vaginal discharge.   Musculoskeletal: Negative for arthralgias, joint swelling and myalgias.   Skin: Negative for rash.   Neurological: Negative for dizziness, weakness, headaches and paresthesias.   Psychiatric/Behavioral: Negative for mood changes. The patient is not nervous/anxious.        OBJECTIVE:   BP (!) 140/60   Pulse 80   Temp 97.9  F (36.6  C)   Resp 20   LMP  (LMP Unknown)   SpO2 99%  Estimated body mass index is 25.6 kg/m  as calculated from the following:    Height as of 2/18/22: 1.575 m (5' 2\").    Weight as of 2/18/22: 63.5 kg (139 lb 15.9 oz).  Physical Exam  GENERAL: healthy, alert and no distress  NECK: no adenopathy, no asymmetry, masses, or scars and thyroid normal to palpation  RESP: lungs clear to auscultation - no rales, rhonchi or wheezes  CV: regular rate and rhythm, normal S1 S2, no S3 or S4, no murmur, click or rub, no peripheral edema and peripheral pulses strong  ABDOMEN: soft, nontender, no hepatosplenomegaly, no masses and bowel sounds normal  MS: no gross musculoskeletal defects noted, no edema  NEURO: Normal strength and tone, mentation intact and speech normal  PSYCH: mentation appears normal, affect normal/bright    Diagnostic Test Results:  Return for fasting labs    ASSESSMENT / PLAN:   Irish was seen today for physical.    Diagnoses and all orders for this visit:    Encounter for Medicare annual wellness exam    Osteopenia of multiple sites  -     DEXA HIP/PELVIS/SPINE - Future; Future    Hyperlipidemia LDL goal <130  -     Lipid panel reflex to direct LDL Fasting; Future    Depression, major, in remission (H)  Comments:  Reduce dose to 50 mg. Follow up recheck in " "3-4 weeks.   Orders:  -     Discontinue: sertraline (ZOLOFT) 100 MG tablet; Take 0.5 tablets (50 mg) by mouth daily  -     sertraline (ZOLOFT) 50 MG tablet; Take 1 tablet (50 mg) by mouth daily    Vitamin B12 deficiency (non anemic)  -     Vitamin B12; Future    Advanced directives, counseling/discussion  -     Full Code    Insomnia, unspecified type  -     traZODone (DESYREL) 50 MG tablet; Take 1 tablet (50 mg) by mouth nightly as needed for sleep Average 1-2 times a month.    Need for Tdap vaccination  -     TDAP VACCINE (Adacel, Boostrix)  [[id number]]    Other orders  -     REVIEW OF HEALTH MAINTENANCE PROTOCOL ORDERS        Patient has been advised of split billing requirements and indicates understanding: Yes    COUNSELING:  Reviewed preventive health counseling, as reflected in patient instructions    Estimated body mass index is 25.6 kg/m  as calculated from the following:    Height as of 2/18/22: 1.575 m (5' 2\").    Weight as of 2/18/22: 63.5 kg (139 lb 15.9 oz).        She reports that she has never smoked. She has never used smokeless tobacco.      Appropriate preventive services were discussed with this patient, including applicable screening as appropriate for cardiovascular disease, diabetes, osteopenia/osteoporosis, and glaucoma.  As appropriate for age/gender, discussed screening for colorectal cancer, prostate cancer, breast cancer, and cervical cancer. Checklist reviewing preventive services available has been given to the patient.    Reviewed patients plan of care and provided an AVS. The Complex Care Plan (for patients with higher acuity and needing more deliberate coordination of services) for Irish meets the Care Plan requirement. This Care Plan has been established and reviewed with the Patient.    Counseling Resources:  ATP IV Guidelines  Pooled Cohorts Equation Calculator  Breast Cancer Risk Calculator  Breast Cancer: Medication to Reduce Risk  FRAX Risk Assessment  ICSI Preventive " Guidelines  Dietary Guidelines for Americans, 2010  USDA's MyPlate  ASA Prophylaxis  Lung CA Screening    Braulio Victoria MD PhD  [facility]    Identified Health Risks:

## 2022-02-24 NOTE — PROGRESS NOTES
Prior to immunization administration, verified patients identity using patient s name and date of birth. Please see Immunization Activity for additional information.     Screening Questionnaire for Adult Immunization    Are you sick today?   No   Do you have allergies to medications, food, a vaccine component or latex?   No   Have you ever had a serious reaction after receiving a vaccination?   No   Do you have a long-term health problem with heart, lung, kidney, or metabolic disease (e.g., diabetes), asthma, a blood disorder, no spleen, complement component deficiency, a cochlear implant, or a spinal fluid leak?  Are you on long-term aspirin therapy?   No   Do you have cancer, leukemia, HIV/AIDS, or any other immune system problem?   No   Do you have a parent, brother, or sister with an immune system problem?   No   In the past 3 months, have you taken medications that affect  your immune system, such as prednisone, other steroids, or anticancer drugs; drugs for the treatment of rheumatoid arthritis, Crohn s disease, or psoriasis; or have you had radiation treatments?   No   Have you had a seizure, or a brain or other nervous system problem?   No   During the past year, have you received a transfusion of blood or blood    products, or been given immune (gamma) globulin or antiviral drug?   No   For women: Are you pregnant or is there a chance you could become       pregnant during the next month?   No   Have you received any vaccinations in the past 4 weeks?   No     Immunization questionnaire answers were all negative.        Per orders of Dr. Victoria, injection of TDAP given by Agnes Ramey RN. Patient instructed to remain in clinic for 15 minutes afterwards, and to report any adverse reaction to me immediately.       Screening performed by Agnes Ramey RN on 2/24/2022 at 1:26 PM.

## 2022-03-01 NOTE — OP NOTE
PATIENT NAME: HARSHAD PAYNE  YOB: 1950  MRN:   [id number]  ACCOUNT:  [id number]      DATE OF PROCEDURE:  02/18/2022    PREOPERATIVE DIAGNOSIS:  1.  Dystonia/dystonic tremors and bilateral tremors, vocal dystonia and tremor, head tremor.   2.  Status post left side deep brain stimulator placement, phase I, placement of left side deep brain stimulator electrode, target left ventral intermediate nucleus of the thalamus, with microelectrode recording on 3/6/2018  3.  Status post deep brain stimulator placement, phase II, placement of deep brain stimulator generator/battery over the left chest wall on 3/13/2018.    4.  Status post right side deep brain stimulator placement, phase I, placement of right side deep brain stimulator electrodes, target left ventral intermediate nucleus and ventral oralis nucleus of the thalamus, with microelectrode recording on 1/8/2019  5.  Status post deep brain stimulator placement, phase II, placement of deep brain stimulator generator/battery over the right chest wall on 1/15/2019.  6.  Depleted deep brain stimulator generator/battery over the left chest wall.    POSTOPERATIVE DIAGNOSIS:  1.  Dystonia/dystonic tremors and bilateral tremors, vocal dystonia and tremor, head tremor.   2.  Status post left side deep brain stimulator placement, phase I, placement of left side deep brain stimulator electrode, target left ventral intermediate nucleus of the thalamus, with microelectrode recording on 3/6/2018  3.  Status post deep brain stimulator placement, phase II, placement of deep brain stimulator generator/battery over the left chest wall on 3/13/2018.    4.  Status post right side deep brain stimulator placement, phase I, placement of right side deep brain stimulator electrodes, target left ventral intermediate nucleus and ventral oralis nucleus of the thalamus, with microelectrode recording on 1/8/2019  5.  Status post deep brain stimulator placement, phase II,  placement of deep brain stimulator generator/battery over the right chest wall on 1/15/2019.  6.  Depleted deep brain stimulator generator/battery over the left chest wall.    PROCEDURES PERFORMED:  1.  Replacement of deep brain stimulator generator/battery, model Infinity 5, over the left chest wall with connection to one electrode array.  2.  Revision of the left chest wall generator/battery pocket.  3.  Electrical interrogation and analysis of the left side deep brain stimulator system.    ATTENDING SURGEON:  Aleksander Morales MD, PhD.    ASSISTANT SURGEON:  Marcello Orlando MD    ANESTHESIA:  Monitored anesthesia care and local anesthetic.    ESTIMATED BLOOD LOSS:  1 mL    COMPLICATIONS:  None    FINDINGS:  No sign of infection.  No sign of hardware breakage or damage.  With new generator/battery connected, all the impedance values were within normal.  No problems were found.    EXPLANTS:  Old Abbott DBS generator/battery, left chest wall, Infinity 5, REF [id number], S/N [id number]    IMPLANTS:  New Abbott DBS generator/battery, left chest wall, Infinity 5, REF 6660, S/N [id number]    INDICATIONS FOR PROCEDURE:  Ms. Irish Rosales is a 71 year old female with a history of dystonia/dystonic tremors and bilateral tremors, vocal dystonia and tremor, head tremor who now presents with depleted DBS generator/battery over the left chest wall.  She is well known to me and she is s/p left side deep brain stimulator placement, phase I, placement of left side deep brain stimulator electrode, target left ventral intermediate nucleus of the thalamus, with microelectrode recording on 3/6/2018, s/p deep brain stimulator placement, phase II, placement of deep brain stimulator generator/battery over the left chest wall on 3/13/2018, s/p right side deep brain stimulator placement, phase I, placement of right side deep brain stimulator electrodes, target left ventral intermediate nucleus and ventral oralis nucleus of the thalamus, with  microelectrode recording on 1/8/2019, and s/p deep brain stimulator placement, phase II, placement of deep brain stimulator generator/battery over the right chest wall on 1/15/2019.  She tolerated the procedures well and there were no complications.  She did present for an evaluation with complaints of decreased range of motion in her neck and stiffness, without pain but it could not be attributed to DBS.  Since then she has done well and she is getting good therapy from the DBS.  She is in her usual state of health and she does not report any recent illness.  She did test positive for Covid 3 weeks ago.  She did not have any symptoms or illness associated with it.  She has not had any further testing.  She is not on any anticoagulant or antiplatelet therapy.  Briefly, the patient is a 71 year old right-handed female with history of dystonic tremor.  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  She also has cervical dystonia.  Her right side is more affected than her left side.  Her goals for DBS surgery are to decrease tremor, especially head and voice and improve dystonia.  She stated that she can live with her hand tremors but wants other symptoms treated.  Her case was discussed at the [employer] meeting and the recommendation was left side Vim DBS with Abbott device and wait and see strategy.  She subsequently had the left side implanted back in March of 2018.  She then wanted to get her right side implanted.  She was again evaluated and subsequently approved for the right side implantation.  For the right side, two electrodes were recommended:  Vim and Vo.  The patient now has a depleted DBS generator/battery over the left chest wall.  Her  is giving her a message that the left side/chest wall DBS generator/battery will need replacement soon.  Back in  1/16/2022, it was interrogated also and the power levels were as follows: LEFT chest wall generator/battery, Abbott Infinity 5 - 3/13/2018 (Left Vim), 2.76 V.  Close to 4 years of use.  RIGHT chest wall generator/battery, Abbott Infinity 7 - 1/15/2019 (two electrodes, Right Vim, Vo), 2.92 V.  Three years and still going.  Now her LEFT chest wall generator/battery has run out of power and is depleted, as it is giving a replacement message.  Her right side generator/battery has some time left, as it is in the 2.90's range.  We did briefly discuss that a rechargeable generator/battery option may be available in the future.  However, given her replacement interval, I do not think that a rechargeable system would be beneficial.  We discussed the surgical procedure for replacing the DBS generator/battery over the left chest wall. It is now depleted.  She currently has an Abbott Infinity 5 generator/battery, and this will be maintained during the upcoming procedure.  Risks, benefits, alternative therapies were discussed with the patient, including but not limited to infection and bleeding.  This surgical procedure will be performed under MAC with local anesthetic.  The thinned part of the wound/pocket may be corrected with mobilization of the tissue under the incision.  The pocket may need to be enlarged to minimize the stress on the closure.  Surgical procedure was discussed in detail.  All questions were answered, and she expressed understanding.    DESCRIPTION OF PROCEDURE:  Informed consent was obtained from the patient and her left chest was marked, specifically the previous incision.  She was brought to the operating theater and was laid in a supine position.  Her left arm was tucked.  All pressure points were padded appropriately.  Monitored anesthesia care was induced and maintained throughout the entire case.  Her scar and previous incision over the left chest wall was prepped and draped in the usual sterile  fashion.  Time out was performed confirming the patient's identity, procedure to be performed, site and side of the surgery and the administration of prophylactic preoperative antibiotic.  Lidocaine 1% with 1:100,000 epinephrine mixed with Marcaine 1/4% plain, 50:50 mix, was injected in the subcutaneous space at the intended incision site, which was the previously well healed incision.  Total of 10 mL was used.  The left and right side DBS system was turned to the surgery safe mode.    We turned our attention to the left side.  We used a #10 blade scalpel to make the skin incision, going over the previously well healed scar.  We carried our dissection through the dermal layer until we reached the subcutaneous fat and then the generator/battery capsule.  The monopolar cautery was used to dissect the subcutaneous tissue and #10 blade to open the capsule, taking great care not to damage the hardware.  We gently opened up the fibrous capsule surrounding the generator/battery to expose it.  Care was taken to open the capsule while lifting the capsule itself away from the generator/battery, taking great care not to come in contact with the metal casing or the wire.  The generator/battery was mobile and we were able to remove it easily, taking care to attend to the location of the wire.  The scar surrounding and anchoring the extension wire was carefully dissected and wire freed.  The extension wire was examined and was found to be without any damage or erosion or defect.  There were no obvious signs of infection.    We then performed blunt and sharp dissection and monopolar cautery within the pocket after the generator/battery had been removed in order to revise the chest wall pocket for the new generator/battery, so as to reduce the tension on the wound closure.  Therefore, the pocket was generously enlarged.  We also dissected free and undermined the superior aspect of the pocket so that the closure would have less  tension after closure and more subcutaneous tissue would be available.  The entire cavity was copiously irrigated with normal saline and meticulous hemostasis was obtained and the pocket was dried.      The old generator/battery, Infinity 5, was disconnected from the extension wire that was inserted at the anterior portal.  We used a special wire pastor tool to do this.  The connector contacts were cleaned.  The new generator/battery, Abbott Infinity 5, was connected to the extension wire with the extension wire being inserted into the anterior portal.  Posterior portal was plugged with a dead end plug.  Therefore, one electrode array was connected to generator/battery and secured.    And then the new generator/battery was placed back into the pocket along with the excess extension wire being placed posteriorly and around the periphery of the generator/battery.  The generator/battery was secured to the new position within the pocket using two 2-0 Ethibond sutures.    The left side DBS system was tested and interrogated electrically and was found to be working well and the impedance values were noted to be within normal.  No problems were found.      With the satisfactory placement of the new system, we began closing the wound.  The deep pocket or capsule was reapproximated using 3-0 Vicryl sutures in an simple interrupted fashion.  The subcutaneous fat layer was then reapproximated using 3-0 Vicryl sutures in an inverted interrupted fashion to provide padding to the skin for the closure.  The dermal layer was reapproximated using 3-0 Vicryl sutures in an inverted interrupted fashion.  The skin was reapproximated using 4-0 Monocryl suture in a subcuticular fashion.  The incision was then cleaned with wet and dry sponges followed by ChloraPrep and then Exofin skin glue was applied.    The left and right side DBS system was turned back on to the therapeutic mode.    At the end of the case, all counts including needle,  sponge and instrument counts were correct x 2.  There were no complications.  Patient was awakened and taken to the recovery room in a stable condition.     ISolange M.D., Ph.D., Neurosurgery Attending, was present and scrubbed for the entire case and performed the key and critical portions of the case.      SOLANGE CHU MD, PHD

## 2022-03-07 ENCOUNTER — OFFICE VISIT (OUTPATIENT)
Dept: NEUROSURGERY | Facility: CLINIC | Age: 72
End: 2022-03-07
Payer: COMMERCIAL

## 2022-03-07 VITALS
DIASTOLIC BLOOD PRESSURE: 75 MMHG | OXYGEN SATURATION: 98 % | SYSTOLIC BLOOD PRESSURE: 127 MMHG | HEART RATE: 75 BPM | RESPIRATION RATE: 16 BRPM

## 2022-03-07 DIAGNOSIS — G24.3 CERVICAL DYSTONIA: ICD-10-CM

## 2022-03-07 DIAGNOSIS — Z96.89 STATUS POST DEEP BRAIN STIMULATOR PLACEMENT: ICD-10-CM

## 2022-03-07 DIAGNOSIS — G25.2 DYSTONIC TREMOR: ICD-10-CM

## 2022-03-07 DIAGNOSIS — Z45.42 END OF BATTERY LIFE OF DEEP BRAIN STIMULATOR: Primary | ICD-10-CM

## 2022-03-07 PROCEDURE — 99024 POSTOP FOLLOW-UP VISIT: CPT | Performed by: NEUROLOGICAL SURGERY

## 2022-03-07 ASSESSMENT — PAIN SCALES - GENERAL: PAINLEVEL: NO PAIN (0)

## 2022-03-07 NOTE — NURSING NOTE
Chief Complaint   Patient presents with     RECHECK     [facility] RETURN - 2 wk post op     [facility] Mock

## 2022-03-07 NOTE — PROGRESS NOTES
NEUROSURGERY    HISTORY AND PHYSICAL EXAM    Chief Complaint   Patient presents with     RECHECK     [facility] RETURN - 2 wk post op, S/P REPLACEMENT OF DEEP BRAIN STIMULATOR GENERATOR/BATTERY OVER THE LEFT CHEST WALL ON 2/18/2022       HISTORY OF PRESENT ILLNESS  Ms. Irish Rosales returns today for her follow-up after the left side DBS generator/battery replacement on 2/18/2022.  Patient reports that she is doing well and there are no complaints.  She reports no pain.  She denies any fevers, chills, nausea, vomiting, dizziness, weakness, numbness or seizure like activity.  She reports that the incision is looking good and there is no redness, no drainage and no fluid collection.  Overall, she is quite happy with the recent surgery.        In summary, Ms. Irish Rosales is a 71 year old female with a history of dystonia/dystonic tremors and bilateral tremors, vocal dystonia and tremor.  She is well known to me and she is s/p left side deep brain stimulator placement, phase I, placement of left side deep brain stimulator electrode, target left ventral intermediate nucleus of the thalamus, with microelectrode recording on 3/6/2018, s/p deep brain stimulator placement, phase II, placement of deep brain stimulator generator/battery over the left chest wall on 3/13/2018, s/p right side deep brain stimulator placement, phase I, placement of right side deep brain stimulator electrodes, target left ventral intermediate nucleus and ventral oralis nucleus of the thalamus, with microelectrode recording on 1/8/2019, and s/p deep brain stimulator placement, phase II, placement of deep brain stimulator generator/battery over the right chest wall on 1/15/2019.  She tolerated the procedures well and there were no complications.  She did present for an evaluation with complaints of decreased range of motion in her neck and stiffness, without pain but it could not be attributed to DBS.  Since then she has done well and she is getting  good therapy from the DBS.  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  She also has cervical dystonia.  Her right side is more affected than her left side.  Her goals for DBS surgery were to decrease tremor, especially head and voice and improve dystonia.  She stated that she can live with her hand tremors but wants other symptoms treated.  Her case was discussed at the Movement Disorder Consensus Group meeting and the recommendation was left side Vim DBS with Abbott device and wait and see strategy.  She subsequently had the left side implanted back in March of 2018.  She then wanted to get her right side implanted.  She was again evaluated and subsequently approved for the right side implantation.  For the right side, two electrodes were recommended:  Vim and Vo.        Past Medical History:   Diagnosis Date     Depression      Depressive disorder      Dyslipidemia      Dystonic movements 1/21/2017     Dystonic tremor 1/21/2017     Family history of movement disorder 1/21/2017     mild abnormality in carotid study 3/2/2017    BILATERAL DUPLEX CAROTID ULTRASOUND March 1, 2017 1:01 PM    HISTORY: Other specified symptoms and signs involving the circulatory and respiratory systems.   COMPARISON: None.   FINDINGS: There is mild atherosclerotic plaque in the carotid bifurcations. Flow velocities and waveforms show less than 50% diameter stenosis in both the right and left internal carotid arteries as assessed by each ICA PS     Osteoporosis 8/2/2010       Past Surgical History:   Procedure Laterality Date     ------------OTHER-------------  2004    vocal cord thyroplasty at [facility]     APPENDECTOMY  1997     COLONOSCOPY       IMPLANT DEEP BRAIN STIMULATION GENERATOR / BATTERY Left 3/13/2018    Procedure: IMPLANT DEEP BRAIN STIMULATION GENERATOR / BATTERY;  Deep Brain Stimulator Placement, Phase  II, Placement Of Deep Brain Stimulator Generator/Battery Over The Left Chest Wall;  Surgeon: Aleksander Morales MD;  Location: [facility] OR     IMPLANT DEEP BRAIN STIMULATION GENERATOR / BATTERY Right 1/15/2019    Procedure: Deep Brain Stimulator Placement, Phase II, Placement Of Deep Brain Stimulator Generator/Battery Over The Right Chest Wall;  Surgeon: Aleksander Morales MD;  Location: [facility] OR     OPTICAL TRACKING SYSTEM INSERTION DEEP BRAIN STIMULATION Left 3/6/2018    Procedure: OPTICAL TRACKING SYSTEM INSERTION DEEP BRAIN STIMULATION;  Stealth Assisted Left Deep Brain Stimulator Placement, Phase I, Placement Of Left Side Deep Brain Stimulator Electrode, Target Left Ventral Intermediate Nucleus Of The Thalamus With Microelectrode Recording;  Surgeon: Aleksander Morales MD;  Location: [facility] OR     OPTICAL TRACKING SYSTEM INSERTION DEEP BRAIN STIMULATION Right 1/8/2019    Procedure: Stealth Assisted Right Deep Brain Stimulator Placement, Phase One, Placement Of Right Side Deep Brain Stimulator Electrode Target Right Ventral Intermediate Nucleus Of The Thalamus and placement Second Electrode With Microelectrode Recording;  Surgeon: Aleksander Morales MD;  Location: [facility] OR     OTHER SURGICAL HISTORY  02/18/2022    DBS - put in new battery     RELEASE CARPAL TUNNEL Left 1/2/2015    Procedure: RELEASE CARPAL TUNNEL;  Surgeon: SHANIA Hernandez MD;  Location: [facility] OR     RELEASE ULNAR NERVE (ELBOW) Left 1/2/2015    Procedure: RELEASE ULNAR NERVE (ELBOW);  Surgeon: SHANIA Hernandez MD;  Location: [facility] OR     REPLACE DEEP BRAIN STIMULATION GENERATOR / BATTERY Left 2/18/2022    Procedure: Replacement of deep brain stimulator generator/battery over left chest wall;  Surgeon: Aleksander Morales MD;  Location:  OR     [facility] BSO, OMENTECTOMY W/XAVIER  1997    hysterectomy     [facility] HAND/FINGER SURGERY UNLISTED  1998    right carpal tunnel surgery       Social History     Socioeconomic History     Marital  status:      Spouse name: Not on file     Number of children: 3     Years of education: 14     Highest education level: Not on file   Occupational History     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   Tobacco Use     Smoking status: Never Smoker     Smokeless tobacco: Never Used   Vaping Use     Vaping Use: Never used   Substance and Sexual Activity     Alcohol use: Yes     Comment: occasionally     Drug use: No     Sexual activity: Yes     Partners: Male   Other Topics Concern     Parent/sibling w/ CABG, MI or angioplasty before 65F 55M? No      Service No     Blood Transfusions No     Caffeine Concern No     Occupational Exposure No     Hobby Hazards No     Sleep Concern No     Stress Concern No     Weight Concern No     Special Diet No     Back Care No     Exercise Yes     Comment: walk     Bike Helmet Yes     Seat Belt Yes     Self-Exams Yes   Social History Narrative        Lives in [place]    Daughter Karyn Sanchez        benign essential tremor with a strained quality to her voice consistent with mild laryngeal spasm        ALLERGIES:  She is allergic to sulfa drugs, atorvastatin and simvastatin.  The statin drugs caused leg cramps.            FAMILY HISTORY:  As noted above with 1 sister developing a voice tremor and another sister having what is described as a facial tremor.  Mother with hand tremors          SOCIAL HISTORY:  She does not smoke.  She does use alcohol on occasion. She previously worked as a RN.         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             Updated 6/13/17: Client was born in [place], [place] to parents Jerome and Berenice.  Client grew up w/ three sisters Tea, Carrie, and Jazmyn who are currently still living.  Client graduated from high school,  attended college for two years, and graduated w/ a RN Degree.  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  She was  to her ex-spouse for 25 years and had three children; one son Bryan and two daughters Halley/Ysabel.  One daughter Halley Sanchez resides nearby and her other daughter Ysabel and her son Bryan reside in [place], [place].  Bryan has two daughters ages five and eight years old.  Client enjoys flying for free to [place] to visit her two granddaughters. Michelle Sutton, [facility] ([phone], Fax: [phone]).         Social Determinants of Health     Financial Resource Strain: Not on file   Food Insecurity: Not on file   Transportation Needs: Not on file   Physical Activity: Not on file   Stress: Not on file   Social Connections: Not on file   Intimate Partner Violence: Not on file   Housing Stability: Not on file       Family History   Problem Relation Age of Onset     Hypertension Father         and mother     Cerebrovascular Disease Father      Tremor Mother      Lung Cancer Mother      Neurologic Disorder Sister         dystonic voice x 2 botox     Neurologic Disorder Sister         jose m mn. facial movement        Current Outpatient Medications   Medication     CALCIUM-VITAMIN D PO     cyanocobalamin (VITAMIN B-12) 1000 MCG tablet     rosuvastatin (CRESTOR) 20 MG tablet     sertraline (ZOLOFT) 50 MG tablet     traZODone (DESYREL) 50 MG tablet     Current Facility-Administered Medications   Medication     botulinum toxin type A (BOTOX) 100 units injection 300 Units       Allergies   Allergen Reactions     Sulfa Drugs Swelling and Rash     Atorvastatin      Leg cramps     Simvastatin      Leg cramp       REVIEW OF SYSTEMS: Also per HPI.  General: POSITIVE for fatigue.  Negative for chills/sweats/fever. difficulty  sleeping, headache, or weight gain/loss.  Eyes: Negative for blurred vision, crossed eyes, double vision, recent eye infections, vision flashes, or vision halos.  Ears/Nose/Mouth/Throat: Negative for bleeding gums, difficulty swallowing, earache, ear discharge, hearing loss, hoarseness, nosebleeds, tinnitus, or sinus problems.  Respiratory:Negative for chronic cough, coughing blood, night sweats, shortness of breath, Tuberculosis, or wheezing.  Cardiovascular: Negative for chest pain, dyspnea at night, heart murmur, palpitations, pacemaker, pacemaker, poor circulation, swollen legs/feet, or varicose veins.  Gastrointestinal: Negative for melena, hematochezia, chronic diarrhea, heartburn, Hepatitis A/B/C, increasing constipation, Liver Disease, nausea, or vomiting.   Genitourinary: Negative for Urinary retention, genital discharge, urinary incontinence, prostate problems, urgency, or UTI.   Neurological: POSITIVE for dystonia and tremor.  Negative for syncope, headaches, numbness of arms/legs, tingling in hands/arms/legs, memory problems, or seizures.  Psychological: Negative for anxiety, depression, panic attacks, or restlessness.  Skin: Negative for chronic skin itching, color changes in hand/feet when cold, poor scarring, non-healing ulcers, skin rashes/hives, unusual moles.  Musculoskeletal: Negative for arthritis, joint swelling in hands/wrists/hips/knees/joints, muscle tenderness in arms/legs, or osteoporosis.  Endocrine: Negative for excessive thirst/hunger, intolerance for warm rooms, loss of libido, multiple broken bones, rapid weight gain/loss, galactorrhea, or thyroid issues.  Hematologic/Lymphatic: Negative for easy skin bruising, significant fatigue, prolonged bleeding, tender glands/lymph nodes.  Allergies: Negative for asthma or hay fever.      PHYSICAL EXAM  /75   Pulse 75   Resp 16   LMP  (LMP Unknown)   SpO2 98%      General: Awake, alert, oriented. Well nourished, well developed, she is  not in any acute distress.  HEENT: Head normocephalic, atraumatic. No carotid bruit. Neck supple. Good range of motion. No palpable thyroid mass.  Extremity: Warm with no clubbing or cyanosis. No lower extremity edema.    Incisions: Left chest wall incision is healing well.  No redness.  No drainage.  No fluid collection.  Skin glue has fallen off.    Neurological  Awake, alert and oriented to date, time, place and person.  Speech is fluent.   Face symmetric.    Motor: full strength throughout.  Sensation: intact to light touch and pinpoint.      ASSESSMENT   71 year old female  Dystonia/dystonic tremors and bilateral tremors, vocal dystonia and tremor, head tremor.   S/p left side deep brain stimulator placement, phase I, placement of left side deep brain stimulator electrode, target left ventral intermediate nucleus of the thalamus, with microelectrode recording on 3/6/2018.  S/p deep brain stimulator placement, phase II, placement of deep brain stimulator generator/battery over the left chest wall on 3/13/2018.    S/p right side deep brain stimulator placement, phase I, placement of right side deep brain stimulator electrodes, target left ventral intermediate nucleus and ventral oralis nucleus of the thalamus, with microelectrode recording on 1/8/2019.  S/p deep brain stimulator placement, phase II, placement of deep brain stimulator generator/battery over the right chest wall on 1/15/2019.  S/p replacement of DBS generator/battery over the left chest wall on 2/18/2022.    Patient is recovering well from the recent DBS generator/battery replacement surgery.  Her incision is healing well with no signs of infection.  She is doing well overall.      Her second side or right side generator/battery is still going strong.  I did mention that [facility] is about to release a rechargeable generator/battery.  I told her to think about it but I also told her that her generator/battery replacement interval is not bad at more  than 4 years.      PLAN  1.  Follow up with neurosurgery as needed.      5 minutes were spent face to face with the patient of which more than 50% of the time was spent counseling and discussing the above issues regarding diagnosis, differential, treatment options, and steps for further evaluation.  5 minutes were spent reviewing patient's chart.  10 minutes were spent on documentation for this encounter.  20 minutes total were spent on this encounter.

## 2022-03-07 NOTE — LETTER
3/7/2022       RE: Irish Rosales  [id number]  [place] [address]     Dear Colleague,    Thank you for referring your patient, Irish Rosales, to the [facility] NEUROSURGERY CLINIC [place] at [facility]. Please see a copy of my visit note below.    NEUROSURGERY    HISTORY AND PHYSICAL EXAM    Chief Complaint   Patient presents with     RECHECK     UMP RETURN - 2 wk post op, S/P REPLACEMENT OF DEEP BRAIN STIMULATOR GENERATOR/BATTERY OVER THE LEFT CHEST WALL ON 2/18/2022       HISTORY OF PRESENT ILLNESS  Ms. Irish Rosales returns today for her follow-up after the left side DBS generator/battery replacement on 2/18/2022.  Patient reports that she is doing well and there are no complaints.  She reports no pain.  She denies any fevers, chills, nausea, vomiting, dizziness, weakness, numbness or seizure like activity.  She reports that the incision is looking good and there is no redness, no drainage and no fluid collection.  Overall, she is quite happy with the recent surgery.        In summary, Ms. Irish Rosales is a 71 year old female with a history of dystonia/dystonic tremors and bilateral tremors, vocal dystonia and tremor.  She is well known to me and she is s/p left side deep brain stimulator placement, phase I, placement of left side deep brain stimulator electrode, target left ventral intermediate nucleus of the thalamus, with microelectrode recording on 3/6/2018, s/p deep brain stimulator placement, phase II, placement of deep brain stimulator generator/battery over the left chest wall on 3/13/2018, s/p right side deep brain stimulator placement, phase I, placement of right side deep brain stimulator electrodes, target left ventral intermediate nucleus and ventral oralis nucleus of the thalamus, with microelectrode recording on 1/8/2019, and s/p deep brain stimulator placement, phase II, placement of deep brain stimulator  generator/battery over the right chest wall on 1/15/2019.  She tolerated the procedures well and there were no complications.  She did present for an evaluation with complaints of decreased range of motion in her neck and stiffness, without pain but it could not be attributed to DBS.  Since then she has done well and she is getting good therapy from the DBS.  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  She also has cervical dystonia.  Her right side is more affected than her left side.  Her goals for DBS surgery were to decrease tremor, especially head and voice and improve dystonia.  She stated that she can live with her hand tremors but wants other symptoms treated.  Her case was discussed at the Movement Disorder Consensus Group meeting and the recommendation was left side Vim DBS with Abbott device and wait and see strategy.  She subsequently had the left side implanted back in March of 2018.  She then wanted to get her right side implanted.  She was again evaluated and subsequently approved for the right side implantation.  For the right side, two electrodes were recommended:  Vim and Vo.        Past Medical History:   Diagnosis Date     Depression      Depressive disorder      Dyslipidemia      Dystonic movements 1/21/2017     Dystonic tremor 1/21/2017     Family history of movement disorder 1/21/2017     mild abnormality in carotid study 3/2/2017    BILATERAL DUPLEX CAROTID ULTRASOUND March 1, 2017 1:01 PM    HISTORY: Other specified symptoms and signs involving the circulatory and respiratory systems.   COMPARISON: None.   FINDINGS: There is mild atherosclerotic plaque in the carotid bifurcations. Flow velocities and waveforms show less than 50% diameter stenosis in both the right and left internal carotid arteries as assessed by each ICA PS     Osteoporosis 8/2/2010       Past Surgical  History:   Procedure Laterality Date     ------------OTHER-------------  2004    vocal cord thyroplasty at [facility]     APPENDECTOMY  1997     COLONOSCOPY       IMPLANT DEEP BRAIN STIMULATION GENERATOR / BATTERY Left 3/13/2018    Procedure: IMPLANT DEEP BRAIN STIMULATION GENERATOR / BATTERY;  Deep Brain Stimulator Placement, Phase II, Placement Of Deep Brain Stimulator Generator/Battery Over The Left Chest Wall;  Surgeon: Aleksander Morales MD;  Location: [facility] OR     IMPLANT DEEP BRAIN STIMULATION GENERATOR / BATTERY Right 1/15/2019    Procedure: Deep Brain Stimulator Placement, Phase II, Placement Of Deep Brain Stimulator Generator/Battery Over The Right Chest Wall;  Surgeon: Aleksander Morales MD;  Location: [facility] OR     OPTICAL TRACKING SYSTEM INSERTION DEEP BRAIN STIMULATION Left 3/6/2018    Procedure: OPTICAL TRACKING SYSTEM INSERTION DEEP BRAIN STIMULATION;  Stealth Assisted Left Deep Brain Stimulator Placement, Phase I, Placement Of Left Side Deep Brain Stimulator Electrode, Target Left Ventral Intermediate Nucleus Of The Thalamus With Microelectrode Recording;  Surgeon: Aleksander Morales MD;  Location: [facility] OR     OPTICAL TRACKING SYSTEM INSERTION DEEP BRAIN STIMULATION Right 1/8/2019    Procedure: Stealth Assisted Right Deep Brain Stimulator Placement, Phase One, Placement Of Right Side Deep Brain Stimulator Electrode Target Right Ventral Intermediate Nucleus Of The Thalamus and placement Second Electrode With Microelectrode Recording;  Surgeon: Aleksander Morales MD;  Location: [facility] OR     OTHER SURGICAL HISTORY  02/18/2022    DBS - put in new battery     RELEASE CARPAL TUNNEL Left 1/2/2015    Procedure: RELEASE CARPAL TUNNEL;  Surgeon: SHANIA Hernandez MD;  Location: [facility] OR     RELEASE ULNAR NERVE (ELBOW) Left 1/2/2015    Procedure: RELEASE ULNAR NERVE (ELBOW);  Surgeon: SHANIA Hernandez MD;  Location: [facility] OR     REPLACE DEEP BRAIN STIMULATION GENERATOR / BATTERY Left 2/18/2022     Procedure: Replacement of deep brain stimulator generator/battery over left chest wall;  Surgeon: Aleksander Morales MD;  [facility] OR     [facility] BSO, OMENTECTOMY W/XAVIER  1997    hysterectomy     ZZC HAND/FINGER SURGERY UNLISTED  1998    right carpal tunnel surgery       Social History     Socioeconomic History     Marital status:      Spouse name: Not on file     Number of children: 3     Years of education: 14     Highest education level: Not on file   Occupational History     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   Tobacco Use     Smoking status: Never Smoker     Smokeless tobacco: Never Used   Vaping Use     Vaping Use: Never used   Substance and Sexual Activity     Alcohol use: Yes     Comment: occasionally     Drug use: No     Sexual activity: Yes     Partners: Male   Other Topics Concern     Parent/sibling w/ CABG, MI or angioplasty before 65F 55M? No      Service No     Blood Transfusions No     Caffeine Concern No     Occupational Exposure No     Hobby Hazards No     Sleep Concern No     Stress Concern No     Weight Concern No     Special Diet No     Back Care No     Exercise Yes     Comment: walk     Bike Helmet Yes     Seat Belt Yes     Self-Exams Yes   Social History Narrative        Lives in [place]    Daughter Karyn Sanchez        benign essential tremor with a strained quality to her voice consistent with mild laryngeal spasm        ALLERGIES:  She is allergic to sulfa drugs, atorvastatin and simvastatin.  The statin drugs caused leg cramps.            FAMILY HISTORY:  As noted above with 1 sister developing a voice tremor and another sister having what is described as a facial tremor.  Mother with hand tremors          SOCIAL HISTORY:  She does not smoke.  She does use alcohol on occasion. She previously worked as a RN.         Jazmyn jimenez   [id number] orofacial dyskinesias    Piedad Harvey [phone]  laryngeal dystonia and laryngeal tremor and laryngeal spasm.             Updated 6/13/17: Client was born in [place], [place] to parents Jerome and Berenice.  Client grew up w/ three sisters Tea, Carrie, and Jazmyn who are currently still living.  Client graduated from high school, attended college for two years, and graduated w/ a RN Degree.  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  She was  to her ex-spouse for 25 years and had three children; one son Bryan and two daughters Halley/Ysabel.  One daughter Halley Sanchez resides nearby and her other daughter Ysabel and her son Bryan reside in [place], [place].  Bryan has two daughters ages five and eight years old.  Client enjoys flying for free to [place] to visit her two granddaughters. Michelle Sutton, [facility] ([phone], Fax: [phone]).         Social Determinants of Health     Financial Resource Strain: Not on file   Food Insecurity: Not on file   Transportation Needs: Not on file   Physical Activity: Not on file   Stress: Not on file   Social Connections: Not on file   Intimate Partner Violence: Not on file   Housing Stability: Not on file       Family History   Problem Relation Age of Onset     Hypertension Father         and mother     Cerebrovascular Disease Father      Tremor Mother      Lung Cancer Mother      Neurologic Disorder Sister         dystonic voice x 2 botox     Neurologic Disorder Sister         jose m mn. facial movement        Current Outpatient Medications   Medication     CALCIUM-VITAMIN D PO     cyanocobalamin (VITAMIN B-12) 1000 MCG tablet     rosuvastatin (CRESTOR) 20 MG tablet     sertraline (ZOLOFT) 50 MG tablet     traZODone (DESYREL) 50 MG tablet     Current Facility-Administered Medications    Medication     botulinum toxin type A (BOTOX) 100 units injection 300 Units       Allergies   Allergen Reactions     Sulfa Drugs Swelling and Rash     Atorvastatin      Leg cramps     Simvastatin      Leg cramp       REVIEW OF SYSTEMS: Also per HPI.  General: POSITIVE for fatigue.  Negative for chills/sweats/fever. difficulty sleeping, headache, or weight gain/loss.  Eyes: Negative for blurred vision, crossed eyes, double vision, recent eye infections, vision flashes, or vision halos.  Ears/Nose/Mouth/Throat: Negative for bleeding gums, difficulty swallowing, earache, ear discharge, hearing loss, hoarseness, nosebleeds, tinnitus, or sinus problems.  Respiratory:Negative for chronic cough, coughing blood, night sweats, shortness of breath, Tuberculosis, or wheezing.  Cardiovascular: Negative for chest pain, dyspnea at night, heart murmur, palpitations, pacemaker, pacemaker, poor circulation, swollen legs/feet, or varicose veins.  Gastrointestinal: Negative for melena, hematochezia, chronic diarrhea, heartburn, Hepatitis A/B/C, increasing constipation, Liver Disease, nausea, or vomiting.   Genitourinary: Negative for Urinary retention, genital discharge, urinary incontinence, prostate problems, urgency, or UTI.   Neurological: POSITIVE for dystonia and tremor.  Negative for syncope, headaches, numbness of arms/legs, tingling in hands/arms/legs, memory problems, or seizures.  Psychological: Negative for anxiety, depression, panic attacks, or restlessness.  Skin: Negative for chronic skin itching, color changes in hand/feet when cold, poor scarring, non-healing ulcers, skin rashes/hives, unusual moles.  Musculoskeletal: Negative for arthritis, joint swelling in hands/wrists/hips/knees/joints, muscle tenderness in arms/legs, or osteoporosis.  Endocrine: Negative for excessive thirst/hunger, intolerance for warm rooms, loss of libido, multiple broken bones, rapid weight gain/loss, galactorrhea, or thyroid  issues.  Hematologic/Lymphatic: Negative for easy skin bruising, significant fatigue, prolonged bleeding, tender glands/lymph nodes.  Allergies: Negative for asthma or hay fever.      PHYSICAL EXAM  /75   Pulse 75   Resp 16   LMP  (LMP Unknown)   SpO2 98%      General: Awake, alert, oriented. Well nourished, well developed, she is not in any acute distress.  HEENT: Head normocephalic, atraumatic. No carotid bruit. Neck supple. Good range of motion. No palpable thyroid mass.  Extremity: Warm with no clubbing or cyanosis. No lower extremity edema.    Incisions: Left chest wall incision is healing well.  No redness.  No drainage.  No fluid collection.  Skin glue has fallen off.    Neurological  Awake, alert and oriented to date, time, place and person.  Speech is fluent.   Face symmetric.    Motor: full strength throughout.  Sensation: intact to light touch and pinpoint.      ASSESSMENT   71 year old female  Dystonia/dystonic tremors and bilateral tremors, vocal dystonia and tremor, head tremor.   S/p left side deep brain stimulator placement, phase I, placement of left side deep brain stimulator electrode, target left ventral intermediate nucleus of the thalamus, with microelectrode recording on 3/6/2018.  S/p deep brain stimulator placement, phase II, placement of deep brain stimulator generator/battery over the left chest wall on 3/13/2018.    S/p right side deep brain stimulator placement, phase I, placement of right side deep brain stimulator electrodes, target left ventral intermediate nucleus and ventral oralis nucleus of the thalamus, with microelectrode recording on 1/8/2019.  S/p deep brain stimulator placement, phase II, placement of deep brain stimulator generator/battery over the right chest wall on 1/15/2019.  S/p replacement of DBS generator/battery over the left chest wall on 2/18/2022.    Patient is recovering well from the recent DBS generator/battery replacement surgery.  Her incision is  healing well with no signs of infection.  She is doing well overall.      Her second side or right side generator/battery is still going strong.  I did mention that Ana is about to release a rechargeable generator/battery.  I told her to think about it but I also told her that her generator/battery replacement interval is not bad at more than 4 years.      PLAN  1.  Follow up with neurosurgery as needed.      5 minutes were spent face to face with the patient of which more than 50% of the time was spent counseling and discussing the above issues regarding diagnosis, differential, treatment options, and steps for further evaluation.  5 minutes were spent reviewing patient's chart.  10 minutes were spent on documentation for this encounter.  20 minutes total were spent on this encounter.      Aleksander Morales MD

## 2022-03-14 ENCOUNTER — LAB (OUTPATIENT)
Dept: LAB | Facility: CLINIC | Age: 72
End: 2022-03-14
Payer: COMMERCIAL

## 2022-03-14 DIAGNOSIS — E78.5 HYPERLIPIDEMIA LDL GOAL <130: ICD-10-CM

## 2022-03-14 DIAGNOSIS — E53.8 VITAMIN B12 DEFICIENCY (NON ANEMIC): ICD-10-CM

## 2022-03-14 LAB
CHOLEST SERPL-MCNC: 249 MG/DL
FASTING STATUS PATIENT QL REPORTED: YES
HDLC SERPL-MCNC: 106 MG/DL
LDLC SERPL CALC-MCNC: 129 MG/DL
NONHDLC SERPL-MCNC: 143 MG/DL
TRIGL SERPL-MCNC: 68 MG/DL
VIT B12 SERPL-MCNC: 707 PG/ML (ref 193–986)

## 2022-03-14 PROCEDURE — 82607 VITAMIN B-12: CPT

## 2022-03-14 PROCEDURE — 80061 LIPID PANEL: CPT

## 2022-03-14 PROCEDURE — 36415 COLL VENOUS BLD VENIPUNCTURE: CPT

## 2022-03-21 ENCOUNTER — ANCILLARY PROCEDURE (OUTPATIENT)
Dept: BONE DENSITY | Facility: CLINIC | Age: 72
End: 2022-03-21
Attending: INTERNAL MEDICINE
Payer: COMMERCIAL

## 2022-03-21 DIAGNOSIS — M85.89 OSTEOPENIA OF MULTIPLE SITES: ICD-10-CM

## 2022-03-21 PROCEDURE — 77080 DXA BONE DENSITY AXIAL: CPT | Performed by: RADIOLOGY

## 2022-03-21 NOTE — RESULT ENCOUNTER NOTE
Dear Irihs,   Your recent test results showed the following:  -- B12 level is normal.   -- lipid panel has improved from last year. Continue with low fat low cholesterol diet.  -- no change in your current medications.     Please call or Mychart to our office if you have further questions.     Braulio Victoria MD-PhD

## 2022-03-23 ENCOUNTER — VIRTUAL VISIT (OUTPATIENT)
Dept: FAMILY MEDICINE | Facility: CLINIC | Age: 72
End: 2022-03-23
Payer: COMMERCIAL

## 2022-03-23 DIAGNOSIS — F32.5 DEPRESSION, MAJOR, IN REMISSION (H): ICD-10-CM

## 2022-03-23 PROCEDURE — 99213 OFFICE O/P EST LOW 20 MIN: CPT | Mod: 95 | Performed by: INTERNAL MEDICINE

## 2022-03-23 PROCEDURE — 96127 BRIEF EMOTIONAL/BEHAV ASSMT: CPT | Performed by: INTERNAL MEDICINE

## 2022-03-23 ASSESSMENT — ANXIETY QUESTIONNAIRES
6. BECOMING EASILY ANNOYED OR IRRITABLE: NOT AT ALL
7. FEELING AFRAID AS IF SOMETHING AWFUL MIGHT HAPPEN: NOT AT ALL
1. FEELING NERVOUS, ANXIOUS, OR ON EDGE: NOT AT ALL
3. WORRYING TOO MUCH ABOUT DIFFERENT THINGS: NOT AT ALL
5. BEING SO RESTLESS THAT IT IS HARD TO SIT STILL: NOT AT ALL
2. NOT BEING ABLE TO STOP OR CONTROL WORRYING: NOT AT ALL
IF YOU CHECKED OFF ANY PROBLEMS ON THIS QUESTIONNAIRE, HOW DIFFICULT HAVE THESE PROBLEMS MADE IT FOR YOU TO DO YOUR WORK, TAKE CARE OF THINGS AT HOME, OR GET ALONG WITH OTHER PEOPLE: NOT DIFFICULT AT ALL
GAD7 TOTAL SCORE: 0

## 2022-03-23 ASSESSMENT — PATIENT HEALTH QUESTIONNAIRE - PHQ9
5. POOR APPETITE OR OVEREATING: NOT AT ALL
SUM OF ALL RESPONSES TO PHQ QUESTIONS 1-9: 0

## 2022-03-23 NOTE — PROGRESS NOTES
Irish is a 71 year old who is being evaluated via a billable video visit.      How would you like to obtain your AVS? MyChart  If the video visit is dropped, the Invitation should be resent by: Text to cell phone: [phone]   Will anyone else be joining your video visit? No    Video Start Time: 2:08 PM    Assessment & Plan     Irish was seen today for recheck medication.    Diagnoses and all orders for this visit:    Depression, major, in remission (H)  Comments:  doing well. return next February for wellness visit         Return in about 11 months (around 2/23/2023) for wellness visit.    Braulio Victoria MD PhD  [facility]   Irish is a 71 year old who presents for the following health issues     History of Present Illness       Reason for visit:  Medication check    She eats 4 or more servings of fruits and vegetables daily.She consumes 0 sweetened beverage(s) daily.She exercises with enough effort to increase her heart rate 30 to 60 minutes per day.  She exercises with enough effort to increase her heart rate 5 days per week.   She is taking medications regularly.       Medication Followup of Zoloft    Taking Medication as prescribed: yes    Side Effects:  None    Medication Helping Symptoms:  yes     At her wellness visit 4 weeks ago, patient was doing well on sertraline 100 mg daily.  She is not sure if she needs to be on this high of the dose.  We asked her to reduce the dose to 50 mg.  Patient reported doing well at this dose.  No unwanted side effects.  She continues to feel well and will like to continue at this dose.  She takes trazodone for sleep at night on as-needed basis.  Typically 1-2 times a week at most.  She has not had any increase in insomnia.    Review of Systems   Constitutional, HEENT, cardiovascular, pulmonary, gi and gu systems are negative, except as otherwise noted.      Objective    Vitals - Patient Reported  Weight (Patient Reported): 59.9  "kg (132 lb)  Height (Patient Reported): 157.5 cm (5' 2\")  BMI (Based on Pt Reported Ht/Wt): 24.14  Pain Score: No Pain (0)      Vitals:  No vitals were obtained today due to virtual visit.    Physical Exam   GENERAL: Healthy, alert and no distress  EYES: Eyes grossly normal to inspection.  No discharge or erythema, or obvious scleral/conjunctival abnormalities.  RESP: No audible wheeze, cough, or visible cyanosis.  No visible retractions or increased work of breathing.    SKIN: Visible skin clear. No significant rash, abnormal pigmentation or lesions.  NEURO: Cranial nerves grossly intact.  Mentation and speech appropriate for age.  PSYCH: Mentation appears normal, affect normal/bright, judgement and insight intact, normal speech and appearance well-groomed.    PHQ-9 SCORE 1/21/2021 10/5/2021 3/23/2022   PHQ-9 Total Score - - -   PHQ-9 Total Score MyChart - 0 -   PHQ-9 Total Score 0 0 0     NAOMI-7 SCORE 10/25/2018 10/5/2021 3/23/2022   Total Score - - -   Total Score - 0 (minimal anxiety) -   Total Score 0 0 0        Video-Visit Details    Type of service:  Video Visit    Video End Time:2:21 PM    Originating Location (pt. Location): Home    Distant Location (provider location):  [facility]     Platform used for Video Visit: Genna"

## 2022-03-24 ASSESSMENT — ANXIETY QUESTIONNAIRES: GAD7 TOTAL SCORE: 0

## 2022-03-25 NOTE — RESULT ENCOUNTER NOTE
Dear Irish,   Your recent test results showed the following:  -- --The bone density test shows osteopenia. This is an intermediate category that is in between normal and osteoporosis.  People with osteopenia should work on taking in 3782-6925 mg of calcium with vitamin D 1000 international unit(s) daily. They should also be getting daily weight bearing exercise (walking works)       Please call or Mychart to our office if you have further questions.     Braulio Victoria MD-PhD

## 2022-04-11 ENCOUNTER — PATIENT OUTREACH (OUTPATIENT)
Dept: GERIATRIC MEDICINE | Facility: CLINIC | Age: 72
End: 2022-04-11
Payer: COMMERCIAL

## 2022-04-11 NOTE — PROGRESS NOTES
Homemaking Provider Search:    1. [facility] [phone]): Listed on [facility] s website as a PCA/homemaker provider. Does not provider PCA or homemaking.    2. [facility] ([email or web address]): Referral emailed to [facility] on 04/08/2022    3. [employer] [phone], [email or web address]) - Spoke w/ Kelli and emailed referral for review.   04/11/22: Does not accept [employer]. No staffing available.    4. [facility] [phone]): LM for intake inquiring about staffing availability and requested a call back or email.    5. [facility] [phone]): Does not provide staffing. Needs to have own staffing.  6. [facility] [phone]): No staffing available and are not accepting referrals.  7. [facility] [phone], Email: [email or web address]): LM and emailed inquiring if staffing was available or if they are accepting new referrals. Requested a return call or email.  04/11/2022: Received return email. No staffing available d/t staffing shortage.    8. [facility] [phone], [email or web address]: Spoke w/ Deborah. May have staffing. Emailed referral information to Deborah.     9. [facility] [phone]): No staffing available. Usually needs own staffing.  10. [facility] [phone]): No staffing available.  11. [facility] ([email or web address]): Emailed referral to Mac on 04/06/2022. No staffing available.    12. [facility] [phone], [email or web address]): LM and emailed Berenice inquiring if staffing was available or if they are accepting new referrals. Requested a return call or email.  13. [facility] [phone]): LM for intake inquiring about staffing availability. Requested a call back.  14. [place] [facility] ([email or web address]): No answer and unable to leave message.  Emailed inquiring about staffing availability. Requested a return email.    15. [employer] [facility] [phone]), [email or web address]): Spoke with Villa. Might possible have staffing available. Emailed referral to [facility].    16. [employer] [phone] ext [phone], [email or web address]): Spoke w/ Oxana the PCA coordinator. They might be able to provide staffing. Emailed referral and client demographics to Oxana.  04/12/22: Received email and spoke to Federica Headley  ([phone] Ext: [phone], Fax: [phone], Email: [email or web address]). Federica is able to look for staffing for [facility] clients (not [employer]). Emailed referral information and demographics.    17. [facility] [phone]), [email or web address]): Was directed to call Felicia ([phone]) . May possible have staffing available. Emailed referral and client demgrapchis to Felicia.    18. [facility] [phone]): LM inquiring about staffing and requested a call back.    19. [facility] ([phone], [email or web address]): [facility] is accepting referrals. Emailed referral and demographics.    Michelle Corcoran [facility]  [phone]  Fax: [phone]

## 2022-04-28 NOTE — PROGRESS NOTES
"72 yo RHF with ET, head & hands, somewhat dystonic also affecting voice, affected sibling.  Bi Vim (and R Vop) DBS.  Fair, but imperfect results, due to some combination of severity of tremor, proximal quality of tremor, ataxia with more intense stimulation (reported as tremor), and adaptation/rebound effect.  Getting botulinum toxin injections for dystonic head tremor.    To mitigate the adaptation/rebound, she is on a 4 week cycle of varying left/right programs.     Weeks                 Right brain        Left brain        1                      VIM           2b        2                      VIM          3abc        3                       VOP          3abc        4                      VOP           2b           At last visit:  Increased \"VIM only\" program, on IPG #2, Right brain, to maximum amplitude    Since last visit    Inquired about botulinum toxin injection follow-up (Mic leaving)    IPG replaced    Today    R hand tremor worse  ADL Sub-Scale 4/29/2022   1. Speaking 0   2. Feeding with a spoon 2   3. Drinking from a glass 4   4. Hygiene 2   5. Dressing 0   6. Pouring 3   7. Carrying food trays, plates or similar items 0   8. Using keys 3   9. Writing 2   10. Working 2   11. Overall disability with the most affected task 3   Name of most affected task: Eating, cooking   12. Social impact 0   ADL TOTAL 21         Motor (Performance) Sub-Scale 4/29/2022   Assessment Time 8:15 AM   Medication None   DBS - Right Brain On   DBS - Left Brain On   Head 0   Face & Jaw 0   Voice 2   Outstretched - RIGHT 2.5   Outstretched - LEFT 1   Wingbeating - RIGHT 2   Wingbeating - LEFT 2   Kinetic - RIGHT 2.5   Kinetic - LEFT 1.5   Lower Limb - RIGHT 0   Lower Limb - LEFT 0   Lower Limb (Max) 0   Spiral - RIGHT 2.5   Spiral - LEFT 3   Handwriting 1   Dot approx - RIGHT 3.5   Dot approx - LEFT 3   Trunk (Standing) 0   Total Right 13   Total Left 10.5   Axial 2   TOTAL 26.5       Lead(s):   Side Left Right Right   Target " "VIM Vop VIM   Lead  Abbott Abbott Abbott   Lead Model Infinity 0.5 Infinity 0.5 Infinity 0.5   Lead Implant Date 3/6/2018 1/8/2019 1/8/2019   IPG # 1 2 2      IPG(s):  IPG # 1 2   IPG  Abbott Abbott   IPG Model Infinity 5 Infinity 7   IPG serial # [id number]   IPG Implant Date 3/13/2018 1/15/2019   Location Left chest Right chest   Battery (V) 2.97 V (2.90) (2.92V) (2.94V)   System impedances normal normal       IPG # 1      Program Name \"2b\"  \"3abc\"    Side Left  Left    Initial/Final Initial Final Initial Final   Active/Inactive inactive inactive Active Active   Amplitude (mA) 5.0 5.0 4 4   Range (mA) 0-5.0 by 0.25 0-5.0 by 0.25 0-4.0 by 0.25 0-4.0 by 0.25   Pulse width (usec) 20 40 20 30   Freq (Hz) 200 200 200 200   Contacts C+2b- C+2b- C+3abc- C+3abc-          Programming:    Program 3abc: put amplitude to 2.0, increased PW to 30, amplitude back up to 4.0, with only transient paresth, and gait OK.  Program 3abc: put amplitude back to 2.0, increased PW to 40, amplitude back up to 4.0 only transient paresth, and gait OK    Program 2a: put amplitude to 3.0, increased PW to 30, amplitude back up to 5.0, only transient paresth, and gait OK  Program 2a: put amplitude back to 3.0, PW to 40, amplitude back up to 5.0, persistent paresth, subtle change in gait, awkward R armswing.  Program 2a: put PW back to 30 feels, now \"OK,\" with amplitude still at 5.0    See above for final settings of IPG #1  IPG #2 stayed the same, as follows:    IPG # 2     Program name Program 2     \"Side\" (i.e. target) Right Vop, lead 1 Right Vim lead 2   Initial/Final Initial & final Initial & final   Active/Inactive Inactive Inactive   Contacts C+, 2abc- C+, 11abc-   Amplitude (mA) 1.5 no range 2.5 [0-4 by 0.1]   Pulse Width (ms) 60 60   Frequency (Hz) 130 130      IPG # 2     Program name Vim only     \"Side\" (i.e. target) Right Vop, lead 1 Right Vim, lead 2    Initial/Final Initial & final Initial&final " "  Active/Inactive Active Active   Contacts C+2abc- C+11abc   Amplitude (mA)  0 no range 4.0 [0-4 by 0.1]   Pulse Width (ms)  20 20   Frequency ( Hz)  200 200      IPG # 2     Program name Vop only     Initial/Final Initial& final Initial & final   Active/Inactive inactive inactive   \"Side\" (i.e. target) Right Vop, lead 1 Right Vim, lead 2   Contacts C+, 2abc- C+,11abc-   Amplitude (mA) 4.0 [0-4 by 0.1] 0, no range   Pulse Width (ms) 20 20   Frequency ( Hz) 200 200   Ther Imp Ohm          Plan:  See AVS    DBS programming 0.75h.  Time this date with patient, reviewing records, and documentin.5h        "

## 2022-04-29 ENCOUNTER — OFFICE VISIT (OUTPATIENT)
Dept: NEUROLOGY | Facility: CLINIC | Age: 72
End: 2022-04-29
Payer: COMMERCIAL

## 2022-04-29 VITALS
HEART RATE: 87 BPM | OXYGEN SATURATION: 97 % | RESPIRATION RATE: 16 BRPM | TEMPERATURE: 97.6 F | DIASTOLIC BLOOD PRESSURE: 80 MMHG | SYSTOLIC BLOOD PRESSURE: 139 MMHG

## 2022-04-29 DIAGNOSIS — G25.0 ESSENTIAL TREMOR: Primary | ICD-10-CM

## 2022-04-29 DIAGNOSIS — Z96.89 STATUS POST DEEP BRAIN STIMULATOR PLACEMENT: ICD-10-CM

## 2022-04-29 PROCEDURE — 99214 OFFICE O/P EST MOD 30 MIN: CPT | Performed by: PSYCHIATRY & NEUROLOGY

## 2022-04-29 PROCEDURE — 95983 ALYS BRN NPGT PRGRMG 15 MIN: CPT | Performed by: PSYCHIATRY & NEUROLOGY

## 2022-04-29 PROCEDURE — 95984 ALYS BRN NPGT PRGRMG ADDL 15: CPT | Performed by: PSYCHIATRY & NEUROLOGY

## 2022-04-29 ASSESSMENT — ACTIVITIES OF DAILY LIVING (ADL)
WORKING: (2) MILDLY ABNORMAL. TREMOR INTERFERES WITH WORK, ABLE TO DO EVERYTHING, BUT WITH ERRORS.
USING_KEYS: (3) MODERATELY ABNORMAL. NEEDS TO USE TWO HANDS OR OTHER STRATEGIES TO PUT KEY IN LOCK.
OVERALL_DISABILITY_WITH_THE_MOST_AFFECTED_TASK: (3) MODERATELY ABNORMAL. CAN DO TASK BUT MUST USE STRATEGIES.
CARRYING_FOOD_TRAYS_PLATES_OR_SIMILAR_ITEMS: (0) NORMAL
SOCIAL_IMPACT: (0) NORMAL
POURING: (3) MODERATELY ABNORMAL. MUST USE TWO HANDS OR USES OTHER STRATEGIES TO AVOID SPILLING.
TOTAL_SCORE: 21
SPEAKING: (0) NORMAL
DRESS: (0) NORMAL
HYGIENE: (2) MILDLY ABNORMAL. SOME DIFFICULTY BUT CAN COMPLETE TASK.
WRITING: (2) MILDLY ABNORMAL. DIFFICULTY WRITING DUE TO THE TREMOR.
FEEDING_WITH_A_SPOON: (2) MILDLY ABNORMAL. SPILLS A LITTLE.
DRINKING_FROM_A_GLASS: (4) SEVERELY ABNORMAL. CANNOT DRINK FROM A GLASS OR USES STRAW OR SIPPY CUP.

## 2022-04-29 ASSESSMENT — PAIN SCALES - GENERAL: PAINLEVEL: NO PAIN (0)

## 2022-04-29 NOTE — PATIENT INSTRUCTIONS
"Continue to \"rotate\" your programs weekly, the same as before.  I have made a change (increased the \"pulse width\") to both of your left brain (right body) programs, trying to improve your right hand tremor.  Let us know in 1-2 weeks how you are doing.  If any mild side effects, then contact us sooner.  If you think you might be having a more serious side effect, then reduce the amplitude (milliamps) and contact us.  I'm asking for a 2-month follow-up.  If things are just fine, you can cancel it, but in that case please reschedule for 4 months (from today, approx).  Remember to tell them that this is a \"DBS appointment\" (one hour, not 30 min).  "

## 2022-05-20 ENCOUNTER — E-VISIT (OUTPATIENT)
Dept: URGENT CARE | Facility: URGENT CARE | Age: 72
End: 2022-05-20
Payer: COMMERCIAL

## 2022-05-20 DIAGNOSIS — L82.1 SEBORRHEIC KERATOSIS: Primary | ICD-10-CM

## 2022-05-20 PROCEDURE — 99421 OL DIG E/M SVC 5-10 MIN: CPT | Performed by: FAMILY MEDICINE

## 2022-05-24 ENCOUNTER — OFFICE VISIT (OUTPATIENT)
Dept: INTERNAL MEDICINE | Facility: CLINIC | Age: 72
End: 2022-05-24
Payer: COMMERCIAL

## 2022-05-24 VITALS
RESPIRATION RATE: 16 BRPM | TEMPERATURE: 98.7 F | DIASTOLIC BLOOD PRESSURE: 60 MMHG | HEART RATE: 84 BPM | SYSTOLIC BLOOD PRESSURE: 100 MMHG | OXYGEN SATURATION: 97 %

## 2022-05-24 DIAGNOSIS — L82.1 SEBORRHEIC KERATOSIS: Primary | ICD-10-CM

## 2022-05-24 PROCEDURE — 99213 OFFICE O/P EST LOW 20 MIN: CPT | Performed by: PHYSICIAN ASSISTANT

## 2022-05-24 NOTE — PROGRESS NOTES
Assessment & Plan     Seborrheic keratosis  Reassurance given no need for biopsy                See Patient Instructions    Return in about 2 months (around 8/1/2022) for dermatology .    Mira Lacey PA-C  [facility] MARCOS Coburn is a 71 year old who presents for the following health issues     History of Present Illness       Reason for visit:  Change in mole  Symptom onset:  1-3 days ago  Symptoms include:  Change in mole  Symptom intensity:  Mild  Symptom progression:  Staying the same  Had these symptoms before:  No  What makes it worse:  N/A  What makes it better:  N/A    She eats 2-3 servings of fruits and vegetables daily.She consumes 0 sweetened beverage(s) daily.She exercises with enough effort to increase her heart rate 30 to 60 minutes per day.  She exercises with enough effort to increase her heart rate 4 days per week.   She is taking medications regularly.             Review of Systems   Constitutional, HEENT, cardiovascular, pulmonary,  systems are negative, except as otherwise noted.      Objective    /60 (BP Location: Left arm, Patient Position: Chair, Cuff Size: Adult Regular)   Pulse 84   Temp 98.7  F (37.1  C) (Oral)   Resp 16   LMP  (LMP Unknown)   SpO2 97%   There is no height or weight on file to calculate BMI.  Physical Exam   GENERAL: healthy, alert and no distress  RESP: lungs clear to auscultation - no rales, rhonchi or wheezes  CV: regular rates and rhythm and normal S1 S2, no S3 or S4  SKIN: no suspicious lesions or rashes

## 2022-06-23 ENCOUNTER — OFFICE VISIT (OUTPATIENT)
Dept: NEUROLOGY | Facility: CLINIC | Age: 72
End: 2022-06-23
Payer: COMMERCIAL

## 2022-06-23 VITALS
HEART RATE: 70 BPM | SYSTOLIC BLOOD PRESSURE: 145 MMHG | RESPIRATION RATE: 16 BRPM | OXYGEN SATURATION: 97 % | DIASTOLIC BLOOD PRESSURE: 81 MMHG

## 2022-06-23 DIAGNOSIS — G25.2 DYSTONIC TREMOR: ICD-10-CM

## 2022-06-23 DIAGNOSIS — G25.0 ESSENTIAL TREMOR: ICD-10-CM

## 2022-06-23 DIAGNOSIS — Z96.89 STATUS POST DEEP BRAIN STIMULATOR PLACEMENT: ICD-10-CM

## 2022-06-23 DIAGNOSIS — G24.3 CERVICAL DYSTONIA: Primary | ICD-10-CM

## 2022-06-23 PROCEDURE — 95874 GUIDE NERV DESTR NEEDLE EMG: CPT | Performed by: PSYCHIATRY & NEUROLOGY

## 2022-06-23 PROCEDURE — 64616 CHEMODENERV MUSC NECK DYSTON: CPT | Mod: 50 | Performed by: PSYCHIATRY & NEUROLOGY

## 2022-06-23 ASSESSMENT — PAIN SCALES - GENERAL: PAINLEVEL: NO PAIN (0)

## 2022-06-23 NOTE — PROGRESS NOTES
Movement Disorders Botulinum Toxin Clinic Note    Chief Complaint: cervical dystonia     History of Present Illness:  Irish Rosales is a 71 year old female who presents to clinic for botulinum toxin injections for treatment of cervical dystonia. She also has essential tremor status post DBS.     She feels that CBD oil has helped her severe tightness      Response to Last Injection: 8/3/2021    Onset of effect: 1-2 days    Benefit from last injections: improved pain, pulling, tremor    Wearing off: a few months ago    Side effects: none, denies head heaviness or dysphagia        Current Outpatient Medications   Medication Sig Dispense Refill     CALCIUM-VITAMIN D PO Take 2 chew tab by mouth daily       cyanocobalamin (VITAMIN B-12) 1000 MCG tablet TAKE 1 TABLET(1000 MCG) BY MOUTH DAILY 100 tablet 2     rosuvastatin (CRESTOR) 20 MG tablet Take 1 tablet (20 mg) by mouth daily (Due for follow up with PCP for future refills) 90 tablet 3     sertraline (ZOLOFT) 50 MG tablet Take 1 tablet (50 mg) by mouth daily 90 tablet 1     traZODone (DESYREL) 50 MG tablet TAKE 1 TABLET BY MOUTH NIGHTLY AS NEEDED FOR SLEEP 90 tablet 2       Allergies: She is allergic to sulfa drugs, atorvastatin, and simvastatin.    Past Medical History:   Diagnosis Date     Depression      Depressive disorder      Dyslipidemia      Dystonic movements 1/21/2017     Dystonic tremor 1/21/2017     Family history of movement disorder 1/21/2017     mild abnormality in carotid study 3/2/2017    BILATERAL DUPLEX CAROTID ULTRASOUND March 1, 2017 1:01 PM    HISTORY: Other specified symptoms and signs involving the circulatory and respiratory systems.   COMPARISON: None.   FINDINGS: There is mild atherosclerotic plaque in the carotid bifurcations. Flow velocities and waveforms show less than 50% diameter stenosis in both the right and left internal carotid arteries as assessed by each ICA PS     Osteoporosis 8/2/2010       Past Surgical History:   Procedure  Laterality Date     ------------OTHER-------------  2004    vocal cord thyroplasty at [facility]     APPENDECTOMY  1997     COLONOSCOPY       IMPLANT DEEP BRAIN STIMULATION GENERATOR / BATTERY Left 3/13/2018    Procedure: IMPLANT DEEP BRAIN STIMULATION GENERATOR / BATTERY;  Deep Brain Stimulator Placement, Phase II, Placement Of Deep Brain Stimulator Generator/Battery Over The Left Chest Wall;  Surgeon: Aleksander Morales MD;  Location: [facility] OR     IMPLANT DEEP BRAIN STIMULATION GENERATOR / BATTERY Right 1/15/2019    Procedure: Deep Brain Stimulator Placement, Phase II, Placement Of Deep Brain Stimulator Generator/Battery Over The Right Chest Wall;  Surgeon: Aleksander Morales MD;  Location: [facility] OR     OPTICAL TRACKING SYSTEM INSERTION DEEP BRAIN STIMULATION Left 3/6/2018    Procedure: OPTICAL TRACKING SYSTEM INSERTION DEEP BRAIN STIMULATION;  Stealth Assisted Left Deep Brain Stimulator Placement, Phase I, Placement Of Left Side Deep Brain Stimulator Electrode, Target Left Ventral Intermediate Nucleus Of The Thalamus With Microelectrode Recording;  Surgeon: Aleksander Morales MD;  Location: [facility] OR     OPTICAL TRACKING SYSTEM INSERTION DEEP BRAIN STIMULATION Right 1/8/2019    Procedure: Stealth Assisted Right Deep Brain Stimulator Placement, Phase One, Placement Of Right Side Deep Brain Stimulator Electrode Target Right Ventral Intermediate Nucleus Of The Thalamus and placement Second Electrode With Microelectrode Recording;  Surgeon: Aleksander Morales MD;  Location: [facility] OR     OTHER SURGICAL HISTORY  02/18/2022    DBS - put in new battery     RELEASE CARPAL TUNNEL Left 1/2/2015    Procedure: RELEASE CARPAL TUNNEL;  Surgeon: SHANIA Hernandez MD;  Location: [facility] OR     RELEASE ULNAR NERVE (ELBOW) Left 1/2/2015    Procedure: RELEASE ULNAR NERVE (ELBOW);  Surgeon: SHANIA Hernandez MD;  Location: [facility] OR     REPLACE DEEP BRAIN STIMULATION GENERATOR / BATTERY Left 2/18/2022    Procedure:  Replacement of deep brain stimulator generator/battery over left chest wall;  Surgeon: Aleksander Morales MD;  [facility] OR     [facility] BSO, OMENTECTOMY W/XAVIER  1997    hysterectomy     ZZC HAND/FINGER SURGERY UNLISTED  1998    right carpal tunnel surgery       Social History     Socioeconomic History     Marital status:      Spouse name: Not on file     Number of children: 3     Years of education: 14     Highest education level: Not on file   Occupational History     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   Tobacco Use     Smoking status: Never Smoker     Smokeless tobacco: Never Used   Vaping Use     Vaping Use: Never used   Substance and Sexual Activity     Alcohol use: Yes     Comment: occasionally     Drug use: No     Sexual activity: Yes     Partners: Male   Other Topics Concern     Parent/sibling w/ CABG, MI or angioplasty before 65F 55M? No      Service No     Blood Transfusions No     Caffeine Concern No     Occupational Exposure No     Hobby Hazards No     Sleep Concern No     Stress Concern No     Weight Concern No     Special Diet No     Back Care No     Exercise Yes     Comment: walk     Bike Helmet Yes     Seat Belt Yes     Self-Exams Yes   Social History Narrative        Lives in [place]    Daughter Karyn Sanchez        benign essential tremor with a strained quality to her voice consistent with mild laryngeal spasm        ALLERGIES:  She is allergic to sulfa drugs, atorvastatin and simvastatin.  The statin drugs caused leg cramps.            FAMILY HISTORY:  As noted above with 1 sister developing a voice tremor and another sister having what is described as a facial tremor.  Mother with hand tremors          SOCIAL HISTORY:  She does not smoke.  She does use alcohol on occasion. She previously worked as a RN.         Jazmyn jimenez  [phone]  orofacial dyskinesias    Piedad Harvey [phone]  laryngeal dystonia and laryngeal tremor and laryngeal spasm.             Updated 17: Client was born in [place], [place] to parents Jerome and Berenice.  Client grew up w/ three sisters Tea, Carrie, and Jazmyn who are currently still living.  Client graduated from high school, attended college for two years, and graduated w/ a RN Degree.  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  She was  to her ex-spouse for 25 years and had three children; one son Bryan and two daughters Halley/Ysabel.  One daughter Halley Sanchez resides nearby and her other daughter Ysabel and her son Bryan reside in [place], [place].  Bryan has two daughters ages five and eight years old.  Client enjoys flying for free to [place] to visit her two granddaughters. Michelle Sutton, [facility] ([phone], Fax: [phone]).         Social Determinants of Health     Financial Resource Strain: Not on file   Food Insecurity: Not on file   Transportation Needs: Not on file   Physical Activity: Not on file   Stress: Not on file   Social Connections: Not on file   Intimate Partner Violence: Not on file   Housing Stability: Not on file       Family History   Problem Relation Age of Onset     Hypertension Father         and mother     Cerebrovascular Disease Father      Tremor Mother      Lung Cancer Mother      Neurologic Disorder Sister         dystonic voice x 2 botox     Neurologic Disorder Sister         jose m mn. facial movement        Physical Examination:  Vital Signs:   vitals were not taken for this visit.   Left laterocollis, left totocollis      BOTULINUM NEUROTOXIN INJECTION PROCEDURES:    VERIFICATION OF PATIENT IDENTIFICATION AND PROCEDURE     Initials   Patient Name acc   Patient  acc   Procedure Verified by: acc      Above assessments performed by:  Resident/Fellow         Isidra Brock MD          The attending provider was present for the entire procedure documented below.      Angelita Soriano DO      INDICATION/S FOR PROCEDURE/S:  Irish Rosales is a 71 year old year old patient with dystonia affecting the  head, neck and shoulder girdle musculature secondary to a diagnosis of cervical dystonia with associated  pain and spasms.     Her baseline symptoms have been recalcitrant to oral medications and conservative therapy.  She is here today for an injection of Botox.      GOAL OF PROCEDURE:  The goal of this procedure is to increase active range of motion and decrease pain  associated with dystonic movements.    TOTAL DOSE ADMINISTERED:  Dose Administered: 300 units Botox    Diluent Used:  Preservative Free Normal Saline    CONSENT:  The risks, benefits, and treatment options were discussed with Irish Rosales and she agreed to proceed.      Written consent was obtained by [facility].     EQUIPMENT USED:  Needle-37mm stimulating/recording  EMG/NCS Machine    SKIN PREPARATION:  Skin preparation was performed using an alcohol wipe.    GUIDANCE DESCRIPTION:  Electro-myographic guidance was necessary throughout the procedure to accurately identify all areas of dystonic muscles while avoiding injection of non-dystonic muscles, neighboring nerves and nearby vascular structures.     AREA/MUSCLE INJECTED:      Visual display of locations injections are scanned into the chart under MEDIA tab.    Muscles Injected Units Injected Number of Injections   Left trapezius 50 (35) 4   Left splenius capitis 45 (45) 2   Left levator scapulae 15 (0) 1   Right trapezius 75 (55) 5   Right splenius capitis 40 (20) 2   Right SCM 60 (25) 3   Right levator scapulae 15 (10) 1        Total Units Injected: 300    Unavoidable Waste: 0    Total Units Billed 300      The patient tolerated the injections without difficulty.      Assessment:     Irish Rosales is a 71 year old female with cervical dystonia and essential tremor status post DBS.  Today we did repeat botulinum toxin injections.    Plan  Follow-up in 3 months' time to consider repeat injections with Dr. Brock.      I, Angelita Soriano DO, personally saw this patient with the Movement Disorders Fellow and agree with the Dr. Brock's findings and plan of care as documented in the Dr. Brock's note. I personally performed salient aspects of the history and neurological examination.     I personally reviewed the medications and labs. I personally viewed the imaging, and agree with the interpretation documented by the fellow.    I personally performed or supervised all procedures.    Date of Service (when I saw the patient): 06/23/22    Time spent with patient: 20 minutes  45 minutes spent on date of encounter doing chart reviews and exam and documentation and further activities as noted above.     Angelita Soriano DO, MA   of Neurology   [facility]

## 2022-06-23 NOTE — LETTER
6/23/2022       RE: Irish Rosales  [address]  [place] [facility] [address]     Dear Colleague,    Thank you for referring your patient, Irish Rosales, to the [facility] [place] [facility]. Please see a copy of my visit note below.    Movement Disorders Botulinum Toxin Clinic Note    Chief Complaint: cervical dystonia     History of Present Illness:  Irish Rosales is a 71 year old female who presents to clinic for botulinum toxin injections for treatment of cervical dystonia. She also has essential tremor status post DBS.     She feels that CBD oil has helped her severe tightness      Response to Last Injection: 8/3/2021    Onset of effect: 1-2 days    Benefit from last injections: improved pain, pulling, tremor    Wearing off: a few months ago    Side effects: none, denies head heaviness or dysphagia        Current Outpatient Medications   Medication Sig Dispense Refill     CALCIUM-VITAMIN D PO Take 2 chew tab by mouth daily       cyanocobalamin (VITAMIN B-12) 1000 MCG tablet TAKE 1 TABLET(1000 MCG) BY MOUTH DAILY 100 tablet 2     rosuvastatin (CRESTOR) 20 MG tablet Take 1 tablet (20 mg) by mouth daily (Due for follow up with PCP for future refills) 90 tablet 3     sertraline (ZOLOFT) 50 MG tablet Take 1 tablet (50 mg) by mouth daily 90 tablet 1     traZODone (DESYREL) 50 MG tablet TAKE 1 TABLET BY MOUTH NIGHTLY AS NEEDED FOR SLEEP 90 tablet 2       Allergies: She is allergic to sulfa drugs, atorvastatin, and simvastatin.    Past Medical History:   Diagnosis Date     Depression      Depressive disorder      Dyslipidemia      Dystonic movements 1/21/2017     Dystonic tremor 1/21/2017     Family history of movement disorder 1/21/2017     mild abnormality in carotid study 3/2/2017    BILATERAL DUPLEX CAROTID ULTRASOUND March 1, 2017 1:01 PM    HISTORY: Other specified symptoms and signs involving the circulatory and  respiratory systems.   COMPARISON: None.   FINDINGS: There is mild atherosclerotic plaque in the carotid bifurcations. Flow velocities and waveforms show less than 50% diameter stenosis in both the right and left internal carotid arteries as assessed by each ICA PS     Osteoporosis 8/2/2010       Past Surgical History:   Procedure Laterality Date     ------------OTHER-------------  2004    vocal cord thyroplasty at [facility]     APPENDECTOMY  1997     COLONOSCOPY       IMPLANT DEEP BRAIN STIMULATION GENERATOR / BATTERY Left 3/13/2018    Procedure: IMPLANT DEEP BRAIN STIMULATION GENERATOR / BATTERY;  Deep Brain Stimulator Placement, Phase II, Placement Of Deep Brain Stimulator Generator/Battery Over The Left Chest Wall;  Surgeon: Aleksander Morales MD;  Location: [facility] OR     IMPLANT DEEP BRAIN STIMULATION GENERATOR / BATTERY Right 1/15/2019    Procedure: Deep Brain Stimulator Placement, Phase II, Placement Of Deep Brain Stimulator Generator/Battery Over The Right Chest Wall;  Surgeon: Aleksander Morales MD;  Location: [facility] OR     OPTICAL TRACKING SYSTEM INSERTION DEEP BRAIN STIMULATION Left 3/6/2018    Procedure: OPTICAL TRACKING SYSTEM INSERTION DEEP BRAIN STIMULATION;  Stealth Assisted Left Deep Brain Stimulator Placement, Phase I, Placement Of Left Side Deep Brain Stimulator Electrode, Target Left Ventral Intermediate Nucleus Of The Thalamus With Microelectrode Recording;  Surgeon: Aleksander Morales MD;  Location: [facility] OR     OPTICAL TRACKING SYSTEM INSERTION DEEP BRAIN STIMULATION Right 1/8/2019    Procedure: Stealth Assisted Right Deep Brain Stimulator Placement, Phase One, Placement Of Right Side Deep Brain Stimulator Electrode Target Right Ventral Intermediate Nucleus Of The Thalamus and placement Second Electrode With Microelectrode Recording;  Surgeon: Aleksander Morales MD;  Location: [facility] OR     OTHER SURGICAL HISTORY  02/18/2022    DBS - put in new battery     RELEASE CARPAL TUNNEL Left  1/2/2015    Procedure: RELEASE CARPAL TUNNEL;  Surgeon: SHANIA Hernandez MD;  Location: [facility] OR     RELEASE ULNAR NERVE (ELBOW) Left 1/2/2015    Procedure: RELEASE ULNAR NERVE (ELBOW);  Surgeon: SHANIA Hernandez MD;  Location: [facility] OR     REPLACE DEEP BRAIN STIMULATION GENERATOR / BATTERY Left 2/18/2022    Procedure: Replacement of deep brain stimulator generator/battery over left chest wall;  Surgeon: Aleksander Morales MD;  Location: [facility] OR     ZZC BSO, OMENTECTOMY W/XAVIER  1997    hysterectomy     ZC HAND/FINGER SURGERY UNLISTED  1998    right carpal tunnel surgery       Social History     Socioeconomic History     Marital status:      Spouse name: Not on file     Number of children: 3     Years of education: 14     Highest education level: Not on file   Occupational History     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   Tobacco Use     Smoking status: Never Smoker     Smokeless tobacco: Never Used   Vaping Use     Vaping Use: Never used   Substance and Sexual Activity     Alcohol use: Yes     Comment: occasionally     Drug use: No     Sexual activity: Yes     Partners: Male   Other Topics Concern     Parent/sibling w/ CABG, MI or angioplasty before 65F 55M? No      Service No     Blood Transfusions No     Caffeine Concern No     Occupational Exposure No     Hobby Hazards No     Sleep Concern No     Stress Concern No     Weight Concern No     Special Diet No     Back Care No     Exercise Yes     Comment: walk     Bike Helmet Yes     Seat Belt Yes     Self-Exams Yes   Social History Narrative        Lives in [place]    Daughter Karyn Sanchez        benign essential tremor with a strained quality to her voice consistent with mild laryngeal spasm        ALLERGIES:  She is allergic to sulfa drugs, atorvastatin and simvastatin.  The statin drugs caused leg  cramps.            FAMILY HISTORY:  As noted above with 1 sister developing a voice tremor and another sister having what is described as a facial tremor.  Mother with hand tremors          SOCIAL HISTORY:  She does not smoke.  She does use alcohol on occasion. She previously worked as a RN.         Jazmyn barbara  [phone] orofacial dyskinesias    Piedad Candice [phone]  laryngeal dystonia and laryngeal tremor and laryngeal spasm.             Updated 6/13/17: Client was born in [place], [place] to parents Jerome and Berenice.  Client grew up w/ three sisters Tea, Carrie, and Jazmyn who are currently still living.  Client graduated from high school, attended college for two years, and graduated w/ a RN Degree.  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  She was  to her ex-spouse for 25 years and had three children; one son Bryan and two daughters Halley/Ysabel.  One daughter Halley Sanchez resides nearby and her other daughter Ysabel and her son Bryan reside in [place], [place].  Bryan has two daughters ages five and eight years old.  Client enjoys flying for free to [place] to visit her two granddaughters. Michelle Sutton, [facility] ([phone], Fax: [phone]).         Social Determinants of Health     Financial Resource Strain: Not on file   Food Insecurity: Not on file   Transportation Needs: Not on file   Physical Activity: Not on file   Stress: Not on file   Social Connections: Not on file   Intimate Partner Violence: Not on file   Housing Stability: Not on file       Family History   Problem Relation Age of Onset     Hypertension Father         and mother     Cerebrovascular Disease Father      Tremor Mother      Lung Cancer Mother      Neurologic Disorder Sister         dystonic voice x 2 botox     Neurologic Disorder Sister          jose m olea. facial movement        Physical Examination:  Vital Signs:   vitals were not taken for this visit.   Left laterocollis, left totocollis      BOTULINUM NEUROTOXIN INJECTION PROCEDURES:    VERIFICATION OF PATIENT IDENTIFICATION AND PROCEDURE     Initials   Patient Name [facility]   Patient  [facility]   Procedure Verified by: [facility]     Above assessments performed by:  Resident/Fellow         Isidra Brock MD          The attending provider was present for the entire procedure documented below.      Angelita Soriano DO      INDICATION/S FOR PROCEDURE/S:  Irish Rosales is a 71 year old year old patient with dystonia affecting the  head, neck and shoulder girdle musculature secondary to a diagnosis of cervical dystonia with associated  pain and spasms.     Her baseline symptoms have been recalcitrant to oral medications and conservative therapy.  She is here today for an injection of Botox.      GOAL OF PROCEDURE:  The goal of this procedure is to increase active range of motion and decrease pain  associated with dystonic movements.    TOTAL DOSE ADMINISTERED:  Dose Administered: 300 units Botox    Diluent Used:  Preservative Free Normal Saline    CONSENT:  The risks, benefits, and treatment options were discussed with Irish Rosales and she agreed to proceed.      Written consent was obtained by [facility].     EQUIPMENT USED:  Needle-37mm stimulating/recording  EMG/NCS Machine    SKIN PREPARATION:  Skin preparation was performed using an alcohol wipe.    GUIDANCE DESCRIPTION:  Electro-myographic guidance was necessary throughout the procedure to accurately identify all areas of dystonic muscles while avoiding injection of non-dystonic muscles, neighboring nerves and nearby vascular structures.     AREA/MUSCLE INJECTED:      Visual display of locations injections are scanned into the chart under MEDIA tab.    Muscles Injected Units Injected Number of Injections   Left trapezius 50 (35) 4   Left splenius capitis 45  (45) 2   Left levator scapulae 15 (0) 1   Right trapezius 75 (55) 5   Right splenius capitis 40 (20) 2   Right SCM 60 (25) 3   Right levator scapulae 15 (10) 1        Total Units Injected: 300    Unavoidable Waste: 0    Total Units Billed 300      The patient tolerated the injections without difficulty.      Assessment:    Irish Rosales is a 71 year old female with cervical dystonia and essential tremor status post DBS.  Today we did repeat botulinum toxin injections.    Plan  Follow-up in 3 months' time to consider repeat injections with Dr. Brock.      I, Angelita Soriano DO, personally saw this patient with the Movement Disorders Fellow and agree with the Dr. Brock's findings and plan of care as documented in the Dr. Brock's note. I personally performed salient aspects of the history and neurological examination.     I personally reviewed the medications and labs. I personally viewed the imaging, and agree with the interpretation documented by the fellow.    I personally performed or supervised all procedures.    Date of Service (when I saw the patient): 06/23/22    Time spent with patient: 20 minutes  45 minutes spent on date of encounter doing chart reviews and exam and documentation and further activities as noted above.       Angelita Soriano DO, MA   of Neurology   [facility]

## 2022-06-29 DIAGNOSIS — E53.8 VITAMIN B12 DEFICIENCY (NON ANEMIC): ICD-10-CM

## 2022-06-29 RX ORDER — LANOLIN ALCOHOL/MO/W.PET/CERES
CREAM (GRAM) TOPICAL
Qty: 100 TABLET | Refills: 2 | Status: SHIPPED | OUTPATIENT
Start: 2022-06-29

## 2022-07-07 NOTE — PROGRESS NOTES
"  73 yo RHF with ET, head & hands, somewhat dystonic also affecting voice, affected sibling.  Bi Vim (and R Vop) DBS.  Fair, but imperfect results, due to some combination of severity of tremor, proximal quality of tremor, ataxia with more intense stimulation (reported as tremor), and adaptation/rebound effect.  Getting botulinum toxin injections for dystonic head tremor.    To mitigate the adaptation/rebound, she is on a 4 week cycle of varying left/right programs.     Weeks                 Right brain        Left brain        1                      VIM           2b        2                      VIM          3abc        3                       VOP          3abc        4                      VOP           2b          At last visit:  Increased left Vim pulsewidth from 20 to 40 (program \"2b\") and 30 (program \"3abc\")    Since last visit  Right hand tremor \"good relief\" for about 2 weeks, then back to baseline.  That was program \"2b\"  Not clear if the worsening was related to changing to \"3abc\"  I asked her to take note of that.  Tingling, not severe, but constant in R wrist and all fingers.  Not clear if that differed between \"2b\" and \"3abc\"  I asked her to take note of that.    Usual Botox with Dr. Soriano, due to Dr. Haile's departure.  Neck & shoulders tight & painful, after, which wasn't usual.    Today:  \"doing pretty good\"  The neck stiffness [gesture: Bi traps] was \"quite bad\" for 2 weeks \"just starting to subside\"  R hand numbness \"like that carpal tunnel [sensation] I've had\" subsided after couple of weeks but improved \"pretty sporatdic, now\"  Tremor \"didn't seem to improve\"    Stable gait, with increase of head tremor.  Able to tandem walk with effort.      Lead(s):   Side Left Right Right   Target VIM Vop VIM   Lead  Abbott Abbott Abbott   Lead Model Infinity 0.5 Infinity 0.5 Infinity 0.5   Lead Implant Date 3/6/2018 1/8/2019 1/8/2019   IPG # 1 2 2      IPG(s):  IPG # 1 2   IPG  " "Perez Perez   IPG Model Infinity 5 Infinity 7   IPG serial # [id number]   IPG Implant Date 3/13/2018 1/15/2019   Location Left chest Right chest   Battery (V) 2.89 (2.95) (2.97) V (2.90) (2.92V) (2.94V)   System impedances normal normal         IPG #  1       Program Name \"2b\"    \"3abc\"    Side Left  Left    Initial/Final Initial Final Initial Final   Active/Inactive Active Active Inactive inactive   Amplitude (mA) 5.0 5.0 4.0 4.0   Range (mA) 0-5.0 by 0.25 0-5.0 by 0.25 0-4.0 by 0.25 0-4.0 by 0.25   Pulse width (usec) 30 20 30 20   Freq (Hz) 200 200 200 200   Contacts C+2b- C+2b- C+3abc- C+3abc-         IPG # 2       Program name Program 2       \"Side\" (i.e. target) Right Vop, lead 1  Right Vim lead 2    Initial/Final Initial Final Initial Final   Active/Inactive Inactive None Inactive None   Contacts C+, 2abc-  C+, 11abc-    Amplitude (mA) 1.5 no range  2.5 [0-4 by 0.1]    Pulse Width (ms) 60  60    Frequency (Hz) 130  130       IPG # 2     Program name Vim only     \"Side\" (i.e. target) Right Vop, lead 1 Right Vim, lead 2    Initial/Final Initial & final Initial&final   Active/Inactive Inactive Inactive   Contacts C+2abc- C+11abc   Amplitude (mA)  0 no range 4.0 [0-4 by 0.1]   Pulse Width (ms)  20 20   Frequency ( Hz)  200 200      IPG # 2     Program name Vop only     Initial/Final Initial& final Initial & final   Active/Inactive active active   \"Side\" (i.e. target) Right Vop, lead 1 Right Vim, lead 2   Contacts C+, 2abc- C+,11abc-   Amplitude (mA) 4.0 [0-4 by 0.1] 0, no range   Pulse Width (ms) 20 20   Frequency ( Hz) 200 200   Ther Imp Ohm          Discussed:  I think we have exhausted all the reasonable options for improving tremor control.  Distal tremor control on the right is good.  Tremor control on the left is fairly good.  Probably, some of the proximal tremor is ataxia, which could worsen with more aggressive stimujlation.  Gait, fortunately, appears to be OK.  Further adjustments are unlikely to " improve tremor further, might give side effects.  She aggreed with leaving stimas is (but will revert PW on R, since, although the numbness side effect was minor and improved with time, it yielded no improvement in tremor, either by her report or on my exam.    Offered remote programming;  She declined, feels the technology would be more frustrating than convenient.    Time this date with patient, reviewing records, and documentinh    Edwar Colvin PhD,

## 2022-07-08 ENCOUNTER — PATIENT OUTREACH (OUTPATIENT)
Dept: GERIATRIC MEDICINE | Facility: CLINIC | Age: 72
End: 2022-07-08

## 2022-07-08 ENCOUNTER — OFFICE VISIT (OUTPATIENT)
Dept: NEUROLOGY | Facility: CLINIC | Age: 72
End: 2022-07-08
Payer: COMMERCIAL

## 2022-07-08 VITALS — OXYGEN SATURATION: 99 % | HEART RATE: 69 BPM | DIASTOLIC BLOOD PRESSURE: 86 MMHG | SYSTOLIC BLOOD PRESSURE: 154 MMHG

## 2022-07-08 DIAGNOSIS — G25.0 ESSENTIAL TREMOR: Primary | ICD-10-CM

## 2022-07-08 DIAGNOSIS — Z96.89 STATUS POST DEEP BRAIN STIMULATOR PLACEMENT: ICD-10-CM

## 2022-07-08 PROCEDURE — 99215 OFFICE O/P EST HI 40 MIN: CPT | Mod: 25 | Performed by: PSYCHIATRY & NEUROLOGY

## 2022-07-08 PROCEDURE — 95970 ALYS NPGT W/O PRGRMG: CPT | Performed by: PSYCHIATRY & NEUROLOGY

## 2022-07-08 ASSESSMENT — ACTIVITIES OF DAILY LIVING (ADL)
POURING: (3) MODERATELY ABNORMAL. MUST USE TWO HANDS OR USES OTHER STRATEGIES TO AVOID SPILLING.
FEEDING_WITH_A_SPOON: (2) MILDLY ABNORMAL. SPILLS A LITTLE.
CARRYING_FOOD_TRAYS_PLATES_OR_SIMILAR_ITEMS: (0) NORMAL
WORKING: (2) MILDLY ABNORMAL. TREMOR INTERFERES WITH WORK, ABLE TO DO EVERYTHING, BUT WITH ERRORS.
DRESS: (0) NORMAL
WRITING: (0) NORMAL
SPEAKING: (0) NORMAL
SOCIAL_IMPACT: (0) NORMAL
USING_KEYS: (3) MODERATELY ABNORMAL. NEEDS TO USE TWO HANDS OR OTHER STRATEGIES TO PUT KEY IN LOCK.
HYGIENE: (2) MILDLY ABNORMAL. SOME DIFFICULTY BUT CAN COMPLETE TASK.
OVERALL_DISABILITY_WITH_THE_MOST_AFFECTED_TASK: (3) MODERATELY ABNORMAL. CAN DO TASK BUT MUST USE STRATEGIES.
DRINKING_FROM_A_GLASS: (3) MODERATELY ABNORMAL. SPILLS A LOT OR CHANGES STRATEGY TO COMPLETE TASK SUCH AS USING TWO HANDS OR LEANING OVER.
TOTAL_SCORE: 18

## 2022-07-08 NOTE — PATIENT INSTRUCTIONS
Today, we reverted your Right brain (for left body) settings to what they were last time, since the change we made last time didn't improve your tremor.    Continue checking your battery level regularly, and notify us promptly if you get a warning that the battery needs replacing.  That way, we'll have plenty of time to schedule the replacement.

## 2022-07-08 NOTE — PROGRESS NOTES
[facility] Care Coordination Contact      [facility] Six-Month Telephone Assessment    6 month telephone assessment completed on 07/08/2022.    ER visits: No  Hospitalizations: No  TCU stays: No  Significant health status changes: None  Falls/Injuries: No  ADL/IADL changes: No  Changes in services: Requested three home delivered meals per week d/t SNAP benefits being discontinued and she didn't know why. Called [facility] and they did not receive the renewal application. Irish will need to reapply for benefits. She previously had four meals being delivered and stopped them after a month. Called [facility] and restarted meals. Meals will begin the week of 07/18/22. Updated Irish re: above information. Irish stated she submitted her renewal application, but [facility] must not have received it.     Caregiver Assessment follow up:  NA    Goals: See POC in chart for goal progress documentation.      Will see member in 6 months for an annual health risk assessment.   Encouraged member to call CC with any questions or concerns in the meantime.     INNA Diallo  [facility]  [phone]  Fax: [phone]

## 2022-07-18 DIAGNOSIS — F32.5 DEPRESSION, MAJOR, IN REMISSION (H): ICD-10-CM

## 2022-07-20 ENCOUNTER — PATIENT OUTREACH (OUTPATIENT)
Dept: GERIATRIC MEDICINE | Facility: CLINIC | Age: 72
End: 2022-07-20

## 2022-07-20 NOTE — PROGRESS NOTES
Homemaking Provider Search:    1. [facility] ((P)  [phone]): Does not have staffing availability.    2. [employer] ((P)  [phone], [email or web address]: Irish currently has an active auth with [employer]. Emailed [employer] inquiring about staffing availability and requested a response.    3. [employer] [facility] (P)   [phone],  [email or web address]: Emailed Aayush Rendon inquiring about staffing availability and requested a response.    4. [facility] (P) [phone], [email or web address]: Emailed [facility] inquiring about staffing availability and requested a response.

## 2022-07-22 ENCOUNTER — TRANSFERRED RECORDS (OUTPATIENT)
Dept: FAMILY MEDICINE | Facility: CLINIC | Age: 72
End: 2022-07-22

## 2022-08-01 ENCOUNTER — HOSPITAL ENCOUNTER (OUTPATIENT)
Dept: MAMMOGRAPHY | Facility: CLINIC | Age: 72
Discharge: HOME | End: 2022-08-01
Attending: INTERNAL MEDICINE | Admitting: INTERNAL MEDICINE
Payer: COMMERCIAL

## 2022-08-01 DIAGNOSIS — Z12.31 VISIT FOR SCREENING MAMMOGRAM: ICD-10-CM

## 2022-08-01 PROCEDURE — 77067 SCR MAMMO BI INCL CAD: CPT

## 2022-08-05 NOTE — PROGRESS NOTES
[facility] verified on 07/26/22 that a homemaker had started with Irish and a  was going to contact Irish to verify services are going as planned.    Michelle Corcoran [facility]  [phone]  Fax: [phone]

## 2022-08-17 ENCOUNTER — PATIENT OUTREACH (OUTPATIENT)
Dept: GERIATRIC MEDICINE | Facility: CLINIC | Age: 72
End: 2022-08-17

## 2022-08-17 NOTE — PROGRESS NOTES
CC updated program tasks and targets for compass lazaro launch.    Michelle Corcoran [facility]  [phone]  Fax: [phone]

## 2022-08-19 ENCOUNTER — TRANSFERRED RECORDS (OUTPATIENT)
Dept: HEALTH INFORMATION MANAGEMENT | Facility: CLINIC | Age: 72
End: 2022-08-19

## 2022-08-25 ENCOUNTER — TELEPHONE (OUTPATIENT)
Dept: NEUROSURGERY | Facility: CLINIC | Age: 72
End: 2022-08-25

## 2022-08-25 NOTE — TELEPHONE ENCOUNTER
Faxed signed patient controller authorization forms and clinical notes to [facility] at [phone].

## 2022-08-30 ENCOUNTER — OFFICE VISIT (OUTPATIENT)
Dept: DERMATOLOGY | Facility: CLINIC | Age: 72
End: 2022-08-30
Payer: COMMERCIAL

## 2022-08-30 DIAGNOSIS — D18.01 ANGIOMA OF SKIN: ICD-10-CM

## 2022-08-30 DIAGNOSIS — L82.1 SEBORRHEIC KERATOSIS: ICD-10-CM

## 2022-08-30 DIAGNOSIS — D22.9 NEVUS: Primary | ICD-10-CM

## 2022-08-30 DIAGNOSIS — L81.4 LENTIGO: ICD-10-CM

## 2022-08-30 PROCEDURE — 99203 OFFICE O/P NEW LOW 30 MIN: CPT | Performed by: PHYSICIAN ASSISTANT

## 2022-08-30 NOTE — LETTER
8/30/2022         RE: Irish Rosales  [address] [place] [id number]        Dear Colleague,    Thank you for referring your patient, Irish Rosales, to the [facility]. Please see a copy of my visit note below.    HPI:   Chief complaints: Irish Rosales is a pleasant 72 year old female who presents for Full skin cancer screening to rule out skin cancer   Last Skin Exam: about 10 years ago      1st Baseline: yes  Personal HX of Skin Cancer: no   Personal HX of Malignant Melanoma: no   Family HX of Skin Cancer / Malignant Melanoma: no  Personal HX of Atypical Moles:   no  Risk factors: history of sun exposure and burns  New / Changing lesions:yes larger mole on the back  Social History: has 2 daughters who live in [place] she visits frequently  On review of systems, there are no further skin complaints, patient is feeling otherwise well.   ROS of the following were done and are negative: Constitutional, Eyes, Ears, Nose,   Mouth, Throat, Cardiovascular, Respiratory, GI, Genitourinary, Musculoskeletal,   Psychiatric, Endocrine, Allergic/Immunologic.    PHYSICAL EXAM:   LMP  (LMP Unknown)   Skin exam performed as follows: Type 2 skin. Mood appropriate  Alert and Oriented X 3. Well developed, well nourished in no distress.  General appearance: Normal  Head including face: Normal  Eyes: conjunctiva and lids: Normal  Mouth: Lips, teeth, gums: Normal  Neck: Normal  Chest-breast/axillae: Normal  Back: Normal  Spleen and liver: Normal  Cardiovascular: Exam of peripheral vascular system by observation for swelling, varicosities, edema: Normal  Genitalia: groin, buttocks: Normal  Extremities: digits/nails (clubbing): Normal  Eccrine and Apocrine glands: Normal  Right upper extremity: Normal  Left upper extremity: Normal  Right lower extremity: Normal  Left lower extremity: Normal  Skin: Scalp and body hair: See below    Pt deferred exam of breasts, groin, buttocks:  No    Other physical findings:  1. Multiple pigmented macules on extremities and trunk  2. Multiple pigmented macules on face, trunk and extremities  3. Multiple vascular papules on trunk, arms and legs  4. Multiple scattered keratotic plaques       Except as noted above, no other signs of skin cancer or melanoma.     ASSESSMENT/PLAN:   Benign Full skin cancer screening today. . Patient with history of none  Advised on monthly self exams and 1 year  Patient Education: Appropriate brochures given.    1. Multiple benign appearing melanocytic nevi on arms, legs and trunk. Discussed ABCDEs of melanoma and sunscreen.   2. Multiple lentigos on arms, legs and trunk. Advised benign, no treatment needed.  3. Multiple scattered angiomas. Advised benign, no treatment needed.   4. Seborrheic keratosis on arms, legs and trunk. Advised benign, no treatment needed.              Follow-up: FSE every 1-2 years/PRN sooner    1.) Patient was asked about new and changing moles. YES  2.) Patient received a complete physical skin examination: YES  3.) Patient was counseled to perform a monthly self skin examination: YES  Scribed By: Edita Morrison, MS, PAJeniseC          Again, thank you for allowing me to participate in the care of your patient.        Sincerely,        Edita Morrison PA-C

## 2022-08-30 NOTE — PROGRESS NOTES
HPI:   Chief complaints: Irish Rosales is a pleasant 72 year old female who presents for Full skin cancer screening to rule out skin cancer   Last Skin Exam: about 10 years ago      1st Baseline: yes  Personal HX of Skin Cancer: no   Personal HX of Malignant Melanoma: no   Family HX of Skin Cancer / Malignant Melanoma: no  Personal HX of Atypical Moles:   no  Risk factors: history of sun exposure and burns  New / Changing lesions:yes larger mole on the back  Social History: has 2 daughters who live in [place] she visits frequently  On review of systems, there are no further skin complaints, patient is feeling otherwise well.   ROS of the following were done and are negative: Constitutional, Eyes, Ears, Nose,   Mouth, Throat, Cardiovascular, Respiratory, GI, Genitourinary, Musculoskeletal,   Psychiatric, Endocrine, Allergic/Immunologic.    PHYSICAL EXAM:   LMP  (LMP Unknown)   Skin exam performed as follows: Type 2 skin. Mood appropriate  Alert and Oriented X 3. Well developed, well nourished in no distress.  General appearance: Normal  Head including face: Normal  Eyes: conjunctiva and lids: Normal  Mouth: Lips, teeth, gums: Normal  Neck: Normal  Chest-breast/axillae: Normal  Back: Normal  Spleen and liver: Normal  Cardiovascular: Exam of peripheral vascular system by observation for swelling, varicosities, edema: Normal  Genitalia: groin, buttocks: Normal  Extremities: digits/nails (clubbing): Normal  Eccrine and Apocrine glands: Normal  Right upper extremity: Normal  Left upper extremity: Normal  Right lower extremity: Normal  Left lower extremity: Normal  Skin: Scalp and body hair: See below    Pt deferred exam of breasts, groin, buttocks: No    Other physical findings:  1. Multiple pigmented macules on extremities and trunk  2. Multiple pigmented macules on face, trunk and extremities  3. Multiple vascular papules on trunk, arms and legs  4. Multiple scattered keratotic plaques       Except as noted above, no  other signs of skin cancer or melanoma.     ASSESSMENT/PLAN:   Benign Full skin cancer screening today. . Patient with history of none  Advised on monthly self exams and 1 year  Patient Education: Appropriate brochures given.    1. Multiple benign appearing melanocytic nevi on arms, legs and trunk. Discussed ABCDEs of melanoma and sunscreen.   2. Multiple lentigos on arms, legs and trunk. Advised benign, no treatment needed.  3. Multiple scattered angiomas. Advised benign, no treatment needed.   4. Seborrheic keratosis on arms, legs and trunk. Advised benign, no treatment needed.              Follow-up: FSE every 1-2 years/PRN sooner    1.) Patient was asked about new and changing moles. YES  2.) Patient received a complete physical skin examination: YES  3.) Patient was counseled to perform a monthly self skin examination: YES  Scribed By: Edita Morrison MS, PA-C

## 2022-09-13 ENCOUNTER — DOCUMENTATION ONLY (OUTPATIENT)
Dept: NEUROLOGY | Facility: CLINIC | Age: 72
End: 2022-09-13

## 2022-09-13 NOTE — PROGRESS NOTES
Email from Alba Bill:    Piedad,  I just met with Julia (inside of [facility]). We set up her new iPod and both of her IPGs have been connected and paired to it. She is good to go.

## 2022-09-13 NOTE — PROGRESS NOTES
Abbott rep reached patient. This is the plan:    Richard Herbert,  I spoke with Catherine on the number you provided which is her cell phone. She is going to be home tonight until tomorrow afternoon. I'm going to call her landline tomorrow at 10am, and then we'll get her set up with the patient control radha on her iPhone (we couldn't try today because I was talking to her on her cell and she didn't have access to another phone). Hopefully this will fix the problem and she won't have to try multiple times to connect to her implant the way she has to do with the current iPod she uses.     Subsequent email from rep:  I spoke with Irish who said she didn't feel comfortable doing this over the phone. She asked initially if I could come to her house, which Ana strongly does not allow. She said she could meet me somewhere today close to her house in [place] in the waiting room of a clinic, either [facility] urgent care or [place] urgent care. I told her I'd get in touch with the U to see that this is okay and get back to her. We have tentatively planned to meet at 1:30.  I won't need a clinician  or any of her IPG settings to go through this process - it should just be disconnecting from the old iPod and getting the new iPod set up and paired with the magnet to both IPGs.     Sent email back to rep stating she could meet patient and a clinic of the patient's choosing. Patient needs this capability as soon as possible.

## 2022-09-13 NOTE — PROGRESS NOTES
"Patient stated she received a new patient  and is wondering who to contact to program it.    Lead(s):   Side Left Right Right   Target VIM Vop VIM   Lead  Abbott Abbott Abbott   Lead Model Infinity 0.5 Infinity 0.5 Infinity 0.5   Lead Implant Date 3/6/2018 1/8/2019 1/8/2019   IPG # 1 2 2      IPG(s):  IPG # 1 2   IPG  Abbott Abbott   IPG Model Infinity 5 Infinity 7   IPG serial # [id number]   IPG Implant Date 3/13/2018 1/15/2019   Location Left chest Right chest   Battery (V) 2.89 (2.95) (2.97) V (2.90) (2.92V) (2.94V)   System impedances normal normal         IPG #  1         Program Name \"2b\"     \"3abc\"     Side Left   Left     Initial/Final Initial Final Initial Final   Active/Inactive Active   Inactive     Amplitude (mA) 5.0   4.0     Range (mA) 0-5.0 by 0.25   0-4.0 by 0.25     Pulse width (usec) 30 20 30 20   Freq (Hz) 200   200     Contacts C+2b-   C+3abc-           IPG # 2         Program name Program 2         \"Side\" (i.e. target) Right Vop, lead 1   Right Vim lead 2     Initial/Final Initial & final   Initial & final     Active/Inactive Inactive None Inactive None   Contacts C+, 2abc-   C+, 11abc-     Amplitude (mA) 1.5 no range   2.5 [0-4 by 0.1]     Pulse Width (ms) 60   60     Frequency (Hz) 130   130        IPG # 2     Program name Vim only     \"Side\" (i.e. target) Right Vop, lead 1 Right Vim, lead 2    Initial/Final Initial & final Initial&final   Active/Inactive Inactive Inactive   Contacts C+2abc- C+11abc   Amplitude (mA)  0 no range 4.0 [0-4 by 0.1]   Pulse Width (ms)  20 20   Frequency ( Hz)  200 200      IPG # 2     Program name Vop only     Initial/Final Initial& final Initial & final   Active/Inactive active active   \"Side\" (i.e. target) Right Vop, lead 1 Right Vim, lead 2   Contacts C+, 2abc- C+,11abc-   Amplitude (mA) 4.0 [0-4 by 0.1] 0, no range   Pulse Width (ms) 20 20   Frequency ( Hz) 200 200   Ther Imp Ohm         Writer believes all she has to do is " use her magnet to connect the IPGs to the new . This is a complex set up however. Sent email to eflow to confirm. May have to have patient come to clinic for nurse visit to connect if she is uncomfortable doing this.

## 2022-09-16 ENCOUNTER — TRANSFERRED RECORDS (OUTPATIENT)
Dept: HEALTH INFORMATION MANAGEMENT | Facility: CLINIC | Age: 72
End: 2022-09-16

## 2022-10-16 ENCOUNTER — HEALTH MAINTENANCE LETTER (OUTPATIENT)
Age: 72
End: 2022-10-16

## 2022-10-18 ENCOUNTER — PATIENT OUTREACH (OUTPATIENT)
Dept: GERIATRIC MEDICINE | Facility: CLINIC | Age: 72
End: 2022-10-18

## 2022-10-18 NOTE — PROGRESS NOTES
Received call from Irish requesting to change to [employer] for meal delivery. Irish receives three meals per week. Placed call to [employer] inquiring if they accepted EW and [employer] as payment for meals and they do.    [facility] [employer] is going to email CC a referral form. Once they receive the referral and an authorization they will contact Irish to start services.     Phone: [phone]  Fax: [phone]  Email: [email or web address]  NPI: NPI: [id number]    Received referral from [employer]. LM for current meal provider [employer] to verify when Irish is going to receive her next meals and when billing needs to go through. Will submit referral and auth once billing with [employer] is verified.    Received call back from [employer]. Meals where placed on hold September 19th. Currently not billing. Completed referral form with a request to start services 10/18/22 to [employer]. Requested for auth to be submitted. LM for Irish updating her that she should receive a call from [employer] after [employer] processes the auth request that can take up to 14 business days.    Michelle Corcoran, [facility]  [phone]  Fax: [phone]

## 2022-10-19 NOTE — PROGRESS NOTES
[facility] Care Coordination Contact    Member Signature - POC Change:  Per CC, member has made a change to their POC.  Care Plan Change Letter mailed to member for signature with a self-addressed return envelope.     Re: Change in meals provider    Radha Parra  Care Management Specialist  [facility]  [phone]

## 2022-10-20 ENCOUNTER — PATIENT OUTREACH (OUTPATIENT)
Dept: GERIATRIC MEDICINE | Facility: CLINIC | Age: 72
End: 2022-10-20

## 2022-10-20 NOTE — PROGRESS NOTES
[facility] Care Coordination Contact    Encounter opened due to Regulatory Compass Berenice Update to open FVP Program.    Radha Parra  Care Management Specialist  [facility]  [phone]

## 2022-10-20 NOTE — PROGRESS NOTES
[facility] Care Coordination Contact    Encounter opened due to Regulatory Compass Berenice Update to close FVP Program.    Radha Parra  Care Management Specialist  [facility]  [phone]

## 2022-10-28 ENCOUNTER — PATIENT OUTREACH (OUTPATIENT)
Dept: GERIATRIC MEDICINE | Facility: CLINIC | Age: 72
End: 2022-10-28

## 2022-10-28 NOTE — PROGRESS NOTES
[facility] Care Coordination Contact    Called member to schedule annual HRA home visit. HRA has been scheduled for Thursday, November 3rd at 10:00am.   INNA Diallo  [facility]  [phone]  Fax: [phone]

## 2022-11-03 ENCOUNTER — PATIENT OUTREACH (OUTPATIENT)
Dept: GERIATRIC MEDICINE | Facility: CLINIC | Age: 72
End: 2022-11-03

## 2022-11-03 ASSESSMENT — ACTIVITIES OF DAILY LIVING (ADL): DEPENDENT_IADLS:: CLEANING;COOKING;LAUNDRY;MEAL PREPARATION

## 2022-11-03 NOTE — PROGRESS NOTES
[facility] Care Coordination Contact    [facility] Home Visit Assessment     Home visit for Health Risk Assessment with Irish Rosales completed on November 3, 2022    Type of residence:: Apartment  Current living arrangement:: I live alone     Assessment completed with:: Patient    Current Care Plan  Member currently receiving the following home care services: NA   Member currently receiving the following community resources: DME, Housekeeping/Chore Agency, Meals on Wheels, Transportation Services    Medication Review  Medication reconciliation completed in Epic: Yes  Medication set-up & administration: Independent-does not set up.  Self-administers medications.  Medication Risk Assessment Medication (1 or more, place referral to MTM): N/A: No risk factors identified  MTM Referral Placed: No: No risk factors idenified    Mental/Behavioral Health   Depression Screening:   PHQ-2 Total Score (Adult) - Positive if 3 or more points; Administer PHQ-9 if positive: 0  Mental health DX:: Yes   Mental health DX how managed:: Medication    Falls Assessment:   Fallen 2 or more times in the past year?: No   Any fall with injury in the past year?: No    ADL/IADL Dependencies:   Dependent ADLs:: Eating  Dependent IADLs:: Cleaning, Cooking, Laundry, Meal Preparation    [facility] [place] Health Plan sponsored benefits: Shared information re: Silver Sneakers/gym memberships, ASA, Calcium +D.    PCA Assessment completed at visit: Not Applicable     Elderly Waiver Eligibility: Yes-will continue on EW    Care Plan & Recommendations: Meal delivery with 1. Maribel Joneslucille Fregoso ((p) [phone]) has not started. Referral sent on 10/18/22. No auth required by [employer] which was communicated to Maribel Tawnya on 10/19/22. Emailed Sisters inquiring when meals will begin. Sister requested for referral form to be mailed to them a second time. Emailed second referral to s. Currently does not have homemaking services as  homemaker returned to college. Will assist with looking for a new provider.    See [facility] for detailed assessment information.    Follow-Up Plan: Member informed of future contact, plan to f/u with member with a 6 month telephone assessment.  Contact information shared with member and family, encouraged member to call with any questions or concerns at any time.    [place] care continuum providers: Please see Snapshot and Care Management Flowsheets for Specific details of care plan.    Michelle Corcoran [facility]  [phone]  Fax: [phone]         This CC note routed to PCP.    Michelle Corcoran [facility]  [phone]  Fax: [phone]

## 2022-11-03 NOTE — Clinical Note
Dr. Victoria,  I am the [facility] care coordinator for Irish Rosales I am writing to inform you that I had an annual assessment with Irish this morning and completed a medication reconciliation. No new changes, medical concerns, DME requests, or medical supply requests at this time.  All of my documentation can be found in EPIC. Please do not hesitate to contact me with any questions or concerns.  Thank you, Michelle Corcoran, [facility] [phone] Fax: [phone]

## 2022-11-03 NOTE — PROGRESS NOTES
Homemaking Provider Search:    1. [facility] [phone]): Spoke with Chayo who will pass on message to Haylee that CC is looking for PCA/homemaking staffing. Requested call back.     2.  [place] [facility] [employer] [facility] ([phone]): No staffing available for [facility].    3. [facility] [employer] [facility] [employer] ([phone]): Emailed referral to [email or web address]. Requested to be contacted if staffing is available.  11/17/22: Received message from Meliza Rawls (ext 114) requesting a call back. Returned call and LM for Meliza.  11/30/22: Meliza has left several messages for Irish and hasn't received a return phone call.    4. [facility] [employer] ([phone], Fax: [phone]): Faxed referral with demographics and Homemaking hours. Requested to be updated re: staffing availability.    INNA Diallo  [facility]  [phone]  Fax: [phone]

## 2022-11-15 NOTE — PROGRESS NOTES
11/11/22: 6. [place] [employer] [facility] (([phone], (F) [phone]): Spoke with Dayana. May have staffing available for [place]. Will call if there is staffing availability.    11/15/22 Calls:    5. [facility] [phone]): LM inquiring about staffing availability.  11/17/22: Received call back. No staffing available.    6. [employer] [phone]): LM inquiring about staffing availability.     7. [employer] [phone]): No staffing availability.    8. [facility] [phone]): LM inquiring about staffing availability.    9. [facility] [phone]): No staffing available for Troy.    INNA Diallo  [facility]  [phone]  Fax: [phone]

## 2022-11-17 NOTE — PROGRESS NOTES
First meal shipment received on 11/11/22 per [facility].    Michelle Corcoran AMANDA  [facility]  [phone]  Fax: [phone]

## 2022-11-18 ENCOUNTER — PATIENT OUTREACH (OUTPATIENT)
Dept: GERIATRIC MEDICINE | Facility: CLINIC | Age: 72
End: 2022-11-18

## 2022-11-18 NOTE — LETTER
November 18, 2022    Important [employer] Information    HARSHAD REINA ELMER  [address] [place] [address],   [facility] [address]  Your Care Plan  Dear Harshad,  When we spoke recently, I promised to send you a Care Plan. The plan enclosed is a summary of our discussion. It includes the steps we agreed would help you meet your health goals. In addition, I can help you with:  [employer]oso-G-LrljWS  This program is available to members who need a ride to medical and dental visits. To schedule a ride, call [phone] or [phone] (toll free). TTY: 711. You can call 8 a.m. to 8 p.m. Seven days a week. Access to a representative may be limited at times.   One Pass  One Pass is your no-cost, complete, fitness solution for your mind and body. To learn more visit [employer]bon/fitness or call One Pass, toll-free 1 [phone] (TTY: 711) 8 a.m. to 9 p.m. Monday-Friday.  Health Care Directive   This form helps you outline your health care wishes. You can request a form from me and I will answer any questions you have before you discuss it with your doctor.   Annual Physical  Take a key step on your path to good health and set up an annual physical at your clinic.  Questions?  Call me at [phone] Monday-Friday between 8am and 5pm.  TTY: 711. As we discussed, I plan to be in touch with you again in 6 months to follow up via phone.  Sincerely,    INNA Diallo    E-mail: [email or web address]  Phone: [phone]      [employer]      cc: member records                                                                                             CB5 (Os) (5-2020)    Civil Rights Notice  Discrimination is against the law. [employer] does not discriminate on the basis of any of the following:    Race    Color    National Origin    Creed    Buddhist    Age    Public Assistance Status    Receipt of Health Care Services    Disability (including physical or mental impairment)    Sex (including sex  stereotypes and gender identity)    Marital Status    Political Beliefs    Medical Condition    Genetic Information    Sexual Orientation    Claims Experience    Medical History    Health Status    Auxiliary Aids and Services:  [employer] provides auxiliary aids and services, like qualified interpreters or information in accessible formats, free of charge and in a timely manner, to ensure an equal opportunity to participate in our health care programs. Contact Medica at [employer]/contact medicaid or call [phone] (toll free); TTY:714 or at [email or web address].    Language Assistance Services:  [employer] provides translated documents and spoken language interpreting, free of charge and in a timely manner, when language assistance services are necessary to ensure limited English speakers have meaningful access to our information and services. Contact [employer] at [phone] (toll free); TTY: 793 or [email or web address].     Civil Rights Complaints  You have the right to file a discrimination complaint if you believe you were treated in a discriminatory way by [employer]. You may contact any of the following four agencies directly to file a discrimination complaint.    U.S. Department of Health and Human Services  Office for Civil Rights (OCR)  You have the right to file a complaint with the OCR, a federal agency, if you believe you have been discriminated against because of any of the following:    Race    Disability    Color    Sex    National Origin    Age    Spiritism (in some cases)    Contact the OCR directly to file a complaint:         Director         U.S. Department of Health and Human Services  Office for Civil Rights         [address] [place] [address]         Customer Response Center: Toll-free: [phone]          TDD: [phone]         Email: [email or web address]    [place] Department of Human Rights (Formerly [facility])  In [place], you  have the right to file a complaint with the MDHR if you believe you have been discriminated against because of any of the following:      Race    Color    National Origin    Gnosticist    Creed    Sex    Sexual Orientation    Marital Status    Public Assistance Status    Disability    Contact the MDHR directly to file a complaint:         [facility] of Human Rights         [address] [address]         [phone] (voice)          [phone] (toll free)         711 or [phone] ([place] Relay)         [phone] (Fax)         [email or web address] (Email)     [place] Department of Human Services (DHS)  You have the right to file a complaint with [facility] if you believe you have been discriminated against in our health care programs because of any of the following:    Race    Color    National Origin    Creed    Gnosticist    Age    Public Assistance Status    Receipt of Health Care Services    Disability (including physical or mental impairment)    Sex (including sex stereotypes and gender identity)    Marital Status    Political Beliefs    Medical Condition    Genetic Information    Sexual Orientation    Claims Experience    Medical History    Health Status    Complaints must be in writing and filed within 180 days of the date you discovered the alleged discrimination. The complaint must contain your name and address and describe the discrimination you are complaining about. After we get your complaint, we will review it and notify you in writing about whether we have authority to investigate. If we do, we will investigate the complaint.      [facility] will notify you in writing of the investigation s outcome. You have a right to appeal the outcome if you disagree with the decision. To appeal, you must send a written request to have [facility] review the investigation outcome. Be brief and state why you disagree with the decision. Include additional information you think is  important.      If you file a complaint in this way, the people who work for the agency named in the complaint cannot retaliate against you. This means they cannot punish you in any way for filing a complaint. Filing a complaint in this way does not stop you from seeking out other legal or administration actions.     Contact DHS directly to file a discrimination complaint:        Civil Rights Coordinator        [facility] of Human Services        Equal Opportunity and Access Division        [address] [address]        [place] [address] [phone] (voice) or use your preferred relay service     [employer] Complaint Notice   You have the right to file a complaint with [employer] if you believe you have been discriminated against because of any of the following:       Medical condition    Health status    Receipt of health care services    Claims experience    Medical history    Genetic information    Disability (including mental or physical impairment)    Marital status    Age    Sex (including sex stereotypes and gender identity)    Sexual orientation    National origin    Race    Color    Sabianist    Creed    Public assistance status    Political beliefs    You can file a complaint and ask for help in filing a complaint in person or by mail, phone, fax, or email at:     [employer] Civil Rights Coordinator  [facility] [employer]  [address], [place] [address] [phone] (voice and fax) or TTL:061  Email: [email or web address]    American Indians can begin or continue to use [place] and  Health Services (IHS) clinics. We will not require prior approval or impose any conditions for you to get services at these clinics. For elders age 65 years and older this includes Elderly Waiver (EW) services accessed through the [place]. If a doctor or other provider in a [facility] or IHS clinic refers you to a provider in our network, we will not require you to see your primary care  provider prior to the referral.

## 2022-11-18 NOTE — PROGRESS NOTES
[facility] Care Coordination Contact    Received after visit chart from care coordinator.  Completed following tasks: Mailed copy of care plan to client, Updated services in Database, Submitted referrals/auths for homemaking, Mailed copy of POC signature sheet for member to sign and return in SASE  and sent [employer] medication disposal form    and Provider Signature - No POC Shared:  Member indicates that they do not want their POC shared with any EW providers.     Radha Parra  Care Management Specialist  [facility]  [phone]

## 2022-11-29 NOTE — PROGRESS NOTES
10. [facility] [phone], Fax: [phone], NPI: [id number]): Does not have staffing available.    11. [facility] [phone]): LM inquiring about staffing availability.  12. [facility] [phone]): LM inquiring about staffing availability.    13. [facility] [phone]): No staffing available. Suggested calling back in Jan 2023 to see if staffing has changed.    14. [facility] [phone]): Spoke with Kelli. May possible have staffing availability. Will call CC if staffing is available.    15.[facility] [phone]): LM for client intake inquiring about staffing availability.    Michelle Corcoran [facility]  [phone]  Fax: [phone]

## 2022-11-30 NOTE — PROGRESS NOTES
Received an update from [facility] (current homemaking provider) today that they have staffing that should be able to start next week.    Spoke with Irish who confirmed that someone should be coming out next week. Irish does not want to change providers even though [facility] has staffing available. Updated [facility].    Michelle Corcoran [facility]  [phone]  Fax: [phone]

## 2022-12-02 ENCOUNTER — ANCILLARY PROCEDURE (OUTPATIENT)
Dept: GENERAL RADIOLOGY | Facility: CLINIC | Age: 72
End: 2022-12-02
Attending: PHYSICIAN ASSISTANT
Payer: COMMERCIAL

## 2022-12-02 ENCOUNTER — OFFICE VISIT (OUTPATIENT)
Dept: URGENT CARE | Facility: URGENT CARE | Age: 72
End: 2022-12-02
Payer: COMMERCIAL

## 2022-12-02 VITALS — OXYGEN SATURATION: 96 % | DIASTOLIC BLOOD PRESSURE: 83 MMHG | SYSTOLIC BLOOD PRESSURE: 143 MMHG | HEART RATE: 98 BPM

## 2022-12-02 DIAGNOSIS — M79.674 PAIN OF TOE OF RIGHT FOOT: ICD-10-CM

## 2022-12-02 DIAGNOSIS — S89.92XA KNEE INJURY, LEFT, INITIAL ENCOUNTER: ICD-10-CM
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
## 2022-12-02 PROCEDURE — 73660 X-RAY EXAM OF TOE(S): CPT | Mod: TC | Performed by: RADIOLOGY

## 2022-12-02 PROCEDURE — 99213 OFFICE O/P EST LOW 20 MIN: CPT | Performed by: PHYSICIAN ASSISTANT

## 2022-12-02 PROCEDURE — 73562 X-RAY EXAM OF KNEE 3: CPT | Mod: TC | Performed by: RADIOLOGY

## 2022-12-02 RX ORDER — MELOXICAM 15 MG/1
15 TABLET ORAL DAILY
Qty: 14 TABLET | Refills: 0 | Status: SHIPPED | OUTPATIENT
Start: 2022-12-02 | End: 2023-08-28

## 2022-12-02 NOTE — PROGRESS NOTES
"  Assessment & Plan     Fall, initial encounter    Fall at home, tripped over telephone cord    Pain of toe of right foot    Xray toe Negative for acute findings, read by Brant OCHOA at time of visit.    RICE treatment: Rest, Ice, compression, elevation   Tylenol  mobic for inflammation and pain  DME post op shoe  Return to activity as tolerated    - XR Toe Right G/E 2 Views; Future  - meloxicam (MOBIC) 15 MG tablet; Take 1 tablet (15 mg) by mouth daily  - Ankle/Foot Bracing Supplies Order for DME - ONLY FOR DME    Knee injury, left, initial encounter    Knee xray Negative for acute findings, read by Brant OCHOA at time of visit.    RICE treatment: Rest, Ice, compression, elevation   mobic for inflammation and tenderness  DME knee sleeve for comfort  Ice compresses    - XR Knee Left 3 Views; Future  - meloxicam (MOBIC) 15 MG tablet; Take 1 tablet (15 mg) by mouth daily  - Knee Supplies Order for DME - ONLY FOR DME    Review of external notes as documented elsewhere in note       BMI:   Estimated body mass index is 25.6 kg/m  as calculated from the following:    Height as of 2/18/22: 1.575 m (5' 2\").    Weight as of 2/18/22: 63.5 kg (139 lb 15.9 oz).   At today's visit with Irish Rosales , we discussed results, diagnosis, medications and formulated a plan.  We also discussed red flags for immediate return to clinic/ER, as well as indications for follow up with PCP if not improved in 3 days. Patient understood and agreed to plan. Irish Rosales was discharged with stable vitals and has no further questions.       No follow-ups on file.    Brant Grant, MMS, PA-C  [facility]    Glenn Coburn is a 72 year old accompanied by her friend, presenting for the following health issues:  Fall (Fell yesterday right foot is very bruised and swollen and the left knee is swollen and has a scrape, )      HPI   Review of Systems   Constitutional, HEENT, " cardiovascular, pulmonary, gi and gu systems are negative, except as otherwise noted.      Objective    BP (!) 143/83 (BP Location: Right arm, Patient Position: Sitting, Cuff Size: Adult Regular)   Pulse 98   LMP  (LMP Unknown)   SpO2 96%   There is no height or weight on file to calculate BMI.  Physical Exam   GENERAL: healthy, alert and no distress  MS: Positive for right great toe with swelling and bruising. Positive for tenderness of the MTP and IP joint of toe  SKIN: Positive for bruising around toe and into the foot  NEURO: Normal strength and tone, mentation intact and speech normal  PSYCH: mentation appears normal, affect normal/bright  KNEE: Positive for left calf and lateral knee tenderness  VASC:: Capillary refill < 2 seconds all digits

## 2023-01-07 ENCOUNTER — APPOINTMENT (OUTPATIENT)
Dept: GENERAL RADIOLOGY | Facility: CLINIC | Age: 73
End: 2023-01-07
Attending: EMERGENCY MEDICINE
Payer: COMMERCIAL

## 2023-01-07 ENCOUNTER — HOSPITAL ENCOUNTER (EMERGENCY)
Facility: CLINIC | Age: 73
Discharge: HOME | End: 2023-01-07
Attending: EMERGENCY MEDICINE | Admitting: EMERGENCY MEDICINE
Payer: COMMERCIAL

## 2023-01-07 VITALS
HEART RATE: 71 BPM | SYSTOLIC BLOOD PRESSURE: 158 MMHG | RESPIRATION RATE: 16 BRPM | TEMPERATURE: 97.6 F | OXYGEN SATURATION: 98 % | DIASTOLIC BLOOD PRESSURE: 77 MMHG

## 2023-01-07 DIAGNOSIS — S92.414A CLOSED NONDISPLACED FRACTURE OF PROXIMAL PHALANX OF RIGHT GREAT TOE, INITIAL ENCOUNTER: ICD-10-CM

## 2023-01-07 PROCEDURE — 73630 X-RAY EXAM OF FOOT: CPT | Mod: RT

## 2023-01-07 PROCEDURE — 99283 EMERGENCY DEPT VISIT LOW MDM: CPT

## 2023-01-07 ASSESSMENT — ENCOUNTER SYMPTOMS: ARTHRALGIAS: 1

## 2023-01-08 NOTE — ED NOTES
Pt to D/C to home.  Pt provided with d/c instructions, including new medications, when medications were last given, and when to take them again.  Pt also informed to f/u with Ortho in 1-2 weeks.  Pt verbalized understanding of all d/c and f/u instructions.  All questions were answered at this time.  Copy of paperwork sent with pt.  Daughter to provide transport.

## 2023-01-08 NOTE — ED PROVIDER NOTES
History     Chief Complaint:  Toe Pain     HPI   Irish Rosales is a 72 year old female who presents with via EMS with pain in her right first toe. She injured this after a fall several months ago, but she recently bumped it on something and now has more pain and difficulty bearing weight. She was seen in  for her prior injury with a negative X-ray.     Independent Historian: patient      Review of External Notes: NA     ROS:  Review of Systems   Musculoskeletal: Positive for arthralgias.   All other systems reviewed and are negative.      Allergies:  Sulfa Drugs  Atorvastatin  Simvastatin     Medications:    cyanocobalamin   meloxicam   rosuvastatin   sertraline   Trazodone     Past Medical History:    Depression   Depressive disorder   Dystonic movements   Dystonic tremor   Family history of movement disorder   mild abnormality in carotid study   Osteoporosis   Dystonic movements  Spasm of vocal cords  Cervical radiculopathy  Impingement syndrome of right shoulder  Vitamin B12 deficiency   Cervical dystonia  Generalized anxiety disorder  Hyperlipidemia     Past Surgical History:    Appendectomy   Implant deep brain stimulation generator   Release carpal tunnel   Release ulnar nerve   Hysterectomy     Family History:    family history includes Cerebrovascular Disease in her father; Hypertension in her father; Lung Cancer in her mother; Neurologic Disorder in her sister and sister; Tremor in her mother.    Social History:   reports that she has never smoked. She has never used smokeless tobacco. She reports current alcohol use. She reports that she does not use drugs.  PCP: Braulio Victoria     Physical Exam     Patient Vitals for the past 24 hrs:   BP Temp Temp src Pulse Resp SpO2   01/07/23 2020 (!) 158/77 -- -- 71 -- --   01/07/23 1840 -- 97.6  F (36.4  C) Temporal -- -- --   01/07/23 1839 (!) 166/78 -- -- 74 16 98 %        Physical Exam  Resp:  Non-labored  Neuro:  Alert and cooperative  MSkel:  Moving all  extremities  Skin:  Right great toe swollen and tender, nail normal, no subungal hematoma, no open wound    Emergency Department Course     Imaging:  Foot  XR, G/E 3 views, right   Final Result   IMPRESSION: There is an acute, comminuted, vertically oriented fracture of the great toe proximal phalanx, without significant displacement. There is intra-articular extension.         Report per radiology      Emergency Department Course & Assessments:   Independent Interpretation (X-rays, CTs, rhythm strip):  Images reviewed and printed for patient    Social Determinants of Health affecting care:  NA     Disposition:  The patient was discharged to home.     Impression & Plan      Medical Decision Making:  History is concerning for the possibility of underlying fracture or dislocation.  There is no evidence for open injury.  There are no other associated injuries.  This was a mechanical injury with no concern for syncope.  Imaging was obtained consistent with fracture of the proximal phalanx of the great toe.  She is having difficulty bearing weight and prescription pain management was discussed.  She would prefer to use Tylenol and ibuprofen.  She has a walker from a friend that she can use around the house.  Will try hard soled shoe first and if unsuccessful for ambulation then CAM boot.  Follow up with orthopedics or podiatry.    Diagnosis:    ICD-10-CM    1. Closed nondisplaced fracture of proximal phalanx of right great toe, initial encounter  S92.414A            Discharge Medications:  Discharge Medication List as of 1/7/2023  8:14 PM           Scribe Disclosure:  I, Joshua Kjer, am serving as a scribe at 8:00 PM on 1/7/2023 to document services personally performed by Kaylie Spaulding MD based on my observations and the provider's statements to me.     1/7/2023   Kaylie Spaulding,        Kaylie Spaulding MD  01/08/23 1944

## 2023-01-08 NOTE — ED TRIAGE NOTES
Pt present via EMS, per EMS pt injured right bi toe a few months ago and now bumped it on something today now more pain

## 2023-01-09 ENCOUNTER — PATIENT OUTREACH (OUTPATIENT)
Dept: GERIATRIC MEDICINE | Facility: CLINIC | Age: 73
End: 2023-01-09

## 2023-01-09 NOTE — PROGRESS NOTES
[facility] Care Coordination Contact  CC received notification of Emergency Room visit.  ER visit occurred on 1/7/2023  at [facility] with Dx of Great Toe Fracture .    CC contacted member and reviewed discharge summary.  Member has a follow-up appointment with PCP: No: Offered Assistance with setting up a follow up appointment  Member has had a change in condition: Yes: Member states she is managing with a Hard shoe at this time. CC encouraged member to make an appt with Orthopedic and she stated she would that. She had the number for [facility].  New referrals placed: No  Home Visit Needed: No  Care plan reviewed and updated.  PCP notified of ED visit via EMR.  Cristin Salamanca RN,BSN  [facility] Case Coordinator   [phone]

## 2023-01-10 ENCOUNTER — TELEPHONE (OUTPATIENT)
Dept: NEUROLOGY | Facility: CLINIC | Age: 73
End: 2023-01-10

## 2023-01-10 NOTE — TELEPHONE ENCOUNTER
[facility] Call Center    Phone Message    May a detailed message be left on voicemail: yes     Reason for Call:  Julia called, stated that she broke her toe and would like to reschedule her DBS Programming appointment with . Please call her back at [phone]    Action Taken: Message routed to:  [facility]    Travel Screening: Not Applicable

## 2023-01-10 NOTE — TELEPHONE ENCOUNTER
Writer left a message for the patient to call back to reschedule her appointment with Dr. Colvin on 1/13/23.

## 2023-02-10 ENCOUNTER — OFFICE VISIT (OUTPATIENT)
Dept: NEUROLOGY | Facility: CLINIC | Age: 73
End: 2023-02-10
Payer: COMMERCIAL

## 2023-02-10 VITALS
DIASTOLIC BLOOD PRESSURE: 86 MMHG | SYSTOLIC BLOOD PRESSURE: 143 MMHG | HEART RATE: 80 BPM | RESPIRATION RATE: 16 BRPM | OXYGEN SATURATION: 97 %

## 2023-02-10 DIAGNOSIS — Z96.89 STATUS POST DEEP BRAIN STIMULATOR PLACEMENT: ICD-10-CM

## 2023-02-10 DIAGNOSIS — G25.0 ESSENTIAL TREMOR: Primary | ICD-10-CM

## 2023-02-10 PROCEDURE — 95970 ALYS NPGT W/O PRGRMG: CPT | Mod: GC | Performed by: PSYCHIATRY & NEUROLOGY

## 2023-02-10 PROCEDURE — 99215 OFFICE O/P EST HI 40 MIN: CPT | Mod: 25 | Performed by: PSYCHIATRY & NEUROLOGY

## 2023-02-10 ASSESSMENT — ACTIVITIES OF DAILY LIVING (ADL)
FEEDING_WITH_A_SPOON: (3) MODERATELY ABNORMAL. SPILLS A LOT OR CHANGES STRATEGY TO COMPLETE TASK SUCH AS USING TWO HANDS OR LEANING OVER.
WORKING: (2) MILDLY ABNORMAL. TREMOR INTERFERES WITH WORK, ABLE TO DO EVERYTHING, BUT WITH ERRORS.
ADL_TOTAL: 26
HYGIENE: (2) MILDLY ABNORMAL. SOME DIFFICULTY BUT CAN COMPLETE TASK.
FEEDING_WITH_A_SPOON: (3) MODERATELY ABNORMAL. SPILLS A LOT OR CHANGES STRATEGY TO COMPLETE TASK SUCH AS USING TWO HANDS OR LEANING OVER.
SOCIAL_IMPACT: (2) FEELS EMBARRASSED BY TREMOR IN SOME SOCIAL SITUATIONS OR PROFESSIONAL MEETINGS.
OVERALL_DISABILITY_WITH_THE_MOST_AFFECTED_TASK: EATING
SPEAKING: (1) SLIGHT VOICE TREMULOUSNESS, ONLY WHEN "NERVOUS".
DRESS: (1) SLIGHTLY ABNORMAL. TREMOR IS PRESENT BUT DOES NOT INTERFERE WITH DRESSING.
SPEAKING: (1) SLIGHT VOICE TREMULOUSNESS, ONLY WHEN "NERVOUS".
DRINKING_FROM_A_GLASS: (2) MILDLY ABNORMAL. SPILLS A LITTLE.
DRESS: (1) SLIGHTLY ABNORMAL. TREMOR IS PRESENT BUT DOES NOT INTERFERE WITH DRESSING.
WORKING: (2) MILDLY ABNORMAL. TREMOR INTERFERES WITH WORK, ABLE TO DO EVERYTHING, BUT WITH ERRORS.
SOCIAL_IMPACT: (2) FEELS EMBARRASSED BY TREMOR IN SOME SOCIAL SITUATIONS OR PROFESSIONAL MEETINGS.
OVERALL_DISABILITY_WITH_THE_MOST_AFFECTED_TASK: EATING
WRITING: (2) MILDLY ABNORMAL. DIFFICULTY WRITING DUE TO THE TREMOR.
WRITING: (2) MILDLY ABNORMAL. DIFFICULTY WRITING DUE TO THE TREMOR.
OVERALL_DISABILITY_WITH_THE_MOST_AFFECTED_TASK: (3) MODERATELY ABNORMAL. CAN DO TASK BUT MUST USE STRATEGIES.
USING_KEYS: (3) MODERATELY ABNORMAL. NEEDS TO USE TWO HANDS OR OTHER STRATEGIES TO PUT KEY IN LOCK.
OVERALL_DISABILITY_WITH_THE_MOST_AFFECTED_TASK: (3) MODERATELY ABNORMAL. CAN DO TASK BUT MUST USE STRATEGIES.
CARRYING_FOOD_TRAYS,_PLATES_OR_SIMILAR_ITEMS: (2) MILDLY ABNORMAL. MUST BE VERY CAREFUL TO AVOID SPILLING ITEMS ON FOOD TRAY.
POURING: (3) MODERATELY ABNORMAL. MUST USE TWO HANDS OR USES OTHER STRATEGIES TO AVOID SPILLING.
HYGIENE: (2) MILDLY ABNORMAL. SOME DIFFICULTY BUT CAN COMPLETE TASK.
USING_KEYS: (3) MODERATELY ABNORMAL. NEEDS TO USE TWO HANDS OR OTHER STRATEGIES TO PUT KEY IN LOCK.
CARRYING_FOOD_TRAYS_PLATES_OR_SIMILAR_ITEMS: (2) MILDLY ABNORMAL. MUST BE VERY CAREFUL TO AVOID SPILLING ITEMS ON FOOD TRAY.
DRINKING_FROM_A_GLASS: (2) MILDLY ABNORMAL. SPILLS A LITTLE.
POURING: (3) MODERATELY ABNORMAL. MUST USE TWO HANDS OR USES OTHER STRATEGIES TO AVOID SPILLING.

## 2023-02-10 ASSESSMENT — PAIN SCALES - GENERAL: PAINLEVEL: NO PAIN (0)

## 2023-02-10 NOTE — PROGRESS NOTES
Department of Neurology  Movement Disorders Division   DBS Follow-up Note    Patient: Irish Rosales  MRN: [id number]   : 1950   Date of Visit: 02/10/23     Diagnosis: Tremor  DBS Target(s): LVIM, RVIM, RVop  Date(s) of DBS Lead Placement:     Date(s) of IPG Placement: R 1/15/2019  Device: Abbott    Chief Complaint:  Irish Rosales is a 72 year old right handed woman who returns to clinic for follow up of tremor status post bilateral VIM + right Vop DBS.   She also receives botulinum toxin injections for cervical dystonia.  She was last seen 22 at which time the pulsewidth of 2b and 3abc was decreased from 30 to 20.     Interval History:  She reports that tremor is largely unchanged. She continues to complete the cycle of DBS settings. Not sure if this helps or not. No one program is better than the others.     Balance largely unchanged. 1 fall in January while tripping over a cord and broke right big toe, healing well.    Tingling has resolved.     Does not turn off DBS at night. No change in amplitudes.       Weeks                 Right brain        Left brain        1                      VIM           2b        2                      VIM          3abc        3                       VOP          3abc        4                      VOP           2b       Tremor ADL Scale 2/10/2023   1. Speaking 1   2. Feeding (spoon) 3   3. Drinking (glass) 2   4. Hygiene 2   5. Dressing 1   6. Pouring 3   7. Carry plate, tray 2   8. Use keys 3   9. Writing 2   10. Work or housework 2   11a. Overall disability 3   11b. Most affected task Eating   12. Social impact 2   ADL TOTAL 26     Previously 18 on 22     Review of Systems:  Other than that noted at the end of this note, the remainder of 12 systems reviewed were negative.    Medications:  Current Outpatient Medications   Medication Sig Dispense Refill     CALCIUM-VITAMIN D PO Take 2 chew tab by mouth daily       cyanocobalamin (VITAMIN B-12) 1000 MCG tablet  TAKE 1 TABLET(1000 MCG) BY MOUTH DAILY 100 tablet 2     meloxicam (MOBIC) 15 MG tablet Take 1 tablet (15 mg) by mouth daily 14 tablet 0     rosuvastatin (CRESTOR) 20 MG tablet TAKE 1 TABLET(20 MG) BY MOUTH DAILY. FOLLOW UP WITH PCP FOR FUTURE REFILLS 90 tablet 0     sertraline (ZOLOFT) 50 MG tablet TAKE 1 TABLET(50 MG) BY MOUTH DAILY 90 tablet 1     traZODone (DESYREL) 50 MG tablet TAKE 1 TABLET BY MOUTH EVERY NIGHT AS NEEDED FOR SLEEP 90 tablet 0        Allergies: is allergic to sulfa drugs, atorvastatin, and simvastatin.    Past Medical History:  Past Medical History:   Diagnosis Date     Depression      Depressive disorder      Dyslipidemia      Dystonic movements 1/21/2017     Dystonic tremor 1/21/2017     Family history of movement disorder 1/21/2017     mild abnormality in carotid study 3/2/2017    BILATERAL DUPLEX CAROTID ULTRASOUND March 1, 2017 1:01 PM    HISTORY: Other specified symptoms and signs involving the circulatory and respiratory systems.   COMPARISON: None.   FINDINGS: There is mild atherosclerotic plaque in the carotid bifurcations. Flow velocities and waveforms show less than 50% diameter stenosis in both the right and left internal carotid arteries as assessed by each ICA PS     Osteoporosis 8/2/2010       Past Surgical History:  Past Surgical History:   Procedure Laterality Date     ------------OTHER-------------  2004    vocal cord thyroplasty at [facility]     APPENDECTOMY  1997     COLONOSCOPY       IMPLANT DEEP BRAIN STIMULATION GENERATOR / BATTERY Left 3/13/2018    Procedure: IMPLANT DEEP BRAIN STIMULATION GENERATOR / BATTERY;  Deep Brain Stimulator Placement, Phase II, Placement Of Deep Brain Stimulator Generator/Battery Over The Left Chest Wall;  Surgeon: Aleksander Morales MD;  Location: [facility] OR     IMPLANT DEEP BRAIN STIMULATION GENERATOR / BATTERY Right 1/15/2019    Procedure: Deep Brain Stimulator Placement, Phase II, Placement Of Deep Brain Stimulator Generator/Battery Over  The Right Chest Wall;  Surgeon: Aleksander Morales MD;  Location: [facility] OR     OPTICAL TRACKING SYSTEM INSERTION DEEP BRAIN STIMULATION Left 3/6/2018    Procedure: OPTICAL TRACKING SYSTEM INSERTION DEEP BRAIN STIMULATION;  Stealth Assisted Left Deep Brain Stimulator Placement, Phase I, Placement Of Left Side Deep Brain Stimulator Electrode, Target Left Ventral Intermediate Nucleus Of The Thalamus With Microelectrode Recording;  Surgeon: Aleksander Morales MD;  Location: [facility] OR     OPTICAL TRACKING SYSTEM INSERTION DEEP BRAIN STIMULATION Right 1/8/2019    Procedure: Stealth Assisted Right Deep Brain Stimulator Placement, Phase One, Placement Of Right Side Deep Brain Stimulator Electrode Target Right Ventral Intermediate Nucleus Of The Thalamus and placement Second Electrode With Microelectrode Recording;  Surgeon: Aleksander Morales MD;  Location: [facility] OR     OTHER SURGICAL HISTORY  02/18/2022    DBS - put in new battery     RELEASE CARPAL TUNNEL Left 1/2/2015    Procedure: RELEASE CARPAL TUNNEL;  Surgeon: SHANIA Hernandez MD;  Location: [facility] OR     RELEASE ULNAR NERVE (ELBOW) Left 1/2/2015    Procedure: RELEASE ULNAR NERVE (ELBOW);  Surgeon: SHANIA Hernandez MD;  Location: [facility] OR     REPLACE DEEP BRAIN STIMULATION GENERATOR / BATTERY Left 2/18/2022    Procedure: Replacement of deep brain stimulator generator/battery over left chest wall;  Surgeon: Aleksander Morales MD;  Location:  OR     [facility] BSO, OMENTECTOMY W/XAVIER  1997    hysterectomy     [facility] HAND/FINGER SURGERY UNLISTED  1998    right carpal tunnel surgery       Social History:  Social History     Socioeconomic History     Marital status:      Spouse name: Not on file     Number of children: 3     Years of education: 14     Highest education level: Not on file   Occupational History     Occupation: RN     Employer: RETIRED     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   Tobacco Use     Smoking status: Never Smoker     Smokeless tobacco: Never Used   Substance and Sexual Activity     Alcohol use: Yes     Comment: occasionally     Drug use: No     Sexual activity: Yes     Partners: Male   Other Topics Concern     Parent/sibling w/ CABG, MI or angioplasty before 65F 55M? No      Service No     Blood Transfusions No     Caffeine Concern No     Occupational Exposure No     Hobby Hazards No     Sleep Concern No     Stress Concern No     Weight Concern No     Special Diet No     Back Care No     Exercise Yes     Comment: walk     Bike Helmet Yes     Seat Belt Yes     Self-Exams Yes   Social History Narrative        Lives in [place]    Daughter Karyn Sanchez        benign essential tremor with a strained quality to her voice consistent with mild laryngeal spasm        ALLERGIES:  She is allergic to sulfa drugs, atorvastatin and simvastatin.  The statin drugs caused leg cramps.            FAMILY HISTORY:  As noted above with 1 sister developing a voice tremor and another sister having what is described as a facial tremor.  Mother with hand tremors          SOCIAL HISTORY:  She does not smoke.  She does use alcohol on occasion. She previously worked as a RN.         [facility]  [phone] orofacial dyskinesias    [facility], [place] [phone]  laryngeal dystonia and laryngeal tremor and laryngeal spasm.             Updated 6/13/17: Client was born in [place], [place] to parents Jerome and Berenice.  Client grew up w/ three sisters Tea, Carrie, and Jazmyn who are currently still living.  Client graduated from high school, attended college for two years, and graduated w/ a RN Degree.  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  She was  to her ex-spouse for 25 years and had three children; one son Bryan and  two daughters Halley/Ysabel.  One daughter Halley Sanchez resides nearby and her other daughter Ysabel and her son Bryan reside in [place], [place].  Bryan has two daughters ages five and eight years old.  Client enjoys flying for free to [place] to visit her two granddaughters. Michelle Sutton, [facility] ([phone], Fax: [phone]).         Social Determinants of Health     Financial Resource Strain: Not on file   Food Insecurity: Not on file   Transportation Needs: Not on file   Physical Activity: Not on file   Stress: Not on file   Social Connections: Unknown     Frequency of Communication with Friends and Family: Not on file     Frequency of Social Gatherings with Friends and Family: Three times a week     Attends Restoration Services: Not on file     Active Member of Clubs or Organizations: Not on file     Attends Club or Organization Meetings: Not on file     Marital Status: Not asked   Intimate Partner Violence: Not on file   Housing Stability: Not on file       Family History:  Family History   Problem Relation Age of Onset     Hypertension Father         and mother     Cerebrovascular Disease Father      Tremor Mother      Lung Cancer Mother      Neurologic Disorder Sister         dystonic voice x 2 botox     Neurologic Disorder Sister         jose m [place]. facial movement        Physical Exam:  The patient's  blood pressure is 143/86 (abnormal) and her pulse is 80. Her respiration is 16 and oxygen saturation is 97%.      Neurological Examination:   Tremor Motor Scale 2/10/2023   Assessment Time  9:29 AM   Medication None   DBS - Right Brain On   DBS - Left Brain On   Head 0   Face & Jaw 0   Voice 2   Outstretched - RIGHT 1.5   Outstretched - LEFT 0   Wingbeating - RIGHT 1.5   Wingbeating - LEFT 1.5   Kinetic - RIGHT 2.5   Kinetic - LEFT 2.5   Lower Limb - RIGHT 0   Lower Limb - LEFT 0   Lower Limb (Max) 0   Spiral - RIGHT 3   Spiral - LEFT 2.5   Handwriting 1.5   Dot approx - RIGHT 2.5   Dot  "approx - LEFT 3   Trunk (Standing) 0   Total Right 11   Total Left 9.5   Axial 2   TOTAL 24     Gait: normal stride length, bilateral arm swing normal, no evidence of ataxic gait, able to complete tandem gait without difficulty    Previously 25 on 7/8/22       Procedure: DBS Interrogation & Programming    Lead(s):   Side Left Right Right   Target VIM Vop VIM   Lead  Abbott Abbott Abbott   Lead Model Infinity 0.5 Infinity 0.5 Infinity 0.5   Lead Implant Date 3/6/2018 1/8/2019 1/8/2019   IPG # 1 2 2      IPG(s):  IPG # 1 2   IPG  Abbott Abbott   IPG Model Infinity 5 Infinity 7   IPG serial # [id number]   IPG Implant Date 3/13/2018 1/15/2019   Location Left chest Right chest   Battery (V) 2.95 (2.89) V 2.80 (2.90) V   System impedances normal normal     IPG #  1     Program Name \"2b\"   \"3abc\"   Side Left Left   Initial/Final Initial & final Initial & final   Active/Inactive Active Inactive   Amplitude (mA) 5.0 4.0   Range (mA) 0-5.0 by 0.25 0-4.0 by 0.25   Pulse width (usec) 20 20   Freq (Hz) 200 200   Contacts C+2b- C+3abc-        IPG # 2     Program name Vim only     \"Side\" (i.e. target) Right Vop, lead 1 Right Vim, lead 2    Initial/Final Initial & final Initial&final   Active/Inactive Inactive  Inactive    Contacts C+2abc- C+11abc   Amplitude (mA)  0 no range 4.0 [0-4 by 0.1]   Pulse Width (ms)  20 20   Frequency ( Hz)  200 200      IPG # 2     Program name Vop only     Initial/Final Initial& final Initial & final   Active/Inactive Active  Active    \"Side\" (i.e. target) Right Vop, lead 1 Right Vim, lead 2   Contacts C+, 2abc- C+,11abc-   Amplitude (mA) 4.0 [0-4 by 0.1] 0, no range   Pulse Width (ms) 20 20   Frequency ( Hz) 200 200   Ther Imp Ohm             Assessment/Plan:  Irish Rosales is a 72 year old right handed woman who returns to clinic for follow up of tremor status post bilateral VIM + right Vop DBS.  Her tremor is largely unchanged and we have largely exhausted all the " reasonable options for improving tremor control. Further adjustments are unlikely to improve tremor further, might give side effects.  She agreed with leaving programming schedule as is, does not find this bothersome.     - no DBS programming changes today    RTC 6 months, 1 hr DBS programming     Patient and plan was examined & discussed with attending physician, Dr. Colvin.    Holly Torres MD  Movement Disorder Fellow        Patient seen and examined with Dr. Torres and I agree with the assessment and plan as outlined.  Time this date with patient, reviewing records, & documentinh.    Edwar Colvin PhD, MD

## 2023-02-21 ENCOUNTER — TELEPHONE (OUTPATIENT)
Dept: FAMILY MEDICINE | Facility: CLINIC | Age: 73
End: 2023-02-21

## 2023-02-21 ENCOUNTER — VIRTUAL VISIT (OUTPATIENT)
Dept: FAMILY MEDICINE | Facility: CLINIC | Age: 73
End: 2023-02-21
Payer: COMMERCIAL

## 2023-02-21 DIAGNOSIS — F41.8 SITUATIONAL ANXIETY: ICD-10-CM

## 2023-02-21 DIAGNOSIS — F40.243 FEAR OF FLYING: Primary | ICD-10-CM

## 2023-02-21 DIAGNOSIS — F32.5 DEPRESSION, MAJOR, IN REMISSION (H): ICD-10-CM

## 2023-02-21 PROCEDURE — 99214 OFFICE O/P EST MOD 30 MIN: CPT | Mod: 93 | Performed by: INTERNAL MEDICINE

## 2023-02-21 PROCEDURE — 96127 BRIEF EMOTIONAL/BEHAV ASSMT: CPT | Mod: 93 | Performed by: INTERNAL MEDICINE

## 2023-02-21 RX ORDER — ALPRAZOLAM 0.25 MG
0.25 TABLET ORAL 2 TIMES DAILY PRN
Qty: 20 TABLET | Refills: 0 | Status: SHIPPED | OUTPATIENT
Start: 2023-02-21 | End: 2023-02-22

## 2023-02-21 ASSESSMENT — PATIENT HEALTH QUESTIONNAIRE - PHQ9
10. IF YOU CHECKED OFF ANY PROBLEMS, HOW DIFFICULT HAVE THESE PROBLEMS MADE IT FOR YOU TO DO YOUR WORK, TAKE CARE OF THINGS AT HOME, OR GET ALONG WITH OTHER PEOPLE: NOT DIFFICULT AT ALL
SUM OF ALL RESPONSES TO PHQ QUESTIONS 1-9: 0
SUM OF ALL RESPONSES TO PHQ QUESTIONS 1-9: 0

## 2023-02-21 NOTE — TELEPHONE ENCOUNTER
Prior Authorization Request      Med:Alprazolam    Message from Pharmacy:Plan not covering this med .   Requesting alternative   clonazepam/lorzepam/diazepam

## 2023-02-21 NOTE — PROGRESS NOTES
Irish is a 72 year old who is being evaluated via a billable telephone visit.      What phone number would you like to be contacted at? [phone]  How would you like to obtain your AVS? Jacky  Distant Location (provider location):  Off-site    Assessment & Plan     Irish was seen today for recheck medication.    Diagnoses and all orders for this visit:    Fear of flying  -     ALPRAZolam (XANAX) 0.25 MG tablet; Take 1 tablet (0.25 mg) by mouth 2 times daily as needed for anxiety or sleep (flying)    Situational anxiety  -     ALPRAZolam (XANAX) 0.25 MG tablet; Take 1 tablet (0.25 mg) by mouth 2 times daily as needed for anxiety or sleep (flying)    Depression, major, in remission (H)  Comments:  Continue with sertraline  Orders:  -     [place] BEHAV ASSMT W/SCORE & DOCD/STAND INSTRUMENT          Return in about 2 months (around 4/21/2023) for wellness visit, Fasting Lab appointment.    Braulio Victoria MD PhD  [facility]   Irish is a 72 year old, presenting for the following health issues:  Recheck Medication      History of Present Illness       Reason for visit:  [place]    She eats 0-1 servings of fruits and vegetables daily.She consumes 0 sweetened beverage(s) daily.She exercises with enough effort to increase her heart rate 20 to 29 minutes per day.  She exercises with enough effort to increase her heart rate 5 days per week.   She is taking medications regularly.    Today's PHQ-9         PHQ-9 Total Score: 0    PHQ-9 Q9 Thoughts of better off dead/self-harm past 2 weeks :   Not at all    How difficult have these problems made it for you to do your work, take care of things at home, or get along with other people: Not difficult at all     Going to [facility] for 2 weeks with her son and family friend.   Would like to get short-term medication for anxiety for flying and traveling.  In the past has done well with alprazolam on a as needed basis.    Review of  Systems   Constitutional, HEENT, cardiovascular, pulmonary, gi and gu systems are negative, except as otherwise noted.      Objective           Vitals:  No vitals were obtained today due to virtual visit.    Physical Exam   healthy, alert and no distress, voice tremor present  PSYCH: Alert and oriented times 3; coherent speech, normal   rate and volume, able to articulate logical thoughts, able   to abstract reason, no tangential thoughts, no hallucinations   or delusions  Her affect is normal  RESP: No cough, no audible wheezing, able to talk in full sentences  Remainder of exam unable to be completed due to telephone visits        Phone call duration: 4 minutes

## 2023-02-22 RX ORDER — LORAZEPAM 0.5 MG/1
.25-.5 TABLET ORAL DAILY PRN
Qty: 20 TABLET | Refills: 0 | Status: ON HOLD | OUTPATIENT
Start: 2023-02-22 | End: 2023-10-12

## 2023-03-23 NOTE — PROGRESS NOTES
70 yo F, ET with dystonic features and strong family history (orofacial dyskinesias and another sister with laryngeal dystonia and laryngeal tremor and laryngeal spasm, mother with hand tremor).  L Vim DBS (Perez) Mar 2018;  R Vim & Vop DBS ([facility]bbott) Jan 2019.  She's also getting botulinum toxin injections for her component of cervical dystonia, most recently Tello 12    She has tended to have good, but transient response of her tremor to DBS.  Following extended programming of the L Vim, we concluded that her tremor was clearly worse without the stimulation than with it, but that more aggressive stimulation introduced a component of limb ataxia which was difficult to distinguishh from the original tremor (and functionally, from her point, amounted to the same thing).  Also, the stimulation appeared to be improving distal more than proximal tremor, such that activities where the tremor was bothering her, such as feeding herself were significantly impaired by proximal and axial tremor.  For that reason, we concluded that treating the contralateral tremor might help her, and opted for dual Vim/Vop leads, in an effort to do all we could for what was proving to be a refractory, proximal and axial tremor (particularly given dystonic component).    Initial R brain, Vim + Vop monopolar review, David, Feb13, 2019  Vim tested omnodirectionally, 60usec, 130Hz  Contacts 10 and 11 looked good, 10 at 2.0V with a ceiling (LUE ataxia) at 3.0.  11 at 2.5 with a ceiling (LUE ataxia or worse tremor) at 4.0V  In a head-to-head, 11 was a little better.  Final contacts 11abcNegCpos, and she's still using that    Vop tested omnidirectionally, 60usec, 130Hz  1 looked best tremor at 2.0, but with slight ataxia  2 looked fair at 2.0, with ceiling of subjective gait ataxia at 2.5 or 3, eric limb ataxia at 3.5  3 maybe good at 2.5, with ceiling 3.5 subjective gait ataxia.  AS doesn't seem to have felt it was as good as 2, though  Even 4 seemed  "fair in the 2-4 range  paresthesia threshold relatively higgh around 4  Final contacts 2abc, and she's still using that.    Follow up programming *Left* Yosi Stewart Apr16, 2019  Reports much improved, neck pain gone, voice much better, L tremor better, no imbalance, no disarthria.  Still R UE tremor  R contacts at start were [id number], 180Hz, 30 usec   He explored ampltude up to 8, and got best spiral at 6.0 (scans are convincing)  With pulsewidth increased to 60, ceiling lowered to 5 (if not lower) disarthria, R face capsular.  Final setting 4.0 ([id number] 30usec 180Hz, with plan for her to gradually increase amplitude to 5 mA  She did this, but, at her Jun 12 botox appointment, amplitude was reduced to 4.5, I don't find reason why, though the reduction documented on Jun 21 appt as *not* having helped \"tremor or movements\"      Neck botox Jun 12 David  documented Jun 21 to have improved neck ROM    Follow up programming both sides David, Jun 21  L Vim.  Orientation of electrode on flat film, suggesting lateral (CCW) rotation making segment c posterior  Tried replacing 2eOhl4tSbmQmro with 6pAft8jUujCmvc.  This was done at a trivially increased freq of 186 and slightly reduced PW of 20, and, at that PW seemed if anything not as good.  Maybe equal, but not better, at original PW of 30.  Finall settings essentially unchanged at 0qFbx2kGufJjgh 4.0mA, 30use, 186 Hz    R Vop.  Increased amplitude 1.5 to 2.5, no noteworthy changes  R Vim. Increased amplitude to 3.0 and rate to 180Hz, with some improvement in tremor.    Since last visit, Jul01  message \"I am having alot of difficulty eating and drinking. I was so much  better after my earlier DBS programming. \"    Possible plan:    On the right brain, for left body, since she reports getting worse since increasing amplitude and frequency on R Vim, and amplitude on R Vop, let's back down R Vim 180 Hz to 160 Hz and 60 usec to 30 usec, while backing down R Vop from " "60 usec to 30 usec (and keep 130 Hz).  Keep contacts the same.  Make ranges down to zero, or create two programs with the idea that we might alternate  Vim only one week, Vop only the other.  (On the theory that she's a an adapter-rebounder.)    On the left brain, for right body, mini-monopolar review of 2b, 2c, 3b, 3c  Hope to get two widely  segments both reasonably effective:  ideally diagonal (2b,3c or 2c,3b)  Create two programs, with the idea that we might alternate weekly.  (On the theory that she's a an adapter-rebounder.)    Today:  Here with sister.  Was \"really good\" after 1st programming of 2nd side \"no trouble eating or drinking\"  Since then, as noted above, she had one appointment for programming of the 1st side, then another for additional programming of the second side, and since then, tremor's been worse.    Despite everything:  \"I could live with this\" \"Thrilled with my results;  Anything else is icing on the cake\"    NOT currently turning off at night.  Never did try.    Confirms, Botox did help with neck pain & ROM    No UE resting  Sustention 1R0L  Wingbeating 1Bi  Finger-chin 2 Bi  LEs rest, postural and kinetic 0  Gait unremarkable, thuogh it brings out a lateral head t remor not seen when seated.    Narrative summary of DBS settings and programming (see below, for table format):  R Vim  Program \"2b3b\"  9lTiz5xOnnYgij 4.0 mA, 30usec, 186Hz  Range 0 to 5.0 mA, step size 0.1    Other R Vim programs are: \"2B\" and \"orig settings\"    System impedances all OK    R Vim & Vop  \"Program 1\" (no other programs)    Lead 1 \"Right (Rt Vo)\"  2abcNegCpos  60 usec, 130 Hz  Range 2.5 to 2.5 mA (I.e.no range)    Lead 2 \"Right (Rt Vim)  11abcNegCpos  60 usec, 180 Hz  Range 0 to 4.0 mA, step size 0.1 mA    System impedances all OK    Added new program \"Vim only\"  Vop off (with minimal washout) L spiral slightly worse, gait same.  Sustention & wingbeating  & finger-chin about the same (possibley a little " "increased in the _right_ UE)    Increase Vim to 3.5, possibly more tremor on finger chin.  Spiral, though, slightly improved.  Increase further to 4.0, spiral same (i.e. Better than at 3.0) finger chin no more tremor than at 3.0    Kept 4.0, reduced PW to 30  Finger chin certainly no worse, possbily better    Sprials look best at 3.5mA, 30 usec, 185 Hz retested there.   Settled on that with range 0 to 4.0 mA            Vop:off, Vim:11abcNegCpos 60 usec 180 Hz 3.0mA Spiral slightly worse       3.5 Spiral slightly better Transient LUE paresth      4.0 Finger chin unchanged Transient LUE peresth    30   No change NN      3.5 Best spirals NN       New program \"Vop only\"  Vim 0 mA, and  Vop 2.5 mA, 60 usec  Some improvement in finger chin from 130 to 185 Hz    No worsening with reduction in PW to 30 usec    Spirals & finger chin done at 3.0, 3.5, 4.0, possibly improved, at any rate no worse.  Gait at 3.5 unchanged.            Vim: off, Vop 2abcNegCpos 60 usec 130 2.5       185  Some improvement finger-chin     30   No worsening       3.0  NN      3.5 Possibly improved, certainly no worse    NN      4.0 same NN               L IPG, L brain   \"2b3b\" LEFT Vim     Initial Final   Contacts C+, 2b-,3b- No change   Amplitude (mA) 4.0 No change   Pulse Width (ms) 30 No change   Frequency (Hz) 186 No change    Two other programs on Right IPG  \"2B\" and \"orig settings\" were left untouched.    R chest IPG, R brain          \"Program 1\" -- actve Right Vop Right Vim     Initial Final Initial Final   Contacts C+, 2abc- No change C+, 11abc- No change   Amplitude (mA) 2.5 1.5 no range 3.0 2.5 [0-4]   Pulse Width (ms) 60 No change 60 No change   Frequency (Hz) 130 No change 130 180                 \"Vim only\"--inactive Right Vop Right Vim     Initial Final Initial Final   Contacts  C+, 2abc-  C+, 11abc-   Amplitude (mA)  0 no range  3.5 [0-4]   Pulse Width (ms)  60  30   Frequency (Hz)  130  180          \"Vop only\"--inactive Right Vop Right " Vim     Initial Final Initial Final   Contacts  C+, 2abc-  C+, 11abc-   Amplitude (mA)  3.0  0 no range   Pulse Width (ms)  30  60   Frequency (Hz)  180  180      1.5 hours spent reviewing chart, on Jul 3 and a further 0.5 hour on Jul4,  including detailed history of recent DBS programming, summarized above.  2 hours today spent on DBS programming.    Edwar Colvin PhD, MD         Fluconazole Counseling:  Patient counseled regarding adverse effects of fluconazole including but not limited to headache, diarrhea, nausea, upset stomach, liver function test abnormalities, taste disturbance, and stomach pain.  There is a rare possibility of liver failure that can occur when taking fluconazole.  The patient understands that monitoring of LFTs and kidney function test may be required, especially at baseline. The patient verbalized understanding of the proper use and possible adverse effects of fluconazole.  All of the patient's questions and concerns were addressed.

## 2023-03-26 ENCOUNTER — HEALTH MAINTENANCE LETTER (OUTPATIENT)
Age: 73
End: 2023-03-26

## 2023-03-27 ENCOUNTER — PATIENT OUTREACH (OUTPATIENT)
Dept: GERIATRIC MEDICINE | Facility: CLINIC | Age: 73
End: 2023-03-27
Payer: COMMERCIAL

## 2023-03-27 NOTE — PROGRESS NOTES
[facility] Care Coordination Contact    CC received a Vm that the member would like to talk about transitioning to another state. CC provided education and next steps for the member. The member states she will be moving in May. Cristin Salamanca RN,BSN  [facility] Case Coordinator   [phone]

## 2023-04-04 ENCOUNTER — DOCUMENTATION ONLY (OUTPATIENT)
Dept: LAB | Facility: CLINIC | Age: 73
End: 2023-04-04
Payer: COMMERCIAL

## 2023-04-04 DIAGNOSIS — E53.8 VITAMIN B12 DEFICIENCY (NON ANEMIC): Primary | ICD-10-CM

## 2023-04-04 DIAGNOSIS — M81.6 LOCALIZED OSTEOPOROSIS WITHOUT CURRENT PATHOLOGICAL FRACTURE: ICD-10-CM

## 2023-04-04 DIAGNOSIS — E78.5 HYPERLIPIDEMIA LDL GOAL <130: ICD-10-CM

## 2023-04-04 NOTE — PROGRESS NOTES
Please place or confirm orders for upcoming lab appointment on 04/17/2023.    If no labs are needed at this time please have the Care Team contact patient regarding this information and cancel lab appointment. Thank you.     Juana WILLIAMSON

## 2023-04-10 ENCOUNTER — PATIENT OUTREACH (OUTPATIENT)
Dept: GERIATRIC MEDICINE | Facility: CLINIC | Age: 73
End: 2023-04-10
Payer: COMMERCIAL

## 2023-04-10 NOTE — PROGRESS NOTES
Irish is planning to move to [place] at the end of May or early June 2023 to live closer to her son and grandchildren. Requested for Irish to update CC once she has an exact move date. Discussed insurance and services ending when she moves. She currently does not have a homemaker and [employer] the homemaker provider has not been able to find staffing Irish is okay with [employer] continuing to look for staffing, but is okay with not having help until she moves.    Michelle Corcoran, [facility]  [phone]  Fax: [phone]

## 2023-04-17 ENCOUNTER — LAB (OUTPATIENT)
Dept: LAB | Facility: CLINIC | Age: 73
End: 2023-04-17
Payer: COMMERCIAL

## 2023-04-17 DIAGNOSIS — E78.5 HYPERLIPIDEMIA LDL GOAL <130: ICD-10-CM

## 2023-04-17 DIAGNOSIS — E53.8 VITAMIN B12 DEFICIENCY (NON ANEMIC): ICD-10-CM

## 2023-04-17 DIAGNOSIS — M81.6 LOCALIZED OSTEOPOROSIS WITHOUT CURRENT PATHOLOGICAL FRACTURE: ICD-10-CM

## 2023-04-17 LAB
ANION GAP SERPL CALCULATED.3IONS-SCNC: 12 MMOL/L (ref 7–15)
BUN SERPL-MCNC: 14.9 MG/DL (ref 8–23)
CALCIUM SERPL-MCNC: 9.5 MG/DL (ref 8.8–10.2)
CHLORIDE SERPL-SCNC: 104 MMOL/L (ref 98–107)
CHOLEST SERPL-MCNC: 226 MG/DL
CREAT SERPL-MCNC: 0.75 MG/DL (ref 0.51–0.95)
DEPRECATED CALCIDIOL+CALCIFEROL SERPL-MC: 27 UG/L (ref 20–75)
DEPRECATED HCO3 PLAS-SCNC: 26 MMOL/L (ref 22–29)
ERYTHROCYTE [DISTWIDTH] IN BLOOD BY AUTOMATED COUNT: 13.1 % (ref 10–15)
GFR SERPL CREATININE-BSD FRML MDRD: 85 ML/MIN/1.73M2
GLUCOSE SERPL-MCNC: 100 MG/DL (ref 70–99)
HCT VFR BLD AUTO: 36.4 % (ref 35–47)
HDLC SERPL-MCNC: 91 MG/DL
HGB BLD-MCNC: 11.9 G/DL (ref 11.7–15.7)
LDLC SERPL CALC-MCNC: 121 MG/DL
MCH RBC QN AUTO: 31.6 PG (ref 26.5–33)
MCHC RBC AUTO-ENTMCNC: 32.7 G/DL (ref 31.5–36.5)
MCV RBC AUTO: 97 FL (ref 78–100)
NONHDLC SERPL-MCNC: 135 MG/DL
PLATELET # BLD AUTO: 261 10E3/UL (ref 150–450)
POTASSIUM SERPL-SCNC: 4.1 MMOL/L (ref 3.4–5.3)
RBC # BLD AUTO: 3.77 10E6/UL (ref 3.8–5.2)
SODIUM SERPL-SCNC: 142 MMOL/L (ref 136–145)
TRIGL SERPL-MCNC: 72 MG/DL
VIT B12 SERPL-MCNC: 1016 PG/ML (ref 232–1245)
WBC # BLD AUTO: 4.3 10E3/UL (ref 4–11)

## 2023-04-17 PROCEDURE — 80048 BASIC METABOLIC PNL TOTAL CA: CPT

## 2023-04-17 PROCEDURE — 85027 COMPLETE CBC AUTOMATED: CPT

## 2023-04-17 PROCEDURE — 36415 COLL VENOUS BLD VENIPUNCTURE: CPT

## 2023-04-17 PROCEDURE — 82306 VITAMIN D 25 HYDROXY: CPT

## 2023-04-17 PROCEDURE — 80061 LIPID PANEL: CPT

## 2023-04-17 PROCEDURE — 82607 VITAMIN B-12: CPT

## 2023-04-18 ASSESSMENT — ENCOUNTER SYMPTOMS
COUGH: 0
HEMATURIA: 0
BREAST MASS: 0
HEMATOCHEZIA: 0
DIARRHEA: 0
FEVER: 0
DIZZINESS: 0
ARTHRALGIAS: 0
SORE THROAT: 0
PARESTHESIAS: 0
NAUSEA: 0
NERVOUS/ANXIOUS: 0
WEAKNESS: 0
SHORTNESS OF BREATH: 0
MYALGIAS: 1
PALPITATIONS: 0
DYSURIA: 0
ABDOMINAL PAIN: 0
EYE PAIN: 0
CONSTIPATION: 0
JOINT SWELLING: 0
FREQUENCY: 0
CHILLS: 0
HEARTBURN: 0
HEADACHES: 0

## 2023-04-18 ASSESSMENT — ACTIVITIES OF DAILY LIVING (ADL): CURRENT_FUNCTION: NO ASSISTANCE NEEDED

## 2023-04-19 ENCOUNTER — OFFICE VISIT (OUTPATIENT)
Dept: FAMILY MEDICINE | Facility: CLINIC | Age: 73
End: 2023-04-19
Payer: COMMERCIAL

## 2023-04-19 ENCOUNTER — LAB (OUTPATIENT)
Dept: FAMILY MEDICINE | Facility: CLINIC | Age: 73
End: 2023-04-19

## 2023-04-19 VITALS
HEART RATE: 74 BPM | BODY MASS INDEX: 25.44 KG/M2 | TEMPERATURE: 97 F | SYSTOLIC BLOOD PRESSURE: 125 MMHG | HEIGHT: 62 IN | OXYGEN SATURATION: 99 % | RESPIRATION RATE: 9 BRPM | DIASTOLIC BLOOD PRESSURE: 82 MMHG

## 2023-04-19 DIAGNOSIS — Z00.00 ENCOUNTER FOR MEDICARE ANNUAL WELLNESS EXAM: ICD-10-CM

## 2023-04-19 DIAGNOSIS — Z12.11 SCREEN FOR COLON CANCER: ICD-10-CM

## 2023-04-19 DIAGNOSIS — Z00.00 ENCOUNTER FOR MEDICARE ANNUAL WELLNESS EXAM: Primary | ICD-10-CM

## 2023-04-19 DIAGNOSIS — F32.5 DEPRESSION, MAJOR, IN REMISSION (H): ICD-10-CM

## 2023-04-19 DIAGNOSIS — E78.5 HYPERLIPIDEMIA LDL GOAL <130: ICD-10-CM

## 2023-04-19 PROCEDURE — 99214 OFFICE O/P EST MOD 30 MIN: CPT | Mod: 25 | Performed by: INTERNAL MEDICINE

## 2023-04-19 PROCEDURE — G0439 PPPS, SUBSEQ VISIT: HCPCS | Performed by: INTERNAL MEDICINE

## 2023-04-19 RX ORDER — ROSUVASTATIN CALCIUM 20 MG/1
20 TABLET, COATED ORAL DAILY
Qty: 90 TABLET | Refills: 3 | Status: SHIPPED | OUTPATIENT
Start: 2023-04-19

## 2023-04-19 ASSESSMENT — ENCOUNTER SYMPTOMS
ARTHRALGIAS: 0
FEVER: 0
MYALGIAS: 1
DIZZINESS: 0
PALPITATIONS: 0
DIARRHEA: 0
HEMATURIA: 0
HEMATOCHEZIA: 0
NERVOUS/ANXIOUS: 0
FREQUENCY: 0
COUGH: 0
JOINT SWELLING: 0
SHORTNESS OF BREATH: 0
PARESTHESIAS: 0
DYSURIA: 0
WEAKNESS: 0
NAUSEA: 0
ABDOMINAL PAIN: 0
EYE PAIN: 0
HEADACHES: 0
SORE THROAT: 0
BREAST MASS: 0
HEARTBURN: 0
CONSTIPATION: 0
CHILLS: 0

## 2023-04-19 ASSESSMENT — ACTIVITIES OF DAILY LIVING (ADL): CURRENT_FUNCTION: NO ASSISTANCE NEEDED

## 2023-04-19 ASSESSMENT — PAIN SCALES - GENERAL: PAINLEVEL: NO PAIN (0)

## 2023-04-19 NOTE — RESULT ENCOUNTER NOTE
Reviewed during clinic/virtual visit.     Please call or make an appointment if you have further questions.     Braulio Victoria MD-PhD

## 2023-04-19 NOTE — PATIENT INSTRUCTIONS
Patient Education   Personalized Prevention Plan  You are due for the preventive services outlined below.  Your care team is available to assist you in scheduling these services.  If you have already completed any of these items, please share that information with your care team to update in your medical record.  Health Maintenance Due   Topic Date Due     Colorectal Cancer Screening  08/14/2020     Annual Wellness Visit  02/24/2023

## 2023-04-19 NOTE — PROGRESS NOTES
"    SUBJECTIVE:   Irish is a 72 year old who presents for Preventive Visit.       View : No data to display.            Patient has been advised of split billing requirements and indicates understanding: Yes  Are you in the first 12 months of your Medicare coverage?  No    Healthy Habits:     In general, how would you rate your overall health?  Good    Duration of exercise:  30-45 minutes    Do you usually eat at least 4 servings of fruit and vegetables a day, include whole grains    & fiber and avoid regularly eating high fat or \"junk\" foods?  Yes    Taking medications regularly:  Yes    Medication side effects:  None    Ability to successfully perform activities of daily living:  No assistance needed    Home Safety:  No safety concerns identified    Hearing Impairment:  No hearing concerns    In the past 6 months, have you been bothered by leaking of urine?  No    In general, how would you rate your overall mental or emotional health?  Good      PHQ-2 Total Score: 0    Additional concerns today:  No      Have you ever done Advance Care Planning? (For example, a Health Directive, POLST, or a discussion with a medical provider or your loved ones about your wishes): Yes, patient states has an Advance Care Planning document and will bring a copy to the clinic.       Fall risk  Fallen 2 or more times in the past year?: No  Any fall with injury in the past year?: Yes    Cognitive Screening   1) Repeat 3 items (Leader, Season, Table)    2) Clock draw: NORMAL  3) 3 item recall: Recalls 3 objects  Results: 3 items recalled: COGNITIVE IMPAIRMENT LESS LIKELY    Mini-CogTM Copyright TAHIRA Barbosa. Licensed by the author for use in [facility]; reprinted with permission (arnold@.[facility]). All rights reserved.          Reviewed and updated as needed this visit by clinical staff   Tobacco  Allergies  Meds              Reviewed and updated as needed this visit by Provider                 Social History     Tobacco Use "     Smoking status: Never     Smokeless tobacco: Never   Vaping Use     Vaping status: Never Used   Substance Use Topics     Alcohol use: Yes     Comment: occasionally             4/18/2023     7:35 AM   Alcohol Use   Prescreen: >3 drinks/day or >7 drinks/week? No     Do you have a current opioid prescription? No  Do you use any other controlled substances or medications that are not prescribed by a provider? None      Current providers sharing in care for this patient include:   Patient Care Team:  Braulio Victoria MD PhD as PCP - General (Internal Medicine)  Michelle Corcoran LSW as Lead Care Coordinator  Braulio Victoria MD PhD as Assigned PCP  Piedad Rose RN as Specialty Care Coordinator (Neurology)  Pieter Chen MA as Medical Assistant (Neurology)  Edita Morrison PA-C as Physician Assistant (Dermatology)  Edwar Colvin MD as Assigned Neuroscience Provider  Edita Morrison PA-C as Assigned Surgical Provider    The following health maintenance items are reviewed in Epic and correct as of today:  Health Maintenance   Topic Date Due     COLORECTAL CANCER SCREENING  08/14/2020     MEDICARE ANNUAL WELLNESS VISIT  02/24/2023     MAMMO SCREENING  08/01/2023     PHQ-9  08/21/2023     DEXA  03/21/2024     LIPID  04/17/2024     ANNUAL REVIEW OF HM ORDERS  04/19/2024     FALL RISK ASSESSMENT  04/19/2024     ADVANCE CARE PLANNING  04/19/2028     DTAP/TDAP/TD IMMUNIZATION (3 - Td or Tdap) 02/24/2032     HEPATITIS C SCREENING  Completed     DEPRESSION ACTION PLAN  Completed     INFLUENZA VACCINE  Completed     Pneumococcal Vaccine: 65+ Years  Completed     ZOSTER IMMUNIZATION  Completed     COVID-19 Vaccine  Completed     IPV IMMUNIZATION  Aged Out     MENINGITIS IMMUNIZATION  Aged Out         Review of Systems   Constitutional: Negative for chills and fever.   HENT: Negative for congestion, ear pain, hearing loss and sore throat.    Eyes: Negative for pain and visual disturbance.   Respiratory: Negative for cough and  "shortness of breath.    Cardiovascular: Negative for chest pain, palpitations and peripheral edema.   Gastrointestinal: Negative for abdominal pain, constipation, diarrhea, heartburn, hematochezia and nausea.   Breasts:  Negative for tenderness, breast mass and discharge.   Genitourinary: Negative for dysuria, frequency, genital sores, hematuria, pelvic pain, urgency, vaginal bleeding and vaginal discharge.   Musculoskeletal: Positive for myalgias. Negative for arthralgias and joint swelling.   Skin: Negative for rash.   Neurological: Negative for dizziness, weakness, headaches and paresthesias.   Psychiatric/Behavioral: Negative for mood changes. The patient is not nervous/anxious.        OBJECTIVE:   /82   Pulse 74   Temp 97  F (36.1  C) (Tympanic)   Resp (!) 9   Ht 1.58 m (5' 2.21\")   LMP  (LMP Unknown)   SpO2 99%   BMI 25.44 kg/m   Estimated body mass index is 25.44 kg/m  as calculated from the following:    Height as of this encounter: 1.58 m (5' 2.21\").    Weight as of 2/18/22: 63.5 kg (139 lb 15.9 oz).  Physical Exam  GENERAL APPEARANCE: healthy, alert and no distress  RESP: lungs clear to auscultation - no rales, rhonchi or wheezes  CV: regular rates and rhythm, normal S1 S2, no S3 or S4 and no murmur, click or rub  ABDOMEN: soft, nontender, without hepatosplenomegaly or masses and bowel sounds normal  MS: extremities normal- no gross deformities noted  SKIN: no suspicious lesions or rashes  NEURO: Normal strength and tone, mentation intact and speech normal  PSYCH: mentation appears normal and affect normal/bright    Diagnostic Test Results:  Lab on 04/17/2023   Component Date Value Ref Range Status     Cholesterol 04/17/2023 226 (H)  <200 mg/dL Final     Triglycerides 04/17/2023 72  <150 mg/dL Final     Direct Measure HDL 04/17/2023 91  >=50 mg/dL Final     LDL Cholesterol Calculated 04/17/2023 121 (H)  <=100 mg/dL Final     Non HDL Cholesterol 04/17/2023 135 (H)  <130 mg/dL Final     Vitamin " B12 04/17/2023 1,016  232 - 1,245 pg/mL Final     Vitamin D, Total (25-Hydroxy) 04/17/2023 27  20 - 75 ug/L Final     Sodium 04/17/2023 142  136 - 145 mmol/L Final     Potassium 04/17/2023 4.1  3.4 - 5.3 mmol/L Final     Chloride 04/17/2023 104  98 - 107 mmol/L Final     Carbon Dioxide (CO2) 04/17/2023 26  22 - 29 mmol/L Final     Anion Gap 04/17/2023 12  7 - 15 mmol/L Final     Urea Nitrogen 04/17/2023 14.9  8.0 - 23.0 mg/dL Final     Creatinine 04/17/2023 0.75  0.51 - 0.95 mg/dL Final     Calcium 04/17/2023 9.5  8.8 - 10.2 mg/dL Final     Glucose 04/17/2023 100 (H)  70 - 99 mg/dL Final     GFR Estimate 04/17/2023 85  >60 mL/min/1.73m2 Final    eGFR calculated using 2021 CKD-EPI equation.     WBC Count 04/17/2023 4.3  4.0 - 11.0 10e3/uL Final     RBC Count 04/17/2023 3.77 (L)  3.80 - 5.20 10e6/uL Final     Hemoglobin 04/17/2023 11.9  11.7 - 15.7 g/dL Final     Hematocrit 04/17/2023 36.4  35.0 - 47.0 % Final     MCV 04/17/2023 97  78 - 100 fL Final     MCH 04/17/2023 31.6  26.5 - 33.0 pg Final     MCHC 04/17/2023 32.7  31.5 - 36.5 g/dL Final     RDW 04/17/2023 13.1  10.0 - 15.0 % Final     Platelet Count 04/17/2023 261  150 - 450 10e3/uL Final           10/5/2021    11:41 AM 3/23/2022     2:20 PM 2/21/2023     7:32 AM   PHQ-9 SCORE   PHQ-9 Total Score MyChart 0  0   PHQ-9 Total Score 0 0 0         ASSESSMENT / PLAN:   Irish was seen today for wellness visit.    Diagnoses and all orders for this visit:    Screen for colon cancer  -     COLOGUARD([employer]ES); Future    Depression, major, in remission (H)  Comments:  Reduce dose to 50 mg. Follow up recheck in 3-4 weeks.   Orders:  -     sertraline (ZOLOFT) 50 MG tablet; Take 1 tablet (50 mg) by mouth daily    Hyperlipidemia LDL goal <130  -     rosuvastatin (CRESTOR) 20 MG tablet; Take 1 tablet (20 mg) by mouth daily    Encounter for Medicare annual wellness exam  -     REVIEW OF HEALTH MAINTENANCE PROTOCOL ORDERS  -     HAIR([employer]); Future  -      PRIMARY CARE FOLLOW-UP SCHEDULING; Future  -     PRIMARY CARE FOLLOW-UP SCHEDULING; Future              COUNSELING:  Reviewed preventive health counseling, as reflected in patient instructions        She reports that she has never smoked. She has never used smokeless tobacco.      Appropriate preventive services were discussed with this patient, including applicable screening as appropriate for cardiovascular disease, diabetes, osteopenia/osteoporosis, and glaucoma.  As appropriate for age/gender, discussed screening for colorectal cancer, prostate cancer, breast cancer, and cervical cancer. Checklist reviewing preventive services available has been given to the patient.    Reviewed patients plan of care and provided an AVS. The Complex Care Plan (for patients with higher acuity and needing more deliberate coordination of services) for Irish meets the Care Plan requirement. This Care Plan has been established and reviewed with the Patient.      Braulio Victoria MD PhD  [facility]    Identified Health Risks:    I have reviewed Opioid Use Disorder and Substance Use Disorder risk factors and made any needed referrals.

## 2023-04-21 ENCOUNTER — TRANSFERRED RECORDS (OUTPATIENT)
Dept: HEALTH INFORMATION MANAGEMENT | Facility: CLINIC | Age: 73
End: 2023-04-21
Payer: COMMERCIAL

## 2023-05-04 LAB — NONINV COLON CA DNA+OCC BLD SCRN STL QL: NEGATIVE

## 2023-05-04 NOTE — RESULT ENCOUNTER NOTE
These test result(s) are within acceptable range or at baseline.     Please call or Mychart to our office if you have further questions.     Braulio Victoria MD-PhD

## 2023-05-18 NOTE — PROGRESS NOTES
Received message from Iirsh that she is moving on Monday, May 31st to [place].  for Irish requesting a call back with her new address.    Michelle Corcoran, [facility]  [phone]  Fax: [phone]

## 2023-05-22 ENCOUNTER — TRANSFERRED RECORDS (OUTPATIENT)
Dept: FAMILY MEDICINE | Facility: CLINIC | Age: 73
End: 2023-05-22
Payer: COMMERCIAL

## 2023-05-31 ENCOUNTER — PATIENT OUTREACH (OUTPATIENT)
Dept: GERIATRIC MEDICINE | Facility: CLINIC | Age: 73
End: 2023-05-31
Payer: COMMERCIAL

## 2023-05-31 NOTE — PROGRESS NOTES
[facility] Care Coordination Contact      [facility] Six-Month Telephone Assessment    6 month telephone assessment completed on 05/31/2023.    ER visits: Yes -  [facility]  Hospitalizations: No  TCU stays: No  Significant health status changes: None reported  Falls/Injuries: No  ADL/IADL changes: No  Changes in services: Yes: Terminated home services. Irish moved to [place]. Currently staying at an [facility] and doesn't have a permanent address. Expects to have a permanent address within the week and will call CC with her new address once it is known.    Caregiver Assessment follow up:     Goals: See POC in chart for goal progress documentation.      Will see member in 6 months for an annual health risk assessment.   Encouraged member to call CC with any questions or concerns in the meantime.     INNA Diallo  [facility]  [phone]  Fax: [phone]

## 2023-05-31 NOTE — PROGRESS NOTES
Confirmed with Irish that she moved to [place] on Monday, May 29th. Currently staying in an AirBnB and doesn't have a permanent address yet but should within the next week.    Michelle Corcoran, [facility]  [phone]  Fax: [phone]

## 2023-06-20 ENCOUNTER — TELEPHONE (OUTPATIENT)
Dept: NEUROLOGY | Facility: CLINIC | Age: 73
End: 2023-06-20
Payer: COMMERCIAL

## 2023-06-20 NOTE — TELEPHONE ENCOUNTER
[facility] Call Center    Phone Message    May a detailed message be left on voicemail: yes     Reason for Call: Other: Please call patient to reschedule DBS programming Scheduled for  8/11/23 pre protocols    Action Taken: Message routed to:  [facility]    Travel Screening: Not Applicable

## 2023-06-26 NOTE — PROGRESS NOTES
[facility] Care Coordination Contact    Received after visit chart from care coordinator.  Completed following tasks: Mailed copy of care plan to client, Updated services in access, Submitted referrals/auths for [facility] and mailed POC Sig sheet w/SASE.   and Provider Signature - No POC Shared:  Member indicates that they do not want their POC shared with any EW providers.   Chayo Hickman  Case Management Specialist  [facility]  [phone]         Declines

## 2023-06-27 ENCOUNTER — PATIENT OUTREACH (OUTPATIENT)
Dept: GERIATRIC MEDICINE | Facility: CLINIC | Age: 73
End: 2023-06-27
Payer: COMMERCIAL

## 2023-06-28 NOTE — PROGRESS NOTES
[facility] Care Coordination Contact    Received a request to submit a DTR for the terminated of Homemaking, meals and EW. Documentation completed and faxed to the health plan. Care Coordinator aware.    Melanie Hatfield RN  Utilization   [facility]  [phone]     stated

## 2023-06-30 ENCOUNTER — MYC MEDICAL ADVICE (OUTPATIENT)
Dept: NEUROSURGERY | Facility: CLINIC | Age: 73
End: 2023-06-30
Payer: COMMERCIAL

## 2023-07-03 ENCOUNTER — PATIENT OUTREACH (OUTPATIENT)
Dept: CARE COORDINATION | Facility: CLINIC | Age: 73
End: 2023-07-03
Payer: COMMERCIAL

## 2023-07-05 DIAGNOSIS — G25.0 DISABLING ESSENTIAL TREMOR: Primary | ICD-10-CM

## 2023-07-12 ENCOUNTER — OFFICE VISIT (OUTPATIENT)
Dept: URBAN - METROPOLITAN AREA CLINIC 89 | Facility: CLINIC | Age: 73
End: 2023-07-12
Payer: COMMERCIAL

## 2023-07-12 DIAGNOSIS — H40.1134 PRIMARY OPEN-ANGLE GLAUCOMA, INDETERMINATE, BILATERAL: ICD-10-CM

## 2023-07-12 DIAGNOSIS — H25.13 AGE-RELATED NUCLEAR CATARACT, BILATERAL: Primary | ICD-10-CM

## 2023-07-12 DIAGNOSIS — H40.033 ANATOMICAL NARROW ANGLE, BILATERAL: ICD-10-CM

## 2023-07-12 PROCEDURE — 99204 OFFICE O/P NEW MOD 45 MIN: CPT | Performed by: OPTOMETRIST

## 2023-07-12 RX ORDER — BRIMONIDINE TARTRATE, TIMOLOL MALEATE 2; 5 MG/ML; MG/ML
SOLUTION/ DROPS OPHTHALMIC
Qty: 15 | Refills: 0 | Status: ACTIVE
Start: 2023-07-12

## 2023-07-12 RX ORDER — BRIMONIDINE TARTRATE, TIMOLOL MALEATE 2; 5 MG/ML; MG/ML
SOLUTION/ DROPS OPHTHALMIC
Qty: 5 | Refills: 0 | Status: INACTIVE
Start: 2023-07-12 | End: 2023-07-12

## 2023-07-12 ASSESSMENT — INTRAOCULAR PRESSURE
OS: 24
OD: 27

## 2023-07-12 NOTE — IMPRESSION/PLAN
Impression: Primary open-angle glaucoma, indeterminate, bilateral: H40.6687. Plan: Start Combigan bid OU x 6 weeks. RTC in 6 weeks for VF/IOP check same day as Dr. reynoso

## 2023-07-19 ENCOUNTER — PATIENT OUTREACH (OUTPATIENT)
Dept: GERIATRIC MEDICINE | Facility: CLINIC | Age: 73
End: 2023-07-19
Payer: COMMERCIAL

## 2023-07-19 NOTE — PROGRESS NOTES
Received notification from [facility] that [facility] was closing on 07/31/23 d/t moving to another state. Called Irish to f/u on permanent address, updated epic, and updated POC. [phone]    New Address:  05/29/23  [facility] [place] [address]      Michelle Corcoran, [facility]  [phone]  Fax: [phone]

## 2023-07-31 ENCOUNTER — PATIENT OUTREACH (OUTPATIENT)
Dept: CARE COORDINATION | Facility: CLINIC | Age: 73
End: 2023-07-31
Payer: COMMERCIAL

## 2023-08-10 ENCOUNTER — PATIENT OUTREACH (OUTPATIENT)
Dept: GERIATRIC MEDICINE | Facility: CLINIC | Age: 73
End: 2023-08-10
Payer: COMMERCIAL

## 2023-08-10 NOTE — PROGRESS NOTES
[facility] Care Coordination Contact    No longer active with [facility] community case management effective 06/30/2023.  Reason for community disenrollment: Other:  Medica term d/t move out of state.    INNA Diallo  [facility]  [phone]  Fax: [phone]

## 2023-08-28 ENCOUNTER — VIRTUAL VISIT (OUTPATIENT)
Dept: NEUROSURGERY | Facility: CLINIC | Age: 73
End: 2023-08-28
Attending: PSYCHIATRY & NEUROLOGY
Payer: COMMERCIAL

## 2023-08-28 DIAGNOSIS — G25.0 DISABLING ESSENTIAL TREMOR: ICD-10-CM

## 2023-08-28 DIAGNOSIS — Z96.89 STATUS POST DEEP BRAIN STIMULATOR PLACEMENT: ICD-10-CM

## 2023-08-28 DIAGNOSIS — G25.2 DYSTONIC TREMOR: ICD-10-CM

## 2023-08-28 DIAGNOSIS — G24.3 CERVICAL DYSTONIA: ICD-10-CM

## 2023-08-28 DIAGNOSIS — Z45.42 END OF BATTERY LIFE OF DEEP BRAIN STIMULATOR: Primary | ICD-10-CM

## 2023-08-28 PROCEDURE — 99207 PR NO CHARGE LOS: CPT | Mod: VID | Performed by: NEUROLOGICAL SURGERY

## 2023-08-28 ASSESSMENT — PAIN SCALES - GENERAL: PAINLEVEL: NO PAIN (0)

## 2023-08-28 NOTE — NURSING NOTE
Is the patient currently in the state of [place]? NO    Visit mode:VIDEO    If the visit is dropped, the patient can be reconnected by: VIDEO VISIT: Text to cell phone:   Telephone Information:   Mobile [phone]       Will anyone else be joining the visit? NO  (If patient encounters technical issues they should call [phone]    How would you like to obtain your AVS? MyChart    Are changes needed to the allergy or medication list? No    Reason for visit: Follow Up    Nilda GARCIA

## 2023-08-28 NOTE — LETTER
8/28/2023       RE: Irish Rosales  [address]  [place] [address]       Dear Colleague,    Thank you for referring your patient, Irish Rosales, to the [facility] [place] at [facility]. Please see a copy of my visit note below.      Ms. Irish Rosales states that she just received a message on her patient controller that her right side DBS generator/battery is reaching the end of its battery life.  She did arrange for this video visit but she is in [place] with her son.  She states that she will come back to [place] for the replacement surgery.    I explained that:  1)  This visit cannot take place, as I do not have medical license in [place].  2)  She will need to return to [place]    We will plan the following.  1.  Place the surgical case request for replacement of the DBS generator/battery over the right chest wall.  2.  Prior to her scheduled surgery, she will need to have a consultation with me, along with preop labs.      SOLANGE MORALES MD, PHD      Again, thank you for allowing me to participate in the care of your patient.      Sincerely,    Solange Morales MD

## 2023-08-28 NOTE — PROGRESS NOTES
Virtual Visit Details    Type of service:  Video Visit   Video Start Time:  1:26 PM  Video End Time: 1:31 PM    Originating Location (pt. Location): Other Son's home in [place]    Distant Location (provider location):  On-site  Platform used for Video Visit: Genna      Ms. Irish Rosales states that she just received a message on her patient controller that her right side DBS generator/battery is reaching the end of its battery life.  She did arrange for this video visit but she is in [place] with her son.  She states that she will come back to [place] for the replacement surgery.    I explained that:  1)  This visit cannot take place, as I do not have medical license in [place].  2)  She will need to return to [place]    We will plan the following.  1.  Place the surgical case request for replacement of the DBS generator/battery over the right chest wall.  2.  Prior to her scheduled surgery, she will need to have a consultation with me, along with preop labs.      SOLANGE CHU MD, PHD

## 2023-09-05 ENCOUNTER — TELEPHONE (OUTPATIENT)
Dept: NEUROLOGY | Facility: CLINIC | Age: 73
End: 2023-09-05
Payer: COMMERCIAL

## 2023-09-05 NOTE — TELEPHONE ENCOUNTER
[facility] Call Center    Phone Message    May a detailed message be left on voicemail: yes     Reason for Call: Other: Pt is calling because she because she is wanting to reschedule her DBS appointment with Dr. Colvin. Per protocols writer is unable to reschedule. Please call pt to reschedule     Action Taken: Message routed to:  [facility] Neurology    Travel Screening: Not Applicable

## 2023-09-06 ENCOUNTER — OFFICE VISIT (OUTPATIENT)
Dept: URBAN - METROPOLITAN AREA CLINIC 89 | Facility: CLINIC | Age: 73
End: 2023-09-06
Payer: COMMERCIAL

## 2023-09-06 DIAGNOSIS — Z01.818 PREOPERATIVE EVALUATION TO RULE OUT SURGICAL CONTRAINDICATION: Primary | ICD-10-CM

## 2023-09-06 DIAGNOSIS — H40.1131 PRIMARY OPEN-ANGLE GLAUCOMA, MILD STAGE, BILATERAL: Primary | ICD-10-CM

## 2023-09-06 DIAGNOSIS — H40.1134 PRIMARY OPEN-ANGLE GLAUCOMA, BILATERAL, INDETERMINATE STAGE: ICD-10-CM

## 2023-09-06 PROCEDURE — 99212 OFFICE O/P EST SF 10 MIN: CPT | Performed by: OPTOMETRIST

## 2023-09-06 PROCEDURE — 76514 ECHO EXAM OF EYE THICKNESS: CPT | Performed by: OPTOMETRIST

## 2023-09-06 PROCEDURE — 92133 CPTRZD OPH DX IMG PST SGM ON: CPT | Performed by: OPTOMETRIST

## 2023-09-06 PROCEDURE — 92083 EXTENDED VISUAL FIELD XM: CPT | Performed by: OPTOMETRIST

## 2023-09-06 RX ORDER — BRIMONIDINE TARTRATE, TIMOLOL MALEATE 2; 5 MG/ML; MG/ML
SOLUTION/ DROPS OPHTHALMIC
Qty: 15 | Refills: 2 | Status: ACTIVE
Start: 2023-09-06

## 2023-09-06 ASSESSMENT — INTRAOCULAR PRESSURE
OS: 13
OD: 16

## 2023-09-06 NOTE — TELEPHONE ENCOUNTER
Spoke to the patient and she was rescheduled to 10/6/23 at 11 AM with Dr. Colvin since she is scheduled for her battery replacement consult on 10/2 and her battery replacement consult on 10/5. The patient stated she plans to return to [place] after that week.

## 2023-09-08 NOTE — TELEPHONE ENCOUNTER
RECORDS RECEIVED FROM: Internal    REASON FOR VISIT: DBS battery replacement consult   Date of Appt: 10/2/23 11:15 am    NOTES (FOR ALL VISITS) STATUS DETAILS   OFFICE NOTE from referring provider Internal 2/10/23, 7/8/22, 4/29/22 [facility]    See Additional Encounters   OFFICE NOTE from other specialist Internal 8/28/23, 3/7/22 [facility]    6/23/22 [facility]     DISCHARGE SUMMARY from hospital Internal 2/18/22 Aleksander Morales MD @[facility]     1/15/19 Aleksander Rubio MD @[facility]     1/8/19 Aleksander Rubio MD @[facility]     3/13/23 Aleksander Rubio MD @[facility]     3/6/18-3/7/18 Aleksander Rubio MD @[facility]      OPERATIVE REPORT Internal 2/18/22 Aleksander Morales MD @[facility] Replacement of deep brain stimulator generator/battery over left chest wall (Left: Chest)    1/15/19 Aleksander Morales MD @[facility] Deep Brain Stimulator Placement, Phase II, Placement Of Deep Brain Stimulator Generator/Battery Over The Right Chest Wall     1/8/19 Aleksander Morales MD @[facility] Stealth Assisted Right Deep Brain Stimulator Placement, Phase One, Placement Of Right Side Deep Brain Stimulator Electrode Target Right Ventral Intermediate Nucleus Of The Thalamus and placement Second Electrode With Microelectrode Recording     3/13/18 Aleksander Morales MD @[facility]  Deep Brain Stimulator Placement, Phase II, Placement Of Deep Brain Stimulator Generator/Battery Over The Left Chest Wall     3/6/18 Aleksander Morales MD @[facility]   Stealth Assisted Left Deep Brain Stimulator Placement, Phase I, Placement Of Left Side Deep Brain Stimulator Electrode, Target Left Ventral Intermediate Nucleus Of The Thalamus With Microelectrode Recording        MEDICATION LIST Internal    IMAGING  (FOR ALL VISITS)     MRI (HEAD, NECK, SPINE) Internal [facility]  2/27/17 MR Brain     CT (HEAD, NECK, SPINE) Internal [facility]  2/13/19  CT Head  1/17/19 CT Head  1/8/19 CT Head

## 2023-09-21 DIAGNOSIS — G47.00 INSOMNIA, UNSPECIFIED TYPE: ICD-10-CM

## 2023-09-21 RX ORDER — TRAZODONE HYDROCHLORIDE 50 MG/1
TABLET, FILM COATED ORAL
Qty: 90 TABLET | Refills: 0 | Status: SHIPPED | OUTPATIENT
Start: 2023-09-21 | End: 2023-12-20

## 2023-09-25 ASSESSMENT — ENCOUNTER SYMPTOMS
MEMORY LOSS: 0
DISTURBANCES IN COORDINATION: 0
WEAKNESS: 0
NUMBNESS: 0
LOSS OF CONSCIOUSNESS: 0
SEIZURES: 0
HEADACHES: 0
DIZZINESS: 0
TREMORS: 1
SPEECH CHANGE: 0
TINGLING: 0
PARALYSIS: 0

## 2023-09-26 ENCOUNTER — TELEPHONE (OUTPATIENT)
Dept: NEUROSURGERY | Facility: CLINIC | Age: 73
End: 2023-09-26
Payer: COMMERCIAL

## 2023-09-26 NOTE — TELEPHONE ENCOUNTER
Artis from the Prior Auth department calling regarding Dr Morales Procedure set for   10/5/23 Case Request: Replacement of deep brain stimulator generator/battery over right chest wall     Pre Op appointment with Dr Morales set for 10/2/23.  Artis is stating that is not enough time to get the Prior Auth through [employer], normally takes 10 business days.    Explained to Artis Morales is not in the clinic again until 10/2 so can not move up appointment.    Note sent to Dr Morales.  Working on surgery date.

## 2023-09-26 NOTE — TELEPHONE ENCOUNTER
Health Call Center    Phone Message    May a detailed message be left on voicemail: yes     Reason for Call: Other: Artis is calling from the  central prior auth dept. He is needing additional information in order to process the prior authorization. Please call Artis back to give additional information.      Action Taken: Message routed to:  [facility] Neurosurgery    Travel Screening: Not Applicable

## 2023-09-27 ENCOUNTER — TELEPHONE (OUTPATIENT)
Dept: NEUROSURGERY | Facility: CLINIC | Age: 73
End: 2023-09-27
Payer: COMMERCIAL

## 2023-09-27 NOTE — TELEPHONE ENCOUNTER
CHANGE in Surgery Date with Dr Morales now on 10/12 @ 0730,   Clinic appointment with Dr Morales 10/2  Insurance requested time for the Prior Auth to be approved.      Spoke with Irish.  Reviewed Clinic OV with Dr Morales on 10/2/23, Surgery Date changed to 10/12 @ 0730AM.    Patient voices understanding and states that will be fine.    New procedure date 10/12 @ 0730.

## 2023-10-02 ENCOUNTER — OFFICE VISIT (OUTPATIENT)
Dept: NEUROSURGERY | Facility: CLINIC | Age: 73
End: 2023-10-02
Payer: COMMERCIAL

## 2023-10-02 ENCOUNTER — PRE VISIT (OUTPATIENT)
Dept: NEUROSURGERY | Facility: CLINIC | Age: 73
End: 2023-10-02

## 2023-10-02 ENCOUNTER — LAB (OUTPATIENT)
Dept: LAB | Facility: CLINIC | Age: 73
End: 2023-10-02
Payer: COMMERCIAL

## 2023-10-02 VITALS
OXYGEN SATURATION: 99 % | BODY MASS INDEX: 26.06 KG/M2 | HEIGHT: 61 IN | RESPIRATION RATE: 16 BRPM | WEIGHT: 138 LBS | HEART RATE: 60 BPM | SYSTOLIC BLOOD PRESSURE: 137 MMHG | DIASTOLIC BLOOD PRESSURE: 64 MMHG

## 2023-10-02 DIAGNOSIS — G24.3 CERVICAL DYSTONIA: ICD-10-CM

## 2023-10-02 DIAGNOSIS — G25.0 DISABLING ESSENTIAL TREMOR: Primary | ICD-10-CM

## 2023-10-02 DIAGNOSIS — Z01.818 PREOPERATIVE EVALUATION TO RULE OUT SURGICAL CONTRAINDICATION: ICD-10-CM

## 2023-10-02 DIAGNOSIS — G25.2 DYSTONIC TREMOR: ICD-10-CM

## 2023-10-02 DIAGNOSIS — Z45.42 END OF BATTERY LIFE OF DEEP BRAIN STIMULATOR: ICD-10-CM

## 2023-10-02 DIAGNOSIS — Z96.89 STATUS POST DEEP BRAIN STIMULATOR PLACEMENT: ICD-10-CM

## 2023-10-02 LAB
ALBUMIN UR-MCNC: NEGATIVE MG/DL
ANION GAP SERPL CALCULATED.3IONS-SCNC: 12 MMOL/L (ref 7–15)
APPEARANCE UR: CLEAR
APTT PPP: 27 SECONDS (ref 22–38)
BILIRUB UR QL STRIP: NEGATIVE
BUN SERPL-MCNC: 18.2 MG/DL (ref 8–23)
CALCIUM SERPL-MCNC: 9.8 MG/DL (ref 8.8–10.2)
CHLORIDE SERPL-SCNC: 103 MMOL/L (ref 98–107)
COLOR UR AUTO: YELLOW
CREAT SERPL-MCNC: 0.74 MG/DL (ref 0.51–0.95)
DEPRECATED HCO3 PLAS-SCNC: 26 MMOL/L (ref 22–29)
EGFRCR SERPLBLD CKD-EPI 2021: 86 ML/MIN/1.73M2
ERYTHROCYTE [DISTWIDTH] IN BLOOD BY AUTOMATED COUNT: 13.2 % (ref 10–15)
GLUCOSE SERPL-MCNC: 163 MG/DL (ref 70–99)
GLUCOSE UR STRIP-MCNC: NEGATIVE MG/DL
HCT VFR BLD AUTO: 40.2 % (ref 35–47)
HGB BLD-MCNC: 13.3 G/DL (ref 11.7–15.7)
HGB UR QL STRIP: NEGATIVE
INR PPP: 1.05 (ref 0.85–1.15)
KETONES UR STRIP-MCNC: NEGATIVE MG/DL
LEUKOCYTE ESTERASE UR QL STRIP: NEGATIVE
MCH RBC QN AUTO: 30.2 PG (ref 26.5–33)
MCHC RBC AUTO-ENTMCNC: 33.1 G/DL (ref 31.5–36.5)
MCV RBC AUTO: 91 FL (ref 78–100)
MUCOUS THREADS #/AREA URNS LPF: PRESENT /LPF
NITRATE UR QL: NEGATIVE
PH UR STRIP: 5.5 [PH] (ref 5–7)
PLATELET # BLD AUTO: 290 10E3/UL (ref 150–450)
POTASSIUM SERPL-SCNC: 4.5 MMOL/L (ref 3.4–5.3)
RBC # BLD AUTO: 4.41 10E6/UL (ref 3.8–5.2)
RBC URINE: 2 /HPF
SODIUM SERPL-SCNC: 141 MMOL/L (ref 135–145)
SP GR UR STRIP: 1.03 (ref 1–1.03)
SQUAMOUS EPITHELIAL: 1 /HPF
UROBILINOGEN UR STRIP-MCNC: NORMAL MG/DL
WBC # BLD AUTO: 4.7 10E3/UL (ref 4–11)
WBC URINE: 1 /HPF

## 2023-10-02 PROCEDURE — 80048 BASIC METABOLIC PNL TOTAL CA: CPT | Performed by: PATHOLOGY

## 2023-10-02 PROCEDURE — 85027 COMPLETE CBC AUTOMATED: CPT | Performed by: PATHOLOGY

## 2023-10-02 PROCEDURE — 81001 URINALYSIS AUTO W/SCOPE: CPT | Performed by: PATHOLOGY

## 2023-10-02 PROCEDURE — 99214 OFFICE O/P EST MOD 30 MIN: CPT | Performed by: NEUROLOGICAL SURGERY

## 2023-10-02 PROCEDURE — 85730 THROMBOPLASTIN TIME PARTIAL: CPT | Performed by: PATHOLOGY

## 2023-10-02 PROCEDURE — 36415 COLL VENOUS BLD VENIPUNCTURE: CPT | Performed by: PATHOLOGY

## 2023-10-02 PROCEDURE — 85610 PROTHROMBIN TIME: CPT | Performed by: PATHOLOGY

## 2023-10-02 ASSESSMENT — PAIN SCALES - GENERAL: PAINLEVEL: NO PAIN (0)

## 2023-10-02 NOTE — LETTER
10/2/2023       RE: Irish Rosales  [address]  [facility] [address]       Dear Colleague,    Thank you for referring your patient, Irish Rosales, to the [facility] NEUROSURGERY CLINIC [place] at [facility]. Please see a copy of my visit note below.    NEUROSURGERY    HISTORY AND PHYSICAL EXAM    Chief Complaint   Patient presents with    RECHECK     Depleted DBS generator/battery over the right chest wall.     HISTORY OF PRESENT ILLNESS  Ms. Irish Rosales is a 73 year old right handed female who is well known to me that presents with depleted DBS generator/battery over the right chest wall.  She has a history of dystonia/dystonic tremor and bilateral tremor, vocal dystonia an tremor and head tremor.  She is s/p left side deep brain stimulator placement, phase I, placement of left side deep brain stimulator electrode, target left ventral intermediate nucleus of the thalamus, with microelectrode recording on 3/6/2018, s/p deep brain stimulator placement, phase II, placement of deep brain stimulator generator/battery over the left chest wall on 3/13/2018, s/p right side deep brain stimulator placement, phase I, placement of right side deep brain stimulator electrodes, target left ventral intermediate nucleus and ventral oralis nucleus of the thalamus, with microelectrode recording on 1/8/2019, s/p deep brain stimulator placement, phase II, placement of deep brain stimulator generator/battery over the right chest wall on 1/15/2019 and s/p replacement of DBS generator/battery over the left chest wall on 2/18/2022.  She reached out to our clinic for her right chest wall generator/battery reaching the end of its battery life.    Patient states that she is in her usual state of health and she does not have any new medical issues or symptoms.  She denies any changes in her health.  She also denies any issues with her current DBS system.  She  denies any pain or discomfort at the right chest wall and she denies any abnormal shocking sensation.  She is not on any anticoagulation or antiplatelet therapy.    Briefly, the patient is a 73 year old right-handed female with history of dystonic tremor.  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  She also has cervical dystonia.  Her right side is more affected than her left side.  Her goals for DBS surgery are to decrease tremor, especially head and voice and improve dystonia.  She stated that she can live with her hand tremors but wants other symptoms treated.  Her case was discussed at the Movement Disorder Consensus Group meeting and the recommendation was left side Vim DBS with Abbott device and wait and see strategy.  She subsequently had the left side implanted back in March of 2018.  She then wanted to get her right side implanted.  She was again evaluated and subsequently approved for the right side implantation.  For the right side, two electrodes were recommended:  Vim and Vo.       Past Medical History:   Diagnosis Date    Depression     Depressive disorder     Dyslipidemia     Dystonic movements 1/21/2017    Dystonic tremor 1/21/2017    Family history of movement disorder 1/21/2017    mild abnormality in carotid study 3/2/2017    BILATERAL DUPLEX CAROTID ULTRASOUND March 1, 2017 1:01 PM    HISTORY: Other specified symptoms and signs involving the circulatory and respiratory systems.   COMPARISON: None.   FINDINGS: There is mild atherosclerotic plaque in the carotid bifurcations. Flow velocities and waveforms show less than 50% diameter stenosis in both the right and left internal carotid arteries as assessed by each ICA PS    Osteoporosis 8/2/2010       Past Surgical History:   Procedure Laterality Date    ------------OTHER-------------  2004    vocal cord thyroplasty at [facility]     ABDOMEN SURGERY      APPENDECTOMY  1997    COLONOSCOPY      ENT SURGERY      HEAD & NECK SURGERY      IMPLANT DEEP BRAIN STIMULATION GENERATOR / BATTERY Left 03/13/2018    Procedure: IMPLANT DEEP BRAIN STIMULATION GENERATOR / BATTERY;  Deep Brain Stimulator Placement, Phase II, Placement Of Deep Brain Stimulator Generator/Battery Over The Left Chest Wall;  Surgeon: Aleksander Morales MD;  Location: [facility] OR    IMPLANT DEEP BRAIN STIMULATION GENERATOR / BATTERY Right 01/15/2019    Procedure: Deep Brain Stimulator Placement, Phase II, Placement Of Deep Brain Stimulator Generator/Battery Over The Right Chest Wall;  Surgeon: Aleksander Morales MD;  Location: [facility] OR    OPTICAL TRACKING SYSTEM INSERTION DEEP BRAIN STIMULATION Left 03/06/2018    Procedure: OPTICAL TRACKING SYSTEM INSERTION DEEP BRAIN STIMULATION;  Stealth Assisted Left Deep Brain Stimulator Placement, Phase I, Placement Of Left Side Deep Brain Stimulator Electrode, Target Left Ventral Intermediate Nucleus Of The Thalamus With Microelectrode Recording;  Surgeon: Aleksander Morales MD;  Location: [facility] OR    OPTICAL TRACKING SYSTEM INSERTION DEEP BRAIN STIMULATION Right 01/08/2019    Procedure: Stealth Assisted Right Deep Brain Stimulator Placement, Phase One, Placement Of Right Side Deep Brain Stimulator Electrode Target Right Ventral Intermediate Nucleus Of The Thalamus and placement Second Electrode With Microelectrode Recording;  Surgeon: Aleksander Morales MD;  Location: [facility] OR    OTHER SURGICAL HISTORY  02/18/2022    DBS - put in new battery    RELEASE CARPAL TUNNEL Left 01/02/2015    Procedure: RELEASE CARPAL TUNNEL;  Surgeon: SHANIA Hernandez MD;  Location: [facility] OR    RELEASE ULNAR NERVE (ELBOW) Left 01/02/2015    Procedure: RELEASE ULNAR NERVE (ELBOW);  Surgeon: SHANIA Hernandez MD;  Location: [facility] OR    REPLACE DEEP BRAIN STIMULATION GENERATOR / BATTERY Left 02/18/2022    Procedure: Replacement of deep brain stimulator  generator/battery over left chest wall;  Surgeon: Aleksander Morales MD;  Location:  OR    [facility] BSO, OMENTECTOMY W/XAVIER  1997    hysterectomy    [facility] HAND/FINGER SURGERY UNLISTED  1998    right carpal tunnel surgery       Family History   Problem Relation Age of Onset    Hypertension Father         and mother    Cerebrovascular Disease Father     Tremor Mother     Lung Cancer Mother     Neurologic Disorder Sister         dystonic voice x 2 botox    Neurologic Disorder Sister         jose m mn. 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   Tobacco Use    Smoking status: Never    Smokeless tobacco: Never   Vaping Use    Vaping Use: Never used   Substance and Sexual Activity    Alcohol use: Yes     Comment: occasionally    Drug use: No    Sexual activity: Not Currently     Partners: Male     Birth control/protection: Post-menopausal   Other Topics Concern    Parent/sibling w/ CABG, MI or angioplasty before 65F 55M? No     Service No    Blood Transfusions No    Caffeine Concern No    Occupational Exposure No    Hobby Hazards No    Sleep Concern No    Stress Concern No    Weight Concern No    Special Diet No    Back Care No    Exercise Yes     Comment: walk    Bike Helmet Yes    Seat Belt Yes    Self-Exams Yes   Social History Narrative        Lives in [place]    Daughter Karyn Sanchez        benign essential tremor with a strained quality to her voice consistent with mild laryngeal spasm        ALLERGIES:  She is allergic to sulfa drugs, atorvastatin and simvastatin.  The statin drugs caused leg cramps.            FAMILY HISTORY:  As noted above  with 1 sister developing a voice tremor and another sister having what is described as a facial tremor.  Mother with hand tremors          SOCIAL HISTORY:  She does not smoke.  She does use alcohol on occasion. She previously worked as a RN.         Jazmyn barbara  [phone] orofacial dyskinesias    Piedad Candice [phone]  laryngeal dystonia and laryngeal tremor and laryngeal spasm.             Updated 6/13/17: Client was born in [place], [place] to parents Jerome and Berenice.  Client grew up w/ three sisters Tea, Carrie, and Jazmyn who are currently still living.  Client graduated from high school, attended college for two years, and graduated w/ a RN Degree.  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  She was  to her ex-spouse for 25 years and had three children; one son Bryan and two daughters Halley/Ysabel.  One daughter Halley Sanchez resides nearby and her other daughter Ysabel and her son Bryan reside in [place], [place].  Bryan has two daughters ages five and eight years old.  Client enjoys flying for free to [place] to visit her two granddaughters. Michelle Sutton, [facility] ([phone], Fax: [phone]).         Social Determinants of Health     Financial Resource Strain: Not on file   Food Insecurity: Not on file   Transportation Needs: Not on file   Physical Activity: Not on file   Stress: Not on file   Social Connections: Unknown (1/6/2021)    Social Connection and Isolation Panel [NHANES]     Frequency of Communication with Friends and Family: Not on file     Frequency of Social Gatherings with Friends and Family: Three times a week     Attends Taoism Services: Not on file     Active Member of Clubs or Organizations: Not on file     Attends Club or Organization Meetings: Not on file     Marital Status: Not asked   Interpersonal  "Safety: Not on file   Housing Stability: Not on file          Allergies   Allergen Reactions    Sulfa Antibiotics Swelling and Rash    Atorvastatin      Leg cramps    Simvastatin      Leg cramp       Current Outpatient Medications   Medication    CALCIUM-VITAMIN D PO    cyanocobalamin (VITAMIN B-12) 1000 MCG tablet    LORazepam (ATIVAN) 0.5 MG tablet    rosuvastatin (CRESTOR) 20 MG tablet    sertraline (ZOLOFT) 50 MG tablet    traZODone (DESYREL) 50 MG tablet     Current Facility-Administered Medications   Medication    botulinum toxin type A (BOTOX) 100 units injection 300 Units       REVIEW OF SYSTEMS  Answers submitted by the patient for this visit:  Symptoms you have experienced in the last 30 days (Submitted on 9/25/2023)  General Symptoms: No  Skin Symptoms: No  HENT Symptoms: No  EYE SYMPTOMS: No  HEART SYMPTOMS: No  LUNG SYMPTOMS: No  INTESTINAL SYMPTOMS: No  URINARY SYMPTOMS: No  GYNECOLOGIC SYMPTOMS: No  BREAST SYMPTOMS: No  SKELETAL SYMPTOMS: No  BLOOD SYMPTOMS: No  NERVOUS SYSTEM SYMPTOMS: Yes  MENTAL HEALTH SYMPTOMS: No  Please answer the questions below to tell us what condition you are experiencing: (Submitted on 9/25/2023)  Trouble with coordination: No  Dizziness or trouble with balance: No  Fainting or black-out spells: No  Memory loss: No  Headache: No  Seizures: No  Speech problems: No  Tingling: No  Tremor: Yes  Weakness: No  Difficulty walking: No  Paralysis: No  Numbness: No      PHYSICAL EXAM  /64 (BP Location: Right arm, Patient Position: Sitting, Cuff Size: Adult Regular)   Pulse 60   Resp 16   Ht 1.56 m (5' 1.42\")   Wt 62.6 kg (138 lb)   LMP  (LMP Unknown)   SpO2 99%   BMI 25.72 kg/m      General: Awake, alert, oriented. Well nourished, well developed, she is not in any acute distress.  HEENT: Head normocephalic, atraumatic. No carotid bruit. Neck supple. Good range of motion. No palpable thyroid mass.  Heart: Regular rhythm and rate. No murmurs.  Lungs: Clear to auscultation " and percussion bilaterally. No ronchi, rales or wheeze.  Abdomen: Soft, non-tender, non-distended. No hepatosplenomegaly.  Extremity: Warm with no clubbing or cyanosis. No lower extremity edema.  Incisions: Bilateral frontal incisions, bilateral parietal incisions and bilateral chest wall incisions are all well healed.  No wound breakdown.  No exposed hardware.    Neurological:  Awake, alert and oriented to date, time, place and person. Speech fluent. Some tremor in her voice is noted.  Pupils equal, round, reactive to light.  Extraocular movement intact.  Visual fields are full on confrontation.  Hearing is grossly normal to finger rub.   Facial sensation intact.  Face symmetric.  Tongue midline.  Uvula elevates equally.    Motor: full strength throughout.  Sensation: intact to light touch and pinpoint.  Deep tendon reflexes: 3+ throughout. Negative for clonus. Negative for Gilbert's sign. No dysmetria.      ASSESSMENT   73 year old female  Dystonia/dystonic tremors and bilateral tremors, vocal dystonia and tremor, head tremor.   S/p left side deep brain stimulator placement, phase I, placement of left side deep brain stimulator electrode, target left ventral intermediate nucleus of the thalamus, with microelectrode recording on 3/6/2018.  S/p deep brain stimulator placement, phase II, placement of deep brain stimulator generator/battery over the left chest wall on 3/13/2018.    S/p right side deep brain stimulator placement, phase I, placement of right side deep brain stimulator electrodes, target left ventral intermediate nucleus and ventral oralis nucleus of the thalamus, with microelectrode recording on 1/8/2019.  S/p deep brain stimulator placement, phase II, placement of deep brain stimulator generator/battery over the right chest wall on 1/15/2019.  S/p replacement of DBS generator/battery over the left chest wall on 2/18/2022.  Depleted DBS battery/generator, Infinity 7, REF 6662, over the right chest  shin.    Ms. Irish Rosales presents with depleted DBS generator/battery over the right chest wall.  Her current right side system was implanted back in January of 2019.  Therefore, the Infinity 7, REF 6662, over the right chest wall has lasted her 4 years and 9 months.  Her left side system with Infinity 5, REF [id number], was implanted back in March of 2018 and replaced in 2/18/2022 lasting close to 4 years.     Based on the above calculations, the left side system is due for replacement in February of 2026 and the right side system is due for replacement in July of 2028.  I do not foresee the two generators/batteries to overlap on their replacement schedule.     We did not discuss the option of a rechargeable system since it is not yet available and she is requiring replacement every 4 years or more.    During today's visit, we discussed the surgical procedure for replacing the DBS generator/battery over the right chest wall.  She currently has the Infinity 7, REF 6662, generator/battery, and this will be maintained during the upcoming procedure.  Risks, benefits, alternative therapies were discussed with the patient, including but not limited to infection and bleeding.  This surgical procedure will be performed under MAC with local anesthetic.  The thinned part of the wound/pocket may be corrected with mobilization of the tissue under the incision.  The pocket may need to be enlarged to minimize the stress on the closure.  Surgical procedure was discussed in detail.  All questions were answered, and she expressed understanding.      PLAN  1.  She will undergo replacement of the DBS generator/battery over the right chest wall under MAC with local anesthetic.   2.  Surgery is scheduled for 10/12/2023 at 7:30 am.  3.  Preop labs today.  4.  History and physical exam has been completed.      15 minutes were spent face to face with the patient of which more than 50% of the time was spent counseling and discussing the  above issues regarding diagnosis, differential, treatment options, and steps for further evaluation.  5 minutes were spent reviewing patient's chart.  10 minutes were spent on documentation for this encounter.  30 minutes total were spent on this encounter today.              Again, thank you for allowing me to participate in the care of your patient.      Sincerely,    Aleksander Morales MD

## 2023-10-02 NOTE — PATIENT INSTRUCTIONS
Dear Irish,    Below you will find a schedule of appointments and information about preparing for surgery. Please review the information and let me know what questions you have.     Thank you,  Carrie Fitzgerald, RN, BA  Neurosurgery Care Coordinator  [facility] Neurosurgery  [phone]      Surgery    Surgery: DBS battery replacement over the right chest wall  Date/time: 10/12/2023 at 7:30 a.m. Arrive on [address] at 5:30 a.m.  Surgeon: Dr. Morales  Location: [facility]  3rd Floor ([address]), [address] [place] [address], [phone]   You will go home on the day of surgery      Pre- and Post-Surgery Appointments      Pre-Surgery Lab Tests   Date/time: 10/2/2023 at 12:30 p.m.  Location: [address] [place] [address]      Post-Surgery Follow-up  Date/time: To be determined (approximately 2 weeks after surgery)  Location: [address] [place] [address], [address] [place] [address]  Provider: Dr. Morales or advanced practice provider        Preparing for Surgery    Medications   7 days before surgery: Stop aspirin, fish oil (omega-3 fatty acids), multivitamins and other supplements.    4 days before surgery: Stop naproxen. You may take acetaminophen (Tylenol) if you need something for pain relief prior to surgery.     24 hours before surgery: Stop ibuprofen. You may take acetaminophen (Tylenol) if you need something for pain relief prior to surgery.     *You will receive a call from the Preadmission Nursing Office a day or two before surgery with additional medication instructions. Please follow those instructions as well.       Eating and Drinking Before Surgery Arrival Time  8 hours before surgery arrival time: Stop eating solid food and dairy products.   You may have clear liquids until 2 hours before arrival time. Examples of clear liquids: water, broth, clear juice (like apple  juice), clear soda (7-Up, Sprite), black coffee or plain tea, Gatorade. Do not drink milk or other dairy products.     2 hours before arrival time: Stop drinking clear liquids.       Preoperative Showers   You will need to purchase a 4-ounce bottle of antibacterial soap such as Hibiclens, Techni-Care or Exidine at your local pharmacy. If your pharmacy doesn't carry one of these brands, the pharmacist can recommend a substitute.    The night before surgery, take a shower using your regular soap and shampoo. Before getting out of the shower, use half the bottle of the antibacterial soap from the neck down. Let it sit on your skin for 1-2 minutes before rinsing. Use clean towels, clean pajamas and clean sheets on your bed. Do not use lotion, powder, fragrance or deodorant.     The day of surgery, repeat the above shower process with the remaining antibacterial soap. Wear clean clothes to the hospital. Do not use lotion, powder, fragrance or deodorant.       *If you become ill or have a fever within a few days before your surgery, let your provider know right away.   *Do not drink alcohol 24 hours before or after surgery, or if you are taking narcotic pain medication.  *Leave all jewelry and other valuables at home.   *Bring your insurance card and identification with you on the day of surgery.   *You must have a responsible adult who can drive you to and from surgery, and who can stay with you for the first 24 hours after your discharge from the hospital.

## 2023-10-02 NOTE — PROGRESS NOTES
NEUROSURGERY    HISTORY AND PHYSICAL EXAM    Chief Complaint   Patient presents with    RECHECK     Depleted DBS generator/battery over the right chest wall.     HISTORY OF PRESENT ILLNESS  Ms. Irish Rosales is a 73 year old right handed female who is well known to me that presents with depleted DBS generator/battery over the right chest wall.  She has a history of dystonia/dystonic tremor and bilateral tremor, vocal dystonia an tremor and head tremor.  She is s/p left side deep brain stimulator placement, phase I, placement of left side deep brain stimulator electrode, target left ventral intermediate nucleus of the thalamus, with microelectrode recording on 3/6/2018, s/p deep brain stimulator placement, phase II, placement of deep brain stimulator generator/battery over the left chest wall on 3/13/2018, s/p right side deep brain stimulator placement, phase I, placement of right side deep brain stimulator electrodes, target left ventral intermediate nucleus and ventral oralis nucleus of the thalamus, with microelectrode recording on 1/8/2019, s/p deep brain stimulator placement, phase II, placement of deep brain stimulator generator/battery over the right chest wall on 1/15/2019 and s/p replacement of DBS generator/battery over the left chest wall on 2/18/2022.  She reached out to our clinic for her right chest wall generator/battery reaching the end of its battery life.    Patient states that she is in her usual state of health and she does not have any new medical issues or symptoms.  She denies any changes in her health.  She also denies any issues with her current DBS system.  She denies any pain or discomfort at the right chest wall and she denies any abnormal shocking sensation.  She is not on any anticoagulation or antiplatelet therapy.    Briefly, the patient is a 73 year old right-handed female with history of dystonic tremor.  She has had bilateral upper extremity tremor her whole life, but her vocal  dystonia and tremor developed in her 40s which has been worsening in the last 3-4 years.  She also has cervical dystonia.  Her right side is more affected than her left side.  Her goals for DBS surgery are to decrease tremor, especially head and voice and improve dystonia.  She stated that she can live with her hand tremors but wants other symptoms treated.  Her case was discussed at the Movement Disorder Consensus Group meeting and the recommendation was left side Vim DBS with Abbott device and wait and see strategy.  She subsequently had the left side implanted back in March of 2018.  She then wanted to get her right side implanted.  She was again evaluated and subsequently approved for the right side implantation.  For the right side, two electrodes were recommended:  Vim and Vo.       Past Medical History:   Diagnosis Date    Depression     Depressive disorder     Dyslipidemia     Dystonic movements 1/21/2017    Dystonic tremor 1/21/2017    Family history of movement disorder 1/21/2017    mild abnormality in carotid study 3/2/2017    BILATERAL DUPLEX CAROTID ULTRASOUND March 1, 2017 1:01 PM    HISTORY: Other specified symptoms and signs involving the circulatory and respiratory systems.   COMPARISON: None.   FINDINGS: There is mild atherosclerotic plaque in the carotid bifurcations. Flow velocities and waveforms show less than 50% diameter stenosis in both the right and left internal carotid arteries as assessed by each ICA PS    Osteoporosis 8/2/2010       Past Surgical History:   Procedure Laterality Date    ------------OTHER-------------  2004    vocal cord thyroplasty at [facility]    ABDOMEN SURGERY      APPENDECTOMY  1997    COLONOSCOPY      ENT SURGERY      HEAD & NECK SURGERY      IMPLANT DEEP BRAIN STIMULATION GENERATOR / BATTERY Left 03/13/2018    Procedure: IMPLANT DEEP BRAIN STIMULATION GENERATOR / BATTERY;  Deep Brain Stimulator Placement, Phase II, Placement Of Deep Brain Stimulator  Generator/Battery Over The Left Chest Wall;  Surgeon: Aleksander Morales MD;  Location: [facility] OR    IMPLANT DEEP BRAIN STIMULATION GENERATOR / BATTERY Right 01/15/2019    Procedure: Deep Brain Stimulator Placement, Phase II, Placement Of Deep Brain Stimulator Generator/Battery Over The Right Chest Wall;  Surgeon: Aleksander Morales MD;  Location: [facility] OR    OPTICAL TRACKING SYSTEM INSERTION DEEP BRAIN STIMULATION Left 03/06/2018    Procedure: OPTICAL TRACKING SYSTEM INSERTION DEEP BRAIN STIMULATION;  Stealth Assisted Left Deep Brain Stimulator Placement, Phase I, Placement Of Left Side Deep Brain Stimulator Electrode, Target Left Ventral Intermediate Nucleus Of The Thalamus With Microelectrode Recording;  Surgeon: Aleksander Morales MD;  Location: [facility] OR    OPTICAL TRACKING SYSTEM INSERTION DEEP BRAIN STIMULATION Right 01/08/2019    Procedure: Stealth Assisted Right Deep Brain Stimulator Placement, Phase One, Placement Of Right Side Deep Brain Stimulator Electrode Target Right Ventral Intermediate Nucleus Of The Thalamus and placement Second Electrode With Microelectrode Recording;  Surgeon: Aleksander Morales MD;  Location: [facility] OR    OTHER SURGICAL HISTORY  02/18/2022    DBS - put in new battery    RELEASE CARPAL TUNNEL Left 01/02/2015    Procedure: RELEASE CARPAL TUNNEL;  Surgeon: SHANIA Hernandez MD;  Location: [facility] OR    RELEASE ULNAR NERVE (ELBOW) Left 01/02/2015    Procedure: RELEASE ULNAR NERVE (ELBOW);  Surgeon: SHANIA Hernandez MD;  Location: [facility] OR    REPLACE DEEP BRAIN STIMULATION GENERATOR / BATTERY Left 02/18/2022    Procedure: Replacement of deep brain stimulator generator/battery over left chest wall;  Surgeon: Aleksander Morales MD;  Location:  OR    [facility] BSO, OMENTECTOMY W/XAVIER  1997    hysterectomy    [facility] HAND/FINGER SURGERY UNLISTED  1998    right carpal tunnel surgery       Family History   Problem Relation Age of Onset    Hypertension Father         and mother     Cerebrovascular Disease Father     Tremor Mother     Lung Cancer Mother     Neurologic Disorder Sister         dystonic voice x 2 botox    Neurologic Disorder Sister         jose m [place]. 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   Tobacco Use    Smoking status: Never    Smokeless tobacco: Never   Vaping Use    Vaping Use: Never used   Substance and Sexual Activity    Alcohol use: Yes     Comment: occasionally    Drug use: No    Sexual activity: Not Currently     Partners: Male     Birth control/protection: Post-menopausal   Other Topics Concern    Parent/sibling w/ CABG, MI or angioplasty before 65F 55M? No     Service No    Blood Transfusions No    Caffeine Concern No    Occupational Exposure No    Hobby Hazards No    Sleep Concern No    Stress Concern No    Weight Concern No    Special Diet No    Back Care No    Exercise Yes     Comment: walk    Bike Helmet Yes    Seat Belt Yes    Self-Exams Yes   Social History Narrative        Lives in [place]    Daughter Karyn Sanchez        benign essential tremor with a strained quality to her voice consistent with mild laryngeal spasm        ALLERGIES:  She is allergic to sulfa drugs, atorvastatin and simvastatin.  The statin drugs caused leg cramps.            FAMILY HISTORY:  As noted above with 1 sister developing a voice tremor and another sister having what is described as a facial tremor.  Mother with hand tremors          SOCIAL HISTORY:  She does not smoke.  She does use alcohol on occasion. She previously worked as a RN.         Jazmyn jimenez  [phone] orofacial dyskinesias    Piedad Harvey  [phone]  laryngeal dystonia and laryngeal tremor and laryngeal spasm.             Updated 6/13/17: Client was born in [place], [place] to parents Jerome and Berenice.  Client grew up w/ three sisters Tea, Carrie, and Jazmyn who are currently still living.  Client graduated from high school, attended college for two years, and graduated w/ a RN Degree.  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  She was  to her ex-spouse for 25 years and had three children; one son Bryan and two daughters Halley/Ysabel.  One daughter Halley Sanchez resides nearby and her other daughter Ysabel and her son Bryan reside in [place], [place].  Bryan has two daughters ages five and eight years old.  Client enjoys flying for free to [place] to visit her two granddaughters. Michelle Sutton, [facility] ([phone], Fax: [phone]).         Social Determinants of Health     Financial Resource Strain: Not on file   Food Insecurity: Not on file   Transportation Needs: Not on file   Physical Activity: Not on file   Stress: Not on file   Social Connections: Unknown (1/6/2021)    Social Connection and Isolation Panel [NHANES]     Frequency of Communication with Friends and Family: Not on file     Frequency of Social Gatherings with Friends and Family: Three times a week     Attends Church Services: Not on file     Active Member of Clubs or Organizations: Not on file     Attends Club or Organization Meetings: Not on file     Marital Status: Not asked   Interpersonal Safety: Not on file   Housing Stability: Not on file          Allergies   Allergen Reactions    Sulfa Antibiotics Swelling and Rash    Atorvastatin      Leg cramps    Simvastatin      Leg cramp       Current Outpatient Medications   Medication    CALCIUM-VITAMIN D PO    cyanocobalamin (VITAMIN B-12) 1000 MCG  "tablet    LORazepam (ATIVAN) 0.5 MG tablet    rosuvastatin (CRESTOR) 20 MG tablet    sertraline (ZOLOFT) 50 MG tablet    traZODone (DESYREL) 50 MG tablet     Current Facility-Administered Medications   Medication    botulinum toxin type A (BOTOX) 100 units injection 300 Units       REVIEW OF SYSTEMS  Answers submitted by the patient for this visit:  Symptoms you have experienced in the last 30 days (Submitted on 9/25/2023)  General Symptoms: No  Skin Symptoms: No  HENT Symptoms: No  EYE SYMPTOMS: No  HEART SYMPTOMS: No  LUNG SYMPTOMS: No  INTESTINAL SYMPTOMS: No  URINARY SYMPTOMS: No  GYNECOLOGIC SYMPTOMS: No  BREAST SYMPTOMS: No  SKELETAL SYMPTOMS: No  BLOOD SYMPTOMS: No  NERVOUS SYSTEM SYMPTOMS: Yes  MENTAL HEALTH SYMPTOMS: No  Please answer the questions below to tell us what condition you are experiencing: (Submitted on 9/25/2023)  Trouble with coordination: No  Dizziness or trouble with balance: No  Fainting or black-out spells: No  Memory loss: No  Headache: No  Seizures: No  Speech problems: No  Tingling: No  Tremor: Yes  Weakness: No  Difficulty walking: No  Paralysis: No  Numbness: No      PHYSICAL EXAM  /64 (BP Location: Right arm, Patient Position: Sitting, Cuff Size: Adult Regular)   Pulse 60   Resp 16   Ht 1.56 m (5' 1.42\")   Wt 62.6 kg (138 lb)   LMP  (LMP Unknown)   SpO2 99%   BMI 25.72 kg/m      General: Awake, alert, oriented. Well nourished, well developed, she is not in any acute distress.  HEENT: Head normocephalic, atraumatic. No carotid bruit. Neck supple. Good range of motion. No palpable thyroid mass.  Heart: Regular rhythm and rate. No murmurs.  Lungs: Clear to auscultation and percussion bilaterally. No ronchi, rales or wheeze.  Abdomen: Soft, non-tender, non-distended. No hepatosplenomegaly.  Extremity: Warm with no clubbing or cyanosis. No lower extremity edema.  Incisions: Bilateral frontal incisions, bilateral parietal incisions and bilateral chest wall incisions are all " well healed.  No wound breakdown.  No exposed hardware.    Neurological:  Awake, alert and oriented to date, time, place and person. Speech fluent. Some tremor in her voice is noted.  Pupils equal, round, reactive to light.  Extraocular movement intact.  Visual fields are full on confrontation.  Hearing is grossly normal to finger rub.   Facial sensation intact.  Face symmetric.  Tongue midline.  Uvula elevates equally.    Motor: full strength throughout.  Sensation: intact to light touch and pinpoint.  Deep tendon reflexes: 3+ throughout. Negative for clonus. Negative for Gilbert's sign. No dysmetria.      ASSESSMENT   73 year old female  Dystonia/dystonic tremors and bilateral tremors, vocal dystonia and tremor, head tremor.   S/p left side deep brain stimulator placement, phase I, placement of left side deep brain stimulator electrode, target left ventral intermediate nucleus of the thalamus, with microelectrode recording on 3/6/2018.  S/p deep brain stimulator placement, phase II, placement of deep brain stimulator generator/battery over the left chest wall on 3/13/2018.    S/p right side deep brain stimulator placement, phase I, placement of right side deep brain stimulator electrodes, target left ventral intermediate nucleus and ventral oralis nucleus of the thalamus, with microelectrode recording on 1/8/2019.  S/p deep brain stimulator placement, phase II, placement of deep brain stimulator generator/battery over the right chest wall on 1/15/2019.  S/p replacement of DBS generator/battery over the left chest wall on 2/18/2022.  Depleted DBS battery/generator, Infinity 7, REF [id number], over the right chest wall.    Ms. Irish Rosales presents with depleted DBS generator/battery over the right chest wall.  Her current right side system was implanted back in January of 2019.  Therefore, the Infinity 7, REF [id number], over the right chest wall has lasted her 4 years and 9 months.  Her left side system with Infinity 5,  REF [id number], was implanted back in March of 2018 and replaced in 2/18/2022 lasting close to 4 years.     Based on the above calculations, the left side system is due for replacement in February of 2026 and the right side system is due for replacement in July of 2028.  I do not foresee the two generators/batteries to overlap on their replacement schedule.     We did not discuss the option of a rechargeable system since it is not yet available and she is requiring replacement every 4 years or more.    During today's visit, we discussed the surgical procedure for replacing the DBS generator/battery over the right chest wall.  She currently has the Infinity 7, REF [id number], generator/battery, and this will be maintained during the upcoming procedure.  Risks, benefits, alternative therapies were discussed with the patient, including but not limited to infection and bleeding.  This surgical procedure will be performed under MAC with local anesthetic.  The thinned part of the wound/pocket may be corrected with mobilization of the tissue under the incision.  The pocket may need to be enlarged to minimize the stress on the closure.  Surgical procedure was discussed in detail.  All questions were answered, and she expressed understanding.      PLAN  1.  She will undergo replacement of the DBS generator/battery over the right chest wall under MAC with local anesthetic.   2.  Surgery is scheduled for 10/12/2023 at 7:30 am.  3.  Preop labs today.  4.  History and physical exam has been completed.      15 minutes were spent face to face with the patient of which more than 50% of the time was spent counseling and discussing the above issues regarding diagnosis, differential, treatment options, and steps for further evaluation.  5 minutes were spent reviewing patient's chart.  10 minutes were spent on documentation for this encounter.  30 minutes total were spent on this encounter today.

## 2023-10-02 NOTE — NURSING NOTE
Chief Complaint   Patient presents with    RECHECK     Here for DBS consult, confirmed with patient     Deanne Duncan

## 2023-10-05 NOTE — PROGRESS NOTES
Department of Neurology  Movement Disorders Division   DBS Follow-up Note    Patient: Irish Rosales  MRN: [id number]   : 1950   Date of Visit: 10/06/23       Diagnosis: Tremor  DBS Target(s): LVIM, RVIM, RVop  Date(s) of DBS Lead Placement:     Date(s) of IPG Placement: R 1/15/2019; L 3/6/2018  Device: Abbott bilaterally    Chief Complaint:  Irish Rosales is a 73 year old right handed woman who returns to clinic for follow up of tremor status post bilateral VIM + right Vop DBS.   She also receives botulinum toxin injections for cervical dystonia.  She was last seen 2/10/23 at which time no DBS changes were made.    Interval History:  She reports that tremor is stable. She continues to complete the cycle of DBS settings. Not sure if this helps or not. No one program is better than the others. She has recently been having difficulty changing programs on the R IPG due to low battery, but she is due to get the R IPG replaced on 10/12/23. It unfortunately took a couple of months from the first warning to get surgery scheduled.    Balance is good. No falls.    Tingling has resolved. No new symptoms.    Does not turn off DBS at night. No change in amplitudes.     She recounts her initial tremor started as early as childhood. Her sisters developed a tremor in their 40s. She has seen a genetic counselor with no positive results on genetic testing. None of her three children have developed tremor.     Weeks                 Right brain        Left brain        1                      VIM           2b        2                      VIM          3abc        3                       VOP          3abc        4                      VOP           2b           10/6/2023    11:00 AM   Tremor ADL Scale   1. Speaking 1   2. Feeding (spoon) 3   3. Drinking (glass) 4   4. Hygiene 2   5. Dressing 1   6. Pouring 3   7. Carry plate, tray 2   8. Use keys 2   9. Writing 2   10. Work or housework 1   11a. Overall disability 4    11b. Most affected task Drinking   12. Social impact 1   ADL TOTAL 26     Previously 26 on 2/10/23     Review of Systems:  Other than that noted at the end of this note, the remainder of 12 systems reviewed were negative.    Medications:  Current Outpatient Medications   Medication Sig Dispense Refill    CALCIUM-VITAMIN D PO Take 2 chew tab by mouth daily      cyanocobalamin (VITAMIN B-12) 1000 MCG tablet TAKE 1 TABLET(1000 MCG) BY MOUTH DAILY 100 tablet 2    rosuvastatin (CRESTOR) 20 MG tablet Take 1 tablet (20 mg) by mouth daily 90 tablet 3    sertraline (ZOLOFT) 50 MG tablet Take 1 tablet (50 mg) by mouth daily 90 tablet 1    LORazepam (ATIVAN) 0.5 MG tablet Take 0.5-1 tablets (0.25-0.5 mg) by mouth daily as needed for anxiety (flying) 20 tablet 0    traZODone (DESYREL) 50 MG tablet TAKE 1 TABLET BY MOUTH EVERY NIGHT AS NEEDED FOR SLEEP (Patient not taking: Reported on 10/6/2023) 90 tablet 0        Allergies: is allergic to sulfa antibiotics, atorvastatin, and simvastatin.        Physical Exam:  The patient's  weight is 62.6 kg (138 lb 1.6 oz). Her blood pressure is 147/83 (abnormal) and her pulse is 61. Her oxygen saturation is 98%.      Neurological Examination:       10/6/2023    11:00 AM   Tremor Motor Scale   Assessment Time 11:25   Medication None   DBS - Right Brain On   DBS - Left Brain On   Head 1   Face & Jaw 1   Voice 2   Outstretched - RIGHT 1.5   Outstretched - LEFT 1   Wingbeating - RIGHT 1.5   Wingbeating - LEFT 1.5   Kinetic - RIGHT 0.5   Kinetic - LEFT 2   Lower Limb - RIGHT 0   Lower Limb - LEFT 0   Lower Limb (Max) 0   Spiral - RIGHT 2.5   Spiral - LEFT 2.5   Handwriting 1   Dot approx - RIGHT 3   Dot approx - LEFT 3   Trunk (Standing) 0   Total Right 9   Total Left 10   Axial 4   TOTAL 24     Gait: normal stride length, bilateral arm swing normal, no evidence of ataxic gait, able to complete tandem gait without difficulty    Previously 24 on 2/10/23      Procedure: DBS Interrogation &  "Programming    Lead(s):   Side Left Right Right   Target VIM Vop VIM   Lead  Abbott Abbott Abbott   Lead Model Infinity 0.5 Infinity 0.5 Infinity 0.5   Lead Implant Date 3/6/2018 1/8/2019 1/8/2019   IPG # 1 2 2      IPG(s):  IPG # 1 2   IPG  Abbott Abbott   IPG Model Infinity 5 Infinity 7   IPG serial # [id number]   IPG Implant Date 2/18/2022 1/15/2019   Location Left chest Right chest   Battery (V) 2.94 (2.89) V \"Reached end of life-cycle\" (2.80) V   System impedances normal normal     IPG #  1     Program Name \"2b\"   \"3abc\"   Side Left Left   Initial/Final Initial & final Initial & final   Active/Inactive Active Inactive   Amplitude (mA) 5.0 4.0   Range (mA) 0-5.0 by 0.25 0-4.0 by 0.25   Pulse width (usec) 20 20   Freq (Hz) 200 200   Contacts C+2b- C+3abc-      Unable to program R IPG due to low battery notification  IPG # 2     Program name Vim only     \"Side\" (i.e. target) Right Vop, lead 1 Right Vim, lead 2    Initial/Final Initial & final Initial&final   Active/Inactive Inactive  Inactive    Contacts C+2abc- C+11abc   Amplitude (mA)  0 no range 4.0 [0-4 by 0.1]   Pulse Width (ms)  20 20   Frequency ( Hz)  200 200      IPG # 2     Program name Vop only     Initial/Final Initial& final Initial & final   Active/Inactive Active  Active    \"Side\" (i.e. target) Right Vop, lead 1 Right Vim, lead 2   Contacts C+, 2abc- C+,11abc-   Amplitude (mA) 4.0 [0-4 by 0.1] 0, no range   Pulse Width (ms) 20 20   Frequency ( Hz) 200 200   Ther Imp Ohm             Assessment/Plan:  Irish Rosales is a 73 year old right handed woman who returns to clinic for follow up of tremor status post bilateral VIM + right Vop DBS.      Her tremor remains stable and we have largely exhausted all the reasonable options for improving tremor control. Further adjustments are unlikely to improve tremor further, might give side effects.  She agreed with leaving programming schedule as is, does not find this bothersome. " "    When she gets the R IPG replaced, it should be programmed to: \"VIM only\"    We will plan to keep her on \"2b\" on the L IPG until she gets the R IPG replaced and reset her cycle at the point of R IPG replacement    - no DBS programming changes today  - agree with battery replacement soon  - can consider re-referral to genetic counseling if more direct family members become affected    RTC 6 months, 1 hr DBS programming     Patient and plan was examined & discussed with attending physician, Dr. Colvin.    Harley Larry MD  Fellow  Movement Disorders Division  [facility] Department of Neurology    Patient seen and examined with Dr. Angulo and I agree with the assessment and plan as outlined.  Time this date with patient, reviewing records, & documenting.    Edwar Colvin, PhD, MD  "

## 2023-10-06 ENCOUNTER — OFFICE VISIT (OUTPATIENT)
Dept: NEUROLOGY | Facility: CLINIC | Age: 73
End: 2023-10-06
Payer: COMMERCIAL

## 2023-10-06 VITALS
SYSTOLIC BLOOD PRESSURE: 147 MMHG | OXYGEN SATURATION: 98 % | HEART RATE: 61 BPM | WEIGHT: 138.1 LBS | DIASTOLIC BLOOD PRESSURE: 83 MMHG | BODY MASS INDEX: 25.74 KG/M2

## 2023-10-06 DIAGNOSIS — Z96.89 STATUS POST DEEP BRAIN STIMULATOR PLACEMENT: ICD-10-CM

## 2023-10-06 DIAGNOSIS — G25.0 ESSENTIAL TREMOR: Primary | ICD-10-CM

## 2023-10-06 PROCEDURE — 95970 ALYS NPGT W/O PRGRMG: CPT | Performed by: PSYCHIATRY & NEUROLOGY

## 2023-10-06 PROCEDURE — 99215 OFFICE O/P EST HI 40 MIN: CPT | Mod: 25 | Performed by: PSYCHIATRY & NEUROLOGY

## 2023-10-06 ASSESSMENT — ACTIVITIES OF DAILY LIVING (ADL)
SOCIAL_IMPACT: (1) AWARE OF TREMOR, BUT IT DOES NOT AFFECT LIFESTYLE OR PROFESSIONAL LIFE.
USING_KEYS: (2) MILDLY ABNORMAL. COMMONLY MISSES TARGET BUT STILL ROUTINELY PUTS KEY IN LOCK WITH ONE HAND.
WORKING: (1) SLIGHTLY ABNORMAL. TREMOR IS PRESENT BUT DOES NOT AFFECT PERFORMANCE AT WORK OR AT HOME.
OVERALL_DISABILITY_WITH_THE_MOST_AFFECTED_TASK: (4) SEVERELY ABNORMAL. CANNOT DO THE TASK.
CARRYING_FOOD_TRAYS_PLATES_OR_SIMILAR_ITEMS: (2) MILDLY ABNORMAL. MUST BE VERY CAREFUL TO AVOID SPILLING ITEMS ON FOOD TRAY.
SPEAKING: (1) SLIGHT VOICE TREMULOUSNESS, ONLY WHEN "NERVOUS".
FEEDING_WITH_A_SPOON: (3) MODERATELY ABNORMAL. SPILLS A LOT OR CHANGES STRATEGY TO COMPLETE TASK SUCH AS USING TWO HANDS OR LEANING OVER.
OVERALL_DISABILITY_WITH_THE_MOST_AFFECTED_TASK: DRINKING
DRINKING_FROM_A_GLASS: (4) SEVERELY ABNORMAL. CANNOT DRINK FROM A GLASS OR USES STRAW OR SIPPY CUP.
POURING: (3) MODERATELY ABNORMAL. MUST USE TWO HANDS OR USES OTHER STRATEGIES TO AVOID SPILLING.
DRESS: (1) SLIGHTLY ABNORMAL. TREMOR IS PRESENT BUT DOES NOT INTERFERE WITH DRESSING.
HYGIENE: (2) MILDLY ABNORMAL. SOME DIFFICULTY BUT CAN COMPLETE TASK.
WRITING: (2) MILDLY ABNORMAL. DIFFICULTY WRITING DUE TO THE TREMOR.
TOTAL_SCORE: 26

## 2023-10-06 ASSESSMENT — PAIN SCALES - GENERAL: PAINLEVEL: NO PAIN (0)

## 2023-10-06 NOTE — LETTER
10/6/2023       RE: Irish Rosales  [address]  [place] [address]     Dear Colleague,    Thank you for referring your patient, Irish Rosales, to the [facility]OLOG[facility] [place] at [facility]. Please see a copy of my visit note below.    Department of Neurology  Movement Disorders Division   DBS Follow-up Note    Patient: Irish Rosales  MRN: [id number]   : 1950   Date of Visit: 10/06/23       Diagnosis: Tremor  DBS Target(s): LVIM, RVIM, RVop  Date(s) of DBS Lead Placement:     Date(s) of IPG Placement: R 1/15/2019; L 3/6/2018  Device: Abbott bilaterally    Chief Complaint:  Irish Rosales is a 73 year old right handed woman who returns to clinic for follow up of tremor status post bilateral VIM + right Vop DBS.   She also receives botulinum toxin injections for cervical dystonia.  She was last seen 2/10/23 at which time no DBS changes were made.    Interval History:  She reports that tremor is stable. She continues to complete the cycle of DBS settings. Not sure if this helps or not. No one program is better than the others. She has recently been having difficulty changing programs on the R IPG due to low battery, but she is due to get the R IPG replaced on 10/12/23. It unfortunately took a couple of months from the first warning to get surgery scheduled.    Balance is good. No falls.    Tingling has resolved. No new symptoms.    Does not turn off DBS at night. No change in amplitudes.     She recounts her initial tremor started as early as childhood. Her sisters developed a tremor in their 40s. She has seen a genetic counselor with no positive results on genetic testing. None of her three children have developed tremor.     Weeks                 Right brain        Left brain        1                      VIM           2b        2                      VIM          3abc        3                       VOP           3abc        4                      VOP           2b           10/6/2023    11:00 AM   Tremor ADL Scale   1. Speaking 1   2. Feeding (spoon) 3   3. Drinking (glass) 4   4. Hygiene 2   5. Dressing 1   6. Pouring 3   7. Carry plate, tray 2   8. Use keys 2   9. Writing 2   10. Work or housework 1   11a. Overall disability 4   11b. Most affected task Drinking   12. Social impact 1   ADL TOTAL 26     Previously 26 on 2/10/23     Review of Systems:  Other than that noted at the end of this note, the remainder of 12 systems reviewed were negative.    Medications:  Current Outpatient Medications   Medication Sig Dispense Refill    CALCIUM-VITAMIN D PO Take 2 chew tab by mouth daily      cyanocobalamin (VITAMIN B-12) 1000 MCG tablet TAKE 1 TABLET(1000 MCG) BY MOUTH DAILY 100 tablet 2    rosuvastatin (CRESTOR) 20 MG tablet Take 1 tablet (20 mg) by mouth daily 90 tablet 3    sertraline (ZOLOFT) 50 MG tablet Take 1 tablet (50 mg) by mouth daily 90 tablet 1    LORazepam (ATIVAN) 0.5 MG tablet Take 0.5-1 tablets (0.25-0.5 mg) by mouth daily as needed for anxiety (flying) 20 tablet 0    traZODone (DESYREL) 50 MG tablet TAKE 1 TABLET BY MOUTH EVERY NIGHT AS NEEDED FOR SLEEP (Patient not taking: Reported on 10/6/2023) 90 tablet 0        Allergies: is allergic to sulfa antibiotics, atorvastatin, and simvastatin.        Physical Exam:  The patient's  weight is 62.6 kg (138 lb 1.6 oz). Her blood pressure is 147/83 (abnormal) and her pulse is 61. Her oxygen saturation is 98%.      Neurological Examination:       10/6/2023    11:00 AM   Tremor Motor Scale   Assessment Time 11:25   Medication None   DBS - Right Brain On   DBS - Left Brain On   Head 1   Face & Jaw 1   Voice 2   Outstretched - RIGHT 1.5   Outstretched - LEFT 1   Wingbeating - RIGHT 1.5   Wingbeating - LEFT 1.5   Kinetic - RIGHT 0.5   Kinetic - LEFT 2   Lower Limb - RIGHT 0   Lower Limb - LEFT 0   Lower Limb (Max) 0   Spiral - RIGHT 2.5   Spiral - LEFT 2.5   Handwriting 1  "  Dot approx - RIGHT 3   Dot approx - LEFT 3   Trunk (Standing) 0   Total Right 9   Total Left 10   Axial 4   TOTAL 24     Gait: normal stride length, bilateral arm swing normal, no evidence of ataxic gait, able to complete tandem gait without difficulty    Previously 24 on 2/10/23      Procedure: DBS Interrogation & Programming    Lead(s):   Side Left Right Right   Target VIM Vop VIM   Lead  Abbott Abbott Abbott   Lead Model Infinity 0.5 Infinity 0.5 Infinity 0.5   Lead Implant Date 3/6/2018 1/8/2019 1/8/2019   IPG # 1 2 2      IPG(s):  IPG # 1 2   IPG  Abbott Abbott   IPG Model Infinity 5 Infinity 7   IPG serial # [id number]   IPG Implant Date 2/18/2022 1/15/2019   Location Left chest Right chest   Battery (V) 2.94 (2.89) V \"Reached end of life-cycle\" (2.80) V   System impedances normal normal     IPG #  1     Program Name \"2b\"   \"3abc\"   Side Left Left   Initial/Final Initial & final Initial & final   Active/Inactive Active Inactive   Amplitude (mA) 5.0 4.0   Range (mA) 0-5.0 by 0.25 0-4.0 by 0.25   Pulse width (usec) 20 20   Freq (Hz) 200 200   Contacts C+2b- C+3abc-      Unable to program R IPG due to low battery notification  IPG # 2     Program name Vim only     \"Side\" (i.e. target) Right Vop, lead 1 Right Vim, lead 2    Initial/Final Initial & final Initial&final   Active/Inactive Inactive  Inactive    Contacts C+2abc- C+11abc   Amplitude (mA)  0 no range 4.0 [0-4 by 0.1]   Pulse Width (ms)  20 20   Frequency ( Hz)  200 200      IPG # 2     Program name Vop only     Initial/Final Initial& final Initial & final   Active/Inactive Active  Active    \"Side\" (i.e. target) Right Vop, lead 1 Right Vim, lead 2   Contacts C+, 2abc- C+,11abc-   Amplitude (mA) 4.0 [0-4 by 0.1] 0, no range   Pulse Width (ms) 20 20   Frequency ( Hz) 200 200   Ther Imp Ohm             Assessment/Plan:  Irish Rosales is a 73 year old right handed woman who returns to clinic for follow up of tremor status post " "bilateral VIM + right Vop DBS.      Her tremor remains stable and we have largely exhausted all the reasonable options for improving tremor control. Further adjustments are unlikely to improve tremor further, might give side effects.  She agreed with leaving programming schedule as is, does not find this bothersome.     When she gets the R IPG replaced, it should be programmed to: \"VIM only\"    We will plan to keep her on \"2b\" on the L IPG until she gets the R IPG replaced and reset her cycle at the point of R IPG replacement    - no DBS programming changes today  - agree with battery replacement soon  - can consider re-referral to genetic counseling if more direct family members become affected    RTC 6 months, 1 hr DBS programming     Patient and plan was examined & discussed with attending physician, Dr. Colvin.            Again, thank you for allowing me to participate in the care of your patient.      Sincerely,    Edwar Colvin MD    "

## 2023-10-06 NOTE — PATIENT INSTRUCTIONS
"Keep your Lt body generator on \"2b\" until you get your new Rt body generator.    When you get your new Rt body generator, set it to \"VIM only\" and restart your cycle at \"week 1\" at that time.     Let us know if you have any issues or questions  "

## 2023-10-06 NOTE — NURSING NOTE
DBS programming  Amol Colmenares, NING    BP (!) 147/83 (BP Location: Left arm, Patient Position: Sitting, Cuff Size: Adult Regular)   Pulse 61   Wt 62.6 kg (138 lb 1.6 oz)   LMP  (LMP Unknown)   SpO2 98%   BMI 25.74 kg/m       Pt reports BP is high when she comes to Doctor but @ home it's normal

## 2023-10-12 ENCOUNTER — ANESTHESIA EVENT (OUTPATIENT)
Dept: SURGERY | Facility: CLINIC | Age: 73
End: 2023-10-12
Payer: COMMERCIAL

## 2023-10-12 ENCOUNTER — HOSPITAL ENCOUNTER (OUTPATIENT)
Facility: CLINIC | Age: 73
Discharge: HOME | End: 2023-10-12
Attending: NEUROLOGICAL SURGERY | Admitting: NEUROLOGICAL SURGERY
Payer: COMMERCIAL

## 2023-10-12 ENCOUNTER — ANESTHESIA (OUTPATIENT)
Dept: SURGERY | Facility: CLINIC | Age: 73
End: 2023-10-12
Payer: COMMERCIAL

## 2023-10-12 VITALS
WEIGHT: 138.89 LBS | RESPIRATION RATE: 18 BRPM | DIASTOLIC BLOOD PRESSURE: 78 MMHG | OXYGEN SATURATION: 98 % | HEART RATE: 68 BPM | BODY MASS INDEX: 25.56 KG/M2 | SYSTOLIC BLOOD PRESSURE: 158 MMHG | HEIGHT: 62 IN | TEMPERATURE: 98 F

## 2023-10-12 DIAGNOSIS — G24.3 CERVICAL DYSTONIA: Primary | ICD-10-CM

## 2023-10-12 DIAGNOSIS — G24.9 DYSTONIC MOVEMENTS: Chronic | ICD-10-CM

## 2023-10-12 DIAGNOSIS — Z45.42 END OF BATTERY LIFE OF DEEP BRAIN STIMULATOR: ICD-10-CM

## 2023-10-12 DIAGNOSIS — G25.2 DYSTONIC TREMOR: ICD-10-CM

## 2023-10-12 DIAGNOSIS — Z96.89 STATUS POST DEEP BRAIN STIMULATOR PLACEMENT: ICD-10-CM

## 2023-10-12 PROCEDURE — C1767 GENERATOR, NEURO NON-RECHARG: HCPCS | Performed by: NEUROLOGICAL SURGERY

## 2023-10-12 PROCEDURE — 370N000017 HC ANESTHESIA TECHNICAL FEE, PER MIN: Performed by: NEUROLOGICAL SURGERY

## 2023-10-12 PROCEDURE — 250N000011 HC RX IP 250 OP 636: Performed by: NEUROLOGICAL SURGERY

## 2023-10-12 PROCEDURE — 710N000012 HC RECOVERY PHASE 2, PER MINUTE: Performed by: NEUROLOGICAL SURGERY

## 2023-10-12 PROCEDURE — 250N000009 HC RX 250: Performed by: NURSE ANESTHETIST, CERTIFIED REGISTERED

## 2023-10-12 PROCEDURE — 999N000141 HC STATISTIC PRE-PROCEDURE NURSING ASSESSMENT: Performed by: NEUROLOGICAL SURGERY

## 2023-10-12 PROCEDURE — 272N000001 HC OR GENERAL SUPPLY STERILE: Performed by: NEUROLOGICAL SURGERY

## 2023-10-12 PROCEDURE — 258N000003 HC RX IP 258 OP 636: Performed by: NURSE ANESTHETIST, CERTIFIED REGISTERED

## 2023-10-12 PROCEDURE — 360N000076 HC SURGERY LEVEL 3, PER MIN: Performed by: NEUROLOGICAL SURGERY

## 2023-10-12 PROCEDURE — 250N000009 HC RX 250: Performed by: NEUROLOGICAL SURGERY

## 2023-10-12 PROCEDURE — 250N000011 HC RX IP 250 OP 636: Performed by: NURSE ANESTHETIST, CERTIFIED REGISTERED

## 2023-10-12 PROCEDURE — 272N000002 HC OR SUPPLY OTHER OPNP: Performed by: NEUROLOGICAL SURGERY

## 2023-10-12 DEVICE — GENERATOR DBS SYSTEM INFINITY 7 IPG 6662ANS: Type: IMPLANTABLE DEVICE | Site: CHEST  WALL | Status: FUNCTIONAL

## 2023-10-12 RX ORDER — OXYCODONE HYDROCHLORIDE 10 MG/1
10 TABLET ORAL
Status: CANCELLED | OUTPATIENT
Start: 2023-10-12

## 2023-10-12 RX ORDER — ONDANSETRON 2 MG/ML
4 INJECTION INTRAMUSCULAR; INTRAVENOUS EVERY 30 MIN PRN
Status: CANCELLED | OUTPATIENT
Start: 2023-10-12

## 2023-10-12 RX ORDER — OXYCODONE HYDROCHLORIDE 5 MG/1
5 TABLET ORAL
Status: CANCELLED | OUTPATIENT
Start: 2023-10-12

## 2023-10-12 RX ORDER — SODIUM CHLORIDE, SODIUM LACTATE, POTASSIUM CHLORIDE, CALCIUM CHLORIDE 600; 310; 30; 20 MG/100ML; MG/100ML; MG/100ML; MG/100ML
INJECTION, SOLUTION INTRAVENOUS CONTINUOUS
Status: CANCELLED | OUTPATIENT
Start: 2023-10-12

## 2023-10-12 RX ORDER — AMOXICILLIN 250 MG
1-2 CAPSULE ORAL 2 TIMES DAILY
Qty: 30 TABLET | Refills: 0 | Status: SHIPPED | OUTPATIENT
Start: 2023-10-12

## 2023-10-12 RX ORDER — FENTANYL CITRATE 50 UG/ML
25 INJECTION, SOLUTION INTRAMUSCULAR; INTRAVENOUS EVERY 5 MIN PRN
Status: CANCELLED | OUTPATIENT
Start: 2023-10-12

## 2023-10-12 RX ORDER — BRIMONIDINE TARTRATE AND TIMOLOL MALEATE 2; 5 MG/ML; MG/ML
1 SOLUTION OPHTHALMIC
COMMUNITY

## 2023-10-12 RX ORDER — ONDANSETRON 4 MG/1
4 TABLET, ORALLY DISINTEGRATING ORAL EVERY 30 MIN PRN
Status: CANCELLED | OUTPATIENT
Start: 2023-10-12

## 2023-10-12 RX ORDER — HYDROMORPHONE HCL IN WATER/PF 6 MG/30 ML
0.4 PATIENT CONTROLLED ANALGESIA SYRINGE INTRAVENOUS EVERY 5 MIN PRN
Status: CANCELLED | OUTPATIENT
Start: 2023-10-12

## 2023-10-12 RX ORDER — SODIUM CHLORIDE, SODIUM LACTATE, POTASSIUM CHLORIDE, CALCIUM CHLORIDE 600; 310; 30; 20 MG/100ML; MG/100ML; MG/100ML; MG/100ML
INJECTION, SOLUTION INTRAVENOUS CONTINUOUS
Status: DISCONTINUED | OUTPATIENT
Start: 2023-10-12 | End: 2023-10-12 | Stop reason: HOSPADM

## 2023-10-12 RX ORDER — FENTANYL CITRATE 50 UG/ML
INJECTION, SOLUTION INTRAMUSCULAR; INTRAVENOUS PRN
Status: DISCONTINUED | OUTPATIENT
Start: 2023-10-12 | End: 2023-10-12

## 2023-10-12 RX ORDER — SODIUM CHLORIDE, SODIUM LACTATE, POTASSIUM CHLORIDE, CALCIUM CHLORIDE 600; 310; 30; 20 MG/100ML; MG/100ML; MG/100ML; MG/100ML
INJECTION, SOLUTION INTRAVENOUS CONTINUOUS PRN
Status: DISCONTINUED | OUTPATIENT
Start: 2023-10-12 | End: 2023-10-12

## 2023-10-12 RX ORDER — LIDOCAINE 40 MG/G
CREAM TOPICAL
Status: DISCONTINUED | OUTPATIENT
Start: 2023-10-12 | End: 2023-10-12 | Stop reason: HOSPADM

## 2023-10-12 RX ORDER — ONDANSETRON 2 MG/ML
INJECTION INTRAMUSCULAR; INTRAVENOUS PRN
Status: DISCONTINUED | OUTPATIENT
Start: 2023-10-12 | End: 2023-10-12

## 2023-10-12 RX ORDER — IBUPROFEN 600 MG/1
400 TABLET, FILM COATED ORAL
Status: ON HOLD | COMMUNITY
End: 2023-10-12

## 2023-10-12 RX ORDER — CEFAZOLIN SODIUM/WATER 2 G/20 ML
2 SYRINGE (ML) INTRAVENOUS SEE ADMIN INSTRUCTIONS
Status: DISCONTINUED | OUTPATIENT
Start: 2023-10-12 | End: 2023-10-12 | Stop reason: HOSPADM

## 2023-10-12 RX ORDER — ACETAMINOPHEN 325 MG/1
650 TABLET ORAL EVERY 6 HOURS
COMMUNITY
Start: 2023-10-12

## 2023-10-12 RX ORDER — CEFAZOLIN SODIUM/WATER 2 G/20 ML
2 SYRINGE (ML) INTRAVENOUS
Status: COMPLETED | OUTPATIENT
Start: 2023-10-12 | End: 2023-10-12

## 2023-10-12 RX ORDER — HYDROMORPHONE HCL IN WATER/PF 6 MG/30 ML
0.2 PATIENT CONTROLLED ANALGESIA SYRINGE INTRAVENOUS EVERY 5 MIN PRN
Status: CANCELLED | OUTPATIENT
Start: 2023-10-12

## 2023-10-12 RX ORDER — CEPHALEXIN 500 MG/1
500 CAPSULE ORAL 4 TIMES DAILY
Qty: 28 CAPSULE | Refills: 0 | Status: SHIPPED | OUTPATIENT
Start: 2023-10-12 | End: 2023-10-19

## 2023-10-12 RX ORDER — OXYCODONE HYDROCHLORIDE 5 MG/1
5 TABLET ORAL EVERY 6 HOURS PRN
Qty: 12 TABLET | Refills: 0 | Status: SHIPPED | OUTPATIENT
Start: 2023-10-12 | End: 2023-10-15

## 2023-10-12 RX ORDER — LIDOCAINE HYDROCHLORIDE 20 MG/ML
INJECTION, SOLUTION INFILTRATION; PERINEURAL PRN
Status: DISCONTINUED | OUTPATIENT
Start: 2023-10-12 | End: 2023-10-12

## 2023-10-12 RX ORDER — PROPOFOL 10 MG/ML
INJECTION, EMULSION INTRAVENOUS CONTINUOUS PRN
Status: DISCONTINUED | OUTPATIENT
Start: 2023-10-12 | End: 2023-10-12

## 2023-10-12 RX ORDER — CALCIUM CARBONATE 500(1250)
TABLET,CHEWABLE ORAL
COMMUNITY

## 2023-10-12 RX ORDER — FENTANYL CITRATE 50 UG/ML
50 INJECTION, SOLUTION INTRAMUSCULAR; INTRAVENOUS EVERY 5 MIN PRN
Status: CANCELLED | OUTPATIENT
Start: 2023-10-12

## 2023-10-12 RX ADMIN — SODIUM CHLORIDE, POTASSIUM CHLORIDE, SODIUM LACTATE AND CALCIUM CHLORIDE: 600; 310; 30; 20 INJECTION, SOLUTION INTRAVENOUS at 07:20

## 2023-10-12 RX ADMIN — Medication 2 G: at 07:28

## 2023-10-12 RX ADMIN — PROPOFOL 125 MCG/KG/MIN: 10 INJECTION, EMULSION INTRAVENOUS at 07:30

## 2023-10-12 RX ADMIN — LIDOCAINE HYDROCHLORIDE 40 MG: 20 INJECTION, SOLUTION INFILTRATION; PERINEURAL at 07:28

## 2023-10-12 RX ADMIN — ONDANSETRON 4 MG: 2 INJECTION INTRAMUSCULAR; INTRAVENOUS at 07:28

## 2023-10-12 RX ADMIN — FENTANYL CITRATE 25 MCG: 50 INJECTION INTRAMUSCULAR; INTRAVENOUS at 07:28

## 2023-10-12 RX ADMIN — MIDAZOLAM 2 MG: 1 INJECTION INTRAMUSCULAR; INTRAVENOUS at 07:20

## 2023-10-12 ASSESSMENT — ACTIVITIES OF DAILY LIVING (ADL)
ADLS_ACUITY_SCORE: 37
ADLS_ACUITY_SCORE: 37

## 2023-10-12 ASSESSMENT — VISUAL ACUITY: OU: NORMAL ACUITY

## 2023-10-12 ASSESSMENT — LIFESTYLE VARIABLES: TOBACCO_USE: 0

## 2023-10-12 NOTE — ANESTHESIA PREPROCEDURE EVALUATION
Anesthesia Pre-Procedure Evaluation    Patient: Irish Rosales   MRN: [id number] : 1950        Procedure : Procedure(s):  Replacement of deep brain stimulator generator/battery over right chest wall          Past Medical History:   Diagnosis Date    Depression     Depressive disorder     Dyslipidemia     Dystonic movements 2017    Dystonic tremor 2017    Family history of movement disorder 2017    mild abnormality in carotid study 3/2/2017    BILATERAL DUPLEX CAROTID ULTRASOUND 2017 1:01 PM    HISTORY: Other specified symptoms and signs involving the circulatory and respiratory systems.   COMPARISON: None.   FINDINGS: There is mild atherosclerotic plaque in the carotid bifurcations. Flow velocities and waveforms show less than 50% diameter stenosis in both the right and left internal carotid arteries as assessed by each ICA PS    Osteoporosis 2010      Past Surgical History:   Procedure Laterality Date    ------------OTHER-------------      vocal cord thyroplasty at [facility]    ABDOMEN SURGERY      APPENDECTOMY      COLONOSCOPY      ENT SURGERY      HEAD & NECK SURGERY      IMPLANT DEEP BRAIN STIMULATION GENERATOR / BATTERY Left 2018    Procedure: IMPLANT DEEP BRAIN STIMULATION GENERATOR / BATTERY;  Deep Brain Stimulator Placement, Phase II, Placement Of Deep Brain Stimulator Generator/Battery Over The Left Chest Wall;  Surgeon: Aleksander Morales MD;  Location: [facility] OR    IMPLANT DEEP BRAIN STIMULATION GENERATOR / BATTERY Right 01/15/2019    Procedure: Deep Brain Stimulator Placement, Phase II, Placement Of Deep Brain Stimulator Generator/Battery Over The Right Chest Wall;  Surgeon: Aleksander Morales MD;  Location: [facility] OR    OPTICAL TRACKING SYSTEM INSERTION DEEP BRAIN STIMULATION Left 2018    Procedure: OPTICAL TRACKING SYSTEM INSERTION DEEP BRAIN STIMULATION;  Stealth Assisted Left Deep Brain Stimulator Placement, Phase I, Placement Of Left  Side Deep Brain Stimulator Electrode, Target Left Ventral Intermediate Nucleus Of The Thalamus With Microelectrode Recording;  Surgeon: Aleksander Morales MD;  Location: [facility] OR    OPTICAL TRACKING SYSTEM INSERTION DEEP BRAIN STIMULATION Right 01/08/2019    Procedure: Stealth Assisted Right Deep Brain Stimulator Placement, Phase One, Placement Of Right Side Deep Brain Stimulator Electrode Target Right Ventral Intermediate Nucleus Of The Thalamus and placement Second Electrode With Microelectrode Recording;  Surgeon: Aleksander Morales MD;  Location: [facility] OR    OTHER SURGICAL HISTORY  02/18/2022    DBS - put in new battery    RELEASE CARPAL TUNNEL Left 01/02/2015    Procedure: RELEASE CARPAL TUNNEL;  Surgeon: SHANIA Hernandez MD;  Location: [facility] OR    RELEASE ULNAR NERVE (ELBOW) Left 01/02/2015    Procedure: RELEASE ULNAR NERVE (ELBOW);  Surgeon: SHANIA Hernandez MD;  Location: [facility] OR    REPLACE DEEP BRAIN STIMULATION GENERATOR / BATTERY Left 02/18/2022    Procedure: Replacement of deep brain stimulator generator/battery over left chest wall;  Surgeon: Aleksander Morales MD;  Location:  OR    [facility] BSO, OMENTECTOMY W/XAVIER  1997    hysterectomy    [facility] HAND/FINGER SURGERY UNLISTED  1998    right carpal tunnel surgery      Allergies   Allergen Reactions    Sulfa Antibiotics Swelling and Rash    Atorvastatin      Leg cramps    Simvastatin      Leg cramp      Social History     Tobacco Use    Smoking status: Never    Smokeless tobacco: Never   Substance Use Topics    Alcohol use: Yes     Comment: occasionally/Socially      Wt Readings from Last 1 Encounters:   10/12/23 63 kg (138 lb 14.2 oz)        Anesthesia Evaluation   Pt has had prior anesthetic. Type: MAC.    No history of anesthetic complications       ROS/MED HX  ENT/Pulmonary:     (+)            vocal cord abnormalities (h/o spasm, no issues since DBS was placed) -                         (-) tobacco use, asthma and sleep apnea   Neurologic:  "Comment: tremor   (-) no CVA and no TIA   Cardiovascular:    (-) CAD and MCKEON   METS/Exercise Tolerance: >4 METS    Hematologic:       Musculoskeletal:       GI/Hepatic:    (-) GERD   Renal/Genitourinary:       Endo:       Psychiatric/Substance Use:     (+) psychiatric history anxiety       Infectious Disease:       Malignancy:       Other:          BP (!) 167/96   Temp 36.6  C (97.9  F) (Oral)   Resp 16   Ht 1.575 m (5' 2\")   Wt 63 kg (138 lb 14.2 oz)   LMP  (LMP Unknown)   SpO2 98%   BMI 25.40 kg/m      Physical Exam    Airway        Mallampati: II   TM distance: > 3 FB   Neck ROM: full   Mouth opening: > 3 cm    Respiratory Devices and Support         Dental       (+) Minor Abnormalities - some fillings, tiny chips      Cardiovascular          Rhythm and rate: normal     Pulmonary   pulmonary exam normal                OUTSIDE LABS:  CBC:   Lab Results   Component Value Date    WBC 4.7 10/02/2023    WBC 4.3 04/17/2023    HGB 13.3 10/02/2023    HGB 11.9 04/17/2023    HCT 40.2 10/02/2023    HCT 36.4 04/17/2023     10/02/2023     04/17/2023     BMP:   Lab Results   Component Value Date     10/02/2023     04/17/2023    POTASSIUM 4.5 10/02/2023    POTASSIUM 4.1 04/17/2023    CHLORIDE 103 10/02/2023    CHLORIDE 104 04/17/2023    CO2 26 10/02/2023    CO2 26 04/17/2023    BUN 18.2 10/02/2023    BUN 14.9 04/17/2023    CR 0.74 10/02/2023    CR 0.75 04/17/2023     (H) 10/02/2023     (H) 04/17/2023     COAGS:   Lab Results   Component Value Date    PTT 27 10/02/2023    INR 1.05 10/02/2023     POC:   Lab Results   Component Value Date     (H) 01/15/2019     HEPATIC:   Lab Results   Component Value Date    ALBUMIN 4.4 10/25/2018    PROTTOTAL 7.9 10/25/2018    ALT 24 01/06/2021    AST 17 10/25/2018    ALKPHOS 62 10/25/2018    BILITOTAL 0.5 10/25/2018     OTHER:   Lab Results   Component Value Date    TJ 9.8 10/02/2023    PHOS 4.1 08/19/2010    TSH 1.44 10/25/2018 "       Anesthesia Plan    ASA Status:  2       Anesthesia Type: MAC.     - Reason for MAC: straight local not clinically adequate   Induction: Intravenous.   Maintenance: TIVA.        Consents    Anesthesia Plan(s) and associated risks, benefits, and realistic alternatives discussed. Questions answered and patient/representative(s) expressed understanding.     - Discussed:     - Discussed with:  Patient      - Extended Intubation/Ventilatory Support Discussed: No.      - Patient is DNR/DNI Status: No     Use of blood products discussed: No .     Postoperative Care    Pain management: IV analgesics, Oral pain medications.   PONV prophylaxis: Dexamethasone or Solumedrol, Ondansetron (or other 5HT-3)     Comments:                Chay Bridges MD

## 2023-10-12 NOTE — ANESTHESIA CARE TRANSFER NOTE
Patient: Irish Rosales    Procedure: Procedure(s):  Replacement of deep brain stimulator generator/battery over right chest wall       Diagnosis: Disabling essential tremor [G25.0]  End of battery life of deep brain stimulator [Z45.42]  Status post deep brain stimulator placement [Z96.89]  Dystonic tremor [G25.2]  Cervical dystonia [G24.3]  Diagnosis Additional Information: No value filed.    Anesthesia Type:   MAC     Note:    Oropharynx: oropharynx clear of all foreign objects  Level of Consciousness: awake  Oxygen Supplementation: room air    Independent Airway: airway patency satisfactory and stable  Dentition: dentition unchanged  Vital Signs Stable: post-procedure vital signs reviewed and stable  Report to RN Given: handoff report given  Patient transferred to: Phase II  Comments: VSS, report given to RN.    Handoff Report: Identifed the Patient, Identified the Reponsible Provider, Reviewed the pertinent medical history, Discussed the surgical course, Reviewed Intra-OP anesthesia mangement and issues during anesthesia, Set expectations for post-procedure period and Allowed opportunity for questions and acknowledgement of understanding      Vitals:  Vitals Value Taken Time   /79    Temp     Pulse 68    Resp 14    SpO2 100        Electronically Signed By: RACHANA Flood CRNA  October 12, 2023  8:46 AM

## 2023-10-12 NOTE — ANESTHESIA POSTPROCEDURE EVALUATION
Patient: Irish Rosales    Procedure: Procedure(s):  Replacement of deep brain stimulator generator/battery over right chest wall       Anesthesia Type:  MAC    Note:  Disposition: Outpatient   Postop Pain Control: Uneventful            Sign Out: Well controlled pain   PONV: No   Neuro/Psych: Uneventful            Sign Out: Acceptable/Baseline neuro status   Airway/Respiratory: Uneventful            Sign Out: Acceptable/Baseline resp. status   CV/Hemodynamics: Uneventful            Sign Out: Acceptable CV status; No obvious hypovolemia; No obvious fluid overload   Other NRE: NONE   DID A NON-ROUTINE EVENT OCCUR? No           Last vitals:  Vitals Value Taken Time   /78 10/12/23 0932   Temp 36.7  C (98  F) 10/12/23 0930   Pulse 68 10/12/23 0932   Resp 18 10/12/23 0930   SpO2 98 % 10/12/23 0930   Vitals shown include unfiled device data.    Electronically Signed By: Chay Bridges MD  October 12, 2023  11:08 AM

## 2023-10-12 NOTE — PROGRESS NOTES
SPIRITUAL HEALTH SERVICES  [facility] ([place]) 3C   PRE-SURGERY VISIT    Had pre-surgery visit with pt.  Provided spiritual support, prayer.     Rev. Alaina Raygoza MDiv, [facility]  Staff    Pager [phone]

## 2023-10-12 NOTE — BRIEF OP NOTE
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  No residents were available.   Anesthesia: MAC    Estimated blood loss: 1 ml   Drains: None   Specimens: None   Findings: Completely depleted generator/battery on the right side.  No sign of infection.  No sign of hardware damage.  All impedance values were within normal.  Both side DBS system turned on.  The right side system is switched to Vim stimulation, per patient and neurology instructions.   Condition:  Complications:  Explants:  Implants: Stable  None  Abbott DBS generator/battery, Infinity 7, REF 6662, S/N [id number]  Abbott DBS generator/battery, Infinity 7, REF 6662, S/N [id number]        Implant Name Type Inv. Item Serial No.  Lot No. LRB No. Used Action   GENERATOR DBS SYSTEM INFINITY 7 IPG 6662ANS Neurology device GENERATOR DBS SYSTEM INFINITY 7 IPG 6662ANS [id number] [employer]  Right 1 Explanted   GENERATOR DBS SYSTEM INFINITY 7 IPG 6662ANS - [id number] Neurology device GENERATOR DBS SYSTEM INFINITY 7 IPG 6662ANS  [employer]  Right 1 Implanted

## 2023-10-12 NOTE — DISCHARGE INSTRUCTIONS
[facility], [place]  Take it easy when you get home.  Remember, same day surgery DOES NOT MEAN SAME DAY RECOVERY! Healing is a gradual process.   You will need some time to recover- you may be more tired than you realize at first.  Rest and relax for at least the first 24 hours at home.  You'll feel better and heal faster if you take good care of yourself.    Same-Day Surgery   Adult Discharge Orders & Instructions     For 24 hours after surgery    Get plenty of rest.  A responsible adult must stay with you for at least 24 hours after you leave the hospital.   Do not drive or use heavy equipment.  If you have weakness or tingling, don't drive or use heavy equipment until this feeling goes away.  Do not drink alcohol.  Avoid strenuous or risky activities.  Ask for help when climbing stairs.   You may feel lightheaded.  IF so, sit for a few minutes before standing.  Have someone help you get up.   If you have nausea (feel sick to your stomach): Drink only clear liquids such as apple juice, ginger ale, broth or 7-Up.  Rest may also help.  Be sure to drink enough fluids.  Move to a regular diet as you feel able.  You may have a slight fever. Call the doctor if your fever is over 100 F (37.7 C) (taken under the tongue) or lasts longer than 24 hours.  You may have a dry mouth, a sore throat, muscle aches or trouble sleeping.  These should go away after 24 hours.  Do not make important or legal decisions.   Call your doctor for any of the followin.  Signs of infection (fever, growing tenderness at the surgery site, a large amount of drainage or bleeding, severe pain, foul-smelling drainage, redness, swelling).    2. It has been over 8 to 10 hours since surgery and you are still not able to urinate (pass water).    3.  Headache for over 24 hours.    4.  Numbness, tingling or weakness the day after surgery (if you had spinal anesthesia).  Use this number to contact the clinic during  regular business hours only please.  To contact a doctor, call ___Dr Morales at [phone] at the Neurosurgery Clinic from 8 am till 5 pm --  ___ or:    '   Use this number if the clinic is closed; this is a hospital answering [phone] and ask for the resident on call for   ____Neurosurgery___ (answered 24 hours a day)  '   Emergency Department:    [facility]: [phone]       (TTY for hearing impaired: [phone])

## 2023-10-13 ENCOUNTER — PATIENT OUTREACH (OUTPATIENT)
Dept: NEUROSURGERY | Facility: CLINIC | Age: 73
End: 2023-10-13
Payer: COMMERCIAL

## 2023-10-13 NOTE — TELEPHONE ENCOUNTER
Pt s/p Replacement of deep brain stimulator generator/battery over right chest wall on 10/12/23 by Dr. Morales. Left VM to check on pt's postop status.Requested that she call back at her earliest convenience. Left number to reach the on-call neurosurgery resident should she have concerns over the weekend/after hours.

## 2023-10-13 NOTE — PROGRESS NOTES
[facility]: Post-Discharge Note  SITUATION                                                      Admission:    Admission Date: 10/12/23   Reason for Admission: Replacement of deep brain stimulator generator/battery over right chest wall  Discharge:   Discharge Date: 10/12/23  Discharge Diagnosis: Battery at end of life    BACKGROUND                                                      Per hospital discharge summary and inpatient provider notes:  Neurosurgery Discharge Coordination Note     Attending physician: Dr. Morales  Discharge to: Home     Current status: Patient states she is doing well since surgery. Pain is well managed with Tylenol as needed. Pt says area around incision is slightly pink but no different from yesterday. Some swelling at site when she woke up this morning; pt applied ice pack and swelling is mostly resolved. Denies increased tenderness, drainage, incision opening or elevated temp. Denies current bowel or bladder issues.    Discharge instructions and medications reviewed with patient and pt expressed understanding.     Follow up appointments/imaging/tests needed: 2 week post op with PCP (pt is returning to [place]).     RN triage/on call number given: [phone]/ [phone]      ASSESSMENT           Discharge Assessment  How are your symptoms? (Red Flag symptoms escalate to triage hotline per guidelines): Improved  Do you feel your condition is stable enough to be safe at home until your provider visit?: Yes  Does the patient have their discharge instructions? : Yes  Does the patient have questions regarding their discharge instructions? : No  Were you started on any new medications or were there changes to any of your previous medications? : Yes  Does the patient have all of their medications?: Yes  Do you have questions regarding any of your medications? : No  Discharge follow-up appointment scheduled within 14 calendar days? : No  Is patient agreeable to assistance with  scheduling? : No (Patient returning to [place] and will see her PCP)         Post-op (Clinicians Only)  Did the patient have surgery or a procedure: Yes  Incision: closed;healing  Drainage: No  Bleeding: none  Fever: No  Chills: No  Redness: No  Warmth: No  Swelling: Yes  Incision site pain: Yes  Closure: suture;dissolving  Eating & Drinking: eating and drinking without complaints/concerns  PO Intake: regular diet  Bowel Function: normal  Urinary Status: voiding without complaint/concerns        PLAN                                                      Outpatient Plan:  Routine postop follow-up      Future Appointments   Date Time Provider Department Center   4/12/2024 11:00 AM Edwar Colvin MD [facility]         For any urgent concerns, please contact our 24 hour nurse triage line: [phone] ([phone])         NORBERTO GARCIA RN

## 2023-10-19 ENCOUNTER — TELEPHONE (OUTPATIENT)
Dept: NEUROSURGERY | Facility: CLINIC | Age: 73
End: 2023-10-19
Payer: COMMERCIAL

## 2023-11-04 ENCOUNTER — HEALTH MAINTENANCE LETTER (OUTPATIENT)
Age: 73
End: 2023-11-04

## 2023-11-07 NOTE — OP NOTE
PATIENT NAME: HARSHAD PAYNE  YOB: 1950  MRN:   [id number]  ACCOUNT:  [id number]      DATE OF PROCEDURE:  10/12/2023    PREOPERATIVE DIAGNOSIS:  1.  Dystonia/dystonic tremors and bilateral tremors, vocal dystonia and tremor, head tremor  2.  Status post left side deep brain stimulator placement, phase I, placement of left side deep brain stimulator electrode, target left ventral intermediate nucleus of the thalamus, with microelectrode recording on 3/6/2018  3.  Status post deep brain stimulator placement, phase II, placement of deep brain stimulator generator/battery over the left chest wall on 3/13/2018  4.  Status post right side deep brain stimulator placement, phase I, placement of right side deep brain stimulator electrodes, target left ventral intermediate nucleus and ventral oralis nucleus of the thalamus, with microelectrode recording on 1/8/2019  5.  Status post deep brain stimulator placement, phase II, placement of deep brain stimulator generator/battery over the right chest wall on 1/15/2019  6.  Status post replacement of DBS generator/battery over the left chest wall on 2/18/2022  7.  Depleted DBS battery/generator, Infinity 7, REF 6662, over the right chest wall    POSTOPERATIVE DIAGNOSIS:  1.  Dystonia/dystonic tremors and bilateral tremors, vocal dystonia and tremor, head tremor  2.  Status post left side deep brain stimulator placement, phase I, placement of left side deep brain stimulator electrode, target left ventral intermediate nucleus of the thalamus, with microelectrode recording on 3/6/2018  3.  Status post deep brain stimulator placement, phase II, placement of deep brain stimulator generator/battery over the left chest wall on 3/13/2018  4.  Status post right side deep brain stimulator placement, phase I, placement of right side deep brain stimulator electrodes, target left ventral intermediate nucleus and ventral oralis nucleus of the thalamus, with microelectrode  recording on 1/8/2019  5.  Status post deep brain stimulator placement, phase II, placement of deep brain stimulator generator/battery over the right chest wall on 1/15/2019  6.  Status post replacement of DBS generator/battery over the left chest wall on 2/18/2022  7.  Depleted DBS battery/generator, Infinity 7, REF [id number], over the right chest wall    PROCEDURES PERFORMED:  1.  Replacement of deep brain stimulator generator/battery, model Abbott Infinity 7, over the right chest wall with connection to two electrode arrays.  2.  Revision of the right chest wall generator/battery pocket.  3.  Electrical interrogation and analysis of the left side and right side deep brain stimulator systems.    ATTENDING SURGEON:  Aleksander Morales MD, PhD.    ASSISTANT SURGEON:  None.  No residents were available.    ANESTHESIA:  Monitored anesthesia care and local anesthetic.    ESTIMATED BLOOD LOSS:  1 mL    COMPLICATIONS:  None    FINDINGS:  No sign of infection.  No sign of hardware breakage or damage.  With new generator/battery connected, all the impedance values were within normal.  No problems were found.    EXPLANTS:  Abbott DBS generator/battery, Infinity 7, REF 6662, S/N [id number]     IMPLANTS:  Abbott DBS generator/battery, Infinity 7, REF 6662, S/N [id number]     INDICATIONS FOR PROCEDURE:  Ms. Irish Rosales is a 73 year old right handed female who is well known to me that presents with depleted DBS generator/battery over the right chest wall.  She has a history of dystonia/dystonic tremor and bilateral tremor, vocal dystonia an tremor and head tremor.  She is s/p left side deep brain stimulator placement, phase I, placement of left side deep brain stimulator electrode, target left ventral intermediate nucleus of the thalamus, with microelectrode recording on 3/6/2018, s/p deep brain stimulator placement, phase II, placement of deep brain stimulator generator/battery over the left chest wall on 3/13/2018, s/p right side  deep brain stimulator placement, phase I, placement of right side deep brain stimulator electrodes, target left ventral intermediate nucleus and ventral oralis nucleus of the thalamus, with microelectrode recording on 1/8/2019, s/p deep brain stimulator placement, phase II, placement of deep brain stimulator generator/battery over the right chest wall on 1/15/2019 and s/p replacement of DBS generator/battery over the left chest wall on 2/18/2022.  She reached out to our clinic for her right chest wall generator/battery reaching the end of its battery life.  Patient states that she is in her usual state of health and she does not have any new medical issues or symptoms.  She denies any changes in her health.  She also denies any issues with her current DBS system.  She denies any pain or discomfort at the right chest wall and she denies any abnormal shocking sensation.  She is not on any anticoagulation or antiplatelet therapy.  Briefly, the patient is a 73 year old right-handed female with history of dystonic tremor.  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  She also has cervical dystonia.  Her right side is more affected than her left side.  Her goals for DBS surgery are to decrease tremor, especially head and voice and improve dystonia.  She stated that she can live with her hand tremors but wants other symptoms treated.  Her case was discussed at the Movement Disorder [employer] meeting and the recommendation was left side Vim DBS with Abbott device and wait and see strategy.  She subsequently had the left side implanted back in March of 2018.  She then wanted to get her right side implanted.  She was again evaluated and subsequently approved for the right side implantation.  For the right side, two electrodes were recommended:  Vim and Vo.  Her current right side system was implanted  back in January of 2019.  Therefore, the Infinity 7, REF 6662, over the right chest wall has lasted her 4 years and 9 months.  Her left side system with Infinity 5, REF 6660, was implanted back in March of 2018 and replaced in 2/18/2022 lasting close to 4 years.  Based on the above calculations, the left side system is due for replacement in February of 2026 and the right side system is due for replacement in July of 2028.  I do not foresee the two generators/batteries to overlap on their replacement schedule.  We did not discuss the option of a rechargeable system since it is not yet available and she is requiring replacement every 4 years or more.  We discussed the surgical procedure for replacing the DBS generator/battery over the right chest wall.  She currently has the Infinity 7, REF 6662, generator/battery, and this will be maintained during the upcoming procedure.  Risks, benefits, alternative therapies were discussed with the patient, including but not limited to infection and bleeding.  This surgical procedure will be performed under MAC with local anesthetic.  The thinned part of the wound/pocket may be corrected with mobilization of the tissue under the incision.  The pocket may need to be enlarged to minimize the stress on the closure.  Surgical procedure was discussed in detail.  All questions were answered, and she expressed understanding.    DESCRIPTION OF PROCEDURE:  Informed consent was obtained from the patient and her right chest was marked, specifically the previous incision.  She was brought to the operating theater and was laid in a supine position.  Her right arm was tucked.  All pressure points were padded appropriately.  Monitored anesthesia care was induced and maintained throughout the entire case.  Her scar and previous incision over the right chest wall was prepped and draped in the usual sterile fashion.  Time out was performed confirming the patient's identity, procedure to be  performed, site and side of the surgery and the administration of prophylactic preoperative antibiotic.  Lidocaine 1% with 1:100,000 epinephrine mixed with Marcaine 1/4% plain, 50:50 mix, was injected in the subcutaneous space at the intended incision site, which was the previously well healed incision.  Total of 22 mL was used.  The left side DBS system was turned to the surgery safe mode.  The right side DBS system was completely depleted.    I turned my attention to the right side.  I used a #10 blade scalpel to make the skin incision, going over the previously well healed scar.  I carried my dissection through the dermal layer until I reached the subcutaneous fat and then the generator/battery capsule.  The monopolar cautery was used to dissect the subcutaneous tissue and #10 blade to open the capsule, taking great care not to damage the hardware.  I gently opened up the fibrous capsule surrounding the generator/battery to expose it.  Care was taken to open the capsule while lifting the capsule itself away from the generator/battery, taking great care not to come in contact with the metal casing or the wires.  The generator/battery was mobile and I was able to remove it easily, taking care to attend to the location of the wires.  The scar surrounding and anchoring the extension wires was carefully dissected and wires freed.  The extension wires were examined and were found to be without any damage or erosion or defect.  There were no obvious signs of infection.    I then performed blunt and sharp dissection and used the monopolar cautery within the pocket after the generator/battery had been removed in order to revise the chest wall pocket for the new generator/battery, so as to reduce the tension on the wound closure.  Therefore, the pocket was generously enlarged.  I also dissected free and undermined the superior aspect of the pocket so that the closure would have less tension after closure and more  subcutaneous tissue would be available.  The entire cavity was copiously irrigated with normal saline and meticulous hemostasis was obtained and the pocket was dried.      The old generator/battery, Infinity 7, was disconnected from the extension wires, one at a time to maintain the correct orientation.  I used a special wire pastor tool to do this.  The connector contacts were cleaned.  The new generator/battery, Abbott Infinity 7, was connected to the extension wires in the same sequence with the extension wires being inserted into the same corresponding portals.  Again, I used a special wire pastor tool.  Therefore, two electrode arrays, both right and left were connected to generator/battery and secured.    And then the new generator/battery was placed back into the pocket along with the excess extension wires being placed posteriorly and around the periphery of the generator/battery.  The generator/battery was secured to the new position within the pocket using two 2-0 Ethibond sutures.    The right side DBS system was tested and interrogated electrically and was found to be working well and impedances were noted to be within normal.  No problems were found.    With the satisfactory placement of the new system, I began closing the wound.  The deep pocket was reapproximated using 3-0 Vicryl sutures in an inverted interrupted fashion.  The subcutaneous fat layer was then reapproximated using 3-0 Vicryl sutures in an inverted interrupted fashion to provide padding to the skin for the closure.  The dermal layer was reapproximated using 3-0 Vicryl sutures in an inverted interrupted fashion.  The skin was reapproximated using 4-0 Monocryl suture in a subcuticular fashion.  The incision was then cleaned with wet and dry sponges followed by ChloraPrep and then Dermabond skin glue was applied.    The bilateral DBS system was turned back on to the therapeutic mode.  The right side system programming was switched to the  Vim stimulation, per patient and the neurology team instructions.    At the end of the case, all counts including needle, sponge and instrument counts were correct x 2.  There were no complications.  Patient was awakened and taken to the recovery room in a stable condition.     Solange SHORT M.D., Ph.D., Neurosurgery Attending, was present and scrubbed for the entire case and performed the entire case.      SOLANGE CHU MD, PHD

## 2023-12-20 DIAGNOSIS — G47.00 INSOMNIA, UNSPECIFIED TYPE: ICD-10-CM

## 2023-12-20 DIAGNOSIS — F32.5 DEPRESSION, MAJOR, IN REMISSION (H): ICD-10-CM

## 2023-12-20 RX ORDER — TRAZODONE HYDROCHLORIDE 50 MG/1
TABLET, FILM COATED ORAL
Qty: 90 TABLET | Refills: 0 | Status: SHIPPED | OUTPATIENT
Start: 2023-12-20

## 2024-01-06 DIAGNOSIS — E78.5 HYPERLIPIDEMIA LDL GOAL <130: ICD-10-CM

## 2024-01-06 DIAGNOSIS — F32.5 DEPRESSION, MAJOR, IN REMISSION (H): ICD-10-CM

## 2024-01-08 RX ORDER — ROSUVASTATIN CALCIUM 20 MG/1
TABLET, COATED ORAL
Qty: 90 TABLET | Refills: 3 | OUTPATIENT
Start: 2024-01-08

## 2024-03-08 ENCOUNTER — TELEPHONE (OUTPATIENT)
Dept: NEUROLOGY | Facility: CLINIC | Age: 74
End: 2024-03-08
Payer: COMMERCIAL

## 2024-03-08 NOTE — TELEPHONE ENCOUNTER
[facility] Call Center    Phone Message    May a detailed message be left on voicemail: yes     Reason for Call: Appointment Intake    Referring Provider Name: Edwar Colvin    Diagnosis and/or Symptoms: DBS Programming    Patient requesting to schedule DBS Programming appointment.    Thanks    Action Taken: Other: Neurology    Travel Screening: Not Applicable

## 2024-03-11 NOTE — TELEPHONE ENCOUNTER
Spoke to the patient and got her scheduled on 4/19/24 at 7 AM with Dr. Colvin for programming. The patient had requested to schedule on either 4/19 or 4/26.

## 2024-03-12 ENCOUNTER — OFFICE VISIT (OUTPATIENT)
Dept: URBAN - METROPOLITAN AREA CLINIC 89 | Facility: CLINIC | Age: 74
End: 2024-03-12
Payer: COMMERCIAL

## 2024-03-12 DIAGNOSIS — H11.153 PINGUECULA, BILATERAL: ICD-10-CM

## 2024-03-12 DIAGNOSIS — H18.419 ARCUS SENILIS: ICD-10-CM

## 2024-03-12 DIAGNOSIS — H40.033 ANATOMICAL NARROW ANGLE, BILATERAL: ICD-10-CM

## 2024-03-12 DIAGNOSIS — H40.1131 PRIMARY OPEN-ANGLE GLAUCOMA, BILATERAL, MILD STAGE: ICD-10-CM

## 2024-03-12 DIAGNOSIS — H25.13 AGE-RELATED NUCLEAR CATARACT, BILATERAL: Primary | ICD-10-CM

## 2024-03-12 PROCEDURE — 92014 COMPRE OPH EXAM EST PT 1/>: CPT | Performed by: OPTOMETRIST

## 2024-03-12 PROCEDURE — 92133 CPTRZD OPH DX IMG PST SGM ON: CPT | Performed by: OPTOMETRIST

## 2024-03-12 ASSESSMENT — INTRAOCULAR PRESSURE
OD: 19
OS: 18

## 2024-04-18 NOTE — PROGRESS NOTES
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    At last visit:  No changes to programming, but in need of R IPG replacement.  Plan was:  When she gets the R IPG replaced, it should be programmed to: \"VIM only\" Keep on \"2b\" on the L IPG until she gets the R IPG replaced and reset her cycle at the point of R IPG replacement    Interval Hx  Oct 12, 2023 IPG replacement right chest Infinity 7 (two leads)    The patient reports that she feels a little \"shakier\" than prior, concerning her tremor. She notices this most when carrying liquids or buttering her toast. Her R hand is worse. She feels that her voice has improved. She feels her balance remains good    The patient now lives with her son      4/19/2024     7:00 AM   Tremor ADL Scale   1. Speaking 1   2. Feeding (spoon) 2   3. Drinking (glass) 4   4. Hygiene 2   5. Dressing 0   6. Pouring 3   7. Carry plate, tray 2   8. Use keys 3   9. Writing 2   10. Work or housework 1   11a. Overall disability 4   11b. Most affected task Drinking   12. Social impact 1   ADL TOTAL 25         4/19/2024     7:00 AM   Tremor Motor Scale   Assessment Time 07:55   Medication None   DBS - Right Brain On   DBS - Left Brain On   Head 1   Face & Jaw 0   Voice 2   Outstretched - RIGHT 1.5   Outstretched - LEFT 0   Wingbeating - RIGHT 1.5   Wingbeating - LEFT 1.5   Kinetic - RIGHT 2   Kinetic - LEFT 2.5   Lower Limb - RIGHT 0   Lower Limb - LEFT 0   Lower Limb (Max) 0   Spiral - RIGHT 3   Spiral - LEFT 3   Handwriting 1   Dot approx - RIGHT 2   Dot approx - LEFT 3   Trunk (Standing) 0   Total Right 10   Total Left 10   Axial 3   TOTAL 24       Gait: Upright posture with narrow stance. Gait is smooth and well-balanced. Appropriate arm swing. No difficulty with turns. Head tremor becomes more pronounced during walking    Rotation Schedule  Weeks  Right brain Left brain   1 VIM 2b   2 VIM " "3abc   3  VOP 3abc   4 VOP 2b      Lead(s):   Side Left Right Right   Target VIM Vop VIM    Abbott Abbott Abbott   Model Infinity 0.5 Infinity 0.5 Infinity 0.5   Implant Date 3/6/2018 1/8/2019 1/8/2019   IPG location Left chest Right chest Right chest      IPG(s):  IPG # 1 2   IPG  Abbott Abbott   IPG Model Infinity 5 Infinity 7   IPG Name Lt Irish Rosales RIGHT 2023   IPG serial # [id number]   IPG Implant Date 2/18/2022 10/12/2023   Location Left chest Right chest   Battery (V) 2.93 (2.94) (2.89) V 2.95 V   System impedances Intact Intact      IPG location  Left chest     Program Name \"2b\"   \"3abc\"   Side Left Left   Initial/Final Initial & final Initial & final   Active/Inactive Active Inactive   Amplitude (mA) 5.0 4.0   Range (mA) 0-5.0 by 0.25 0-4.0 by 0.25   Pulse width (usec) 20 20   Freq (Hz) 200 200   Contacts C+2b-  C+3abc-            Intended      IPG location Right chest     Program name Vim only     \"Side\" (i.e. target) Right Vop, lead 1 Right Vim, lead 2    Initial/Final Initial & final Initial&final   Active/Inactive Inactive  Inactive    On/Off OFF ON   Contacts C+2abc- C+11abc-   Amplitude (mA) 0 4.0   Range (mA) 0-0 by 0.1 0-4.0 by 0.1   Pulse Width (ms)  20 20   Frequency ( Hz)  200 200       Intended       IPG location Right chest      Program name Vop only      Initial/Final Initial Final Initial & final   Active/Inactive Active   Active    \"Side\" (i.e. target) Right Vop, lead 1  Right Vim, lead 2   On/Off ON ON OFF   Contacts C+, 2abc-  C+,11abc-   Amplitude (mA) 4.0  0   Range (mA) 4.0-4.0 by 0.1 0-4.0 by 0.1 0-0 by 0.1   Pulse Width (ms) 20  20   Frequency ( Hz) 200  200      Recommendations:See AVS    Some of the above was scribed by Stacy Lamb, Neurology resident.  I spent 0.75h with the patient, plus time on this date reviewing records before the appointment, and documenting, after it.    Edwar Colvin PhD, MD  "

## 2024-04-19 ENCOUNTER — OFFICE VISIT (OUTPATIENT)
Dept: NEUROLOGY | Facility: CLINIC | Age: 74
End: 2024-04-19
Payer: COMMERCIAL

## 2024-04-19 VITALS
HEART RATE: 62 BPM | SYSTOLIC BLOOD PRESSURE: 157 MMHG | BODY MASS INDEX: 25.06 KG/M2 | OXYGEN SATURATION: 99 % | WEIGHT: 137 LBS | DIASTOLIC BLOOD PRESSURE: 78 MMHG

## 2024-04-19 DIAGNOSIS — G25.0 ESSENTIAL TREMOR: Primary | ICD-10-CM

## 2024-04-19 DIAGNOSIS — Z96.89 STATUS POST DEEP BRAIN STIMULATOR PLACEMENT: ICD-10-CM

## 2024-04-19 PROCEDURE — 99215 OFFICE O/P EST HI 40 MIN: CPT | Mod: 25 | Performed by: PSYCHIATRY & NEUROLOGY

## 2024-04-19 PROCEDURE — 95970 ALYS NPGT W/O PRGRMG: CPT | Performed by: PSYCHIATRY & NEUROLOGY

## 2024-04-19 RX ORDER — MAGNESIUM 200 MG
TABLET ORAL
COMMUNITY

## 2024-04-19 ASSESSMENT — ACTIVITIES OF DAILY LIVING (ADL)
CARRYING_FOOD_TRAYS_PLATES_OR_SIMILAR_ITEMS: (2) MILDLY ABNORMAL. MUST BE VERY CAREFUL TO AVOID SPILLING ITEMS ON FOOD TRAY.
HYGIENE: (2) MILDLY ABNORMAL. SOME DIFFICULTY BUT CAN COMPLETE TASK.
OVERALL_DISABILITY_WITH_THE_MOST_AFFECTED_TASK: DRINKING
WORKING: (1) SLIGHTLY ABNORMAL. TREMOR IS PRESENT BUT DOES NOT AFFECT PERFORMANCE AT WORK OR AT HOME.
WRITING: (2) MILDLY ABNORMAL. DIFFICULTY WRITING DUE TO THE TREMOR.
SOCIAL_IMPACT: (1) AWARE OF TREMOR, BUT IT DOES NOT AFFECT LIFESTYLE OR PROFESSIONAL LIFE.
DRESS: (0) NORMAL
POURING: (3) MODERATELY ABNORMAL. MUST USE TWO HANDS OR USES OTHER STRATEGIES TO AVOID SPILLING.
TOTAL_SCORE: 25
SPEAKING: (1) SLIGHT VOICE TREMULOUSNESS, ONLY WHEN "NERVOUS".
OVERALL_DISABILITY_WITH_THE_MOST_AFFECTED_TASK: (4) SEVERELY ABNORMAL. CANNOT DO THE TASK.
FEEDING_WITH_A_SPOON: (2) MILDLY ABNORMAL. SPILLS A LITTLE.
USING_KEYS: (3) MODERATELY ABNORMAL. NEEDS TO USE TWO HANDS OR OTHER STRATEGIES TO PUT KEY IN LOCK.
DRINKING_FROM_A_GLASS: (4) SEVERELY ABNORMAL. CANNOT DRINK FROM A GLASS OR USES STRAW OR SIPPY CUP.

## 2024-04-19 ASSESSMENT — PAIN SCALES - GENERAL: PAINLEVEL: NO PAIN (0)

## 2024-04-19 NOTE — PATIENT INSTRUCTIONS
Weeks  Right brain Left brain   1 VIM 2b   2 VIM 3abc   3  VOP 3abc   4 VOP 2b   Continue the same rotation

## 2024-05-17 ENCOUNTER — OFFICE VISIT (OUTPATIENT)
Dept: URBAN - METROPOLITAN AREA CLINIC 89 | Facility: CLINIC | Age: 74
End: 2024-05-17
Payer: COMMERCIAL

## 2024-05-17 DIAGNOSIS — S05.02XA CORNEAL ABRASION WITHOUT FB OF LEFT EYE, INITIAL ENCOUNTER: Primary | ICD-10-CM

## 2024-05-17 PROCEDURE — 92012 INTRM OPH EXAM EST PATIENT: CPT | Performed by: OPTOMETRIST

## 2024-05-17 ASSESSMENT — INTRAOCULAR PRESSURE
OS: 17
OD: 19

## 2024-05-23 ENCOUNTER — TELEPHONE (OUTPATIENT)
Dept: FAMILY MEDICINE | Facility: CLINIC | Age: 74
End: 2024-05-23
Payer: COMMERCIAL

## 2024-05-23 NOTE — TELEPHONE ENCOUNTER
Patient Quality Outreach    Patient is due for the following:   Hypertension -  BP check  Breast Cancer Screening - Mammogram  Depression  -  PHQ-9 needed  Physical Annual Wellness Visit    Next Steps:   Schedule a Annual Wellness Visit    Type of outreach:    Sent Shary[employer] message.    Next Steps:  Reach out within 90 days via Sharypic.    Max number of attempts reached: Yes. Will try again in 90 days if patient still on fail list.    Questions for provider review:    None           Michelle Patel

## 2024-06-01 ENCOUNTER — HEALTH MAINTENANCE LETTER (OUTPATIENT)
Age: 74
End: 2024-06-01

## 2024-06-05 DIAGNOSIS — E78.5 HYPERLIPIDEMIA LDL GOAL <130: ICD-10-CM

## 2024-06-19 DIAGNOSIS — E78.5 HYPERLIPIDEMIA LDL GOAL <130: ICD-10-CM

## 2024-09-03 RX ORDER — ROSUVASTATIN CALCIUM 20 MG/1
20 TABLET, COATED ORAL DAILY
Qty: 90 TABLET | Refills: 3 | OUTPATIENT
Start: 2024-09-03

## 2024-09-17 RX ORDER — ROSUVASTATIN CALCIUM 20 MG/1
TABLET, COATED ORAL
Qty: 90 TABLET | Refills: 3 | OUTPATIENT
Start: 2024-09-17

## 2024-10-01 ENCOUNTER — OFFICE VISIT (OUTPATIENT)
Dept: URBAN - METROPOLITAN AREA CLINIC 89 | Facility: CLINIC | Age: 74
End: 2024-10-01
Payer: COMMERCIAL

## 2024-10-01 DIAGNOSIS — H25.13 AGE-RELATED NUCLEAR CATARACT, BILATERAL: ICD-10-CM

## 2024-10-01 DIAGNOSIS — H40.1131 PRIMARY OPEN-ANGLE GLAUCOMA, BILATERAL, MILD STAGE: Primary | ICD-10-CM

## 2024-10-01 PROCEDURE — 92134 CPTRZ OPH DX IMG PST SGM RTA: CPT | Performed by: OPTOMETRIST

## 2024-10-01 PROCEDURE — 92133 CPTRZD OPH DX IMG PST SGM ON: CPT | Performed by: OPTOMETRIST

## 2024-10-01 PROCEDURE — 99214 OFFICE O/P EST MOD 30 MIN: CPT | Performed by: OPTOMETRIST

## 2024-10-01 ASSESSMENT — KERATOMETRY
OD: 46.10
OS: 45.20

## 2024-10-01 ASSESSMENT — INTRAOCULAR PRESSURE
OD: 21
OS: 20

## 2024-11-05 ENCOUNTER — TECH ONLY (OUTPATIENT)
Dept: URBAN - METROPOLITAN AREA CLINIC 88 | Facility: CLINIC | Age: 74
End: 2024-11-05
Payer: COMMERCIAL

## 2024-11-05 DIAGNOSIS — H25.13 AGE-RELATED NUCLEAR CATARACT, BILATERAL: Primary | ICD-10-CM

## 2024-12-22 ENCOUNTER — HEALTH MAINTENANCE LETTER (OUTPATIENT)
Age: 74
End: 2024-12-22

## 2025-03-18 NOTE — PROGRESS NOTES
Client returned call and left message late afternoon 11/17/18.  Left message for client today requesting a call back.    Michelle Sutton, [facility]  [phone]  Fax: [phone]     Patient requests all Lab, Cardiology, and Radiology Results on their Discharge Instructions
